# Patient Record
Sex: MALE | Race: BLACK OR AFRICAN AMERICAN | Employment: OTHER | URBAN - METROPOLITAN AREA
[De-identification: names, ages, dates, MRNs, and addresses within clinical notes are randomized per-mention and may not be internally consistent; named-entity substitution may affect disease eponyms.]

---

## 2018-02-24 ENCOUNTER — APPOINTMENT (OUTPATIENT)
Dept: GENERAL RADIOLOGY | Age: 68
End: 2018-02-24
Payer: MEDICARE

## 2018-02-24 ENCOUNTER — HOSPITAL ENCOUNTER (EMERGENCY)
Age: 68
Discharge: HOME | End: 2018-02-24
Attending: STUDENT IN AN ORGANIZED HEALTH CARE EDUCATION/TRAINING PROGRAM
Payer: MEDICARE

## 2018-02-24 ENCOUNTER — APPOINTMENT (OUTPATIENT)
Dept: CT IMAGING | Age: 68
End: 2018-02-24
Payer: MEDICARE

## 2018-02-24 VITALS
WEIGHT: 172 LBS | HEIGHT: 73 IN | BODY MASS INDEX: 22.8 KG/M2 | OXYGEN SATURATION: 96 % | DIASTOLIC BLOOD PRESSURE: 67 MMHG | HEART RATE: 60 BPM | RESPIRATION RATE: 29 BRPM | SYSTOLIC BLOOD PRESSURE: 154 MMHG | TEMPERATURE: 98.2 F

## 2018-02-24 DIAGNOSIS — N32.89 BLADDER MASS: ICD-10-CM

## 2018-02-24 DIAGNOSIS — M16.11 OSTEOARTHRITIS OF RIGHT HIP, UNSPECIFIED OSTEOARTHRITIS TYPE: Primary | ICD-10-CM

## 2018-02-24 PROCEDURE — 99283 EMERGENCY DEPT VISIT LOW MDM: CPT

## 2018-02-24 PROCEDURE — 73502 X-RAY EXAM HIP UNI 2-3 VIEWS: CPT

## 2018-02-24 PROCEDURE — 73700 CT LOWER EXTREMITY W/O DYE: CPT

## 2018-02-24 PROCEDURE — 6370000000 HC RX 637 (ALT 250 FOR IP): Performed by: STUDENT IN AN ORGANIZED HEALTH CARE EDUCATION/TRAINING PROGRAM

## 2018-02-24 RX ORDER — TRAMADOL HYDROCHLORIDE 50 MG/1
50 TABLET ORAL EVERY 8 HOURS PRN
Qty: 9 TABLET | Refills: 0 | Status: SHIPPED | OUTPATIENT
Start: 2018-02-24 | End: 2018-03-06

## 2018-02-24 RX ORDER — TRAMADOL HYDROCHLORIDE 50 MG/1
100 TABLET ORAL ONCE
Status: COMPLETED | OUTPATIENT
Start: 2018-02-24 | End: 2018-02-24

## 2018-02-24 RX ORDER — ACETAMINOPHEN 500 MG
1000 TABLET ORAL EVERY 6 HOURS PRN
Qty: 30 TABLET | Refills: 0 | Status: ON HOLD | OUTPATIENT
Start: 2018-02-24 | End: 2020-09-26 | Stop reason: ALTCHOICE

## 2018-02-24 RX ADMIN — TRAMADOL HYDROCHLORIDE 100 MG: 50 TABLET, FILM COATED ORAL at 13:22

## 2018-02-24 ASSESSMENT — PAIN SCALES - GENERAL
PAINLEVEL_OUTOF10: 8
PAINLEVEL_OUTOF10: 8

## 2018-02-24 ASSESSMENT — ENCOUNTER SYMPTOMS
COUGH: 0
VOMITING: 0
ABDOMINAL PAIN: 0
DIARRHEA: 0
NAUSEA: 0
SHORTNESS OF BREATH: 0

## 2018-02-24 ASSESSMENT — PAIN DESCRIPTION - LOCATION: LOCATION: HIP

## 2018-02-24 ASSESSMENT — PAIN DESCRIPTION - ORIENTATION: ORIENTATION: RIGHT

## 2018-02-24 NOTE — ED PROVIDER NOTES
 Smokeless tobacco: Never Used    Alcohol use No    Drug use: No    Sexual activity: Not Asked     Other Topics Concern    None     Social History Narrative    None       SCREENINGS             PHYSICAL EXAM    (up to 7 for level 4, 8 or more for level 5)     ED Triage Vitals [02/24/18 1218]   BP Temp Temp Source Pulse Resp SpO2 Height Weight   (!) 154/67 98.2 °F (36.8 °C) Oral 60 29 96 % 6' 1\" (1.854 m) 172 lb (78 kg)       Physical Exam   Constitutional: He is oriented to person, place, and time. He appears well-developed and well-nourished. He is active. No distress. HENT:   Head: Normocephalic and atraumatic. Mouth/Throat: Mucous membranes are normal.   Neck: Normal range of motion. Neck supple. Cardiovascular: Normal rate, regular rhythm, normal heart sounds, intact distal pulses and normal pulses. Pulmonary/Chest: Effort normal and breath sounds normal.   Abdominal: Soft. Normal appearance and bowel sounds are normal. There is no tenderness. Musculoskeletal:        Right hip: He exhibits decreased range of motion and tenderness. Minimal tenderness to palpation of hip. Pain elicited with range of motion. Minimal pain elicited with logroll test.  Sensation intact to light touch. Bilateral distal 2+ distal pulses. Neurological: He is alert and oriented to person, place, and time. He has normal strength. Skin: Skin is warm, dry and intact. No rash noted. He is not diaphoretic. Nursing note and vitals reviewed. All other labs were within normal range or not returned as of this dictation. EMERGENCY DEPARTMENT COURSE and DIFFERENTIAL DIAGNOSIS/MDM:   Vitals:    Vitals:    02/24/18 1218   BP: (!) 154/67   Pulse: 60   Resp: 29   Temp: 98.2 °F (36.8 °C)   TempSrc: Oral   SpO2: 96%   Weight: 172 lb (78 kg)   Height: 6' 1\" (1.854 m)       ED Course        This patient was seen with attending physician Fredo Martinez    Patient's x-ray does reveal severe degenerative joint disease.   CT

## 2019-11-21 ENCOUNTER — HOSPITAL ENCOUNTER (OUTPATIENT)
Dept: INFUSION THERAPY | Age: 69
Setting detail: INFUSION SERIES
Discharge: HOME | End: 2019-11-21
Payer: MEDICARE

## 2019-11-21 VITALS
TEMPERATURE: 97.9 F | OXYGEN SATURATION: 98 % | HEART RATE: 68 BPM | RESPIRATION RATE: 18 BRPM | DIASTOLIC BLOOD PRESSURE: 70 MMHG | SYSTOLIC BLOOD PRESSURE: 130 MMHG

## 2019-11-21 PROCEDURE — 2580000003 HC RX 258

## 2019-11-21 PROCEDURE — 96413 CHEMO IV INFUSION 1 HR: CPT

## 2019-11-21 PROCEDURE — 6360000002 HC RX W HCPCS: Performed by: INTERNAL MEDICINE

## 2019-11-21 PROCEDURE — 96401 CHEMO ANTI-NEOPL SQ/IM: CPT

## 2019-11-21 PROCEDURE — 96375 TX/PRO/DX INJ NEW DRUG ADDON: CPT

## 2019-11-21 PROCEDURE — 2580000003 HC RX 258: Performed by: INTERNAL MEDICINE

## 2019-11-21 RX ORDER — DIPHENHYDRAMINE HYDROCHLORIDE 50 MG/ML
25 INJECTION INTRAMUSCULAR; INTRAVENOUS ONCE
Status: COMPLETED | OUTPATIENT
Start: 2019-11-21 | End: 2019-11-21

## 2019-11-21 RX ORDER — SODIUM CHLORIDE 9 MG/ML
INJECTION, SOLUTION INTRAVENOUS
Status: COMPLETED
Start: 2019-11-21 | End: 2019-11-21

## 2019-11-21 RX ADMIN — DIPHENHYDRAMINE HYDROCHLORIDE 25 MG: 50 INJECTION INTRAMUSCULAR; INTRAVENOUS at 15:09

## 2019-11-21 RX ADMIN — WATER 10 ML: 1 INJECTION INTRAMUSCULAR; INTRAVENOUS; SUBCUTANEOUS at 15:07

## 2019-11-21 RX ADMIN — DENOSUMAB 120 MG: 120 INJECTION SUBCUTANEOUS at 13:40

## 2019-11-21 RX ADMIN — DEXAMETHASONE SODIUM PHOSPHATE: 4 INJECTION, SOLUTION INTRAMUSCULAR; INTRAVENOUS at 14:16

## 2019-11-21 RX ADMIN — HYDROCORTISONE SODIUM SUCCINATE 100 MG: 100 INJECTION, POWDER, FOR SOLUTION INTRAMUSCULAR; INTRAVENOUS at 15:08

## 2019-11-21 RX ADMIN — ONDANSETRON: 2 INJECTION INTRAMUSCULAR; INTRAVENOUS at 13:55

## 2019-11-21 RX ADMIN — SODIUM CHLORIDE 116 MG: 9 INJECTION, SOLUTION INTRAVENOUS at 14:33

## 2019-11-21 RX ADMIN — SODIUM CHLORIDE 250 ML: 9 INJECTION, SOLUTION INTRAVENOUS at 13:09

## 2019-11-21 NOTE — FLOWSHEET NOTE
solucortef and benadryl given per order. Vital signs stable. Legs remain elevated. Will reassess in 15 minutes. Family remains at side.

## 2019-11-21 NOTE — FLOWSHEET NOTE
Vital signs taken and stable. Denies any shortness of breath, chest pain, or dizziness. Family at bedside. Chemo treatment restarted. Nurse at side for observation.

## 2019-11-21 NOTE — FLOWSHEET NOTE
Patient left the unit via wheelchair. All equipment used in the care for this patient has been cleaned.

## 2019-11-21 NOTE — FLOWSHEET NOTE
Legs remain elevated. States that he feels better. Sweating is gone. Slight dizziness remains. Blood pressure is 130/77 with a heart rate of 66.

## 2019-11-21 NOTE — FLOWSHEET NOTE
Called to the room. Patient diaphoretic. Chemo stopped. States that he is having abdominal pain. Very pale. Blood pressure 192/88 with a heart rate of 44. Denies any chest pain, shortness of breath. Complains of dizziness. Elevated legs.  Call to Dr. Ailyn Paz.

## 2019-11-21 NOTE — FLOWSHEET NOTE
Spoke to Dr. Gunjan Cruz regarding patient having a reaction. New orders given. If after 15 -20 minutes he is feeling better, we may resume the chemo.

## 2019-12-12 ENCOUNTER — HOSPITAL ENCOUNTER (OUTPATIENT)
Dept: INFUSION THERAPY | Age: 69
Setting detail: INFUSION SERIES
End: 2019-12-12
Payer: MEDICARE

## 2019-12-12 ENCOUNTER — HOSPITAL ENCOUNTER (OUTPATIENT)
Dept: INFUSION THERAPY | Age: 69
Setting detail: INFUSION SERIES
Discharge: HOME | End: 2019-12-12
Payer: MEDICARE

## 2019-12-12 VITALS
SYSTOLIC BLOOD PRESSURE: 157 MMHG | DIASTOLIC BLOOD PRESSURE: 80 MMHG | RESPIRATION RATE: 18 BRPM | HEIGHT: 72 IN | HEART RATE: 70 BPM | WEIGHT: 165 LBS | BODY MASS INDEX: 22.35 KG/M2 | OXYGEN SATURATION: 99 % | TEMPERATURE: 97.8 F

## 2019-12-12 LAB
GLUCOSE BLD-MCNC: 88 MG/DL (ref 60–115)
PERFORMED ON: NORMAL

## 2019-12-12 PROCEDURE — 96375 TX/PRO/DX INJ NEW DRUG ADDON: CPT

## 2019-12-12 PROCEDURE — 6360000002 HC RX W HCPCS

## 2019-12-12 PROCEDURE — 6360000002 HC RX W HCPCS: Performed by: INTERNAL MEDICINE

## 2019-12-12 PROCEDURE — 96413 CHEMO IV INFUSION 1 HR: CPT

## 2019-12-12 PROCEDURE — 2580000003 HC RX 258

## 2019-12-12 PROCEDURE — 2580000003 HC RX 258: Performed by: INTERNAL MEDICINE

## 2019-12-12 RX ORDER — DEXAMETHASONE 4 MG/1
4 TABLET ORAL 2 TIMES DAILY WITH MEALS
Status: ON HOLD | COMMUNITY
End: 2020-11-18

## 2019-12-12 RX ORDER — BICALUTAMIDE 50 MG/1
50 TABLET, FILM COATED ORAL DAILY
Status: ON HOLD | COMMUNITY
End: 2020-11-18

## 2019-12-12 RX ORDER — PROCHLORPERAZINE MALEATE 10 MG
10 TABLET ORAL EVERY 6 HOURS PRN
Status: ON HOLD | COMMUNITY
End: 2020-12-18 | Stop reason: HOSPADM

## 2019-12-12 RX ORDER — SODIUM CHLORIDE 9 MG/ML
INJECTION, SOLUTION INTRAVENOUS
Status: COMPLETED
Start: 2019-12-12 | End: 2019-12-12

## 2019-12-12 RX ORDER — OXYCODONE HYDROCHLORIDE 5 MG/1
5 CAPSULE ORAL EVERY 6 HOURS PRN
COMMUNITY
End: 2020-03-25

## 2019-12-12 RX ADMIN — DIPHENHYDRAMINE HYDROCHLORIDE 25 MG: 50 INJECTION, SOLUTION INTRAMUSCULAR; INTRAVENOUS at 13:39

## 2019-12-12 RX ADMIN — DEXAMETHASONE SODIUM PHOSPHATE: 10 INJECTION INTRAMUSCULAR; INTRAVENOUS at 13:22

## 2019-12-12 RX ADMIN — HYDROCORTISONE SODIUM SUCCINATE 200 MG: 100 INJECTION, POWDER, FOR SOLUTION INTRAMUSCULAR; INTRAVENOUS at 14:40

## 2019-12-12 RX ADMIN — ONDANSETRON: 2 INJECTION INTRAMUSCULAR; INTRAVENOUS at 13:04

## 2019-12-12 RX ADMIN — SODIUM CHLORIDE 116 MG: 9 INJECTION, SOLUTION INTRAVENOUS at 14:09

## 2019-12-12 RX ADMIN — WATER 10 ML: 1 INJECTION INTRAMUSCULAR; INTRAVENOUS; SUBCUTANEOUS at 14:41

## 2019-12-12 RX ADMIN — SODIUM CHLORIDE 250 ML: 9 INJECTION, SOLUTION INTRAVENOUS at 12:29

## 2019-12-12 NOTE — FLOWSHEET NOTE
Patient yelling that his stomach on the right side is aching. Becoming diaphoretic and complaining of dizziness. Chemo stopped. Vs bp 185/97 HR 42 O2 sat is 93%. Blood sugar is 88.  Call to [facility]

## 2019-12-12 NOTE — FLOWSHEET NOTE
Patient is feeling much better. Dizziness is gone. Denies any nausea, skin is dry, denies any abdominal pain. solucortef 200 mg iv given. Call light within reach. Family at side.

## 2019-12-12 NOTE — FLOWSHEET NOTE
Patient to the floor ambulatory for his cycle 2 chemo treatment. Vital signs taken. Denies any discomfort. Call light within reach. Spoke to [facility] regarding labs and progress note from 12/9.   They will fax along with home medication list.

## 2019-12-12 NOTE — FLOWSHEET NOTE
Patient states \"I feel good\"  Vital signs are stable. Will resume chemo treatment at a lower rate.

## 2019-12-12 NOTE — FLOWSHEET NOTE
Spoke to Dr. Leslie Deleon and made him aware of all of his symptoms and new orders received. We are to give solucortef 200 mg now and wait 20 minutes. If he is doing better, them we may resume his chemo treatment.

## 2019-12-12 NOTE — FLOWSHEET NOTE
Declined an AVS. Left the unit via wheelchair. All equipment used in the care for this patient has been cleaned.

## 2019-12-12 NOTE — FLOWSHEET NOTE
Spoke to Dr. Kenton Varela and made him aware. He stated that he will add solucortef pre chemo treatment next infusion.

## 2020-01-10 ENCOUNTER — HOSPITAL ENCOUNTER (OUTPATIENT)
Dept: INFUSION THERAPY | Age: 70
Setting detail: INFUSION SERIES
Discharge: HOME | End: 2020-01-10
Payer: MEDICARE

## 2020-01-10 VITALS
HEART RATE: 73 BPM | RESPIRATION RATE: 18 BRPM | TEMPERATURE: 98 F | SYSTOLIC BLOOD PRESSURE: 140 MMHG | DIASTOLIC BLOOD PRESSURE: 86 MMHG

## 2020-01-10 PROCEDURE — 96367 TX/PROPH/DG ADDL SEQ IV INF: CPT

## 2020-01-10 PROCEDURE — 96375 TX/PRO/DX INJ NEW DRUG ADDON: CPT

## 2020-01-10 PROCEDURE — 2580000003 HC RX 258: Performed by: INTERNAL MEDICINE

## 2020-01-10 PROCEDURE — 2580000003 HC RX 258

## 2020-01-10 PROCEDURE — 96413 CHEMO IV INFUSION 1 HR: CPT

## 2020-01-10 PROCEDURE — 6360000002 HC RX W HCPCS: Performed by: INTERNAL MEDICINE

## 2020-01-10 RX ORDER — SODIUM CHLORIDE 9 MG/ML
INJECTION, SOLUTION INTRAVENOUS
Status: COMPLETED
Start: 2020-01-10 | End: 2020-01-10

## 2020-01-10 RX ADMIN — DOCETAXEL 116 MG: 20 INJECTION INTRAVENOUS at 13:05

## 2020-01-10 RX ADMIN — HYDROCORTISONE SODIUM SUCCINATE 100 MG: 100 INJECTION, POWDER, FOR SOLUTION INTRAMUSCULAR; INTRAVENOUS at 12:55

## 2020-01-10 RX ADMIN — DIPHENHYDRAMINE HYDROCHLORIDE 25 MG: 50 INJECTION, SOLUTION INTRAMUSCULAR; INTRAVENOUS at 12:31

## 2020-01-10 RX ADMIN — SODIUM CHLORIDE 250 ML: 9 INJECTION, SOLUTION INTRAVENOUS at 12:05

## 2020-01-10 RX ADMIN — DEXAMETHASONE SODIUM PHOSPHATE: 10 INJECTION, SOLUTION INTRAMUSCULAR; INTRAVENOUS at 11:36

## 2020-01-10 RX ADMIN — ONDANSETRON: 2 INJECTION INTRAMUSCULAR; INTRAVENOUS at 12:04

## 2020-01-10 NOTE — PROGRESS NOTES
Pt arrived to [facility] at 1030, d/t registration. Accompanied by sister and friend. States has no problems in between treatments. Pt had reactions with last two treatments and was medicated with rescue meds. Pt has requested to receive the rescue med before chemo, to avoid reaction. Stated \" I don't want to feel like that.   i'd rather  Have the medicine first.\"

## 2020-01-10 NOTE — PROGRESS NOTES
Had initiated taxotere. Pt c;o diaphoresis, abd pain. Stopped med. Will recheck in 30 minutes since premed was given and did not wait the 30 minutes.

## 2020-01-31 ENCOUNTER — HOSPITAL ENCOUNTER (OUTPATIENT)
Dept: INFUSION THERAPY | Age: 70
Setting detail: INFUSION SERIES
Discharge: HOME | End: 2020-01-31
Payer: MEDICARE

## 2020-01-31 VITALS
HEART RATE: 63 BPM | OXYGEN SATURATION: 99 % | DIASTOLIC BLOOD PRESSURE: 66 MMHG | SYSTOLIC BLOOD PRESSURE: 127 MMHG | RESPIRATION RATE: 18 BRPM | TEMPERATURE: 97.1 F

## 2020-01-31 PROCEDURE — 6360000002 HC RX W HCPCS: Performed by: INTERNAL MEDICINE

## 2020-01-31 PROCEDURE — 96413 CHEMO IV INFUSION 1 HR: CPT

## 2020-01-31 PROCEDURE — 2580000003 HC RX 258: Performed by: INTERNAL MEDICINE

## 2020-01-31 PROCEDURE — 2580000003 HC RX 258

## 2020-01-31 PROCEDURE — 6360000002 HC RX W HCPCS

## 2020-01-31 PROCEDURE — 96375 TX/PRO/DX INJ NEW DRUG ADDON: CPT

## 2020-01-31 RX ORDER — SODIUM CHLORIDE 9 MG/ML
INJECTION, SOLUTION INTRAVENOUS
Status: COMPLETED
Start: 2020-01-31 | End: 2020-01-31

## 2020-01-31 RX ORDER — DIPHENHYDRAMINE HYDROCHLORIDE 50 MG/ML
25 INJECTION INTRAMUSCULAR; INTRAVENOUS ONCE
Status: COMPLETED | OUTPATIENT
Start: 2020-01-31 | End: 2020-01-31

## 2020-01-31 RX ORDER — SODIUM CHLORIDE 9 MG/ML
INJECTION, SOLUTION INTRAVENOUS
Status: DISCONTINUED
Start: 2020-01-31 | End: 2020-02-01 | Stop reason: HOSPADM

## 2020-01-31 RX ADMIN — HYDROCORTISONE SODIUM SUCCINATE 200 MG: 100 INJECTION, POWDER, FOR SOLUTION INTRAMUSCULAR; INTRAVENOUS at 13:28

## 2020-01-31 RX ADMIN — DIPHENHYDRAMINE HYDROCHLORIDE 25 MG: 50 INJECTION INTRAMUSCULAR; INTRAVENOUS at 13:37

## 2020-01-31 RX ADMIN — DIPHENHYDRAMINE HYDROCHLORIDE 25 MG: 50 INJECTION INTRAMUSCULAR; INTRAVENOUS at 12:28

## 2020-01-31 RX ADMIN — HYDROCORTISONE SODIUM SUCCINATE 100 MG: 100 INJECTION, POWDER, FOR SOLUTION INTRAMUSCULAR; INTRAVENOUS at 11:56

## 2020-01-31 RX ADMIN — DEXAMETHASONE SODIUM PHOSPHATE: 10 INJECTION, SOLUTION INTRAMUSCULAR; INTRAVENOUS at 11:37

## 2020-01-31 RX ADMIN — SODIUM CHLORIDE 250 ML: 9 INJECTION, SOLUTION INTRAVENOUS at 11:36

## 2020-01-31 RX ADMIN — SODIUM CHLORIDE 116 MG: 9 INJECTION, SOLUTION INTRAVENOUS at 12:51

## 2020-01-31 RX ADMIN — ONDANSETRON: 2 INJECTION INTRAMUSCULAR; INTRAVENOUS at 12:03

## 2020-01-31 NOTE — PROGRESS NOTES
Called and left message on doctor voicemail line that pt did well after meds. Also suggested  Doctor if pt would benefit from pepcid as a premed. Roe Hernandez

## 2020-01-31 NOTE — PROGRESS NOTES
Spoke with dr. Bridgette Bassett again. Pt is doing fine. Dr. Sergo Boogie can do treatment, but if starts to react, then stop and do not complete it.

## 2020-01-31 NOTE — PROGRESS NOTES
Pt to [facility] via w/c with friend. Stated no c/o. VS were taken. Heart rate was elevated. Called Dr. Jihan Mix.   After another 5 minutes after spoke with Dr. Jihan Mix, heart rate to 84. Apical was checked during increased heart rate. Was regular rhythm.

## 2020-01-31 NOTE — PROGRESS NOTES
Treatment completed without reaction after extra rescue meds were given. Pt tolerated treatment well. Instructed pt to keep next appt. My at [facility], said if Dr. Olivier Haskins wants to see pt earlier, she will call patient.

## 2020-01-31 NOTE — PROGRESS NOTES
Pt started getting pain in abd, \"all over\". Stopped med. Heart rate 48, P.Ox 95%. Pt states this is the worse one yet. All premeds were given.

## 2020-01-31 NOTE — PROGRESS NOTES
Orders were received from Dr. Jen Dudley.  We are to hold treatment. Pt received rescue meds. Is relaxing in recliner. States is doing okay. Addendum to earlier note, that when pt started to react was guarding abd, diaphoretic, flushed.

## 2020-01-31 NOTE — PROGRESS NOTES
Pt to [facility] ambulatory, acompanied by friend. States no c/o. VS taken. Heart rate up. Called Dr. Jessica Martinez.  Updated doctor. He said if heart rate settles down under 100lbpm, then okay to give chemo. Pt states has been on mind of reactions from previous treatments. Reassuring pt that premeds will be given and will wait the 30 minutes before treatment initiated.

## 2020-02-05 ENCOUNTER — HOSPITAL ENCOUNTER (OUTPATIENT)
Dept: INFUSION THERAPY | Age: 70
Setting detail: INFUSION SERIES
Discharge: HOME | End: 2020-02-05
Payer: MEDICARE

## 2020-02-05 VITALS
HEART RATE: 74 BPM | DIASTOLIC BLOOD PRESSURE: 78 MMHG | SYSTOLIC BLOOD PRESSURE: 134 MMHG | TEMPERATURE: 98.9 F | RESPIRATION RATE: 18 BRPM

## 2020-02-05 PROCEDURE — 6360000002 HC RX W HCPCS: Performed by: INTERNAL MEDICINE

## 2020-02-05 PROCEDURE — 96401 CHEMO ANTI-NEOPL SQ/IM: CPT

## 2020-02-05 RX ADMIN — DENOSUMAB 120 MG: 120 INJECTION SUBCUTANEOUS at 14:02

## 2020-02-05 NOTE — PROGRESS NOTES
Pt to [facility] ambulatory, with sister, for xgeva injection. Is not sure if had before. Stated had an injection at the doctor office a few months ago. lexicomp info given on med.   Pt states will take AVS.

## 2020-02-24 ENCOUNTER — HOSPITAL ENCOUNTER (OUTPATIENT)
Dept: INFUSION THERAPY | Age: 70
Setting detail: INFUSION SERIES
Discharge: HOME | End: 2020-02-24
Payer: MEDICARE

## 2020-02-24 VITALS
RESPIRATION RATE: 16 BRPM | OXYGEN SATURATION: 100 % | HEART RATE: 73 BPM | TEMPERATURE: 97.5 F | SYSTOLIC BLOOD PRESSURE: 145 MMHG | DIASTOLIC BLOOD PRESSURE: 70 MMHG

## 2020-02-24 LAB
HCT VFR BLD CALC: 39.8 % (ref 42–52)
HEMOGLOBIN: 13.4 G/DL (ref 14–18)
MCH RBC QN AUTO: 30.6 PG (ref 27–31.3)
MCHC RBC AUTO-ENTMCNC: 33.6 % (ref 33–37)
MCV RBC AUTO: 91.1 FL (ref 80–100)
PDW BLD-RTO: 15.1 % (ref 11.5–14.5)
PLATELET # BLD: 284 K/UL (ref 130–400)
RBC # BLD: 4.37 M/UL (ref 4.7–6.1)
WBC # BLD: 7.8 K/UL (ref 4.8–10.8)

## 2020-02-24 PROCEDURE — 96413 CHEMO IV INFUSION 1 HR: CPT

## 2020-02-24 PROCEDURE — 6360000002 HC RX W HCPCS: Performed by: INTERNAL MEDICINE

## 2020-02-24 PROCEDURE — 36415 COLL VENOUS BLD VENIPUNCTURE: CPT

## 2020-02-24 PROCEDURE — 85027 COMPLETE CBC AUTOMATED: CPT

## 2020-02-24 PROCEDURE — 96376 TX/PRO/DX INJ SAME DRUG ADON: CPT

## 2020-02-24 PROCEDURE — 96375 TX/PRO/DX INJ NEW DRUG ADDON: CPT

## 2020-02-24 PROCEDURE — 2580000003 HC RX 258

## 2020-02-24 PROCEDURE — 2500000003 HC RX 250 WO HCPCS: Performed by: INTERNAL MEDICINE

## 2020-02-24 PROCEDURE — 96374 THER/PROPH/DIAG INJ IV PUSH: CPT

## 2020-02-24 PROCEDURE — 2580000003 HC RX 258: Performed by: INTERNAL MEDICINE

## 2020-02-24 RX ORDER — SODIUM CHLORIDE 9 MG/ML
INJECTION, SOLUTION INTRAVENOUS
Status: COMPLETED
Start: 2020-02-24 | End: 2020-02-24

## 2020-02-24 RX ORDER — SODIUM CHLORIDE 9 MG/ML
INJECTION, SOLUTION INTRAVENOUS
Status: DISCONTINUED
Start: 2020-02-24 | End: 2020-02-25 | Stop reason: HOSPADM

## 2020-02-24 RX ADMIN — ONDANSETRON: 2 INJECTION INTRAMUSCULAR; INTRAVENOUS at 10:55

## 2020-02-24 RX ADMIN — HYDROCORTISONE SODIUM SUCCINATE 100 MG: 100 INJECTION, POWDER, FOR SOLUTION INTRAMUSCULAR; INTRAVENOUS at 12:23

## 2020-02-24 RX ADMIN — SODIUM CHLORIDE 116 MG: 9 INJECTION, SOLUTION INTRAVENOUS at 13:25

## 2020-02-24 RX ADMIN — DIPHENHYDRAMINE HYDROCHLORIDE 25 MG: 50 INJECTION, SOLUTION INTRAMUSCULAR; INTRAVENOUS at 11:14

## 2020-02-24 RX ADMIN — WATER 10 ML: 1 INJECTION INTRAMUSCULAR; INTRAVENOUS; SUBCUTANEOUS at 12:23

## 2020-02-24 RX ADMIN — FAMOTIDINE: 10 INJECTION, SOLUTION INTRAVENOUS at 11:57

## 2020-02-24 RX ADMIN — SODIUM CHLORIDE 250 ML: 9 INJECTION, SOLUTION INTRAVENOUS at 10:55

## 2020-02-24 RX ADMIN — HYDROCORTISONE SODIUM SUCCINATE 200 MG: 100 INJECTION, POWDER, FOR SOLUTION INTRAMUSCULAR; INTRAVENOUS at 13:40

## 2020-02-24 RX ADMIN — DEXAMETHASONE SODIUM PHOSPHATE: 4 INJECTION, SOLUTION INTRAMUSCULAR; INTRAVENOUS at 11:41

## 2020-02-24 NOTE — FLOWSHEET NOTE
Patient resting in chair, vitals within normal limits. Patient denies any further sypmtoms Infusion still on hold, awaiting return physician call.

## 2020-02-24 NOTE — FLOWSHEET NOTE
Spoke to Ivis Edmondson at [facility]. Call place to Dr. Kelton Encarnacion regarding current orders.

## 2020-02-24 NOTE — FLOWSHEET NOTE
Infusion complete with no further complications. AVS and reminder card provided for next appointment. Patient to have Xgeva injection on 3/4/20. Patient aware. Patient ambulated off unit.

## 2020-02-24 NOTE — FLOWSHEET NOTE
CBC Lab results called to Dr Blount Duty and faxed to office, patient isidoro'd to proceed with chemotherapy

## 2020-02-24 NOTE — FLOWSHEET NOTE
Infusion stopped due to patient complaints of dizziness, chest pressure and severe abdominal pain. Noted low heart rate  and pulse ox 87%. On 2 liters oxygen. Solumedrol given as ordered. Patient reported relief of all symptoms after medication stopped and solumedrol provided. .Pulse ox improved to 100% on 2liters nasal cannula and heart rate improved to 67. Dr Candice Davidson office notified, awaiting return call. RN to stay with patient and monitor closely.

## 2020-02-26 ENCOUNTER — HOSPITAL ENCOUNTER (EMERGENCY)
Age: 70
Discharge: HOME | End: 2020-02-26
Attending: EMERGENCY MEDICINE
Payer: MEDICARE

## 2020-02-26 ENCOUNTER — APPOINTMENT (OUTPATIENT)
Dept: CT IMAGING | Age: 70
End: 2020-02-26
Payer: MEDICARE

## 2020-02-26 ENCOUNTER — APPOINTMENT (OUTPATIENT)
Dept: GENERAL RADIOLOGY | Age: 70
End: 2020-02-26
Payer: MEDICARE

## 2020-02-26 ENCOUNTER — OFFICE VISIT (OUTPATIENT)
Dept: PALLATIVE CARE | Age: 70
End: 2020-02-26
Payer: MEDICARE

## 2020-02-26 VITALS
WEIGHT: 177 LBS | DIASTOLIC BLOOD PRESSURE: 89 MMHG | RESPIRATION RATE: 15 BRPM | HEART RATE: 91 BPM | SYSTOLIC BLOOD PRESSURE: 129 MMHG | OXYGEN SATURATION: 100 % | BODY MASS INDEX: 23.35 KG/M2 | TEMPERATURE: 97.3 F

## 2020-02-26 VITALS
DIASTOLIC BLOOD PRESSURE: 80 MMHG | RESPIRATION RATE: 18 BRPM | HEART RATE: 67 BPM | HEIGHT: 73 IN | BODY MASS INDEX: 23.46 KG/M2 | SYSTOLIC BLOOD PRESSURE: 115 MMHG | OXYGEN SATURATION: 99 % | WEIGHT: 177 LBS | TEMPERATURE: 97.6 F

## 2020-02-26 PROBLEM — M16.9 OSTEOARTHRITIS OF HIP: Status: ACTIVE | Noted: 2020-02-26

## 2020-02-26 PROBLEM — R16.0 LIVER MASS: Status: ACTIVE | Noted: 2020-02-26

## 2020-02-26 LAB
ALBUMIN SERPL-MCNC: 4.1 G/DL (ref 3.5–4.6)
ALP BLD-CCNC: 102 U/L (ref 35–104)
ALT SERPL-CCNC: 17 U/L (ref 0–41)
ANION GAP SERPL CALCULATED.3IONS-SCNC: 15 MEQ/L (ref 9–15)
AST SERPL-CCNC: 20 U/L (ref 0–40)
BASOPHILS ABSOLUTE: 0 K/UL (ref 0–0.2)
BASOPHILS RELATIVE PERCENT: 0.8 %
BILIRUB SERPL-MCNC: 0.3 MG/DL (ref 0.2–0.7)
BUN BLDV-MCNC: 12 MG/DL (ref 8–23)
CALCIUM SERPL-MCNC: 8.6 MG/DL (ref 8.5–9.9)
CHLORIDE BLD-SCNC: 105 MEQ/L (ref 95–107)
CO2: 21 MEQ/L (ref 20–31)
CREAT SERPL-MCNC: 0.74 MG/DL (ref 0.7–1.2)
D DIMER: 1.81 MG/L FEU (ref 0–0.5)
EKG ATRIAL RATE: 271 BPM
EKG Q-T INTERVAL: 326 MS
EKG QRS DURATION: 72 MS
EKG QTC CALCULATION (BAZETT): 444 MS
EKG R AXIS: 21 DEGREES
EKG T AXIS: -57 DEGREES
EKG VENTRICULAR RATE: 112 BPM
EOSINOPHILS ABSOLUTE: 0 K/UL (ref 0–0.7)
EOSINOPHILS RELATIVE PERCENT: 0.4 %
GFR AFRICAN AMERICAN: >60
GFR NON-AFRICAN AMERICAN: >60
GLOBULIN: 3.5 G/DL (ref 2.3–3.5)
GLUCOSE BLD-MCNC: 111 MG/DL (ref 70–99)
HCT VFR BLD CALC: 42.7 % (ref 42–52)
HEMOGLOBIN: 14.5 G/DL (ref 14–18)
INR BLD: 0.9
LYMPHOCYTES ABSOLUTE: 1.6 K/UL (ref 1–4.8)
LYMPHOCYTES RELATIVE PERCENT: 30.5 %
MAGNESIUM: 2.2 MG/DL (ref 1.7–2.4)
MCH RBC QN AUTO: 30.8 PG (ref 27–31.3)
MCHC RBC AUTO-ENTMCNC: 34.1 % (ref 33–37)
MCV RBC AUTO: 90.4 FL (ref 80–100)
MONOCYTES ABSOLUTE: 0.3 K/UL (ref 0.2–0.8)
MONOCYTES RELATIVE PERCENT: 4.9 %
NEUTROPHILS ABSOLUTE: 3.2 K/UL (ref 1.4–6.5)
NEUTROPHILS RELATIVE PERCENT: 63.4 %
PDW BLD-RTO: 15.1 % (ref 11.5–14.5)
PLATELET # BLD: 228 K/UL (ref 130–400)
POTASSIUM SERPL-SCNC: 3.7 MEQ/L (ref 3.4–4.9)
PROTHROMBIN TIME: 12.3 SEC (ref 12.3–14.9)
RBC # BLD: 4.72 M/UL (ref 4.7–6.1)
SODIUM BLD-SCNC: 141 MEQ/L (ref 135–144)
TOTAL PROTEIN: 7.6 G/DL (ref 6.3–8)
TROPONIN: <0.01 NG/ML (ref 0–0.01)
TSH SERPL DL<=0.05 MIU/L-ACNC: 4.45 UIU/ML (ref 0.44–3.86)
WBC # BLD: 5.1 K/UL (ref 4.8–10.8)

## 2020-02-26 PROCEDURE — 71045 X-RAY EXAM CHEST 1 VIEW: CPT

## 2020-02-26 PROCEDURE — 99284 EMERGENCY DEPT VISIT MOD MDM: CPT

## 2020-02-26 PROCEDURE — 85379 FIBRIN DEGRADATION QUANT: CPT

## 2020-02-26 PROCEDURE — 6360000004 HC RX CONTRAST MEDICATION: Performed by: EMERGENCY MEDICINE

## 2020-02-26 PROCEDURE — 84484 ASSAY OF TROPONIN QUANT: CPT

## 2020-02-26 PROCEDURE — 93005 ELECTROCARDIOGRAM TRACING: CPT | Performed by: EMERGENCY MEDICINE

## 2020-02-26 PROCEDURE — 99205 OFFICE O/P NEW HI 60 MIN: CPT | Performed by: FAMILY MEDICINE

## 2020-02-26 PROCEDURE — 71275 CT ANGIOGRAPHY CHEST: CPT

## 2020-02-26 PROCEDURE — 85610 PROTHROMBIN TIME: CPT

## 2020-02-26 PROCEDURE — 80053 COMPREHEN METABOLIC PANEL: CPT

## 2020-02-26 PROCEDURE — 96374 THER/PROPH/DIAG INJ IV PUSH: CPT

## 2020-02-26 PROCEDURE — 85025 COMPLETE CBC W/AUTO DIFF WBC: CPT

## 2020-02-26 PROCEDURE — 99497 ADVNCD CARE PLAN 30 MIN: CPT | Performed by: FAMILY MEDICINE

## 2020-02-26 PROCEDURE — 83735 ASSAY OF MAGNESIUM: CPT

## 2020-02-26 PROCEDURE — 6370000000 HC RX 637 (ALT 250 FOR IP): Performed by: EMERGENCY MEDICINE

## 2020-02-26 PROCEDURE — 84443 ASSAY THYROID STIM HORMONE: CPT

## 2020-02-26 PROCEDURE — 36415 COLL VENOUS BLD VENIPUNCTURE: CPT

## 2020-02-26 PROCEDURE — 2500000003 HC RX 250 WO HCPCS: Performed by: EMERGENCY MEDICINE

## 2020-02-26 RX ORDER — ONDANSETRON 4 MG/1
4 TABLET, FILM COATED ORAL DAILY PRN
Qty: 30 TABLET | Refills: 0 | Status: ON HOLD | OUTPATIENT
Start: 2020-02-26 | End: 2020-12-18 | Stop reason: HOSPADM

## 2020-02-26 RX ORDER — OXYCODONE HYDROCHLORIDE 10 MG/1
10 TABLET ORAL EVERY 6 HOURS PRN
Qty: 120 TABLET | Refills: 0 | Status: SHIPPED | OUTPATIENT
Start: 2020-02-26 | End: 2020-03-25 | Stop reason: SDUPTHER

## 2020-02-26 RX ORDER — DILTIAZEM HYDROCHLORIDE 60 MG/1
60 TABLET, FILM COATED ORAL ONCE
Status: COMPLETED | OUTPATIENT
Start: 2020-02-26 | End: 2020-02-26

## 2020-02-26 RX ORDER — DILTIAZEM HYDROCHLORIDE 120 MG/1
120 CAPSULE, COATED, EXTENDED RELEASE ORAL DAILY
Qty: 90 CAPSULE | Refills: 3 | Status: ON HOLD | OUTPATIENT
Start: 2020-02-26 | End: 2020-11-18

## 2020-02-26 RX ORDER — DOCUSATE SODIUM 100 MG/1
100 CAPSULE, LIQUID FILLED ORAL 2 TIMES DAILY
Qty: 60 CAPSULE | Refills: 0 | Status: SHIPPED | OUTPATIENT
Start: 2020-02-26 | End: 2020-03-27

## 2020-02-26 RX ORDER — DILTIAZEM HYDROCHLORIDE 5 MG/ML
10 INJECTION INTRAVENOUS ONCE
Status: COMPLETED | OUTPATIENT
Start: 2020-02-26 | End: 2020-02-26

## 2020-02-26 RX ADMIN — DILTIAZEM HYDROCHLORIDE 60 MG: 60 TABLET, FILM COATED ORAL at 17:20

## 2020-02-26 RX ADMIN — RIVAROXABAN 20 MG: 20 TABLET, FILM COATED ORAL at 17:19

## 2020-02-26 RX ADMIN — DILTIAZEM HYDROCHLORIDE 10 MG: 5 INJECTION INTRAVENOUS at 14:47

## 2020-02-26 RX ADMIN — IOPAMIDOL 100 ML: 755 INJECTION, SOLUTION INTRAVENOUS at 16:03

## 2020-02-26 SDOH — HEALTH STABILITY: MENTAL HEALTH: HOW OFTEN DO YOU HAVE A DRINK CONTAINING ALCOHOL?: 2-4 TIMES A MONTH

## 2020-02-26 ASSESSMENT — ENCOUNTER SYMPTOMS
DIARRHEA: 0
SHORTNESS OF BREATH: 0
VOMITING: 0
RESPIRATORY NEGATIVE: 1
BACK PAIN: 1
EYES NEGATIVE: 1
CONSTIPATION: 0
WHEEZING: 0
COUGH: 0
NAUSEA: 1
ABDOMINAL DISTENTION: 0

## 2020-02-26 NOTE — ED NOTES
Patient presents to the ER from Palliative Care with complains of irregular heart rhythm  Patient was being seen by his palliative care NP when they auscultated his heart and sent him to the ER  Patient denies chest pain, denies SOB  Complains of being tired more than usual  Patient states sometimes he can feel his heart race but right now he does not feel that it is racing  [id number] RN  02/26/20 2592

## 2020-02-26 NOTE — ED NOTES
Pt given orange juice after ok'd with Dr. Reji Covarrubias Pt denies any pain at this time.       Kita Vazquez RN  02/26/20 3933

## 2020-02-26 NOTE — PROGRESS NOTES
Right 11/2018     Social History     Socioeconomic History    Marital status: Single     Spouse name: Not on file    Number of children: Not on file    Years of education: Not on file    Highest education level: Not on file   Occupational History    Not on file   Social Needs    Financial resource strain: Not on file    Food insecurity:     Worry: Not on file     Inability: Not on file    Transportation needs:     Medical: Not on file     Non-medical: Not on file   Tobacco Use    Smoking status: Never Smoker    Smokeless tobacco: Never Used   Substance and Sexual Activity    Alcohol use: Yes     Frequency: 2-4 times a month    Drug use: No    Sexual activity: Not on file   Lifestyle    Physical activity:     Days per week: Not on file     Minutes per session: Not on file    Stress: Not on file   Relationships    Social connections:     Talks on phone: Not on file     Gets together: Not on file     Attends Sikh service: Not on file     Active member of club or organization: Not on file     Attends meetings of clubs or organizations: Not on file     Relationship status: Not on file    Intimate partner violence:     Fear of current or ex partner: Not on file     Emotionally abused: Not on file     Physically abused: Not on file     Forced sexual activity: Not on file   Other Topics Concern    Not on file   Social History Narrative    Not on file     No family history on file. No Known Allergies  Current Outpatient Medications on File Prior to Visit   Medication Sig Dispense Refill    Fe Bisgly-Succ-C-Thre-B12-FA (IRON-150 PO) Take 325 mg by mouth daily      Denosumab (XGEVA SC) Inject into the skin      oxyCODONE 5 MG capsule Take 5 mg by mouth every 6 hours as needed for Pain.       bicalutamide (CASODEX) 50 MG chemo tablet Take 50 mg by mouth daily      acetaminophen (APAP EXTRA STRENGTH) 500 MG tablet Take 2 tablets by mouth every 6 hours as needed for Pain 30 tablet 0    dexamethasone is soft. Skin:     General: Skin is warm and dry. Neurological:      Mental Status: He is alert and oriented to person, place, and time. Psychiatric:         Behavior: Behavior normal.         Assessment and Plan:      1. Cancer associated pain  - Due to pain exacerbation will increase oxy to 10mg and assess at next visit    - oxyCODONE (OXY-IR) 10 MG immediate release tablet; Take 1 tablet by mouth every 6 hours as needed for Pain (moderate to severe pain) for up to 120 days. Dispense: 120 tablet; Refill: 0    Pt is tolerating current pain meds without adverse effects or over sedation. Lowest effective dose used. Pt advised to call and notify palliative care for any adverse effects or sedation  Pt is able to maintain adequate functional level and participate in ADLs  OARRS reviewed. There is no indication of aberrant behavior  Pt advised to call for increasing or uncontrolled pain. Risk vs benefit assessed. Pt educated on risk of addiction. Pt advised to take only as prescribed and not to change frequency of pain meds without consulting palliative care first.    2. Chemotherapy-induced nausea    - ondansetron (ZOFRAN) 4 MG tablet; Take 1 tablet by mouth daily as needed for Nausea or Vomiting  Dispense: 30 tablet; Refill: 0    3. Drug-induced constipation    - docusate sodium (COLACE) 100 MG capsule; Take 1 capsule by mouth 2 times daily  Dispense: 60 capsule; Refill: 0    4. Prostate cancer ([address]  5. Metastatic bone cancer ([address]  6. Liver mass  - F/U with heme/onc as scheduled    7. Palpitations  - Due to irregular rhythym on auscultation, intermittent tachycardia, and dizziness intermittently recommended ER eval.     8. Osteoarthritis of both hips, unspecified osteoarthritis type  - See #1    9. Encounter for palliative care  - Call for any questions, concerns or change in condition. Much support, guidance and active listening provided. There are no discontinued medications.      - Advance Care Planning   We discussed Living Will (LW), and [facility]) as well as their activation. Patient choice discussed. LW/HCPOA in place No;Tasked  for assistance with completing paperwork Yes; Code status discussed Yes; signed No. Code Full code. All related questions were answered completely. - Goals of Care Discussion:  Disease process and goals of treatment were discussed in basic terms. Herman's goal is to optimize available comfort care measures to optimize pain control and maximize functional ability. We discussed the palliative care philosophy in light of those goals. We discussed all care options contingent on treatment response and QOL. Much active listening, presence, and emotional support were given. Discussed with patient/surrogate: POC, Treatment risks/benefits, and alternatives. All questions were answered. Additionally, a printed copy of the CDC medications/opioid safety informational sheet was reviewed and given to patient for reference. Opioid contract signed:Yes    Due to acuity, symptomatology and high-risk medication management, I advised patient to Return in about 4 weeks (around 3/25/2020), or if symptoms worsen or fail to improve. Thanks for the opportunity you have allowed us to provide palliative care to Melchor Cates. We will be in touch as care progresses. Please feel free to reach out to us should you have any questions or requests.     Total Time 60 mins (Adv. Care planning 17 mins)   > 50% Time Spent Counseling/Care coordination yes       JOYCE Linares - CNP    Collaborating physician: Dr. Martha Zuniga

## 2020-02-26 NOTE — ED TRIAGE NOTES
Pt a/o x3 skin pink w/d resp non labored. Pt sent by Flaquito Watters NP for irregular hr. Pt denies any heart rhythm issues. pt denies pain.  Denies sob

## 2020-02-26 NOTE — ED PROVIDER NOTES
violence:     Fear of current or ex partner: None     Emotionally abused: None     Physically abused: None     Forced sexual activity: None   Other Topics Concern    None   Social History Narrative    None       SCREENINGS              PHYSICAL EXAM    (up to 7 for level 4, 8 or more for level 5)     ED Triage Vitals [02/26/20 1420]   BP Temp Temp Source Pulse Resp SpO2 Height Weight   118/69 97.3 °F (36.3 °C) Oral 89 18 98 % -- 177 lb (80.3 kg)       Physical Exam  Vitals signs and nursing note reviewed. Constitutional:       General: He is not in acute distress. Appearance: He is well-developed. He is not diaphoretic. HENT:      Head: Normocephalic and atraumatic. Right Ear: External ear normal.      Left Ear: External ear normal.      Mouth/Throat:      Pharynx: No oropharyngeal exudate. Eyes:      Conjunctiva/sclera: Conjunctivae normal.      Pupils: Pupils are equal, round, and reactive to light. Neck:      Musculoskeletal: Normal range of motion and neck supple. Thyroid: No thyromegaly. Vascular: No JVD. Trachea: No tracheal deviation. Cardiovascular:      Rate and Rhythm: Tachycardia present. Rhythm irregular. Heart sounds: Normal heart sounds. No murmur. Pulmonary:      Effort: Pulmonary effort is normal. No respiratory distress. Breath sounds: Normal breath sounds. No wheezing. Abdominal:      General: Bowel sounds are normal.      Palpations: Abdomen is soft. Tenderness: There is no abdominal tenderness. There is no guarding. Musculoskeletal: Normal range of motion. Skin:     General: Skin is warm and dry. Findings: No rash. Neurological:      Mental Status: He is alert and oriented to person, place, and time. Cranial Nerves: No cranial nerve deficit.    Psychiatric:         Behavior: Behavior normal.         DIAGNOSTIC RESULTS     EKG: All EKG's are interpreted by the Emergency Department Physician who either signs or Co-signsthis chart response [facility])          DISPOSITION/PLAN   DISPOSITION        PATIENT REFERREDTO:  Aroldo Andre MD  [address]  [phone]    Schedule an appointment as soon as possible for a visit   for your heart rhythm issue. DISCHARGEMEDICATIONS:  New Prescriptions    DILTIAZEM (CARDIZEM CD) 120 MG EXTENDED RELEASE CAPSULE    Take 1 capsule by mouth daily    RIVAROXABAN (XARELTO) 20 MG TABS TABLET    Take 1 tablet by mouth daily (with breakfast)     Controlled Substances Monitoring:     No flowsheet data found.     (Please note that portions of this note were completed with a voice recognition program.  Efforts were made to edit the dictations but occasionally words are mis-transcribed.)    Amado Mccoy DO (electronically signed)  Attending Emergency Physician          Amado Mccoy,   02/26/20 7357 [address] [place] [address],   02/26/20 4705

## 2020-02-27 PROCEDURE — 93010 ELECTROCARDIOGRAM REPORT: CPT | Performed by: INTERNAL MEDICINE

## 2020-03-04 ENCOUNTER — HOSPITAL ENCOUNTER (OUTPATIENT)
Dept: INFUSION THERAPY | Age: 70
Setting detail: INFUSION SERIES
Discharge: HOME | End: 2020-03-04
Payer: MEDICARE

## 2020-03-04 VITALS
TEMPERATURE: 97.6 F | HEART RATE: 74 BPM | DIASTOLIC BLOOD PRESSURE: 76 MMHG | RESPIRATION RATE: 18 BRPM | SYSTOLIC BLOOD PRESSURE: 155 MMHG

## 2020-03-04 PROCEDURE — 6360000002 HC RX W HCPCS: Performed by: INTERNAL MEDICINE

## 2020-03-04 PROCEDURE — 96401 CHEMO ANTI-NEOPL SQ/IM: CPT

## 2020-03-04 RX ADMIN — DENOSUMAB 120 MG: 120 INJECTION SUBCUTANEOUS at 12:36

## 2020-03-04 NOTE — FLOWSHEET NOTE
Injection given in left posterior arm. Tolerated well. Declined an AVS. Left the unit ambulatory. All equipment used in the care for this patient has been cleaned.

## 2020-03-04 NOTE — FLOWSHEET NOTE
Patient to the floor ambulatory for his  Xgeva. Vital signs taken. Denies any discomfort. Call light within reach.

## 2020-03-24 ENCOUNTER — TELEPHONE (OUTPATIENT)
Dept: PALLATIVE CARE | Age: 70
End: 2020-03-24

## 2020-03-25 ENCOUNTER — OFFICE VISIT (OUTPATIENT)
Dept: PALLATIVE CARE | Age: 70
End: 2020-03-25
Payer: MEDICARE

## 2020-03-25 ENCOUNTER — HOSPITAL ENCOUNTER (OUTPATIENT)
Dept: INFUSION THERAPY | Age: 70
Setting detail: INFUSION SERIES
Discharge: HOME | End: 2020-03-25
Payer: MEDICARE

## 2020-03-25 VITALS
RESPIRATION RATE: 18 BRPM | SYSTOLIC BLOOD PRESSURE: 124 MMHG | HEART RATE: 69 BPM | DIASTOLIC BLOOD PRESSURE: 71 MMHG | TEMPERATURE: 97.5 F

## 2020-03-25 VITALS
WEIGHT: 180.6 LBS | BODY MASS INDEX: 23.83 KG/M2 | TEMPERATURE: 97.3 F | HEART RATE: 62 BPM | DIASTOLIC BLOOD PRESSURE: 66 MMHG | RESPIRATION RATE: 16 BRPM | SYSTOLIC BLOOD PRESSURE: 151 MMHG | OXYGEN SATURATION: 99 %

## 2020-03-25 DIAGNOSIS — M85.80 OTHER SPECIFIED DISORDERS OF BONE DENSITY AND STRUCTURE, UNSPECIFIED SITE: Primary | ICD-10-CM

## 2020-03-25 DIAGNOSIS — M16.0 OSTEOARTHRITIS OF BOTH HIPS, UNSPECIFIED OSTEOARTHRITIS TYPE: ICD-10-CM

## 2020-03-25 PROCEDURE — 99214 OFFICE O/P EST MOD 30 MIN: CPT | Performed by: FAMILY MEDICINE

## 2020-03-25 PROCEDURE — 96401 CHEMO ANTI-NEOPL SQ/IM: CPT

## 2020-03-25 PROCEDURE — 6360000002 HC RX W HCPCS: Performed by: INTERNAL MEDICINE

## 2020-03-25 RX ORDER — POLYETHYLENE GLYCOL 3350
POWDER (GRAM) MISCELLANEOUS
Status: ON HOLD | COMMUNITY
End: 2020-12-18 | Stop reason: HOSPADM

## 2020-03-25 RX ORDER — OXYCODONE HYDROCHLORIDE 10 MG/1
10 TABLET ORAL EVERY 6 HOURS PRN
Qty: 120 TABLET | Refills: 0 | Status: SHIPPED | OUTPATIENT
Start: 2020-03-25 | End: 2020-04-23 | Stop reason: SDUPTHER

## 2020-03-25 RX ADMIN — DENOSUMAB 120 MG: 120 INJECTION SUBCUTANEOUS at 09:31

## 2020-03-25 ASSESSMENT — ENCOUNTER SYMPTOMS
COUGH: 0
SHORTNESS OF BREATH: 0
ABDOMINAL DISTENTION: 0
WHEEZING: 0
CONSTIPATION: 0
NAUSEA: 1
ABDOMINAL PAIN: 1
DIARRHEA: 0
BACK PAIN: 1
EYES NEGATIVE: 1
VOMITING: 0
RESPIRATORY NEGATIVE: 1

## 2020-03-25 NOTE — FLOWSHEET NOTE
Injection given in left arm. Tolerated well. Declined an AVS. Left the unit via wheelchair. All equipment used in the care for this patient has been cleaned. PEDIATRIC TRANSPLANT CARDIOLOGY NOTE    re:Rosa Phillips  :2011    Raina Castillo MD  [address] [facility] [place] [address]    Dear Dr. Castillo:    3rd grade this year at [employer].      09/04/2019    MCV 79 09/04/2019     (H) 09/04/[facility] [address] [place]  [address]  [phone]

## 2020-03-25 NOTE — FLOWSHEET NOTE
Patient to the floor via wheelchair for his xgeva injection. Vital signs taken. Denies any discomfort. Call light within reach.

## 2020-04-23 ENCOUNTER — VIRTUAL VISIT (OUTPATIENT)
Dept: PALLATIVE CARE | Age: 70
End: 2020-04-23
Payer: MEDICARE

## 2020-04-23 PROCEDURE — 99442 PR PHYS/QHP TELEPHONE EVALUATION 11-20 MIN: CPT | Performed by: FAMILY MEDICINE

## 2020-04-23 RX ORDER — OXYCODONE HYDROCHLORIDE 10 MG/1
10 TABLET ORAL EVERY 6 HOURS PRN
Qty: 120 TABLET | Refills: 0 | Status: SHIPPED | OUTPATIENT
Start: 2020-04-23 | End: 2020-05-18 | Stop reason: SDUPTHER

## 2020-04-23 ASSESSMENT — ENCOUNTER SYMPTOMS
VOMITING: 0
ABDOMINAL DISTENTION: 0
DIARRHEA: 0
CONSTIPATION: 0
WHEEZING: 0
ABDOMINAL PAIN: 1
SHORTNESS OF BREATH: 0
NAUSEA: 1
BACK PAIN: 1
RESPIRATORY NEGATIVE: 1
COUGH: 0
EYES NEGATIVE: 1

## 2020-04-23 NOTE — PROGRESS NOTES
Lifestyle    Physical activity     Days per week: Not on file     Minutes per session: Not on file    Stress: Not on file   Relationships    Social connections     Talks on phone: Not on file     Gets together: Not on file     Attends Tenriism service: Not on file     Active member of club or organization: Not on file     Attends meetings of clubs or organizations: Not on file     Relationship status: Not on file    Intimate partner violence     Fear of current or ex partner: Not on file     Emotionally abused: Not on file     Physically abused: Not on file     Forced sexual activity: Not on file   Other Topics Concern    Not on file   Social History Narrative    Not on file     No family history on file. No Known Allergies  Current Outpatient Medications on File Prior to Visit   Medication Sig Dispense Refill    Polyethylene Glycol 3350 POWD Take by mouth      Fe Bisgly-Succ-C-Thre-B12-FA (IRON-150 PO) Take 325 mg by mouth daily      Denosumab (XGEVA SC) Inject into the skin      ondansetron (ZOFRAN) 4 MG tablet Take 1 tablet by mouth daily as needed for Nausea or Vomiting 30 tablet 0    dilTIAZem (CARDIZEM CD) 120 MG extended release capsule Take 1 capsule by mouth daily 90 capsule 3    rivaroxaban (XARELTO) 20 MG TABS tablet Take 1 tablet by mouth daily (with breakfast) 60 tablet 2    dexamethasone (DECADRON) 4 MG tablet Take 4 mg by mouth 2 times daily (with meals) Take 1 tab Bid 1 day before chemo tx and 2 days after chemo treatment.  bicalutamide (CASODEX) 50 MG chemo tablet Take 50 mg by mouth daily      prochlorperazine (COMPAZINE) 10 MG tablet Take 10 mg by mouth every 6 hours as needed      acetaminophen (APAP EXTRA STRENGTH) 500 MG tablet Take 2 tablets by mouth every 6 hours as needed for Pain 30 tablet 0     No current facility-administered medications on file prior to visit.         Review of Systems   Constitutional: Negative for appetite change, fatigue, fever and unexpected

## 2020-05-07 ENCOUNTER — HOSPITAL ENCOUNTER (OUTPATIENT)
Dept: CT IMAGING | Age: 70
Discharge: HOME | End: 2020-05-09
Payer: MEDICARE

## 2020-05-07 PROCEDURE — 74177 CT ABD & PELVIS W/CONTRAST: CPT

## 2020-05-07 PROCEDURE — 71260 CT THORAX DX C+: CPT

## 2020-05-07 PROCEDURE — 6360000004 HC RX CONTRAST MEDICATION: Performed by: INTERNAL MEDICINE

## 2020-05-07 RX ORDER — SODIUM CHLORIDE 0.9 % (FLUSH) 0.9 %
10 SYRINGE (ML) INJECTION
Status: DISPENSED | OUTPATIENT
Start: 2020-05-07 | End: 2020-05-07

## 2020-05-07 RX ADMIN — IOPAMIDOL 100 ML: 612 INJECTION, SOLUTION INTRAVENOUS at 09:53

## 2020-05-18 RX ORDER — OXYCODONE HYDROCHLORIDE 10 MG/1
10 TABLET ORAL EVERY 6 HOURS PRN
Qty: 120 TABLET | Refills: 0 | Status: SHIPPED | OUTPATIENT
Start: 2020-05-18 | End: 2020-05-22 | Stop reason: SDUPTHER

## 2020-05-22 RX ORDER — OXYCODONE HYDROCHLORIDE 10 MG/1
10 TABLET ORAL EVERY 6 HOURS PRN
Qty: 120 TABLET | Refills: 0 | Status: SHIPPED | OUTPATIENT
Start: 2020-05-22 | End: 2020-06-24 | Stop reason: SDUPTHER

## 2020-05-27 ENCOUNTER — VIRTUAL VISIT (OUTPATIENT)
Dept: PALLATIVE CARE | Age: 70
End: 2020-05-27
Payer: MEDICARE

## 2020-05-27 PROCEDURE — 99213 OFFICE O/P EST LOW 20 MIN: CPT | Performed by: FAMILY MEDICINE

## 2020-05-27 ASSESSMENT — ENCOUNTER SYMPTOMS
DIARRHEA: 0
COUGH: 0
ABDOMINAL DISTENTION: 0
CONSTIPATION: 0
ABDOMINAL PAIN: 1
RESPIRATORY NEGATIVE: 1
EYES NEGATIVE: 1
BACK PAIN: 1
WHEEZING: 0
VOMITING: 0
NAUSEA: 1
SHORTNESS OF BREATH: 0

## 2020-05-27 NOTE — PROGRESS NOTES
Prostate cancer ([facility] [address]  7. Liver mass  - F/U with heme/onc as scheduled    8. Encounter for palliative care  - Call for any questions, concerns or change in condition. Much support, guidance and active listening provided. There are no discontinued medications. Discussed with patient/surrogate: POC, Treatment risks/benefits, and alternatives including as noted above. All questions were answered. Gave much active listening, presence, and emotional support. Due to acuity, symptomatology and high-risk medication management,   I advised patient to No follow-ups on file. Total Time 15 mins   > 50% Time Spent Counseling/Care coordination?  yes     JOYCE Medellin - CNP    Collaborating physician: Dr. July Rouse

## 2020-06-01 ENCOUNTER — HOSPITAL ENCOUNTER (OUTPATIENT)
Dept: INFUSION THERAPY | Age: 70
Setting detail: INFUSION SERIES
End: 2020-06-01
Payer: MEDICARE

## 2020-06-24 ENCOUNTER — OFFICE VISIT (OUTPATIENT)
Dept: PALLATIVE CARE | Age: 70
End: 2020-06-24
Payer: MEDICARE

## 2020-06-24 VITALS
HEART RATE: 70 BPM | OXYGEN SATURATION: 97 % | DIASTOLIC BLOOD PRESSURE: 90 MMHG | BODY MASS INDEX: 24.04 KG/M2 | RESPIRATION RATE: 20 BRPM | SYSTOLIC BLOOD PRESSURE: 160 MMHG | WEIGHT: 182.2 LBS

## 2020-06-24 PROCEDURE — 99215 OFFICE O/P EST HI 40 MIN: CPT | Performed by: FAMILY MEDICINE

## 2020-06-24 RX ORDER — OXYCODONE HYDROCHLORIDE 10 MG/1
10 TABLET ORAL EVERY 6 HOURS PRN
Qty: 120 TABLET | Refills: 0 | Status: SHIPPED | OUTPATIENT
Start: 2020-06-24 | End: 2020-07-22 | Stop reason: SDUPTHER

## 2020-06-24 RX ORDER — SENNA AND DOCUSATE SODIUM 50; 8.6 MG/1; MG/1
2 TABLET, FILM COATED ORAL DAILY PRN
Qty: 120 TABLET | Refills: 3 | Status: ON HOLD | OUTPATIENT
Start: 2020-06-24 | End: 2020-12-18 | Stop reason: HOSPADM

## 2020-06-24 RX ORDER — MORPHINE SULFATE 15 MG/1
15 TABLET, FILM COATED, EXTENDED RELEASE ORAL 2 TIMES DAILY
Qty: 60 TABLET | Refills: 0 | Status: SHIPPED | OUTPATIENT
Start: 2020-06-24 | End: 2020-07-22 | Stop reason: SDUPTHER

## 2020-06-24 ASSESSMENT — ENCOUNTER SYMPTOMS
CONSTIPATION: 0
RESPIRATORY NEGATIVE: 1
EYES NEGATIVE: 1
BACK PAIN: 1
ABDOMINAL DISTENTION: 0
WHEEZING: 0
SHORTNESS OF BREATH: 0
ABDOMINAL PAIN: 1
VOMITING: 0
COUGH: 0
DIARRHEA: 0
NAUSEA: 1

## 2020-07-15 ENCOUNTER — HOSPITAL ENCOUNTER (OUTPATIENT)
Dept: INFUSION THERAPY | Age: 70
Setting detail: INFUSION SERIES
Discharge: HOME | End: 2020-07-15
Payer: MEDICARE

## 2020-07-15 VITALS
SYSTOLIC BLOOD PRESSURE: 144 MMHG | RESPIRATION RATE: 18 BRPM | HEART RATE: 75 BPM | DIASTOLIC BLOOD PRESSURE: 70 MMHG | TEMPERATURE: 97.9 F

## 2020-07-15 DIAGNOSIS — M16.0 OSTEOARTHRITIS OF BOTH HIPS, UNSPECIFIED OSTEOARTHRITIS TYPE: ICD-10-CM

## 2020-07-15 DIAGNOSIS — M85.80 OTHER SPECIFIED DISORDERS OF BONE DENSITY AND STRUCTURE, UNSPECIFIED SITE: Primary | ICD-10-CM

## 2020-07-15 PROCEDURE — 6360000002 HC RX W HCPCS: Performed by: INTERNAL MEDICINE

## 2020-07-15 PROCEDURE — 96401 CHEMO ANTI-NEOPL SQ/IM: CPT

## 2020-07-15 RX ADMIN — DENOSUMAB 120 MG: 120 INJECTION SUBCUTANEOUS at 13:21

## 2020-07-15 NOTE — FLOWSHEET NOTE
Injection provided, patient tolerated well. Provided AVS and medication printout. discussed with patient that medication will be due every month if his doctor continues to prescribe, explained that we will need a new order for next month. Patient states he has appointment with his doctor in 2 weeks . Patient ambulated off unit, all equipment cleaned after discharge.

## 2020-07-22 ENCOUNTER — OFFICE VISIT (OUTPATIENT)
Dept: PALLATIVE CARE | Age: 70
End: 2020-07-22
Payer: MEDICARE

## 2020-07-22 VITALS
OXYGEN SATURATION: 98 % | WEIGHT: 179.8 LBS | BODY MASS INDEX: 23.72 KG/M2 | DIASTOLIC BLOOD PRESSURE: 72 MMHG | RESPIRATION RATE: 20 BRPM | HEART RATE: 82 BPM | SYSTOLIC BLOOD PRESSURE: 128 MMHG

## 2020-07-22 PROCEDURE — 99214 OFFICE O/P EST MOD 30 MIN: CPT | Performed by: FAMILY MEDICINE

## 2020-07-22 RX ORDER — OXYCODONE HYDROCHLORIDE 10 MG/1
10 TABLET ORAL EVERY 6 HOURS PRN
Qty: 120 TABLET | Refills: 0 | Status: SHIPPED | OUTPATIENT
Start: 2020-07-22 | End: 2020-08-26 | Stop reason: SDUPTHER

## 2020-07-22 RX ORDER — MORPHINE SULFATE 15 MG/1
15 TABLET, FILM COATED, EXTENDED RELEASE ORAL 2 TIMES DAILY
Qty: 60 TABLET | Refills: 0 | Status: SHIPPED | OUTPATIENT
Start: 2020-07-22 | End: 2020-08-26 | Stop reason: SDUPTHER

## 2020-07-22 ASSESSMENT — ENCOUNTER SYMPTOMS
DIARRHEA: 0
COUGH: 0
CONSTIPATION: 0
WHEEZING: 0
ABDOMINAL PAIN: 1
RESPIRATORY NEGATIVE: 1
ABDOMINAL DISTENTION: 0
NAUSEA: 1
EYES NEGATIVE: 1
BACK PAIN: 1
VOMITING: 0
SHORTNESS OF BREATH: 0

## 2020-07-22 NOTE — PROGRESS NOTES
Subjective:      Patient Id: Seen at [facility] for symptom management. He was accompanied to the appointment by: self. Chief Complaint   Patient presents with    Pain    Shortness of Breath        HPI       Nerissa Veliz is a 79 y.o. male seen and examined for symptomatic mangement related to prostate cancer with mets to the bone, liver mass.      States pain is well controlled on current regime. Rates pain at a 4/10 and states it occurs in all his joints though predominantly the ones in the abdomen and pelvic area. Describes pain as a constant ache that intermittently worsens. Currently taking oxy IR at 10mg PRN and morphine 15mg po ER BID. Denies Sedation, constipation, or other adverse effects on current regime.      Weight remains stable and Appetite is good. Constipation increased on med regime though was started on metamucil without relief.        On Casodex PO and Xgeva subq monthly    Past Medical History:   Diagnosis Date    Prostate cancer ([facility] [address] 11/2019     Past Surgical History:   Procedure Laterality Date    JOINT REPLACEMENT Right 11/2018     Social History     Socioeconomic History    Marital status: Single     Spouse name: Not on file    Number of children: Not on file    Years of education: Not on file    Highest education level: Not on file   Occupational History    Not on file   Social Needs    Financial resource strain: Not on file    Food insecurity     Worry: Not on file     Inability: Not on file    Transportation needs     Medical: Not on file     Non-medical: Not on file   Tobacco Use    Smoking status: Never Smoker    Smokeless tobacco: Never Used   Substance and Sexual Activity    Alcohol use: Yes     Frequency: 2-4 times a month    Drug use: No    Sexual activity: Not on file   Lifestyle    Physical activity     Days per week: Not on file     Minutes per session: Not on file    Stress: Not on file   Relationships    Social connections     Talks on phone: Not on file Gets together: Not on file     Attends Amish service: Not on file     Active member of club or organization: Not on file     Attends meetings of clubs or organizations: Not on file     Relationship status: Not on file    Intimate partner violence     Fear of current or ex partner: Not on file     Emotionally abused: Not on file     Physically abused: Not on file     Forced sexual activity: Not on file   Other Topics Concern    Not on file   Social History Narrative    Not on file     History reviewed. No pertinent family history. No Known Allergies  Current Outpatient Medications on File Prior to Visit   Medication Sig Dispense Refill    morphine (MS CONTIN) 15 MG extended release tablet Take 1 tablet by mouth 2 times daily for 30 days. 60 tablet 0    oxyCODONE HCl (OXY-IR) 10 MG immediate release tablet Take 1 tablet by mouth every 6 hours as needed for Pain (moderate to severe pain) for up to 120 days. 120 tablet 0    sennosides-docusate sodium (SENOKOT-S) 8.6-50 MG tablet Take 2 tablets by mouth daily as needed for Constipation 120 tablet 3    Polyethylene Glycol 3350 POWD Take by mouth      Fe Bisgly-Succ-C-Thre-B12-FA (IRON-150 PO) Take 325 mg by mouth daily      Denosumab (XGEVA SC) Inject into the skin      ondansetron (ZOFRAN) 4 MG tablet Take 1 tablet by mouth daily as needed for Nausea or Vomiting 30 tablet 0    dilTIAZem (CARDIZEM CD) 120 MG extended release capsule Take 1 capsule by mouth daily 90 capsule 3    rivaroxaban (XARELTO) 20 MG TABS tablet Take 1 tablet by mouth daily (with breakfast) 60 tablet 2    dexamethasone (DECADRON) 4 MG tablet Take 4 mg by mouth 2 times daily (with meals) Take 1 tab Bid 1 day before chemo tx and 2 days after chemo treatment.       bicalutamide (CASODEX) 50 MG chemo tablet Take 50 mg by mouth daily      prochlorperazine (COMPAZINE) 10 MG tablet Take 10 mg by mouth every 6 hours as needed      acetaminophen (APAP EXTRA STRENGTH) 500 MG tablet Take 2 tablets by mouth every 6 hours as needed for Pain 30 tablet 0     No current facility-administered medications on file prior to visit. Review of Systems   Constitutional: Negative for appetite change, fatigue, fever and unexpected weight change. HENT: Negative. Eyes: Negative. Respiratory: Negative. Negative for cough, shortness of breath and wheezing. Cardiovascular: Negative. Gastrointestinal: Positive for abdominal pain and nausea. Negative for abdominal distention, constipation, diarrhea and vomiting. Endocrine: Negative. Genitourinary: Negative. Musculoskeletal: Positive for arthralgias, back pain and myalgias. Skin: Negative. Neurological: Positive for weakness. Negative for dizziness and syncope. Psychiatric/Behavioral: Negative for confusion, sleep disturbance and suicidal ideas. The patient is not nervous/anxious. Objective:   /72   Pulse 82   Resp 20   Wt 179 lb 12.8 oz (81.6 kg)   SpO2 98%   BMI 23.72 kg/m²    Wt Readings from Last 3 Encounters:   07/22/20 179 lb 12.8 oz (81.6 kg)   06/24/20 182 lb 3.2 oz (82.6 kg)   03/25/20 180 lb 9.6 oz (81.9 kg)     Physical Exam  Vitals signs reviewed. Constitutional:       Appearance: He is well-developed. He is ill-appearing. HENT:      Head: Normocephalic and atraumatic. Eyes:      Pupils: Pupils are equal, round, and reactive to light. Neck:      Musculoskeletal: Normal range of motion. Cardiovascular:      Rate and Rhythm: Normal rate and regular rhythm. Heart sounds: No murmur. Pulmonary:      Effort: Pulmonary effort is normal.      Breath sounds: Normal breath sounds. Abdominal:      General: Bowel sounds are normal.      Palpations: Abdomen is soft. Skin:     General: Skin is warm and dry. Neurological:      Mental Status: He is alert and oriented to person, place, and time. Psychiatric:         Behavior: Behavior normal.         Assessment and Plan:      1.  Cancer associated pain  2. Osteoarthritis of both hips, unspecified osteoarthritis type  - controlled on current regime    - morphine (MS CONTIN) 15 MG extended release tablet; Take 1 tablet by mouth 2 times daily for 30 days. Dispense: 60 tablet; Refill: 0  - oxyCODONE HCl (OXY-IR) 10 MG immediate release tablet; Take 1 tablet by mouth every 6 hours as needed for Pain (moderate to severe pain) for up to 120 days. Dispense: 120 tablet; Refill: 0    Pt is tolerating current pain meds without adverse effects or over sedation. Lowest effective dose used. Pt advised to call and notify palliative care for any adverse effects or sedation  Pt is able to maintain adequate functional level and participate in ADLs  OARRS reviewed. There is no indication of aberrant behavior  Pt advised to call for increasing or uncontrolled pain. Risk vs benefit assessed. Pt educated on risk of addiction. Pt advised to take only as prescribed and not to change frequency of pain meds without consulting palliative care first.    3. Constipation, unspecified constipation type  4. Drug-induced constipation  Controlled on current regime of senna s    5. Chemotherapy-induced nausea  Controlled on current regime    6. Metastatic bone cancer ([facility] [address]  7. Prostate cancer ([facility] [address]  8. Liver mass  - F/U with heme/onc as scheduled    9. Encounter for palliative care  - Call for any questions, concerns or change in condition. Much support, guidance and active listening provided. There are no discontinued medications. Discussed with patient/surrogate: POC, Treatment risks/benefits, and alternatives including as noted above. All questions were answered. Gave much active listening, presence, and emotional support. Due to acuity, symptomatology and high-risk medication management,   I advised patient to Return in about 4 weeks (around 8/19/2020), or if symptoms worsen or fail to improve. Total Time 25 mins   > 50% Time Spent Counseling/Care coordination?  yes JOYCE Alicea - CNP    Collaborating physician: Dr. Pavel Davis

## 2020-08-19 ENCOUNTER — HOSPITAL ENCOUNTER (OUTPATIENT)
Dept: INFUSION THERAPY | Age: 70
Setting detail: INFUSION SERIES
Discharge: HOME | End: 2020-08-19
Payer: MEDICARE

## 2020-08-19 ENCOUNTER — APPOINTMENT (OUTPATIENT)
Dept: INFUSION THERAPY | Age: 70
End: 2020-08-19
Payer: MEDICARE

## 2020-08-19 VITALS
HEART RATE: 72 BPM | RESPIRATION RATE: 18 BRPM | DIASTOLIC BLOOD PRESSURE: 68 MMHG | TEMPERATURE: 98.1 F | SYSTOLIC BLOOD PRESSURE: 113 MMHG

## 2020-08-19 DIAGNOSIS — M85.80 OTHER SPECIFIED DISORDERS OF BONE DENSITY AND STRUCTURE, UNSPECIFIED SITE: Primary | ICD-10-CM

## 2020-08-19 DIAGNOSIS — M16.0 OSTEOARTHRITIS OF BOTH HIPS, UNSPECIFIED OSTEOARTHRITIS TYPE: ICD-10-CM

## 2020-08-19 PROCEDURE — 6360000002 HC RX W HCPCS: Performed by: INTERNAL MEDICINE

## 2020-08-19 PROCEDURE — 96401 CHEMO ANTI-NEOPL SQ/IM: CPT

## 2020-08-19 RX ADMIN — DENOSUMAB 120 MG: 120 INJECTION SUBCUTANEOUS at 12:49

## 2020-08-19 NOTE — FLOWSHEET NOTE
Patient left unit ambulatory. Injection given in right arm. Patient tolerated. Patient given information regarding medication. Equipment used by patient was cleaned.

## 2020-08-26 ENCOUNTER — OFFICE VISIT (OUTPATIENT)
Dept: PALLATIVE CARE | Age: 70
End: 2020-08-26
Payer: MEDICARE

## 2020-08-26 VITALS
BODY MASS INDEX: 22.48 KG/M2 | HEART RATE: 93 BPM | RESPIRATION RATE: 18 BRPM | OXYGEN SATURATION: 97 % | WEIGHT: 170.4 LBS | DIASTOLIC BLOOD PRESSURE: 68 MMHG | SYSTOLIC BLOOD PRESSURE: 124 MMHG

## 2020-08-26 PROCEDURE — 99214 OFFICE O/P EST MOD 30 MIN: CPT | Performed by: FAMILY MEDICINE

## 2020-08-26 RX ORDER — ROPINIROLE 0.25 MG/1
0.25 TABLET, FILM COATED ORAL 3 TIMES DAILY
Qty: 90 TABLET | Refills: 0 | Status: ON HOLD | OUTPATIENT
Start: 2020-08-26 | End: 2020-09-26

## 2020-08-26 RX ORDER — OXYCODONE HYDROCHLORIDE 10 MG/1
10 TABLET ORAL EVERY 6 HOURS PRN
Qty: 120 TABLET | Refills: 0 | Status: SHIPPED | OUTPATIENT
Start: 2020-08-26 | End: 2020-09-18 | Stop reason: SDUPTHER

## 2020-08-26 RX ORDER — MORPHINE SULFATE 15 MG/1
15 TABLET, FILM COATED, EXTENDED RELEASE ORAL 2 TIMES DAILY
Qty: 60 TABLET | Refills: 0 | Status: SHIPPED | OUTPATIENT
Start: 2020-08-26 | End: 2020-09-18 | Stop reason: SDUPTHER

## 2020-08-26 ASSESSMENT — ENCOUNTER SYMPTOMS
VOMITING: 0
ABDOMINAL PAIN: 1
WHEEZING: 0
RESPIRATORY NEGATIVE: 1
SHORTNESS OF BREATH: 0
COUGH: 0
NAUSEA: 1
DIARRHEA: 0
EYES NEGATIVE: 1
ABDOMINAL DISTENTION: 0
BACK PAIN: 1

## 2020-08-26 NOTE — PROGRESS NOTES
Subjective:      Patient Id: Seen at [facility] for symptom management. He was accompanied to the appointment by: self. Chief Complaint   Patient presents with    Pain    Shortness of Breath        HPI       Haley Alejo is a 79 y.o. male seen and examined for symptomatic mangement related to prostate cancer with mets to the bone, liver mass. Pt is calm, cooperative and in NAD. States pain is well controlled on current regime. Rates pain at a 7/10 and states it occurs in all his joints. States he has intermittent chills and sweats. Describes pain as a constant ache that intermittently worsens. Currently taking oxy IR at 10mg PRN and morphine 15mg po ER BID. Denies Sedation, constipation, or other adverse effects on current regime. States he has been having tighting and numbness in bilateral legs off and on for many months. Primarily in hip area. Denies increased. Back pain with tightening legs.        On Casodex PO and Xgeva subq monthly    Past Medical History:   Diagnosis Date    Prostate cancer ([facility] [address] 11/2019     Past Surgical History:   Procedure Laterality Date    JOINT REPLACEMENT Right 11/2018     Social History     Socioeconomic History    Marital status: Single     Spouse name: Not on file    Number of children: Not on file    Years of education: Not on file    Highest education level: Not on file   Occupational History    Not on file   Social Needs    Financial resource strain: Not on file    Food insecurity     Worry: Not on file     Inability: Not on file    Transportation needs     Medical: Not on file     Non-medical: Not on file   Tobacco Use    Smoking status: Never Smoker    Smokeless tobacco: Never Used   Substance and Sexual Activity    Alcohol use: Yes     Frequency: 2-4 times a month    Drug use: No    Sexual activity: Not on file   Lifestyle    Physical activity     Days per week: Not on file     Minutes per session: Not on file    Stress: Not on file   Relationships  Social connections     Talks on phone: Not on file     Gets together: Not on file     Attends Religion service: Not on file     Active member of club or organization: Not on file     Attends meetings of clubs or organizations: Not on file     Relationship status: Not on file    Intimate partner violence     Fear of current or ex partner: Not on file     Emotionally abused: Not on file     Physically abused: Not on file     Forced sexual activity: Not on file   Other Topics Concern    Not on file   Social History Narrative    Not on file     History reviewed. No pertinent family history. No Known Allergies  Current Outpatient Medications on File Prior to Visit   Medication Sig Dispense Refill    sennosides-docusate sodium (SENOKOT-S) 8.6-50 MG tablet Take 2 tablets by mouth daily as needed for Constipation 120 tablet 3    Polyethylene Glycol 3350 POWD Take by mouth      Fe Bisgly-Succ-C-Thre-B12-FA (IRON-150 PO) Take 325 mg by mouth daily      Denosumab (XGEVA SC) Inject into the skin      ondansetron (ZOFRAN) 4 MG tablet Take 1 tablet by mouth daily as needed for Nausea or Vomiting 30 tablet 0    dilTIAZem (CARDIZEM CD) 120 MG extended release capsule Take 1 capsule by mouth daily 90 capsule 3    rivaroxaban (XARELTO) 20 MG TABS tablet Take 1 tablet by mouth daily (with breakfast) 60 tablet 2    dexamethasone (DECADRON) 4 MG tablet Take 4 mg by mouth 2 times daily (with meals) Take 1 tab Bid 1 day before chemo tx and 2 days after chemo treatment.  bicalutamide (CASODEX) 50 MG chemo tablet Take 50 mg by mouth daily      prochlorperazine (COMPAZINE) 10 MG tablet Take 10 mg by mouth every 6 hours as needed      acetaminophen (APAP EXTRA STRENGTH) 500 MG tablet Take 2 tablets by mouth every 6 hours as needed for Pain 30 tablet 0     No current facility-administered medications on file prior to visit.         Review of Systems   Constitutional: Negative for fatigue, fever and unexpected weight Dispense: 120 tablet; Refill: 0  - morphine (MS CONTIN) 15 MG extended release tablet; Take 1 tablet by mouth 2 times daily for 30 days. Dispense: 60 tablet; Refill: 0    Pt is tolerating current pain meds without adverse effects or over sedation. Lowest effective dose used. Pt advised to call and notify palliative care for any adverse effects or sedation  Pt is able to maintain adequate functional level and participate in ADLs  OARRS reviewed. There is no indication of aberrant behavior  Pt advised to call for increasing or uncontrolled pain. Risk vs benefit assessed. Pt educated on risk of addiction. Pt advised to take only as prescribed and not to change frequency of pain meds without consulting palliative care first.    3. Chemotherapy-induced nausea  Controlled on current regime    4. Metastatic bone cancer ([facility] [address]  5. Prostate cancer ([facility] [address]  6. Liver mass  - F/U with Heme/Onc as scheduled    7. RLS (restless legs syndrome)    - rOPINIRole (REQUIP) 0.25 MG tablet; Take 1 tablet by mouth 3 times daily  Dispense: 90 tablet; Refill: 0    8. Encounter for palliative care  - Call for any questions, concerns or change in condition. Much support, guidance and active listening provided. Medications Discontinued During This Encounter   Medication Reason    oxyCODONE HCl (OXY-IR) 10 MG immediate release tablet REORDER    morphine (MS CONTIN) 15 MG extended release tablet REORDER       Discussed with patient/surrogate: POC, Treatment risks/benefits, and alternatives including as noted above. All questions were answered. Gave much active listening, presence, and emotional support. Due to acuity, symptomatology and high-risk medication management,   I advised patient to Return in about 4 weeks (around 9/23/2020), or if symptoms worsen or fail to improve. Total Time 40 mins   > 50% Time Spent Counseling/Care coordination?  yes     Nathalie Hardin, APRN - CNP    Collaborating physician: Dr. Farzad Kahn

## 2020-09-16 ENCOUNTER — HOSPITAL ENCOUNTER (OUTPATIENT)
Dept: INFUSION THERAPY | Age: 70
Setting detail: INFUSION SERIES
Discharge: HOME | End: 2020-09-16
Payer: MEDICARE

## 2020-09-16 VITALS
DIASTOLIC BLOOD PRESSURE: 62 MMHG | TEMPERATURE: 98.9 F | RESPIRATION RATE: 18 BRPM | SYSTOLIC BLOOD PRESSURE: 117 MMHG | HEART RATE: 85 BPM

## 2020-09-16 DIAGNOSIS — M16.0 OSTEOARTHRITIS OF BOTH HIPS, UNSPECIFIED OSTEOARTHRITIS TYPE: ICD-10-CM

## 2020-09-16 DIAGNOSIS — M85.80 OTHER SPECIFIED DISORDERS OF BONE DENSITY AND STRUCTURE, UNSPECIFIED SITE: Primary | ICD-10-CM

## 2020-09-16 DIAGNOSIS — R16.0 LIVER MASS: ICD-10-CM

## 2020-09-16 PROCEDURE — 6360000002 HC RX W HCPCS: Performed by: INTERNAL MEDICINE

## 2020-09-16 PROCEDURE — 96401 CHEMO ANTI-NEOPL SQ/IM: CPT

## 2020-09-16 RX ADMIN — DENOSUMAB 120 MG: 120 INJECTION SUBCUTANEOUS at 15:42

## 2020-09-16 NOTE — FLOWSHEET NOTE
Spoke to Aneesh Sorenson at [facility]. Lupron is 22.5  Is on back order. She will get us a new order for 7.5 mg monthly to start today.

## 2020-09-16 NOTE — PROGRESS NOTES
Pt to [facility] via w/c with a friend and 2 children. States c/o pain, numbness of right side. States goes to pain management.

## 2020-09-16 NOTE — FLOWSHEET NOTE
Injection given in left arm. Tolerated well. New order for lupron received from [facility]. Will place a new therapy plan and schedule him next week to come back. Left the unit via wheelchair. All equipment used in the care for this patient has been cleaned.

## 2020-09-18 RX ORDER — OXYCODONE HYDROCHLORIDE 10 MG/1
10 TABLET ORAL EVERY 6 HOURS PRN
Qty: 120 TABLET | Refills: 0 | Status: SHIPPED | OUTPATIENT
Start: 2020-09-18 | End: 2020-09-23 | Stop reason: SDUPTHER

## 2020-09-18 RX ORDER — MORPHINE SULFATE 15 MG/1
15 TABLET, FILM COATED, EXTENDED RELEASE ORAL 2 TIMES DAILY
Qty: 60 TABLET | Refills: 0 | Status: SHIPPED | OUTPATIENT
Start: 2020-09-18 | End: 2020-09-23 | Stop reason: SDUPTHER

## 2020-09-23 ENCOUNTER — OFFICE VISIT (OUTPATIENT)
Dept: PALLATIVE CARE | Age: 70
End: 2020-09-23
Payer: MEDICARE

## 2020-09-23 VITALS
OXYGEN SATURATION: 98 % | HEART RATE: 76 BPM | SYSTOLIC BLOOD PRESSURE: 154 MMHG | RESPIRATION RATE: 18 BRPM | BODY MASS INDEX: 21.93 KG/M2 | WEIGHT: 166.2 LBS | DIASTOLIC BLOOD PRESSURE: 74 MMHG

## 2020-09-23 PROCEDURE — 99215 OFFICE O/P EST HI 40 MIN: CPT | Performed by: FAMILY MEDICINE

## 2020-09-23 RX ORDER — OXYCODONE HYDROCHLORIDE 15 MG/1
15 TABLET ORAL EVERY 6 HOURS PRN
Qty: 120 TABLET | Refills: 0 | Status: SHIPPED | OUTPATIENT
Start: 2020-09-23 | End: 2020-10-13 | Stop reason: SDUPTHER

## 2020-09-23 RX ORDER — MIRTAZAPINE 15 MG/1
7.5 TABLET, FILM COATED ORAL NIGHTLY
Qty: 90 TABLET | Refills: 3 | Status: ON HOLD | OUTPATIENT
Start: 2020-09-23 | End: 2020-09-26

## 2020-09-23 RX ORDER — MORPHINE SULFATE 15 MG/1
15 TABLET, FILM COATED, EXTENDED RELEASE ORAL 2 TIMES DAILY
Qty: 60 TABLET | Refills: 0 | Status: SHIPPED | OUTPATIENT
Start: 2020-09-23 | End: 2020-10-13 | Stop reason: SDUPTHER

## 2020-09-23 ASSESSMENT — ENCOUNTER SYMPTOMS
NAUSEA: 1
DIARRHEA: 0
WHEEZING: 0
ABDOMINAL DISTENTION: 0
VOMITING: 0
BACK PAIN: 1
EYES NEGATIVE: 1
SHORTNESS OF BREATH: 1
ABDOMINAL PAIN: 1
COUGH: 0

## 2020-09-23 NOTE — PROGRESS NOTES
Subjective:      Patient Id: Seen at [facility] for symptom management. He was accompanied to the appointment by: self. Chief Complaint   Patient presents with    Pain    Shortness of Breath          Sherren Evans is a 79 y.o. male seen and examined for symptomatic mangement related to prostate cancer with mets to the bone, liver mass. Pt is calm, cooperative and in NAD. States pain is not well controlled on current regime. Exacerbated in colder weather with patient stating he is out of medications due to self increasing dose. Rates pain at a 7/10 and states it occurs in all his joints. States he has intermittent chills and sweats. Describes pain as a constant ache that intermittently worsens. Currently taking oxy IR at 10mg PRN and morphine 15mg po ER BID. Denies Sedation, constipation, or other adverse effects on current regime. States he has been having lack of appetitie. Noted weight loss at visit today. States he has less urge to eat at today's visit.   On Casodex PO and Xgeva subq monthly    Past Medical History:   Diagnosis Date    Prostate cancer ([facility] [address] 11/2019     Past Surgical History:   Procedure Laterality Date    JOINT REPLACEMENT Right 11/2018     Social History     Socioeconomic History    Marital status: Single     Spouse name: Not on file    Number of children: Not on file    Years of education: Not on file    Highest education level: Not on file   Occupational History    Not on file   Social Needs    Financial resource strain: Not on file    Food insecurity     Worry: Not on file     Inability: Not on file    Transportation needs     Medical: Not on file     Non-medical: Not on file   Tobacco Use    Smoking status: Never Smoker    Smokeless tobacco: Never Used   Substance and Sexual Activity    Alcohol use: Yes     Frequency: 2-4 times a month    Drug use: No    Sexual activity: Not on file   Lifestyle    Physical activity     Days per week: Not on file     Minutes per session: Not on file    Stress: Not on file   Relationships    Social connections     Talks on phone: Not on file     Gets together: Not on file     Attends Orthodox service: Not on file     Active member of club or organization: Not on file     Attends meetings of clubs or organizations: Not on file     Relationship status: Not on file    Intimate partner violence     Fear of current or ex partner: Not on file     Emotionally abused: Not on file     Physically abused: Not on file     Forced sexual activity: Not on file   Other Topics Concern    Not on file   Social History Narrative    Not on file     No family history on file. No Known Allergies  Current Outpatient Medications on File Prior to Visit   Medication Sig Dispense Refill    rOPINIRole (REQUIP) 0.25 MG tablet Take 1 tablet by mouth 3 times daily 90 tablet 0    sennosides-docusate sodium (SENOKOT-S) 8.6-50 MG tablet Take 2 tablets by mouth daily as needed for Constipation 120 tablet 3    Polyethylene Glycol 3350 POWD Take by mouth      Fe Bisgly-Succ-C-Thre-B12-FA (IRON-150 PO) Take 325 mg by mouth daily      Denosumab (XGEVA SC) Inject into the skin      ondansetron (ZOFRAN) 4 MG tablet Take 1 tablet by mouth daily as needed for Nausea or Vomiting 30 tablet 0    dilTIAZem (CARDIZEM CD) 120 MG extended release capsule Take 1 capsule by mouth daily 90 capsule 3    rivaroxaban (XARELTO) 20 MG TABS tablet Take 1 tablet by mouth daily (with breakfast) 60 tablet 2    dexamethasone (DECADRON) 4 MG tablet Take 4 mg by mouth 2 times daily (with meals) Take 1 tab Bid 1 day before chemo tx and 2 days after chemo treatment.       bicalutamide (CASODEX) 50 MG chemo tablet Take 50 mg by mouth daily      prochlorperazine (COMPAZINE) 10 MG tablet Take 10 mg by mouth every 6 hours as needed      acetaminophen (APAP EXTRA STRENGTH) 500 MG tablet Take 2 tablets by mouth every 6 hours as needed for Pain 30 tablet 0     No current facility-administered medications on file prior to visit. Review of Systems   Constitutional: Negative for fatigue, fever and unexpected weight change. HENT: Negative. Eyes: Negative. Respiratory: Positive for shortness of breath. Negative for cough and wheezing. Cardiovascular: Negative. Gastrointestinal: Positive for abdominal pain and nausea. Negative for abdominal distention, diarrhea and vomiting. Endocrine: Negative. Genitourinary: Negative. Musculoskeletal: Positive for arthralgias, back pain and myalgias. Skin: Negative. Neurological: Positive for weakness. Negative for dizziness and syncope. Psychiatric/Behavioral: Negative for confusion, sleep disturbance and suicidal ideas. The patient is not nervous/anxious. Objective:   BP (!) 154/74   Pulse 76   Resp 18   Wt 166 lb 3.2 oz (75.4 kg)   SpO2 98%   BMI 21.93 kg/m²    Wt Readings from Last 3 Encounters:   09/23/20 166 lb 3.2 oz (75.4 kg)   08/26/20 170 lb 6.4 oz (77.3 kg)   07/22/20 179 lb 12.8 oz (81.6 kg)     Physical Exam  Vitals signs reviewed. Constitutional:       Appearance: He is well-developed. He is ill-appearing. HENT:      Head: Normocephalic and atraumatic. Eyes:      Pupils: Pupils are equal, round, and reactive to light. Neck:      Musculoskeletal: Normal range of motion. Cardiovascular:      Rate and Rhythm: Normal rate and regular rhythm. Heart sounds: No murmur. Pulmonary:      Effort: Pulmonary effort is normal.      Breath sounds: Normal breath sounds. Abdominal:      General: Bowel sounds are normal.      Palpations: Abdomen is soft. Skin:     General: Skin is warm and dry. Neurological:      Mental Status: He is alert and oriented to person, place, and time. Psychiatric:         Behavior: Behavior normal.         Assessment and Plan:      1. Cancer associated pain  2.  Osteoarthritis of both hips, unspecified osteoarthritis type  Exacerbated on current regime  Pt advised not to increase if symptoms worsen or fail to improve. Total Time 40 mins   > 50% Time Spent Counseling/Care coordination?  yes     JOYCE Burt - CNP    Collaborating physician: Dr. Estefanía Fox

## 2020-09-24 ENCOUNTER — TELEPHONE (OUTPATIENT)
Dept: PALLATIVE CARE | Age: 70
End: 2020-09-24

## 2020-09-24 NOTE — TELEPHONE ENCOUNTER
Pharmacy called and stated that their were two different strengths for oxycodone sent over and are unsure if pt should be getting both one for 15mg and one for 10 mg  Please advise

## 2020-09-25 ENCOUNTER — HOSPITAL ENCOUNTER (OUTPATIENT)
Dept: INFUSION THERAPY | Age: 70
Setting detail: INFUSION SERIES
Discharge: HOME | End: 2020-09-25
Payer: MEDICARE

## 2020-09-25 DIAGNOSIS — M85.80 OTHER SPECIFIED DISORDERS OF BONE DENSITY AND STRUCTURE, UNSPECIFIED SITE: Primary | ICD-10-CM

## 2020-09-25 DIAGNOSIS — R16.0 LIVER MASS: ICD-10-CM

## 2020-09-25 NOTE — FLOWSHEET NOTE
Spoke to his sister, he is complaining of being very dizzy. Encouraged her to call his DrErik To make him aware and if it does not improve he should go to the ER to be evaluated.

## 2020-09-26 ENCOUNTER — APPOINTMENT (OUTPATIENT)
Dept: CT IMAGING | Age: 70
DRG: 435 | End: 2020-09-26
Payer: MEDICARE

## 2020-09-26 ENCOUNTER — APPOINTMENT (OUTPATIENT)
Dept: GENERAL RADIOLOGY | Age: 70
DRG: 435 | End: 2020-09-26
Payer: MEDICARE

## 2020-09-26 ENCOUNTER — HOSPITAL ENCOUNTER (INPATIENT)
Age: 70
LOS: 4 days | Discharge: HOME HEALTH | DRG: 435 | End: 2020-09-30
Attending: EMERGENCY MEDICINE | Admitting: INTERNAL MEDICINE
Payer: MEDICARE

## 2020-09-26 PROBLEM — D64.9 ANEMIA: Status: ACTIVE | Noted: 2020-09-26

## 2020-09-26 LAB
ABO/RH: NORMAL
ALBUMIN SERPL-MCNC: 3.5 G/DL (ref 3.5–4.6)
ALP BLD-CCNC: 171 U/L (ref 35–104)
ALT SERPL-CCNC: 23 U/L (ref 0–41)
ANION GAP SERPL CALCULATED.3IONS-SCNC: 11 MEQ/L (ref 9–15)
ANISOCYTOSIS: ABNORMAL
ANTIBODY SCREEN: NORMAL
AST SERPL-CCNC: 30 U/L (ref 0–40)
BACTERIA: ABNORMAL /HPF
BASOPHILS ABSOLUTE: 0 K/UL (ref 0–0.2)
BASOPHILS ABSOLUTE: 0.1 K/UL (ref 0–0.2)
BASOPHILS RELATIVE PERCENT: 0.4 %
BASOPHILS RELATIVE PERCENT: 0.9 %
BILIRUB SERPL-MCNC: 0.5 MG/DL (ref 0.2–0.7)
BILIRUBIN URINE: NEGATIVE
BLOOD BANK DISPENSE STATUS: NORMAL
BLOOD BANK PRODUCT CODE: NORMAL
BLOOD, URINE: ABNORMAL
BPU ID: NORMAL
BUN BLDV-MCNC: 19 MG/DL (ref 8–23)
CALCIUM SERPL-MCNC: 8.5 MG/DL (ref 8.5–9.9)
CHLORIDE BLD-SCNC: 104 MEQ/L (ref 95–107)
CLARITY: ABNORMAL
CO2: 22 MEQ/L (ref 20–31)
COLOR: YELLOW
CREAT SERPL-MCNC: 0.93 MG/DL (ref 0.7–1.2)
DESCRIPTION BLOOD BANK: NORMAL
EOSINOPHILS ABSOLUTE: 0.1 K/UL (ref 0–0.7)
EOSINOPHILS ABSOLUTE: 0.1 K/UL (ref 0–0.7)
EOSINOPHILS RELATIVE PERCENT: 0.4 %
EOSINOPHILS RELATIVE PERCENT: 1 %
EPITHELIAL CELLS, UA: ABNORMAL /HPF (ref 0–5)
FERRITIN: 1685 NG/ML (ref 30–400)
GFR AFRICAN AMERICAN: >60
GFR NON-AFRICAN AMERICAN: >60
GLOBULIN: 3.4 G/DL (ref 2.3–3.5)
GLUCOSE BLD-MCNC: 144 MG/DL (ref 70–99)
GLUCOSE URINE: NEGATIVE MG/DL
HCT VFR BLD CALC: 20 % (ref 42–52)
HCT VFR BLD CALC: 21.9 % (ref 42–52)
HCT VFR BLD CALC: 23 % (ref 42–52)
HEMOGLOBIN: 6.6 G/DL (ref 14–18)
HEMOGLOBIN: 7.2 G/DL (ref 14–18)
HEMOGLOBIN: 7.4 G/DL (ref 14–18)
HYALINE CASTS: ABNORMAL /HPF (ref 0–5)
IRON SATURATION: 5 % (ref 11–46)
IRON: 9 UG/DL (ref 59–158)
KETONES, URINE: NEGATIVE MG/DL
LACTIC ACID: 2.3 MMOL/L (ref 0.5–2.2)
LEUKOCYTE ESTERASE, URINE: ABNORMAL
LYMPHOCYTES ABSOLUTE: 1.2 K/UL (ref 1–4.8)
LYMPHOCYTES ABSOLUTE: 1.7 K/UL (ref 1–4.8)
LYMPHOCYTES RELATIVE PERCENT: 13 %
LYMPHOCYTES RELATIVE PERCENT: 9.2 %
MAGNESIUM: 2.1 MG/DL (ref 1.7–2.4)
MCH RBC QN AUTO: 26.9 PG (ref 27–31.3)
MCH RBC QN AUTO: 27.5 PG (ref 27–31.3)
MCHC RBC AUTO-ENTMCNC: 32.7 % (ref 33–37)
MCHC RBC AUTO-ENTMCNC: 32.9 % (ref 33–37)
MCV RBC AUTO: 82.5 FL (ref 80–100)
MCV RBC AUTO: 83.5 FL (ref 80–100)
METAMYELOCYTES RELATIVE PERCENT: 1 %
MICROCYTES: ABNORMAL
MONOCYTES ABSOLUTE: 0.4 K/UL (ref 0.2–0.8)
MONOCYTES ABSOLUTE: 0.8 K/UL (ref 0.2–0.8)
MONOCYTES RELATIVE PERCENT: 2.8 %
MONOCYTES RELATIVE PERCENT: 6 %
MYELOCYTE PERCENT: 1 %
NEUTROPHILS ABSOLUTE: 10.9 K/UL (ref 1.4–6.5)
NEUTROPHILS ABSOLUTE: 10.9 K/UL (ref 1.4–6.5)
NEUTROPHILS RELATIVE PERCENT: 81 %
NEUTROPHILS RELATIVE PERCENT: 84 %
NITRITE, URINE: NEGATIVE
PDW BLD-RTO: 16.2 % (ref 11.5–14.5)
PDW BLD-RTO: 16.4 % (ref 11.5–14.5)
PH UA: 6 (ref 5–9)
PLATELET # BLD: 312 K/UL (ref 130–400)
PLATELET # BLD: 361 K/UL (ref 130–400)
PLATELET SLIDE REVIEW: NORMAL
POLYCHROMASIA: ABNORMAL
POTASSIUM SERPL-SCNC: 4.3 MEQ/L (ref 3.4–4.9)
PROTEIN UA: NEGATIVE MG/DL
RBC # BLD: 2.39 M/UL (ref 4.7–6.1)
RBC # BLD: 2.66 M/UL (ref 4.7–6.1)
RBC UA: ABNORMAL /HPF (ref 0–5)
SLIDE REVIEW: ABNORMAL
SODIUM BLD-SCNC: 137 MEQ/L (ref 135–144)
SPECIFIC GRAVITY UA: 1.02 (ref 1–1.03)
TOTAL IRON BINDING CAPACITY: 171 UG/DL (ref 178–450)
TOTAL PROTEIN: 6.9 G/DL (ref 6.3–8)
TROPONIN: <0.01 NG/ML (ref 0–0.01)
URINE REFLEX TO CULTURE: YES
UROBILINOGEN, URINE: 1 E.U./DL
WBC # BLD: 13 K/UL (ref 4.8–10.8)
WBC # BLD: 13.1 K/UL (ref 4.8–10.8)
WBC UA: ABNORMAL /HPF (ref 0–5)

## 2020-09-26 PROCEDURE — 86923 COMPATIBILITY TEST ELECTRIC: CPT

## 2020-09-26 PROCEDURE — 6360000002 HC RX W HCPCS: Performed by: INTERNAL MEDICINE

## 2020-09-26 PROCEDURE — 82728 ASSAY OF FERRITIN: CPT

## 2020-09-26 PROCEDURE — 83605 ASSAY OF LACTIC ACID: CPT

## 2020-09-26 PROCEDURE — 87077 CULTURE AEROBIC IDENTIFY: CPT

## 2020-09-26 PROCEDURE — 99283 EMERGENCY DEPT VISIT LOW MDM: CPT

## 2020-09-26 PROCEDURE — 2580000003 HC RX 258: Performed by: EMERGENCY MEDICINE

## 2020-09-26 PROCEDURE — 80053 COMPREHEN METABOLIC PANEL: CPT

## 2020-09-26 PROCEDURE — 86850 RBC ANTIBODY SCREEN: CPT

## 2020-09-26 PROCEDURE — 1210000000 HC MED SURG R&B

## 2020-09-26 PROCEDURE — 74177 CT ABD & PELVIS W/CONTRAST: CPT

## 2020-09-26 PROCEDURE — 6370000000 HC RX 637 (ALT 250 FOR IP): Performed by: INTERNAL MEDICINE

## 2020-09-26 PROCEDURE — 71045 X-RAY EXAM CHEST 1 VIEW: CPT

## 2020-09-26 PROCEDURE — 85014 HEMATOCRIT: CPT

## 2020-09-26 PROCEDURE — P9612 CATHETERIZE FOR URINE SPEC: HCPCS

## 2020-09-26 PROCEDURE — 6360000002 HC RX W HCPCS: Performed by: EMERGENCY MEDICINE

## 2020-09-26 PROCEDURE — 83540 ASSAY OF IRON: CPT

## 2020-09-26 PROCEDURE — 85018 HEMOGLOBIN: CPT

## 2020-09-26 PROCEDURE — 83735 ASSAY OF MAGNESIUM: CPT

## 2020-09-26 PROCEDURE — 87086 URINE CULTURE/COLONY COUNT: CPT

## 2020-09-26 PROCEDURE — 87040 BLOOD CULTURE FOR BACTERIA: CPT

## 2020-09-26 PROCEDURE — 83550 IRON BINDING TEST: CPT

## 2020-09-26 PROCEDURE — 96365 THER/PROPH/DIAG IV INF INIT: CPT

## 2020-09-26 PROCEDURE — 2580000003 HC RX 258: Performed by: INTERNAL MEDICINE

## 2020-09-26 PROCEDURE — 86901 BLOOD TYPING SEROLOGIC RH(D): CPT

## 2020-09-26 PROCEDURE — 36430 TRANSFUSION BLD/BLD COMPNT: CPT

## 2020-09-26 PROCEDURE — 85025 COMPLETE CBC W/AUTO DIFF WBC: CPT

## 2020-09-26 PROCEDURE — 86900 BLOOD TYPING SEROLOGIC ABO: CPT

## 2020-09-26 PROCEDURE — 93005 ELECTROCARDIOGRAM TRACING: CPT | Performed by: EMERGENCY MEDICINE

## 2020-09-26 PROCEDURE — 36415 COLL VENOUS BLD VENIPUNCTURE: CPT

## 2020-09-26 PROCEDURE — 81001 URINALYSIS AUTO W/SCOPE: CPT

## 2020-09-26 PROCEDURE — 99284 EMERGENCY DEPT VISIT MOD MDM: CPT

## 2020-09-26 PROCEDURE — 6360000004 HC RX CONTRAST MEDICATION: Performed by: EMERGENCY MEDICINE

## 2020-09-26 PROCEDURE — 99222 1ST HOSP IP/OBS MODERATE 55: CPT | Performed by: SURGERY

## 2020-09-26 PROCEDURE — P9016 RBC LEUKOCYTES REDUCED: HCPCS

## 2020-09-26 PROCEDURE — 87186 SC STD MICRODIL/AGAR DIL: CPT

## 2020-09-26 PROCEDURE — 84484 ASSAY OF TROPONIN QUANT: CPT

## 2020-09-26 RX ORDER — BICALUTAMIDE 50 MG/1
50 TABLET, FILM COATED ORAL DAILY
Status: DISCONTINUED | OUTPATIENT
Start: 2020-09-26 | End: 2020-09-30 | Stop reason: HOSPADM

## 2020-09-26 RX ORDER — ACETAMINOPHEN 325 MG/1
650 TABLET ORAL EVERY 6 HOURS PRN
Status: DISCONTINUED | OUTPATIENT
Start: 2020-09-26 | End: 2020-09-30 | Stop reason: HOSPADM

## 2020-09-26 RX ORDER — POLYETHYLENE GLYCOL 3350 17 G/17G
17 POWDER, FOR SOLUTION ORAL DAILY PRN
Status: DISCONTINUED | OUTPATIENT
Start: 2020-09-26 | End: 2020-09-30 | Stop reason: HOSPADM

## 2020-09-26 RX ORDER — MORPHINE SULFATE 15 MG/1
15 TABLET, FILM COATED, EXTENDED RELEASE ORAL 2 TIMES DAILY
Status: DISCONTINUED | OUTPATIENT
Start: 2020-09-26 | End: 2020-09-30 | Stop reason: HOSPADM

## 2020-09-26 RX ORDER — LACTULOSE 10 G/15ML
20 SOLUTION ORAL 3 TIMES DAILY
Status: COMPLETED | OUTPATIENT
Start: 2020-09-26 | End: 2020-09-27

## 2020-09-26 RX ORDER — 0.9 % SODIUM CHLORIDE 0.9 %
20 INTRAVENOUS SOLUTION INTRAVENOUS ONCE
Status: COMPLETED | OUTPATIENT
Start: 2020-09-26 | End: 2020-09-26

## 2020-09-26 RX ORDER — PROMETHAZINE HYDROCHLORIDE 12.5 MG/1
12.5 TABLET ORAL EVERY 6 HOURS PRN
Status: DISCONTINUED | OUTPATIENT
Start: 2020-09-26 | End: 2020-09-30 | Stop reason: HOSPADM

## 2020-09-26 RX ORDER — ONDANSETRON 2 MG/ML
4 INJECTION INTRAMUSCULAR; INTRAVENOUS EVERY 6 HOURS PRN
Status: DISCONTINUED | OUTPATIENT
Start: 2020-09-26 | End: 2020-09-30 | Stop reason: HOSPADM

## 2020-09-26 RX ORDER — DILTIAZEM HYDROCHLORIDE 120 MG/1
120 CAPSULE, COATED, EXTENDED RELEASE ORAL DAILY
Status: DISCONTINUED | OUTPATIENT
Start: 2020-09-26 | End: 2020-09-30 | Stop reason: HOSPADM

## 2020-09-26 RX ORDER — ACETAMINOPHEN 650 MG/1
650 SUPPOSITORY RECTAL EVERY 6 HOURS PRN
Status: DISCONTINUED | OUTPATIENT
Start: 2020-09-26 | End: 2020-09-30 | Stop reason: HOSPADM

## 2020-09-26 RX ORDER — CEFTRIAXONE 1 G/1
INJECTION, POWDER, FOR SOLUTION INTRAMUSCULAR; INTRAVENOUS
Status: COMPLETED
Start: 2020-09-26 | End: 2020-09-26

## 2020-09-26 RX ORDER — SODIUM CHLORIDE 0.9 % (FLUSH) 0.9 %
10 SYRINGE (ML) INJECTION PRN
Status: DISCONTINUED | OUTPATIENT
Start: 2020-09-26 | End: 2020-09-30 | Stop reason: HOSPADM

## 2020-09-26 RX ORDER — POLYETHYLENE GLYCOL 3350 17 G/17G
17 POWDER, FOR SOLUTION ORAL DAILY
Status: DISCONTINUED | OUTPATIENT
Start: 2020-09-26 | End: 2020-09-30 | Stop reason: HOSPADM

## 2020-09-26 RX ORDER — 0.9 % SODIUM CHLORIDE 0.9 %
1000 INTRAVENOUS SOLUTION INTRAVENOUS ONCE
Status: COMPLETED | OUTPATIENT
Start: 2020-09-26 | End: 2020-09-26

## 2020-09-26 RX ORDER — PANTOPRAZOLE SODIUM 40 MG/1
40 TABLET, DELAYED RELEASE ORAL 2 TIMES DAILY
Status: DISCONTINUED | OUTPATIENT
Start: 2020-09-26 | End: 2020-09-30 | Stop reason: HOSPADM

## 2020-09-26 RX ORDER — AMOXICILLIN 250 MG
2 CAPSULE ORAL DAILY PRN
Status: DISCONTINUED | OUTPATIENT
Start: 2020-09-26 | End: 2020-09-30 | Stop reason: HOSPADM

## 2020-09-26 RX ORDER — OXYCODONE HYDROCHLORIDE 5 MG/1
15 TABLET ORAL EVERY 6 HOURS PRN
Status: DISCONTINUED | OUTPATIENT
Start: 2020-09-26 | End: 2020-09-30 | Stop reason: HOSPADM

## 2020-09-26 RX ORDER — SODIUM CHLORIDE 0.9 % (FLUSH) 0.9 %
10 SYRINGE (ML) INJECTION EVERY 12 HOURS SCHEDULED
Status: DISCONTINUED | OUTPATIENT
Start: 2020-09-26 | End: 2020-09-30 | Stop reason: HOSPADM

## 2020-09-26 RX ADMIN — POLYETHYLENE GLYCOL 3350 17 G: 17 POWDER, FOR SOLUTION ORAL at 20:56

## 2020-09-26 RX ADMIN — DEXTROSE MONOHYDRATE: 5 INJECTION INTRAVENOUS at 09:30

## 2020-09-26 RX ADMIN — SODIUM CHLORIDE 20 ML: 9 INJECTION, SOLUTION INTRAVENOUS at 11:20

## 2020-09-26 RX ADMIN — OXYCODONE 15 MG: 5 TABLET ORAL at 15:45

## 2020-09-26 RX ADMIN — BICALUTAMIDE 50 MG: 50 TABLET ORAL at 20:56

## 2020-09-26 RX ADMIN — PANTOPRAZOLE SODIUM 40 MG: 40 TABLET, DELAYED RELEASE ORAL at 20:56

## 2020-09-26 RX ADMIN — MORPHINE SULFATE 15 MG: 15 TABLET, FILM COATED, EXTENDED RELEASE ORAL at 15:45

## 2020-09-26 RX ADMIN — DILTIAZEM HYDROCHLORIDE 120 MG: 120 CAPSULE, COATED, EXTENDED RELEASE ORAL at 15:45

## 2020-09-26 RX ADMIN — CEFTRIAXONE SODIUM: 1 INJECTION, POWDER, FOR SOLUTION INTRAMUSCULAR; INTRAVENOUS at 09:30

## 2020-09-26 RX ADMIN — LACTULOSE 20 G: 20 SOLUTION ORAL at 20:56

## 2020-09-26 RX ADMIN — OXYCODONE 15 MG: 5 TABLET ORAL at 22:05

## 2020-09-26 RX ADMIN — CEFTRIAXONE SODIUM 1 G: 1 INJECTION, POWDER, FOR SOLUTION INTRAMUSCULAR; INTRAVENOUS at 09:49

## 2020-09-26 RX ADMIN — IOPAMIDOL 100 ML: 612 INJECTION, SOLUTION INTRAVENOUS at 11:06

## 2020-09-26 RX ADMIN — Medication 10 ML: at 21:00

## 2020-09-26 RX ADMIN — SODIUM CHLORIDE 1000 ML: 9 INJECTION, SOLUTION INTRAVENOUS at 09:06

## 2020-09-26 ASSESSMENT — ENCOUNTER SYMPTOMS
VOMITING: 0
SHORTNESS OF BREATH: 0
NAUSEA: 0
SORE THROAT: 0
CONSTIPATION: 1
EYE PAIN: 0
ABDOMINAL PAIN: 0
CHEST TIGHTNESS: 0

## 2020-09-26 ASSESSMENT — PAIN SCALES - GENERAL
PAINLEVEL_OUTOF10: 5
PAINLEVEL_OUTOF10: 0
PAINLEVEL_OUTOF10: 8

## 2020-09-26 NOTE — PROGRESS NOTES
On admission patient states that he has $790 cash and is refusing to lock it up with security, or have a family member take it home. Pt stated he wants to keep it with him in his black bag on his bed. Patient has a watch, hat, footwear, outer wear, pants and a few t-shirts.        Electronically signed by Riva Closs, RN on 9/26/2020 at 5:28 PM

## 2020-09-26 NOTE — CARE COORDINATION
[facility] [place] Case Management Initial Discharge Assessment    Met with Patient to discuss discharge plan. PCP: No primary care provider on file. Date of Last Visit: \"years\"    If no PCP, list provided? Yes    Discharge Planning    Living Arrangements: independently at home    Who do you live with? self    Who helps you with your care:  self    If lives at home:     Do you have any barriers navigating in your home? no    Patient can perform ADL? Yes    Current Services (outpatient and in home) :  Palliative care: Niranjan COOK    Dialysis: No    Is transportation available to get to your appointments? Yes    DME Equipment:  no    Respiratory equipment: None    Respiratory provider:  no     [facility]    Consult with Medication Assistance Program?  No        Does Patient Have a High-Risk for Readmission Diagnosis (CHF, PN, MI, COPD)? No    Initial Discharge Plan? (Note: please see concurrent daily documentation for any updates after initial note). Pt is current with palliative care through [facility] with Niranjan López NP. Pain control had been an issue. CM to assess for further d/c needs and referrals.      Electronically signed by Diane Fernandez on 9/26/2020 at 1:01 PM

## 2020-09-26 NOTE — ED PROVIDER NOTES
[address] [facility] ED  EMERGENCY DEPARTMENT ENCOUNTER      Pt Name: Jess Mcfarlane  MRN: [id number]  [place] 1950  Date of evaluation: 9/26/2020  Provider: Ky Alvarado DO    CHIEF COMPLAINT       Chief Complaint   Patient presents with    Fatigue     increasing over the past couple of days. hx of prostate cancer. HISTORY OF PRESENT ILLNESS   (Location/Symptom, Timing/Onset, Context/Setting, Quality, Duration, Modifying Factors, Severity)  Note limiting factors. Jess Mcfarlane is a 79 y.o. male who presents to the emergency department . Patient comes in with his sister because he is had increasing fatigue over the last couple of days. He states that he is able to eat and drink but does not have a good appetite. History of prostate cancer. Last treatment 3 months ago. Patient does self cath and has not self cath today. He thinks that perhaps he has a urinary tract infection. No fevers. No pain. HPI    Nursing Notes were reviewed. REVIEW OF SYSTEMS    (2-9 systems for level 4, 10 or more for level 5)     Review of Systems   Constitutional: Positive for activity change, appetite change and fatigue. Negative for fever. HENT: Negative for congestion and sore throat. Eyes: Negative for pain and visual disturbance. Respiratory: Negative for chest tightness and shortness of breath. Cardiovascular: Negative for chest pain. Gastrointestinal: Positive for constipation. Negative for abdominal pain, nausea and vomiting. Endocrine: Negative for polydipsia. Genitourinary: Negative for flank pain and urgency. Musculoskeletal: Negative for gait problem and neck stiffness. Skin: Negative for rash. Neurological: Positive for weakness and light-headedness. Negative for headaches. Psychiatric/Behavioral: Negative for confusion and sleep disturbance. Except as noted above the remainder of the review of systems was reviewed and negative.        PAST MEDICAL HISTORY Past Medical History:   Diagnosis Date    Prostate cancer ([facility] [address] 11/2019         SURGICAL HISTORY       Past Surgical History:   Procedure Laterality Date    JOINT REPLACEMENT Right 11/2018         CURRENT MEDICATIONS       Previous Medications    ACETAMINOPHEN (APAP EXTRA STRENGTH) 500 MG TABLET    Take 2 tablets by mouth every 6 hours as needed for Pain    BICALUTAMIDE (CASODEX) 50 MG CHEMO TABLET    Take 50 mg by mouth daily    DENOSUMAB (XGEVA SC)    Inject into the skin    DEXAMETHASONE (DECADRON) 4 MG TABLET    Take 4 mg by mouth 2 times daily (with meals) Take 1 tab Bid 1 day before chemo tx and 2 days after chemo treatment. DILTIAZEM (CARDIZEM CD) 120 MG EXTENDED RELEASE CAPSULE    Take 1 capsule by mouth daily    FE BISGLY-SUCC-C-THRE-B12-FA (IRON-150 PO)    Take 325 mg by mouth daily    MIRTAZAPINE (REMERON) 15 MG TABLET    Take 0.5 tablets by mouth nightly    MORPHINE (MS CONTIN) 15 MG EXTENDED RELEASE TABLET    Take 1 tablet by mouth 2 times daily for 30 days. ONDANSETRON (ZOFRAN) 4 MG TABLET    Take 1 tablet by mouth daily as needed for Nausea or Vomiting    OXYCODONE HCL (OXY-IR) 15 MG IMMEDIATE RELEASE TABLET    Take 1 tablet by mouth every 6 hours as needed for Pain (moderate to severe pain) for up to 120 days. POLYETHYLENE GLYCOL 3350 POWD    Take by mouth    PROCHLORPERAZINE (COMPAZINE) 10 MG TABLET    Take 10 mg by mouth every 6 hours as needed    RIVAROXABAN (XARELTO) 20 MG TABS TABLET    Take 1 tablet by mouth daily (with breakfast)    ROPINIROLE (REQUIP) 0.25 MG TABLET    Take 1 tablet by mouth 3 times daily    SENNOSIDES-DOCUSATE SODIUM (SENOKOT-S) 8.6-50 MG TABLET    Take 2 tablets by mouth daily as needed for Constipation       ALLERGIES     Patient has no known allergies. FAMILY HISTORY     History reviewed. No pertinent family history.        SOCIAL HISTORY       Social History     Socioeconomic History    Marital status: Single     Spouse name: None    Number of children: None    Years of education: None    Highest education level: None   Occupational History    None   Social Needs    Financial resource strain: None    Food insecurity     Worry: None     Inability: None    Transportation needs     Medical: None     Non-medical: None   Tobacco Use    Smoking status: Never Smoker    Smokeless tobacco: Never Used   Substance and Sexual Activity    Alcohol use: Yes     Frequency: 2-4 times a month    Drug use: No    Sexual activity: None   Lifestyle    Physical activity     Days per week: None     Minutes per session: None    Stress: None   Relationships    Social connections     Talks on phone: None     Gets together: None     Attends Denominational service: None     Active member of club or organization: None     Attends meetings of clubs or organizations: None     Relationship status: None    Intimate partner violence     Fear of current or ex partner: None     Emotionally abused: None     Physically abused: None     Forced sexual activity: None   Other Topics Concern    None   Social History Narrative    None       SCREENINGS                        PHYSICAL EXAM    (up to 7 for level 4, 8 or more for level 5)     ED Triage Vitals [09/26/20 0750]   BP Temp Temp Source Pulse Resp SpO2 Height Weight   92/61 97.4 °F (36.3 °C) Oral 100 20 100 % 6' 1\" (1.854 m) 166 lb (75.3 kg)       Physical Exam  Vitals signs and nursing note reviewed. Constitutional:       General: He is not in acute distress. Appearance: He is well-developed. He is ill-appearing. He is not diaphoretic. HENT:      Head: Normocephalic and atraumatic. Right Ear: External ear normal.      Left Ear: External ear normal.      Mouth/Throat:      Pharynx: No oropharyngeal exudate. Eyes:      Conjunctiva/sclera: Conjunctivae normal.      Pupils: Pupils are equal, round, and reactive to light. Neck:      Musculoskeletal: Normal range of motion and neck supple. Thyroid: No thyromegaly. Vascular: No JVD. Trachea: No tracheal deviation. Cardiovascular:      Rate and Rhythm: Normal rate. Heart sounds: Normal heart sounds. No murmur. Pulmonary:      Effort: Pulmonary effort is normal. No respiratory distress. Breath sounds: Normal breath sounds. No wheezing. Abdominal:      General: Bowel sounds are normal. There is no distension. Palpations: Abdomen is soft. Tenderness: There is no abdominal tenderness. There is no guarding. Genitourinary:     Rectum: Guaiac result positive. Comments: On rectal exam a left-sided rectal mass was palpated. Stool very dark and heme positive  Musculoskeletal: Normal range of motion. Skin:     General: Skin is warm and dry. Findings: No rash. Neurological:      Mental Status: He is alert and oriented to person, place, and time. Cranial Nerves: No cranial nerve deficit. Psychiatric:         Behavior: Behavior normal.         DIAGNOSTIC RESULTS     EKG: All EKG's are interpreted by the Emergency Department Physician who either signs or Co-signs this chart in the absence of a cardiologist.    Normal sinus rhythm 90 bpm nonspecific ST change    RADIOLOGY:   Non-plain film images such as CT, Ultrasound and MRI are read by the radiologist. Plain radiographic images are visualized and preliminarily interpreted by the emergency physician with the below findings:    Chest x-ray shows no acute pulmonary disease  CT abdomen pelvis shows mets to bone and liver    Interpretation per the Radiologist below, if available at the time of this note:    No orders to display         ED BEDSIDE ULTRASOUND:   Performed by ED Physician - none    LABS:  Labs Reviewed   LACTIC ACID, PLASMA   CBC WITH AUTO DIFFERENTIAL   COMPREHENSIVE METABOLIC PANEL   URINE RT REFLEX TO CULTURE   MAGNESIUM       All other labs were within normal range or not returned as of this dictation.     EMERGENCY DEPARTMENT COURSE and DIFFERENTIAL DIAGNOSIS/MDM:

## 2020-09-26 NOTE — H&P
Hospital Medicine  History and Physical    Patient:  Verónica Nieto  MRN: [id number]    CHIEF COMPLAINT:    Chief Complaint   Patient presents with    Fatigue     increasing over the past couple of days. hx of prostate cancer. History Obtained From:  Patient, EMR  Primary Care Physician: No primary care provider on file. HISTORY OF PRESENT ILLNESS:   68-year-old gentleman with history of metastatic prostate cancer presents with 4-day history of worsening weakness and dizziness. In the emergency department, he was found to have hemoglobin of 6.6. He denies any obvious bleeding. He otherwise denies fever, chills, chest pain, dyspnea, abdominal pain. He does admit to significant problems with constipation-he states he has not had a good bowel movement in months. He does not smoke, drink alcohol, or use illicit drugs. Past Medical History:      Diagnosis Date    Prostate cancer ([facility] [address] 11/2019       Past Surgical History:      Procedure Laterality Date    JOINT REPLACEMENT Right 11/2018       Medications Prior to Admission:    Prior to Admission medications    Medication Sig Start Date End Date Taking? Authorizing Provider   oxyCODONE HCl (OXY-IR) 15 MG immediate release tablet Take 1 tablet by mouth every 6 hours as needed for Pain (moderate to severe pain) for up to 120 days. 9/23/20 1/21/21 Yes JOYCE Figueroa CNP   morphine (MS CONTIN) 15 MG extended release tablet Take 1 tablet by mouth 2 times daily for 30 days.  9/23/20 10/23/20 Yes JOYCE Figueroa CNP   sennosides-docusate sodium (SENOKOT-S) 8.6-50 MG tablet Take 2 tablets by mouth daily as needed for Constipation 6/24/20  Yes JOYCE Figueroa CNP   dilTIAZem (CARDIZEM CD) 120 MG extended release capsule Take 1 capsule by mouth daily 2/26/20  Yes Elida Blanco,    rivaroxaban (XARELTO) 20 MG TABS tablet Take 1 tablet by mouth daily (with breakfast) 2/26/20  Yes Elida Paris,    dexamethasone (DECADRON) 4 MG tablet Take 4 mg by mouth 2 times daily (with meals) Take 1 tab Bid 1 day before chemo tx and 2 days after chemo treatment. Yes Historical Provider, MD   bicalutamide (CASODEX) 50 MG chemo tablet Take 50 mg by mouth daily   Yes Historical Provider, MD   Polyethylene Glycol 3350 POWD Take by mouth    Historical Provider, MD Soliman Bisgly-Succ-C-Thre-B12-FA (IRON-150 PO) Take 325 mg by mouth daily 11/28/18   Historical Provider, MD   Denosumab ([facility]) Inject into the skin    Historical Provider, MD   ondansetron (ZOFRAN) 4 MG tablet Take 1 tablet by mouth daily as needed for Nausea or Vomiting 2/26/20   Elana Martinez APRN - CNP   prochlorperazine (COMPAZINE) 10 MG tablet Take 10 mg by mouth every 6 hours as needed    Historical Provider, MD       Allergies:  Patient has no known allergies. Social History:   TOBACCO:   reports that he has never smoked. He has never used smokeless tobacco.  ETOH:   reports current alcohol use. Family History:   History reviewed. No pertinent family history. REVIEW OF SYSTEMS:  Ten systems reviewed and negative except for stated in HPI    Physical Exam:    Vitals: /61   Pulse 94   Temp 99.1 °F (37.3 °C) (Oral)   Resp 18   Ht 6' 1\" (1.854 m)   Wt 166 lb (75.3 kg)   SpO2 100%   BMI 21.90 kg/m²   General appearance: alert, appears stated age and cooperative. NAD. Thin  Skin: Skin color, texture, turgor normal. No rashes or lesions  HEENT: eomi, perrla. MMM  Neck: No JVD or lymphadenopathy  Lungs: CTA bilaterally. No wheeze   Heart: RRR, no murmur or gallp  Abdomen: soft, nontender. Bsx4. No masses or organomegaly  Extremities: no edema, redness, or tenderness in calves.   Neurologic: No focal deficits    Recent Labs     09/26/20  0815 09/26/20  0945 09/26/20  1412   WBC 13.0* 13.1*  --    HGB 7.2* 6.6* 7.4*    312  --      Recent Labs     09/26/20  0815      K 4.3      CO2 22   BUN 19   CREATININE 0.93   GLUCOSE 144*   AST 30   ALT 23 lymphadenopathy. No small bowel obstruction. No overt colonic mass or pericolonic inflammation. The appendix is within normal limits. . No free fluid or free air. Numerous sclerotic lesions are again identified of the visualized spine and pelvis, most significantly involving the inferior pubic ramus. Postsurgical changes of right total hip arthroplasty. Degenerative changes of the spine and left hip. No acute fracture identified. Interval increase in number and size of numerous hypodense lesions within the liver most compatible with metastatic disease. Numerous metastatic bone lesions are again identified. A tiny focus of air is present within the urinary bladder and is likely due to catheterization as there is no urinary bladder wall thickening or perivesicular inflammation. All CT scans at this facility use dose modulation, iterative reconstruction, and/or weight based dosing when appropriate to reduce radiation dose to as low as reasonably achievable. Xr Chest Portable    Result Date: 9/26/2020  Exam: XR CHEST PORTABLE History: Chest pain Technique: AP portable view of the chest obtained. Comparison:  CT chest from May 7, 2020  Findings: The cardiomediastinal silhouette is within normal limits. No pneumothorax, pleural effusion, or consolidation. Bones of the thorax appear intact. No radiographic evidence of acute intrathoracic process. Assessment and Plan     1. 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 Possible contribution from blood loss, chemotherapy, bone marrow involvement  -GI evaluation ongoing. Hold blood thinner  -Reticulocyte count in a.m.  -Transfuse to hemoglobin goal greater than 7    2. Chronic constipation: Aggressive bowel regimen    3. Paroxysmal atrial fibrillation on Xarelto: Hold anticoagulant    4. Pyuria: Follow-up urine culture    Prostate cancer with bone and liver metastases: Progression of disease noted on most recent imaging.   Patient to follow-up in office next week to discuss further treatment options.   May involve palliative care-we will discuss with patient    Teo Bhakta DO  Admitting Hospitalist

## 2020-09-26 NOTE — CONSULTS
Pt Name: Hima Shaver  MRN: [id number]  YOB: 1950  Date of evaluation: 9/26/2020  Primary Care Physician: No primary care provider on file. Patient evaluated at the request of  Dr. Annette Bennett  Reason for evaluation: Rectal bleeding and mass    IMPRESSIONS:  1. Lower GI bleed  2. History of prostate CA  3. Evidence of metastatic disease to the bone and liver  4. Acute blood loss anemia    PLANS  1. GI consult for bleeding  2. Hold Xarelto    SUBJECTIVE:  History of Chief Complaint:    Karie Humphreys is a 79 y.o.male who presents to the emergency room today with a chief complaint of increased fatigue and dark stools. He states he does have a history of constipation. He has a history of prostate CA and just recently finished radiation treatment for it. He complains of some lightheadedness and weakness as well as fatigue and decreased activity. He states his appetite has been poor lately. He denies any abdominal pain or rectal pain. Denies any nausea or vomiting. Hemoglobin was 6.6. Hemoglobin  6 months ago was normal.  Patient is on Xarelto. Past Medical History   has a past medical history of Prostate cancer ([facility] [address] Past Surgical History   has a past surgical history that includes joint replacement (Right, 11/2018). Medications  Prior to Admission medications    Medication Sig Start Date End Date Taking? Authorizing Provider   oxyCODONE HCl (OXY-IR) 15 MG immediate release tablet Take 1 tablet by mouth every 6 hours as needed for Pain (moderate to severe pain) for up to 120 days. 9/23/20 1/21/21  Melene Lama, APRN - CNP   morphine (MS CONTIN) 15 MG extended release tablet Take 1 tablet by mouth 2 times daily for 30 days.  9/23/20 10/23/20  Melene Lama, APRN - CNP   mirtazapine (REMERON) 15 MG tablet Take 0.5 tablets by mouth nightly 9/23/20   Melene Lama, APRN - CNP   rOPINIRole (REQUIP) 0.25 MG tablet Take 1 tablet by mouth 3 times daily 8/26/20   Melene Lama, APRN - CNP sennosides-docusate sodium (SENOKOT-S) 8.6-50 MG tablet Take 2 tablets by mouth daily as needed for Constipation 6/24/20   PhylJOYCE Akhtar CNP   Polyethylene Glycol 3350 POWD Take by mouth    Historical Provider, MD Janny Qureshi-Succ-C-Thre-B12-FA (IRON-150 PO) Take 325 mg by mouth daily 11/28/18   Historical Provider, MD   Denosumab ([facility]) Inject into the skin    Historical Provider, MD   ondansetron (ZOFRAN) 4 MG tablet Take 1 tablet by mouth daily as needed for Nausea or Vomiting 2/26/20   Phyllistine JOYCE Modi CNP   dilTIAZem (CARDIZEM CD) 120 MG extended release capsule Take 1 capsule by mouth daily 2/26/20   Belia Shepard DO   rivaroxaban (XARELTO) 20 MG TABS tablet Take 1 tablet by mouth daily (with breakfast) 2/26/20   Elida Paris DO   dexamethasone (DECADRON) 4 MG tablet Take 4 mg by mouth 2 times daily (with meals) Take 1 tab Bid 1 day before chemo tx and 2 days after chemo treatment. Historical Provider, MD   bicalutamide (CASODEX) 50 MG chemo tablet Take 50 mg by mouth daily    Historical Provider, MD   prochlorperazine (COMPAZINE) 10 MG tablet Take 10 mg by mouth every 6 hours as needed    Historical Provider, MD   acetaminophen (APAP EXTRA STRENGTH) 500 MG tablet Take 2 tablets by mouth every 6 hours as needed for Pain 2/24/18   Candice Harris Child, PANehalC    Scheduled Meds:   cefTRIAXone        dextrose        sodium chloride  20 mL Intravenous Once     Continuous Infusions:  PRN Meds:. Allergies  has No Known Allergies. Family History  family history is not on file. Social History   reports that he has never smoked. He has never used smokeless tobacco. He reports current alcohol use. He reports that he does not use drugs. Review of Systems:  14 systems were reviewed and negative other than [place]    OBJECTIVE  CURRENT VITALS:  height is 6' 1\" (1.854 m) and weight is 166 lb (75.3 kg). His temperature is 98 °F (36.7 °C). His blood pressure is 106/62 and his pulse is 90.  His K 4.3  --      --    CO2 22  --    BUN 19  --    CREATININE 0.93  --    MG 2.1  --    CALCIUM 8.5  --    AST 30  --    ALT 23  --    BILITOT 0.5  --    LACTA 2.3*  --    NITRU Negative  --    COLORU Yellow  --    BACTERIA MANY*  --        RADIOLOGY:  I have personally reviewed the following films:  CT scan abd/pelvis: Multiple liver and bone lesions consistent with metastatic disease. No rectal mass seen  Thank you for the interesting evaluation. Further recommendations to follow.     Electronically signed by Henry Espino MD on 9/26/20 at 11:56 AM EDT

## 2020-09-26 NOTE — PROGRESS NOTES
Shift assessment performed. Vitals stable. Patient states pain 8 out of 10 chronic and acute in his R leg. Patient finished receiving the 1 unit of blood. Repeat H/H ysabel and it went from 6.6 to 7.4 hemoglobin. Patient states that he straight caths himself at home due to enlarged prostate and has for over 1 year now. Supplies was given-patient voided 400 mL's of vadim urine. Reassessed patients pain and patient is satisfied after pain medication. Patient states he is unable to ambulate which is new to him and that scares him. Denies any needs at this time. Will continue to monitor.      Electronically signed by Antonette Pitt RN on 9/26/2020 at 5:23 PM

## 2020-09-26 NOTE — CONSULTS
Hematology/Oncology Consult  Encounter Date: 2020 3:43 PM    Mr. Suad Leroy is a 79 y.o. male  : 1950  MRN: [id number]  [place] Number: [[id number]]  Requesting Provider: DR Sanjeev Andres    Reason for request: Prostate cancer      CONSULTANT: Lazarus Craig    HPI: Rubbie Klinefelter was admitted for progressive weakness. CBC showd hemoglobin 7.2, WBC 98691 and platelets at [id number]. Today the hemoglobin dropped to 6.5 and received blood transfusion. He has a history of prostate cancer diagnosed in 2019. On hormonal therapy with lupron and casodex. Had chemotherapy with taxotere with good response. Last treatment in 3/2020. Ct scan of the abdomen and pelvis now showed bigger liver lesions. He will need to receive additional treatment with xtandi as outpatient. Patient Active Problem List   Diagnosis    Liver mass    Osteoarthritis of hip    Palpitations    Other specified disorders of bone density and structure, unspecified site     Anemia     Past Medical History:   Diagnosis Date    [facility] 2019     @PS@  History reviewed. No pertinent family history.   Social History     Socioeconomic History    Marital status: Single     Spouse name: Not on file    Number of children: Not on file    Years of education: Not on file    Highest education level: Not on file   Occupational History    Not on file   Social Needs    Financial resource strain: Not on file    Food insecurity     Worry: Not on file     Inability: Not on file    Transportation needs     Medical: Not on file     Non-medical: Not on file   Tobacco Use    Smoking status: Never Smoker    Smokeless tobacco: Never Used   Substance and Sexual Activity    Alcohol use: Yes     Frequency: 2-4 times a month    Drug use: No    Sexual activity: Not on file   Lifestyle    Physical activity     Days per week: Not on file     Minutes per session: Not on file    Stress: Not on file   Relationships    Social connections     Talks on phone: Not on file     Gets together: Not on file     Attends Mandaeism service: Not on file     Active member of club or organization: Not on file     Attends meetings of clubs or organizations: Not on file     Relationship status: Not on file    Intimate partner violence     Fear of current or ex partner: Not on file     Emotionally abused: Not on file     Physically abused: Not on file     Forced sexual activity: Not on file   Other Topics Concern    Not on file   Social History Narrative    Not on file            Current Facility-Administered Medications   Medication Dose Route Frequency Provider Last Rate Last Dose    sodium chloride flush 0.9 % injection 10 mL  10 mL Intravenous 2 times per day Bertrum Mediate, DO        sodium chloride flush 0.9 % injection 10 mL  10 mL Intravenous PRN Bertrum Mediate, DO        acetaminophen (TYLENOL) tablet 650 mg  650 mg Oral Q6H PRN Bertrum Mediate, DO        Or    acetaminophen (TYLENOL) suppository 650 mg  650 mg Rectal Q6H PRN Bertrum Mediate, DO        polyethylene glycol (GLYCOLAX) packet 17 g  17 g Oral Daily PRN Bertrum Mediate, DO        promethazine (PHENERGAN) tablet 12.5 mg  12.5 mg Oral Q6H PRN Bertrum Mediate, DO        Or    ondansetron (ZOFRAN) injection 4 mg  4 mg Intravenous Q6H PRN Bertrum Mediate, DO        bicalutamide (CASODEX) chemo tablet 50 mg  50 mg Oral Daily Bertrum Mediate, DO        dilTIAZem (CARDIZEM CD) extended release capsule 120 mg  120 mg Oral Daily Bertrum Mediate, DO        morphine (MS CONTIN) extended release tablet 15 mg  15 mg Oral BID Bertrum Mediate, DO        oxyCODONE (ROXICODONE) immediate release tablet 15 mg  15 mg Oral Q6H PRN Bertrum Mediate, DO        senna-docusate (PERICOLACE) 8.6-50 MG per tablet 2 tablet  2 tablet Oral Daily PRN Bertrum Mediate, DO         Facility-Administered Medications Ordered in Other Encounters   Medication Dose Route Frequency Provider Last Rate Last Dose    leuprolide (LUPRON) injection 7.5 mg  7.5 mg Intramuscular Once Linda Kirk MD         [unfilled]  No Known Allergies     Review of Systems  All other systems are normal other than ones mentioned in HPI. PHYSICAL EXAMINATION:   VITAL SIGNS: /61   Pulse 98   Temp 99.8 °F (37.7 °C)   Resp 18   Ht 6' 1\" (1.854 m)   Wt 166 lb (75.3 kg)   SpO2 100%   BMI 21.90 kg/m²         GENERAL: In no acute distress, well- nourished, well- developed,alert and oriented to person place and time. SKIN: Warm and dry, withoutjaundice, ecchymoses, or petechiae. HEENT: Normocephalic, sclera anicteric, oral mucosa moist without lesion or exudate in the visible oral cavity or oropharynx, tongue mid-line with good mobility and no deviation with extension. NODES: No palpable adenopathy in the neck Levels I-V, bilateral   Supraclavicular fossae, axillary chains, or inguinal regions. LUNGS: Good inspiratory effort, no accessory muscle use, clear bilaterally, no focal wheeze, rales or rhonchi. CARDIAC: Regular rate and rhythm, without murmurs, rubs or gallops. ABDOMINAL: Normal bowel soundspresent, soft, non-tender, no mass or  organomegaly.   MUSKL:   LAB RESULTS:  Recent Results (from the past 24 hour(s))   Lactic Acid, Plasma    Collection Time: 09/26/20  8:15 AM   Result Value Ref Range    Lactic Acid 2.3 (H) 0.5 - 2.2 mmol/L   CBC Auto Differential    Collection Time: 09/26/20  8:15 AM   Result Value Ref Range    WBC 13.0 (H) 4.8 - 10.8 K/uL    RBC 2.66 (L) 4.70 - 6.10 M/uL    Hemoglobin 7.2 (L) 14.0 - 18.0 g/dL    Hematocrit 21.9 (L) 42.0 - 52.0 %    MCV 82.5 80.0 - 100.0 fL    MCH 26.9 (L) 27.0 - 31.3 pg    MCHC 32.7 (L) 33.0 - 37.0 %    RDW 16.2 (H) 11.5 - 14.5 %    Platelets 394 500 - 319 K/uL    Neutrophils % 84.0 %    Lymphocytes % 9.2 %    Monocytes % 6.0 %    Eosinophils % 0.4 %    Basophils % 0.4 %    Neutrophils Absolute 10.9 (H) 1.4 - 6.5 K/uL    Lymphocytes Absolute 1.2 1.0 - 4.8 K/uL    Monocytes Absolute 0.8 0.2 - 0.8 K/uL    Eosinophils Absolute 0.1 0.0 - 0.7 K/uL    Basophils Absolute 0.1 0.0 - 0.2 K/uL   Comprehensive Metabolic Panel    Collection Time: 09/26/20  8:15 AM   Result Value Ref Range    Sodium 137 135 - 144 mEq/L    Potassium 4.3 3.4 - 4.9 mEq/L    Chloride 104 95 - 107 mEq/L    CO2 22 20 - 31 mEq/L    Anion Gap 11 9 - 15 mEq/L    Glucose 144 (H) 70 - 99 mg/dL    BUN 19 8 - 23 mg/dL    CREATININE 0.93 0.70 - 1.20 mg/dL    GFR Non-African American >60.0 >60    GFR  >60.0 >60    Calcium 8.5 8.5 - 9.9 mg/dL    Total Protein 6.9 6.3 - 8.0 g/dL    Alb 3.5 3.5 - 4.6 g/dL    Total Bilirubin 0.5 0.2 - 0.7 mg/dL    Alkaline Phosphatase 171 (H) 35 - 104 U/L    ALT 23 0 - 41 U/L    AST 30 0 - 40 U/L    Globulin 3.4 2.3 - 3.5 g/dL   Urine Reflex to Culture    Collection Time: 09/26/20  8:15 AM    Specimen: Urine, clean catch   Result Value Ref Range    Color, UA Yellow Straw/Yellow    Clarity, UA CLOUDY (A) Clear    Glucose, Ur Negative Negative mg/dL    Bilirubin Urine Negative Negative    Ketones, Urine Negative Negative mg/dL    Specific Gravity, UA 1.019 1.005 - 1.030    Blood, Urine TRACE (A) Negative    pH, UA 6.0 5.0 - 9.0    Protein, UA Negative Negative mg/dL    Urobilinogen, Urine 1.0 <2.0 E.U./dL    Nitrite, Urine Negative Negative    Leukocyte Esterase, Urine SMALL (A) Negative    Urine Reflex to Culture Yes    Magnesium    Collection Time: 09/26/20  8:15 AM   Result Value Ref Range    Magnesium 2.1 1.7 - 2.4 mg/dL   Microscopic Urinalysis    Collection Time: 09/26/20  8:15 AM   Result Value Ref Range    Bacteria, UA MANY (A) Negative /HPF    Hyaline Casts, UA 0-1 0 - 5 /HPF    WBC, UA 20-50 (A) 0 - 5 /HPF    RBC, UA 3-5 (A) 0 - 5 /HPF    Epithelial Cells, UA 0-2 0 - 5 /HPF   EKG 12 Lead - Chest Pain    Collection Time: 09/26/20  8:35 AM   Result Value Ref Range    Ventricular Rate 90 BPM    Atrial Rate 90 BPM    P-R Interval 186 ms    QRS Duration 82 ms    Q-T Interval 386 ms    QTc Calculation (Alexia) 472 ms    P Axis 28 degrees    R Axis 1 degrees    T Axis 16 degrees   Troponin    Collection Time: 09/26/20  9:45 AM   Result Value Ref Range    Troponin <0.010 0.000 - 0.010 ng/mL   CBC Auto Differential    Collection Time: 09/26/20  9:45 AM   Result Value Ref Range    WBC 13.1 (H) 4.8 - 10.8 K/uL    RBC 2.39 (L) 4.70 - 6.10 M/uL    Hemoglobin 6.6 (LL) 14.0 - 18.0 g/dL    Hematocrit 20.0 (LL) 42.0 - 52.0 %    MCV 83.5 80.0 - 100.0 fL    MCH 27.5 27.0 - 31.3 pg    MCHC 32.9 (L) 33.0 - 37.0 %    RDW 16.4 (H) 11.5 - 14.5 %    Platelets 912 603 - 984 K/uL    PLATELET SLIDE REVIEW Normal     SLIDE REVIEW see below     Neutrophils % 81.0 %    Lymphocytes % 13.0 %    Monocytes % 2.8 %    Eosinophils % 1.0 %    Basophils % 0.9 %    Neutrophils Absolute 10.9 (H) 1.4 - 6.5 K/uL    Lymphocytes Absolute 1.7 1.0 - 4.8 K/uL    Monocytes Absolute 0.4 0.2 - 0.8 K/uL    Eosinophils Absolute 0.1 0.0 - 0.7 K/uL    Basophils Absolute 0.0 0.0 - 0.2 K/uL    Metamyelocytes Relative 1 %    Myelocyte Percent 1 %    Anisocytosis 1+     Microcytes 1+     Polychromasia 1+    TYPE AND SCREEN    Collection Time: 09/26/20  9:45 AM   Result Value Ref Range    ABO/Rh O POS     Antibody Screen NEG    PREPARE RBC (CROSSMATCH), 1 Units    Collection Time: 09/26/20  9:45 AM   Result Value Ref Range    Product Code Blood Bank [id number]     Description Blood Bank Red Blood Cells, Apheresis, Leuko-reduced     Unit Number [id number]     Dispense Status Blood Bank issued    Iron and TIBC    Collection Time: 09/26/20  9:45 AM   Result Value Ref Range    Iron 9 (L) 59 - 158 ug/dL    TIBC 171 (L) 178 - 450 ug/dL    Iron Saturation 5 (L) 11 - 46 %   Ferritin    Collection Time: 09/26/20  9:45 AM   Result Value Ref Range    Ferritin 1,685.0 (H) 30.0 - 400.0 ng/mL   Hemoglobin and Hematocrit, Blood    Collection Time: 09/26/20  2:12 PM   Result Value Ref Range    Hemoglobin 7.4 (L) 14.0 - 18.0 g/dL    Hematocrit 23.0 (L) 42.0 - 52.0 %     Recent Labs 09/26/20  0815   COLORU Yellow   PHUR 6.0   WBCUA 20-50*   RBCUA 3-5*   BACTERIA MANY*   CLARITYU CLOUDY*   SPECGRAV 1.019   LEUKOCYTESUR SMALL*   UROBILINOGEN 1.0   BILIRUBINUR Negative   BLOODU TRACE*   GLUCOSE 144*        Pathology:     RADIOLOGY RESULTS:  Ct Abdomen Pelvis W Iv Contrast Additional Contrast? None    Result Date: 9/26/2020  EXAM:  CT ABDOMEN PELVIS W IV CONTRAST History: Rectal mass. Increasing fatigue. Technique: Multiple contiguous axial images were obtained of the abdomen and pelvis from an level of the lung bases through the ischial tuberosities with contrast. Multiplanar reformats were obtained. Delayed images were obtained Comparison: CT chest abdomen pelvis from May 7, 2020 Findings: A few tiny right lung base nodules are stable. Interval increase in size and number of hypodense lesions throughout the liver, the largest of which measures approximately 6 cm and is located within the superior right lobe. The gallbladder contains at least one gallstone but is otherwise unremarkable. The spleen, stomach, pancreas, and adrenal glands are within normal limits. The kidneys enhance uniformly. 1 cm left renal cyst. 2.5 cm right renal cyst. No urinary tract calculi or hydronephrosis. Urinary bladder and prostate are secured by streak artifact from right total hip hip arthroplasty hardware. Given this, no overt abnormality of the prostate. A tiny focus of air is present within the urinary bladder, likely due to catheterization as there is no urinary bladder wall thickening or perivesicular inflammation. Abdominal aorta is nonaneurysmal  and demonstrates atherosclerotic disease . No retroperitoneal or abdominal/pelvic lymphadenopathy. No small bowel obstruction. No overt colonic mass or pericolonic inflammation. The appendix is within normal limits. . No free fluid or free air.  Numerous sclerotic lesions are again identified of the visualized spine and pelvis, most significantly involving the inferior pubic ramus. Postsurgical changes of right total hip arthroplasty. Degenerative changes of the spine and left hip. No acute fracture identified. Interval increase in number and size of numerous hypodense lesions within the liver most compatible with metastatic disease. Numerous metastatic bone lesions are again identified. A tiny focus of air is present within the urinary bladder and is likely due to catheterization as there is no urinary bladder wall thickening or perivesicular inflammation. All CT scans at this facility use dose modulation, iterative reconstruction, and/or weight based dosing when appropriate to reduce radiation dose to as low as reasonably achievable. Xr Chest Portable    Result Date: 9/26/2020  Exam: XR CHEST PORTABLE History: Chest pain Technique: AP portable view of the chest obtained. Comparison:  CT chest from May 7, 2020  Findings: The cardiomediastinal silhouette is within normal limits. No pneumothorax, pleural effusion, or consolidation. Bones of the thorax appear intact. No radiographic evidence of acute intrathoracic process. .     ASSESSMENT AND PLAN  1. Prostate cancer with bone metastasis and liver metastasis. Now with progression. Anemia is due to cancer progression and likely bone marrow involvement. He will follow up in our office next week when discharged.      Electronically signed by Dario Mcdonald MD on 9/26/2020 at 3:43 PM

## 2020-09-27 LAB
ALBUMIN SERPL-MCNC: 2.9 G/DL (ref 3.5–4.6)
ALP BLD-CCNC: 142 U/L (ref 35–104)
ALT SERPL-CCNC: 20 U/L (ref 0–41)
ANION GAP SERPL CALCULATED.3IONS-SCNC: 11 MEQ/L (ref 9–15)
AST SERPL-CCNC: 21 U/L (ref 0–40)
BASOPHILS ABSOLUTE: 0 K/UL (ref 0–0.2)
BASOPHILS RELATIVE PERCENT: 0.3 %
BILIRUB SERPL-MCNC: 0.5 MG/DL (ref 0.2–0.7)
BILIRUBIN DIRECT: 0.3 MG/DL (ref 0–0.4)
BILIRUBIN, INDIRECT: 0.2 MG/DL (ref 0–0.6)
BLOOD BANK DISPENSE STATUS: NORMAL
BLOOD BANK PRODUCT CODE: NORMAL
BPU ID: NORMAL
BUN BLDV-MCNC: 13 MG/DL (ref 8–23)
CALCIUM SERPL-MCNC: 7.8 MG/DL (ref 8.5–9.9)
CHLORIDE BLD-SCNC: 103 MEQ/L (ref 95–107)
CO2: 21 MEQ/L (ref 20–31)
CREAT SERPL-MCNC: 0.7 MG/DL (ref 0.7–1.2)
DESCRIPTION BLOOD BANK: NORMAL
EOSINOPHILS ABSOLUTE: 0.1 K/UL (ref 0–0.7)
EOSINOPHILS RELATIVE PERCENT: 0.6 %
GFR AFRICAN AMERICAN: >60
GFR NON-AFRICAN AMERICAN: >60
GLUCOSE BLD-MCNC: 124 MG/DL (ref 70–99)
HCT VFR BLD CALC: 21.3 % (ref 42–52)
HCT VFR BLD CALC: 21.3 % (ref 42–52)
HEMOGLOBIN: 6.9 G/DL (ref 14–18)
LYMPHOCYTES ABSOLUTE: 2.1 K/UL (ref 1–4.8)
LYMPHOCYTES RELATIVE PERCENT: 13.8 %
MCH RBC QN AUTO: 27.2 PG (ref 27–31.3)
MCHC RBC AUTO-ENTMCNC: 32.4 % (ref 33–37)
MCV RBC AUTO: 84.1 FL (ref 80–100)
MONOCYTES ABSOLUTE: 1.4 K/UL (ref 0.2–0.8)
MONOCYTES RELATIVE PERCENT: 9.1 %
NEUTROPHILS ABSOLUTE: 11.3 K/UL (ref 1.4–6.5)
NEUTROPHILS RELATIVE PERCENT: 76.2 %
PDW BLD-RTO: 16.2 % (ref 11.5–14.5)
PLATELET # BLD: 296 K/UL (ref 130–400)
POTASSIUM REFLEX MAGNESIUM: 4 MEQ/L (ref 3.4–4.9)
RBC # BLD: 2.54 M/UL (ref 4.7–6.1)
RETICULOCYTE ABSOLUTE COUNT: 0.09 M/CUMM (ref 0.02–0.11)
RETICULOCYTE COUNT PCT: 3.6 % (ref 0.6–2.2)
SODIUM BLD-SCNC: 135 MEQ/L (ref 135–144)
TOTAL PROTEIN: 6.2 G/DL (ref 6.3–8)
WBC # BLD: 14.9 K/UL (ref 4.8–10.8)

## 2020-09-27 PROCEDURE — 6360000002 HC RX W HCPCS: Performed by: INTERNAL MEDICINE

## 2020-09-27 PROCEDURE — 1210000000 HC MED SURG R&B

## 2020-09-27 PROCEDURE — 36415 COLL VENOUS BLD VENIPUNCTURE: CPT

## 2020-09-27 PROCEDURE — 85046 RETICYTE/HGB CONCENTRATE: CPT

## 2020-09-27 PROCEDURE — 99222 1ST HOSP IP/OBS MODERATE 55: CPT | Performed by: INTERNAL MEDICINE

## 2020-09-27 PROCEDURE — 85025 COMPLETE CBC W/AUTO DIFF WBC: CPT

## 2020-09-27 PROCEDURE — 6370000000 HC RX 637 (ALT 250 FOR IP): Performed by: INTERNAL MEDICINE

## 2020-09-27 PROCEDURE — 80048 BASIC METABOLIC PNL TOTAL CA: CPT

## 2020-09-27 PROCEDURE — 36430 TRANSFUSION BLD/BLD COMPNT: CPT

## 2020-09-27 PROCEDURE — P9016 RBC LEUKOCYTES REDUCED: HCPCS

## 2020-09-27 PROCEDURE — 80076 HEPATIC FUNCTION PANEL: CPT

## 2020-09-27 PROCEDURE — 2580000003 HC RX 258: Performed by: INTERNAL MEDICINE

## 2020-09-27 PROCEDURE — 99231 SBSQ HOSP IP/OBS SF/LOW 25: CPT | Performed by: SURGERY

## 2020-09-27 RX ORDER — 0.9 % SODIUM CHLORIDE 0.9 %
250 INTRAVENOUS SOLUTION INTRAVENOUS ONCE
Status: COMPLETED | OUTPATIENT
Start: 2020-09-27 | End: 2020-09-27

## 2020-09-27 RX ORDER — PEG-3350, SODIUM SULFATE, SODIUM CHLORIDE, POTASSIUM CHLORIDE, SODIUM ASCORBATE AND ASCORBIC ACID 7.5-2.691G
100 KIT ORAL ONCE
Status: COMPLETED | OUTPATIENT
Start: 2020-09-27 | End: 2020-09-27

## 2020-09-27 RX ORDER — POLYETHYLENE GLYCOL 3350 17 G/17G
238 POWDER, FOR SOLUTION ORAL ONCE
Status: COMPLETED | OUTPATIENT
Start: 2020-09-27 | End: 2020-09-27

## 2020-09-27 RX ORDER — LACTULOSE 10 G/15ML
20 SOLUTION ORAL 3 TIMES DAILY
Status: ACTIVE | OUTPATIENT
Start: 2020-09-27 | End: 2020-09-28

## 2020-09-27 RX ADMIN — PANTOPRAZOLE SODIUM 40 MG: 40 TABLET, DELAYED RELEASE ORAL at 20:37

## 2020-09-27 RX ADMIN — OXYCODONE 15 MG: 5 TABLET ORAL at 15:32

## 2020-09-27 RX ADMIN — PANTOPRAZOLE SODIUM 40 MG: 40 TABLET, DELAYED RELEASE ORAL at 08:03

## 2020-09-27 RX ADMIN — POLYETHYLENE GLYCOL 3350 238 G: 17 POWDER, FOR SOLUTION ORAL at 11:40

## 2020-09-27 RX ADMIN — CEFTRIAXONE SODIUM 1 G: 1 INJECTION, POWDER, FOR SOLUTION INTRAMUSCULAR; INTRAVENOUS at 09:51

## 2020-09-27 RX ADMIN — POLYETHYLENE GLYCOL 3350, SODIUM SULFATE, SODIUM CHLORIDE, POTASSIUM CHLORIDE, ASCORBIC ACID, SODIUM ASCORBATE 100 G: KIT at 17:45

## 2020-09-27 RX ADMIN — BICALUTAMIDE 50 MG: 50 TABLET ORAL at 08:44

## 2020-09-27 RX ADMIN — MORPHINE SULFATE 15 MG: 15 TABLET, FILM COATED, EXTENDED RELEASE ORAL at 20:37

## 2020-09-27 RX ADMIN — LACTULOSE 20 G: 20 SOLUTION ORAL at 08:03

## 2020-09-27 RX ADMIN — Medication 10 ML: at 08:04

## 2020-09-27 RX ADMIN — Medication 10 ML: at 20:38

## 2020-09-27 RX ADMIN — MORPHINE SULFATE 15 MG: 15 TABLET, FILM COATED, EXTENDED RELEASE ORAL at 08:03

## 2020-09-27 RX ADMIN — SODIUM CHLORIDE 250 ML: 9 INJECTION, SOLUTION INTRAVENOUS at 11:41

## 2020-09-27 RX ADMIN — POLYETHYLENE GLYCOL 3350 17 G: 17 POWDER, FOR SOLUTION ORAL at 08:03

## 2020-09-27 RX ADMIN — DILTIAZEM HYDROCHLORIDE 120 MG: 120 CAPSULE, COATED, EXTENDED RELEASE ORAL at 08:03

## 2020-09-27 ASSESSMENT — PAIN SCALES - GENERAL
PAINLEVEL_OUTOF10: 6
PAINLEVEL_OUTOF10: 3
PAINLEVEL_OUTOF10: 0
PAINLEVEL_OUTOF10: 7
PAINLEVEL_OUTOF10: 7

## 2020-09-27 NOTE — PROGRESS NOTES
senna-docusate (PERICOLACE) 8.6-50 MG per tablet 2 tablet  2 tablet Oral Daily PRN Jamari Tracy DO        pantoprazole (PROTONIX) tablet 40 mg  40 mg Oral BID Damian Valentine MD   40 mg at 09/27/20 0803    polyethylene glycol (GLYCOLAX) packet 17 g  17 g Oral Daily Jamari Tracy    17 g at 09/27/20 0803     Facility-Administered Medications Ordered in Other Encounters   Medication Dose Route Frequency Provider Last Rate Last Dose    leuprolide (LUPRON) injection 7.5 mg  7.5 mg Intramuscular Once Nitin Lee MD           PHYSICAL EXAM:    EYES:  Lids and lashes normal, pupils equal, round and reactive to light, extra ocular muscles intact, sclera clear, conjunctiva normal    ENT:  Normocephalic, without obvious abnormality, atraumatic, sinuses nontender on palpation, external ears without lesions, oral pharynx with moist mucus membranes, tonsils without erythema or exudates, gums normal and good dentition. NECK:  Supple, symmetrical, trachea midline, no adenopathy, thyroid symmetric, not enlarged and no tenderness, skin normal    CHEST:    LUNGS:  No increased work of breathing, good air exchange, clear to auscultation bilaterally, no crackles or wheezing    CARDIOVASCULAR:  Normal apical impulse, regular rate and rhythm, normal S1 and S2, no S3 or S4, and no murmur noted    ABDOMEN:  No scars, normal bowel sounds, soft, non-distended, non-tender, no masses palpated, no hepatosplenomegally    MUSCULOSKELETAL:  There is no redness, warmth, or swelling of the joints. Full range of motion noted. Motor strength is 5 out of 5 all extremities bilaterally.   Tone is normal.  EXTREMITIES:    NEURO:    DATA:      PT/INR:  No results found for: PTINR  PTT:  No results found for: APTT  CMP:    Lab Results   Component Value Date     09/27/2020    K 4.0 09/27/2020     09/27/2020    CO2 21 09/27/2020    BUN 13 09/27/2020    PROT 6.2 09/27/2020     Magnesium:    Lab Results   Component Value Date    MG 2.1 09/26/2020     Phosphorus:  No components found for: PO4  Calcium:  No components found for: CA  CBC:    Lab Results   Component Value Date    WBC 14.9 09/27/2020    RBC 2.54 09/27/2020    HGB 6.9 09/27/2020    HCT 21.3 09/27/2020    MCV 84.1 09/27/2020    RDW 16.2 09/27/2020     09/27/2020     DIFF:    Lab Results   Component Value Date    MCV 84.1 09/27/2020    RDW 16.2 09/27/2020      LDH:  No results found for: LDH  Uric Acid:  No components found for: URIC    EKG Reviewed  Appropriate Radiology Reviewed      Pathology: Reviewed where indicated      ASSESSMENT:  Active Problems:    Anemia  Resolved Problems:    * No resolved hospital problems. *    Patient Active Problem List   Diagnosis    Liver mass    Osteoarthritis of hip    Palpitations    Other specified disorders of bone density and structure, unspecified site     Anemia       PLAN:  When discharged to follow up in our office.           Electronically signed by Tran Valdez MD on 9/27/20 at 1:21 PM EDT

## 2020-09-27 NOTE — PROGRESS NOTES
Pt Name: Jess Mcfarlane  Medical Record Number: [id number]  Date of Birth 1950   Admit date 2020  7:55 AM  Today's Date: 2020     ASSESSMENT  1. Hospital day # 1  2. Lower GI bleed  3. Acute blood loss anemia    PLAN  1. Dr. Martita Garza to evaluate    SUBJECTIVE  Chief complaint: None  Afebrile, vital signs are stable. He denies any nausea or vomiting, has passed flatus and had a bowel movement. He is tolerating a DIET CLEAR LIQUID;. His pain is well controlled on current medications. He has been ambulating in the halls. has a past medical history of Prostate cancer ([facility] [address] CURRENT MEDS  Scheduled Meds:   cefTRIAXone (ROCEPHIN) IV  1 g Intravenous Q24H    PEG-KCl-NaCl-NaSulf-Na Asc-C  100 g Oral Once    lactulose  20 g Oral TID    sodium chloride flush  10 mL Intravenous 2 times per day    bicalutamide  50 mg Oral Daily    dilTIAZem  120 mg Oral Daily    morphine  15 mg Oral BID    pantoprazole  40 mg Oral BID    polyethylene glycol  17 g Oral Daily     Continuous Infusions:  PRN Meds:.sodium chloride flush, acetaminophen **OR** acetaminophen, polyethylene glycol, promethazine **OR** ondansetron, oxyCODONE, senna-docusate    OBJECTIVE  CURRENT VITALS:  height is 6' 1\" (1.854 m) and weight is 166 lb (75.3 kg). His temperature is 100.2 °F (37.9 °C). His blood pressure is 121/63 and his pulse is 81. His respiration is 16 and oxygen saturation is 100%.    Temperature Range (24h):Temp: 100.2 °F (37.9 °C) Temp  Av.8 °F (37.1 °C)  Min: 98.2 °F (36.8 °C)  Max: 100.2 °F (37.9 °C)  BP Range (27N): Systolic (53FFF), ICZ:678 , Min:91 , QTR:188     Diastolic (73WYT), NCD:73, Min:61, Max:74    Pulse Range (24h): Pulse  Av.2  Min: 78  Max: 96  Respiration Range (24h): Resp  Av  Min: 16  Max: 18    GENERAL: alert, no distress  LUNGS: clear to ausculation, without wheezes, rales or rhonci  HEART: normal rate and regular rhythm  ABDOMEN: soft, non-tender, non-distended, bowel sounds present in all 4 quadrants and no guarding or peritoneal signs  EXTERMITY: no cyanosis, clubbing or edema  In: 1220 [P.O.:360; I.V.:860]  Out: 550 [Urine:550]  Date 09/27/20 0000 - 09/27/20 2359   Shift 5086-0578 0942-0279 0321-7361 24 Hour Total   INTAKE   P.O.(mL/kg/hr) 120(0.2) 240  360   I. V.(mL/kg) 10(0.1) 850(11.3)  860(11.4)   Shift Total(mL/kg) 130(1.7) 7431(33.2)  1851(92.8)   OUTPUT   Urine(mL/kg/hr) 550(0.9)   550   Shift Total(mL/kg) 550(7.3)   550(7.3)   Weight (kg) 75.3 75.3 75.3 75.3       LABS  Recent Labs     09/26/20  0815 09/26/20  0945 09/26/20  1412 09/27/20  0632 09/27/20  1049   WBC 13.0* 13.1*  --  14.9*  --    HGB 7.2* 6.6* 7.4* 6.9*  --    HCT 21.9* 20.0* 23.0* 21.3* 21.3*    312  --  296  --      --   --  135  --    K 4.3  --   --  4.0  --      --   --  103  --    CO2 22  --   --  21  --    BUN 19  --   --  13  --    CREATININE 0.93  --   --  0.70  --    MG 2.1  --   --   --   --    CALCIUM 8.5  --   --  7.8*  --       No results for input(s): PTT, INR in the last 72 hours. Invalid input(s): PT  Recent Labs     09/26/20 0815 09/27/20  0632   AST 30 21   ALT 23 20   BILITOT 0.5 0.5   BILIDIR  --  0.3   LACTA 2.3*  --        RADIOLOGY  Ct Abdomen Pelvis W Iv Contrast Additional Contrast? None    Result Date: 9/26/2020  EXAM:  CT ABDOMEN PELVIS W IV CONTRAST History: Rectal mass. Increasing fatigue. Technique: Multiple contiguous axial images were obtained of the abdomen and pelvis from an level of the lung bases through the ischial tuberosities with contrast. Multiplanar reformats were obtained. Delayed images were obtained Comparison: CT chest abdomen pelvis from May 7, 2020 Findings: A few tiny right lung base nodules are stable. Interval increase in size and number of hypodense lesions throughout the liver, the largest of which measures approximately 6 cm and is located within the superior right lobe.  The gallbladder contains at least one gallstone but is otherwise unremarkable. The spleen, stomach, pancreas, and adrenal glands are within normal limits. The kidneys enhance uniformly. 1 cm left renal cyst. 2.5 cm right renal cyst. No urinary tract calculi or hydronephrosis. Urinary bladder and prostate are secured by streak artifact from right total hip hip arthroplasty hardware. Given this, no overt abnormality of the prostate. A tiny focus of air is present within the urinary bladder, likely due to catheterization as there is no urinary bladder wall thickening or perivesicular inflammation. Abdominal aorta is nonaneurysmal  and demonstrates atherosclerotic disease . No retroperitoneal or abdominal/pelvic lymphadenopathy. No small bowel obstruction. No overt colonic mass or pericolonic inflammation. The appendix is within normal limits. . No free fluid or free air. Numerous sclerotic lesions are again identified of the visualized spine and pelvis, most significantly involving the inferior pubic ramus. Postsurgical changes of right total hip arthroplasty. Degenerative changes of the spine and left hip. No acute fracture identified. Interval increase in number and size of numerous hypodense lesions within the liver most compatible with metastatic disease. Numerous metastatic bone lesions are again identified. A tiny focus of air is present within the urinary bladder and is likely due to catheterization as there is no urinary bladder wall thickening or perivesicular inflammation. All CT scans at this facility use dose modulation, iterative reconstruction, and/or weight based dosing when appropriate to reduce radiation dose to as low as reasonably achievable. Xr Chest Portable    Result Date: 9/26/2020  Exam: XR CHEST PORTABLE History: Chest pain Technique: AP portable view of the chest obtained. Comparison:  CT chest from May 7, 2020  Findings: The cardiomediastinal silhouette is within normal limits. No pneumothorax, pleural effusion, or consolidation.   Bones of the thorax appear intact. No radiographic evidence of acute intrathoracic process.      Electronically signed by Felipe Barboza MD on 9/27/2020 at 3:40 PM

## 2020-09-27 NOTE — CONSULTS
Inpatient consult to GI  Consult performed by: Soo Schwartz MD  Consult ordered by: Itzel Barnard MD          Patient Name: Cricket Gillespie Date: 2020  7:55 AM  MR #: [id number]  : 1950    Attending Physician: Dieudonne Dean DO  Reason for consult: Acute blood loss anemia, rectal bleeding   History of Presenting Illness:      Petra Chaudhary is a 79 y.o. male on hospital day 1 with a history of  Metastatic prostate cancer, osteoarthritis of hip, anemia, constipation   . History Obtained From:  Patient  Patient reports increased weakness/fatigue over the past week. He reports taking ibuprofen along with oxycodone for worsening right hip pain. He deals with constipation on a regular basis, due to his pain medication. He does not take any over the counter laxative to help. He just \"waits\". Patient denies hematochezia or hematemesis, but reports hard \"black\" stool on and off for months. Experiencing lower abd pressure, bloating, and excessive flatus. +occult blood on rectal exam in ED. Prostate Ca diagnosed on . Patient is on hormonal therapy with lupron and casodex. Had chemotherapy with taxotere, last treatment 3/2020. Patient denies CP, SOB, or palpitations. No F/C. Colonoscopy 2010- moderate internal hemorrhoids. One 3 mm polyp removed from rectum. Sigmoid diverticulosis. History:      Past Medical History:   Diagnosis Date    Prostate cancer ([facility] [address] 2019     Past Surgical History:   Procedure Laterality Date    JOINT REPLACEMENT Right 2018     Family History  History reviewed. No pertinent family history.   Social History     Socioeconomic History    Marital status: Single     Spouse name: Not on file    Number of children: Not on file    Years of education: Not on file    Highest education level: Not on file   Occupational History    Not on file   Social Needs    Financial resource strain: Not on file    Food insecurity     Worry: Not on file     Inability: Not on file    Transportation needs     Medical: Not on file     Non-medical: Not on file   Tobacco Use    Smoking status: Never Smoker    Smokeless tobacco: Never Used   Substance and Sexual Activity    Alcohol use: Yes     Frequency: 2-4 times a month    Drug use: No    Sexual activity: Not on file   Lifestyle    Physical activity     Days per week: Not on file     Minutes per session: Not on file    Stress: Not on file   Relationships    Social connections     Talks on phone: Not on file     Gets together: Not on file     Attends [employer] service: Not on file     Active member of club or organization: Not on file     Attends meetings of clubs or organizations: Not on file     Relationship status: Not on file    Intimate partner violence     Fear of current or ex partner: Not on file     Emotionally abused: Not on file     Physically abused: Not on file     Forced sexual activity: Not on file   Other Topics Concern    Not on file   Social History Narrative    Not on file      Home Medications:      Medications Prior to Admission: oxyCODONE HCl (OXY-IR) 15 MG immediate release tablet, Take 1 tablet by mouth every 6 hours as needed for Pain (moderate to severe pain) for up to 120 days. morphine (MS CONTIN) 15 MG extended release tablet, Take 1 tablet by mouth 2 times daily for 30 days. sennosides-docusate sodium (SENOKOT-S) 8.6-50 MG tablet, Take 2 tablets by mouth daily as needed for Constipation  dilTIAZem (CARDIZEM CD) 120 MG extended release capsule, Take 1 capsule by mouth daily  rivaroxaban (XARELTO) 20 MG TABS tablet, Take 1 tablet by mouth daily (with breakfast)  dexamethasone (DECADRON) 4 MG tablet, Take 4 mg by mouth 2 times daily (with meals) Take 1 tab Bid 1 day before chemo tx and 2 days after chemo treatment.   bicalutamide (CASODEX) 50 MG chemo tablet, Take 50 mg by mouth daily  [DISCONTINUED] mirtazapine (REMERON) 15 MG tablet, Take 0.5 tablets by mouth nightly  [DISCONTINUED] rOPINIRole (REQUIP) 0.25 MG tablet, Take 1 tablet by mouth 3 times daily  Polyethylene Glycol 3350 POWD, Take by mouth  Fe Bisgly-Succ-C-Thre-B12-FA (IRON-150 PO), Take 325 mg by mouth daily  Denosumab (XGEVA SC), Inject into the skin  ondansetron (ZOFRAN) 4 MG tablet, Take 1 tablet by mouth daily as needed for Nausea or Vomiting  prochlorperazine (COMPAZINE) 10 MG tablet, Take 10 mg by mouth every 6 hours as needed    Current Hospital Medications:   Scheduled Meds:   0.9 % sodium chloride  250 mL Intravenous Once    cefTRIAXone (ROCEPHIN) IV  1 g Intravenous Q24H    sodium chloride flush  10 mL Intravenous 2 times per day    bicalutamide  50 mg Oral Daily    dilTIAZem  120 mg Oral Daily    morphine  15 mg Oral BID    pantoprazole  40 mg Oral BID    polyethylene glycol  17 g Oral Daily     Continuous Infusions:  PRN Meds:.sodium chloride flush, acetaminophen **OR** acetaminophen, polyethylene glycol, promethazine **OR** ondansetron, oxyCODONE, senna-docusate     Allergies:   No Known Allergies   Review of Systems:       [x] CV, Resp, Neuro, , and all other systems reviewed and negative other than listed in HPI.     Objective Findings:     Vitals:   Vitals:    09/26/20 1610 09/27/20 0015 09/27/20 0421 09/27/20 0752   BP: 131/61  108/64 115/67   Pulse: 94 90 82 82   Resp: 18  16    Temp: 99.1 °F (37.3 °C)  98.2 °F (36.8 °C) 98.4 °F (36.9 °C)   TempSrc: Oral      SpO2: 100%  100% 100%   Weight:       Height:          Physical Examination:  General: No apparent distress, A/O x3  HEENT: Normocephalic, no scleral icterus. Neck: No JVD. Heart: Regular, no murmur, no rub/gallop. No RV heave. Lungs: Clear to ascultation, no rales/wheezing/rhonchi. Good chest wall excursion. Abdomen: Appearance:, Distension+, Soft , tenderness -none, Bowel sounds normal  Extremities: No clubbing/cyanosis, no edema. Skin: Warm, dry, normal turgor, no rash, no bruise, no petichiae. Neuro: No myoclonus or tremor.    Psych: Normal affect    Results/ Medications reviewed 9/27/2020, 9:39 AM     Laboratory, Microbiology, Pathology, Radiology, Cardiology, Medications and Transcriptions reviewed  Scheduled Meds:   0.9 % sodium chloride  250 mL Intravenous Once    cefTRIAXone (ROCEPHIN) IV  1 g Intravenous Q24H    sodium chloride flush  10 mL Intravenous 2 times per day    bicalutamide  50 mg Oral Daily    dilTIAZem  120 mg Oral Daily    morphine  15 mg Oral BID    pantoprazole  40 mg Oral BID    polyethylene glycol  17 g Oral Daily     Continuous Infusions:    Recent Labs     09/26/20  0815 09/26/20  0945 09/26/20  1412 09/27/20  0632   WBC 13.0* 13.1*  --  14.9*   HGB 7.2* 6.6* 7.4* 6.9*   HCT 21.9* 20.0* 23.0* 21.3*   MCV 82.5 83.5  --  84.1    312  --  296     Recent Labs     09/26/20  0815 09/27/20  0632    135   K 4.3 4.0    103   CO2 22 21   BUN 19 13   CREATININE 0.93 0.70     Recent Labs     09/26/20  0815 09/27/20  0632   AST 30 21   ALT 23 20   BILIDIR  --  0.3   BILITOT 0.5 0.5   ALKPHOS 171* 142*     No results for input(s): LIPASE, AMYLASE in the last 72 hours. Recent Labs     09/26/20  0815 09/27/20  0632   PROT 6.9 6.2*     Ct Abdomen Pelvis W Iv Contrast Additional Contrast? None  Result Date: 9/26/2020  Interval increase in number and size of numerous hypodense lesions within the liver most compatible with metastatic disease. Numerous metastatic bone lesions are again identified. A tiny focus of air is present within the urinary bladder and is likely due to catheterization as there is no urinary bladder wall thickening or perivesicular inflammation. Xr Chest Portable  Result Date: 9/26/2020  No radiographic evidence of acute intrathoracic process. Impression:   79year old male patient with metastatic prostate cancer. GI consulted for symptomatic anemia and tarry stools. Risk factors include NSAID use and oral anticoagulation. Hemoglobin on admission 6.6.   Iron studies show low iron saturation, TIBC not elevated. Suggests bone marrow suppression/anemia of chronic disease in addition to suspected recent GI blood loss: Acute on chronic anemia  Endoscopic evaluation to exclude NSAID induced peptic ulcer disease, colonic source for melena, small bowel AVMs in differential  Plan:   - Continue supportive care  - Monitor H/H  - Continue PPI twice daily   - Start MiraLAX/Gatorade prep now  - Movi prep split prep to start tonight  - Continue clear liquid diet  - NPO 9/28/20 at 0800  Comments: Thank you for allowing us to participate in the care of this patient. Will continue to follow. Please call if questions or concerns arise. Electronically signed by Sol Hutson MD on 9/27/2020 at 9:39 AM    Please note this report has been partially produced using speech recognition software and may cause contain errors related to that system including grammar, punctuation and spelling as well as words and phrases that may seem inappropriate. If there are questions or concerns please feel free to contact me to clarify.

## 2020-09-27 NOTE — PROGRESS NOTES
Physician Progress Note    9/27/2020   12:33 PM    Name:  Dru Evans  MRN:    [id number]      Day: 1     Admit Date: 9/26/2020  7:55 AM  PCP: No primary care provider on file. Code Status:  Full Code    Subjective:     He has not tried moving around but denies any dizziness. No obvious bleeding. Some abdominal discomfort in his right upper quadrant. Still no bowel movement.     Current Facility-Administered Medications   Medication Dose Route Frequency Provider Last Rate Last Dose    cefTRIAXone (ROCEPHIN) 1 g IVPB in 50 mL D5W minibag  1 g Intravenous Q24H Doreene Yony, DO   Stopped at 09/27/20 1021    PEG-KCl-NaCl-NaSulf-Na Asc-C (MOVIPREP) solution 100 g  100 g Oral Once Christin Roa MD        lactulose [facility]) 10 GM/15ML solution 20 g  20 g Oral TID Doreene Yony, DO        sodium chloride flush 0.9 % injection 10 mL  10 mL Intravenous 2 times per day Doreene Yony, DO   10 mL at 09/27/20 0804    sodium chloride flush 0.9 % injection 10 mL  10 mL Intravenous PRN Doreene Yony, DO        acetaminophen (TYLENOL) tablet 650 mg  650 mg Oral Q6H PRN Doreene Yony, DO        Or    acetaminophen (TYLENOL) suppository 650 mg  650 mg Rectal Q6H PRN Doreene Yony, DO        polyethylene glycol (GLYCOLAX) packet 17 g  17 g Oral Daily PRN Doreene Yony, DO        promethazine (PHENERGAN) tablet 12.5 mg  12.5 mg Oral Q6H PRN Doreene Yony, DO        Or    [facility]ron [facility]) injection 4 mg  4 mg Intravenous Q6H PRN Doreene Yony, DO        bicalutamide (CASODEX) chemo tablet 50 mg  50 mg Oral Daily Doreene Yony, DO   50 mg at 09/27/20 0844    dilTIAZem (CARDIZEM CD) extended release capsule 120 mg  120 mg Oral Daily Doreene Yony, DO   120 mg at 09/27/20 0803    morphine (MS CONTIN) extended release tablet 15 mg  15 mg Oral BID Doreene Yony, DO   15 mg at 09/27/20 0803    oxyCODONE (ROXICODONE) immediate release tablet 15 mg  15 mg Oral Q6H PRN Teo Bhakta, DO   15 mg at 20    senna-docusate (PERICOLACE) 8.6-50 MG per tablet 2 tablet  2 tablet Oral Daily PRN Petra Pugh DO        pantoprazole (PROTONIX) tablet 40 mg  40 mg Oral BID Suzy Monteiro MD   40 mg at 20 08    polyethylene glycol (GLYCOLAX) packet 17 g  17 g Oral Daily Petra Pugh DO   17 g at 20 0803     Facility-Administered Medications Ordered in Other Encounters   Medication Dose Route Frequency Provider Last Rate Last Dose    leuprolide (LUPRON) injection 7.5 mg  7.5 mg Intramuscular Once Ray Schwarz MD           Physical Examination:      Vitals:  /69   Pulse 80   Temp 98.2 °F (36.8 °C)   Resp 16   Ht 6' 1\" (1.854 m)   Wt 166 lb (75.3 kg)   SpO2 100%   BMI 21.90 kg/m²   Temp (24hrs), Av.9 °F (37.2 °C), Min:98.2 °F (36.8 °C), Max:100.2 °F (37.9 °C)      General appearance: alert, cooperative and no distress. thin  Mental Status: oriented to person, place and time and normal affect  Lungs: clear to auscultation bilaterally, normal effort  Heart: regular rate and rhythm, no murmur  Abdomen: mild TTP in RUQ. Otherwise soft, nontender, nondistended, bowel sounds present, no masses  Extremities: no edema, redness, tenderness in the calves  Skin: no gross lesions, rashes    Data:     Labs:  Recent Labs     20  0945 20  1412 20  0632   WBC 13.1*  --  14.9*   HGB 6.6* 7.4* 6.9*     --  296     Recent Labs     20  0815 20  0632    135   K 4.3 4.0    103   CO2 22 21   BUN 19 13   CREATININE 0.93 0.70   GLUCOSE 144* 124*     Recent Labs     20  0815 20  0632   AST 30 21   ALT 23 20   BILITOT 0.5 0.5   ALKPHOS 171* 142*       Assessment and Plan:        1. 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 Possible contribution from blood loss, chemotherapy, bone marrow involvement  -GI evaluation ongoing. Hold blood thinner  -Transfuse to hemoglobin goal greater than 7     2.   Chronic constipation: Aggressive bowel

## 2020-09-28 LAB
ANION GAP SERPL CALCULATED.3IONS-SCNC: 15 MEQ/L (ref 9–15)
BASOPHILS ABSOLUTE: 0 K/UL (ref 0–0.2)
BASOPHILS RELATIVE PERCENT: 0.2 %
BUN BLDV-MCNC: 12 MG/DL (ref 8–23)
CALCIUM SERPL-MCNC: 8.1 MG/DL (ref 8.5–9.9)
CHLORIDE BLD-SCNC: 97 MEQ/L (ref 95–107)
CO2: 21 MEQ/L (ref 20–31)
CREAT SERPL-MCNC: 0.73 MG/DL (ref 0.7–1.2)
EKG ATRIAL RATE: 90 BPM
EKG ATRIAL RATE: 91 BPM
EKG P AXIS: 28 DEGREES
EKG P AXIS: 31 DEGREES
EKG P-R INTERVAL: 186 MS
EKG P-R INTERVAL: 194 MS
EKG Q-T INTERVAL: 386 MS
EKG Q-T INTERVAL: 392 MS
EKG QRS DURATION: 82 MS
EKG QRS DURATION: 86 MS
EKG QTC CALCULATION (BAZETT): 472 MS
EKG QTC CALCULATION (BAZETT): 482 MS
EKG R AXIS: -6 DEGREES
EKG R AXIS: 1 DEGREES
EKG T AXIS: 16 DEGREES
EKG T AXIS: 7 DEGREES
EKG VENTRICULAR RATE: 90 BPM
EKG VENTRICULAR RATE: 91 BPM
EOSINOPHILS ABSOLUTE: 0.1 K/UL (ref 0–0.7)
EOSINOPHILS RELATIVE PERCENT: 0.6 %
GFR AFRICAN AMERICAN: >60
GFR NON-AFRICAN AMERICAN: >60
GLUCOSE BLD-MCNC: 117 MG/DL (ref 70–99)
HCT VFR BLD CALC: 27 % (ref 42–52)
HEMOGLOBIN: 8.8 G/DL (ref 14–18)
LYMPHOCYTES ABSOLUTE: 1.7 K/UL (ref 1–4.8)
LYMPHOCYTES RELATIVE PERCENT: 10.6 %
MCH RBC QN AUTO: 27.6 PG (ref 27–31.3)
MCHC RBC AUTO-ENTMCNC: 32.6 % (ref 33–37)
MCV RBC AUTO: 84.7 FL (ref 80–100)
MONOCYTES ABSOLUTE: 1.5 K/UL (ref 0.2–0.8)
MONOCYTES RELATIVE PERCENT: 9.2 %
NEUTROPHILS ABSOLUTE: 12.9 K/UL (ref 1.4–6.5)
NEUTROPHILS RELATIVE PERCENT: 79.4 %
ORGANISM: ABNORMAL
PDW BLD-RTO: 15.8 % (ref 11.5–14.5)
PLATELET # BLD: 367 K/UL (ref 130–400)
POTASSIUM REFLEX MAGNESIUM: 3.8 MEQ/L (ref 3.4–4.9)
RBC # BLD: 3.18 M/UL (ref 4.7–6.1)
SODIUM BLD-SCNC: 133 MEQ/L (ref 135–144)
URINE CULTURE, ROUTINE: ABNORMAL
WBC # BLD: 16.2 K/UL (ref 4.8–10.8)

## 2020-09-28 PROCEDURE — 80048 BASIC METABOLIC PNL TOTAL CA: CPT

## 2020-09-28 PROCEDURE — 6370000000 HC RX 637 (ALT 250 FOR IP): Performed by: INTERNAL MEDICINE

## 2020-09-28 PROCEDURE — 6360000002 HC RX W HCPCS: Performed by: INTERNAL MEDICINE

## 2020-09-28 PROCEDURE — 1210000000 HC MED SURG R&B

## 2020-09-28 PROCEDURE — 99231 SBSQ HOSP IP/OBS SF/LOW 25: CPT | Performed by: INTERNAL MEDICINE

## 2020-09-28 PROCEDURE — 99231 SBSQ HOSP IP/OBS SF/LOW 25: CPT | Performed by: NURSE PRACTITIONER

## 2020-09-28 PROCEDURE — 85025 COMPLETE CBC W/AUTO DIFF WBC: CPT

## 2020-09-28 PROCEDURE — 93010 ELECTROCARDIOGRAM REPORT: CPT | Performed by: INTERNAL MEDICINE

## 2020-09-28 PROCEDURE — 93005 ELECTROCARDIOGRAM TRACING: CPT | Performed by: INTERNAL MEDICINE

## 2020-09-28 PROCEDURE — 2580000003 HC RX 258: Performed by: INTERNAL MEDICINE

## 2020-09-28 PROCEDURE — 99231 SBSQ HOSP IP/OBS SF/LOW 25: CPT | Performed by: SURGERY

## 2020-09-28 PROCEDURE — 36415 COLL VENOUS BLD VENIPUNCTURE: CPT

## 2020-09-28 RX ORDER — PEG-3350, SODIUM SULFATE, SODIUM CHLORIDE, POTASSIUM CHLORIDE, SODIUM ASCORBATE AND ASCORBIC ACID 7.5-2.691G
100 KIT ORAL ONCE
Status: COMPLETED | OUTPATIENT
Start: 2020-09-28 | End: 2020-09-28

## 2020-09-28 RX ADMIN — CEFTRIAXONE SODIUM 1 G: 1 INJECTION, POWDER, FOR SOLUTION INTRAMUSCULAR; INTRAVENOUS at 09:13

## 2020-09-28 RX ADMIN — BICALUTAMIDE 50 MG: 50 TABLET ORAL at 09:12

## 2020-09-28 RX ADMIN — PANTOPRAZOLE SODIUM 40 MG: 40 TABLET, DELAYED RELEASE ORAL at 20:02

## 2020-09-28 RX ADMIN — POLYETHYLENE GLYCOL 3350, SODIUM SULFATE, SODIUM CHLORIDE, POTASSIUM CHLORIDE, ASCORBIC ACID, SODIUM ASCORBATE 100 G: KIT at 17:46

## 2020-09-28 RX ADMIN — DILTIAZEM HYDROCHLORIDE 120 MG: 120 CAPSULE, COATED, EXTENDED RELEASE ORAL at 09:12

## 2020-09-28 RX ADMIN — Medication 10 ML: at 09:16

## 2020-09-28 RX ADMIN — MORPHINE SULFATE 15 MG: 15 TABLET, FILM COATED, EXTENDED RELEASE ORAL at 09:15

## 2020-09-28 RX ADMIN — ONDANSETRON 4 MG: 2 INJECTION INTRAMUSCULAR; INTRAVENOUS at 06:20

## 2020-09-28 RX ADMIN — Medication 10 ML: at 20:03

## 2020-09-28 RX ADMIN — PANTOPRAZOLE SODIUM 40 MG: 40 TABLET, DELAYED RELEASE ORAL at 09:12

## 2020-09-28 RX ADMIN — OXYCODONE 15 MG: 5 TABLET ORAL at 15:46

## 2020-09-28 RX ADMIN — MORPHINE SULFATE 15 MG: 15 TABLET, FILM COATED, EXTENDED RELEASE ORAL at 20:02

## 2020-09-28 ASSESSMENT — PAIN SCALES - GENERAL
PAINLEVEL_OUTOF10: 3
PAINLEVEL_OUTOF10: 0
PAINLEVEL_OUTOF10: 6
PAINLEVEL_OUTOF10: 6
PAINLEVEL_OUTOF10: 7

## 2020-09-28 ASSESSMENT — ENCOUNTER SYMPTOMS
EYES NEGATIVE: 1
ABDOMINAL PAIN: 0
SHORTNESS OF BREATH: 0
DIARRHEA: 0
ALLERGIC/IMMUNOLOGIC NEGATIVE: 1
VOMITING: 0
CHEST TIGHTNESS: 0
ABDOMINAL DISTENTION: 0
NAUSEA: 0
BACK PAIN: 1
CONSTIPATION: 1
WHEEZING: 0

## 2020-09-28 NOTE — PLAN OF CARE
Problem: Pain:  Description: Pain management should include both nonpharmacologic and pharmacologic interventions.   Goal: Pain level will decrease  Outcome: Ongoing  Goal: Control of acute pain  Outcome: Ongoing  Goal: Control of chronic pain  Outcome: Ongoing     Problem: Skin Integrity:  Goal: Will show no infection signs and symptoms  Outcome: Ongoing  Goal: Absence of new skin breakdown  Outcome: Ongoing     Problem: Falls - Risk of:  Goal: Will remain free from falls  Outcome: Ongoing  Goal: Absence of physical injury  Outcome: Ongoing

## 2020-09-28 NOTE — PROGRESS NOTES
[facility] Occupational Therapy      Date: 2020  Patient Name: Marcy Herrmann        MRN: [id number]  Account: [de-identified]   : 1950  (79 y.o.)  Room: [place] [id number]    Chart reviewed, attempted OT at [facility] [id number] for eval. Patient not seen 2° to:    Hold per nursing request due to: Discussed with Linda Bal RN Pt's most recent HgB of 6.9. Decision made to hold pending results of next CBC. Spoke to NEIDA Rolle aware. Will attempt again when able.     Electronically signed by GIFTY Ortega on 2020 at 10:45 AM

## 2020-09-28 NOTE — PROGRESS NOTES
Physician Progress Note    9/28/2020   3:22 PM    Name:  Stefano Adams  MRN:    [id number]      Day: 2     Admit Date: 9/26/2020  7:55 AM  PCP: No primary care provider on file. Code Status:  Full Code    Subjective:     Still not clear. Endoscopy tomorrow.      Current Facility-Administered Medications   Medication Dose Route Frequency Provider Last Rate Last Dose    PEG-KCl-NaCl-NaSulf-Na Asc-C (MOVIPREP) solution 100 g  100 g Oral Once Tonja Cates MD        cefTRIAXone (ROCEPHIN) 1 g IVPB in 50 mL D5W minibag  1 g Intravenous Q24H Erica Chuyita,  mL/hr at 09/28/20 0913 1 g at 09/28/20 0913    sodium chloride flush 0.9 % injection 10 mL  10 mL Intravenous 2 times per day Erica Chuyita, DO   10 mL at 09/28/20 0916    sodium chloride flush 0.9 % injection 10 mL  10 mL Intravenous PRN Erica Chuyita, DO        acetaminophen (TYLENOL) tablet 650 mg  650 mg Oral Q6H PRN Erica Chuyita, DO        Or    acetaminophen (TYLENOL) suppository 650 mg  650 mg Rectal Q6H PRN Erica Chuyita, DO        polyethylene glycol (GLYCOLAX) packet 17 g  17 g Oral Daily PRN Erica Chuyita, DO        promethazine (PHENERGAN) tablet 12.5 mg  12.5 mg Oral Q6H PRN Erica Chuyita, DO        Or    [facility]ron [facility]) injection 4 mg  4 mg Intravenous Q6H PRN Erica Chuyita, DO   4 mg at 09/28/20 0620    bicalutamide (CASODEX) chemo tablet 50 mg  50 mg Oral Daily Erica Chuyita, DO   50 mg at 09/28/20 0912    dilTIAZem (CARDIZEM CD) extended release capsule 120 mg  120 mg Oral Daily Erica Chuyita, DO   120 mg at 09/28/20 0912    morphine (MS CONTIN) extended release tablet 15 mg  15 mg Oral BID Erica Chuyita, DO   15 mg at 09/28/20 0915    oxyCODONE (ROXICODONE) immediate release tablet 15 mg  15 mg Oral Q6H PRN Erica Chuyita, DO   15 mg at 09/27/20 1532    senna-docusate (PERICOLACE) 8.6-50 MG per tablet 2 tablet  2 tablet Oral Daily PRN Erica Boogie DO        pantoprazole (PROTONIX) tablet 40 mg 40 mg Oral BID Melita Caballero MD   40 mg at 20 0912    polyethylene glycol (GLYCOLAX) packet 17 g  17 g Oral Daily Herminio Murdock DO   17 g at 20 0803       Physical Examination:      Vitals:  /74   Pulse 87   Temp 98.1 °F (36.7 °C)   Resp 16   Ht 6' 1\" (1.854 m)   Wt 166 lb (75.3 kg)   SpO2 100%   BMI 21.90 kg/m²   Temp (24hrs), Av.6 °F (37 °C), Min:97.5 °F (36.4 °C), Max:100.2 °F (37.9 °C)      General appearance: alert, cooperative and no distress. thin  Mental Status: oriented to person, place and time and normal affect  Lungs: clear to auscultation bilaterally, normal effort  Heart: regular rate and rhythm, no murmur  Abdomen: mild TTP in RUQ. Otherwise soft, nontender, nondistended, bowel sounds present, no masses  Extremities: no edema, redness, tenderness in the calves  Skin: no gross lesions, rashes    Data:     Labs:  Recent Labs     20  0632 20  0835   WBC 14.9* 16.2*   HGB 6.9* 8.8*    367     Recent Labs     20  0632 20  0835    133*   K 4.0 3.8    97   CO2 21 21   BUN 13 12   CREATININE 0.70 0.73   GLUCOSE 124* 117*     Recent Labs     20  0815 20  0632   AST 30 21   ALT 23 20   BILITOT 0.5 0.5   ALKPHOS 171* 142*     Escherichia coli (1)     Antibiotic  Interpretation  MADELYN  Status     ampicillin  Sensitive  8  mcg/mL      ceFAZolin  Sensitive  <=4  mcg/mL      ciprofloxacin  Resistant  >=4  mcg/mL      gentamicin  Sensitive  <=1  mcg/mL      levofloxacin  Resistant  >=8  mcg/mL      nitrofurantoin  Sensitive  <=16  mcg/mL      trimethoprim-sulfamethoxazole  Resistant  >=320  mcg/mL            Assessment and Plan:        1. 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 Possible contribution from blood loss, chemotherapy, bone marrow involvement  -GI evaluation ongoing- endoscopy tomorrow. Hold blood thinner  -Transfuse to hemoglobin goal greater than 7     2. Chronic constipation: on bowel prep     3.   Paroxysmal atrial fibrillation on Xarelto: Hold anticoagulant     4.  E coli UTI: CTX while in house. Transition to macrobid at d/c for 7d total     5. Prostate cancer with bone and liver metastases: Progression of disease noted on most recent imaging. Patient to follow-up in office next week to discuss further treatment options. Will ask [facility] to follow as outpatient. Inpatient until blood counts have stabilized. PT/OT ordered.        Electronically signed by Julia Jara DO on 9/28/2020 at 3:22 PM

## 2020-09-28 NOTE — CONSULTS
abdominal pain, diarrhea, nausea and vomiting. Endocrine: Negative. Genitourinary: Negative for dysuria. Musculoskeletal: Positive for arthralgias and back pain. Skin: Negative for pallor. Allergic/Immunologic: Negative. Neurological: Positive for weakness. Negative for dizziness and tremors. Psychiatric/Behavioral: Negative for agitation and sleep disturbance. Physical Examination:       /74   Pulse 87   Temp 98.1 °F (36.7 °C)   Resp 16   Ht 6' 1\" (1.854 m)   Wt 166 lb (75.3 kg)   SpO2 100%   BMI 21.90 kg/m²    Physical Exam  Vitals signs reviewed. Constitutional:       General: He is not in acute distress. Appearance: He is well-developed. He is not diaphoretic. HENT:      Head: Normocephalic and atraumatic. Right Ear: External ear normal.      Left Ear: External ear normal.      Nose: Nose normal.      Mouth/Throat:      Pharynx: No oropharyngeal exudate. Eyes:      General: No scleral icterus. Right eye: No discharge. Left eye: No discharge. Conjunctiva/sclera: Conjunctivae normal.      Pupils: Pupils are equal, round, and reactive to light. Neck:      Musculoskeletal: Normal range of motion and neck supple. Thyroid: No thyromegaly. Vascular: No JVD. Trachea: No tracheal deviation. Cardiovascular:      Rate and Rhythm: Normal rate and regular rhythm. Heart sounds: Normal heart sounds. No murmur. No friction rub. No gallop. Pulmonary:      Effort: Pulmonary effort is normal. No respiratory distress. Breath sounds: Normal breath sounds. No stridor. No wheezing or rales. Chest:      Chest wall: No tenderness. Abdominal:      General: Bowel sounds are normal. There is no distension. Palpations: Abdomen is soft. There is no mass. Tenderness: There is no abdominal tenderness. There is no guarding or rebound. Hernia: No hernia is present. Genitourinary:     Rectum: Guaiac result negative. Oral Daily Louellen Honey, DO   50 mg at 09/28/20 0912    dilTIAZem (CARDIZEM CD) extended release capsule 120 mg  120 mg Oral Daily Louellen Honey, DO   120 mg at 09/28/20 0912    morphine (MS CONTIN) extended release tablet 15 mg  15 mg Oral BID Louellen Honey, DO   15 mg at 09/28/20 0915    oxyCODONE (ROXICODONE) immediate release tablet 15 mg  15 mg Oral Q6H PRN Louellen Honey, DO   15 mg at 09/27/20 1532    senna-docusate (PERICOLACE) 8.6-50 MG per tablet 2 tablet  2 tablet Oral Daily PRN Louellen Honey, DO        pantoprazole (PROTONIX) tablet 40 mg  40 mg Oral BID Jose Elias Kong MD   40 mg at 09/28/20 0912    polyethylene glycol (GLYCOLAX) packet 17 g  17 g Oral Daily Louellen Honey, DO   17 g at 09/27/20 9294       History:       PMHx:  Past Medical History:   Diagnosis Date    [facility]     ON XARELTO    Prostate cancer ([facility] 11/2019       PSHx:  Past Surgical History:   Procedure Laterality Date    JOINT REPLACEMENT Right 11/2018       Social Hx:  Social History     Socioeconomic History    Marital status: Single     Spouse name: None    Number of children: None    Years of education: None    Highest education level: None   Occupational History    None   Social Needs    Financial resource strain: None    Food insecurity     Worry: None     Inability: None    Transportation needs     Medical: None     Non-medical: None   Tobacco Use    Smoking status: Never Smoker    Smokeless tobacco: Never Used   Substance and Sexual Activity    Alcohol use: Yes     Frequency: 2-4 times a month    Drug use: No    Sexual activity: None   Lifestyle    Physical activity     Days per week: None     Minutes per session: None    Stress: None   Relationships    Social connections     Talks on phone: None     Gets together: None     Attends [employer] service: None     Active member of club or organization: None     Attends meetings of clubs or organizations: None Relationship status: None    Intimate partner violence     Fear of current or ex partner: None     Emotionally abused: None     Physically abused: None     Forced sexual activity: None   Other Topics Concern    None   Social History Narrative    None       Family Hx:  History reviewed. No pertinent family history.     LABS: Reviewed     CBC:  Lab Results   Component Value Date    WBC 16.2 09/28/2020    RBC 3.18 09/28/2020    HGB 8.8 09/28/2020    HCT 27.0 09/28/2020    MCV 84.7 09/28/2020    MCH 27.6 09/28/2020    MCHC 32.6 09/28/2020    RDW 15.8 09/28/2020     09/28/2020    MPV 8.7 09/09/2015     CBC with Differential:   Lab Results   Component Value Date    WBC 16.2 09/28/2020    RBC 3.18 09/28/2020    HGB 8.8 09/28/2020    HCT 27.0 09/28/2020     09/28/2020    MCV 84.7 09/28/2020    MCH 27.6 09/28/2020    MCHC 32.6 09/28/2020    RDW 15.8 09/28/2020    METASPCT 1 09/26/2020    LYMPHOPCT 10.6 09/28/2020    MONOPCT 9.2 09/28/2020    MYELOPCT 1 09/26/2020    BASOPCT 0.2 09/28/2020    MONOSABS 1.5 09/28/2020    LYMPHSABS 1.7 09/28/2020    EOSABS 0.1 09/28/2020    BASOSABS 0.0 09/28/2020     CMP:    Lab Results   Component Value Date     09/28/2020    K 3.8 09/28/2020    CL 97 09/28/2020    CO2 21 09/28/2020    BUN 12 09/28/2020    CREATININE 0.73 09/28/2020    GFRAA >60.0 09/28/2020    LABGLOM >60.0 09/28/2020    GLUCOSE 117 09/28/2020    PROT 6.2 09/27/2020    LABALBU 2.9 09/27/2020    CALCIUM 8.1 09/28/2020    BILITOT 0.5 09/27/2020    ALKPHOS 142 09/27/2020    AST 21 09/27/2020    ALT 20 09/27/2020     BMP:    Lab Results   Component Value Date     09/28/2020    K 3.8 09/28/2020    CL 97 09/28/2020    CO2 21 09/28/2020    BUN 12 09/28/2020    LABALBU 2.9 09/27/2020    CREATININE 0.73 09/28/2020    CALCIUM 8.1 09/28/2020    GFRAA >60.0 09/28/2020    LABGLOM >60.0 09/28/2020    GLUCOSE 117 09/28/2020     TSH:   Lab Results   Component Value Date    TSH 4.450 02/26/2020     Vitamin B12 and Folate: No components found for: FOLIC,  R02  Urinalysis:   Lab Results   Component Value Date    NITRU Negative 09/26/2020    WBCUA 20-50 09/26/2020    BACTERIA MANY 09/26/2020    RBCUA 3-5 09/26/2020    BLOODU TRACE 09/26/2020    SPECGRAV 1.019 09/26/2020    GLUCOSEU Negative 09/26/2020           FUNCTIONAL ADL´S:     Independent: [ X ] Eating, [  X ] Dressing, [ X  ] Transferring, [ X  ] Toileting, [ X  ] Bathing, [  X ] Continence  Dependent   : [  ] Eating, [   ] Dressing, [   ] Transferring, [   ] Cavazos Apley, [   ] Bathing, [   ] Continence  W. assistant : [  ] Eating, [   ] Dressing, [   ] Transferring, [   ] Cavazos Apley, [   ] Christiano Andersen, [   ] Continence    Radiology: Reviewed      Ct Abdomen Pelvis W Iv Contrast Additional Contrast? None     Result Date: 9/26/2020  EXAM:  CT ABDOMEN PELVIS W IV CONTRAST History: Rectal mass. Increasing fatigue. Technique: Multiple contiguous axial images were obtained of the abdomen and pelvis from an level of the lung bases through the ischial tuberosities with contrast. Multiplanar reformats were obtained. Delayed images were obtained Comparison: CT chest abdomen pelvis from May 7, 2020 Findings: A few tiny right lung base nodules are stable. Interval increase in size and number of hypodense lesions throughout the liver, the largest of which measures approximately 6 cm and is located within the superior right lobe. The gallbladder contains at least one gallstone but is otherwise unremarkable. The spleen, stomach, pancreas, and adrenal glands are within normal limits. The kidneys enhance uniformly. 1 cm left renal cyst. 2.5 cm right renal cyst. No urinary tract calculi or hydronephrosis. Urinary bladder and prostate are secured by streak artifact from right total hip hip arthroplasty hardware. Given this, no overt abnormality of the prostate.  A tiny focus of air is present within the urinary bladder, likely due to catheterization as there is no urinary bladder wall thickening or perivesicular inflammation. Abdominal aorta is nonaneurysmal  and demonstrates atherosclerotic disease . No retroperitoneal or abdominal/pelvic lymphadenopathy. No small bowel obstruction. No overt colonic mass or pericolonic inflammation. The appendix is within normal limits. . No free fluid or free air. Numerous sclerotic lesions are again identified of the visualized spine and pelvis, most significantly involving the inferior pubic ramus. Postsurgical changes of right total hip arthroplasty. Degenerative changes of the spine and left hip. No acute fracture identified.      Interval increase in number and size of numerous hypodense lesions within the liver most compatible with metastatic disease. Numerous metastatic bone lesions are again identified. A tiny focus of air is present within the urinary bladder and is likely due to catheterization as there is no urinary bladder wall thickening or perivesicular inflammation. All CT scans at this facility use dose modulation, iterative reconstruction, and/or weight based dosing when appropriate to reduce radiation dose to as low as reasonably achievable.     Xr Chest Portable     Result Date: 9/26/2020  Exam: XR CHEST PORTABLE History: Chest pain Technique: AP portable view of the chest obtained. Comparison:  CT chest from May 7, 2020  Findings: The cardiomediastinal silhouette is within normal limits. No pneumothorax, pleural effusion, or consolidation. Bones of the thorax appear intact.      No radiographic evidence of acute intrathoracic process. Assessment and plan:    1. Generalized weakness secondary to anemia in the setting of malignancy. -prostate cancer with bone and liver metastases. Currently on Casodex  -H&H 7.5 and 21.9    2. Chronic constipation  -Currently on Miralax, and Lactulose    3. Generalized weakness  -Will benefit from PT/OT    4.  Neoplasm related pain  -Currently controlled with MS Contin and Roxicodone    -Palliative Care Encounter  -Patient is currently active with [address] in the community.   -Will continue to follow.  -Patient will continue to follow with Nuris Noel CNP upon discharge. -Advance Care Planning  Discussed goals of care with patient. Explained in extensive detail nuances between full code, DNR CCA and DNR CC. Patient has made the decision to be Full code      -Goals of Care Discussion:  Disease process and goals of treatment were discussed in basic terms. Herman's goal is to optimize available comfort care measures to decrease hospitalization and prevent ER visits, manage symptomology with future chemo and immunotherapy  and Palliative Care Services. We discussed the palliative care philosophy in light of those goals. We discussed all care options contingent on treatment response and QOL. Much active listening, presence, and emotional support were given.          Electronically signed by JOYCE Turner CNP on 9/28/2020 at 12:25 PM

## 2020-09-28 NOTE — PROGRESS NOTES
Pt finished the 2nd part of the movi bowel prep. Was nauseated and had emesis. medicated with zofran PRN. Pt has not had any bowel movements yet.

## 2020-09-28 NOTE — PROGRESS NOTES
2200: RN bedside. Pt completed bowel prep for procedure in morning. Pt on bedside commode. Pt complaint of loose stool. 2230: Pt back to bed, Assessment complete. Pt denies any pain. Pt resting comfortably. 1051: Moviprep bowel prep given to patient.  Pt instructed to drink before 7am

## 2020-09-28 NOTE — PROGRESS NOTES
rales or rhonci  HEART: normal rate and regular rhythm  ABDOMEN: soft, non-tender, non-distended, bowel sounds present in all 4 quadrants and no guarding or peritoneal signs  EXTERMITY: no cyanosis, clubbing or edema  In: 2050 [P.O.:1200; I.V.:850]  Out: 300 [Urine:300]      LABS  Recent Labs     09/26/20  0815 09/26/20  0945 09/26/20  1412 09/27/20  0632 09/27/20  1049   WBC 13.0* 13.1*  --  14.9*  --    HGB 7.2* 6.6* 7.4* 6.9*  --    HCT 21.9* 20.0* 23.0* 21.3* 21.3*    312  --  296  --      --   --  135  --    K 4.3  --   --  4.0  --      --   --  103  --    CO2 22  --   --  21  --    BUN 19  --   --  13  --    CREATININE 0.93  --   --  0.70  --    MG 2.1  --   --   --   --    CALCIUM 8.5  --   --  7.8*  --       No results for input(s): PTT, INR in the last 72 hours. Invalid input(s): PT  Recent Labs     09/26/20  0815 09/27/20  0632   AST 30 21   ALT 23 20   BILITOT 0.5 0.5   BILIDIR  --  0.3   LACTA 2.3*  --        RADIOLOGY  Ct Abdomen Pelvis W Iv Contrast Additional Contrast? None    Result Date: 9/26/2020  EXAM:  CT ABDOMEN PELVIS W IV CONTRAST History: Rectal mass. Increasing fatigue. Technique: Multiple contiguous axial images were obtained of the abdomen and pelvis from an level of the lung bases through the ischial tuberosities with contrast. Multiplanar reformats were obtained. Delayed images were obtained Comparison: CT chest abdomen pelvis from May 7, 2020 Findings: A few tiny right lung base nodules are stable. Interval increase in size and number of hypodense lesions throughout the liver, the largest of which measures approximately 6 cm and is located within the superior right lobe. The gallbladder contains at least one gallstone but is otherwise unremarkable. The spleen, stomach, pancreas, and adrenal glands are within normal limits. The kidneys enhance uniformly. 1 cm left renal cyst. 2.5 cm right renal cyst. No urinary tract calculi or hydronephrosis.  Urinary bladder and prostate are secured by streak artifact from right total hip hip arthroplasty hardware. Given this, no overt abnormality of the prostate. A tiny focus of air is present within the urinary bladder, likely due to catheterization as there is no urinary bladder wall thickening or perivesicular inflammation. Abdominal aorta is nonaneurysmal  and demonstrates atherosclerotic disease . No retroperitoneal or abdominal/pelvic lymphadenopathy. No small bowel obstruction. No overt colonic mass or pericolonic inflammation. The appendix is within normal limits. . No free fluid or free air. Numerous sclerotic lesions are again identified of the visualized spine and pelvis, most significantly involving the inferior pubic ramus. Postsurgical changes of right total hip arthroplasty. Degenerative changes of the spine and left hip. No acute fracture identified. Interval increase in number and size of numerous hypodense lesions within the liver most compatible with metastatic disease. Numerous metastatic bone lesions are again identified. A tiny focus of air is present within the urinary bladder and is likely due to catheterization as there is no urinary bladder wall thickening or perivesicular inflammation. All CT scans at this facility use dose modulation, iterative reconstruction, and/or weight based dosing when appropriate to reduce radiation dose to as low as reasonably achievable. Xr Chest Portable    Result Date: 9/26/2020  Exam: XR CHEST PORTABLE History: Chest pain Technique: AP portable view of the chest obtained. Comparison:  CT chest from May 7, 2020  Findings: The cardiomediastinal silhouette is within normal limits. No pneumothorax, pleural effusion, or consolidation. Bones of the thorax appear intact. No radiographic evidence of acute intrathoracic process.      Electronically signed by Mario Willis MD on 9/28/2020 at 9:01 AM

## 2020-09-28 NOTE — PROGRESS NOTES
Physician Progress Note      PATIENT:               Shanna Saint  CSN #:                  [id number]  :                       1950  ADMIT DATE:       2020 7:55 AM  DISCH DATE:  RESPONDING  PROVIDER #:        Leon Hyatt DO          QUERY TEXT:    Attending Physician:  Patient admitted with E. coli UTI. Noted: self catheterization. Please   document in the progress notes and discharge summary if you are evaluating   and/or treating any of the following: The medical record reflects the following:  Risk Factors: prostate cancer with bone and liver metastases  anemia  Clinical Indicators:   Dr. Ruiz Camacho: E coli UTI: CTX  ED: does self cath and has not self cath today. He thinks that perhaps he has   a urinary tract infection. Urine Culture: Escherichia coli >100,000 CFU/ml  Treatment: IV Rocephin  UA/UC  Options provided:  -- UTI due to self catheterization  -- UTI not due to self catheterization  -- Other - I will add my own diagnosis  -- Disagree - Not applicable / Not valid  -- Disagree - Clinically unable to determine / Unknown  -- Refer to Clinical Documentation Reviewer    PROVIDER RESPONSE TEXT:    This patient?s UTI is due to self catheterization. Query created by: Marimar Naranjo on 2020 11:14 AM      QUERY TEXT:    Patient admitted with E. coli UTI, bone/liver metastases, and acute blood loss   anemia. Noted: WBC 13.0  13.1  14.9  Pulse 112-78. Please document in the   progress notes and discharge summary if you are evaluating and /or treating   any of the following: The medical record reflects the following:  Risk Factors: prostate cancer with bone/liver metastases  acute blood loss   anemia  Clinical Indicators:   Dr. Ruiz Camacho: E coli UTI: CTX  WBC 13.0  13.1  14.9  Pulse 112-78  T 100.2.   Treatment: IV Rocephin  UA/UC  blood culture  oncology  GI  general surgery  Options provided:  -- Sepsis, present on admission  -- No Sepsis, localized infection only E. coli UTI  -- Other - I will add my own diagnosis  -- Disagree - Not applicable / Not valid  -- Disagree - Clinically unable to determine / Unknown  -- Refer to Clinical Documentation Reviewer    PROVIDER RESPONSE TEXT:    This patient has E. coli UTI localized infection only, patient is not septic.     Query created by: Maddy Rosenberg on 9/28/2020 11:50 AM      Electronically signed by:  Katrin Benitez DO 9/28/2020 3:22 PM

## 2020-09-28 NOTE — CARE COORDINATION
MET WITH PATIENT, FREEDOM OF CHOICE OFFERED . SNF LIST GIVEN TO PATIENT. STATES HE WILL DISCUSS WITH HIS SISTER AND LET US KNOW TOMORROW.

## 2020-09-28 NOTE — PROGRESS NOTES
Physical Therapy   Facility/Department: [facility] MED SURG K967/[id number]    NAME: Jenny Malhotra    : 1950 (31 y.o.)  MRN: [id number]    Account: [de-identified]  Gender: male    PT evaluation and treatment orders received. Chart reviewed. PT eval attempted. Hold PT eval: Last Hgb reading 6.9. Spoke with RN and decision to hold PT until improved lab values. Will attempt PT evaluation again at earliest convenience.       Electronically signed by Taco Oliva PT on 20 at 10:44 AM EDT

## 2020-09-28 NOTE — PROGRESS NOTES
Gastroenterology Progress Note      Sandra Peres   Hospitalization Day:2    Chief C/o: Anemia     SUBJECTIVE: Patient finished MiraLAx/Gatorade prep along with Movi prep. Still having dark brown stool. RN reports patient now in AF, pending 12 lead ECG. Patient without CP, SOB, or palpitations. No F/C. Ongoing lower abd pressure. Did experience nausea without emesis early this morning. Zofran helped. No melena, hematochezia, or hematemesis. ROS GI: as mentioned above. Physical    VITALS:  /65   Pulse 87   Temp 97.5 °F (36.4 °C) (Oral)   Resp 16   Ht 6' 1\" (1.854 m)   Wt 166 lb (75.3 kg)   SpO2 99%   BMI 21.90 kg/m²   TEMPERATURE:  Current - Temp: 97.5 °F (36.4 °C); Max - Temp  Av.5 °F (36.9 °C)  Min: 97.5 °F (36.4 °C)  Max: 100.2 °F (37.9 °C)    General- No Apparent distress, A/O x3  Eyes- anicteric sclera, no pallor  Cardiovascular- RRR withtout murmur  Lungs- clear to auscultation bilaterally  Abdomen soft, distended, nontender, no organomegaly, Bowel sounds normal   Extremities- without edema  Skin- without jaundice    Data      Recent Labs     20  0815 20  0945 20  1412 20  0632 20  1049   WBC 13.0* 13.1*  --  14.9*  --    HGB 7.2* 6.6* 7.4* 6.9*  --    HCT 21.9* 20.0* 23.0* 21.3* 21.3*   MCV 82.5 83.5  --  84.1  --     312  --  296  --      Recent Labs     20  0815 20  0632    135   K 4.3 4.0    103   CO2 22 21   BUN 19 13   CREATININE 0.93 0.70     Recent Labs     20  0815 20  0632   AST 30 21   ALT 23 20   BILIDIR  --  0.3   BILITOT 0.5 0.5   ALKPHOS 171* 142*     ASSESSMENT :  79year old male patient with symptomatic iron deficiency anemia. History  of prostate cancer with metastasis. No overt signs of active GI bleeding. Brief run of AF, MP now NSR. VSS. Completed prep, but results are still dark brown. Will postpone endoscopies, until clear and cardiac clearance obtained.    PLAN:  - Continue course of care  - Monitor H/H, H/H results pending for today  - Clear liquid diet  - Movi prep split prep     Thank you for allowing me to participate in the care of your patient.   Feel free to contact me with any questions or concerns    Jj Medley MD

## 2020-09-28 NOTE — FLOWSHEET NOTE
Patient has been awake and assisted up to [facility] for watery dark black color stools after having movi prep very early this morning but still not clear yet. EGD/Colonoscopy was changed for tomorrow. Greg Gauthier CNP/Dr Hardy's  nurse spoke to patient this morning. Had breakfast, remains on clear liquids. Lungs clear. Tele. NSR-88 but had converted to A. Fib at 08:01 this morning then convert back to NSR. 12 lead EKG was done during that time. Dr Esteban Loud notified of rhythm change. Hgb. 8.8/27.0 for today. Alert and oriented x4.

## 2020-09-29 ENCOUNTER — ANESTHESIA (OUTPATIENT)
Dept: ENDOSCOPY | Age: 70
DRG: 435 | End: 2020-09-29
Payer: MEDICARE

## 2020-09-29 ENCOUNTER — ANESTHESIA EVENT (OUTPATIENT)
Dept: ENDOSCOPY | Age: 70
DRG: 435 | End: 2020-09-29
Payer: MEDICARE

## 2020-09-29 ENCOUNTER — ANCILLARY PROCEDURE (OUTPATIENT)
Dept: ENDOSCOPY | Age: 70
DRG: 435 | End: 2020-09-29
Payer: MEDICARE

## 2020-09-29 VITALS
DIASTOLIC BLOOD PRESSURE: 58 MMHG | RESPIRATION RATE: 11 BRPM | SYSTOLIC BLOOD PRESSURE: 92 MMHG | OXYGEN SATURATION: 100 %

## 2020-09-29 PROBLEM — K25.9 GASTRIC ULCER: Status: ACTIVE | Noted: 2020-09-29

## 2020-09-29 LAB
ANION GAP SERPL CALCULATED.3IONS-SCNC: 12 MEQ/L (ref 9–15)
BASOPHILS ABSOLUTE: 0 K/UL (ref 0–0.2)
BASOPHILS RELATIVE PERCENT: 0.3 %
BUN BLDV-MCNC: 8 MG/DL (ref 8–23)
CALCIUM SERPL-MCNC: 7.8 MG/DL (ref 8.5–9.9)
CHLORIDE BLD-SCNC: 99 MEQ/L (ref 95–107)
CO2: 23 MEQ/L (ref 20–31)
CREAT SERPL-MCNC: 0.7 MG/DL (ref 0.7–1.2)
EOSINOPHILS ABSOLUTE: 0.2 K/UL (ref 0–0.7)
EOSINOPHILS RELATIVE PERCENT: 1.7 %
GFR AFRICAN AMERICAN: >60
GFR NON-AFRICAN AMERICAN: >60
GLUCOSE BLD-MCNC: 105 MG/DL (ref 70–99)
HCT VFR BLD CALC: 25.5 % (ref 42–52)
HEMOGLOBIN: 8.3 G/DL (ref 14–18)
LYMPHOCYTES ABSOLUTE: 2.1 K/UL (ref 1–4.8)
LYMPHOCYTES RELATIVE PERCENT: 15.9 %
MCH RBC QN AUTO: 27.6 PG (ref 27–31.3)
MCHC RBC AUTO-ENTMCNC: 32.5 % (ref 33–37)
MCV RBC AUTO: 85 FL (ref 80–100)
MONOCYTES ABSOLUTE: 1.2 K/UL (ref 0.2–0.8)
MONOCYTES RELATIVE PERCENT: 9 %
NEUTROPHILS ABSOLUTE: 9.6 K/UL (ref 1.4–6.5)
NEUTROPHILS RELATIVE PERCENT: 73.1 %
PDW BLD-RTO: 16.1 % (ref 11.5–14.5)
PLATELET # BLD: 395 K/UL (ref 130–400)
POTASSIUM REFLEX MAGNESIUM: 3.7 MEQ/L (ref 3.4–4.9)
RBC # BLD: 3 M/UL (ref 4.7–6.1)
SODIUM BLD-SCNC: 134 MEQ/L (ref 135–144)
WBC # BLD: 13.1 K/UL (ref 4.8–10.8)

## 2020-09-29 PROCEDURE — 2580000003 HC RX 258

## 2020-09-29 PROCEDURE — 2580000003 HC RX 258: Performed by: NURSE ANESTHETIST, CERTIFIED REGISTERED

## 2020-09-29 PROCEDURE — 1210000000 HC MED SURG R&B

## 2020-09-29 PROCEDURE — 85025 COMPLETE CBC W/AUTO DIFF WBC: CPT

## 2020-09-29 PROCEDURE — 6360000002 HC RX W HCPCS: Performed by: NURSE ANESTHETIST, CERTIFIED REGISTERED

## 2020-09-29 PROCEDURE — 3700000000 HC ANESTHESIA ATTENDED CARE: Performed by: INTERNAL MEDICINE

## 2020-09-29 PROCEDURE — 6370000000 HC RX 637 (ALT 250 FOR IP): Performed by: INTERNAL MEDICINE

## 2020-09-29 PROCEDURE — 36415 COLL VENOUS BLD VENIPUNCTURE: CPT

## 2020-09-29 PROCEDURE — 6360000002 HC RX W HCPCS: Performed by: INTERNAL MEDICINE

## 2020-09-29 PROCEDURE — 2580000003 HC RX 258: Performed by: INTERNAL MEDICINE

## 2020-09-29 PROCEDURE — 0DJ08ZZ INSPECTION OF UPPER INTESTINAL TRACT, VIA NATURAL OR ARTIFICIAL OPENING ENDOSCOPIC: ICD-10-PCS | Performed by: INTERNAL MEDICINE

## 2020-09-29 PROCEDURE — 2500000003 HC RX 250 WO HCPCS: Performed by: NURSE ANESTHETIST, CERTIFIED REGISTERED

## 2020-09-29 PROCEDURE — 97162 PT EVAL MOD COMPLEX 30 MIN: CPT

## 2020-09-29 PROCEDURE — 80048 BASIC METABOLIC PNL TOTAL CA: CPT

## 2020-09-29 PROCEDURE — 2709999900 HC NON-CHARGEABLE SUPPLY: Performed by: INTERNAL MEDICINE

## 2020-09-29 PROCEDURE — 3609017100 HC EGD: Performed by: INTERNAL MEDICINE

## 2020-09-29 PROCEDURE — 99231 SBSQ HOSP IP/OBS SF/LOW 25: CPT | Performed by: SURGERY

## 2020-09-29 PROCEDURE — 99231 SBSQ HOSP IP/OBS SF/LOW 25: CPT | Performed by: INTERNAL MEDICINE

## 2020-09-29 PROCEDURE — 0DJD8ZZ INSPECTION OF LOWER INTESTINAL TRACT, VIA NATURAL OR ARTIFICIAL OPENING ENDOSCOPIC: ICD-10-PCS | Performed by: INTERNAL MEDICINE

## 2020-09-29 PROCEDURE — 7100000010 HC PHASE II RECOVERY - FIRST 15 MIN: Performed by: INTERNAL MEDICINE

## 2020-09-29 PROCEDURE — 3700000001 HC ADD 15 MINUTES (ANESTHESIA): Performed by: INTERNAL MEDICINE

## 2020-09-29 PROCEDURE — 7100000011 HC PHASE II RECOVERY - ADDTL 15 MIN: Performed by: INTERNAL MEDICINE

## 2020-09-29 PROCEDURE — 3609027000 HC COLONOSCOPY: Performed by: INTERNAL MEDICINE

## 2020-09-29 PROCEDURE — 97166 OT EVAL MOD COMPLEX 45 MIN: CPT

## 2020-09-29 RX ORDER — LIDOCAINE HYDROCHLORIDE 20 MG/ML
INJECTION, SOLUTION INFILTRATION; PERINEURAL PRN
Status: DISCONTINUED | OUTPATIENT
Start: 2020-09-29 | End: 2020-09-29 | Stop reason: SDUPTHER

## 2020-09-29 RX ORDER — SODIUM CHLORIDE 9 MG/ML
INJECTION, SOLUTION INTRAVENOUS CONTINUOUS PRN
Status: DISCONTINUED | OUTPATIENT
Start: 2020-09-29 | End: 2020-09-29 | Stop reason: SDUPTHER

## 2020-09-29 RX ORDER — NITROFURANTOIN 25; 75 MG/1; MG/1
100 CAPSULE ORAL 2 TIMES DAILY
Qty: 6 CAPSULE | Refills: 0 | Status: SHIPPED | OUTPATIENT
Start: 2020-09-29 | End: 2020-10-02

## 2020-09-29 RX ORDER — MAGNESIUM HYDROXIDE 1200 MG/15ML
LIQUID ORAL PRN
Status: DISCONTINUED | OUTPATIENT
Start: 2020-09-29 | End: 2020-09-29 | Stop reason: ALTCHOICE

## 2020-09-29 RX ORDER — 0.9 % SODIUM CHLORIDE 0.9 %
500 INTRAVENOUS SOLUTION INTRAVENOUS ONCE
Status: COMPLETED | OUTPATIENT
Start: 2020-09-29 | End: 2020-09-29

## 2020-09-29 RX ORDER — SODIUM CHLORIDE 9 MG/ML
INJECTION, SOLUTION INTRAVENOUS
Status: COMPLETED
Start: 2020-09-29 | End: 2020-09-29

## 2020-09-29 RX ORDER — PANTOPRAZOLE SODIUM 40 MG/1
40 TABLET, DELAYED RELEASE ORAL
Qty: 30 TABLET | Refills: 1 | Status: ON HOLD | OUTPATIENT
Start: 2020-09-29 | End: 2020-11-18

## 2020-09-29 RX ORDER — PROPOFOL 10 MG/ML
INJECTION, EMULSION INTRAVENOUS PRN
Status: DISCONTINUED | OUTPATIENT
Start: 2020-09-29 | End: 2020-09-29 | Stop reason: SDUPTHER

## 2020-09-29 RX ADMIN — Medication 500 ML: at 13:18

## 2020-09-29 RX ADMIN — CEFTRIAXONE SODIUM 1 G: 1 INJECTION, POWDER, FOR SOLUTION INTRAMUSCULAR; INTRAVENOUS at 10:02

## 2020-09-29 RX ADMIN — PANTOPRAZOLE SODIUM 40 MG: 40 TABLET, DELAYED RELEASE ORAL at 10:01

## 2020-09-29 RX ADMIN — Medication 10 ML: at 21:44

## 2020-09-29 RX ADMIN — BICALUTAMIDE 50 MG: 50 TABLET ORAL at 10:01

## 2020-09-29 RX ADMIN — MORPHINE SULFATE 15 MG: 15 TABLET, FILM COATED, EXTENDED RELEASE ORAL at 21:43

## 2020-09-29 RX ADMIN — Medication 10 ML: at 10:02

## 2020-09-29 RX ADMIN — DILTIAZEM HYDROCHLORIDE 120 MG: 120 CAPSULE, COATED, EXTENDED RELEASE ORAL at 10:01

## 2020-09-29 RX ADMIN — PROPOFOL 280 MG: 10 INJECTION, EMULSION INTRAVENOUS at 13:38

## 2020-09-29 RX ADMIN — OXYCODONE 15 MG: 5 TABLET ORAL at 18:50

## 2020-09-29 RX ADMIN — SODIUM CHLORIDE: 9 INJECTION, SOLUTION INTRAVENOUS at 13:13

## 2020-09-29 RX ADMIN — MORPHINE SULFATE 15 MG: 15 TABLET, FILM COATED, EXTENDED RELEASE ORAL at 10:01

## 2020-09-29 RX ADMIN — LIDOCAINE HYDROCHLORIDE 40 MG: 20 INJECTION, SOLUTION INFILTRATION; PERINEURAL at 13:38

## 2020-09-29 RX ADMIN — SODIUM CHLORIDE 500 ML: 9 INJECTION, SOLUTION INTRAVENOUS at 13:18

## 2020-09-29 RX ADMIN — PANTOPRAZOLE SODIUM 40 MG: 40 TABLET, DELAYED RELEASE ORAL at 21:42

## 2020-09-29 ASSESSMENT — PULMONARY FUNCTION TESTS
PIF_VALUE: 0
PIF_VALUE: 1
PIF_VALUE: 0
PIF_VALUE: 1
PIF_VALUE: 0

## 2020-09-29 ASSESSMENT — PAIN DESCRIPTION - ORIENTATION
ORIENTATION: RIGHT
ORIENTATION: RIGHT

## 2020-09-29 ASSESSMENT — PAIN DESCRIPTION - PAIN TYPE
TYPE: ACUTE PAIN
TYPE: CHRONIC PAIN

## 2020-09-29 ASSESSMENT — PAIN - FUNCTIONAL ASSESSMENT: PAIN_FUNCTIONAL_ASSESSMENT: 0-10

## 2020-09-29 ASSESSMENT — PAIN SCALES - GENERAL
PAINLEVEL_OUTOF10: 9
PAINLEVEL_OUTOF10: 0
PAINLEVEL_OUTOF10: 8
PAINLEVEL_OUTOF10: 9
PAINLEVEL_OUTOF10: 9
PAINLEVEL_OUTOF10: 8

## 2020-09-29 ASSESSMENT — PAIN DESCRIPTION - LOCATION
LOCATION: HIP
LOCATION: HIP
LOCATION: GENERALIZED
LOCATION: ABDOMEN

## 2020-09-29 ASSESSMENT — PAIN DESCRIPTION - FREQUENCY: FREQUENCY: INTERMITTENT

## 2020-09-29 NOTE — ANESTHESIA POSTPROCEDURE EVALUATION
Department of Anesthesiology  Postprocedure Note    Patient: Reny Forrester  MRN: [id number]  YOB: 1950  Date of evaluation: 9/29/2020  Time:  2:05 PM     Procedure Summary     Date:  09/29/20 Room / Location:  [address] [place] [address]    Anesthesia Start:  1333 Anesthesia Stop:  7002    Procedures:       EGD DIAGNOSTIC ONLY (N/A )      COLONOSCOPY DIAGNOSTIC (N/A ) Diagnosis:  (Inpatient EGD/Colon)    Surgeon:  Lady Cyr MD Responsible Provider:  JOYCE Disla CRNA    Anesthesia Type:  MAC ASA Status:  3          Anesthesia Type: MAC    Mathieu Phase I: Mathieu Score: 10    Mathieu Phase II:      Last vitals: Reviewed and per EMR flowsheets.        Anesthesia Post Evaluation    Patient location during evaluation: PACU  Patient participation: complete - patient participated  Level of consciousness: awake and alert  Pain score: 1  Airway patency: patent  Nausea & Vomiting: no nausea and no vomiting  Complications: no  Cardiovascular status: hemodynamically stable  Respiratory status: acceptable  Hydration status: euvolemic  Comments: Report to RN, normal sinus rhythm

## 2020-09-29 NOTE — FLOWSHEET NOTE
Patient resting in bed, a/o x4. No distress. Patient tolerating clear liquid diet and MoviPrep started for procedures tomorrow morning. Patient has saline locked iv in right AC, patent, flushed. NSR on tele. Pt is up with 1 assist, steady in room. Will continue with hourly rounding. MoviPrep completed by midnight. Will give the rest in the morning.     0600 - Pt drinking second pack of MoviPrep.    0650 - Bowel prep finished.

## 2020-09-29 NOTE — PROGRESS NOTES
Patient had EGD/colonoscopy. Per Dr. Aileen Marquez, patient may have advance diet as tolerated. Pt eating general diet for dinner and tolerating. VSS. Denies pain. No active bleed found. Plan for possible dc tomorrow AM per Dr. Maureen Pretty.  Electronically signed by Curtis Vivas RN on 9/29/2020 at 6:34 PM

## 2020-09-29 NOTE — ANESTHESIA PRE PROCEDURE
Department of Anesthesiology  Preprocedure Note       Name:  Adriana Valles   Age:  79 y.o.  :  1950                                          MRN:  [id number]         Date:  2020      Surgeon: Savanna Henry):  Cristina Dolan MD    Procedure: Procedure(s):  EGD DIAGNOSTIC ONLY  COLONOSCOPY DIAGNOSTIC    Medications prior to admission:   Prior to Admission medications    Medication Sig Start Date End Date Taking? Authorizing Provider   oxyCODONE HCl (OXY-IR) 15 MG immediate release tablet Take 1 tablet by mouth every 6 hours as needed for Pain (moderate to severe pain) for up to 120 days. 20 Yes JOYCE Zuniga CNP   morphine (MS CONTIN) 15 MG extended release tablet Take 1 tablet by mouth 2 times daily for 30 days. 9/23/20 10/23/20 Yes JOYCE Zuniga CNP   sennosides-docusate sodium (SENOKOT-S) 8.6-50 MG tablet Take 2 tablets by mouth daily as needed for Constipation 20  Yes JOYCE Zuniga CNP   dilTIAZem (CARDIZEM CD) 120 MG extended release capsule Take 1 capsule by mouth daily 20  Yes Elida Zamora DO   rivaroxaban (XARELTO) 20 MG TABS tablet Take 1 tablet by mouth daily (with breakfast) 20  Yes Elida Paris DO   dexamethasone (DECADRON) 4 MG tablet Take 4 mg by mouth 2 times daily (with meals) Take 1 tab Bid 1 day before chemo tx and 2 days after chemo treatment.    Yes Historical Provider, MD   bicalutamide (CASODEX) 50 MG chemo tablet Take 50 mg by mouth daily   Yes Historical Provider, MD   Polyethylene Glycol 3350 POWD Take by mouth    Historical Provider, MD   Fe Bisgly-Succ-C-Thre-B12-FA (IRON-150 PO) Take 325 mg by mouth daily 18   Historical Provider, MD   Denosumab ([place]) Inject into the skin    Historical Provider, MD   ondansetron (ZOFRAN) 4 MG tablet Take 1 tablet by mouth daily as needed for Nausea or Vomiting 20   JOYCE Zuniga CNP   prochlorperazine (COMPAZINE) 10 MG tablet Take 10 mg by mouth every 6 hours as needed    Historical Provider, MD       Current medications:    Current Facility-Administered Medications   Medication Dose Route Frequency Provider Last Rate Last Dose    0.9 % sodium chloride bolus  500 mL Intravenous Once Guillermo Peterson  mL/hr at 09/29/20 1318 500 mL at 09/29/20 1318    cefTRIAXone (ROCEPHIN) 1 g IVPB in 50 mL D5W minibag  1 g Intravenous Q24H Amanda Basket, DO   Stopped at 09/29/20 1032    sodium chloride flush 0.9 % injection 10 mL  10 mL Intravenous 2 times per day Irwin Basket, DO   10 mL at 09/29/20 1002    sodium chloride flush 0.9 % injection 10 mL  10 mL Intravenous PRN Irwin Basket, DO        acetaminophen (TYLENOL) tablet 650 mg  650 mg Oral Q6H PRN Amanda Basket, DO        Or    acetaminophen (TYLENOL) suppository 650 mg  650 mg Rectal Q6H PRN Amanda Basket, DO        polyethylene glycol (GLYCOLAX) packet 17 g  17 g Oral Daily PRN Amanda Basket, DO        promethazine (PHENERGAN) tablet 12.5 mg  12.5 mg Oral Q6H PRN Irwin Basket, DO        Or    ondansetron [facility]) injection 4 mg  4 mg Intravenous Q6H PRN Irwin Basket, DO   4 mg at 09/28/20 0620    bicalutamide (CASODEX) chemo tablet 50 mg  50 mg Oral Daily Amanda Basket, DO   50 mg at 09/29/20 1001    dilTIAZem (CARDIZEM CD) extended release capsule 120 mg  120 mg Oral Daily Irwin Basket, DO   120 mg at 09/29/20 1001    morphine (MS CONTIN) extended release tablet 15 mg  15 mg Oral BID Amanda Basket, DO   15 mg at 09/29/20 1001    oxyCODONE (ROXICODONE) immediate release tablet 15 mg  15 mg Oral Q6H PRN Irwin Basket, DO   15 mg at 09/28/20 1546    senna-docusate (PERICOLACE) 8.6-50 MG per tablet 2 tablet  2 tablet Oral Daily PRN Amanda Basket, DO        pantoprazole (PROTONIX) tablet 40 mg  40 mg Oral BID Guillermo Peterson MD   40 mg at 09/29/20 1001    polyethylene glycol (GLYCOLAX) packet 17 g  17 g Oral Daily Amanda Basket, DO   17 g at 09/27/20 0803       Allergies:  No Known Allergies    Problem List:    Patient Active Problem List   Diagnosis Code    Liver mass R16.0    Osteoarthritis of hip M16.9    Palpitations R00.2    Other specified disorders of bone density and structure, unspecified site  M85.80    Anemia D64.9    General weakness R53.1       Past Medical History:        Diagnosis Date    [facility]ox[facility]) [facility] [address]     ON XARELTO    [facility]       Past Surgical History:        Procedure Laterality Date    JOINT REPLACEMENT Right 11/2018       Social History:    Social History     Tobacco Use    Smoking status: Never Smoker    Smokeless tobacco: Never Used   Substance Use Topics    Alcohol use: Yes     Frequency: 2-4 times a month                                Counseling given: Not Answered      Vital Signs (Current):   Vitals:    09/28/20 0923 09/28/20 2000 09/29/20 0734 09/29/20 1306   BP:  122/70 (!) 147/119 128/63   Pulse: 87 82 82 80   Resp:  18 16 16   Temp:  36.7 °C (98.1 °F) 36.5 °C (97.7 °F) 37.3 °C (99.1 °F)   TempSrc:  Oral Oral Temporal   SpO2:  100%  98%   Weight:    166 lb (75.3 kg)   Height:    6' 1\" (1.854 m)                                              BP Readings from Last 3 Encounters:   09/29/20 128/63   09/23/20 (!) 154/74   09/16/20 117/62       NPO Status: Time of last liquid consumption: 1000(sip of water with meds)                        Time of last solid consumption: 0800(some bites of scrambled eggs and hashbrowns)                        Date of last liquid consumption: 09/29/20                        Date of last solid food consumption: 09/28/20    BMI:   Wt Readings from Last 3 Encounters:   09/29/20 166 lb (75.3 kg)   09/23/20 166 lb 3.2 oz (75.4 kg)   08/26/20 170 lb 6.4 oz (77.3 kg)     Body mass index is 21.9 kg/m².     CBC:   Lab Results   Component Value Date    WBC 13.1 09/29/2020    RBC 3.00 09/29/2020    HGB 8.3 09/29/2020    HCT 25.5 09/29/2020    MCV 85.0 09/29/2020    RDW 16.1

## 2020-09-29 NOTE — PROGRESS NOTES
Physical Therapy Med Surg Initial Assessment  Facility/Department: [address] MED SURG UNIT  Room: [facility] OR/NONE       NAME: You Santillan  : 1950 (79 y.o.)  MRN: [id number]  CODE STATUS: Full Code    Date of Service: 2020    Patient Diagnosis(es): Anemia [D64.9]   Chief Complaint   Patient presents with    Fatigue     increasing over the past couple of days. hx of prostate cancer.       Patient Active Problem List    Diagnosis Date Noted    General weakness     Anemia 2020    Other specified disorders of bone density and structure, unspecified site      Liver mass 2020    Osteoarthritis of hip 2020    Palpitations 10/19/2010        Past Medical History:   Diagnosis Date    PAF (paroxysmal atrial fibrillation) ([facility] [address]     ON XARELTO    Prostate cancer ([facility] [address] 2019     Past Surgical History:   Procedure Laterality Date    JOINT REPLACEMENT Right 2018       Chart Reviewed: Yes  Family / Caregiver Present: No    Restrictions:  Restrictions/Precautions: Fall Risk     SUBJECTIVE:      Pain  Pre Treatment Pain Screening  Pain at present: 9  Intervention List: Patient able to continue with treatment  Comments / Details: right hip pain - increasing over the last 2 weeks - medication taken this date    Post Treatment Pain Screening:   Pain Screening  Patient Currently in Pain: Yes  Pain Assessment  Pain Level: 9  Pain Location: Hip  Pain Orientation: Right    Prior Level of Function:  Social/Functional History  Lives With: Alone  Type of Home: Apartment  Home Layout: One level  Home Access: Level entry  Bathroom Shower/Tub: Tub/Shower unit  Bathroom Toilet: Handicap height  Bathroom Equipment: Grab bars in shower  Home Equipment: Grab bars  ADL Assistance: Independent  Homemaking Assistance: Independent  Ambulation Assistance: Independent  Transfer Assistance: Independent  Active : Yes  Occupation: Retired    OBJECTIVE:   Vision: Within Functional Limits  Hearing: Within functional limits    Cognition:  Overall Orientation Status: Within Functional Limits  Follows Commands: Within Functional Limits         ROM:  RLE PROM: WFL  RLE General PROM: good alignment of hip joint noted  LLE PROM: WFL    Strength:  Strength RLE  Comment: 1/5 hip, 4-/5 knee and ankle  Strength LLE  Strength LLE: WFL    Neuro:  Balance  Sitting - Static: Good  Sitting - Dynamic: Good  Standing - Static: Fair;-  Standing - Dynamic: Fair;-  Comments: Pt required intermittent assistance for balance maintenance in gait             Bed mobility  Rolling to Right: Modified independent  Supine to Sit: Modified independent  Sit to Supine: Modified independent  Comment: pt utilizes UE's to assist with right LE movement    Transfers  Sit to Stand: Supervision  Stand to sit: Supervision    Ambulation  Ambulation?: Yes  Ambulation 1  Device: No Device  Assistance: Contact guard assistance;Minimal assistance  Quality of Gait: significant decrease RLE stance time, LOB to right with intermittent balance assistance required, short step length bilat  Distance: 4-5 feet  Comments: pt declines gait with device this date              Activity Tolerance  Activity Tolerance: Patient limited by pain  Activity Tolerance: right hip pain limits gait distance and tolerance          PT Education  PT Education: PT Role;Goals;Plan of Care    ASSESSMENT:   Body structures, Functions, Activity limitations: Decreased functional mobility ; Decreased strength;Decreased balance  Decision Making: Medium Complexity  History: med  Exam: med  Clinical Presentation: med    Barriers to Learning: none    DISCHARGE RECOMMENDATIONS:  Discharge Recommendations: Continue to assess pending progress    Assessment: Pt demonstrate deficits as listed. Right hip replacement performed approx 1 1/2 years ago. Joint mobility in maintained however weakness is noted. Pt is requiring assistance with mobility as result.  Pt would benefit from PT at this level of care

## 2020-09-29 NOTE — PROGRESS NOTES
Physician Progress Note    9/29/2020   4:09 PM    Name:  Deepak Sykes  MRN:    [id number]      Day: 3     Admit Date: 9/26/2020  7:55 AM  PCP: No primary care provider on file.     Code Status:  Full Code    Subjective:     Endoscopy today    Current Facility-Administered Medications   Medication Dose Route Frequency Provider Last Rate Last Dose    cefTRIAXone (ROCEPHIN) 1 g IVPB in 50 mL D5W minibag  1 g Intravenous Q24H Petra Pugh DO   Stopped at 09/29/20 1032    sodium chloride flush 0.9 % injection 10 mL  10 mL Intravenous 2 times per day Petra Pugh DO   10 mL at 09/29/20 1002    sodium chloride flush 0.9 % injection 10 mL  10 mL Intravenous PRN Petra Pugh DO        acetaminophen (TYLENOL) tablet 650 mg  650 mg Oral Q6H PRN Petra Pugh DO        Or    acetaminophen (TYLENOL) suppository 650 mg  650 mg Rectal Q6H PRN Petra Pugh DO        polyethylene glycol (GLYCOLAX) packet 17 g  17 g Oral Daily PRN Petra Pugh DO        promethazine (PHENERGAN) tablet 12.5 mg  12.5 mg Oral Q6H PRN Petra Pugh DO        Or    ondansetron [facility]) injection 4 mg  4 mg Intravenous Q6H PRN Petra Pugh DO   4 mg at 09/28/20 0620    bicalutamide (CASODEX) chemo tablet 50 mg  50 mg Oral Daily Petra Pugh, DO   50 mg at 09/29/20 1001    dilTIAZem (CARDIZEM CD) extended release capsule 120 mg  120 mg Oral Daily Petra Pugh, DO   120 mg at 09/29/20 1001    morphine (MS CONTIN) extended release tablet 15 mg  15 mg Oral BID Petra Pugh DO   15 mg at 09/29/20 1001    oxyCODONE (ROXICODONE) immediate release tablet 15 mg  15 mg Oral Q6H PRN Petra Pugh DO   15 mg at 09/28/20 1546    senna-docusate (PERICOLACE) 8.6-50 MG per tablet 2 tablet  2 tablet Oral Daily PRN Petra Pugh DO        pantoprazole (PROTONIX) tablet 40 mg  40 mg Oral BID Suzy Monteiro MD   40 mg at 09/29/20 1001    polyethylene glycol (GLYCOLAX) packet 17 g  17 g Oral Daily Petra Pugh, DO   17 g at 20 0803       Physical Examination:      Vitals:  /67   Pulse 77   Temp 99.1 °F (37.3 °C) (Temporal)   Resp 16   Ht 6' 1\" (1.854 m)   Wt 166 lb (75.3 kg)   SpO2 99%   BMI 21.90 kg/m²   Temp (24hrs), Av.3 °F (36.8 °C), Min:97.7 °F (36.5 °C), Max:99.1 °F (37.3 °C)      General appearance: alert, cooperative and no distress. thin  Mental Status: oriented to person, place and time and normal affect  Lungs: clear to auscultation bilaterally, normal effort  Heart: regular rate and rhythm, no murmur  Abdomen: mild TTP in RUQ. Otherwise soft, nontender, nondistended, bowel sounds present, no masses  Extremities: no edema, redness, tenderness in the calves  Skin: no gross lesions, rashes    Data:     Labs:  Recent Labs     20  0835 20  0638   WBC 16.2* 13.1*   HGB 8.8* 8.3*    395     Recent Labs     20  0835 20  0638   * 134*   K 3.8 3.7   CL 97 99   CO2 21 23   BUN 12 8   CREATININE 0.73 0.70   GLUCOSE 117* 105*     Recent Labs     20  0632   AST 21   ALT 20   BILITOT 0.5   ALKPHOS 142*     Escherichia coli (1)     Antibiotic  Interpretation  MADELYN  Status     ampicillin  Sensitive  8  mcg/mL      ceFAZolin  Sensitive  <=4  mcg/mL      ciprofloxacin  Resistant  >=4  mcg/mL      gentamicin  Sensitive  <=1  mcg/mL      levofloxacin  Resistant  >=8  mcg/mL      nitrofurantoin  Sensitive  <=16  mcg/mL      trimethoprim-sulfamethoxazole  Resistant  >=320  mcg/mL            Assessment and Plan:        1. 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 Possible contribution from blood loss, chemotherapy, bone marrow involvement  -endoscopy today. Hold blood thinner  -Transfuse to hemoglobin goal greater than 7     2. Chronic constipation: resolved with bowel prep     3. Paroxysmal atrial fibrillation on Xarelto: Hold anticoagulant     4.  E coli UTI: CTX while in house. Transition to macrobid at d/c for 7d total     5.  Prostate cancer with bone and liver

## 2020-09-29 NOTE — CARE COORDINATION
JAKEW met with pt and sister Francesca Prakash at bedside. Pt is declining DC to SNF at this time and would like DC to home with [facility]. Sister states she lives directly across from pt and can assist if needed. Pt is requesting a script for a rollator.

## 2020-09-29 NOTE — PROGRESS NOTES
[facility] OCCUPATIONAL THERAPY EVALUATION - ACUTE     NAME: Primo Sharpe  : 1950 (79 y.o.)  MRN: [id number]  CODE STATUS: Full Code  Room: [place] [address]    Date of Service: 2020    Patient Diagnosis(es): Anemia [D64.9]   Chief Complaint   Patient presents with    Fatigue     increasing over the past couple of days. hx of prostate cancer. Patient Active Problem List    Diagnosis Date Noted    General weakness     Anemia 2020    Other specified disorders of bone density and structure, unspecified site      Liver mass 2020    Osteoarthritis of hip 2020    Palpitations 10/19/2010        Past Medical History:   Diagnosis Date    PAF (paroxysmal atrial fibrillation) ([facility] [address]     ON XARELTO    [facility]     Past Surgical History:   Procedure Laterality Date    JOINT REPLACEMENT Right 2018        Restrictions  Restrictions/Precautions: Fall Risk     Safety Devices: Safety Devices  Safety Devices in place: Yes  Type of devices:  All fall risk precautions in place        Subjective       Pain Reassessment:   Pain Assessment  Pain Assessment: 0-10  Pain Level: 9  Pain Location: Hip  Pain Orientation: Right       Prior Level of Function:  Social/Functional History  Lives With: Alone  Type of Home: Apartment  Home Layout: One level  Home Access: Level entry  Bathroom Shower/Tub: Tub/Shower unit  Bathroom Toilet: Handicap height  Bathroom Equipment: Grab bars in shower  Home Equipment: Grab bars  ADL Assistance: Independent  Homemaking Assistance: Independent  Ambulation Assistance: Independent  Transfer Assistance: Independent  Active : Yes  Occupation: Retired    OBJECTIVE:     Orientation Status:  Orientation  Overall Orientation Status: Within Functional Limits    Observation:       Cognition Status:  Cognition  Overall Cognitive Status: WFL    Perception Status:       Sensation Status:  Sensation  Overall Sensation Status: Impaired(tingling in feet and hands)    Vision and Hearing Status:  Vision  Vision: Within Functional Limits  Hearing  Hearing: Within functional limits     ROM:   LUE AROM (degrees)  LUE AROM : WFL  Left Hand AROM (degrees)  Left Hand AROM: WFL  RUE AROM (degrees)  RUE AROM : WFL  Right Hand AROM (degrees)  Right Hand AROM: WFL    Strength:  LUE Strength  Gross LUE Strength: WFL  L Hand General: 4/5  LUE Strength Comment: gross assessment  RUE Strength  Gross RUE Strength: WFL  R Hand General: 4/5  RUE Strength Comment: gross assessment    Coordination, Tone, Quality of Movement: Tone RUE  RUE Tone: Normotonic  Tone LUE  LUE Tone: Normotonic  Coordination  Movements Are Fluid And Coordinated: Yes    Hand Dominance:  Hand Dominance  Hand Dominance: Right    ADL Status:  ADL  Feeding: Setup  Grooming: Stand by assistance  UE Bathing: Stand by assistance  LE Bathing: Minimal assistance  UE Dressing: Stand by assistance  LE Dressing: Moderate assistance  Toileting: Moderate assistance  Additional Comments: simulated ADL seated EOB. levels as anticipated.   Toilet Transfers  Toilet Transfer: Supervision  Toilet Transfers Comments: anticipated level based on functional t/f completed in room       Therapy key for assistance levels -   Independent = Pt. is able to perform task with no assistance but may require a device   Stand by assistance = Pt. does not perform task at an independent level but does not need physical assistance, requires verbal cues  Minimal, Moderate, Maximal Assistance = Pt. requires physical assistance (25%, 50%, 75% assist from helper) for task but is able to actively participate in task   Dependent = Pt. requires total assistance with task and is not able to actively participate with task completion     Functional Mobility:  Functional Mobility  Functional - Mobility Device: No device  Activity: Other(aprox 5 ft)  Assist Level: Minimal assistance  Transfers  Sit to stand: Supervision  Stand to sit: Supervision    Bed Mobility  Bed mobility  Rolling to Right: Modified independent  Supine to Sit: Modified independent  Sit to Supine: Modified independent    Seated and Standing Balance:  Balance  Sitting Balance: Supervision  Standing Balance: Contact guard assistance    Functional Endurance:  Activity Tolerance  Activity Tolerance: Patient limited by pain    D/C Recommendations:  OT D/C RECOMMENDATIONS  REQUIRES OT FOLLOW UP: Yes    Equipment Recommendations:  OT Equipment Recommendations  Other: continue to assess    OT Education:        OT Follow Up:  OT D/C RECOMMENDATIONS  REQUIRES OT FOLLOW UP: Yes       Assessment/Discharge Disposition:  Assessment: Pt is a 79 yr old male presenting to [facility] with the above functional deficits. Pt would benefit from occupational therapy services to maximize safety and independence with ADL tasks, improve overall strength/endurance and balance for functional tasks.   Performance deficits / Impairments: Decreased functional mobility , Decreased strength, Decreased endurance, Decreased ADL status, Decreased high-level IADLs, Decreased balance  Prognosis: Good  Discharge Recommendations: Continue to assess pending progress  Decision Making: Medium Complexity  History: minimal  Exam: 6 perf deficits  Assistance / Modification: modA    Six Click Score   How much help for putting on and taking off regular lower body clothing?: A Lot  How much help for Bathing?: A Lot  How much help for Toileting?: A Lot  How much help for putting on and taking off regular upper body clothing?: A Little  How much help for taking care of personal grooming?: A Little  How much help for eating meals?: A Little  AM-[facility] Inpatient Daily Activity Raw Score: 15  AM-PAC Inpatient ADL T-Scale Score : 34.69  ADL Inpatient CMS 0-100% Score: 56.46    Plan:  Plan  Times per week: 1-3x/wk  Plan weeks: length of acute stay  Current Treatment Recommendations: Strengthening, Functional Mobility Training, Patient/Caregiver Education & Training, Home Management Training, Endurance Training, Equipment Evaluation, Education, & procurement, Balance Training, Safety Education & Training, Self-Care / ADL    Goals:   Patient will:    - Improve functional endurance to tolerate/complete 30 mins of ADL's  - Be MILADIS in UB ADLs   - Be MILADIS in LB ADLs  - Be MILADIS in ADL transfers without LOB  - Be MILADIS in toileting tasks  - Improve Bilateral UE strength and endurance to 5/5 in order to participate in self-care activities as projected. - Access appropriate D/C site with as few architectural barriers as possible.     Patient Goal: Patient goals : to get better     Discussed and agreed upon: Yes Comments:     Therapy Time:   OT Individual Minutes  Time In: 1055  Time Out: 1108  Minutes: 13    Eval: 13 minutes     Electronically signed by:    GIFTY Hussein  9/29/2020, 1:16 PM

## 2020-09-30 VITALS
BODY MASS INDEX: 22 KG/M2 | HEIGHT: 73 IN | DIASTOLIC BLOOD PRESSURE: 76 MMHG | TEMPERATURE: 98.6 F | RESPIRATION RATE: 17 BRPM | WEIGHT: 166 LBS | OXYGEN SATURATION: 99 % | SYSTOLIC BLOOD PRESSURE: 134 MMHG | HEART RATE: 80 BPM

## 2020-09-30 LAB
ANION GAP SERPL CALCULATED.3IONS-SCNC: 11 MEQ/L (ref 9–15)
BASOPHILS ABSOLUTE: 0 K/UL (ref 0–0.2)
BASOPHILS RELATIVE PERCENT: 0.5 %
BUN BLDV-MCNC: 8 MG/DL (ref 8–23)
CALCIUM SERPL-MCNC: 8.2 MG/DL (ref 8.5–9.9)
CHLORIDE BLD-SCNC: 103 MEQ/L (ref 95–107)
CO2: 21 MEQ/L (ref 20–31)
CREAT SERPL-MCNC: 0.68 MG/DL (ref 0.7–1.2)
EOSINOPHILS ABSOLUTE: 0.3 K/UL (ref 0–0.7)
EOSINOPHILS RELATIVE PERCENT: 2.7 %
GFR AFRICAN AMERICAN: >60
GFR NON-AFRICAN AMERICAN: >60
GLUCOSE BLD-MCNC: 92 MG/DL (ref 70–99)
HCT VFR BLD CALC: 23.5 % (ref 42–52)
HEMOGLOBIN: 8 G/DL (ref 14–18)
LYMPHOCYTES ABSOLUTE: 2.1 K/UL (ref 1–4.8)
LYMPHOCYTES RELATIVE PERCENT: 20.9 %
MCH RBC QN AUTO: 28.4 PG (ref 27–31.3)
MCHC RBC AUTO-ENTMCNC: 34.1 % (ref 33–37)
MCV RBC AUTO: 83.4 FL (ref 80–100)
MONOCYTES ABSOLUTE: 1 K/UL (ref 0.2–0.8)
MONOCYTES RELATIVE PERCENT: 9.4 %
NEUTROPHILS ABSOLUTE: 6.7 K/UL (ref 1.4–6.5)
NEUTROPHILS RELATIVE PERCENT: 66.5 %
PDW BLD-RTO: 16.2 % (ref 11.5–14.5)
PLATELET # BLD: 436 K/UL (ref 130–400)
POTASSIUM REFLEX MAGNESIUM: 3.7 MEQ/L (ref 3.4–4.9)
RBC # BLD: 2.81 M/UL (ref 4.7–6.1)
SODIUM BLD-SCNC: 135 MEQ/L (ref 135–144)
WBC # BLD: 10.1 K/UL (ref 4.8–10.8)

## 2020-09-30 PROCEDURE — 6360000002 HC RX W HCPCS: Performed by: INTERNAL MEDICINE

## 2020-09-30 PROCEDURE — 99232 SBSQ HOSP IP/OBS MODERATE 35: CPT | Performed by: NURSE PRACTITIONER

## 2020-09-30 PROCEDURE — 6370000000 HC RX 637 (ALT 250 FOR IP): Performed by: INTERNAL MEDICINE

## 2020-09-30 PROCEDURE — 36415 COLL VENOUS BLD VENIPUNCTURE: CPT

## 2020-09-30 PROCEDURE — 80048 BASIC METABOLIC PNL TOTAL CA: CPT

## 2020-09-30 PROCEDURE — 2580000003 HC RX 258: Performed by: INTERNAL MEDICINE

## 2020-09-30 PROCEDURE — 85025 COMPLETE CBC W/AUTO DIFF WBC: CPT

## 2020-09-30 RX ADMIN — PANTOPRAZOLE SODIUM 40 MG: 40 TABLET, DELAYED RELEASE ORAL at 08:57

## 2020-09-30 RX ADMIN — Medication 10 ML: at 08:50

## 2020-09-30 RX ADMIN — BICALUTAMIDE 50 MG: 50 TABLET ORAL at 08:51

## 2020-09-30 RX ADMIN — CEFTRIAXONE SODIUM 1 G: 1 INJECTION, POWDER, FOR SOLUTION INTRAMUSCULAR; INTRAVENOUS at 08:47

## 2020-09-30 RX ADMIN — DILTIAZEM HYDROCHLORIDE 120 MG: 120 CAPSULE, COATED, EXTENDED RELEASE ORAL at 08:56

## 2020-09-30 RX ADMIN — MORPHINE SULFATE 15 MG: 15 TABLET, FILM COATED, EXTENDED RELEASE ORAL at 08:58

## 2020-09-30 ASSESSMENT — ENCOUNTER SYMPTOMS
CHEST TIGHTNESS: 0
WHEEZING: 0
BACK PAIN: 1
SHORTNESS OF BREATH: 0
ABDOMINAL PAIN: 0
ABDOMINAL DISTENTION: 0
EYES NEGATIVE: 1
CONSTIPATION: 1
VOMITING: 0
ALLERGIC/IMMUNOLOGIC NEGATIVE: 1
NAUSEA: 0
DIARRHEA: 0

## 2020-09-30 ASSESSMENT — PAIN SCALES - GENERAL
PAINLEVEL_OUTOF10: 0

## 2020-09-30 NOTE — DISCHARGE INSTR - COC
Continuity of Care Form    Patient Name: Collin Thorne   :  1950  MRN:  [id number]    Admit date:  2020  Discharge date:  2020    Code Status Order: Full Code   Advance Directives:   [facility] Documentation     Date/Time Healthcare Directive Type of Healthcare Directive Copy in [address] Agent's Name Healthcare Agent's Phone Number    [phone]  No, patient does not have an advance directive for healthcare treatment -- -- -- -- --          Admitting Physician:  Nevin Spaulding DO  PCP: No primary care provider on file. Discharging Nurse: [facility] Unit/Room#: X302/O752-63  Discharging Unit Phone Number: [phone]    Emergency Contact:   Extended Emergency Contact Information  Primary Emergency Contact: Lizette Sanders  of [address] [place] [address] Phone: [phone]  Mobile Phone: [phone]  Relation: Other  Secondary Emergency Contact: Jimmy Georgedwaine of [address] [place] [address] Phone: [phone]  Mobile Phone: [phone]  Relation: Brother/Sister    Past Surgical History:  Past Surgical History:   Procedure Laterality Date    COLONOSCOPY N/A 2020    COLONOSCOPY DIAGNOSTIC performed by Tatyana Peterson MD at [address] [address] Right 2018    UPPER GASTROINTESTINAL ENDOSCOPY N/A 2020    EGD DIAGNOSTIC ONLY performed by [facility] MD at [facility]       Immunization History: There is no immunization history on file for this patient.     Active Problems:  Patient Active Problem List   Diagnosis Code    Liver mass R16.0    Osteoarthritis of hip M16.9    Palpitations R00.2    Other specified disorders of bone density and structure, unspecified site  M85.80    Anemia D64.9    General weakness R53.1    Gastric ulcer K25.9       Isolation/Infection:   Isolation          No Isolation        Patient Infection Status     None to display          Nurse Assessment:  Last Vital Signs: /76   Pulse 80   Temp 98.6 °F (37 °C) (Oral)   Resp 17   Ht 6' 1\" (1.854 m)   Wt 166 lb (75.3 kg)   SpO2 99%   BMI 21.90 kg/m²     Last documented pain score (0-10 scale): Pain Level: 0  Last Weight:   Wt Readings from Last 1 Encounters:   09/29/20 166 lb (75.3 kg)     Mental Status:  oriented, alert, coherent, logical, thought processes intact and able to concentrate and follow conversation    IV Access:  - None    Nursing Mobility/ADLs:  Walking   Independent  Transfer  Independent  Bathing  Independent  Dressing  Independent  Toileting  Independent  Feeding  Independent  Med [address]  Med Delivery   whole    Wound Care Documentation and Therapy:        Elimination:  Continence:   · Bowel: No  · Bladder: No  Urinary Catheter: None   Colostomy/Ileostomy/Ileal Conduit: No       Date of Last BM: 09/29/2020    Intake/Output Summary (Last 24 hours) at 9/30/2020 1109  Last data filed at 9/30/2020 0540  Gross per 24 hour   Intake 590 ml   Output 400 ml   Net 190 ml     I/O last 3 completed shifts: In: 5192 [P.O.:720; I.V.:350]  Out: 400 [Urine:400]    Safety Concerns: At Risk for Falls    Impairments/Disabilities:      None    Nutrition Therapy:  Current Nutrition Therapy:   - Oral Diet:  General    Routes of Feeding: Oral  Liquids: Thin Liquids  Daily Fluid Restriction: no  Last Modified Barium Swallow with Video (Video Swallowing Test): not done    Treatments at the Time of Hospital Discharge:   Respiratory Treatments:    Oxygen Therapy:  is not on home oxygen therapy. Ventilator:    - No ventilator support    Rehab Therapies: Physical Therapy and Occupational Therapy  Weight Bearing Status/Restrictions: No weight bearing restirctions  Other Medical Equipment (for information only, NOT a DME order):      Other Treatments: ***    Patient's personal belongings (please select all that are sent with patient):  {[facility] DME Belongings:545203269}    RN SIGNATURE:  Electronically signed by Olga Armendariz RN on 9/30/20 at 2:26 PM EDT    CASE MANAGEMENT/SOCIAL WORK SECTION    Inpatient Status Date: ***    Readmission Risk Assessment Score:  Readmission Risk              Risk of Unplanned Readmission:        18           Discharging to Facility/ Agency   · Name: [facility]  · Address:  · [phone]  · Fax:    Dialysis Facility (if applicable)   · Name:  · Address:  · Dialysis Schedule:  · Phone:  · Fax:    / signature: Electronically signed by RUBÉN Lopes on 9/30/20 at 11:10 AM EDT    PHYSICIAN SECTION    Prognosis: {Prognosis:8967736771}    Condition at Discharge: [facility] Patient Condition:560824451}    Rehab Potential (if transferring to Rehab): {Prognosis:6547855446}    Recommended Labs or Other Treatments After Discharge: ***    Physician Certification: I certify the above information and transfer of [facility]  is necessary for the continuing treatment of the diagnosis listed and that he requires {Admit to Appropriate Level of Care:18603} for {GREATER/LESS:777384825} 30 days.      Update Admission H&P: [facility]225[facility]}    PHYSICIAN SIGNATURE:  {Esignature:933552517}

## 2020-09-30 NOTE — CARE COORDINATION
Met with pt who confirmed he would like to DC to home with [facility]. Script for rollator written. He requested DME supply company for this be discussed with his sister Rian Henderson who will also transport to home. LSW called Rian Henderson who would like script for rollator faxed to [facility]. She has a walker he can borrow until this is received. Phone call to [facility] and spoke to Quentin. Faxed script and accompanying information so that she may start process to obtain rollator. Phone call to [facility]. They are not in network for pt. He is agreeable to looking at other options. Phone call to [facility]  Who may be in network. Faxed [employer] for them to verify. Spoke with Kerin Mortimer with [facility] who stated they are in network and can provide services for pt. They are aware pt will DC today. Pt is in need of a PCP. Given list and he would like an appt made with Dr. Keily Navarro. Phone call back from Quentin at [facility]. She confirmed they are in network and they will process the order asap.

## 2020-09-30 NOTE — PROGRESS NOTES
Pt Name: Jenny Malhotra  Medical Record Number: [id number]  Date of Birth 1950   Admit date 2020  7:55 AM  Today's Date: 2020     ASSESSMENT  1. Hospital day # 3  2. Small pyloric channel ulcer and small colonic polyp noted on EGD and colonoscopy today  3. No evidence of active GI bleeding    PLAN  1. There are no general surgical issues at this time and he can be discharged at your discretion    SUBJECTIVE  Chief complaint: None  Afebrile, vital signs are stable. He denies any nausea or vomiting, has passed flatus and had a bowel movement. He is tolerating a DIET GENERAL;. Hemoglobin is 8.3 today. has a past medical history of PAF (paroxysmal atrial fibrillation) ([facility] [address] and Prostate cancer ([facility] [address] CURRENT MEDS  Scheduled Meds:   cefTRIAXone (ROCEPHIN) IV  1 g Intravenous Q24H    sodium chloride flush  10 mL Intravenous 2 times per day    bicalutamide  50 mg Oral Daily    dilTIAZem  120 mg Oral Daily    morphine  15 mg Oral BID    pantoprazole  40 mg Oral BID    polyethylene glycol  17 g Oral Daily     Continuous Infusions:  PRN Meds:.sodium chloride flush, acetaminophen **OR** acetaminophen, polyethylene glycol, promethazine **OR** ondansetron, oxyCODONE, senna-docusate    OBJECTIVE  CURRENT VITALS:  height is 6' 1\" (1.854 m) and weight is 166 lb (75.3 kg). His temporal temperature is 99.1 °F (37.3 °C). His blood pressure is 128/67 and his pulse is 77. His respiration is 16 and oxygen saturation is 99%.    Temperature Range (24h):Temp: 99.1 °F (37.3 °C) Temp  Av.4 °F (36.9 °C)  Min: 97.7 °F (36.5 °C)  Max: 99.1 °F (37.3 °C)  BP Range (74Z): Systolic (36KDR), LKC:866 , Min:90 , CGK:709     Diastolic (80FOU), YRR:46, Min:52, Max:119    Pulse Range (24h): Pulse  Av  Min: 77  Max: 82  Respiration Range (24h): Resp  Av.6  Min: 0  Max: 22    GENERAL: alert, no distress  LUNGS: clear to ausculation, without wheezes, rales or rhonci  HEART: normal rate and regular and adrenal glands are within normal limits. The kidneys enhance uniformly. 1 cm left renal cyst. 2.5 cm right renal cyst. No urinary tract calculi or hydronephrosis. Urinary bladder and prostate are secured by streak artifact from right total hip hip arthroplasty hardware. Given this, no overt abnormality of the prostate. A tiny focus of air is present within the urinary bladder, likely due to catheterization as there is no urinary bladder wall thickening or perivesicular inflammation. Abdominal aorta is nonaneurysmal  and demonstrates atherosclerotic disease . No retroperitoneal or abdominal/pelvic lymphadenopathy. No small bowel obstruction. No overt colonic mass or pericolonic inflammation. The appendix is within normal limits. . No free fluid or free air. Numerous sclerotic lesions are again identified of the visualized spine and pelvis, most significantly involving the inferior pubic ramus. Postsurgical changes of right total hip arthroplasty. Degenerative changes of the spine and left hip. No acute fracture identified. Interval increase in number and size of numerous hypodense lesions within the liver most compatible with metastatic disease. Numerous metastatic bone lesions are again identified. A tiny focus of air is present within the urinary bladder and is likely due to catheterization as there is no urinary bladder wall thickening or perivesicular inflammation. All CT scans at this facility use dose modulation, iterative reconstruction, and/or weight based dosing when appropriate to reduce radiation dose to as low as reasonably achievable. Xr Chest Portable    Result Date: 9/26/2020  Exam: XR CHEST PORTABLE History: Chest pain Technique: AP portable view of the chest obtained. Comparison:  CT chest from May 7, 2020  Findings: The cardiomediastinal silhouette is within normal limits. No pneumothorax, pleural effusion, or consolidation. Bones of the thorax appear intact.      No radiographic evidence of acute intrathoracic process.      Electronically signed by Fercho Gómez MD on 9/29/2020 at 8:07 PM

## 2020-09-30 NOTE — FLOWSHEET NOTE
Patient resting in bed, a/o x4, watching tv. Pt is c/o aching, repositioned and warm blanket provided. Pt also medicated per orders. Pt NSR on tele. Vitals stable. Will continue with hourly rounding.

## 2020-09-30 NOTE — PLAN OF CARE
Problem: Pain:  Description: Pain management should include both nonpharmacologic and pharmacologic interventions.   Goal: Pain level will decrease  9/30/2020 1045 by Jacob Trejo RN  Outcome: Completed  9/30/2020 1045 by Jacob Trejo RN  Outcome: Ongoing  Goal: Control of acute pain  9/30/2020 1045 by Jacob Trejo RN  Outcome: Completed  9/30/2020 1045 by Jacob Trejo RN  Outcome: Ongoing  Goal: Control of chronic pain  9/30/2020 1045 by Jacob Trejo RN  Outcome: Completed  9/30/2020 1045 by Jacob Trejo RN  Outcome: Ongoing     Problem: Skin Integrity:  Goal: Will show no infection signs and symptoms  9/30/2020 1045 by Jacob Trejo RN  Outcome: Completed  9/30/2020 1045 by Jacob Trejo RN  Outcome: Ongoing  Goal: Absence of new skin breakdown  9/30/2020 1045 by Jacob Trejo RN  Outcome: Completed  9/30/2020 1045 by Jacob Trejo RN  Outcome: Ongoing     Problem: Falls - Risk of:  Goal: Will remain free from falls  9/30/2020 1045 by Jacob Trejo RN  Outcome: Completed  9/30/2020 1045 by Jacob Trejo RN  Outcome: Ongoing  Goal: Absence of physical injury  9/30/2020 1045 by Jacob Trejo RN  Outcome: Completed  9/30/2020 1045 by Jacob Trejo RN  Outcome: Ongoing

## 2020-09-30 NOTE — DISCHARGE SUMMARY
[facility] Medicine Discharge Summary    Hima Shaver  :  1950  MRN:  [id number]    Admit date:  2020  Discharge date:  2020    Admitting Physician:  Park Apley, DO  Primary Care Physician:  No primary care provider on file. Discharge Diagnoses: Active Problems:    Anemia    General weakness    Gastric ulcer    Goals of care, counseling/discussion    Gastrointestinal hemorrhage    Rectal mass    Acute cystitis without hematuria    Metastatic cancer ([address]  Resolved Problems:    * No resolved hospital problems. *    Chief Complaint   Patient presents with    Fatigue     increasing over the past couple of days. hx of prostate cancer. Condition: improved   Activity: no strenuous activity   Diet: regular  Disposition: home with home care  Functional Status: ambulatory    Significant Findings:     Recent Labs     20  0622 20  0638 20  0835   WBC 10.1 13.1* 16.2*   HGB 8.0* 8.3* 8.8*   HCT 23.5* 25.5* 27.0*   MCV 83.4 85.0 84.7   * 395 367       Hospital Course:   80-year-old male with history of metastatic prostate cancer to liver and bone, urinary retention requiring straight catheterization presented on  with 4-day history of worsening weakness and dizziness. In the emergency department, he was found to have hemoglobin of 6.6. This was felt to be anemia of multifactorial etiology: Some contribution from GI bleed and other contribution from malignancy with possible bone marrow involvement. Upper and lower endoscopy was performed by GI which revealed nonbleeding gastric ulcer. He was maintained on PPI throughout the hospitalization and will be discharged on Protonix once daily. He has history of paroxysmal atrial fibrillation for which he is on Xarelto-with this recent bleed and his low CHADSVASC score of 1, decision was made to hold his Xarelto at discharge.   He may resume this after approval from either hematology or cardiology. This was discussed with the patient and he understands he is at higher risk of blood clot and CVA while without his blood thinner. Urinalysis was sent in the emergency department which did ultimately grow E. coli-this was treated with ceftriaxone and he was transitioned to [facility] at discharge. The significance of this bacteriuria is unclear-I feel his symptoms were most likely related to his anemia, but in case there was some contribution from urinary tract infection, I decided to treat empirically for total 7-day course. Exam on Discharge:   /76   Pulse 80   Temp 98.6 °F (37 °C) (Oral)   Resp 17   Ht 6' 1\" (1.854 m)   Wt 166 lb (75.3 kg)   SpO2 99%   BMI 21.90 kg/m²   General appearance: alert, cooperative and no distress  Mental Status: oriented to person, place and time and normal affect  Lungs: clear to auscultation bilaterally, normal effort  Heart: regular rate and rhythm, no murmur  Abdomen: soft, nontender, nondistended, bowel sounds present, no masses  Extremities: no edema, redness, tenderness in the calves  Skin: no gross lesions, rashes    Discharge Medication List:   Farzad Buckley   [place] Medication Instructions JOSE:[id number]    Printed on:09/30/20 1512   Medication Information                      bicalutamide (CASODEX) 50 MG chemo tablet  Take 50 mg by mouth daily             Denosumab (XGEVA SC)  Inject into the skin             dexamethasone (DECADRON) 4 MG tablet  Take 4 mg by mouth 2 times daily (with meals) Take 1 tab Bid 1 day before chemo tx and 2 days after chemo treatment. dilTIAZem (CARDIZEM CD) 120 MG extended release capsule  Take 1 capsule by mouth daily             Fe Bisgly-Succ-C-Thre-B12-FA (IRON-150 PO)  Take 325 mg by mouth daily             morphine (MS CONTIN) 15 MG extended release tablet  Take 1 tablet by mouth 2 times daily for 30 days.              nitrofurantoin, macrocrystal-monohydrate, (MACROBID) 100 MG capsule  Take 1 capsule by mouth 2 times daily for 3 days             ondansetron (ZOFRAN) 4 MG tablet  Take 1 tablet by mouth daily as needed for Nausea or Vomiting             oxyCODONE HCl (OXY-IR) 15 MG immediate release tablet  Take 1 tablet by mouth every 6 hours as needed for Pain (moderate to severe pain) for up to 120 days.              pantoprazole (PROTONIX) 40 MG tablet  Take 1 tablet by mouth every morning (before breakfast)             Polyethylene Glycol 3350 POWD  Take by mouth             prochlorperazine (COMPAZINE) 10 MG tablet  Take 10 mg by mouth every 6 hours as needed             sennosides-docusate sodium (SENOKOT-S) 8.6-50 MG tablet  Take 2 tablets by mouth daily as needed for Constipation                 DC time 37 minutes    Signed:  Stefano Mitchell  9/30/2020, 3:12 PM

## 2020-09-30 NOTE — PROGRESS NOTES
Palliative Care Progress Note  Patient: Allie Navarrete  Gender: male  YOB: 1950  Unit/Bed: R037/U703-40  CodeStatus: Full Code  Inpatient Treatment Team: Treatment Team: Attending Provider: Roxana Kenney DO; Consulting Physician: Gal Amor MD; Consulting Physician: Luanne Waite MD; Consulting Physician: Daniel Reeves MD; Surgeon: Danile Reeves MD; Utilization Reviewer: Lydia Fletcher RN; Registered Nurse: Jahaira Garcia RN; : Mckayla Arguello RN; : Lacy Lakhani [place]  Admit Date:  9/26/2020    Chief Complaint: weakness    History of Presenting Illness:      Allie Navarrete is a 79 y.o. male on hospital day 4 with a history of Anemia. PMH is significant for Prostate cancer with metastases to bone and liver, OA, and PAF. Patient seen and examined at bedside for goals of care, code status, family support, and symptom management. Patient observed laying in bed. He is alert and oriented times three. However, he is forgetful. He reports pain 3/10 which he states is acceptable for him. He tells me that he is still weak but his weakness is improving. Review of Systems:       Review of Systems   Constitutional: Positive for fatigue. Negative for activity change, appetite change and unexpected weight change. HENT: Negative for congestion. Eyes: Negative. Respiratory: Negative for chest tightness, shortness of breath and wheezing. Cardiovascular: Negative for chest pain, palpitations and leg swelling. Gastrointestinal: Positive for constipation. Negative for abdominal distention, abdominal pain, diarrhea, nausea and vomiting. Endocrine: Negative. Genitourinary: Negative for dysuria. Musculoskeletal: Positive for arthralgias and back pain. Skin: Negative for pallor. Allergic/Immunologic: Negative. Neurological: Positive for weakness. Negative for dizziness and tremors.    Psychiatric/Behavioral: Negative for agitation and sleep alert and oriented to person, place, and time. Cranial Nerves: No cranial nerve deficit. Sensory: No sensory deficit. Motor: No abnormal muscle tone. Coordination: Coordination normal.      Deep Tendon Reflexes: Reflexes normal.   Psychiatric:         Behavior: Behavior normal. Behavior is cooperative. Thought Content:  Thought content normal.         Judgment: Judgment normal.         Allergies:      No Known Allergies    Medications:      Current Facility-Administered Medications   Medication Dose Route Frequency Provider Last Rate Last Dose    cefTRIAXone (ROCEPHIN) 1 g IVPB in 50 mL D5W minibag  1 g Intravenous Q24H Stefano Stephen, DO   Stopped at 09/30/20 9221    sodium chloride flush 0.9 % injection 10 mL  10 mL Intravenous 2 times per day Stefano Randolph, DO   10 mL at 09/30/20 0850    sodium chloride flush 0.9 % injection 10 mL  10 mL Intravenous PRN Stefano Stephen, DO        acetaminophen (TYLENOL) tablet 650 mg  650 mg Oral Q6H PRN Stefano Randolph, DO        Or    acetaminophen (TYLENOL) suppository 650 mg  650 mg Rectal Q6H PRN Stefano Randolph, DO        polyethylene glycol (GLYCOLAX) packet 17 g  17 g Oral Daily PRN Stefano Stephen, DO        promethazine (PHENERGAN) tablet 12.5 mg  12.5 mg Oral Q6H PRN Stefano Randolph, DO        Or    [facility] injection 4 mg  4 mg Intravenous Q6H PRN Stefano Stephen, DO   4 mg at 09/28/20 0620    bicalutamide (CASODEX) chemo tablet 50 mg  50 mg Oral Daily Stefano Randolph, DO   50 mg at 09/30/20 0851    dilTIAZem (CARDIZEM CD) extended release capsule 120 mg  120 mg Oral Daily Stefano Stephen, DO   120 mg at 09/30/20 0856    morphine (MS CONTIN) extended release tablet 15 mg  15 mg Oral BID Stefano Stephen, DO   15 mg at 09/30/20 0858    oxyCODONE (ROXICODONE) immediate release tablet 15 mg  15 mg Oral Q6H PRN Stefano Stephen, DO   15 mg at 09/29/20 1850    senna-docusate (PERICOLACE) 8.6-50 MG per tablet 2 tablet  2 Family Hx:  History reviewed. No pertinent family history.     LABS: Reviewed     CBC:  Lab Results   Component Value Date    WBC 10.1 09/30/2020    RBC 2.81 09/30/2020    HGB 8.0 09/30/2020    HCT 23.5 09/30/2020    MCV 83.4 09/30/2020    MCH 28.4 09/30/2020    MCHC 34.1 09/30/2020    RDW 16.2 09/30/2020     09/30/2020    MPV 8.7 09/09/2015     CBC with Differential:   Lab Results   Component Value Date    WBC 10.1 09/30/2020    RBC 2.81 09/30/2020    HGB 8.0 09/30/2020    HCT 23.5 09/30/2020     09/30/2020    MCV 83.4 09/30/2020    MCH 28.4 09/30/2020    MCHC 34.1 09/30/2020    RDW 16.2 09/30/2020    METASPCT 1 09/26/2020    LYMPHOPCT 20.9 09/30/2020    MONOPCT 9.4 09/30/2020    MYELOPCT 1 09/26/2020    BASOPCT 0.5 09/30/2020    MONOSABS 1.0 09/30/2020    LYMPHSABS 2.1 09/30/2020    EOSABS 0.3 09/30/2020    BASOSABS 0.0 09/30/2020     CMP:    Lab Results   Component Value Date     09/30/2020    K 3.7 09/30/2020     09/30/2020    CO2 21 09/30/2020    BUN 8 09/30/2020    CREATININE 0.68 09/30/2020    GFRAA >60.0 09/30/2020    LABGLOM >60.0 09/30/2020    GLUCOSE 92 09/30/2020    PROT 6.2 09/27/2020    LABALBU 2.9 09/27/2020    CALCIUM 8.2 09/30/2020    BILITOT 0.5 09/27/2020    ALKPHOS 142 09/27/2020    AST 21 09/27/2020    ALT 20 09/27/2020     BMP:    Lab Results   Component Value Date     09/30/2020    K 3.7 09/30/2020     09/30/2020    CO2 21 09/30/2020    BUN 8 09/30/2020    LABALBU 2.9 09/27/2020    CREATININE 0.68 09/30/2020    CALCIUM 8.2 09/30/2020    GFRAA >60.0 09/30/2020    LABGLOM >60.0 09/30/2020    GLUCOSE 92 09/30/2020     TSH:   Lab Results   Component Value Date    TSH 4.450 02/26/2020     Vitamin B12 and Folate: No components found for: FOLIC,  P84  Urinalysis:   Lab Results   Component Value Date    NITRU Negative 09/26/2020    WBCUA 20-50 09/26/2020    BACTERIA MANY 09/26/2020    RBCUA 3-5 09/26/2020    BLOODU TRACE 09/26/2020    SPECGRAV 1.019 09/26/2020    GLUCOSEU Negative 09/26/2020           FUNCTIONAL ADL´S:     Independent: [ X ] Eating, [  X ] Dressing, [ X  ] Transferring, [ X  ] Toileting, [ X  ] Bathing, [  X ] Continence  Dependent   : [  ] Eating, [   ] Dressing, [   ] Transferring, [   ] Vianca Joseph, [   ] Joanne Cornel, [   ] Continence  W. assistant : [  ] Eating, [   ] Dressing, [   ] Transferring, [   ] Vianca Joseph, [   ] Bathing, [   ] Continence    Radiology: Reviewed      Ct Abdomen Pelvis W Iv Contrast Additional Contrast? None     Result Date: 9/26/2020  EXAM:  CT ABDOMEN PELVIS W IV CONTRAST History: Rectal mass. Increasing fatigue. Technique: Multiple contiguous axial images were obtained of the abdomen and pelvis from an level of the lung bases through the ischial tuberosities with contrast. Multiplanar reformats were obtained. Delayed images were obtained Comparison: CT chest abdomen pelvis from May 7, 2020 Findings: A few tiny right lung base nodules are stable. Interval increase in size and number of hypodense lesions throughout the liver, the largest of which measures approximately 6 cm and is located within the superior right lobe. The gallbladder contains at least one gallstone but is otherwise unremarkable. The spleen, stomach, pancreas, and adrenal glands are within normal limits. The kidneys enhance uniformly. 1 cm left renal cyst. 2.5 cm right renal cyst. No urinary tract calculi or hydronephrosis. Urinary bladder and prostate are secured by streak artifact from right total hip hip arthroplasty hardware. Given this, no overt abnormality of the prostate. A tiny focus of air is present within the urinary bladder, likely due to catheterization as there is no urinary bladder wall thickening or perivesicular inflammation. Abdominal aorta is nonaneurysmal  and demonstrates atherosclerotic disease . No retroperitoneal or abdominal/pelvic lymphadenopathy. No small bowel obstruction. No overt colonic mass or pericolonic inflammation.  The appendix is within normal limits. . No free fluid or free air. Numerous sclerotic lesions are again identified of the visualized spine and pelvis, most significantly involving the inferior pubic ramus. Postsurgical changes of right total hip arthroplasty. Degenerative changes of the spine and left hip. No acute fracture identified.      Interval increase in number and size of numerous hypodense lesions within the liver most compatible with metastatic disease. Numerous metastatic bone lesions are again identified. A tiny focus of air is present within the urinary bladder and is likely due to catheterization as there is no urinary bladder wall thickening or perivesicular inflammation. All CT scans at this facility use dose modulation, iterative reconstruction, and/or weight based dosing when appropriate to reduce radiation dose to as low as reasonably achievable.     Xr Chest Portable     Result Date: 9/26/2020  Exam: XR CHEST PORTABLE History: Chest pain Technique: AP portable view of the chest obtained. Comparison:  CT chest from May 7, 2020  Findings: The cardiomediastinal silhouette is within normal limits. No pneumothorax, pleural effusion, or consolidation. Bones of the thorax appear intact.      No radiographic evidence of acute intrathoracic process. Assessment and plan:    1. Generalized weakness secondary to anemia in the setting of malignancy. -prostate cancer with bone and liver metastases. Currently on Casodex  -Had colonoscopy yesterday. There are no evidence of bleeding.  -H&H has improved to 8.0 and 23.5    2. Chronic constipation  -Currently on Miralax, and Lactulose    3. Generalized weakness  -Will benefit from PT/OT    4.  Neoplasm related pain  -Currently controlled with MS Contin and Roxicodone    -Palliative Care Encounter  -Patient is currently active with [id number] [facility] in the community.   -Will continue to follow.  -Patient will continue to follow with Irving Dubin, CNP upon discharge. -Advance Care Planning  Discussed goals of care with patient. Explained in extensive detail nuances between full code, DNR CCA and DNR CC. Patient has made the decision to be Full code      -Goals of Care Discussion:  Disease process and goals of treatment were discussed in basic terms. Herman's goal is to optimize available comfort care measures to decrease hospitalization and prevent ER visits, manage symptomology with future chemo and immunotherapy  and Palliative Care Services. We discussed the palliative care philosophy in light of those goals. We discussed all care options contingent on treatment response and QOL. Much active listening, presence, and emotional support were given.          Electronically signed by JOYCE Myles CNP on 9/30/2020 at 10:59 AM

## 2020-09-30 NOTE — PROGRESS NOTES
Physician Progress Note      PATIENT:               Thalia Fernandez  CSN #:                  [id number]  :                       1950  ADMIT DATE:       2020 7:55 AM  DISCH DATE:  RESPONDING  PROVIDER #:        Archana Navarro DO          QUERY TEXT:    Attending Physician:  Patient admitted with metastatic prostate cancer with worsening weakness and   dizziness and hemoglobin of 6.6. Per general surgery: lower GI bleed and   acute blood loss anemia. Consult to GI for symptomatic anemia and tarry stools    Please document in progress notes and discharge summary the cause of the GI   bleeding: The medical record reflects the following:  Risk Factors: metastatic prostate cancer  NSAID use  oral anticoagulation  Clinical Indicators:   Dr. Elzbieta Herrera: Anemia secondary to malignancy. Possible contribution from   blood loss, chemotherapy, bone marrow involvement -endoscopy today. ?   Dr. Ministerio Mao: Small pyloric channel ulcer and small colonic polyp noted   on EGD and colonoscopy today  No evidence of active GI bleeding?  Dr. Saintclair Murdoch: reports hard \"black\" stool on and off for months. +occult blood   on rectal exam in ED  GI consulted for symptomatic anemia and tarry stools    Hemoglobin on admission 6.6.   Iron studies show low iron saturation  Hgb: 7.2 8  8.3  8.06.6  7.4  6.9  8.0  Treatment: hold AC  IV Protonix  H&H  blood transfusion  GI  general surgery    EGD/colonoscopy  Options provided:  -- GI bleeding due to colon polyp and/or small pyloric channel ulcer  -- Anemia secondary to malignancy, no GI bleeding  -- Other - I will add my own diagnosis  -- Disagree - Not applicable / Not valid  -- Disagree - Clinically unable to determine / Unknown  -- Refer to Clinical Documentation Reviewer    PROVIDER RESPONSE TEXT:    Anemia secondary to both malignancy and GI bleed    Query created by: Nery Castro on 2020 12:13 PM      Electronically signed by:  Archana Navarro DO 2020 1:45 PM

## 2020-10-01 ENCOUNTER — VIRTUAL VISIT (OUTPATIENT)
Dept: FAMILY MEDICINE CLINIC | Age: 70
End: 2020-10-01
Payer: MEDICARE

## 2020-10-01 ENCOUNTER — TELEPHONE (OUTPATIENT)
Dept: FAMILY MEDICINE CLINIC | Age: 70
End: 2020-10-01

## 2020-10-01 LAB
BLOOD CULTURE, ROUTINE: NORMAL
CULTURE, BLOOD 2: NORMAL

## 2020-10-01 PROCEDURE — 99204 OFFICE O/P NEW MOD 45 MIN: CPT | Performed by: INTERNAL MEDICINE

## 2020-10-01 RX ORDER — CALCIUM CARBONATE 160(400)MG
1 TABLET,CHEWABLE ORAL DAILY
Qty: 1 EACH | Refills: 0 | Status: ON HOLD | OUTPATIENT
Start: 2020-10-01 | End: 2020-12-18 | Stop reason: HOSPADM

## 2020-10-01 ASSESSMENT — PATIENT HEALTH QUESTIONNAIRE - PHQ9
2. FEELING DOWN, DEPRESSED OR HOPELESS: 0
SUM OF ALL RESPONSES TO PHQ QUESTIONS 1-9: 0
1. LITTLE INTEREST OR PLEASURE IN DOING THINGS: 0
SUM OF ALL RESPONSES TO PHQ9 QUESTIONS 1 & 2: 0
SUM OF ALL RESPONSES TO PHQ QUESTIONS 1-9: 0

## 2020-10-01 ASSESSMENT — ENCOUNTER SYMPTOMS
VOMITING: 0
SHORTNESS OF BREATH: 0
CONSTIPATION: 0
SINUS PRESSURE: 0
FACIAL SWELLING: 0
PHOTOPHOBIA: 0
VOICE CHANGE: 0
BLOOD IN STOOL: 0
TROUBLE SWALLOWING: 0
SINUS PAIN: 0
EYE DISCHARGE: 0
RHINORRHEA: 0
ABDOMINAL PAIN: 0
COUGH: 0
ABDOMINAL DISTENTION: 0
APNEA: 0
WHEEZING: 0
SORE THROAT: 0
NAUSEA: 0
BACK PAIN: 0
EYE ITCHING: 0
EYE REDNESS: 0
RECTAL PAIN: 0
CHEST TIGHTNESS: 0
EYE PAIN: 0
DIARRHEA: 0
COLOR CHANGE: 0

## 2020-10-01 NOTE — PROGRESS NOTES
10/1/2020    Suad Leroy (:  1950) is a 79 y.o. male, here for evaluation of the following medical concerns:    HPI     63-year-old male with a history of prostate cancer recently diagnosed with a nonbleeding gastric ulcer presents to establish continuity with me as his primary care doctor. Patient has a history of atrial fibrillation and is no longer receiving anticoagulation therapy as a result of his recent gastrointestinal bleed. The patient request a Rollator and referral for home health. Right hip pain: Managed by palliative care physician Dr. CASTORENA [facility]. At present he denies polyuria,  Polydipsia, constitutional, sinus, visual, cardiopulmonary, urologic, gastrointestinal, immunologic/hematologic, musculoskeletal, neurologic,dermatologic, or psychiatric complaints. Review of Systems   Constitutional: Negative for chills, diaphoresis, fatigue and fever. HENT: Negative for congestion, dental problem, drooling, ear discharge, ear pain, facial swelling, hearing loss, mouth sores, nosebleeds, postnasal drip, rhinorrhea, sinus pressure, sinus pain, sneezing, sore throat, tinnitus, trouble swallowing and voice change. Eyes: Negative for photophobia, pain, discharge, redness, itching and visual disturbance. Respiratory: Negative for apnea, cough, chest tightness, shortness of breath and wheezing. Cardiovascular: Negative for chest pain, palpitations and leg swelling. Gastrointestinal: Negative for abdominal distention, abdominal pain, blood in stool, constipation, diarrhea, nausea, rectal pain and vomiting. Endocrine: Negative for cold intolerance, heat intolerance, polydipsia, polyphagia and polyuria. Genitourinary: Negative for decreased urine volume, difficulty urinating, dysuria, flank pain, frequency, genital sores, hematuria and urgency. Musculoskeletal: Negative for arthralgias, back pain, gait problem, joint swelling, myalgias, neck pain and neck stiffness. Right hip pain   Skin: Negative for color change, rash and wound. Allergic/Immunologic: Negative for environmental allergies and food allergies. Neurological: Negative for dizziness, tremors, seizures, syncope, facial asymmetry, speech difficulty, weakness, light-headedness, numbness and headaches. Hematological: Negative for adenopathy. Does not bruise/bleed easily. Psychiatric/Behavioral: Negative for agitation, confusion, decreased concentration, hallucinations, self-injury, sleep disturbance and suicidal ideas. The patient is not nervous/anxious. Prior to Visit Medications    Medication Sig Taking? Authorizing Provider   Misc. Devices (ROLLATOR ULTRA-LIGHT) MISC 1 Device by Does not apply route daily Yes Jose G Ma MD   pantoprazole (PROTONIX) 40 MG tablet Take 1 tablet by mouth every morning (before breakfast)  Park Apley, DO   nitrofurantoin, macrocrystal-monohydrate, (MACROBID) 100 MG capsule Take 1 capsule by mouth 2 times daily for 3 days  Park Apley, DO   oxyCODONE HCl (OXY-IR) 15 MG immediate release tablet Take 1 tablet by mouth every 6 hours as needed for Pain (moderate to severe pain) for up to 120 days. Melene Lama, APRN - CNP   morphine (MS CONTIN) 15 MG extended release tablet Take 1 tablet by mouth 2 times daily for 30 days.   Melene Lama, APRN - CNP   sennosides-docusate sodium (SENOKOT-S) 8.6-50 MG tablet Take 2 tablets by mouth daily as needed for Constipation  Melene Lama, APRN - CNP   Polyethylene Glycol 3350 POWD Take by mouth  Historical Provider, MD   Fe Bisgly-Succ-C-Thre-B12-FA (IRON-150 PO) Take 325 mg by mouth daily  Historical Provider, MD   Denosumab (Mitchael Dills SC) Inject into the skin  Historical Provider, MD   ondansetron (ZOFRAN) 4 MG tablet Take 1 tablet by mouth daily as needed for Nausea or Vomiting  Melene Lama, APRN - CNP   dilTIAZem (CARDIZEM CD) 120 MG extended release capsule Take 1 capsule by mouth daily  Elida Purdy, DO   dexamethasone (DECADRON) 4 MG tablet Take 4 mg by mouth 2 times daily (with meals) Take 1 tab Bid 1 day before chemo tx and 2 days after chemo treatment.   Historical Provider, MD   bicalutamide (CASODEX) 50 MG chemo tablet Take 50 mg by mouth daily  Historical Provider, MD   prochlorperazine (COMPAZINE) 10 MG tablet Take 10 mg by mouth every 6 hours as needed  Historical Provider, MD        No Known Allergies    Past Medical History:   Diagnosis Date    PAF (paroxysmal atrial fibrillation) ([facility] [address]     ON XARELTO    Prostate cancer ([facility] [address] 11/2019       Past Surgical History:   Procedure Laterality Date    COLONOSCOPY N/A 9/29/2020    COLONOSCOPY DIAGNOSTIC performed by Carina Ureña MD at [address] Right 11/2018    UPPER GASTROINTESTINAL ENDOSCOPY N/A 9/29/2020    EGD DIAGNOSTIC ONLY performed by Carina Ureña MD at [address] [place] [address] Marital status: Single     Spouse name: Not on file    Number of children: Not on file    Years of education: Not on file    Highest education level: Not on file   Occupational History    Not on file   Social Needs    Financial resource strain: Not on file    Food insecurity     Worry: Not on file     Inability: Not on file    Transportation needs     Medical: Not on file     Non-medical: Not on file   Tobacco Use    Smoking status: Never Smoker    Smokeless tobacco: Never Used   Substance and Sexual Activity    Alcohol use: Yes     Frequency: 2-4 times a month    Drug use: No    Sexual activity: Not on file   Lifestyle    Physical activity     Days per week: Not on file     Minutes per session: Not on file    Stress: Not on file   Relationships    Social connections     Talks on phone: Not on file     Gets together: Not on file     Attends [employer] service: Not on file     Active member of club or organization: Not on file     Attends meetings of clubs or organizations: Not on file Relationship status: Not on file    Intimate partner violence     Fear of current or ex partner: Not on file     Emotionally abused: Not on file     Physically abused: Not on file     Forced sexual activity: Not on file   Other Topics Concern    Not on file   Social History Narrative    Not on file        No family history on file. There were no vitals filed for this visit. Estimated body mass index is 21.9 kg/m² as calculated from the following:    Height as of 9/29/20: 6' 1\" (1.854 m). Weight as of 9/29/20: 166 lb (75.3 kg). Physical Exam    ASSESSMENT/PLAN:       Diagnosis Orders   1. Prostate cancer [facility])  Amb External Referral To Home Health   2. Debility     3. Atrial fibrillation, unspecified type ([address]  Amb External Referral To Home Health   4. Anemia, unspecified type  Amb External Referral To Home Health   5. Encounter for general medical examination  Lipid Panel    Hepatitis C Antibody   6. Acute gastric ulcer without hemorrhage or perforation       -We will monitor the patient's hemoglobin hematocrit.  -Continue Protonix  -Follow-up with palliative care and gastroenterology  -Follow-up with hematology oncology    Return in about 2 weeks (around 10/15/2020). An  electronic signature was used to authenticate this note.     --Pedro Mendoza MD on 10/1/2020 at 2:38 PM

## 2020-10-02 NOTE — PROGRESS NOTES
Physical Therapy  Facility/Department: [facility] C203/V870-79  Physical Therapy Discharge      NAME: Charles Beaver    : 1950 (17 y.o.)  MRN: [id number]    Account: [de-identified]  Gender: male      Patient has been discharged from acute care hospital. DC patient from current PT program.      Electronically signed by Reilly Andrews, PT on 10/2/20 at 4:18 PM EDT

## 2020-10-09 ENCOUNTER — HOSPITAL ENCOUNTER (OUTPATIENT)
Dept: INFUSION THERAPY | Age: 70
Setting detail: INFUSION SERIES
Discharge: HOME | End: 2020-10-09
Payer: MEDICARE

## 2020-10-09 VITALS
HEART RATE: 78 BPM | RESPIRATION RATE: 18 BRPM | SYSTOLIC BLOOD PRESSURE: 94 MMHG | DIASTOLIC BLOOD PRESSURE: 59 MMHG | TEMPERATURE: 97.8 F

## 2020-10-09 DIAGNOSIS — M16.0 OSTEOARTHRITIS OF BOTH HIPS, UNSPECIFIED OSTEOARTHRITIS TYPE: ICD-10-CM

## 2020-10-09 DIAGNOSIS — M85.80 OTHER SPECIFIED DISORDERS OF BONE DENSITY AND STRUCTURE, UNSPECIFIED SITE: Primary | ICD-10-CM

## 2020-10-09 DIAGNOSIS — R16.0 LIVER MASS: ICD-10-CM

## 2020-10-09 PROCEDURE — 6360000002 HC RX W HCPCS: Performed by: INTERNAL MEDICINE

## 2020-10-09 PROCEDURE — 96372 THER/PROPH/DIAG INJ SC/IM: CPT

## 2020-10-09 PROCEDURE — 96401 CHEMO ANTI-NEOPL SQ/IM: CPT

## 2020-10-09 RX ADMIN — LEUPROLIDE ACETATE 7.5 MG: KIT at 13:54

## 2020-10-09 RX ADMIN — DENOSUMAB 120 MG: 120 INJECTION SUBCUTANEOUS at 13:51

## 2020-10-09 NOTE — FLOWSHEET NOTE
Patient to the floor via wheelchair for his injections. Vital signs taken. Denies any discomfort. Call light within reach.

## 2020-10-09 NOTE — FLOWSHEET NOTE
xgeva injection given in left arm and lupron injection given in left buttocks. Tolerated well. Declined an observation. Left the unit via wheelchair.  All equipment used in the care for this patient has been cleaned.'

## 2020-10-13 RX ORDER — MORPHINE SULFATE 15 MG/1
15 TABLET, FILM COATED, EXTENDED RELEASE ORAL 2 TIMES DAILY
Qty: 60 TABLET | Refills: 0 | Status: ON HOLD | OUTPATIENT
Start: 2020-10-13 | End: 2020-12-12

## 2020-10-13 RX ORDER — OXYCODONE HYDROCHLORIDE 15 MG/1
15 TABLET ORAL EVERY 6 HOURS PRN
Qty: 120 TABLET | Refills: 0 | Status: ON HOLD | OUTPATIENT
Start: 2020-10-13 | End: 2020-12-18 | Stop reason: HOSPADM

## 2020-10-13 NOTE — TELEPHONE ENCOUNTER
Filled early as I will be on vacation week due    OARRS reviewed and validated. Suspicious activity identified: no. Patient is getting appropriate analgesic effect of treatment; no sedation and other serious reactions noted.

## 2020-10-13 NOTE — TELEPHONE ENCOUNTER
Rx requested:  Requested Prescriptions     Pending Prescriptions Disp Refills    oxyCODONE (OXY-IR) 15 MG immediate release tablet 120 tablet 0     Sig: Take 1 tablet by mouth every 6 hours as needed for Pain (moderate to severe pain) for up to 120 days.  morphine (MS CONTIN) 15 MG extended release tablet 60 tablet 0     Sig: Take 1 tablet by mouth 2 times daily for 30 days.        Last Office Visit:   9/23/2020      Last filled:  9/23/2020    Next Visit Date:  Future Appointments   Date Time Provider Alberto Shearer   10/15/2020  2:00 PM Scott Clarke MD [facility] [place]   11/4/2020  1:30 PM Elda Santillan APRN - CNP [id number]

## 2020-10-15 ENCOUNTER — TELEPHONE (OUTPATIENT)
Dept: FAMILY MEDICINE CLINIC | Age: 70
End: 2020-10-15

## 2020-10-15 ENCOUNTER — VIRTUAL VISIT (OUTPATIENT)
Dept: FAMILY MEDICINE CLINIC | Age: 70
End: 2020-10-15
Payer: MEDICARE

## 2020-10-15 PROCEDURE — 99441 PR PHYS/QHP TELEPHONE EVALUATION 5-10 MIN: CPT | Performed by: INTERNAL MEDICINE

## 2020-10-15 ASSESSMENT — ENCOUNTER SYMPTOMS
BLOOD IN STOOL: 0
EYE REDNESS: 0
VOMITING: 0
PHOTOPHOBIA: 0
NAUSEA: 0
SINUS PAIN: 0
FACIAL SWELLING: 0
CHEST TIGHTNESS: 0
EYE ITCHING: 0
DIARRHEA: 0
CONSTIPATION: 0
RECTAL PAIN: 0
TROUBLE SWALLOWING: 0
WHEEZING: 0
VOICE CHANGE: 0
COUGH: 0
APNEA: 0
ABDOMINAL PAIN: 0
EYE PAIN: 0
SINUS PRESSURE: 0
RHINORRHEA: 0
EYE DISCHARGE: 0
SORE THROAT: 0
COLOR CHANGE: 0
ABDOMINAL DISTENTION: 0
SHORTNESS OF BREATH: 0
BACK PAIN: 0

## 2020-11-18 ENCOUNTER — APPOINTMENT (OUTPATIENT)
Dept: GENERAL RADIOLOGY | Age: 70
DRG: 515 | End: 2020-11-18
Payer: MEDICARE

## 2020-11-18 ENCOUNTER — HOSPITAL ENCOUNTER (INPATIENT)
Age: 70
LOS: 20 days | Discharge: HOME HEALTH | DRG: 515 | End: 2020-12-10
Attending: FAMILY MEDICINE
Payer: MEDICARE

## 2020-11-18 ENCOUNTER — APPOINTMENT (OUTPATIENT)
Dept: CT IMAGING | Age: 70
DRG: 515 | End: 2020-11-18
Payer: MEDICARE
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
## 2020-11-18 LAB
ALBUMIN SERPL-MCNC: 3.6 G/DL (ref 3.5–4.6)
ALP BLD-CCNC: 233 U/L (ref 35–104)
ALT SERPL-CCNC: 9 U/L (ref 0–41)
ANION GAP SERPL CALCULATED.3IONS-SCNC: 13 MEQ/L (ref 9–15)
AST SERPL-CCNC: 26 U/L (ref 0–40)
BASOPHILS ABSOLUTE: 0.1 K/UL (ref 0–0.2)
BASOPHILS RELATIVE PERCENT: 0.6 %
BILIRUB SERPL-MCNC: 0.7 MG/DL (ref 0.2–0.7)
BUN BLDV-MCNC: 12 MG/DL (ref 8–23)
CALCIUM SERPL-MCNC: 9.4 MG/DL (ref 8.5–9.9)
CHLORIDE BLD-SCNC: 98 MEQ/L (ref 95–107)
CO2: 21 MEQ/L (ref 20–31)
CREAT SERPL-MCNC: 0.68 MG/DL (ref 0.7–1.2)
EKG ATRIAL RATE: 88 BPM
EKG P AXIS: 62 DEGREES
EKG P-R INTERVAL: 174 MS
EKG Q-T INTERVAL: 400 MS
EKG QRS DURATION: 82 MS
EKG QTC CALCULATION (BAZETT): 484 MS
EKG R AXIS: 12 DEGREES
EKG T AXIS: 33 DEGREES
EKG VENTRICULAR RATE: 88 BPM
EOSINOPHILS ABSOLUTE: 0.1 K/UL (ref 0–0.7)
EOSINOPHILS RELATIVE PERCENT: 0.8 %
GFR AFRICAN AMERICAN: >60
GFR NON-AFRICAN AMERICAN: >60
GLOBULIN: 4.4 G/DL (ref 2.3–3.5)
GLUCOSE BLD-MCNC: 125 MG/DL (ref 70–99)
HCT VFR BLD CALC: 33.9 % (ref 42–52)
HEMOGLOBIN: 11.2 G/DL (ref 14–18)
LIPASE: 12 U/L (ref 12–95)
LYMPHOCYTES ABSOLUTE: 1.1 K/UL (ref 1–4.8)
LYMPHOCYTES RELATIVE PERCENT: 11.3 %
MAGNESIUM: 2.2 MG/DL (ref 1.7–2.4)
MCH RBC QN AUTO: 25.7 PG (ref 27–31.3)
MCHC RBC AUTO-ENTMCNC: 33 % (ref 33–37)
MCV RBC AUTO: 77.9 FL (ref 80–100)
MONOCYTES ABSOLUTE: 0.7 K/UL (ref 0.2–0.8)
MONOCYTES RELATIVE PERCENT: 7.1 %
NEUTROPHILS ABSOLUTE: 7.5 K/UL (ref 1.4–6.5)
NEUTROPHILS RELATIVE PERCENT: 80.2 %
PDW BLD-RTO: 17.9 % (ref 11.5–14.5)
PLATELET # BLD: 493 K/UL (ref 130–400)
POTASSIUM SERPL-SCNC: 4.7 MEQ/L (ref 3.4–4.9)
RBC # BLD: 4.36 M/UL (ref 4.7–6.1)
SARS-COV-2, NAAT: NOT DETECTED
SODIUM BLD-SCNC: 132 MEQ/L (ref 135–144)
TOTAL PROTEIN: 8 G/DL (ref 6.3–8)
TROPONIN: <0.01 NG/ML (ref 0–0.01)
WBC # BLD: 9.4 K/UL (ref 4.8–10.8)

## 2020-11-18 PROCEDURE — 71045 X-RAY EXAM CHEST 1 VIEW: CPT

## 2020-11-18 PROCEDURE — 83690 ASSAY OF LIPASE: CPT

## 2020-11-18 PROCEDURE — 80053 COMPREHEN METABOLIC PANEL: CPT

## 2020-11-18 PROCEDURE — 99285 EMERGENCY DEPT VISIT HI MDM: CPT

## 2020-11-18 PROCEDURE — 36415 COLL VENOUS BLD VENIPUNCTURE: CPT

## 2020-11-18 PROCEDURE — 70450 CT HEAD/BRAIN W/O DYE: CPT

## 2020-11-18 PROCEDURE — 83735 ASSAY OF MAGNESIUM: CPT

## 2020-11-18 PROCEDURE — 84484 ASSAY OF TROPONIN QUANT: CPT

## 2020-11-18 PROCEDURE — 96372 THER/PROPH/DIAG INJ SC/IM: CPT

## 2020-11-18 PROCEDURE — G0378 HOSPITAL OBSERVATION PER HR: HCPCS

## 2020-11-18 PROCEDURE — 96374 THER/PROPH/DIAG INJ IV PUSH: CPT

## 2020-11-18 PROCEDURE — 6360000002 HC RX W HCPCS: Performed by: FAMILY MEDICINE

## 2020-11-18 PROCEDURE — 85025 COMPLETE CBC W/AUTO DIFF WBC: CPT

## 2020-11-18 PROCEDURE — 93005 ELECTROCARDIOGRAM TRACING: CPT | Performed by: NURSE PRACTITIONER

## 2020-11-18 PROCEDURE — U0002 COVID-19 LAB TEST NON-CDC: HCPCS

## 2020-11-18 RX ORDER — ENZALUTAMIDE 40 MG/1
160 CAPSULE ORAL DAILY
Status: ON HOLD | COMMUNITY
End: 2020-12-18 | Stop reason: HOSPADM

## 2020-11-18 RX ORDER — SODIUM CHLORIDE 0.9 % (FLUSH) 0.9 %
10 SYRINGE (ML) INJECTION PRN
Status: DISCONTINUED | OUTPATIENT
Start: 2020-11-18 | End: 2020-12-01

## 2020-11-18 RX ORDER — ACETAMINOPHEN 325 MG/1
650 TABLET ORAL EVERY 6 HOURS PRN
Status: DISCONTINUED | OUTPATIENT
Start: 2020-11-18 | End: 2020-11-24

## 2020-11-18 RX ORDER — ONDANSETRON 2 MG/ML
4 INJECTION INTRAMUSCULAR; INTRAVENOUS EVERY 6 HOURS PRN
Status: DISCONTINUED | OUTPATIENT
Start: 2020-11-18 | End: 2020-12-10 | Stop reason: HOSPADM

## 2020-11-18 RX ORDER — PROMETHAZINE HYDROCHLORIDE 12.5 MG/1
12.5 TABLET ORAL EVERY 6 HOURS PRN
Status: DISCONTINUED | OUTPATIENT
Start: 2020-11-18 | End: 2020-12-10 | Stop reason: HOSPADM

## 2020-11-18 RX ORDER — ACETAMINOPHEN 650 MG/1
650 SUPPOSITORY RECTAL EVERY 6 HOURS PRN
Status: DISCONTINUED | OUTPATIENT
Start: 2020-11-18 | End: 2020-11-24

## 2020-11-18 RX ORDER — POLYETHYLENE GLYCOL 3350 17 G/17G
17 POWDER, FOR SOLUTION ORAL DAILY PRN
Status: DISCONTINUED | OUTPATIENT
Start: 2020-11-18 | End: 2020-11-19

## 2020-11-18 RX ORDER — SODIUM CHLORIDE 0.9 % (FLUSH) 0.9 %
10 SYRINGE (ML) INJECTION EVERY 12 HOURS SCHEDULED
Status: DISCONTINUED | OUTPATIENT
Start: 2020-11-18 | End: 2020-12-01

## 2020-11-18 RX ADMIN — ONDANSETRON 4 MG: 2 INJECTION INTRAMUSCULAR; INTRAVENOUS at 18:43

## 2020-11-18 RX ADMIN — ENOXAPARIN SODIUM 40 MG: 40 INJECTION SUBCUTANEOUS at 18:40

## 2020-11-18 ASSESSMENT — PAIN DESCRIPTION - ORIENTATION: ORIENTATION: RIGHT

## 2020-11-18 ASSESSMENT — PAIN DESCRIPTION - PAIN TYPE: TYPE: CHRONIC PAIN

## 2020-11-18 ASSESSMENT — PAIN DESCRIPTION - LOCATION: LOCATION: SHOULDER;HIP

## 2020-11-18 ASSESSMENT — PAIN SCALES - GENERAL: PAINLEVEL_OUTOF10: 8

## 2020-11-18 ASSESSMENT — PAIN DESCRIPTION - FREQUENCY: FREQUENCY: CONTINUOUS

## 2020-11-18 ASSESSMENT — PAIN DESCRIPTION - DESCRIPTORS: DESCRIPTORS: CONSTANT;ACHING

## 2020-11-18 ASSESSMENT — PAIN DESCRIPTION - PROGRESSION: CLINICAL_PROGRESSION: NOT CHANGED

## 2020-11-18 ASSESSMENT — PAIN DESCRIPTION - ONSET: ONSET: ON-GOING

## 2020-11-18 NOTE — ED NOTES
Pt back from CT scan, still waiting on urine. Urinal at bedside and pt aware of need for urine.        Sherrell Santillan RN  11/18/20 2019

## 2020-11-18 NOTE — ED NOTES
Patient to ct via cart. No distress and patient tolerated well.       Sravani Bartholomew  11/18/20 0386

## 2020-11-18 NOTE — ED PROVIDER NOTES
1980 [facility]  EMERGENCY DEPARTMENT ENCOUNTER      Pt Name: Dru Evans  MRN: [id number]  [place] 1950  Date of evaluation: 11/18/2020  Provider: JOYCE Reina CNP    CHIEF COMPLAINT       Chief Complaint   Patient presents with    Fatigue         HISTORY OF PRESENT ILLNESS   (Location/Symptom, Timing/Onset,Context/Setting, Quality, Duration, Modifying Factors, Severity)  Note limiting factors. Dru Evans is a 79 y.o. male who presents to the emergency department with chart past medical history of liver mass, metastatic pancreatic cancer, prostate cancer, A. fib, anemia, generalized weakness for chief complaint of increasing weakness. He reports that for the past week he has been struggling to walk because he feels more weak, he is reporting fatigue and feeling very tired. He denies any shortness of breath, chest pain, headaches, numbness or tingling. He denies loss of bladder bowel control. Nursing Notes were reviewed. REVIEW OF SYSTEMS    (2-9 systems for level 4, 10 or more for level 5)     Review of Systems   Constitutional: Positive for fatigue. Negative for activity change, appetite change and fever. HENT: Negative for congestion, ear pain, rhinorrhea, sore throat and trouble swallowing. Eyes: Negative for pain, discharge and redness. Respiratory: Negative for cough, shortness of breath and wheezing. Cardiovascular: Negative for chest pain and palpitations. Gastrointestinal: Negative for abdominal pain, blood in stool, constipation, diarrhea, nausea and vomiting. Endocrine: Negative for polydipsia and polyuria. Genitourinary: Negative for decreased urine volume, dysuria, flank pain and hematuria. Musculoskeletal: Negative for arthralgias, back pain and myalgias. Skin: Negative for color change, rash and wound. Neurological: Positive for weakness. Negative for dizziness, syncope, light-headedness and headaches.    Psychiatric/Behavioral: Negative for behavioral problems. All other systems reviewed and are negative. Except as noted above the remainder of the review of systems was reviewed and negative.        PAST MEDICAL HISTORY     Past Medical History:   Diagnosis Date    PAF (paroxysmal atrial fibrillation) ([facility] [address]     ON XARELTO    Prostate cancer ([facility] [address] 11/2019     Past Surgical History:   Procedure Laterality Date    COLONOSCOPY N/A 9/29/2020    COLONOSCOPY DIAGNOSTIC performed by Fannie Hernandez MD at [address] 11/2018    UPPER GASTROINTESTINAL ENDOSCOPY N/A 9/29/2020    EGD DIAGNOSTIC ONLY performed by Fannie Hernandez MD at [address] History     Socioeconomic History    Marital status: Single     Spouse name: None    Number of children: None    Years of education: None    Highest education level: None   Occupational History    None   Social Needs    Financial resource strain: None    Food insecurity     Worry: None     Inability: None    Transportation needs     Medical: None     Non-medical: None   Tobacco Use    Smoking status: Never Smoker    Smokeless tobacco: Never Used    Tobacco comment: denies   Substance and Sexual Activity    Alcohol use: Not Currently     Frequency: 2-4 times a month     Comment: denies    Drug use: No     Comment: denies    Sexual activity: None   Lifestyle    Physical activity     Days per week: None     Minutes per session: None    Stress: None   Relationships    Social connections     Talks on phone: None     Gets together: None     Attends Yazidism service: None     Active member of club or organization: None     Attends meetings of clubs or organizations: None     Relationship status: None    Intimate partner violence     Fear of current or ex partner: None     Emotionally abused: None     Physically abused: None     Forced sexual activity: None   Other Topics Concern    None   Social History Narrative    None       SCREENINGS findings:        Interpretation per the Radiologist below, if available at the time ofthis note:    RADIOLOGY REPORT   Final Result      MRI CERVICAL THORACIC LUMBAR SPINE WO CONTRAST LIMITED   Final Result      Advanced cervical spondylosis from C3-4 through C6-7 resulting in severe spinal canal stenosis at these levels with minimal spinal canal diameter approaching 4 to 5 mm. Multiple metastatic bone disease throughout the thoracolumbar spine with no pathologic fracture. CT Head WO Contrast   Final Result   Impression:      No acute findings. Mild cerebral atrophy. Chronic ischemic white matter disease. All CT scans at this facility use dose modulation, iterative reconstruction, and/or weight based dosing when appropriate to reduce radiation dose to as low as reasonably achievable. XR CHEST PORTABLE   Final Result   NO ACUTE CARDIOPULMONARY DISEASE. ED BEDSIDE ULTRASOUND:   Performed by ED Physician - none    LABS:  Labs Reviewed   COMPREHENSIVE METABOLIC PANEL - Abnormal; Notable for the following components:       Result Value    Sodium 132 (*)     Glucose 125 (*)     CREATININE 0.68 (*)     Alkaline Phosphatase 233 (*)     Globulin 4.4 (*)     All other components within normal limits   CBC WITH AUTO DIFFERENTIAL - Abnormal; Notable for the following components:    RBC 4.36 (*)     Hemoglobin 11.2 (*)     Hematocrit 33.9 (*)     MCV 77.9 (*)     MCH 25.7 (*)     RDW 17.9 (*)     Platelets 727 (*)     Neutrophils Absolute 7.5 (*)     All other components within normal limits   VITAMIN B12 & FOLATE - Abnormal; Notable for the following components:    Folate 3.1 (*)     All other components within normal limits   CK - Abnormal; Notable for the following components:     Total  (*)     All other components within normal limits   CBC WITH AUTO DIFFERENTIAL - Abnormal; Notable for the following components:    RBC 3.59 (*)     Hemoglobin 9.0 (*)     Hematocrit 27.9 (*)     MCV 77.8 (*)     MCH 25.1 (*)     MCHC 32.3 (*)     RDW 18.5 (*)     Neutrophils Absolute 7.7 (*)     Monocytes Absolute 1.3 (*)     All other components within normal limits   BASIC METABOLIC PANEL W/ REFLEX TO MG FOR LOW K - Abnormal; Notable for the following components:    Sodium 127 (*)     CO2 19 (*)     Glucose 106 (*)     CREATININE 0.55 (*)     All other components within normal limits   LIPASE   MAGNESIUM   TROPONIN   COVID-19   PSA, DIAGNOSTIC   TSH WITHOUT REFLEX   CKMB & RELATIVE PERCENT   COVID-19   URINE RT REFLEX TO CULTURE       All other labs were within normal range or not returned as of this dictation. EMERGENCY DEPARTMENT COURSE and DIFFERENTIAL DIAGNOSIS/MDM:   Vitals:    Vitals:    11/19/20 0950 11/19/20 0953 11/19/20 2015 11/20/20 0727   BP: 113/69 (!) 107/54 130/68 131/65   Pulse: 74 103 84 85   Resp: 18 18 18 18   Temp: 98.4 °F (36.9 °C) 99.1 °F (37.3 °C) 98.4 °F (36.9 °C) 99 °F (37.2 °C)   TempSrc: Oral Oral Oral Oral   SpO2:   100% 100%   Weight:       Height:                MDM   Patient is a 9year-old male presenting to the ER with chief complaint of weakness. Hemodynamically stable nontoxic-appearing. Lab work unremarkable. CT negative. Patient unable to walk with the assistance of the RN. Tried multiple times. Admitted patient to the service of the hospitalist for a PT OT evaluation. CRITICAL CARE TIME       CONSULTS:  IP CONSULT TO ONCOLOGY  IP CONSULT TO REHAB/TCU ADMISSION COORDINATOR  IP CONSULT TO NEUROLOGY    PROCEDURES:  Unless otherwise noted below, none     Procedures    FINAL IMPRESSION      1.  Generalized weakness          DISPOSITION/PLAN   DISPOSITION Admitted 11/18/2020 06:13:42 PM      PATIENT REFERRED TO:  Minda Cedillo MD  [address] [facility] [address] [facility]  [phone]    Schedule an appointment as soon as possible for a visit in 1 day      [address] [phone]    In 1 week  after discharge      DISCHARGE MEDICATIONS:  Current Discharge Medication List             (Please notethat portions of this note were completed with a voice recognition program.  Efforts were made to edit the dictations but occasionally words are mis-transcribed.)    JOYCE Pearson CNP (electronically signed)  Attending Emergency Physician          JOYCE Hopper CNP  11/18/20 JOYCE Olvera CNP  11/20/20 [id number]

## 2020-11-18 NOTE — ED TRIAGE NOTES
Pt here with complaints of weakness for about a week. Pt has pancreatic cancer and is undergoing chemotherapy. He states he cannot walk due to the weakness. Pt reports nausea, denies vomiting or diarrhea. He reports decreased appetite and decreased fluid intake. He denies pain. Denies any chest or shortness of breath.

## 2020-11-19 ENCOUNTER — APPOINTMENT (OUTPATIENT)
Dept: MRI IMAGING | Age: 70
DRG: 515 | End: 2020-11-19
Payer: MEDICARE

## 2020-11-19 PROBLEM — E43 SEVERE MALNUTRITION (HCC): Status: ACTIVE | Noted: 2020-11-19

## 2020-11-19 LAB
CK MB: 1.3 NG/ML (ref 0–6.7)
CREATINE KINASE-MB INDEX: 0.7 % (ref 0–3.5)
FOLATE: 3.1 NG/ML (ref 7.3–26.1)
PROSTATE SPECIFIC ANTIGEN: 3.46 NG/ML (ref 0–6.22)
SARS-COV-2, NAAT: NOT DETECTED
TOTAL CK: 191 U/L (ref 0–190)
TSH SERPL DL<=0.05 MIU/L-ACNC: 1.21 UIU/ML (ref 0.44–3.86)
VITAMIN B-12: 393 PG/ML (ref 232–1245)

## 2020-11-19 PROCEDURE — 6360000002 HC RX W HCPCS: Performed by: FAMILY MEDICINE

## 2020-11-19 PROCEDURE — 82550 ASSAY OF CK (CPK): CPT

## 2020-11-19 PROCEDURE — U0002 COVID-19 LAB TEST NON-CDC: HCPCS

## 2020-11-19 PROCEDURE — 6360000002 HC RX W HCPCS: Performed by: NURSE PRACTITIONER

## 2020-11-19 PROCEDURE — 97162 PT EVAL MOD COMPLEX 30 MIN: CPT

## 2020-11-19 PROCEDURE — 6370000000 HC RX 637 (ALT 250 FOR IP): Performed by: INTERNAL MEDICINE

## 2020-11-19 PROCEDURE — 82607 VITAMIN B-12: CPT

## 2020-11-19 PROCEDURE — 82553 CREATINE MB FRACTION: CPT

## 2020-11-19 PROCEDURE — 96376 TX/PRO/DX INJ SAME DRUG ADON: CPT

## 2020-11-19 PROCEDURE — 2580000003 HC RX 258: Performed by: INTERNAL MEDICINE

## 2020-11-19 PROCEDURE — 97166 OT EVAL MOD COMPLEX 45 MIN: CPT

## 2020-11-19 PROCEDURE — 82746 ASSAY OF FOLIC ACID SERUM: CPT

## 2020-11-19 PROCEDURE — 2580000003 HC RX 258: Performed by: FAMILY MEDICINE

## 2020-11-19 PROCEDURE — 6360000002 HC RX W HCPCS: Performed by: PSYCHIATRY & NEUROLOGY

## 2020-11-19 PROCEDURE — 99222 1ST HOSP IP/OBS MODERATE 55: CPT | Performed by: PSYCHIATRY & NEUROLOGY

## 2020-11-19 PROCEDURE — 6370000000 HC RX 637 (ALT 250 FOR IP): Performed by: PSYCHIATRY & NEUROLOGY

## 2020-11-19 PROCEDURE — G0378 HOSPITAL OBSERVATION PER HR: HCPCS

## 2020-11-19 PROCEDURE — 96375 TX/PRO/DX INJ NEW DRUG ADDON: CPT

## 2020-11-19 PROCEDURE — 96372 THER/PROPH/DIAG INJ SC/IM: CPT

## 2020-11-19 PROCEDURE — 84443 ASSAY THYROID STIM HORMONE: CPT

## 2020-11-19 PROCEDURE — 72146 MRI CHEST SPINE W/O DYE: CPT

## 2020-11-19 PROCEDURE — 36415 COLL VENOUS BLD VENIPUNCTURE: CPT

## 2020-11-19 PROCEDURE — 93010 ELECTROCARDIOGRAM REPORT: CPT | Performed by: INTERNAL MEDICINE

## 2020-11-19 PROCEDURE — 84153 ASSAY OF PSA TOTAL: CPT

## 2020-11-19 RX ORDER — MORPHINE SULFATE 15 MG/1
15 TABLET, FILM COATED, EXTENDED RELEASE ORAL EVERY 12 HOURS SCHEDULED
Status: DISCONTINUED | OUTPATIENT
Start: 2020-11-19 | End: 2020-11-24

## 2020-11-19 RX ORDER — SODIUM CHLORIDE 9 MG/ML
INJECTION, SOLUTION INTRAVENOUS CONTINUOUS
Status: DISPENSED | OUTPATIENT
Start: 2020-11-19 | End: 2020-11-19

## 2020-11-19 RX ORDER — CYANOCOBALAMIN 1000 UG/ML
1000 INJECTION INTRAMUSCULAR; SUBCUTANEOUS ONCE
Status: COMPLETED | OUTPATIENT
Start: 2020-11-19 | End: 2020-11-19

## 2020-11-19 RX ORDER — FOLIC ACID 1 MG/1
1 TABLET ORAL DAILY
Status: DISCONTINUED | OUTPATIENT
Start: 2020-11-19 | End: 2020-12-10 | Stop reason: HOSPADM

## 2020-11-19 RX ORDER — LACTULOSE 10 G/15ML
20 SOLUTION ORAL ONCE
Status: COMPLETED | OUTPATIENT
Start: 2020-11-19 | End: 2020-11-19

## 2020-11-19 RX ORDER — KETOROLAC TROMETHAMINE 15 MG/ML
15 INJECTION, SOLUTION INTRAMUSCULAR; INTRAVENOUS EVERY 6 HOURS PRN
Status: DISCONTINUED | OUTPATIENT
Start: 2020-11-19 | End: 2020-11-24

## 2020-11-19 RX ORDER — FOLIC ACID 1 MG/1
1 TABLET ORAL DAILY
Status: DISCONTINUED | OUTPATIENT
Start: 2020-11-19 | End: 2020-11-19

## 2020-11-19 RX ORDER — SENNA PLUS 8.6 MG/1
1 TABLET ORAL NIGHTLY
Status: DISCONTINUED | OUTPATIENT
Start: 2020-11-19 | End: 2020-12-10 | Stop reason: HOSPADM

## 2020-11-19 RX ORDER — LORAZEPAM 2 MG/ML
1 INJECTION INTRAMUSCULAR
Status: ACTIVE | OUTPATIENT
Start: 2020-11-19 | End: 2020-11-19

## 2020-11-19 RX ORDER — POLYETHYLENE GLYCOL 3350 17 G/17G
17 POWDER, FOR SOLUTION ORAL DAILY
Status: DISCONTINUED | OUTPATIENT
Start: 2020-11-19 | End: 2020-12-10 | Stop reason: HOSPADM

## 2020-11-19 RX ORDER — OXYCODONE HYDROCHLORIDE 5 MG/1
5 TABLET ORAL EVERY 4 HOURS PRN
Status: DISCONTINUED | OUTPATIENT
Start: 2020-11-19 | End: 2020-11-24

## 2020-11-19 RX ADMIN — FOLIC ACID 1 MG: 1 TABLET ORAL at 15:08

## 2020-11-19 RX ADMIN — Medication 10 ML: at 23:01

## 2020-11-19 RX ADMIN — POLYETHYLENE GLYCOL 3350 17 G: 17 POWDER, FOR SOLUTION ORAL at 15:09

## 2020-11-19 RX ADMIN — Medication 10 ML: at 01:26

## 2020-11-19 RX ADMIN — Medication 10 ML: at 15:16

## 2020-11-19 RX ADMIN — MORPHINE SULFATE 15 MG: 15 TABLET, FILM COATED, EXTENDED RELEASE ORAL at 20:01

## 2020-11-19 RX ADMIN — SODIUM CHLORIDE: 9 INJECTION, SOLUTION INTRAVENOUS at 15:14

## 2020-11-19 RX ADMIN — STANDARDIZED SENNA CONCENTRATE 8.6 MG: 8.6 TABLET ORAL at 20:00

## 2020-11-19 RX ADMIN — ENOXAPARIN SODIUM 40 MG: 40 INJECTION SUBCUTANEOUS at 09:30

## 2020-11-19 RX ADMIN — LACTULOSE 20 G: 20 SOLUTION ORAL at 15:10

## 2020-11-19 RX ADMIN — Medication 10 ML: at 09:39

## 2020-11-19 RX ADMIN — KETOROLAC TROMETHAMINE 15 MG: 15 INJECTION, SOLUTION INTRAMUSCULAR; INTRAVENOUS at 10:39

## 2020-11-19 RX ADMIN — KETOROLAC TROMETHAMINE 15 MG: 15 INJECTION, SOLUTION INTRAMUSCULAR; INTRAVENOUS at 01:26

## 2020-11-19 RX ADMIN — CYANOCOBALAMIN 1000 MCG: 1000 INJECTION, SOLUTION INTRAMUSCULAR; SUBCUTANEOUS at 15:11

## 2020-11-19 ASSESSMENT — ENCOUNTER SYMPTOMS
VOMITING: 0
TROUBLE SWALLOWING: 0
COLOR CHANGE: 0
BACK PAIN: 0
PHOTOPHOBIA: 0
NAUSEA: 0
CHOKING: 0
SHORTNESS OF BREATH: 0

## 2020-11-19 ASSESSMENT — PAIN DESCRIPTION - ORIENTATION: ORIENTATION: RIGHT

## 2020-11-19 ASSESSMENT — PAIN SCALES - GENERAL
PAINLEVEL_OUTOF10: 0
PAINLEVEL_OUTOF10: 8
PAINLEVEL_OUTOF10: 10
PAINLEVEL_OUTOF10: 6
PAINLEVEL_OUTOF10: 8
PAINLEVEL_OUTOF10: 4

## 2020-11-19 ASSESSMENT — PAIN DESCRIPTION - FREQUENCY: FREQUENCY: CONTINUOUS

## 2020-11-19 ASSESSMENT — PAIN DESCRIPTION - DESCRIPTORS: DESCRIPTORS: ACHING;CONSTANT

## 2020-11-19 ASSESSMENT — PAIN DESCRIPTION - LOCATION: LOCATION: OTHER (COMMENT)

## 2020-11-19 ASSESSMENT — PAIN DESCRIPTION - PAIN TYPE: TYPE: CHRONIC PAIN

## 2020-11-19 ASSESSMENT — PAIN DESCRIPTION - PROGRESSION: CLINICAL_PROGRESSION: NOT CHANGED

## 2020-11-19 ASSESSMENT — PAIN DESCRIPTION - ONSET: ONSET: ON-GOING

## 2020-11-19 NOTE — CARE COORDINATION
[facility] AT [place] Case Management Initial Discharge Assessment    Met with Patient to discuss discharge plan. PCP: Javon Espinoza MD                                Date of Last Visit: 1 week    If no PCP, list provided? N/A    Discharge Planning    Living Arrangements: independently at home    Who do you live with? self    Who helps you with your care:  self    If lives at home:     Do you have any barriers navigating in your home? no    Patient can perform ADL? Yes    Current Services (outpatient and in home) :  None    Dialysis: No    Is transportation available to get to your appointments? Yes    DME Equipment:  no    Respiratory equipment: None    Respiratory provider:  no     [facility]    Consult with Medication Assistance Program?  No      Does Patient Have a High-Risk for Readmission Diagnosis (CHF, PN, MI, COPD)? No    Initial Discharge Plan? (Note: please see concurrent daily documentation for any updates after initial note). Pt may benefit from rehab due to weakness. He has pt/ot consults ordered and I left a sticky note for a rehab consult. CM to assess for further d/c needs and referrals.         Electronically signed by Grace Montoya on 11/18/2020 at 9:19 PM

## 2020-11-19 NOTE — PROGRESS NOTES
Physical Therapy Med Surg Initial Assessment  Fac[facility]/Department: [facility]  Room: [facility]       NAME: Torrie Bamberger  : 1950 (30 y.o.)  MRN: [id number]  CODE STATUS: Full Code    Date of Service: 2020    Patient Diagnosis(es): Fall at home, initial encounter [W19. Erica Traylor, L83.337]   Chief Complaint   Patient presents with    Fatigue     Patient Active Problem List    Diagnosis Date Noted    Fall at home, initial encounter 2020    Goals of care, counseling/discussion     Gastrointestinal hemorrhage     Rectal mass     Acute cystitis without hematuria     Met[facility] cancer ([facility] [address]     Gastric ulcer 2020    General weakness     Anemia 2020    Other specified disorders of bone density and structure, unspecified site      Liver mass 2020    Osteoarthritis of hip 2020    Palpitations 10/19/2010        Past Medical History:   Diagnosis Date    PAF (paroxysmal atrial fibrillation) ([facility] [address]     ON XARELTO    Prostate cancer ([facility] [address] 2019     Past Surgical History:   Procedure Laterality Date    COLONOSCOPY N/A 2020    COLONOSCOPY DIAGNOSTIC performed by Thu José MD at [facility] [address] 2018    UPPER GASTROINTESTINAL ENDOSCOPY N/A 2020    EGD DIAGNOSTIC ONLY performed by Thu José MD at [facility]       Patient assessed for rehabilitation services?: Yes(Simultaneous filing. User may not have seen previous data.)  Family / Caregiver Present: No  General Comment  Comments: Pt resting in bed/agreeable to PT eval    Restrictions:  Restrictions/Precautions: Isolation, Contact Precautions, Fall Risk(droplet plus +)     SUBJECTIVE: Subjective: Pt reports overall fatigue and weakness.  Pt denies recent falls    Pain  Pre Treatment Pain Screening  Pain at present: 0  Scale Used: Numeric Score  Intervention List: Patient able to continue with treatment    Post Treatment Pain Screening:   Pain Screening  Patient Currently in Pain: No(Simultaneous filing. User may not have seen previous data.)  Pain Assessment  Pain Assessment: 0-10(Simultaneous filing. User may not have seen previous data.)    Prior Level of Function:  Social/Functional History  Lives With: Alone  Type of Home: Apartment  Home Layout: One level  Home Access: Level entry  Bathroom Shower/Tub: Tub/Shower unit  Bathroom Equipment: Grab bars in shower  ADL Assistance: Independent  Homemaking Assistance: Independent  Ambulation Assistance: Independent  Transfer Assistance: Independent  Active : No  Patient's  Info: Sister  Additional Comments: Pt. reports he has been having near falls but no falls in the last few weeks, but progressively more weakness hte last few weeks    OBJECTIVE:   Vision: Within Functional Limits  Hearing: Within functional limits    Cognition:  Overall Orientation Status: Within Functional Limits  Follows Commands: Within Functional Limits    Observation/Palpation  Posture: Good  Observation: cooperative, flat affect, no acute distress on RA    ROM:  RLE PROM: WFL  LLE PROM: WFL    Strength:  Strength RLE  Comment: functionally >3+/5  Strength LLE  Comment: functionally >3+/5    Neuro:  Balance  Posture: Fair  Sitting - Static: Good  Sitting - Dynamic: Good;-  Standing - Static: Fair;+(hand washing at sink with no UE support-supervision)  Standing - Dynamic: Fair;+     Motor Control  Gross Motor?: WFL  Sensation  Overall Sensation Status: WFL    Bed mobility  Supine to Sit: Stand by assistance  Sit to Supine: Stand by assistance    Transfers  Sit to Stand: Stand by assistance  Stand to sit: Stand by assistance   From EOB and toilet.     Ambulation  Ambulation?: Yes  Ambulation 1  Surface: level tile  Device: No Device  Assistance: Contact guard assistance  Gait Deviations: Slow Yanet;Decreased step length  Distance: 25ftx2  Comments: mild unsteadiness, slow pace, pt fatigues quickly    Activity

## 2020-11-19 NOTE — CARE COORDINATION
Attempted to reach pt by phone due to contact precautions, but no contact yet. Per therapy staff, pt stated he would want to go somewhere for therapy at [place]. Rehab eval order in and still being reviewed.

## 2020-11-19 NOTE — PROGRESS NOTES
[facility] OCCUPATIONAL THERAPY EVALUATION - ACUTE     NAME: Audi Donahue  : 1950 (87 y.o.)  MRN: [id number]  CODE STATUS: Full Code  [facility]    Date of Service: 2020    Patient Diagnosis(es): Fall at home, initial encounter [W19. Italo Martinez, E60.961]   Chief Complaint   Patient presents with    Fatigue     Patient Active Problem List    Diagnosis Date Noted    Fall at home, initial encounter 2020    Goals of care, counseling/discussion     Gastrointestinal hemorrhage     Rectal mass     Acute cystitis without hematuria     [facility] [address]     Gastric ulcer 2020    General weakness     Anemia 2020    Other specified disorders of bone density and structure, unspecified site      Liver mass 2020    Osteoarthritis of hip 2020    Palpitations 10/19/2010        Past Medical History:   Diagnosis Date    PAF (paroxysmal atrial fibrillation) ([facility] [address]     ON XARELTO    Prostate cancer ([facility] [address] 2019     Past Surgical History:   Procedure Laterality Date    COLONOSCOPY N/A 2020    COLONOSCOPY DIAGNOSTIC performed by Jose Elias Kong MD at [address] 2018    UPPER GASTROINTESTINAL ENDOSCOPY N/A 2020    EGD DIAGNOSTIC ONLY performed by Jose Elias Kong MD at [facility]        Restrictions  Restrictions/Precautions: Isolation, Contact Precautions, Fall Risk(Droplet plus)     Safety Devices: Safety Devices  Safety Devices in place: Yes  Type of devices:  All fall risk precautions in place        Subjective  Pre Treatment Pain Screening  Pain at present: 0  Scale Used: Numeric Score  Intervention List: Patient able to continue with treatment, Patient declined any intervention    Pain Reassessment:   Pain Assessment  Pain Level: 0       Prior Level of Function:  Social/Functional History  Lives With: Alone  Type of Home: Apartment  Home Layout: One level  Home Access: Level entry  Bathroom Shower/Tub: Tub/Shower unit  Bathroom Equipment: Grab bars in shower  ADL Assistance: Independent  Homemaking Assistance: Independent  Ambulation Assistance: Independent  Transfer Assistance: Independent  Active : No  Patient's  Info: Sister  Additional Comments: Pt. reports he has been having near falls but no falls in the last few weeks, but progressively more weakness hte last few weeks    OBJECTIVE:     Orientation Status:  Orientation  Overall Orientation Status: Within Functional Limits    Observation:  Observation/Palpation  Posture: Good  Observation: Pt. alert, attentive, answers questions in 1-2 word answers    Cognition Status:  Cognition  Overall Cognitive Status: WFL    Perception Status:  Perception  Overall Perceptual Status: WFL    Sensation Status:  Sensation  Overall Sensation Status: WFL    Vision and Hearing Status:  Vision  Vision: Within Functional Limits  Hearing  Hearing: Within functional limits     ROM:   LUE AROM (degrees)  LUE AROM : WFL  Left Hand AROM (degrees)  Left Hand AROM: WFL  RUE AROM (degrees)  RUE AROM : WFL  Right Hand AROM (degrees)  Right Hand AROM: WFL    Strength:  LUE Strength  Gross LUE Strength: Exceptions to [facility]  L Hand General: 3+/5  LUE Strength Comment: 3+/5 all planes  RUE Strength  Gross RUE Strength: Exceptions to [facility]  R Hand General: 3+/5  RUE Strength Comment: 3+/5 all planes    Coordination, Tone, Quality of Movement:    Tone RUE  RUE Tone: Normotonic  Tone LUE  LUE Tone: Normotonic  Coordination  Movements Are Fluid And Coordinated: No  Coordination and Movement description: Fine motor impairments, Decreased speed, Decreased accuracy, Right UE, Left UE    Hand Dominance:  Hand Dominance  Hand Dominance: Right    ADL Status:  ADL  Feeding: Independent  Grooming: Supervision  UE Bathing: Stand by assistance  LE Bathing: Contact guard assistance  UE Dressing: Stand by assistance  LE Dressing: Contact guard assistance  Toileting: Contact guard assistance  Additional Comments: Simulated ADLs as above. Decreased overall endurance, strength and coordination. Fatigues quickly  Toilet Transfers  Toilet - Technique: Ambulating  Equipment Used: Grab bars  Toilet Transfer: Stand by assistance  Toilet Transfers Comments: Increased time and effort for ambulation       Therapy key for assistance levels -   Independent = Pt. is able to perform task with no assistance but may require a device   Stand by assistance = Pt. does not perform task at an independent level but does not need physical assistance, requires verbal cues  Minimal, Moderate, Maximal Assistance = Pt. requires physical assistance (25%, 50%, 75% assist from helper) for task but is able to actively participate in task   Dependent = Pt. requires total assistance with task and is not able to actively participate with task completion     Functional Mobility:  Functional Mobility  Functional - Mobility Device: No device  Activity: To/from bathroom  Assist Level: Stand by assistance  Functional Mobility Comments: Verbal cues for safety, decreased overall balance  Transfers  Sit to stand: Stand by assistance  Stand to sit: Stand by assistance    Bed Mobility  Bed mobility  Supine to Sit: Stand by assistance  Sit to Supine: Stand by assistance    Seated and Standing Balance:  Balance  Sitting Balance: Stand by assistance  Standing Balance: Stand by assistance    Functional Endurance:  Activity Tolerance  Activity Tolerance: Patient Tolerated treatment well    D/C Recommendations:  OT D/C RECOMMENDATIONS  REQUIRES OT FOLLOW UP: Yes    Equipment Recommendations:  OT Equipment Recommendations  Other: Continue to assess    OT Education:   OT Education  OT Education: Plan of Care, OT Role  Patient Education: Educated pt.  on role of acute care OT  Barriers to Learning: None    OT Follow Up:  OT D/C RECOMMENDATIONS  REQUIRES OT FOLLOW UP: Yes       Assessment/Discharge Disposition:  Assessment: Pt. is a 79year old man from home alone who presents to [address] [facility] with the above deficits which impact his ability to perform ADLs and IADLs. Pt. would benefit from continued OT to maximize independence and safety with ADL tasks.   Performance deficits / Impairments: Decreased functional mobility , Decreased ADL status, Decreased strength, Decreased endurance, Decreased balance, Decreased high-level IADLs, Decreased fine motor control  Prognosis: Good  Discharge Recommendations: Continue to assess pending progress  Decision Making: Medium Complexity  History: Pt's medical history is moderately complex  Exam: Pt. has 7 performance deficits  Assistance / Modification: Pt. requires min A    Six Click Score   How much help for putting on and taking off regular lower body clothing?: A Little  How much help for Bathing?: A Little  How much help for Toileting?: A Little  How much help for putting on and taking off regular upper body clothing?: A Little  How much help for taking care of personal grooming?: A Little  How much help for eating meals?: None  AM-[facility] Inpatient Daily Activity Raw Score: 19  AM-PAC Inpatient ADL T-Scale Score : 40.22  ADL Inpatient CMS 0-100% Score: 42.8    Plan:  Plan  Times per week: 1-3x  Plan weeks: Length of acute stay  Current Treatment Recommendations: Strengthening, Balance Training, Functional Mobility Training, Endurance Training, Safety Education & Training, Patient/Caregiver Education & Training, Equipment Evaluation, Education, & procurement, Pain Management, Self-Care / ADL, Home Management Training, Cognitive/Perceptual Training    Goals:   Patient will:    - Improve functional endurance to tolerate/complete 30 mins of ADL's  - Be Mod I in UB ADLs   - Be Mod I in LB ADLs  - Be Mod I in ADL transfers without LOB  - Be Mod I in toileting tasks  - Improve B hand fine motor coordination to [facility] in order to manage clothing fasteners/self-care containers in a timely manner  - Improve B UE strength and endurance to 4/5 in order to participate in self-care activities as projected. - Access appropriate D/C site with as few architectural barriers as possible. - Sequence self-care tasks with no verbal cues for safety    Patient Goal: Patient goals : \"I need to get stronger\"     Discussed and agreed upon: Yes Comments:     Therapy Time:   OT Individual Minutes  Time In: 1055  Time Out: 1118  Minutes: 23    Eval: 23 minutes     Electronically signed by:     GIFTY Velasco  11/19/2020, 11:28 AM

## 2020-11-19 NOTE — ONCOLOGY
Hematology/Oncology Consult  Encounter Date: 2020 11:45 AM    Mr. You Santillan is a 79 y.o. male  : 1950  MRN: [id number]  Acct Number: [de-identified]  Requesting Provider: Suzette Mcduffie DO    Reason for request: prostate cancer      CONSULTANT: Zaire Saavedra    HPI: The patient has androgen independent prostate cancer. Known liver and bone mets. Started oral Sarah Cornel in 10/2020. Admitted  with frequent falls and weakness. Patient Active Problem List   Diagnosis    Liver mass    Osteoarthritis of hip    Palpitations    Other specified disorders of bone density and structure, unspecified site     Anemia    General weakness    Gastric ulcer    Goals of care, counseling/discussion    Gastrointestinal hemorrhage    Rectal mass    Acute cystitis without hematuria    [facility]    Fall at home, initial encounter     Past Medical History:   Diagnosis Date    PAF (paroxysmal atrial fibrillation) ([facility]     ON XARELTO    [facility]     @[facility]@  History reviewed. No pertinent family history.   Social History     Socioeconomic History    Marital status: Single     Spouse name: Not on file    Number of children: Not on file    Years of education: Not on file    Highest education level: Not on file   Occupational History    Not on file   Social Needs    Financial resource strain: Not on file    Food insecurity     Worry: Not on file     Inability: Not on file    Transportation needs     Medical: Not on file     Non-medical: Not on file   Tobacco Use    Smoking status: Never Smoker    Smokeless tobacco: Never Used    Tobacco comment: denies   Substance and Sexual Activity    Alcohol use: Not Currently     Frequency: 2-4 times a month     Comment: denies    Drug use: No     Comment: denies    Sexual activity: Not on file   Lifestyle    Physical activity     Days per week: Not on file     Minutes per session: Not on file    Stress: Not on file Relationships    Social connections     Talks on phone: Not on file     Gets together: Not on file     Attends Buddhist service: Not on file     Active member of club or organization: Not on file     Attends meetings of clubs or organizations: Not on file     Relationship status: Not on file    Intimate partner violence     Fear of current or ex partner: Not on file     Emotionally abused: Not on file     Physically abused: Not on file     Forced sexual activity: Not on file   Other Topics Concern    Not on file   Social History Narrative    Not on file         Current Facility-Administered Medications   Medication Dose Route Frequency Provider Last Rate Last Dose    enzalutamide (XTANDI) capsule 160 mg  160 mg Oral Daily Sha Santiago RN, NP        ketorolac (TORADOL) injection 15 mg  15 mg Intravenous Q6H PRN Sha Santiago RN, NP   15 mg at 11/19/20 1039    sodium chloride flush 0.9 % injection 10 mL  10 mL Intravenous 2 times per day Mira South MD   10 mL at 11/19/20 0939    sodium chloride flush 0.9 % injection 10 mL  10 mL Intravenous PRN Mira South MD        acetaminophen (TYLENOL) tablet 650 mg  650 mg Oral Q6H PRN Mira South MD        Or    acetaminophen (TYLENOL) suppository 650 mg  650 mg Rectal Q6H PRN Mira South MD        polyethylene glycol [facility]) packet 17 g  17 g Oral Daily PRN Mira South MD        promethazine (PHENERGAN) tablet 12.5 mg  12.5 mg Oral Q6H PRN Mira South MD        Or    ondansetron [facility]) injection 4 mg  4 mg Intravenous Q6H PRN Mira South MD   4 mg at 11/18/20 1843    enoxaparin (LOVENOX) injection 40 mg  40 mg Subcutaneous Daily Mira South MD   40 mg at 11/19/20 0930     [unfilled]  No Known Allergies     Review of Systems  Moderate weight loss, weakness, and frequent falls. Some right-sided pain. Moderate constipation. No fever, chills, or night sweats. No new SOB.   Otherwise ROS are negative. PHYSICAL EXAMINATION:   VITAL SIGNS: BP (!) 107/54   Pulse 103   Temp 99.1 °F (37.3 °C) (Oral)   Resp 18   Ht 6' 1\" (1.854 m)   Wt 147 lb 4.3 oz (66.8 kg)   SpO2 100%   BMI 19.43 kg/m²     GENERAL: In no acute distress, thin, well- developed,alert and oriented to person place and time. SKIN: Warm and dry, withoutjaundice, ecchymoses, or petechiae. HEENT: Normocephalic, sclera anicteric, oral mucosa moist.  NODES: No palpable adenopathy in the neck Levels I-V, bilateral   Supraclavicular fossae, or axillary chains. LUNGS: Good inspiratory effort, no accessory muscle use, clear bilaterally, no focal wheeze, rales or rhonchi. CARDIAC: Regular rate and rhythm, without murmurs, rubs or gallops. ABDOMINAL: Normal bowel soundspresent, soft, non-tender, no mass or  organomegaly. MUSKL: Full ROM  EXTREMITIES:No edema. NEUROLOGIC: No grossly apparent focal deficits.     LAB RESULTS:  Recent Results (from the past 24 hour(s))   Comprehensive Metabolic Panel    Collection Time: 11/18/20  3:00 PM   Result Value Ref Range    Sodium 132 (L) 135 - 144 mEq/L    Potassium 4.7 3.4 - 4.9 mEq/L    Chloride 98 95 - 107 mEq/L    CO2 21 20 - 31 mEq/L    Anion Gap 13 9 - 15 mEq/L    Glucose 125 (H) 70 - 99 mg/dL    BUN 12 8 - 23 mg/dL    CREATININE 0.68 (L) 0.70 - 1.20 mg/dL    GFR Non-African American >60.0 >60    GFR  >60.0 >60    Calcium 9.4 8.5 - 9.9 mg/dL    Total Protein 8.0 6.3 - 8.0 g/dL    Alb 3.6 3.5 - 4.6 g/dL    Total Bilirubin 0.7 0.2 - 0.7 mg/dL    Alkaline Phosphatase 233 (H) 35 - 104 U/L    ALT 9 0 - 41 U/L    AST 26 0 - 40 U/L    Globulin 4.4 (H) 2.3 - 3.5 g/dL   CBC Auto Differential    Collection Time: 11/18/20  3:00 PM   Result Value Ref Range    WBC 9.4 4.8 - 10.8 K/uL    RBC 4.36 (L) 4.70 - 6.10 M/uL    Hemoglobin 11.2 (L) 14.0 - 18.0 g/dL    Hematocrit 33.9 (L) 42.0 - 52.0 %    MCV 77.9 (L) 80.0 - 100.0 fL    MCH 25.7 (L) 27.0 - 31.3 pg    MCHC 33.0 33.0 - 37.0 %    RDW 17.9 (H) 11.5 - 14.5 %    Platelets 726 (H) 759 - 400 K/uL    Neutrophils % 80.2 %    Lymphocytes % 11.3 %    Monocytes % 7.1 %    Eosinophils % 0.8 %    Basophils % 0.6 %    Neutrophils Absolute 7.5 (H) 1.4 - 6.5 K/uL    Lymphocytes Absolute 1.1 1.0 - 4.8 K/uL    Monocytes Absolute 0.7 0.2 - 0.8 K/uL    Eosinophils Absolute 0.1 0.0 - 0.7 K/uL    Basophils Absolute 0.1 0.0 - 0.2 K/uL   Lipase    Collection Time: 11/18/20  3:00 PM   Result Value Ref Range    Lipase 12 12 - 95 U/L   Magnesium    Collection Time: 11/18/20  3:00 PM   Result Value Ref Range    Magnesium 2.2 1.7 - 2.4 mg/dL   Troponin    Collection Time: 11/18/20  3:00 PM   Result Value Ref Range    Troponin <0.010 0.000 - 0.010 ng/mL   EKG 12 Lead - Chest Pain    Collection Time: 11/18/20  3:06 PM   Result Value Ref Range    Ventricular Rate 88 BPM    Atrial Rate 88 BPM    P-R Interval 174 ms    QRS Duration 82 ms    Q-T Interval 400 ms    QTc Calculation (Bazett) 484 ms    P Axis 62 degrees    R Axis 12 degrees    T Axis 33 degrees   COVID-19    Collection Time: 11/18/20  3:38 PM   Result Value Ref Range    SARS-CoV-2, NAAT Not Detected Not Detected     Recent Labs     11/18/20  1500   GLUCOSE 125*     RADIOLOGY RESULTS:  Ct Head Wo Contrast    Result Date: 11/18/2020  CT Brain Contrast medium:  Not utilized. History:  Weakness, fatigue Comparison:  None Findings: Extra-axial spaces:  Normal. Intracranial hemorrhage:  None. Ventricular system: Ventricles mildly enlarged. Sulci mildly prominent. Basal Cisterns:  Normal. Cerebral Parenchyma: Bilateral symmetric periventricular areas decreased attenuation. Midline Shift:  None. Cerebellum:  Normal. Paranasal sinuses and mastoid air cells:  Normal. Visualized Orbits:  Normal.     Impression: No acute findings. Mild cerebral atrophy. Chronic ischemic white matter disease.  All CT scans at this facility use dose modulation, iterative reconstruction, and/or weight based dosing when appropriate to reduce radiation dose to as low as reasonably achievable. Xr Chest Portable    Result Date: 11/18/2020  EXAMINATION: XR CHEST PORTABLE CLINICAL HISTORY: SHORTNESS OF BREATH COMPARISONS: CHEST RADIOGRAPH, SEPTEMBER 26, 2020 FINDINGS: Osseous structures are intact. Cardiopericardial silhouette is normal. Pulmonary vasculature is normal. Lungs are clear and hyperexpanded. NO ACUTE CARDIOPULMONARY DISEASE. ASSESSMENT AND PLAN:  Patient with androgen independent metastatic prostate cancer to bones and liver. Started Erle Rase 10/2020. Since that time increased weakness and falls. I suspect his symptoms are from Erle Rase (grade 3-4 weakness in 9% of patients). However, cord compression should be ruled out. Rafa Balderas for now. I consulted neurology just to ensure no cord compression causing symptoms. Eventual rehab and slowly increase Xtandi dose in a few weeks. Prognosis is guarded, but he should still have some response to various treatments. Fu with Dr. Ruy Lees 1 week after discharge. Thanks.     Electronically signed by Henry Pitt DO on 11/19/2020 at 11:45 AM

## 2020-11-19 NOTE — PROGRESS NOTES
Comprehensive Nutrition Assessment    Type and Reason for Visit:  Initial, Positive Nutrition Screen    Nutrition Recommendations/Plan:   Continue with General diet  High Calorie ONS @ B & D  Frozen and Clear ONS @ L    Nutrition Assessment:  Severe malnutrition related to metastatic disease, attempted to reach pt by phone due to droplet precautions, unable to reach, May need to consider nutrition suppoort if po < 50%    Malnutrition Assessment:  Malnutrition Status:  Severe malnutrition    Context:  Chronic Illness     Findings of the 6 clinical characteristics of malnutrition:  Energy Intake:  7 - 75% or less estimated energy requirements for 1 month or longer  Weight Loss:  7 - Greater than 10% over 6 months     Body Fat Loss:  Unable to assess     Muscle Mass Loss:  Unable to assess    Fluid Accumulation:  No significant fluid accumulation     Strength:  Not Performed    Estimated Daily Nutrient Needs:  Energy (kcal):  1644-3402 kcals @ 28-30 kcal/kg; Weight Used for Energy Requirements:  Current(67 kg)     Protein (g):   g protein @ 1.3-1.5 g/kg; Weight Used for Protein Requirements:  Current(67 kg)        Fluid (ml/day):  ~1900 ( 28 ml/kg);  Method Used for Fluid Requirements:  ml/Kg      Nutrition Related Findings:  Pt admitted with weakness, falls , hx of chemo therapy wtih metastatic prostate Ca ( liver/bone), hx of poor po PTA, currently in droplet precautions to r/o COIVD, c/o constpation with last BM 11/15, per notes, bowel regimine ordered, IVF = .9 NS @ 100 ml/hr      Wounds:  None       Current Nutrition Therapies:    DIET GENERAL; Safety Tray; Safety Tray (Disposables)  Dietary Nutrition Supplements: Standard High Calorie Oral Supplement    Anthropometric Measures:  · Height: 6' 1\" (185.4 cm)  · Current Body Weight: 147 lb (66.7 kg)   · Admission Body Weight: 147 lb (66.7 kg)(stated)    · Usual Body Weight: 177 lb (80.3 kg)((2/20), 182# ( 6/20), 166# ( 9/20))     · Ideal Body Weight: 184 lbs; % Ideal Body Weight   80%  · BMI: 19.4  · BMI Categories: Underweight (BMI less than 22) age over 72       Nutrition Diagnosis:   · Severe malnutrition, In context of chronic illness related to catabolic illness, inadequate protein-energy intake as evidenced by poor intake prior to admission, BMI, weight loss greater than or equal to 10% in 6 months      Nutrition Interventions:   Food and/or Nutrient Delivery:  Continue Current Diet, Start Oral Nutrition Supplement  Nutrition Education/Counseling:  Education not indicated   Coordination of Nutrition Care:  No recommendation at this time, Continue to monitor while inpatient    Goals:  po > 50% meals . supplements, wt > 147# resolve constipation       Nutrition Monitoring and Evaluation:   Behavioral-Environmental Outcomes:      Food/Nutrient Intake Outcomes:  Diet Advancement/Tolerance, Food and Nutrient Intake, Supplement Intake  Physical Signs/Symptoms Outcomes:  Weight, Meal Time Behavior     Discharge Planning:     Too soon to determine     Electronically signed by Chris Todd RD, LD on 11/19/20 at 1:50 PM EST

## 2020-11-19 NOTE — H&P
Hospital Medicine  History and Physical    Patient:  Kirsten Aquino  MRN: [id number]    CHIEF COMPLAINT:    Chief Complaint   Patient presents with    Fatigue       History Obtained From:  patient  Primary Care Physician: Shea Weber MD    HISTORY OF PRESENT ILLNESS:   The patient is a 79 y.o. male who presents with a hx of prostate cancer undergoing current chemotherapy, with recurrent falls. The patient states he was seen as an outpatient in mid October for similar falls. He was started on home health pt/ot which was not successful. His fall frequency increased, and he presented today with the inability to ambulate. He is on current chemotherapy which he believes has weakened him, decreased his appetite and decreased his energy. He denies cp, sob. Past Medical History:      Diagnosis Date    PAF (paroxysmal atrial fibrillation) ([facility]     ON XARELTO    Prostate cancer ([facility] 11/2019       Past Surgical History:      Procedure Laterality Date    COLONOSCOPY N/A 9/29/2020    COLONOSCOPY DIAGNOSTIC performed by Ravin Del Castillo MD at [address] 11/2018    UPPER GASTROINTESTINAL ENDOSCOPY N/A 9/29/2020    EGD DIAGNOSTIC ONLY performed by R[facility] Del Castillo MD at [facility]       Medications Prior to Admission:    Prior to Admission medications    Medication Sig Start Date End Date Taking? Authorizing Provider   oxyCODONE (OXY-IR) 15 MG immediate release tablet Take 1 tablet by mouth every 6 hours as needed for Pain (moderate to severe pain) for up to 120 days. 10/13/20 2/10/21  Pablo Skiff, APRN - CNP   leuprolide (LUPRON) 7.5 MG injection Inject 7.5 mg into the muscle every 28 days    Historical Provider, MD [place]  Devices (ROLLATOR ULTRA-LIGHT) MISC 1 Device by Does not apply route daily 10/1/20   Shea Weber MD   pantoprazole (PROTONIX) 40 MG tablet Take 1 tablet by mouth every morning (before breakfast) 9/29/20   DO yamel Law sodium (SENOKOT-S) 8.6-50 MG tablet Take 2 tablets by mouth daily as needed for Constipation 6/24/20   JOYCE Crowe CNP   Polyethylene Glycol 3350 POWD Take by mouth    Historical Provider, MD [place] Bisgly-Succ-C-Thre-B12-FA (IRON-150 PO) Take 325 mg by mouth daily 11/28/18   Historical Provider, MD   Denosumab (Marcene Marking SC) Inject into the skin    Historical Provider, MD   ondansetron (ZOFRAN) 4 MG tablet Take 1 tablet by mouth daily as needed for Nausea or Vomiting 2/26/20   JOYCE Crowe CNP   dilTIAZem (CARDIZEM CD) 120 MG extended release capsule Take 1 capsule by mouth daily 2/26/20   Elida Paris DO   dexamethasone (DECADRON) 4 MG tablet Take 4 mg by mouth 2 times daily (with meals) Take 1 tab Bid 1 day before chemo tx and 2 days after chemo treatment. Historical Provider, MD   bicalutamide (CASODEX) 50 MG chemo tablet Take 50 mg by mouth daily    Historical Provider, MD   prochlorperazine (COMPAZINE) 10 MG tablet Take 10 mg by mouth every 6 hours as needed    Historical Provider, MD       Allergies:  Patient has no known allergies. Social History:   TOBACCO:   reports that he has never smoked. He has never used smokeless tobacco.  ETOH:   reports previous alcohol use. Family History:   History reviewed. No pertinent family history. REVIEW OF SYSTEMS:  Ten systems reviewed and negative except for as above. Physical Exam:    Vitals: /74   Pulse 86   Temp 97.7 °F (36.5 °C) (Oral)   Resp 16   Ht 6' 1\" (1.854 m)   Wt 147 lb (66.7 kg)   SpO2 100%   BMI 19.39 kg/m²   Constitutional: alert, appears stated age and cooperative, thin, cachetic  Skin: Skin color, texture, turgor normal. No rashes or lesions  Eyes:Eye: Normal external eye, conjunctiva, NEFTALY. ENT: Head: Normocephalic, no lesions, without obvious abnormality.   Neck: no adenopathy, no carotid bruit, no JVD, supple, symmetrical, trachea midline and thyroid not enlarged, symmetric, no tenderness/mass/nodules  Respiratory: clear to auscultation bilaterally  Cardiovascular: regular rate and rhythm, S1, S2 normal, no murmur, click, rub or gallop  Gastrointestinal: soft, non-tender; bowel sounds normal; no masses,  no organomegaly  Genitourinary: Deferred  Musculoskeletal:extremities normal, atraumatic, no cyanosis or edema  Neurologic: Mental status AAOx3 No facial asymmetry or droop. Normal muscle strength b/l. CN II-XII grossly intact. Psychiatric: Appropriate mood and affect. Good insight and judgement  Hematologic: No obvious bruising or bleeding    Recent Labs     11/18/20  1500   WBC 9.4   HGB 11.2*   *     Recent Labs     11/18/20  1500   *   K 4.7   CL 98   CO2 21   BUN 12   CREATININE 0.68*   GLUCOSE 125*   AST 26   ALT 9   BILITOT 0.7   ALKPHOS 233*     Troponin T:   Recent Labs     11/18/20  1500   TROPONINI <0.010     -----------------------------------------------------------------   Ct Head Wo Contrast    Result Date: 11/18/2020  CT Brain Contrast medium:  Not utilized. History:  Weakness, fatigue Comparison:  None Findings: Extra-axial spaces:  Normal. Intracranial hemorrhage:  None. Ventricular system: Ventricles mildly enlarged. Sulci mildly prominent. Basal Cisterns:  Normal. Cerebral Parenchyma: Bilateral symmetric periventricular areas decreased attenuation. Midline Shift:  None. Cerebellum:  Normal. Paranasal sinuses and mastoid air cells:  Normal. Visualized Orbits:  Normal.     Impression: No acute findings. Mild cerebral atrophy. Chronic ischemic white matter disease. All CT scans at this facility use dose modulation, iterative reconstruction, and/or weight based dosing when appropriate to reduce radiation dose to as low as reasonably achievable. Xr Chest Portable    Result Date: 11/18/2020  EXAMINATION: XR CHEST PORTABLE CLINICAL HISTORY: SHORTNESS OF BREATH COMPARISONS: CHEST RADIOGRAPH, SEPTEMBER 26, 2020 FINDINGS: Osseous structures are intact.

## 2020-11-19 NOTE — PROGRESS NOTES
Patient assessment an vitals complete and stable. Medicated for pain per [place] with Toradol, voiced relief. PT and OT in to see patient, falls protocol in place. COVID test due at 1530 today. Will test at that time.

## 2020-11-19 NOTE — ACP (ADVANCE CARE PLANNING)
Advance Care Planning     Advance Care Planning Activator (Inpatient)  Conversation Note      Date of ACP Conversation: 11/18/2020    Conversation Conducted with: Patient with Decision Making Capacity    ACP Activator: [address] makes decisions on behalf of the incapacitated patient: Decision Maker is asked to consider and make decisions based on patient values, known preferences, or best interests. Health Care Decision Maker:     Current Designated Health Care Decision Maker:   Primary Decision Maker: Alf Lockett - Brother/Sister - [phone]    Secondary Decision Maker: Reny Linton - Other - [phone]  (If there is a [address] named in the [address] [employer]\" box in the ACP activity, but it is not visible above, be sure to open that field and then select the health care decision maker relationship (ie \"primary\") in the blank space to the right of the name.) Validate  this information as still accurate & up-to-date; edit [place] field as needed.)    Note: Assess and validate information in current ACP documents, as indicated. If no Decision Maker listed above or available through scanned documents, then:    If no Authorized Decision Maker has previously been identified, then patient chooses [place]:  \"Who would you like to name as your primary health care decision-maker? \"               Name: Santhosh Coelho        Relationship: sister          Phone number: [phone]  Lynnette Leon this person be reached easily? \" Yes  \"Who would you like to name as your back-up decision maker? \"   Name: Bud Leola        Relationship: other          Phone number: [phone]  Lynnette Leon this person be reached easily? \" unknown    Note: If the relationship of these Decision-Makers to the patient does NOT follow your state's Next of Kin hierarchy, recommend that patient complete ACP document that meets state-specific requirements to previously recorded wishes  [] New Advance Directive completed  [] Portable Do Not Rescitate prepared for Provider review and signature  [] POLST/POST/MOLST/MOST prepared for Provider review and signature      Follow-up plan:    [] Schedule follow-up conversation to continue planning  [] Referred individual to Provider for additional questions/concerns   [] Advised patient/agent/surrogate to review completed ACP document and update if needed with changes in condition, patient preferences or care setting    [x] This note routed to one or more involved healthcare providers

## 2020-11-19 NOTE — ED NOTES
Transporter here. Pt states the nausea is gone for now. Ate the jello without difficulty. Pt asking if there is food he can have upstairs. Pt aware there is. No acute distress noted at this time.      Haider Bejarano RN  11/18/20 1919

## 2020-11-19 NOTE — CARE COORDINATION
Received fax from [facility] pt is current with them Electronically signed by Jaylon Aceves RN on 11/19/2020 at 3:09 PM

## 2020-11-19 NOTE — PROGRESS NOTES
Physician Progress Note    11/19/2020   1:13 PM    Name:  You Santillan  MRN:    [id number]      Day: 0     Admit Date: 11/18/2020  2:44 PM  PCP: Lenny Vargas MD    Code Status:  Full Code    Subjective:     He admits to feeling weak with decreased appetite but otherwise denies nausea, chest pain, dyspnea, abdominal pain. He does admit he has not had a bowel movement in 4 days. He straight catheterizes to urinate.     Current Facility-Administered Medications   Medication Dose Route Frequency Provider Last Rate Last Dose    enzalutamide (XTANDI) capsule 160 mg  160 mg Oral Daily Sha Santiago RN, NP   Stopped at 11/19/20 1242    ketorolac (TORADOL) injection 15 mg  15 mg Intravenous Q6H PRN Sha Santiago RN, NP   15 mg at 02/08/55 5196    folic acid (FOLVITE) tablet 1 mg  1 mg Oral Daily Suzette Proffer, DO        0.9 % sodium chloride infusion   Intravenous Continuous Suzette Griffithfer, DO        LORazepam (ATIVAN) injection 1 mg  1 mg Intravenous Once PRN Bria La MD        sodium chloride flush 0.9 % injection 10 mL  10 mL Intravenous 2 times per day Stefano Salazar MD   10 mL at 11/19/20 0939    sodium chloride flush 0.9 % injection 10 mL  10 mL Intravenous PRN Stefano Salazar MD        acetaminophen (TYLENOL) tablet 650 mg  650 mg Oral Q6H PRN Stefano Salazar MD        Or    acetaminophen (TYLENOL) suppository 650 mg  650 mg Rectal Q6H PRN Stefano Salazar MD        polyethylene glycol [facility]) packet 17 g  17 g Oral Daily PRN Stefano Salazar MD        [facility]) tablet 12.5 mg  12.5 mg Oral Q6H PRN Stefano Salazar MD        Or    ondansetron [facility]) injection 4 mg  4 mg Intravenous Q6H PRN Stefano Salazar MD   4 mg at 11/18/20 1843    enoxaparin (LOVENOX) injection 40 mg  40 mg Subcutaneous Daily Stefano Salazar MD   40 mg at 11/19/20 0930       Physical Examination:      Vitals:  BP (!) 107/54   Pulse 103   Temp 99.1 °F (37.3 °C) (Oral)   Resp 18   Ht 6' 1\" (1.854 m)   Wt 147 lb 4.3 oz (66.8 kg)   SpO2 100%   BMI 19.43 kg/m²   Temp (24hrs), Av.4 °F (36.9 °C), Min:97.7 °F (36.5 °C), Max:99.1 °F (37.3 °C)      General appearance: alert, cooperative and no distress  Mental Status: oriented to person, place and time and normal affect  Lungs: clear to auscultation bilaterally, normal effort  Heart: regular rate and rhythm, no murmur  Abdomen: soft, nontender, nondistended, bowel sounds present, no masses  Extremities: no edema, redness, tenderness in the calves  Skin: no gross lesions, rashes    Data:     Labs:  Recent Labs     20  1500   WBC 9.4   HGB 11.2*   *     Recent Labs     20  1500   *   K 4.7   CL 98   CO2 21   BUN 12   CREATININE 0.68*   GLUCOSE 125*     Recent Labs     20  1500   AST 26   ALT 9   BILITOT 0.7   ALKPHOS 233*       Assessment and Plan:        1. Recurrent falls multifactorial in etiology: Mild orthostatic hypotension suspect due to autonomic dysfunction versus mild dehydration, advanced prostate cancer on chemotherapy, and moderate protein calorie malnutrition  -Gentle IVF. Recheck orthostatic vitals in a.m.  -Oncology to consult with neurology to assess for any cord compression  -Boost protein supplement  -PT/OT  -Palliative care referral for outpatient    2. Constipation: Add bowel regimen    3. Folic acid deficiency: Supplement    4.   Cancer related pain on opioids    Lovenox prophylaxis    Continue observation    Electronically signed by Marian Sanchez DO on 2020 at 1:13 PM

## 2020-11-19 NOTE — PLAN OF CARE
Nutrition Problem #1: Severe malnutrition, In context of chronic illness  Intervention: Food and/or Nutrient Delivery: Continue Current Diet, Start Oral Nutrition Supplement  Nutritional Goals: po > 50% meals .  supplements, wt > 147# resolve constipation

## 2020-11-19 NOTE — PROGRESS NOTES
Pt has chemo medication from home   States he pays $2000/month for this medication. Medication sent to pharmacy to be stored there while patient is here.   Electronically signed by Zuleima Seay RN on 11/18/2020 at 11:29 PM

## 2020-11-19 NOTE — CONSULTS
Subjective:      Patient ID: Jess Mcfarlane is a 79 y.o. male who presents today for weakness    HPI 72-year-old right-handed gentleman who we are asked to see for weakness. Patient reports he has generalized weakness going on for months. He has lost about 40 pounds in weight in the last 2 months. He is able to walk but only certain distance and he feels weak. He denies any numbness in his lower extremity he denies any burning pain. He can self caths and this has been done for the last few months. Patient has prostate cancer and received chemotherapy he did not receive radiation. He denies any back pain. Denies any recent falls injuries trauma or bleeding bruising fevers chills rigors    Review of Systems   Constitutional: Negative for fever. HENT: Negative for ear pain, tinnitus and trouble swallowing. Eyes: Negative for photophobia and visual disturbance. Respiratory: Negative for choking and shortness of breath. Cardiovascular: Negative for chest pain and palpitations. Gastrointestinal: Negative for nausea and vomiting. Musculoskeletal: Positive for gait problem. Negative for back pain, joint swelling, myalgias, neck pain and neck stiffness. Skin: Negative for color change. Allergic/Immunologic: Negative for food allergies. Neurological: Positive for weakness. Negative for dizziness, tremors, seizures, syncope, facial asymmetry, speech difficulty, light-headedness, numbness and headaches. Psychiatric/Behavioral: Negative for behavioral problems, confusion, hallucinations and sleep disturbance.        Past Medical History:   Diagnosis Date    PAF (paroxysmal atrial fibrillation) ([facility] [address]     ON XARELTO    Prostate cancer ([facility] [address] 11/2019     Past Surgical History:   Procedure Laterality Date    COLONOSCOPY N/A 9/29/2020    COLONOSCOPY DIAGNOSTIC performed by Devan Hartley MD at [address] 11/2018    UPPER GASTROINTESTINAL ENDOSCOPY N/A 9/29/2020 EGD DIAGNOSTIC ONLY performed by Don[facility] Saba [facility] at [facility] Marital status: Single     Spouse name: Not on file    Number of children: Not on file    Years of education: Not on file    Highest education level: Not on file   Occupational History    Not on file   Social Needs    Financial resource strain: Not on file    Food insecurity     Worry: Not on file     Inability: Not on file    Transportation needs     Medical: Not on file     Non-medical: Not on file   Tobacco Use    Smoking status: Never Smoker    Smokeless tobacco: Never Used    Tobacco comment: denies   Substance and Sexual Activity    Alcohol use: Not Currently     Frequency: 2-4 times a month     Comment: denies    Drug use: No     Comment: denies    Sexual activity: Not on file   Lifestyle    Physical activity     Days per week: Not on file     Minutes per session: Not on file    Stress: Not on file   Relationships    Social connections     Talks on phone: Not on file     Gets together: Not on file     Attends [employer] witness service: Not on file     Active member of club or organization: Not on file     Attends meetings of clubs or organizations: Not on file     Relationship status: Not on file    Intimate partner violence     Fear of current or ex partner: Not on file     Emotionally abused: Not on file     Physically abused: Not on file     Forced sexual activity: Not on file   Other Topics Concern    Not on file   Social History Narrative    Not on file     History reviewed. No pertinent family history.   No Known Allergies  Current Facility-Administered Medications   Medication Dose Route Frequency Provider Last Rate Last Dose    enzalutamide (XTANDI) capsule 160 mg  160 mg Oral Daily Sha Santiago RN, NP   Stopped at 11/19/20 1242    ketorolac (TORADOL) injection 15 mg  15 mg Intravenous Q6H PRN Sha Santiago RN, NP   15 mg at 38/71/04 8192    folic sounds: Normal breath sounds. Abdominal:      General: Bowel sounds are normal.   Musculoskeletal: Normal range of motion. Skin:     General: Skin is warm. Neurological:      Mental Status: He is alert and oriented to person, place, and time. Cranial Nerves: No cranial nerve deficit. Sensory: No sensory deficit. Motor: No abnormal muscle tone. Coordination: Coordination normal.      Deep Tendon Reflexes: Babinski sign absent on the right side. Babinski sign absent on the left side. Reflex Scores:       Tricep reflexes are 0 on the right side and 0 on the left side. Bicep reflexes are 0 on the right side and 0 on the left side. Brachioradialis reflexes are 0 on the right side and 0 on the left side. Patellar reflexes are 3+ on the right side and 3+ on the left side. Achilles reflexes are 3+ on the right side and 3+ on the left side. Comments: In addition to the above examination patient has some minor weakness which is proximal bilaterally in the lower extremity. There is no sensory levels with pinprick. Patient is areflexic in the upper extremity but hyperreflexic in the lower extremity. Patient is able to stand but cannot walk a distance   Psychiatric:         Mood and Affect: Mood normal.         Ct Head Wo Contrast    Result Date: 11/18/2020  CT Brain Contrast medium:  Not utilized. History:  Weakness, fatigue Comparison:  None Findings: Extra-axial spaces:  Normal. Intracranial hemorrhage:  None. Ventricular system: Ventricles mildly enlarged. Sulci mildly prominent. Basal Cisterns:  Normal. Cerebral Parenchyma: Bilateral symmetric periventricular areas decreased attenuation. Midline Shift:  None. Cerebellum:  Normal. Paranasal sinuses and mastoid air cells:  Normal. Visualized Orbits:  Normal.     Impression: No acute findings. Mild cerebral atrophy. Chronic ischemic white matter disease.  All CT scans at this facility use dose modulation, iterative reconstruction, and/or weight based dosing when appropriate to reduce radiation dose to as low as reasonably achievable. Xr Chest Portable    Result Date: 11/18/2020  EXAMINATION: XR CHEST PORTABLE CLINICAL HISTORY: SHORTNESS OF BREATH COMPARISONS: CHEST RADIOGRAPH, SEPTEMBER 26, 2020 FINDINGS: Osseous structures are intact. Cardiopericardial silhouette is normal. Pulmonary vasculature is normal. Lungs are clear and hyperexpanded. NO ACUTE CARDIOPULMONARY DISEASE. Lab Results   Component Value Date    WBC 9.4 11/18/2020    RBC 4.36 11/18/2020    HGB 11.2 11/18/2020    HCT 33.9 11/18/2020    MCV 77.9 11/18/2020    MCH 25.7 11/18/2020    MCHC 33.0 11/18/2020    RDW 17.9 11/18/2020     11/18/2020    MPV 8.7 09/09/2015     Lab Results   Component Value Date     11/18/2020    K 4.7 11/18/2020    K 3.7 09/30/2020    CL 98 11/18/2020    CO2 21 11/18/2020    BUN 12 11/18/2020    CREATININE 0.68 11/18/2020    GFRAA >60.0 11/18/2020    LABGLOM >60.0 11/18/2020    GLUCOSE 125 11/18/2020    PROT 8.0 11/18/2020    LABALBU 3.6 11/18/2020    CALCIUM 9.4 11/18/2020    BILITOT 0.7 11/18/2020    ALKPHOS 233 11/18/2020    AST 26 11/18/2020    ALT 9 11/18/2020     Lab Results   Component Value Date    PROTIME 12.3 02/26/2020    INR 0.9 02/26/2020     Lab Results   Component Value Date    TSH 4.450 02/26/2020    BGHDBEDS73 393 11/19/2020    FOLATE 3.1 11/19/2020    FERRITIN 1,685.0 09/26/2020    IRON 9 09/26/2020    TIBC 171 09/26/2020     No results found for: TRIG, HDL, LDLCALC, LDLDIRECT, LABVLDL  No results found for: LABAMPH, BARBSCNU, LABBENZ, CANNAB, COCAINESCRN, LABMETH, OPIATESCREENURINE, PHENCYCLIDINESCREENURINE, PPXUR, ETOH  No results found for: LITHIUM, DILFRTOT, VALPROATE    Assessment:   Generalized weakness with leg weakness with significant weight loss. Patient's examination suggests hyperreflexia in the lower extremity with areflexia in the upper extremity.   Patient did receive chemotherapy

## 2020-11-20 ENCOUNTER — APPOINTMENT (OUTPATIENT)
Dept: MRI IMAGING | Age: 70
DRG: 515 | End: 2020-11-20
Payer: MEDICARE
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
## 2020-11-20 LAB
ANION GAP SERPL CALCULATED.3IONS-SCNC: 10 MEQ/L (ref 9–15)
BASOPHILS ABSOLUTE: 0 K/UL (ref 0–0.2)
BASOPHILS RELATIVE PERCENT: 0.4 %
BUN BLDV-MCNC: 13 MG/DL (ref 8–23)
CALCIUM SERPL-MCNC: 8.6 MG/DL (ref 8.5–9.9)
CHLORIDE BLD-SCNC: 98 MEQ/L (ref 95–107)
CO2: 19 MEQ/L (ref 20–31)
CREAT SERPL-MCNC: 0.55 MG/DL (ref 0.7–1.2)
EOSINOPHILS ABSOLUTE: 0.1 K/UL (ref 0–0.7)
EOSINOPHILS RELATIVE PERCENT: 0.8 %
GFR AFRICAN AMERICAN: >60
GFR NON-AFRICAN AMERICAN: >60
GLUCOSE BLD-MCNC: 106 MG/DL (ref 70–99)
HCT VFR BLD CALC: 27.9 % (ref 42–52)
HEMOGLOBIN: 9 G/DL (ref 14–18)
LYMPHOCYTES ABSOLUTE: 1.5 K/UL (ref 1–4.8)
LYMPHOCYTES RELATIVE PERCENT: 14 %
MCH RBC QN AUTO: 25.1 PG (ref 27–31.3)
MCHC RBC AUTO-ENTMCNC: 32.3 % (ref 33–37)
MCV RBC AUTO: 77.8 FL (ref 80–100)
MONOCYTES ABSOLUTE: 1.3 K/UL (ref 0.2–0.8)
MONOCYTES RELATIVE PERCENT: 11.9 %
NEUTROPHILS ABSOLUTE: 7.7 K/UL (ref 1.4–6.5)
NEUTROPHILS RELATIVE PERCENT: 72.9 %
PDW BLD-RTO: 18.5 % (ref 11.5–14.5)
PLATELET # BLD: 399 K/UL (ref 130–400)
POTASSIUM REFLEX MAGNESIUM: 3.9 MEQ/L (ref 3.4–4.9)
RBC # BLD: 3.59 M/UL (ref 4.7–6.1)
SODIUM BLD-SCNC: 127 MEQ/L (ref 135–144)
WBC # BLD: 10.5 K/UL (ref 4.8–10.8)

## 2020-11-20 PROCEDURE — 80048 BASIC METABOLIC PNL TOTAL CA: CPT

## 2020-11-20 PROCEDURE — 6360000002 HC RX W HCPCS: Performed by: NURSE PRACTITIONER

## 2020-11-20 PROCEDURE — 85025 COMPLETE CBC W/AUTO DIFF WBC: CPT

## 2020-11-20 PROCEDURE — 0PH304Z INSERTION OF INTERNAL FIXATION DEVICE INTO CERVICAL VERTEBRA, OPEN APPROACH: ICD-10-PCS | Performed by: NEUROLOGICAL SURGERY

## 2020-11-20 PROCEDURE — 96372 THER/PROPH/DIAG INJ SC/IM: CPT

## 2020-11-20 PROCEDURE — 6370000000 HC RX 637 (ALT 250 FOR IP): Performed by: PSYCHIATRY & NEUROLOGY

## 2020-11-20 PROCEDURE — 96376 TX/PRO/DX INJ SAME DRUG ADON: CPT

## 2020-11-20 PROCEDURE — 6360000002 HC RX W HCPCS: Performed by: INTERNAL MEDICINE

## 2020-11-20 PROCEDURE — 72156 MRI NECK SPINE W/O & W/DYE: CPT

## 2020-11-20 PROCEDURE — 99233 SBSQ HOSP IP/OBS HIGH 50: CPT | Performed by: PSYCHIATRY & NEUROLOGY

## 2020-11-20 PROCEDURE — 0PU30JZ SUPPLEMENT CERVICAL VERTEBRA WITH SYNTHETIC SUBSTITUTE, OPEN APPROACH: ICD-10-PCS | Performed by: NEUROLOGICAL SURGERY

## 2020-11-20 PROCEDURE — A9577 INJ MULTIHANCE: HCPCS | Performed by: NURSE PRACTITIONER

## 2020-11-20 PROCEDURE — 6360000004 HC RX CONTRAST MEDICATION: Performed by: NURSE PRACTITIONER

## 2020-11-20 PROCEDURE — 6360000002 HC RX W HCPCS: Performed by: FAMILY MEDICINE

## 2020-11-20 PROCEDURE — 1210000000 HC MED SURG R&B

## 2020-11-20 PROCEDURE — 36415 COLL VENOUS BLD VENIPUNCTURE: CPT

## 2020-11-20 PROCEDURE — 2580000003 HC RX 258: Performed by: FAMILY MEDICINE

## 2020-11-20 PROCEDURE — 6370000000 HC RX 637 (ALT 250 FOR IP): Performed by: INTERNAL MEDICINE

## 2020-11-20 PROCEDURE — 97116 GAIT TRAINING THERAPY: CPT

## 2020-11-20 PROCEDURE — APPSS30 APP SPLIT SHARED TIME 16-30 MINUTES: Performed by: NURSE PRACTITIONER

## 2020-11-20 RX ORDER — SODIUM CHLORIDE 0.9 % (FLUSH) 0.9 %
10 SYRINGE (ML) INJECTION 2 TIMES DAILY
Status: DISCONTINUED | OUTPATIENT
Start: 2020-11-20 | End: 2020-12-01

## 2020-11-20 RX ORDER — CEFAZOLIN SODIUM 2 G/50ML
2 SOLUTION INTRAVENOUS ONCE
Status: COMPLETED | OUTPATIENT
Start: 2020-11-23 | End: 2020-11-23

## 2020-11-20 RX ORDER — LORAZEPAM 2 MG/ML
1 INJECTION INTRAMUSCULAR ONCE
Status: COMPLETED | OUTPATIENT
Start: 2020-11-20 | End: 2020-11-20

## 2020-11-20 RX ADMIN — POLYETHYLENE GLYCOL 3350 17 G: 17 POWDER, FOR SOLUTION ORAL at 09:13

## 2020-11-20 RX ADMIN — FOLIC ACID 1 MG: 1 TABLET ORAL at 09:41

## 2020-11-20 RX ADMIN — STANDARDIZED SENNA CONCENTRATE 8.6 MG: 8.6 TABLET ORAL at 20:51

## 2020-11-20 RX ADMIN — MORPHINE SULFATE 15 MG: 15 TABLET, FILM COATED, EXTENDED RELEASE ORAL at 09:13

## 2020-11-20 RX ADMIN — LORAZEPAM 1 MG: 2 INJECTION INTRAMUSCULAR; INTRAVENOUS at 12:38

## 2020-11-20 RX ADMIN — GADOBENATE DIMEGLUMINE 13 ML: 529 INJECTION, SOLUTION INTRAVENOUS at 13:03

## 2020-11-20 RX ADMIN — Medication 10 ML: at 09:13

## 2020-11-20 RX ADMIN — KETOROLAC TROMETHAMINE 15 MG: 15 INJECTION, SOLUTION INTRAMUSCULAR; INTRAVENOUS at 10:34

## 2020-11-20 RX ADMIN — KETOROLAC TROMETHAMINE 15 MG: 15 INJECTION, SOLUTION INTRAMUSCULAR; INTRAVENOUS at 00:42

## 2020-11-20 RX ADMIN — Medication 10 ML: at 20:51

## 2020-11-20 RX ADMIN — ENOXAPARIN SODIUM 40 MG: 40 INJECTION SUBCUTANEOUS at 09:13

## 2020-11-20 RX ADMIN — MORPHINE SULFATE 15 MG: 15 TABLET, FILM COATED, EXTENDED RELEASE ORAL at 20:51

## 2020-11-20 ASSESSMENT — ENCOUNTER SYMPTOMS
ABDOMINAL PAIN: 0
RHINORRHEA: 0
SORE THROAT: 0
EYE REDNESS: 0
COLOR CHANGE: 0
CONSTIPATION: 0
TROUBLE SWALLOWING: 0
CHEST TIGHTNESS: 0
EYE PAIN: 0
NAUSEA: 0
BACK PAIN: 0
DIARRHEA: 0
BLOOD IN STOOL: 0
COUGH: 0
EYE DISCHARGE: 0
WHEEZING: 0
SHORTNESS OF BREATH: 0
VOMITING: 0

## 2020-11-20 ASSESSMENT — PAIN SCALES - GENERAL
PAINLEVEL_OUTOF10: 2
PAINLEVEL_OUTOF10: 9
PAINLEVEL_OUTOF10: 8
PAINLEVEL_OUTOF10: 0
PAINLEVEL_OUTOF10: 6
PAINLEVEL_OUTOF10: 10
PAINLEVEL_OUTOF10: 0

## 2020-11-20 NOTE — PROGRESS NOTES
Physical Therapy Missed Treatment   Facility/Department: [facility] MED SURG C510/[id number]    NAME: Verónica Nieto    : 1950 (79 y.o.)  MRN: [id number]    Account: [de-identified]  Gender: male        [x] Patient Declines PT Treatment: no specific reason \"I am not moving right now. \" Request to check back            Electronically signed by Macario Morrison PTA on 20 at 9:10 AM EST

## 2020-11-20 NOTE — PROGRESS NOTES
Physical Therapy Med Surg Daily Treatment Note  Facility/Department: [facility] MED SURG UNIT  [id number]       NAME: Stefano Adams  : 1950 (17 y.o.)  MRN: [id number]  CODE STATUS: Full Code    Date of Service: 2020    Patient Diagnosis(es): Fall at home, initial encounter [W19. XXXA, Q92.041]  Falls, initial encounter [V43. XXXA]   Chief Complaint   Patient presents with    Fatigue     Patient Active Problem List    Diagnosis Date Noted    Falls, initial encounter 2020    Severe malnutrition ([facility] [address] 2020    Fall at home, initial encounter 2020    Goals of care, counseling/discussion     Gastrointestinal hemorrhage     Rectal mass     Acute cystitis without hematuria     Metastatic cancer ([facility] [address]     Gastric ulcer 2020    General weakness     Anemia 2020    Other specified disorders of bone density and structure, unspecified site      Liver mass 2020    Osteoarthritis of hip 2020    Palpitations 10/19/2010        Past Medical History:   Diagnosis Date    PAF (paroxysmal atrial fibrillation) ([facility] [address]     ON XARELTO    Prostate cancer ([facility] [address] 2019     Past Surgical History:   Procedure Laterality Date    COLONOSCOPY N/A 2020    COLONOSCOPY DIAGNOSTIC performed by Tonja Cates MD at [facility] [address] 2018    UPPER GASTROINTESTINAL ENDOSCOPY N/A 2020    EGD DIAGNOSTIC ONLY performed by Tonja Cates [facility] at [facility]       Restrictions  Restrictions/Precautions: Fall Risk    SUBJECTIVE   General  Chart Reviewed: Yes  Family / Caregiver Present: No  Subjective  Subjective: Pt reports overall fatigue and weakness. Agreeable to get into chair.     Pre-Session Pain Report  Pre Treatment Pain Screening  Pain at present: 0  Scale Used: Numeric Score  Intervention List: Patient able to continue with treatment  Pain Screening  Patient Currently in Pain: No       Post-Session Pain Report  Pain Assessment  Pain Assessment: 0-10  Pain Level: 0         OBJECTIVE        Bed mobility  Supine to Sit: Stand by assistance    Transfers  Sit to Stand: Stand by assistance  Stand to sit: Stand by assistance  Comment: vc's for safety, pt with slight LOB but able to self correct. Ambulation  Ambulation?: Yes  Ambulation 1  Surface: level tile  Device: No Device  Assistance: Contact guard assistance  Quality of Gait: increased lateral sway. guarded. Gait Deviations: Slow Yanet;Decreased step length  Distance: 15'  Comments: pt declines further distance, only wants to get to chair. Exercises  Comments: pt educated on supine/seated HEP instructed to complete throughout the day, pt verbalizes understanding. Activity Tolerance  Activity Tolerance: Patient Tolerated treatment well          ASSESSMENT   Assessment: pt needing encouragement to participate in therapy, pt agreeable to get into bedside chair only. Discharge Recommendations:  Continue to assess pending progress    Goals  Long term goals  Long term goal 1: Bed mobility with indep  Long term goal 2: Functional transfers with indep  Long term goal 3: Amb >150ft with indep  Long term goal 4: TUG <15.0 seconds to demo decreased risk for falls    PLAN    Times per week: 3-6  Safety Devices  Type of devices: Left in chair, Call light within reach     [facility] (6 CLICK) BASIC MOBILITY  AM-PAC Inpatient Mobility Raw Score : 17      Therapy Time   Individual   Time In 1429   Time Out 1437   Minutes 8      Gait: [address], PTA, 11/20/20 at 2:51 PM         Definitions for assistance levels  Independent = pt does not require any physical supervision or assistance from another person for activity completion. Device may be needed.   Stand by assistance = pt requires verbal cues or instructions from another person, close to but not touching, to perform the activity  Minimal assistance= pt performs 75% or more of the activity; assistance is required to complete the activity  Moderate assistance= pt performs 50% of the activity; assistance is required to complete the activity  Maximal assistance = pt performs 25% of the activity; assistance is required to complete the activity  Dependent = pt requires total physical assistance to accomplish the task

## 2020-11-20 NOTE — CARE COORDINATION
CM met with patient to discuss inpatient status and go over IMM. Patient updated on plan to continue medical work-up with eventual goal of inpatient rehab for resumption of prior level of functioning. Patient verbalized understanding of IMM and ongoing plan of care and is in agreement. IMM signed and placed on patient's chart.

## 2020-11-20 NOTE — CARE COORDINATION
Inpatient Rehab referral received. Attempted to meet with patient to discuss x2 today but unavailable both times. Per chart review, MRI cervical/thoracic/lumbar completed this morning and Neurosurgery consulted regarding findings. Will continue to follow.  Electronically signed by Kristin Babin RN on 11/20/20 at 12:45 PM EST

## 2020-11-20 NOTE — PROGRESS NOTES
Physician Progress Note    11/20/2020   2:36 PM    Name:  Leia River  MRN:    [id number]      Day: 0     Admit Date: 11/18/2020  2:44 PM  PCP: Mary Clay MD    Code Status:  Full Code    Subjective:     Still feeling weak. He had bowel movement yesterday. Going for MRI today.     Current Facility-Administered Medications   Medication Dose Route Frequency Provider Last Rate Last Dose    sodium chloride flush 0.9 % injection 10 mL  10 mL Intravenous BID JOYCE Eller - CNP        enzalutamide [facility]) capsule 160 mg  160 mg Oral Daily Sha Santiago, RN, NP   Stopped at 11/19/20 1242    ketorolac (TORADOL) injection 15 mg  15 mg Intravenous Q6H PRN Sha Santiago RN, NP   15 mg at 11/20/20 1034    polyethylene glycol (GLYCOLAX) packet 17 g  17 g Oral Daily Leila Oliverio, DO   17 g at 11/20/20 0913    senna (SENOKOT) tablet 8.6 mg  1 tablet Oral Nightly Leila Oliverio, DO   8.6 mg at 11/19/20 2000    morphine (MS CONTIN) extended release tablet 15 mg  15 mg Oral 2 times per day Leila Oliverio, DO   15 mg at 11/20/20 0913    oxyCODONE (ROXICODONE) immediate release tablet 5 mg  5 mg Oral Q4H PRN Leila Oliverio, DO        folic acid (FOLVITE) tablet 1 mg  1 mg Oral Daily Woo Liu MD   1 mg at 11/20/20 0941    sodium chloride flush 0.9 % injection 10 mL  10 mL Intravenous 2 times per day Lashon Alfred MD   10 mL at 11/20/20 0913    sodium chloride flush 0.9 % injection 10 mL  10 mL Intravenous PRN Lashon Alfred MD   10 mL at 11/19/20 1516    acetaminophen (TYLENOL) tablet 650 mg  650 mg Oral Q6H PRN Lashon Alfred MD        Or    acetaminophen (TYLENOL) suppository 650 mg  650 mg Rectal Q6H PRN Lashon Alfred MD        [facility]) tablet 12.5 mg  12.5 mg Oral Q6H PRN Lashon Alfred MD        Or    [facility]) injection 4 mg  4 mg Intravenous Q6H PRN Lashon Alfred MD   4 mg at 11/18/20 0252    enoxaparin (LOVENOX) injection 40 mg  40 mg Subcutaneous Daily Lashon Alfred MD   40 mg at 20 0913       Physical Examination:      Vitals:  /65   Pulse 85   Temp 99 °F (37.2 °C) (Oral)   Resp 18   Ht 6' 1\" (1.854 m)   Wt 147 lb 4.3 oz (66.8 kg)   SpO2 100%   BMI 19.43 kg/m²   Temp (24hrs), Av.7 °F (37.1 °C), Min:98.4 °F (36.9 °C), Max:99 °F (37.2 °C)      General appearance: alert, cooperative and no distress  Mental Status: oriented to person, place and time and normal affect  Lungs: clear to auscultation bilaterally, normal effort  Heart: regular rate and rhythm, no murmur  Abdomen: soft, nontender, nondistended, bowel sounds present, no masses  Extremities: no edema, redness, tenderness in the calves  Skin: no gross lesions, rashes    Data:     Labs:  Recent Labs     20  1500 20  0708   WBC 9.4 10.5   HGB 11.2* 9.0*   * 399     Recent Labs     20  1500 20  0707   * 127*   K 4.7 3.9   CL 98 98   CO2 21 19*   BUN 12 13   CREATININE 0.68* 0.55*   GLUCOSE 125* 106*     Recent Labs     20  1500   AST 26   ALT 9   BILITOT 0.7   ALKPHOS 233*       Assessment and Plan:        1. Recurrent falls multifactorial in etiology: Mild orthostatic hypotension suspect due to autonomic dysfunction versus mild dehydration, advanced prostate cancer on chemotherapy, moderate protein calorie malnutrition, spinal stenosis  -s/p IVF. Recheck orthostatic vitals  -neurology to assess regarding spinal stenosis. Further imaging pending  -Boost protein supplement  -PT/OT; rehab evaluation  -Palliative care referral    2. Constipation: resolved    3. Folic acid deficiency: Supplement    4.   Cancer related pain on opioids    Lovenox prophylaxis    Electronically signed by Leila Duron DO on 2020 at 2:36 PM

## 2020-11-20 NOTE — PROGRESS NOTES
for gait problem. Negative for back pain. Skin: Negative for color change and rash. Neurological: Positive for weakness. Negative for dizziness, tremors, seizures, syncope, facial asymmetry, speech difficulty, light-headedness, numbness and headaches. Psychiatric/Behavioral: Negative for agitation, confusion and hallucinations. The patient is not nervous/anxious. Physical Exam  Vitals signs and nursing note reviewed. Constitutional:       General: He is not in acute distress. Appearance: He is underweight. He is not diaphoretic. HENT:      Head: Normocephalic and atraumatic. Eyes:      Extraocular Movements: Extraocular movements intact. Pupils: Pupils are equal, round, and reactive to light. Cardiovascular:      Rate and Rhythm: Normal rate and regular rhythm. Pulmonary:      Effort: Pulmonary effort is normal. No respiratory distress. Breath sounds: Normal breath sounds. Skin:     General: Skin is warm and dry. Neurological:      Mental Status: He is alert and oriented to person, place, and time. Cranial Nerves: No cranial nerve deficit. Motor: Weakness present. No tremor or seizure activity. Gait: Gait abnormal.      Deep Tendon Reflexes:      Reflex Scores:       Bicep reflexes are 0 on the right side and 0 on the left side. Brachioradialis reflexes are 0 on the right side and 0 on the left side. Patellar reflexes are 3+ on the right side and 3+ on the left side.               Medications:  Reviewed    Infusion Medications:   Scheduled Medications:    enzalutamide  160 mg Oral Daily    polyethylene glycol  17 g Oral Daily    senna  1 tablet Oral Nightly    morphine  15 mg Oral 2 times per day    folic acid  1 mg Oral Daily    sodium chloride flush  10 mL Intravenous 2 times per day    enoxaparin  40 mg Subcutaneous Daily     PRN Meds: ketorolac, oxyCODONE, sodium chloride flush, acetaminophen **OR** acetaminophen, promethazine **OR** ondansetron    Labs:   Recent Labs     11/18/20  1500 11/20/20  0708   WBC 9.4 10.5   HGB 11.2* 9.0*   HCT 33.9* 27.9*   * 399     Recent Labs     11/18/20  1500 11/20/20  0707   * 127*   K 4.7 3.9   CL 98 98   CO2 21 19*   BUN 12 13   CREATININE 0.68* 0.55*   CALCIUM 9.4 8.6     Recent Labs     11/18/20  1500   AST 26   ALT 9   BILITOT 0.7   ALKPHOS 233*     No results for input(s): INR in the last 72 hours. Recent Labs     11/18/20  1500 11/19/20  1335   CKTOTAL  --  191*   TROPONINI <0.010  --        Urinalysis:   Lab Results   Component Value Date    NITRU Negative 09/26/2020    WBCUA 20-50 09/26/2020    BACTERIA MANY 09/26/2020    RBCUA 3-5 09/26/2020    BLOODU TRACE 09/26/2020    SPECGRAV 1.019 09/26/2020    GLUCOSEU Negative 09/26/2020       Radiology:   Most recent    EEG No procedure found. MRI of Brain No results found for this or any previous visit. No results found for this or any previous visit. MRA of the Head and Neck: No results found for this or any previous visit. No results found for this or any previous visit. No results found for this or any previous visit. CT of the Head:   Results for orders placed during the hospital encounter of 11/18/20   CT Head WO Contrast    Narrative CT Brain    Contrast medium:  Not utilized. History:  Weakness, fatigue    Comparison:  None    Findings:    Extra-axial spaces:  Normal.     Intracranial hemorrhage:  None. Ventricular system: Ventricles mildly enlarged. Sulci mildly prominent. Basal Cisterns:  Normal.    Cerebral Parenchyma: Bilateral symmetric periventricular areas decreased attenuation. Midline Shift:  None. Cerebellum:  Normal.     Paranasal sinuses and mastoid air cells:  Normal.    Visualized Orbits:  Normal.        Impression Impression:    No acute findings. Mild cerebral atrophy. Chronic ischemic white matter disease.       All CT scans at this facility use dose modulation, iterative reconstruction, and/or weight based dosing when appropriate to reduce radiation dose to as low as reasonably achievable. No results found for this or any previous visit. No results found for this or any previous visit. Carotid duplex: No results found for this or any previous visit. No results found for this or any previous visit. No results found for this or any previous visit. Echo No results found for this or any previous visit. Assessment/Plan:    11/19/20:  Generalized weakness with leg weakness with significant weight loss. Patient's examination suggests hyperreflexia in the lower extremity with areflexia in the upper extremity. Patient did receive chemotherapy and therefore may have been areflexic in the upper extremity though the lower extremity reflexes are of concern for upper motor neuron type of pathology is no sensory levels. Cord compression is a consideration.     We will arrange for a stat limited sagittal views of the cervical thoracic and lumbar spine to make sure he does not have cord compression and if they see anything specific a dedicated MRIs will further requested. Patient CPK levels are pending see thyroid tests and B12 levels are pending.     Of note patient has lost about 40 pounds in weight in the last 2 months which could add to generalized weakness. We will follow patient with you    11/20/20:  Patient with history of prostate cancer with known liver and bone mets and was started on Xtandi in October 2020. Oncology is following and placed Mena Curio on hold to rule this out as contributing factor for lower extremity weakness and falls. Screening MRI of cervical and thoracic spine identified severe spinal canal stenosis at C3-4 and C6-7.     Will obtain dedicated MRI of cervical spine with and without contrast given these findings and patient's history of prostate cancer  Will place neurosurgery on consult  TSH within normal limits  B12 within normal limits  Folate low at 3.1, will replace    I independently performed an evaluation on this patient. I have reviewed the above documentation completed by the Nurse Practitioner. Please see my additional contributions to the HPI, physical exam, assessment/medical decision making. Oscar Combs MD, [id number], American Board of Psychiatry & Neurology  Board Certified in Vascular Neurology  Board Certified in Neuromuscular Medicine  Certified in Neurorehabilitation         Collaborating physicians: Dr Sheyla Combs    Electronically signed by JOYCE Galvez CNP on 11/20/2020 at 9:47 AM

## 2020-11-20 NOTE — CARE COORDINATION
Care coordination:    1621: Message sent to hospitalist regarding plan for patient's stay and whether patient should be inpatient. Physician responded, patient's status still to be determined. 1130: CM spoke with [facility] Admissions/CM Ronit Jj regarding whether patient had been evaluated by rehab staff for admission. Rehab CM states that she has not yet seen the patient and is unsure if Dr. Nilda Moralez has done her evaluation yet. Rehab CM is also concerned about patient's MRI results and neurology consult and prefers to wait to see what neurology determines in terms of care/treatment for this patient before an evaluation can be completed.

## 2020-11-20 NOTE — CONSULTS
79-year-old male with progressive quadriparesis weight loss inability to ambulate. His legs will not support him anymore. Every few weeks he is getting weaker and weaker. He is now unable to ambulate. He has been self cathing himself for the last few months. He does have a history of prostate cancer receiving chemotherapy has not received radiation. Review of Systems   Constitutional: Negative for fever. HENT: Negative for ear pain, tinnitus and trouble swallowing. Eyes: Negative for photophobia and visual disturbance. Respiratory: Negative for choking and shortness of breath. Cardiovascular: Negative for chest pain and palpitations. Gastrointestinal: Negative for nausea and vomiting. Musculoskeletal: Positive for gait problem. Negative for back pain, joint swelling, myalgias, neck pain and neck stiffness. Skin: Negative for color change. Allergic/Immunologic: Negative for food allergies. Neurological: Positive for weakness. Negative for dizziness, tremors, seizures, syncope, facial asymmetry, speech difficulty, light-headedness, numbness and headaches. Psychiatric/Behavioral: Negative for behavioral problems, confusion, hallucinations and sleep disturbance.      Physical Exam  Vitals signs reviewed. Eyes:      Pupils: Pupils are equal, round, and reactive to light. Neck:      Musculoskeletal: Normal range of motion. Cardiovascular:      Rate and Rhythm: Normal rate and regular rhythm. Heart sounds: No murmur. Pulmonary:      Effort: Pulmonary effort is normal.      Breath sounds: Normal breath sounds. Abdominal:      General: Bowel sounds are normal.   Musculoskeletal: Normal range of motion. Skin:     General: Skin is warm. Neurological:      Mental Status: He is alert and oriented to person, place, and time. Cranial Nerves: No cranial nerve deficit. Sensory: No sensory deficit. Motor: No abnormal muscle tone.       Coordination: Coordination normal. Deep Tendon Reflexes: Babinski sign absent on the right side. Babinski sign absent on the left side. Reflex Scores:       Tricep reflexes are 0 on the right side and 0 on the left side. Bicep reflexes are 0 on the right side and 0 on the left side. Brachioradialis reflexes are 0 on the right side and 0 on the left side. Patellar reflexes are 3+ on the right side and 3+ on the left side. Achilles reflexes are 3+ on the right side and 3+ on the left side. Comments: In addition to the above examination patient has some minor weakness which is proximal bilaterally in the lower extremity. There is no sensory levels with pinprick. Patient is areflexic in the upper extremity but hyperreflexic in the lower extremity. Patient is able to stand but cannot walk a distance   Psychiatric:         Mood and Affect: Mood normal.        MRI cervical spine shows severe canal stenosis C3-4-5 6 top of 7. He is a candidate for cervical laminoplasty with reconstruction. The procedure, indications and risks of the surgery have been discussed with the patient. The risks are small. There is a low chance of having any type of risk, but the risks still present including even something serious like death, paralysis, sensory loss, loss of bladder or bowel control, pain, bleeding, infection, spinal instability, chance of recurrence and so forth. Surgery is not a guarantee of normalcy. We cannot undo any permanent damage already done. We cannot change the course of any of the other medical diseases. I have discussed alternative procedures, risks and benefits. I have answered his questions. He understand and agree to proceed.      11/19/2020

## 2020-11-21 PROCEDURE — 99221 1ST HOSP IP/OBS SF/LOW 40: CPT | Performed by: PHYSICAL MEDICINE & REHABILITATION

## 2020-11-21 PROCEDURE — 2580000003 HC RX 258: Performed by: NURSE PRACTITIONER

## 2020-11-21 PROCEDURE — 6360000002 HC RX W HCPCS: Performed by: NURSE PRACTITIONER

## 2020-11-21 PROCEDURE — 6370000000 HC RX 637 (ALT 250 FOR IP): Performed by: PSYCHIATRY & NEUROLOGY

## 2020-11-21 PROCEDURE — 99233 SBSQ HOSP IP/OBS HIGH 50: CPT | Performed by: PSYCHIATRY & NEUROLOGY

## 2020-11-21 PROCEDURE — 2580000003 HC RX 258: Performed by: FAMILY MEDICINE

## 2020-11-21 PROCEDURE — 1210000000 HC MED SURG R&B

## 2020-11-21 PROCEDURE — 6370000000 HC RX 637 (ALT 250 FOR IP): Performed by: INTERNAL MEDICINE

## 2020-11-21 PROCEDURE — 6360000002 HC RX W HCPCS: Performed by: FAMILY MEDICINE

## 2020-11-21 RX ADMIN — Medication 10 ML: at 08:12

## 2020-11-21 RX ADMIN — Medication 10 ML: at 19:55

## 2020-11-21 RX ADMIN — MORPHINE SULFATE 15 MG: 15 TABLET, FILM COATED, EXTENDED RELEASE ORAL at 19:55

## 2020-11-21 RX ADMIN — ENOXAPARIN SODIUM 40 MG: 40 INJECTION SUBCUTANEOUS at 08:12

## 2020-11-21 RX ADMIN — Medication 10 ML: at 19:54

## 2020-11-21 RX ADMIN — STANDARDIZED SENNA CONCENTRATE 8.6 MG: 8.6 TABLET ORAL at 19:55

## 2020-11-21 RX ADMIN — FOLIC ACID 1 MG: 1 TABLET ORAL at 08:12

## 2020-11-21 RX ADMIN — KETOROLAC TROMETHAMINE 15 MG: 15 INJECTION, SOLUTION INTRAMUSCULAR; INTRAVENOUS at 06:34

## 2020-11-21 RX ADMIN — MORPHINE SULFATE 15 MG: 15 TABLET, FILM COATED, EXTENDED RELEASE ORAL at 08:12

## 2020-11-21 ASSESSMENT — ENCOUNTER SYMPTOMS
TROUBLE SWALLOWING: 0
VOMITING: 0
CONSTIPATION: 1
STRIDOR: 0
ABDOMINAL PAIN: 0
BLOOD IN STOOL: 0
DIARRHEA: 0
SHORTNESS OF BREATH: 1
COLOR CHANGE: 0
NAUSEA: 0
BACK PAIN: 1
PHOTOPHOBIA: 0
SHORTNESS OF BREATH: 0
BACK PAIN: 0
COUGH: 0
WHEEZING: 0
CHEST TIGHTNESS: 0
EYE REDNESS: 0
SORE THROAT: 0
EYE PAIN: 0

## 2020-11-21 ASSESSMENT — PAIN SCALES - GENERAL
PAINLEVEL_OUTOF10: 7
PAINLEVEL_OUTOF10: 6
PAINLEVEL_OUTOF10: 7

## 2020-11-21 NOTE — PROGRESS NOTES
We discussed procedure indications and risks of surgery on Monday C3-C6 laminoplasty with reconstruction. All questions answered.

## 2020-11-21 NOTE — CONSULTS
Physical Medicine & Rehabilitation  Consult Note      Admitting Physician: Jonathon Bender DO    Primary Care Provider: Stella Burnette MD     Reason for Consult:  Asses rehab needs, promote physical and mental function, and decrease likelihood of re-admit to the hospital after discharge. History of Present Illness:    Stacy Jim is a 79 y.o. male admitted to [facility] on 11/18/2020. Patient was evaluated after falling at home and found to have severe cervical myelopathy dt mets with compression requiring surgery. He is currently awaiting surgery on Monday for C3-C6 laminectomy decompression with Dr. Schuyler Loredo. Fatigue   This is a chronic problem. The current episode started more than 1 year ago. The problem occurs constantly. The problem has been gradually worsening. Associated symptoms include fatigue, myalgias, neck pain, numbness and weakness. Pertinent negatives include no abdominal pain, chest pain, chills, congestion, coughing, diaphoresis, fever, headaches, nausea, rash, sore throat or vomiting. The symptoms are aggravated by walking. He has tried rest for the symptoms. The treatment provided mild relief. Neck Pain    Associated symptoms include numbness and weakness. Pertinent negatives include no chest pain, fever, headaches or photophobia. Neurologic Problem   The patient's primary symptoms include weakness. Associated symptoms include back pain, fatigue, neck pain and shortness of breath. Pertinent negatives include no abdominal pain, chest pain, diaphoresis, dizziness, fever, headaches, nausea, palpitations or vomiting. I reviewed recent nursing notes, \" Pt medicated for pain per orders  Pt states no improvement in LE weakness   \". Their inpatient work up has included:    Imaging:    Imaging and other studies reviewed and discussed with patient and staff    Ct Head  11/18/2020  CT Brain Contrast medium:  Not utilized.  History:  Weakness, fatigue Comparison:  None Findings: Extra-axial spaces:  Normal. Intracranial hemorrhage:  None. Ventricular system: Ventricles mildly enlarged. Sulci mildly prominent. Basal Cisterns:  Normal. Cerebral Parenchyma: Bilateral symmetric periventricular areas decreased attenuation. Midline Shift:  None. Cerebellum:  Normal. Paranasal sinuses and mastoid air cells:  Normal. Visualized Orbits:  Normal.     Impression: No acute findings. Mild cerebral atrophy. Chronic ischemic white matter disease. Mri Cervical Spine  11/20/2020  Image quality is compromised by motion. Mid to lower cervical spinal canal has a decreased caliber on a congenital basis accentuated by spondylitic changes detailed below. Cervical cord has myelomalacia with a diffuse decreased caliber and mild intrasubstance increased signal beginning just above the C3-4 disc level and extending to just below the C6-7 level secondary to severe canal stenosis and chronic cord compression at multiple disc levels. As an example, the AP dimension of the cord is 2.6 mm at the C5-6 disc level. Although evaluation is limited by motion there is a pinpoint focus of enhancement at the posterior cervical cord margin at the C4-5 disc level on sagittal image 9 and axial image 19 of the contrast-enhanced sequence. Equivocal enhancement just below the C3-4 disc level only on the sagittal image. This could alternatively represent enhancing pial vessels distended due to chronic cord compression. Metastatic osseous marrow replacing enhancement throughout most of the clivus on the edge of the study. T3 superior posterior vertebral body 1 cm low T1 signal, subsequently enhancing lesion compatible with metastasis. Cervical vertebral body heights are maintained.  Mid C4 vertebral body 8.8 x 5.7 mm focus of increased T1 and T2 signal without corresponding inversion recovery signal or enhancement compatible with fatty marrow rest. No sign of craniocervical junction stenosis or cerebellar tonsillar ectopia. C2-3 (C2): Disc is essentially unremarkable. No canal or foraminal stenosis. C3-4 (C3): Moderate to severe disc space narrowing. Anterior posterior disc bulging. Moderate canal stenosis accentuated by ligamentous hypertrophy with the AP dimension measuring 6 mm. Cord atrophy as previously described. No cord compression with cerebrospinal fluid signal anterior posterior to the cord. Bilateral uncovertebral joint arthrosis with associated moderate foraminal stenosis, evaluation limited by motion. C4-5 (C4): Moderate to severe disc space narrowing. Mild posterior disc bulging which along with ligamentous hypertrophy causes severe canal stenosis with a 5.4 mm in AP dimension. Cord compression with no cerebrospinal fluid signal anterior posterior to  the cord. At least moderate to severe foraminal stenosis due to uncovertebral and facet joint arthrosis, evaluation limited by motion. C5-6 (C5): Moderate to severe disc space narrowing. Mild posterior disc bulging. Severe canal stenosis with the AP dimension measuring 4.5 mm. Cerebrospinal fluid signal posterior to the cord due to marked cord atrophy. Severe foraminal stenosis due to uncovertebral and to lesser extent facet joint arthrosis, evaluation limited by motion. C6-7 (C6): Moderate to severe disc space narrowing. Mild anterior and slightly more pronounced posterior disc bulging. Moderate canal stenosis with the AP dimension measuring 6.2 mm. Cord atrophy as previously described. Uncovertebral joint arthrosis causes severe right and mild to moderate left foraminal stenosis, evaluation limited by motion. C7-T1 (C7): Disc is unremarkable. No canal or foraminal stenosis. T1-2 through the T3-4 disc levels are unremarkable with no canal or foraminal stenosis. Osseous metastases throughout the clivus and a 1 cm T3 vertebra metastasis. Please refer to prior whole spine MRI detailing more extensive metastasis.  C3-4 through C6-7 myelomalacia with cord atrophy, and abnormal signal due to canal stenosis and chronic cord compression. C3-4 through C6-7 varying degrees of significant foraminal stenosis predominantly due to uncovertebral joint arthrosis. Evaluation less than optimal due to motion. Punctate posterior peripheral cord enhancement at the C4-5 disc level and equivocal just below the C3-4 disc level detailed above. Xr Chest   11/18/2020  EXAMINATION: XR CHEST PORTABLE CLINICAL HISTORY: SHORTNESS OF BREATH COMPARISONS: CHEST RADIOGRAPH, SEPTEMBER 26, 2020 FINDINGS: Osseous structures are intact. Cardiopericardial silhouette is normal. Pulmonary vasculature is normal. Lungs are clear and hyperexpanded. NO ACUTE CARDIOPULMONARY DISEASE. Mri Cervical Thoracic Lumbar  11/20/2020 Craniocervical junction: Craniocervical junction is within normal limits. Bone marrow: T1 marrow signal loss involving T3, T4, T7 T10 and T11 consistent with metastatic bone disease. No pathologic fracture or associated canal stenosis at these levels. At C3-4, C4-5, C5-6, and C6-7 there is advanced cervical spondylosis with disc osteophyte complex with associated spinal cord deformity consistent with severe canal stenosis. Minimal canal diameter is less than 5 mm at several points throughout this area. Assessment of abnormal cord signal intensity is limited due to severe cord compression. The thoracic vertebral bodies are normally aligned with no evidence of pathologic fracture. There is no evidence for spinal canal stenosis in the thoracic spine. Scans of the lower thoracic spine was included due to the lumbar area show metastatic bone lesions involving all lumbar levels most significant at L3 and L4 without pathologic fracture or canal stenosis. Metastatic foci involving multiple sacral levels as well.  No paraspinal soft tissue mass or fluid collection     Advanced cervical spondylosis from C3-4 through C6-7 resulting in severe spinal canal stenosis at these levels with minimal spinal canal diameter approaching 4 to 5 mm. Multiple metastatic bone disease throughout the thoracolumbar spine with no pathologic fracture. Labs:     labs reviewed and discussed with patient and staff    Lab Results   Component Value Date    POCGLU 88 12/12/2019     Lab Results   Component Value Date     11/22/2020    K 3.8 11/22/2020    CL 99 11/22/2020    CO2 22 11/22/2020    BUN 10 11/22/2020    CREATININE 0.53 11/22/2020    CALCIUM 8.4 11/22/2020    LABALBU 3.6 11/18/2020    BILITOT 0.7 11/18/2020    ALKPHOS 233 11/18/2020    AST 26 11/18/2020    ALT 9 11/18/2020     Lab Results   Component Value Date    WBC 9.3 11/22/2020    RBC 3.26 11/22/2020    HGB 8.6 11/22/2020    HCT 24.9 11/22/2020    MCV 76.5 11/22/2020    MCH 26.3 11/22/2020    MCHC 34.3 11/22/2020    RDW 18.3 11/22/2020     11/22/2020    MPV 8.7 09/09/2015     No results found for: VITD25  Lab Results   Component Value Date    COLORU Yellow 09/26/2020    NITRU Negative 09/26/2020    GLUCOSEU Negative 09/26/2020    KETUA Negative 09/26/2020    UROBILINOGEN 1.0 09/26/2020    BILIRUBINUR Negative 09/26/2020     Lab Results   Component Value Date    PROTIME 12.3 02/26/2020     Lab Results   Component Value Date    INR 0.9 02/26/2020         I discussed results with patient. Current Rehabilitation Assessments:    Rehabilitation:  Physical therapy: FIMS:  BedMobility:      Transfers: Sit to Stand: Stand by assistance  Stand to sit: Stand by assistance, Ambulation 1  Surface: level tile  Device: No Device  Assistance: Contact guard assistance  Quality of Gait: increased lateral sway. guarded. Gait Deviations: Slow Yanet, Decreased step length  Distance: 15'  Comments: pt declines further distance, only wants to get to chair.,      FIMS: ,  , Assessment: pt needing encouragement to participate in therapy, pt agreeable to get into bedside chair only.       Occupational therapy: FIMS:   ,  , Assessment: Pt. is a 79 year old man from home alone who presents to [facility] with the above deficits which impact his ability to perform ADLs and IADLs. Pt. would benefit from continued OT to maximize independence and safety with ADL tasks. ADL  Feeding: Independent (11/19/20 1121)  Grooming: Supervision (11/19/20 1121)  UE Bathing: Stand by assistance (11/19/20 1121)  LE Bathing: Contact guard assistance (11/19/20 1121)  UE Dressing: Stand by assistance (11/19/20 1121)  LE Dressing: Contact guard assistance (11/19/20 1121)  Toileting: Contact guard assistance (11/19/20 1121)  Additional Comments: Simulated ADLs as above. Decreased overall endurance, strength and coordination. Fatigues quickly (11/19/20 1121)  OCCUPATIONAL THERAPY  Hand Dominance: Right  ADL  Feeding: Independent (11/19/20 1121)  Grooming: Supervision (11/19/20 1121)  UE Bathing: Stand by assistance (11/19/20 1121)  LE Bathing: Contact guard assistance (11/19/20 1121)  UE Dressing: Stand by assistance (11/19/20 1121)  LE Dressing: Contact guard assistance (11/19/20 1121)  Toileting: Contact guard assistance (11/19/20 1121)  Additional Comments: Simulated ADLs as above. Decreased overall endurance, strength and coordination.  Fatigues quickly (11/19/20 1121)  Toilet Transfers  Toilet - Technique: Ambulating (11/19/20 1123)  Equipment Used: Grab bars (11/19/20 1123)  Toilet Transfer: Stand by assistance (11/19/20 1123)  Toilet Transfers Comments: Increased time and effort for ambulation (11/19/20 1123)            LTG:                 Speech therapy: FIMS:          Past Medical History:          Diagnosis Date    PAF (paroxysmal atrial fibrillation) ([facility] [address]     ON XARELTO    Prostate cancer ([facility] [address] 11/2019         PastSurgical History:          Procedure Laterality Date    COLONOSCOPY N/A 9/29/2020    COLONOSCOPY DIAGNOSTIC performed by Yoselin Arevalo MD at [address] 11/2018    UPPER GASTROINTESTINAL ENDOSCOPY N/A 9/29/2020    EGD DIAGNOSTIC ONLY performed by Kennedi Paez MD at [facility]         Allergies:   No Known Allergies     CurrentMedications:     Current Facility-Administered Medications: sodium chloride flush 0.9 % injection 10 mL, 10 mL, Intravenous, BID  [START ON 11/23/2020] ceFAZolin (ANCEF) 2 g in dextrose 3 % 50 mL IVPB (duplex), 2 g, Intravenous, Once  enzalutamide (XTANDI) capsule 160 mg, 160 mg, Oral, Daily  ketorolac (TORADOL) injection 15 mg, 15 mg, Intravenous, Q6H PRN  polyethylene glycol (GLYCOLAX) packet 17 g, 17 g, Oral, Daily  senna (SENOKOT) tablet 8.6 mg, 1 tablet, Oral, Nightly  morphine (MS CONTIN) extended release tablet 15 mg, 15 mg, Oral, 2 times per day  oxyCODONE (ROXICODONE) immediate release tablet 5 mg, 5 mg, Oral, P9Y PRN  folic acid (FOLVITE) tablet 1 mg, 1 mg, Oral, Daily  sodium chloride flush 0.9 % injection 10 mL, 10 mL, Intravenous, 2 times per day  sodium chloride flush 0.9 % injection 10 mL, 10 mL, Intravenous, PRN  acetaminophen (TYLENOL) tablet 650 mg, 650 mg, Oral, Q6H PRN **OR** acetaminophen (TYLENOL) suppository 650 mg, 650 mg, Rectal, Q6H PRN  promethazine (PHENERGAN) tablet 12.5 mg, 12.5 mg, Oral, Q6H PRN **OR** ondansetron (ZOFRAN) injection 4 mg, 4 mg, Intravenous, Q6H PRN  enoxaparin (LOVENOX) injection 40 mg, 40 mg, Subcutaneous, Daily      Social History:    Social History     Socioeconomic History    Marital status: Single     Spouse name: Not on file    Number of children: Not on file    Years of education: Not on file    Highest education level: Not on file   Occupational History    Occupation: retired [employer] Financial resource strain: Not very hard    Food insecurity     Worry: Never true     Inability: Never true    Transportation needs     Medical: No     Non-medical: No   Tobacco Use    Smoking status: Never Smoker    Smokeless tobacco: Never Used    Tobacco comment: denies   Substance and Sexual Activity    Alcohol use: Not Currently     Frequency: for constipation. Negative for abdominal pain, blood in stool, diarrhea, nausea and vomiting. Endocrine: Negative for polydipsia. Genitourinary: Negative for dysuria, flank pain, frequency, hematuria and urgency. Musculoskeletal: Positive for back pain, gait problem, myalgias and neck pain. Skin: Negative for rash. Allergic/Immunologic: Positive for immunocompromised state. Negative for environmental allergies. Neurological: Positive for weakness and numbness. Negative for dizziness, tremors, seizures and headaches. Hematological: Does not bruise/bleed easily. Psychiatric/Behavioral: Negative for hallucinations and suicidal ideas. The patient is not nervous/anxious. Physical Exam:    BP (!) 142/72   Pulse 77   Temp 98.1 °F (36.7 °C) (Oral)   Resp 16   Ht 6' 1\" (1.854 m)   Wt 147 lb 4.3 oz (66.8 kg)   SpO2 100%   BMI 19.43 kg/m²      Physical Exam  Constitutional:       General: He is not in acute distress. Appearance: He is well-developed. He is ill-appearing. He is not toxic-appearing or diaphoretic. HENT:      Head: Normocephalic. Not macrocephalic and not microcephalic. No raccoon eyes or Pérez's sign. Mouth/Throat:      Pharynx: Oropharyngeal exudate present. Eyes:      General: No scleral icterus. Right eye: No discharge. Left eye: No discharge. Conjunctiva/sclera: Conjunctivae normal.      Pupils: Pupils are equal, round, and reactive to light. Neck:      Musculoskeletal: Normal range of motion. Spinous process tenderness and muscular tenderness present. Thyroid: No thyromegaly. Vascular: Normal carotid pulses. No carotid bruit, hepatojugular reflux or JVD. Trachea: No tracheal deviation. Pulmonary:      Effort: Pulmonary effort is normal.      Breath sounds: No stridor. Decreased breath sounds present. Musculoskeletal:      Cervical back: He exhibits decreased range of motion and tenderness.       Thoracic back: He exhibits decreased range of motion and tenderness. Lumbar back: He exhibits decreased range of motion and tenderness. Skin:     General: Skin is warm and dry. Coloration: Skin is pale. Findings: Bruising present. Comments: Old IV sites   Neurological:      Sensory: Sensory deficit present. Coordination: Coordination abnormal.      Gait: Gait abnormal.      Deep Tendon Reflexes:      Reflex Scores:       Tricep reflexes are 0 on the right side and 0 on the left side. Bicep reflexes are 1+ on the right side and 1+ on the left side. Brachioradialis reflexes are 1+ on the right side and 1+ on the left side. Patellar reflexes are 0 on the right side and 0 on the left side. Achilles reflexes are 0 on the right side and 0 on the left side. Psychiatric:         Attention and Perception: He is attentive. Mood and Affect: Mood is not anxious or depressed. Affect is not angry. Speech: Speech is delayed. Speech is not rapid and pressured. Behavior: Behavior is slowed. Behavior is not withdrawn. Thought Content: Thought content normal.         Cognition and Memory: Memory is not impaired. He exhibits impaired recent memory. He does not exhibit impaired remote memory. Judgment: Judgment is not impulsive or inappropriate. Ortho Exam  Neurologic Exam     Mental Status   Level of consciousness: drowsy  Knowledge: good. Cranial Nerves     CN III, IV, VI   Pupils are equal, round, and reactive to light.     Gait, Coordination, and Reflexes     Reflexes   Right brachioradialis: 1+  Left brachioradialis: 1+  Right biceps: 1+  Left biceps: 1+  Right triceps: 0  Left triceps: 0  Right patellar: 0  Left patellar: 0  Right achilles: 0  Left achilles: 0            Diagnostics:    Recent Results (from the past 24 hour(s))   CBC Auto Differential    Collection Time: 11/22/20  6:28 AM   Result Value Ref Range    WBC 9.3 4.8 - 10.8 K/uL    RBC 3.26 (L) 4.70 - 6.10 M/uL    Hemoglobin 8.6 (L) 14.0 - 18.0 g/dL    Hematocrit 24.9 (L) 42.0 - 52.0 %    MCV 76.5 (L) 80.0 - 100.0 fL    MCH 26.3 (L) 27.0 - 31.3 pg    MCHC 34.3 33.0 - 37.0 %    RDW 18.3 (H) 11.5 - 14.5 %    Platelets 582 659 - 868 K/uL    Neutrophils % 69.7 %    Lymphocytes % 17.3 %    Monocytes % 12.0 %    Eosinophils % 0.7 %    Basophils % 0.3 %    Neutrophils Absolute 6.5 1.4 - 6.5 K/uL    Lymphocytes Absolute 1.6 1.0 - 4.8 K/uL    Monocytes Absolute 1.1 (H) 0.2 - 0.8 K/uL    Eosinophils Absolute 0.1 0.0 - 0.7 K/uL    Basophils Absolute 0.0 0.0 - 0.2 K/uL   Basic Metabolic Panel w/ Reflex to MG    Collection Time: 11/22/20  6:28 AM   Result Value Ref Range    Sodium 131 (L) 135 - 144 mEq/L    Potassium reflex Magnesium 3.8 3.4 - 4.9 mEq/L    Chloride 99 95 - 107 mEq/L    CO2 22 20 - 31 mEq/L    Anion Gap 10 9 - 15 mEq/L    Glucose 102 (H) 70 - 99 mg/dL    BUN 10 8 - 23 mg/dL    CREATININE 0.53 (L) 0.70 - 1.20 mg/dL    GFR Non-African American >60.0 >60    GFR  >60.0 >60    Calcium 8.4 (L) 8.5 - 9.9 mg/dL   Phosphorus    Collection Time: 11/22/20  6:28 AM   Result Value Ref Range    Phosphorus 2.4 2.3 - 4.8 mg/dL              Impression:    1. Impaired mobility and ADLs due to cervical myelopathy-Osseous metastases throughout the clivus and a 1 cm T3 vertebra metastasis  2. Fatigue and Malaise      Complex Active General Medical Issues thatcomplicate care Assess & Plan:     1. Active Problems:    Palpitations    Weakness    Rectal mass    Metastatic cancer ([address]    Fall at home, initial encounter    Severe malnutrition ([address]    Falls, initial encounter    Myelopathy ([address]    Malignant tumor of prostate ([facility] [address]    Retention of urine    Urethritis    Gait abnormality  Resolved Problems:    * No resolved hospital problems. *            Recommendations:    1.  Considering all of the factors aboveincluding the patient's current medical status, social status/home envirnment, their functional needs and their ability to participate in a therapy program, I feel that they would best be served at a acute   Rehabilitationlevel of care. It is my opinion that they will be able to tolerate and benefit from 3 hours of therapy a day. I reviewed the varous local options re these levels of care with the patient and family. 2. Nursing care to focus on bowel and bladder issues. 3. Vitamin B12 shot times one  4. Improve hydration and Nutrition by adding Proteinex and push PO fluids       It was my pleasure toevaluate Deepak Sykes today. Please call [phone] with questions.     Giovana Meneses, DO

## 2020-11-21 NOTE — PROGRESS NOTES
Physician Progress Note    11/21/2020   1:58 PM    Name:  Ana Mak  MRN:    [id number]      Day: 1     Admit Date: 11/18/2020  2:44 PM  PCP: Marianna Gan MD    Code Status:  Full Code    Subjective:     NO complaints today.      Current Facility-Administered Medications   Medication Dose Route Frequency Provider Last Rate Last Dose    sodium chloride flush 0.9 % injection 10 mL  10 mL Intravenous BID Rory Benjamin, APRN - CNP       Floydene Ada [START ON 11/23/2020] ceFAZolin (ANCEF) 2 g in dextrose 3 % 50 mL IVPB (duplex)  2 g Intravenous Once Robyn Sorto MD       Floydene Ada enzalutamide [facility]) capsule 160 mg  160 mg Oral Daily Sha Santiago RN, NP   Stopped at 11/19/20 1242    ketorolac (TORADOL) injection 15 mg  15 mg Intravenous Q6H PRN Sha Santiago RN, NP   15 mg at 11/21/20 0634    polyethylene glycol (GLYCOLAX) packet 17 g  17 g Oral Daily Amina Durham DO   17 g at 11/20/20 0913    senna (SENOKOT) tablet 8.6 mg  1 tablet Oral Nightly Amina Durham DO   8.6 mg at 11/20/20 2051    morphine (MS CONTIN) extended release tablet 15 mg  15 mg Oral 2 times per day Amina Durham DO   15 mg at 11/21/20 9362    oxyCODONE (ROXICODONE) immediate release tablet 5 mg  5 mg Oral Q4H PRN Amina Durham DO        folic acid (FOLVITE) tablet 1 mg  1 mg Oral Daily Gilles Torres MD   1 mg at 11/21/20 0517    sodium chloride flush 0.9 % injection 10 mL  10 mL Intravenous 2 times per day Donte Sánchez MD   10 mL at 11/21/20 2136    sodium chloride flush 0.9 % injection 10 mL  10 mL Intravenous PRN Donte Sánchez MD   10 mL at 11/19/20 1516    acetaminophen (TYLENOL) tablet 650 mg  650 mg Oral Q6H PRN Donte Sánchez MD        Or    acetaminophen (TYLENOL) suppository 650 mg  650 mg Rectal Q6H PRN Donte Sánchez MD        [facility]) tablet 12.5 mg  12.5 mg Oral Q6H PRN Donte Sánchez MD        Or    ondansetron [facility]) injection 4 mg  4 mg Intravenous Q6H PRN Hernandez Mejia MD   4 mg at 20 1843    enoxaparin (LOVENOX) injection 40 mg  40 mg Subcutaneous Daily Hernandez Mejia MD   40 mg at 20 5650       Physical Examination:      Vitals:  /67   Pulse 82   Temp 97.9 °F (36.6 °C) (Oral)   Resp 16   Ht 6' 1\" (1.854 m)   Wt 147 lb 4.3 oz (66.8 kg)   SpO2 100%   BMI 19.43 kg/m²   Temp (24hrs), Av.4 °F (36.9 °C), Min:97.9 °F (36.6 °C), Max:98.8 °F (37.1 °C)      General appearance: alert, cooperative and no distress. thin  Mental Status: oriented to person, place and time and normal affect  Lungs: clear to auscultation bilaterally, normal effort  Heart: regular rate and rhythm, no murmur  Abdomen: soft, nontender, nondistended, bowel sounds present, no masses  Extremities: no edema, redness, tenderness in the calves  Skin: no gross lesions, rashes    Data:     Labs:  Recent Labs     20  1500 20  0708   WBC 9.4 10.5   HGB 11.2* 9.0*   * 399     Recent Labs     20  1500 20  0707   * 127*   K 4.7 3.9   CL 98 98   CO2 21 19*   BUN 12 13   CREATININE 0.68* 0.55*   GLUCOSE 125* 106*     Recent Labs     20  1500   AST 26   ALT 9   BILITOT 0.7   ALKPHOS 233*       Assessment and Plan:        1. Recurrent falls multifactorial in etiology: Orthostatic hypotension suspect due to autonomic dysfunction, spinal stenosis, advanced prostate cancer on chemotherapy, moderate protein calorie malnutrition  -C3-6 laminoplasty with reconstruction on Monday  -s/p IVF. Recheck orthostatic vitals remain positive- consider midodrine / florinef following surgery Monday  -Boost protein supplement  -PT/OT; rehab evaluation  -Palliative care referral    2. Constipation: resolved    3. Folic acid deficiency: Supplement    4.   Cancer related pain on opioids    Lovenox prophylaxis    Electronically signed by Petra Pugh DO on 2020 at 1:58 PM

## 2020-11-21 NOTE — PROGRESS NOTES
[facility] Neurology Daily Progress Note  Name: Suad Leroy  Age: 79 y.o. Gender: male  CodeStatus: Full Code  Allergies: No Known Allergies    Chief Complaint:Fatigue    Primary Care Provider: Minerva Butler MD  InpatientTreatment Team: Treatment Team: Attending Provider: Heaven Mathew DO; Consulting Physician: Bunny Miller DO; Utilization Reviewer: Oriana Garsia RN; Consulting Physician: Raina Small MD; Consulting Physician: Abi Murray MD; Surgeon: Abi Murray MD; Patient Care Tech: Arlington Felipe; Registered Nurse: Michelle Castaneda RN; : Marcin Aponte RN; Tech: Lisandro Trevon; Utilization Reviewer: Dary Fulton RN  Admission Date: 11/18/2020      Fatigue   Associated symptoms include fatigue and weakness. Pertinent negatives include no chest pain, coughing, fever, headaches, nausea, numbness, rash or vomiting. Pt seen and examined for neuro follow up for bilateral lower extremity weakness. Patient currently alert and oriented x3, no acute distress, cooperative. 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 He has developed progressive lower extremity weakness and upon examination he is areflexic in upper extremities and hyperreflexic in lower extremities. Screening by of the cervical spine identifies significant canal stenosis. Will obtain dedicated cervical spine MRI with and without contrast given his history of prostate cancer with mets to bone. Patient reports increasing weakness to lower extremities today. He remains hyperreflexic. On exam strength in lower extremities is 3-4 out of 5. Patient still unable to ambulate very well. Review of Systems   Constitutional: Positive for fatigue. Negative for fever. HENT: Negative for hearing loss and trouble swallowing. Eyes: Negative for visual disturbance.    Respiratory: Negative for cough, chest tightness, shortness of breath and wheezing. Cardiovascular: Negative for chest pain, palpitations and leg swelling. Gastrointestinal: Negative for nausea and vomiting. Genitourinary: Positive for difficulty urinating. Musculoskeletal: Positive for gait problem. Negative for back pain. Skin: Negative for color change and rash. Neurological: Positive for weakness. Negative for dizziness, tremors, seizures, syncope, facial asymmetry, speech difficulty, light-headedness, numbness and headaches. Psychiatric/Behavioral: Negative for agitation, confusion and hallucinations. The patient is not nervous/anxious. Physical Exam  Vitals signs and nursing note reviewed. Constitutional:       General: He is not in acute distress. Appearance: He is underweight. He is not diaphoretic. HENT:      Head: Normocephalic and atraumatic. Eyes:      Extraocular Movements: Extraocular movements intact. Pupils: Pupils are equal, round, and reactive to light. Cardiovascular:      Rate and Rhythm: Normal rate and regular rhythm. Pulmonary:      Effort: Pulmonary effort is normal. No respiratory distress. Breath sounds: Normal breath sounds. Skin:     General: Skin is warm and dry. Neurological:      Mental Status: He is alert and oriented to person, place, and time. Cranial Nerves: No cranial nerve deficit. Motor: Weakness present. No tremor or seizure activity. Gait: Gait abnormal.      Deep Tendon Reflexes:      Reflex Scores:       Bicep reflexes are 0 on the right side and 0 on the left side. Brachioradialis reflexes are 0 on the right side and 0 on the left side. Patellar reflexes are 3+ on the right side and 3+ on the left side.       /67   Pulse 82   Temp 97.9 °F (36.6 °C) (Oral)   Resp 16   Ht 6' 1\" (1.854 m)   Wt 147 lb 4.3 oz (66.8 kg)   SpO2 100%   BMI 19.43 kg/m²           Medications:  Reviewed    Infusion Medications:   Scheduled Medications:    sodium chloride flush  10 mL Intravenous BID    [START ON 11/23/2020] ceFAZolin  2 g Intravenous Once    enzalutamide  160 mg Oral Daily    polyethylene glycol  17 g Oral Daily    senna  1 tablet Oral Nightly    morphine  15 mg Oral 2 times per day    folic acid  1 mg Oral Daily    sodium chloride flush  10 mL Intravenous 2 times per day    enoxaparin  40 mg Subcutaneous Daily     PRN Meds: ketorolac, oxyCODONE, sodium chloride flush, acetaminophen **OR** acetaminophen, promethazine **OR** ondansetron    Labs:   Recent Labs     11/18/20  1500 11/20/20  0708   WBC 9.4 10.5   HGB 11.2* 9.0*   HCT 33.9* 27.9*   * 399     Recent Labs     11/18/20  1500 11/20/20  0707   * 127*   K 4.7 3.9   CL 98 98   CO2 21 19*   BUN 12 13   CREATININE 0.68* 0.55*   CALCIUM 9.4 8.6     Recent Labs     11/18/20  1500   AST 26   ALT 9   BILITOT 0.7   ALKPHOS 233*     No results for input(s): INR in the last 72 hours. Recent Labs     11/18/20  1500 11/19/20  1335   CKTOTAL  --  191*   TROPONINI <0.010  --        Urinalysis:   Lab Results   Component Value Date    NITRU Negative 09/26/2020    WBCUA 20-50 09/26/2020    BACTERIA MANY 09/26/2020    RBCUA 3-5 09/26/2020    BLOODU TRACE 09/26/2020    SPECGRAV 1.019 09/26/2020    GLUCOSEU Negative 09/26/2020       Radiology:   Most recent    EEG No procedure found. MRI of Brain No results found for this or any previous visit. No results found for this or any previous visit. MRA of the Head and Neck: No results found for this or any previous visit. No results found for this or any previous visit. No results found for this or any previous visit. CT of the Head:   Results for orders placed during the hospital encounter of 11/18/20   CT Head WO Contrast    Narrative CT Brain    Contrast medium:  Not utilized. History:  Weakness, fatigue    Comparison:  None    Findings:    Extra-axial spaces:  Normal.     Intracranial hemorrhage:  None.     Ventricular system: Ventricles mildly enlarged. Sulci mildly prominent. Basal Cisterns:  Normal.    Cerebral Parenchyma: Bilateral symmetric periventricular areas decreased attenuation. Midline Shift:  None. Cerebellum:  Normal.     Paranasal sinuses and mastoid air cells:  Normal.    Visualized Orbits:  Normal.        Impression Impression:    No acute findings. Mild cerebral atrophy. Chronic ischemic white matter disease. All CT scans at this facility use dose modulation, iterative reconstruction, and/or weight based dosing when appropriate to reduce radiation dose to as low as reasonably achievable. No results found for this or any previous visit. No results found for this or any previous visit. Carotid duplex: No results found for this or any previous visit. No results found for this or any previous visit. No results found for this or any previous visit. Echo No results found for this or any previous visit. Assessment/Plan:    11/19/20:  Generalized weakness with leg weakness with significant weight loss. Patient's examination suggests hyperreflexia in the lower extremity with areflexia in the upper extremity. Patient did receive chemotherapy and therefore may have been areflexic in the upper extremity though the lower extremity reflexes are of concern for upper motor neuron type of pathology is no sensory levels. Cord compression is a consideration.     We will arrange for a stat limited sagittal views of the cervical thoracic and lumbar spine to make sure he does not have cord compression and if they see anything specific a dedicated MRIs will further requested. Patient CPK levels are pending see thyroid tests and B12 levels are pending.     Of note patient has lost about 40 pounds in weight in the last 2 months which could add to generalized weakness.   We will follow patient with you    11/20/20:  Patient with history of prostate cancer with known liver and bone mets and was started on Xtandi in October 2020. Oncology is following and placed Will Renetta on hold to rule this out as contributing factor for lower extremity weakness and falls. Screening MRI of cervical and thoracic spine identified severe spinal canal stenosis at C3-4 and C6-7. Will obtain dedicated MRI of cervical spine with and without contrast given these findings and patient's history of prostate cancer  Will place neurosurgery on consult  TSH within normal limits  B12 within normal limits  Folate low at 3.1, will replace  Dedicated  MRI of the cervical spine was reviewed and shows significant stenosis at C3-4-5. Await neurosurgical opinion on this. Oscar Casas MD, Davide Petersen, American Board of Psychiatry & Neurology  Board Certified in Vascular Neurology  Board Certified in Neuromuscular Medicine  Certified in [address]       Electronically signed by Woo Liu MD on 11/21/2020 at 11:06 AM

## 2020-11-22 ENCOUNTER — ANESTHESIA EVENT (OUTPATIENT)
Dept: OPERATING ROOM | Age: 70
DRG: 515 | End: 2020-11-22
Payer: MEDICARE

## 2020-11-22 PROBLEM — C61 MALIGNANT TUMOR OF PROSTATE (HCC): Status: ACTIVE | Noted: 2020-11-22

## 2020-11-22 PROBLEM — N40.1 BENIGN PROSTATIC HYPERPLASIA WITH INCOMPLETE BLADDER EMPTYING: Status: ACTIVE | Noted: 2018-11-29

## 2020-11-22 PROBLEM — R39.14 BENIGN PROSTATIC HYPERPLASIA WITH INCOMPLETE BLADDER EMPTYING: Status: ACTIVE | Noted: 2018-11-29

## 2020-11-22 PROBLEM — R97.20 RAISED PROSTATE SPECIFIC ANTIGEN: Status: ACTIVE | Noted: 2020-11-22

## 2020-11-22 PROBLEM — R26.9 GAIT ABNORMALITY: Status: ACTIVE | Noted: 2020-11-22

## 2020-11-22 PROBLEM — R33.9 RETENTION OF URINE: Status: ACTIVE | Noted: 2020-11-22

## 2020-11-22 PROBLEM — N34.2 URETHRITIS: Status: ACTIVE | Noted: 2020-11-22

## 2020-11-22 LAB
ANION GAP SERPL CALCULATED.3IONS-SCNC: 10 MEQ/L (ref 9–15)
BASOPHILS ABSOLUTE: 0 K/UL (ref 0–0.2)
BASOPHILS RELATIVE PERCENT: 0.3 %
BUN BLDV-MCNC: 10 MG/DL (ref 8–23)
CALCIUM SERPL-MCNC: 8.4 MG/DL (ref 8.5–9.9)
CHLORIDE BLD-SCNC: 99 MEQ/L (ref 95–107)
CO2: 22 MEQ/L (ref 20–31)
CREAT SERPL-MCNC: 0.53 MG/DL (ref 0.7–1.2)
EOSINOPHILS ABSOLUTE: 0.1 K/UL (ref 0–0.7)
EOSINOPHILS RELATIVE PERCENT: 0.7 %
GFR AFRICAN AMERICAN: >60
GFR NON-AFRICAN AMERICAN: >60
GLUCOSE BLD-MCNC: 102 MG/DL (ref 70–99)
HCT VFR BLD CALC: 24.9 % (ref 42–52)
HEMOGLOBIN: 8.6 G/DL (ref 14–18)
LYMPHOCYTES ABSOLUTE: 1.6 K/UL (ref 1–4.8)
LYMPHOCYTES RELATIVE PERCENT: 17.3 %
MCH RBC QN AUTO: 26.3 PG (ref 27–31.3)
MCHC RBC AUTO-ENTMCNC: 34.3 % (ref 33–37)
MCV RBC AUTO: 76.5 FL (ref 80–100)
MONOCYTES ABSOLUTE: 1.1 K/UL (ref 0.2–0.8)
MONOCYTES RELATIVE PERCENT: 12 %
NEUTROPHILS ABSOLUTE: 6.5 K/UL (ref 1.4–6.5)
NEUTROPHILS RELATIVE PERCENT: 69.7 %
PDW BLD-RTO: 18.3 % (ref 11.5–14.5)
PHOSPHORUS: 2.4 MG/DL (ref 2.3–4.8)
PLATELET # BLD: 358 K/UL (ref 130–400)
POTASSIUM REFLEX MAGNESIUM: 3.8 MEQ/L (ref 3.4–4.9)
RBC # BLD: 3.26 M/UL (ref 4.7–6.1)
SODIUM BLD-SCNC: 131 MEQ/L (ref 135–144)
WBC # BLD: 9.3 K/UL (ref 4.8–10.8)

## 2020-11-22 PROCEDURE — 99233 SBSQ HOSP IP/OBS HIGH 50: CPT | Performed by: PSYCHIATRY & NEUROLOGY

## 2020-11-22 PROCEDURE — 1210000000 HC MED SURG R&B

## 2020-11-22 PROCEDURE — 80048 BASIC METABOLIC PNL TOTAL CA: CPT

## 2020-11-22 PROCEDURE — 2580000003 HC RX 258: Performed by: FAMILY MEDICINE

## 2020-11-22 PROCEDURE — 6370000000 HC RX 637 (ALT 250 FOR IP): Performed by: PSYCHIATRY & NEUROLOGY

## 2020-11-22 PROCEDURE — 36415 COLL VENOUS BLD VENIPUNCTURE: CPT

## 2020-11-22 PROCEDURE — 85025 COMPLETE CBC W/AUTO DIFF WBC: CPT

## 2020-11-22 PROCEDURE — 6360000002 HC RX W HCPCS: Performed by: FAMILY MEDICINE

## 2020-11-22 PROCEDURE — 6370000000 HC RX 637 (ALT 250 FOR IP): Performed by: INTERNAL MEDICINE

## 2020-11-22 PROCEDURE — 6360000002 HC RX W HCPCS: Performed by: NURSE PRACTITIONER

## 2020-11-22 PROCEDURE — 84100 ASSAY OF PHOSPHORUS: CPT

## 2020-11-22 RX ADMIN — MORPHINE SULFATE 15 MG: 15 TABLET, FILM COATED, EXTENDED RELEASE ORAL at 21:03

## 2020-11-22 RX ADMIN — Medication 10 ML: at 09:27

## 2020-11-22 RX ADMIN — KETOROLAC TROMETHAMINE 15 MG: 15 INJECTION, SOLUTION INTRAMUSCULAR; INTRAVENOUS at 15:21

## 2020-11-22 RX ADMIN — OXYCODONE HYDROCHLORIDE 5 MG: 5 TABLET ORAL at 23:41

## 2020-11-22 RX ADMIN — MORPHINE SULFATE 15 MG: 15 TABLET, FILM COATED, EXTENDED RELEASE ORAL at 09:26

## 2020-11-22 RX ADMIN — ENOXAPARIN SODIUM 40 MG: 40 INJECTION SUBCUTANEOUS at 09:26

## 2020-11-22 RX ADMIN — OXYCODONE HYDROCHLORIDE 5 MG: 5 TABLET ORAL at 05:28

## 2020-11-22 RX ADMIN — Medication 10 ML: at 21:03

## 2020-11-22 RX ADMIN — KETOROLAC TROMETHAMINE 15 MG: 15 INJECTION, SOLUTION INTRAMUSCULAR; INTRAVENOUS at 23:20

## 2020-11-22 RX ADMIN — KETOROLAC TROMETHAMINE 15 MG: 15 INJECTION, SOLUTION INTRAMUSCULAR; INTRAVENOUS at 06:40

## 2020-11-22 RX ADMIN — STANDARDIZED SENNA CONCENTRATE 8.6 MG: 8.6 TABLET ORAL at 21:02

## 2020-11-22 RX ADMIN — POLYETHYLENE GLYCOL 3350 17 G: 17 POWDER, FOR SOLUTION ORAL at 09:27

## 2020-11-22 RX ADMIN — FOLIC ACID 1 MG: 1 TABLET ORAL at 09:26

## 2020-11-22 RX ADMIN — OXYCODONE HYDROCHLORIDE 5 MG: 5 TABLET ORAL at 10:10

## 2020-11-22 RX ADMIN — OXYCODONE HYDROCHLORIDE 5 MG: 5 TABLET ORAL at 19:38

## 2020-11-22 SDOH — SOCIAL STABILITY: SOCIAL INSECURITY: WITHIN THE LAST YEAR, HAVE YOU BEEN AFRAID OF YOUR PARTNER OR EX-PARTNER?: NO

## 2020-11-22 SDOH — ECONOMIC STABILITY: TRANSPORTATION INSECURITY
IN THE PAST 12 MONTHS, HAS THE LACK OF TRANSPORTATION KEPT YOU FROM MEDICAL APPOINTMENTS OR FROM GETTING MEDICATIONS?: NO

## 2020-11-22 SDOH — ECONOMIC STABILITY: FOOD INSECURITY: WITHIN THE PAST 12 MONTHS, YOU WORRIED THAT YOUR FOOD WOULD RUN OUT BEFORE YOU GOT MONEY TO BUY MORE.: NEVER TRUE

## 2020-11-22 SDOH — SOCIAL STABILITY: SOCIAL INSECURITY
WITHIN THE LAST YEAR, HAVE TO BEEN RAPED OR FORCED TO HAVE ANY KIND OF SEXUAL ACTIVITY BY YOUR PARTNER OR EX-PARTNER?: NO

## 2020-11-22 SDOH — HEALTH STABILITY: PHYSICAL HEALTH: ON AVERAGE, HOW MANY DAYS PER WEEK DO YOU ENGAGE IN MODERATE TO STRENUOUS EXERCISE (LIKE A BRISK WALK)?: 0 DAYS

## 2020-11-22 SDOH — ECONOMIC STABILITY: INCOME INSECURITY: HOW HARD IS IT FOR YOU TO PAY FOR THE VERY BASICS LIKE FOOD, HOUSING, MEDICAL CARE, AND HEATING?: NOT VERY HARD

## 2020-11-22 SDOH — HEALTH STABILITY: MENTAL HEALTH
STRESS IS WHEN SOMEONE FEELS TENSE, NERVOUS, ANXIOUS, OR CAN'T SLEEP AT NIGHT BECAUSE THEIR MIND IS TROUBLED. HOW STRESSED ARE YOU?: ONLY A LITTLE

## 2020-11-22 SDOH — ECONOMIC STABILITY: TRANSPORTATION INSECURITY
IN THE PAST 12 MONTHS, HAS LACK OF TRANSPORTATION KEPT YOU FROM MEETINGS, WORK, OR FROM GETTING THINGS NEEDED FOR DAILY LIVING?: NO

## 2020-11-22 SDOH — ECONOMIC STABILITY: FOOD INSECURITY: WITHIN THE PAST 12 MONTHS, THE FOOD YOU BOUGHT JUST DIDN'T LAST AND YOU DIDN'T HAVE MONEY TO GET MORE.: NEVER TRUE

## 2020-11-22 SDOH — SOCIAL STABILITY: SOCIAL INSECURITY: WITHIN THE LAST YEAR, HAVE YOU BEEN HUMILIATED OR EMOTIONALLY ABUSED IN OTHER WAYS BY YOUR PARTNER OR EX-PARTNER?: NO

## 2020-11-22 SDOH — SOCIAL STABILITY: SOCIAL INSECURITY
WITHIN THE LAST YEAR, HAVE YOU BEEN KICKED, HIT, SLAPPED, OR OTHERWISE PHYSICALLY HURT BY YOUR PARTNER OR EX-PARTNER?: NO

## 2020-11-22 SDOH — HEALTH STABILITY: PHYSICAL HEALTH: ON AVERAGE, HOW MANY MINUTES DO YOU ENGAGE IN EXERCISE AT THIS LEVEL?: 0 MIN

## 2020-11-22 ASSESSMENT — ENCOUNTER SYMPTOMS
BACK PAIN: 0
NAUSEA: 0
COUGH: 0
CHEST TIGHTNESS: 0
WHEEZING: 0
COLOR CHANGE: 0
SHORTNESS OF BREATH: 0
TROUBLE SWALLOWING: 0
VOMITING: 0

## 2020-11-22 ASSESSMENT — PAIN SCALES - GENERAL
PAINLEVEL_OUTOF10: 9
PAINLEVEL_OUTOF10: 10
PAINLEVEL_OUTOF10: 6
PAINLEVEL_OUTOF10: 6
PAINLEVEL_OUTOF10: 9
PAINLEVEL_OUTOF10: 7
PAINLEVEL_OUTOF10: 6
PAINLEVEL_OUTOF10: 8
PAINLEVEL_OUTOF10: 6
PAINLEVEL_OUTOF10: 7

## 2020-11-22 NOTE — PROGRESS NOTES
[facility] Daily Progress Note  Name: Charles Abler  Age: 79 y.o. Gender: male  CodeStatus: Full Code  Allergies: No Known Allergies    Chief Complaint:Fatigue    Primary Care Provider: Joanie Hodgson MD  InpatientTreatment Team: Treatment Team: Attending Provider: Stefano Mitchell DO; Consulting Physician: Caterina Pearl DO; Utilization Reviewer: Nicki Moreno RN; Consulting Physician: Alexis Christopher MD; Consulting Physician: Fifi Arceo MD; Surgeon: Fifi Arceo MD; Utilization Reviewer: Navdeep Ellsworth, NEIDA; Registered Nurse: Richard Shepard, RN; Registered Nurse: Joanna Vazquez, RN; Tech: [facility]; : Angel White RN  Admission Date: 11/18/2020      Fatigue   Associated symptoms include fatigue and weakness. Pertinent negatives include no chest pain, coughing, fever, headaches, nausea, numbness, rash or vomiting. Pt seen and examined for neuro follow up for bilateral lower extremity weakness. Patient currently alert and oriented x3, no acute distress, cooperative. 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 He has developed progressive lower extremity weakness and upon examination he is areflexic in upper extremities and hyperreflexic in lower extremities. Screening by of the cervical spine identifies significant canal stenosis. Will obtain dedicated cervical spine MRI with and without contrast given his history of prostate cancer with mets to bone. Patient reports increasing weakness to lower extremities today. He remains hyperreflexic. On exam strength in lower extremities is 3-4 out of 5. Patient still unable to ambulate very well. Patient appears to be about the same. He has not had any new changes today he slept well is still not complain of any neck pain       Review of Systems   Constitutional: Positive for fatigue. Negative for fever.    HENT: Negative for hearing loss and trouble BMI 19.43 kg/m²       Medications:  Reviewed    Infusion Medications:   Scheduled Medications:    sodium chloride flush  10 mL Intravenous BID    [START ON 11/23/2020] ceFAZolin  2 g Intravenous Once    enzalutamide  160 mg Oral Daily    polyethylene glycol  17 g Oral Daily    senna  1 tablet Oral Nightly    morphine  15 mg Oral 2 times per day    folic acid  1 mg Oral Daily    sodium chloride flush  10 mL Intravenous 2 times per day    enoxaparin  40 mg Subcutaneous Daily     PRN Meds: ketorolac, oxyCODONE, sodium chloride flush, acetaminophen **OR** acetaminophen, promethazine **OR** ondansetron    Labs:   Recent Labs     11/20/20  0708 11/22/20  0628   WBC 10.5 9.3   HGB 9.0* 8.6*   HCT 27.9* 24.9*    358     Recent Labs     11/20/20  0707 11/22/20  0628   * 131*   K 3.9 3.8   CL 98 99   CO2 19* 22   BUN 13 10   CREATININE 0.55* 0.53*   CALCIUM 8.6 8.4*   PHOS  --  2.4     No results for input(s): AST, ALT, BILIDIR, BILITOT, ALKPHOS in the last 72 hours. No results for input(s): INR in the last 72 hours. Recent Labs     11/19/20  1335   CKTOTAL 191*       Urinalysis:   Lab Results   Component Value Date    NITRU Negative 09/26/2020    WBCUA 20-50 09/26/2020    BACTERIA MANY 09/26/2020    RBCUA 3-5 09/26/2020    BLOODU TRACE 09/26/2020    SPECGRAV 1.019 09/26/2020    GLUCOSEU Negative 09/26/2020       Radiology:   Most recent    EEG No procedure found. MRI of Brain No results found for this or any previous visit. No results found for this or any previous visit. MRA of the Head and Neck: No results found for this or any previous visit. No results found for this or any previous visit. No results found for this or any previous visit. CT of the Head:   Results for orders placed during the hospital encounter of 11/18/20   CT Head WO Contrast    Narrative CT Brain    Contrast medium:  Not utilized.      History:  Weakness, fatigue    Comparison: None    Findings:    Extra-axial spaces:  Normal.     Intracranial hemorrhage:  None. Ventricular system: Ventricles mildly enlarged. Sulci mildly prominent. Basal Cisterns:  Normal.    Cerebral Parenchyma: Bilateral symmetric periventricular areas decreased attenuation. Midline Shift:  None. Cerebellum:  Normal.     Paranasal sinuses and mastoid air cells:  Normal.    Visualized Orbits:  Normal.        Impression Impression:    No acute findings. Mild cerebral atrophy. Chronic ischemic white matter disease. All CT scans at this facility use dose modulation, iterative reconstruction, and/or weight based dosing when appropriate to reduce radiation dose to as low as reasonably achievable. No results found for this or any previous visit. No results found for this or any previous visit. Carotid duplex: No results found for this or any previous visit. No results found for this or any previous visit. No results found for this or any previous visit. Echo No results found for this or any previous visit. Assessment/Plan:    11/19/20:  Generalized weakness with leg weakness with significant weight loss. Patient's examination suggests hyperreflexia in the lower extremity with areflexia in the upper extremity. Patient did receive chemotherapy and therefore may have been areflexic in the upper extremity though the lower extremity reflexes are of concern for upper motor neuron type of pathology is no sensory levels. Cord compression is a consideration.     We will arrange for a stat limited sagittal views of the cervical thoracic and lumbar spine to make sure he does not have cord compression and if they see anything specific a dedicated MRIs will further requested. Patient CPK levels are pending see thyroid tests and B12 levels are pending.     Of note patient has lost about 40 pounds in weight in the last 2 months which could add to generalized weakness.   We will follow patient with you    11/20/20:  Patient with history of prostate cancer with known liver and bone mets and was started on Xtandi in October 2020. Oncology is following and placed Shahnaz Shallow on hold to rule this out as contributing factor for lower extremity weakness and falls. Screening MRI of cervical and thoracic spine identified severe spinal canal stenosis at C3-4 and C6-7. Will obtain dedicated MRI of cervical spine with and without contrast given these findings and patient's history of prostate cancer  Will place neurosurgery on consult  TSH within normal limits  B12 within normal limits  Folate low at 3.1, will replace  Dedicated  MRI of the cervical spine was reviewed and shows significant stenosis at C3-4-5. Await neurosurgical opinion on this. 11/22  Patient actually is doing quite well in terms of not becoming more weaker. Patient was seen by Dr. Shari Jones  and surgical intervention is planned. He had some questions which are answered and discussed that he will require rehabilitation after his procedure which will help him. Oscar Dunbar MD, Juan Aranda, American Board of Psychiatry & Neurology  Board Certified in Vascular Neurology  Board Certified in Neuromuscular Medicine  Certified in [address]       Electronically signed by Elisabet Cordon MD on 11/22/2020 at 12:10 PM

## 2020-11-22 NOTE — PROGRESS NOTES
[facility] Neurology Daily Progress Note  Name: Rachel Santana  Age: 79 y.o. Gender: male  CodeStatus: Full Code  Allergies: No Known Allergies    Chief Complaint:Fatigue    Primary Care Provider: Scott Clarke MD  InpatientTreatment Team: Treatment Team: Attending Provider: Rema Jeronimo DO; Consulting Physician: Terese Pugh DO; Utilization Reviewer: Mercy Anderson, RN; Consulting Physician: Jj Johnson MD; Consulting Physician: Courtney Suárez MD; Surgeon: Courtney Suárez MD; Utilization Reviewer: Eber Hood, RN; Registered Nurse: Rufino Arevalo, RN; Registered Nurse: Melissa Knott, RN; Tech: [employer]; : Fide Tuttle RN  Admission Date: 11/18/2020      Fatigue   Associated symptoms include fatigue and weakness. Pertinent negatives include no chest pain, coughing, fever, headaches, nausea, numbness, rash or vomiting. Pt seen and examined for neuro follow up for bilateral lower extremity weakness. Patient currently alert and oriented x3, no acute distress, cooperative. 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 He has developed progressive lower extremity weakness and upon examination he is areflexic in upper extremities and hyperreflexic in lower extremities. Screening by of the cervical spine identifies significant canal stenosis. Will obtain dedicated cervical spine MRI with and without contrast given his history of prostate cancer with mets to bone. Patient reports increasing weakness to lower extremities today. He remains hyperreflexic. On exam strength in lower extremities is 3-4 out of 5. Patient was seen by Dr. Sobia Lucero  and though he does not recall the conversation and I am not quite sure if he is some memory issues here. Review of Systems   Constitutional: Positive for fatigue. Negative for fever. HENT: Negative for hearing loss and trouble swallowing.     Eyes: Negative for visual disturbance. Respiratory: Negative for cough, chest tightness, shortness of breath and wheezing. Cardiovascular: Negative for chest pain, palpitations and leg swelling. Gastrointestinal: Negative for nausea and vomiting. Genitourinary: Positive for difficulty urinating. Musculoskeletal: Positive for gait problem. Negative for back pain. Skin: Negative for color change and rash. Neurological: Positive for weakness. Negative for dizziness, tremors, seizures, syncope, facial asymmetry, speech difficulty, light-headedness, numbness and headaches. Psychiatric/Behavioral: Negative for agitation, confusion and hallucinations. The patient is not nervous/anxious. Physical Exam  Vitals signs and nursing note reviewed. Constitutional:       General: He is not in acute distress. Appearance: He is underweight. He is not diaphoretic. HENT:      Head: Normocephalic and atraumatic. Eyes:      Extraocular Movements: Extraocular movements intact. Pupils: Pupils are equal, round, and reactive to light. Cardiovascular:      Rate and Rhythm: Normal rate and regular rhythm. Pulmonary:      Effort: Pulmonary effort is normal. No respiratory distress. Breath sounds: Normal breath sounds. Skin:     General: Skin is warm and dry. Neurological:      Mental Status: He is alert and oriented to person, place, and time. Cranial Nerves: No cranial nerve deficit. Motor: Weakness present. No tremor or seizure activity. Gait: Gait abnormal.      Deep Tendon Reflexes:      Reflex Scores:       Bicep reflexes are 0 on the right side and 0 on the left side. Brachioradialis reflexes are 0 on the right side and 0 on the left side. Patellar reflexes are 3+ on the right side and 3+ on the left side.       BP (!) 142/72   Pulse 77   Temp 98.1 °F (36.7 °C) (Oral)   Resp 16   Ht 6' 1\" (1.854 m)   Wt 147 lb 4.3 oz (66.8 kg)   SpO2 100%   BMI 19.43 kg/m²   Medications: Reviewed    Infusion Medications:   Scheduled Medications:    sodium chloride flush  10 mL Intravenous BID    [START ON 11/23/2020] ceFAZolin  2 g Intravenous Once    enzalutamide  160 mg Oral Daily    polyethylene glycol  17 g Oral Daily    senna  1 tablet Oral Nightly    morphine  15 mg Oral 2 times per day    folic acid  1 mg Oral Daily    sodium chloride flush  10 mL Intravenous 2 times per day    enoxaparin  40 mg Subcutaneous Daily     PRN Meds: ketorolac, oxyCODONE, sodium chloride flush, acetaminophen **OR** acetaminophen, promethazine **OR** ondansetron    Labs:   Recent Labs     11/20/20  0708 11/22/20  0628   WBC 10.5 9.3   HGB 9.0* 8.6*   HCT 27.9* 24.9*    358     Recent Labs     11/20/20  0707 11/22/20  0628   * 131*   K 3.9 3.8   CL 98 99   CO2 19* 22   BUN 13 10   CREATININE 0.55* 0.53*   CALCIUM 8.6 8.4*   PHOS  --  2.4     No results for input(s): AST, ALT, BILIDIR, BILITOT, ALKPHOS in the last 72 hours. No results for input(s): INR in the last 72 hours. Recent Labs     11/19/20  1335   CKTOTAL 191*       Urinalysis:   Lab Results   Component Value Date    NITRU Negative 09/26/2020    WBCUA 20-50 09/26/2020    BACTERIA MANY 09/26/2020    RBCUA 3-5 09/26/2020    BLOODU TRACE 09/26/2020    SPECGRAV 1.019 09/26/2020    GLUCOSEU Negative 09/26/2020       Radiology:   Most recent    EEG No procedure found. MRI of Brain No results found for this or any previous visit. No results found for this or any previous visit. MRA of the Head and Neck: No results found for this or any previous visit. No results found for this or any previous visit. No results found for this or any previous visit. CT of the Head:   Results for orders placed during the hospital encounter of 11/18/20   CT Head WO Contrast    Narrative CT Brain    Contrast medium:  Not utilized.      History:  Weakness, fatigue    Comparison:  None    Findings:    Extra-axial spaces:  Normal.     Intracranial hemorrhage:  None. Ventricular system: Ventricles mildly enlarged. Sulci mildly prominent. Basal Cisterns:  Normal.    Cerebral Parenchyma: Bilateral symmetric periventricular areas decreased attenuation. Midline Shift:  None. Cerebellum:  Normal.     Paranasal sinuses and mastoid air cells:  Normal.    Visualized Orbits:  Normal.        Impression Impression:    No acute findings. Mild cerebral atrophy. Chronic ischemic white matter disease. All CT scans at this facility use dose modulation, iterative reconstruction, and/or weight based dosing when appropriate to reduce radiation dose to as low as reasonably achievable. No results found for this or any previous visit. No results found for this or any previous visit. Carotid duplex: No results found for this or any previous visit. No results found for this or any previous visit. No results found for this or any previous visit. Echo No results found for this or any previous visit. Assessment/Plan:    11/19/20:  Generalized weakness with leg weakness with significant weight loss. Patient's examination suggests hyperreflexia in the lower extremity with areflexia in the upper extremity. Patient did receive chemotherapy and therefore may have been areflexic in the upper extremity though the lower extremity reflexes are of concern for upper motor neuron type of pathology is no sensory levels. Cord compression is a consideration.     We will arrange for a stat limited sagittal views of the cervical thoracic and lumbar spine to make sure he does not have cord compression and if they see anything specific a dedicated MRIs will further requested. Patient CPK levels are pending see thyroid tests and B12 levels are pending.     Of note patient has lost about 40 pounds in weight in the last 2 months which could add to generalized weakness.   We will follow patient with you    11/20/20:  Patient with history of prostate cancer with known liver and bone mets and was started on Xtandi in October 2020. Oncology is following and placed Trudy Espana on hold to rule this out as contributing factor for lower extremity weakness and falls. Screening MRI of cervical and thoracic spine identified severe spinal canal stenosis at C3-4 and C6-7. Will obtain dedicated MRI of cervical spine with and without contrast given these findings and patient's history of prostate cancer  Will place neurosurgery on consult  TSH within normal limits  B12 within normal limits  Folate low at 3.1, will replace  Dedicated  MRI of the cervical spine was reviewed and shows significant stenosis at C3-4-5. Await neurosurgical opinion on this. 11/21  Patient is not complaining of any new numbness or tingling. I am not quite sure what his post residual bladder issues are. He is still not able to ambulate very well is just generally weak though he is examination which would keep a close eye on to make sure he does not develop anterior spinal artery syndrome. We will see what can be done after his surgery for rehabilitation      Oscar Chavarria MD, Javon Barclay, American Board of Psychiatry & Neurology  Board Certified in Vascular Neurology  Board Certified in Neuromuscular Medicine  Certified in [address]       Electronically signed by Ashlie Kuo MD on 11/22/2020 at 12:12 PM

## 2020-11-22 NOTE — PROGRESS NOTES
Assessment completed. Patient is alert and oriented X4. Is complaining of pain; scheduled Morphine was given. Denies any SOB, N/V, or dizziness at this time. Call light is within reach.   Electronically signed by Martha Grant RN on 11/22/2020 at 3:28 AM

## 2020-11-22 NOTE — PROGRESS NOTES
Physician Progress Note    11/22/2020   1:53 PM    Name:  Petra Chaudhary  MRN:    [id number]     [address] Day: 2     Admit Date: 11/18/2020  2:44 PM  PCP: Shahida Gill MD    Code Status:  Full Code    Subjective:     No complaints today.      Current Facility-Administered Medications   Medication Dose Route Frequency Provider Last Rate Last Dose    sodium chloride flush 0.9 % injection 10 mL  10 mL Intravenous BID JOYCE Swenson - CNP   10 mL at 11/21/20 1955    [START ON 11/23/2020] ceFAZolin (ANCEF) 2 g in dextrose 3 % 50 mL IVPB (duplex)  2 g Intravenous Once Alyssa Parrish MD       [facility] enzalutamide Florestine Abts) capsule 160 mg  160 mg Oral Daily Sha Santiago RN, NP   Stopped at 11/19/20 1242    ketorolac (TORADOL) injection 15 mg  15 mg Intravenous Q6H PRN Sha Santiago RN, NP   15 mg at 11/22/20 0640    polyethylene glycol (GLYCOLAX) packet 17 g  17 g Oral Daily Carlean Dianne, DO   17 g at 11/22/20 4268    senna (SENOKOT) tablet 8.6 mg  1 tablet Oral Nightly Carlean Dianne, DO   8.6 mg at 11/21/20 1955    morphine (MS CONTIN) extended release tablet 15 mg  15 mg Oral 2 times per day Carlean Dianne, DO   15 mg at 11/22/20 0456    oxyCODONE (ROXICODONE) immediate release tablet 5 mg  5 mg Oral Q4H PRN Dieudonne Dianne, DO   5 mg at 75/17/51 1559    folic acid (FOLVITE) tablet 1 mg  1 mg Oral Daily Gil Tellez MD   1 mg at 11/22/20 7071    sodium chloride flush 0.9 % injection 10 mL  10 mL Intravenous 2 times per day Scott De La Cruz MD   10 mL at 11/22/20 0927    sodium chloride flush 0.9 % injection 10 mL  10 mL Intravenous PRN Scott De La Cruz MD   10 mL at 11/19/20 1516    acetaminophen (TYLENOL) tablet 650 mg  650 mg Oral Q6H PRN Scott De La Cruz MD        Or    acetaminophen (TYLENOL) suppository 650 mg  650 mg Rectal Q6H PRN Scott De La Cruz MD        [facility]) tablet 12.5 mg  12.5 mg Oral Q6H PRN Scott De La Cruz MD        Or    ondansetron [facility]

## 2020-11-23 ENCOUNTER — APPOINTMENT (OUTPATIENT)
Dept: GENERAL RADIOLOGY | Age: 70
DRG: 515 | End: 2020-11-23
Payer: MEDICARE

## 2020-11-23 ENCOUNTER — ANESTHESIA (OUTPATIENT)
Dept: OPERATING ROOM | Age: 70
DRG: 515 | End: 2020-11-23
Payer: MEDICARE

## 2020-11-23 VITALS
SYSTOLIC BLOOD PRESSURE: 106 MMHG | DIASTOLIC BLOOD PRESSURE: 62 MMHG | OXYGEN SATURATION: 100 % | RESPIRATION RATE: 17 BRPM | TEMPERATURE: 96.3 F

## 2020-11-23 LAB
ANION GAP SERPL CALCULATED.3IONS-SCNC: 12 MEQ/L (ref 9–15)
BASOPHILS ABSOLUTE: 0 K/UL (ref 0–0.2)
BASOPHILS RELATIVE PERCENT: 0.5 %
BUN BLDV-MCNC: 13 MG/DL (ref 8–23)
CALCIUM SERPL-MCNC: 8.3 MG/DL (ref 8.5–9.9)
CHLORIDE BLD-SCNC: 100 MEQ/L (ref 95–107)
CO2: 21 MEQ/L (ref 20–31)
CREAT SERPL-MCNC: 0.57 MG/DL (ref 0.7–1.2)
EOSINOPHILS ABSOLUTE: 0.2 K/UL (ref 0–0.7)
EOSINOPHILS RELATIVE PERCENT: 3.2 %
GFR AFRICAN AMERICAN: >60
GFR NON-AFRICAN AMERICAN: >60
GLUCOSE BLD-MCNC: 96 MG/DL (ref 70–99)
HCT VFR BLD CALC: 28.2 % (ref 42–52)
HEMOGLOBIN: 9.1 G/DL (ref 14–18)
LYMPHOCYTES ABSOLUTE: 1.7 K/UL (ref 1–4.8)
LYMPHOCYTES RELATIVE PERCENT: 22.5 %
MCH RBC QN AUTO: 25.2 PG (ref 27–31.3)
MCHC RBC AUTO-ENTMCNC: 32.3 % (ref 33–37)
MCV RBC AUTO: 78 FL (ref 80–100)
MONOCYTES ABSOLUTE: 0.9 K/UL (ref 0.2–0.8)
MONOCYTES RELATIVE PERCENT: 11.7 %
NEUTROPHILS ABSOLUTE: 4.6 K/UL (ref 1.4–6.5)
NEUTROPHILS RELATIVE PERCENT: 62.1 %
PDW BLD-RTO: 18.5 % (ref 11.5–14.5)
PLATELET # BLD: 416 K/UL (ref 130–400)
POTASSIUM REFLEX MAGNESIUM: 3.9 MEQ/L (ref 3.4–4.9)
RBC # BLD: 3.62 M/UL (ref 4.7–6.1)
SODIUM BLD-SCNC: 133 MEQ/L (ref 135–144)
WBC # BLD: 7.4 K/UL (ref 4.8–10.8)

## 2020-11-23 PROCEDURE — 99221 1ST HOSP IP/OBS SF/LOW 40: CPT | Performed by: NURSE PRACTITIONER

## 2020-11-23 PROCEDURE — 2580000003 HC RX 258: Performed by: NEUROLOGICAL SURGERY

## 2020-11-23 PROCEDURE — 6360000002 HC RX W HCPCS: Performed by: NURSE ANESTHETIST, CERTIFIED REGISTERED

## 2020-11-23 PROCEDURE — 6370000000 HC RX 637 (ALT 250 FOR IP): Performed by: INTERNAL MEDICINE

## 2020-11-23 PROCEDURE — 6360000002 HC RX W HCPCS: Performed by: NEUROLOGICAL SURGERY

## 2020-11-23 PROCEDURE — 3700000000 HC ANESTHESIA ATTENDED CARE: Performed by: NEUROLOGICAL SURGERY

## 2020-11-23 PROCEDURE — 2500000003 HC RX 250 WO HCPCS: Performed by: NEUROLOGICAL SURGERY

## 2020-11-23 PROCEDURE — 2580000003 HC RX 258: Performed by: NURSE ANESTHETIST, CERTIFIED REGISTERED

## 2020-11-23 PROCEDURE — 1210000000 HC MED SURG R&B

## 2020-11-23 PROCEDURE — 36415 COLL VENOUS BLD VENIPUNCTURE: CPT

## 2020-11-23 PROCEDURE — 2500000003 HC RX 250 WO HCPCS: Performed by: NURSE ANESTHETIST, CERTIFIED REGISTERED

## 2020-11-23 PROCEDURE — 6360000002 HC RX W HCPCS: Performed by: INTERNAL MEDICINE

## 2020-11-23 PROCEDURE — 7100000001 HC PACU RECOVERY - ADDTL 15 MIN: Performed by: NEUROLOGICAL SURGERY

## 2020-11-23 PROCEDURE — 99233 SBSQ HOSP IP/OBS HIGH 50: CPT | Performed by: PSYCHIATRY & NEUROLOGY

## 2020-11-23 PROCEDURE — C1713 ANCHOR/SCREW BN/BN,TIS/BN: HCPCS | Performed by: NEUROLOGICAL SURGERY

## 2020-11-23 PROCEDURE — 80048 BASIC METABOLIC PNL TOTAL CA: CPT

## 2020-11-23 PROCEDURE — 6360000002 HC RX W HCPCS: Performed by: NURSE PRACTITIONER

## 2020-11-23 PROCEDURE — 6360000002 HC RX W HCPCS: Performed by: STUDENT IN AN ORGANIZED HEALTH CARE EDUCATION/TRAINING PROGRAM

## 2020-11-23 PROCEDURE — 2720000010 HC SURG SUPPLY STERILE: Performed by: NEUROLOGICAL SURGERY

## 2020-11-23 PROCEDURE — 2580000003 HC RX 258

## 2020-11-23 PROCEDURE — 2580000003 HC RX 258: Performed by: FAMILY MEDICINE

## 2020-11-23 PROCEDURE — 7100000000 HC PACU RECOVERY - FIRST 15 MIN: Performed by: NEUROLOGICAL SURGERY

## 2020-11-23 PROCEDURE — 3600000004 HC SURGERY LEVEL 4 BASE: Performed by: NEUROLOGICAL SURGERY

## 2020-11-23 PROCEDURE — 6370000000 HC RX 637 (ALT 250 FOR IP): Performed by: NEUROLOGICAL SURGERY

## 2020-11-23 PROCEDURE — 3209999900 FLUORO FOR SURGICAL PROCEDURES

## 2020-11-23 PROCEDURE — 2580000003 HC RX 258: Performed by: NURSE PRACTITIONER

## 2020-11-23 PROCEDURE — 3700000001 HC ADD 15 MINUTES (ANESTHESIA): Performed by: NEUROLOGICAL SURGERY

## 2020-11-23 PROCEDURE — 2500000003 HC RX 250 WO HCPCS: Performed by: STUDENT IN AN ORGANIZED HEALTH CARE EDUCATION/TRAINING PROGRAM

## 2020-11-23 PROCEDURE — 2709999900 HC NON-CHARGEABLE SUPPLY: Performed by: NEUROLOGICAL SURGERY

## 2020-11-23 PROCEDURE — 85025 COMPLETE CBC W/AUTO DIFF WBC: CPT

## 2020-11-23 PROCEDURE — 2580000003 HC RX 258: Performed by: STUDENT IN AN ORGANIZED HEALTH CARE EDUCATION/TRAINING PROGRAM

## 2020-11-23 PROCEDURE — 3600000014 HC SURGERY LEVEL 4 ADDTL 15MIN: Performed by: NEUROLOGICAL SURGERY

## 2020-11-23 DEVICE — PLATE SPNL CRAN CAUD LAM 9.5MM M LEVERAGE LFS: Type: IMPLANTABLE DEVICE | Site: NECK | Status: FUNCTIONAL

## 2020-11-23 DEVICE — SCREW SPNL L7MM DIA2.6MM LUM LAT MASS LEVERAGE LFS: Type: IMPLANTABLE DEVICE | Site: NECK | Status: FUNCTIONAL

## 2020-11-23 DEVICE — SCREW SPNL L5MM DIA2.6MM LAM LEVERAGE LFS: Type: IMPLANTABLE DEVICE | Site: NECK | Status: FUNCTIONAL

## 2020-11-23 RX ORDER — FENTANYL CITRATE 50 UG/ML
50 INJECTION, SOLUTION INTRAMUSCULAR; INTRAVENOUS EVERY 10 MIN PRN
Status: DISCONTINUED | OUTPATIENT
Start: 2020-11-23 | End: 2020-11-23 | Stop reason: HOSPADM

## 2020-11-23 RX ORDER — PROPOFOL 10 MG/ML
INJECTION, EMULSION INTRAVENOUS PRN
Status: DISCONTINUED | OUTPATIENT
Start: 2020-11-23 | End: 2020-11-23 | Stop reason: SDUPTHER

## 2020-11-23 RX ORDER — MEPERIDINE HYDROCHLORIDE 25 MG/ML
12.5 INJECTION INTRAMUSCULAR; INTRAVENOUS; SUBCUTANEOUS EVERY 5 MIN PRN
Status: DISCONTINUED | OUTPATIENT
Start: 2020-11-23 | End: 2020-11-23 | Stop reason: HOSPADM

## 2020-11-23 RX ORDER — SODIUM CHLORIDE, SODIUM LACTATE, POTASSIUM CHLORIDE, CALCIUM CHLORIDE 600; 310; 30; 20 MG/100ML; MG/100ML; MG/100ML; MG/100ML
INJECTION, SOLUTION INTRAVENOUS CONTINUOUS
Status: DISCONTINUED | OUTPATIENT
Start: 2020-11-23 | End: 2020-11-24

## 2020-11-23 RX ORDER — SODIUM CHLORIDE 0.9 % (FLUSH) 0.9 %
10 SYRINGE (ML) INJECTION EVERY 12 HOURS SCHEDULED
Status: DISCONTINUED | OUTPATIENT
Start: 2020-11-23 | End: 2020-11-23 | Stop reason: HOSPADM

## 2020-11-23 RX ORDER — EPHEDRINE SULFATE/0.9% NACL/PF 50 MG/5 ML
SYRINGE (ML) INTRAVENOUS PRN
Status: DISCONTINUED | OUTPATIENT
Start: 2020-11-23 | End: 2020-11-23 | Stop reason: SDUPTHER

## 2020-11-23 RX ORDER — LIDOCAINE HYDROCHLORIDE 10 MG/ML
1 INJECTION, SOLUTION EPIDURAL; INFILTRATION; INTRACAUDAL; PERINEURAL
Status: DISCONTINUED | OUTPATIENT
Start: 2020-11-23 | End: 2020-11-23 | Stop reason: HOSPADM

## 2020-11-23 RX ORDER — METOCLOPRAMIDE HYDROCHLORIDE 5 MG/ML
10 INJECTION INTRAMUSCULAR; INTRAVENOUS
Status: DISCONTINUED | OUTPATIENT
Start: 2020-11-23 | End: 2020-11-23 | Stop reason: HOSPADM

## 2020-11-23 RX ORDER — ROCURONIUM BROMIDE 10 MG/ML
INJECTION, SOLUTION INTRAVENOUS PRN
Status: DISCONTINUED | OUTPATIENT
Start: 2020-11-23 | End: 2020-11-23 | Stop reason: SDUPTHER

## 2020-11-23 RX ORDER — GLYCOPYRROLATE 1 MG/5 ML
SYRINGE (ML) INTRAVENOUS PRN
Status: DISCONTINUED | OUTPATIENT
Start: 2020-11-23 | End: 2020-11-23 | Stop reason: SDUPTHER

## 2020-11-23 RX ORDER — MAGNESIUM HYDROXIDE 1200 MG/15ML
LIQUID ORAL CONTINUOUS PRN
Status: COMPLETED | OUTPATIENT
Start: 2020-11-23 | End: 2020-11-23

## 2020-11-23 RX ORDER — DEXAMETHASONE SODIUM PHOSPHATE 10 MG/ML
INJECTION INTRAMUSCULAR; INTRAVENOUS PRN
Status: DISCONTINUED | OUTPATIENT
Start: 2020-11-23 | End: 2020-11-23 | Stop reason: SDUPTHER

## 2020-11-23 RX ORDER — DIPHENHYDRAMINE HYDROCHLORIDE 50 MG/ML
12.5 INJECTION INTRAMUSCULAR; INTRAVENOUS
Status: DISCONTINUED | OUTPATIENT
Start: 2020-11-23 | End: 2020-11-23 | Stop reason: HOSPADM

## 2020-11-23 RX ORDER — ONDANSETRON 2 MG/ML
INJECTION INTRAMUSCULAR; INTRAVENOUS PRN
Status: DISCONTINUED | OUTPATIENT
Start: 2020-11-23 | End: 2020-11-23 | Stop reason: SDUPTHER

## 2020-11-23 RX ORDER — SODIUM CHLORIDE, SODIUM LACTATE, POTASSIUM CHLORIDE, CALCIUM CHLORIDE 600; 310; 30; 20 MG/100ML; MG/100ML; MG/100ML; MG/100ML
INJECTION, SOLUTION INTRAVENOUS
Status: COMPLETED
Start: 2020-11-23 | End: 2020-11-23

## 2020-11-23 RX ORDER — ONDANSETRON 2 MG/ML
4 INJECTION INTRAMUSCULAR; INTRAVENOUS
Status: DISCONTINUED | OUTPATIENT
Start: 2020-11-23 | End: 2020-11-23 | Stop reason: HOSPADM

## 2020-11-23 RX ORDER — FENTANYL CITRATE 50 UG/ML
INJECTION, SOLUTION INTRAMUSCULAR; INTRAVENOUS PRN
Status: DISCONTINUED | OUTPATIENT
Start: 2020-11-23 | End: 2020-11-23 | Stop reason: SDUPTHER

## 2020-11-23 RX ORDER — SODIUM CHLORIDE, SODIUM LACTATE, POTASSIUM CHLORIDE, CALCIUM CHLORIDE 600; 310; 30; 20 MG/100ML; MG/100ML; MG/100ML; MG/100ML
INJECTION, SOLUTION INTRAVENOUS CONTINUOUS PRN
Status: DISCONTINUED | OUTPATIENT
Start: 2020-11-23 | End: 2020-11-23 | Stop reason: SDUPTHER

## 2020-11-23 RX ORDER — MORPHINE SULFATE 4 MG/ML
4 INJECTION, SOLUTION INTRAMUSCULAR; INTRAVENOUS EVERY 4 HOURS PRN
Status: DISCONTINUED | OUTPATIENT
Start: 2020-11-23 | End: 2020-11-24

## 2020-11-23 RX ORDER — HYDROCODONE BITARTRATE AND ACETAMINOPHEN 5; 325 MG/1; MG/1
2 TABLET ORAL PRN
Status: DISCONTINUED | OUTPATIENT
Start: 2020-11-23 | End: 2020-11-23 | Stop reason: HOSPADM

## 2020-11-23 RX ORDER — LIDOCAINE HYDROCHLORIDE AND EPINEPHRINE 10; 10 MG/ML; UG/ML
INJECTION, SOLUTION INFILTRATION; PERINEURAL PRN
Status: DISCONTINUED | OUTPATIENT
Start: 2020-11-23 | End: 2020-11-23 | Stop reason: ALTCHOICE

## 2020-11-23 RX ORDER — HYDROCODONE BITARTRATE AND ACETAMINOPHEN 5; 325 MG/1; MG/1
1 TABLET ORAL PRN
Status: DISCONTINUED | OUTPATIENT
Start: 2020-11-23 | End: 2020-11-23 | Stop reason: HOSPADM

## 2020-11-23 RX ORDER — SODIUM CHLORIDE 0.9 % (FLUSH) 0.9 %
10 SYRINGE (ML) INJECTION PRN
Status: DISCONTINUED | OUTPATIENT
Start: 2020-11-23 | End: 2020-11-23 | Stop reason: HOSPADM

## 2020-11-23 RX ORDER — MIDAZOLAM HYDROCHLORIDE 1 MG/ML
INJECTION INTRAMUSCULAR; INTRAVENOUS PRN
Status: DISCONTINUED | OUTPATIENT
Start: 2020-11-23 | End: 2020-11-23 | Stop reason: SDUPTHER

## 2020-11-23 RX ORDER — LIDOCAINE HYDROCHLORIDE 20 MG/ML
INJECTION, SOLUTION INTRAVENOUS PRN
Status: DISCONTINUED | OUTPATIENT
Start: 2020-11-23 | End: 2020-11-23 | Stop reason: SDUPTHER

## 2020-11-23 RX ADMIN — PHENYLEPHRINE HYDROCHLORIDE 100 MCG: 10 INJECTION INTRAVENOUS at 16:21

## 2020-11-23 RX ADMIN — FENTANYL CITRATE 50 MCG: 50 INJECTION, SOLUTION INTRAMUSCULAR; INTRAVENOUS at 15:08

## 2020-11-23 RX ADMIN — FENTANYL CITRATE 50 MCG: 50 INJECTION, SOLUTION INTRAMUSCULAR; INTRAVENOUS at 15:51

## 2020-11-23 RX ADMIN — CEFAZOLIN SODIUM 2 G: 2 SOLUTION INTRAVENOUS at 15:05

## 2020-11-23 RX ADMIN — Medication 10 ML: at 09:46

## 2020-11-23 RX ADMIN — PHENYLEPHRINE HYDROCHLORIDE 200 MCG: 10 INJECTION INTRAVENOUS at 16:16

## 2020-11-23 RX ADMIN — PHENYLEPHRINE HYDROCHLORIDE 100 MCG: 10 INJECTION INTRAVENOUS at 16:03

## 2020-11-23 RX ADMIN — STANDARDIZED SENNA CONCENTRATE 8.6 MG: 8.6 TABLET ORAL at 20:59

## 2020-11-23 RX ADMIN — Medication 10 ML: at 21:00

## 2020-11-23 RX ADMIN — ROCURONIUM BROMIDE 50 MG: 10 INJECTION INTRAVENOUS at 15:08

## 2020-11-23 RX ADMIN — SODIUM CHLORIDE, POTASSIUM CHLORIDE, SODIUM LACTATE AND CALCIUM CHLORIDE: 600; 310; 30; 20 INJECTION, SOLUTION INTRAVENOUS at 15:05

## 2020-11-23 RX ADMIN — PHENYLEPHRINE HYDROCHLORIDE 200 MCG: 10 INJECTION INTRAVENOUS at 16:25

## 2020-11-23 RX ADMIN — PHENYLEPHRINE HYDROCHLORIDE 100 MCG: 10 INJECTION INTRAVENOUS at 15:36

## 2020-11-23 RX ADMIN — LIDOCAINE HYDROCHLORIDE 100 MG: 20 INJECTION, SOLUTION INTRAVENOUS at 15:08

## 2020-11-23 RX ADMIN — PHENYLEPHRINE HYDROCHLORIDE 200 MCG: 10 INJECTION INTRAVENOUS at 15:34

## 2020-11-23 RX ADMIN — Medication 10 MG: at 16:37

## 2020-11-23 RX ADMIN — SODIUM CHLORIDE, POTASSIUM CHLORIDE, SODIUM LACTATE AND CALCIUM CHLORIDE: 600; 310; 30; 20 INJECTION, SOLUTION INTRAVENOUS at 18:35

## 2020-11-23 RX ADMIN — OXYCODONE HYDROCHLORIDE 5 MG: 5 TABLET ORAL at 04:46

## 2020-11-23 RX ADMIN — PROPOFOL 70 MG: 10 INJECTION, EMULSION INTRAVENOUS at 15:23

## 2020-11-23 RX ADMIN — Medication 10 MG: at 16:26

## 2020-11-23 RX ADMIN — MORPHINE SULFATE 4 MG: 4 INJECTION, SOLUTION INTRAMUSCULAR; INTRAVENOUS at 09:46

## 2020-11-23 RX ADMIN — FENTANYL CITRATE 50 MCG: 50 INJECTION, SOLUTION INTRAMUSCULAR; INTRAVENOUS at 18:37

## 2020-11-23 RX ADMIN — FENTANYL CITRATE 50 MCG: 50 INJECTION, SOLUTION INTRAMUSCULAR; INTRAVENOUS at 18:23

## 2020-11-23 RX ADMIN — ROCURONIUM BROMIDE 10 MG: 10 INJECTION INTRAVENOUS at 16:30

## 2020-11-23 RX ADMIN — SUGAMMADEX 200 MG: 100 INJECTION, SOLUTION INTRAVENOUS at 17:22

## 2020-11-23 RX ADMIN — ROCURONIUM BROMIDE 10 MG: 10 INJECTION INTRAVENOUS at 15:45

## 2020-11-23 RX ADMIN — KETOROLAC TROMETHAMINE 15 MG: 15 INJECTION, SOLUTION INTRAMUSCULAR; INTRAVENOUS at 06:16

## 2020-11-23 RX ADMIN — KETOROLAC TROMETHAMINE 15 MG: 15 INJECTION, SOLUTION INTRAMUSCULAR; INTRAVENOUS at 23:42

## 2020-11-23 RX ADMIN — ONDANSETRON 4 MG: 2 INJECTION INTRAMUSCULAR; INTRAVENOUS at 17:04

## 2020-11-23 RX ADMIN — MIDAZOLAM HYDROCHLORIDE 2 MG: 2 INJECTION, SOLUTION INTRAMUSCULAR; INTRAVENOUS at 15:05

## 2020-11-23 RX ADMIN — Medication 0.2 MG: at 15:29

## 2020-11-23 RX ADMIN — PHENYLEPHRINE HYDROCHLORIDE 100 MCG: 10 INJECTION INTRAVENOUS at 15:16

## 2020-11-23 RX ADMIN — PROPOFOL 130 MG: 10 INJECTION, EMULSION INTRAVENOUS at 15:08

## 2020-11-23 RX ADMIN — SODIUM CHLORIDE, POTASSIUM CHLORIDE, SODIUM LACTATE AND CALCIUM CHLORIDE 1000 ML: 600; 310; 30; 20 INJECTION, SOLUTION INTRAVENOUS at 13:55

## 2020-11-23 RX ADMIN — MORPHINE SULFATE 15 MG: 15 TABLET, FILM COATED, EXTENDED RELEASE ORAL at 20:59

## 2020-11-23 RX ADMIN — SODIUM CHLORIDE, POTASSIUM CHLORIDE, SODIUM LACTATE AND CALCIUM CHLORIDE: 600; 310; 30; 20 INJECTION, SOLUTION INTRAVENOUS at 15:12

## 2020-11-23 RX ADMIN — DEXAMETHASONE SODIUM PHOSPHATE 10 MG: 10 INJECTION INTRAMUSCULAR; INTRAVENOUS at 15:15

## 2020-11-23 ASSESSMENT — PULMONARY FUNCTION TESTS
PIF_VALUE: 17
PIF_VALUE: 18
PIF_VALUE: 17
PIF_VALUE: 11
PIF_VALUE: 18
PIF_VALUE: 22
PIF_VALUE: 18
PIF_VALUE: 14
PIF_VALUE: 15
PIF_VALUE: 18
PIF_VALUE: 3
PIF_VALUE: 18
PIF_VALUE: 18
PIF_VALUE: 15
PIF_VALUE: 18
PIF_VALUE: 18
PIF_VALUE: 15
PIF_VALUE: 17
PIF_VALUE: 0
PIF_VALUE: 18
PIF_VALUE: 19
PIF_VALUE: 18
PIF_VALUE: 14
PIF_VALUE: 14
PIF_VALUE: 18
PIF_VALUE: 18
PIF_VALUE: 14
PIF_VALUE: 18
PIF_VALUE: 13
PIF_VALUE: 18
PIF_VALUE: 14
PIF_VALUE: 17
PIF_VALUE: 18
PIF_VALUE: 17
PIF_VALUE: 1
PIF_VALUE: 13
PIF_VALUE: 18
PIF_VALUE: 18
PIF_VALUE: 14
PIF_VALUE: 18
PIF_VALUE: 17
PIF_VALUE: 18
PIF_VALUE: 18
PIF_VALUE: 3
PIF_VALUE: 18
PIF_VALUE: 13
PIF_VALUE: 14
PIF_VALUE: 18
PIF_VALUE: 4
PIF_VALUE: 18
PIF_VALUE: 13
PIF_VALUE: 18
PIF_VALUE: 19
PIF_VALUE: 18
PIF_VALUE: 18
PIF_VALUE: 19
PIF_VALUE: 17
PIF_VALUE: 17
PIF_VALUE: 18
PIF_VALUE: 16
PIF_VALUE: 18
PIF_VALUE: 14
PIF_VALUE: 16
PIF_VALUE: 18
PIF_VALUE: 3
PIF_VALUE: 18
PIF_VALUE: 2
PIF_VALUE: 18
PIF_VALUE: 4
PIF_VALUE: 18
PIF_VALUE: 13
PIF_VALUE: 17
PIF_VALUE: 18
PIF_VALUE: 13
PIF_VALUE: 19
PIF_VALUE: 18
PIF_VALUE: 13
PIF_VALUE: 18
PIF_VALUE: 17
PIF_VALUE: 15
PIF_VALUE: 14
PIF_VALUE: 18
PIF_VALUE: 18
PIF_VALUE: 13
PIF_VALUE: 14
PIF_VALUE: 18
PIF_VALUE: 19
PIF_VALUE: 15
PIF_VALUE: 18
PIF_VALUE: 4
PIF_VALUE: 18
PIF_VALUE: 15
PIF_VALUE: 18
PIF_VALUE: 17
PIF_VALUE: 14
PIF_VALUE: 17
PIF_VALUE: 15
PIF_VALUE: 15
PIF_VALUE: 18
PIF_VALUE: 8
PIF_VALUE: 10
PIF_VALUE: 18
PIF_VALUE: 12
PIF_VALUE: 18
PIF_VALUE: 15
PIF_VALUE: 15
PIF_VALUE: 3
PIF_VALUE: 17
PIF_VALUE: 18
PIF_VALUE: 18
PIF_VALUE: 15
PIF_VALUE: 14
PIF_VALUE: 13
PIF_VALUE: 17
PIF_VALUE: 18
PIF_VALUE: 15
PIF_VALUE: 6
PIF_VALUE: 18
PIF_VALUE: 4
PIF_VALUE: 18
PIF_VALUE: 13
PIF_VALUE: 3
PIF_VALUE: 18

## 2020-11-23 ASSESSMENT — ENCOUNTER SYMPTOMS
RECTAL PAIN: 0
ANAL BLEEDING: 0
CHOKING: 0
DIARRHEA: 0
CHEST TIGHTNESS: 0
ABDOMINAL DISTENTION: 0
APNEA: 0
SORE THROAT: 0
STRIDOR: 0
VOMITING: 0
EYE DISCHARGE: 0
SHORTNESS OF BREATH: 0
BACK PAIN: 0
RESPIRATORY NEGATIVE: 1
COLOR CHANGE: 0
VOICE CHANGE: 0
GASTROINTESTINAL NEGATIVE: 1
TROUBLE SWALLOWING: 0
NAUSEA: 0
WHEEZING: 0
EYES NEGATIVE: 1
COUGH: 0
ABDOMINAL PAIN: 0
BLOOD IN STOOL: 0
CONSTIPATION: 0

## 2020-11-23 ASSESSMENT — PAIN SCALES - GENERAL
PAINLEVEL_OUTOF10: 7
PAINLEVEL_OUTOF10: 9
PAINLEVEL_OUTOF10: 6
PAINLEVEL_OUTOF10: 7
PAINLEVEL_OUTOF10: 8
PAINLEVEL_OUTOF10: 6
PAINLEVEL_OUTOF10: 6
PAINLEVEL_OUTOF10: 8
PAINLEVEL_OUTOF10: 6
PAINLEVEL_OUTOF10: 6

## 2020-11-23 ASSESSMENT — PAIN DESCRIPTION - ORIENTATION: ORIENTATION: UPPER;RIGHT

## 2020-11-23 ASSESSMENT — PAIN DESCRIPTION - LOCATION: LOCATION: BACK;LEG

## 2020-11-23 ASSESSMENT — PAIN DESCRIPTION - PAIN TYPE: TYPE: CHRONIC PAIN

## 2020-11-23 ASSESSMENT — PAIN DESCRIPTION - DESCRIPTORS: DESCRIPTORS: NUMBNESS

## 2020-11-23 NOTE — DISCHARGE INSTR - COC
Continuity of Care Form    Patient Name: Petra Chaudhary   :  1950  MRN:  [id number]    [facility] date:  2020  Discharge date:  12/10/2020      Code Status Order: Full Code   Advance Directives:   Advance Care Flowsheet Documentation     Date/Time Healthcare Directive Type of Healthcare Directive Copy in [address] Agent's Name Healthcare Agent's Phone Number    [phone]  No, patient does not have an advance directive for healthcare treatment -- -- -- -- --          Admitting Physician:  No admitting provider for patient encounter.   PCP: Shahida Gill MD    Discharging Nurse: [facility] Unit/Room#: [address] Unit Phone Number: [phone]      Emergency Contact:   Extended Emergency Contact Information  Primary Emergency Contact: Raynaangelique Schuyler [address] Phone: [phone]  Mobile Phone: [phone]  Relation: Other  Secondary Emergency Contact: [address] Phone: [phone]  Mobile Phone: [phone]  Relation: Brother/Sister    Past Surgical History:  Past Surgical History:   Procedure Laterality Date    COLONOSCOPY N/A 2020    COLONOSCOPY DIAGNOSTIC performed by Soo Schwartz MD at [address] [address] Right 2018    hip    UPPER GASTROINTESTINAL ENDOSCOPY N/A 2020    EGD DIAGNOSTIC ONLY performed by Soo Schwartz MD at [facility]       Immunization History:   Immunization History   Administered Date(s) Administered    Influenza, High Dose (Fluzone 65 yrs and older) 2018    Pneumococcal Polysaccharide ([id number]) 2018       Active Problems:  Patient Active Problem List   Diagnosis Code    Liver mass R16.0    Osteoarthritis of hip M16.9    Palpitations R00.2    Other specified disorders of bone density and structure, unspecified site  M85.80    Anemia D64.9    Generalized weakness R53.1    Gastric ulcer K25.9  Goals of care, counseling/discussion Z71.89    Gastrointestinal hemorrhage K92.2    Rectal mass K62.89    Acute cystitis without hematuria N30.00    Metastatic cancer ([facility] [address] C79.9    Fall at home, initial encounter W19. XXXA, Y92.009    Severe malnutrition ([facility] [address] E43    Falls, initial encounter W19. Michell Lim    Myelopathy ([facility] [address] G95.9    Benign prostatic hyperplasia with incomplete bladder emptying N40.1, R39.14    Malignant tumor of prostate ([facility] [address] C61    Raised prostate specific antigen R97.20    Retention of urine R33.9    Urethritis N34.2    Gait abnormality R26.9       Isolation/Infection:   Isolation          No Isolation        Patient Infection Status     Infection Onset Added Last Indicated Last Indicated By Review Planned Expiration Resolved Resolved By    COVID-19 Rule Out  11/20/20 11/20/20 Emerson Evangelista RN 11/27/20 12/04/20      Resolved    COVID-19 Rule Out 11/19/20 11/19/20 11/19/20 COVID-19 (Ordered)   11/19/20 Rule-Out Test Resulted    COVID-19 Rule Out 11/18/20 11/18/20 11/18/20 COVID-19 (Ordered)   11/18/20 Rule-Out Test Resulted          Nurse Assessment:  Last Vital Signs: /65   Pulse 68   Temp 97 °F (36.1 °C) (Oral)   Resp 16   Ht 6' 1\" (1.854 m)   Wt 147 lb 4.3 oz (66.8 kg)   SpO2 100%   BMI 19.43 kg/m²     Last documented pain score (0-10 scale): Pain Level: 6  Last Weight:   Wt Readings from Last 1 Encounters:   11/18/20 147 lb 4.3 oz (66.8 kg)     Mental Status:  oriented    IV Access:  - PICC - site  R Basilic, insertion date: 12/4/20    Nursing Mobility/ADLs:  Walking   Assisted  Transfer  Assisted  Bathing  Assisted  Dressing  Assisted  Toileting  Assisted  Feeding  Independent  Med Admin  Assisted  Med Delivery   whole    Wound Care Documentation and Therapy:        Elimination:  Continence:   · Bowel: Yes and No  · Bladder: No  Urinary Catheter: Patient straight caths a couple times a day and is incontinent.    Colostomy/Ileostomy/Ileal Conduit: No       Date of After Discharge: ***    Physician Certification: I certify the above information and transfer of Primo Sharpe  is necessary for the continuing treatment of the diagnosis listed and that he requires {Admit to Appropriate Level of Care:13013} for {GREATER/LESS:447289053} 30 days.      Update Admission H&P: {CHP DME Changes in APUAQ:[id number]}    PHYSICIAN SIGNATURE:  Electronically signed by Idalia Galindo MD on 11/23/20 at 5:28 PM EST

## 2020-11-23 NOTE — PROGRESS NOTES
[id number] [facility] Neurology Daily Progress Note  Name: Monique Tomlinson  Age: 79 y.o. Gender: male  CodeStatus: Full Code  Allergies: No Known Allergies    Chief Complaint:Fatigue    Primary Care Provider: Wilda Sahu MD  InpatientTreatment Team: Treatment Team: Attending Provider: Chayito Valadez MD; Consulting Physician: Servando Nugent DO; Utilization Reviewer: Daxa Nicole RN; Consulting Physician: Pito Benz MD; Consulting Physician: Bernardo Godoy MD; Surgeon: Bernardo Godoy MD; Tech: Carolann Manus; Registered Nurse: Corrie Thompson RN; LPN: Dangelo Escobar LPN; Registered Nurse: Austin Barber RN; : Mirela Caruso RN; Patient Care Tech: Yumiko Moise; Registered Nurse: Gildardo Bates RN; : Carlitos Marquez RN; : RUBÉN Vela  Admission Date: 11/18/2020      HPI     Patient is due for neurosurgery today with Dr. Santos Collado for spinal stenosis of C2-C3 and C6-C7. He continues to have weakness, fatigue, and paresthesias. He states he feels the weakness has gotten worse since he was hospitalized Wednesday. Unable to ambulate during physical therapy. Currently on Lovenox for DVT prophylaxis. He denies chest pain, shortness of breath, N/V, or fever. Patient currently alert and oriented x3, no acute distress, cooperative. 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 He has developed progressive lower extremity weakness and upon examination he is areflexic in upper extremities and hyperreflexic in lower extremities. Vitals:    11/23/20 0822   BP: 129/65   Pulse: 68   Resp: 16   Temp: 97 °F (36.1 °C)   SpO2: 100%      Review of Systems   Constitutional: Positive for fatigue. HENT: Negative. Eyes: Negative. Respiratory: Negative. Cardiovascular: Negative. Gastrointestinal: Negative. Endocrine: Negative. Genitourinary: Positive for difficulty urinating. Musculoskeletal: Positive for gait problem. Neurological: Positive for weakness and numbness. Psychiatric/Behavioral: Negative. Physical Exam  Vitals signs and nursing note reviewed. Constitutional:       Comments: underweight   HENT:      Head: Normocephalic and atraumatic. Eyes:      Conjunctiva/sclera: Conjunctivae normal.   Cardiovascular:      Rate and Rhythm: Normal rate and regular rhythm. Pulmonary:      Effort: Pulmonary effort is normal.   Skin:     General: Skin is warm and dry. Neurological:      General: No focal deficit present. Mental Status: He is alert and oriented to person, place, and time. Motor: Weakness present. Gait: Gait abnormal.      Deep Tendon Reflexes:      Reflex Scores:       Tricep reflexes are 0 on the right side and 0 on the left side. Brachioradialis reflexes are 0 on the right side and 0 on the left side. Patellar reflexes are 3+ on the right side and 3+ on the left side. Neurologic Exam     Mental Status   Oriented to person, place, and time.      Gait, Coordination, and Reflexes     Reflexes   Right brachioradialis: 0  Left brachioradialis: 0  Right triceps: 0  Left triceps: 0  Right patellar: 3+  Left patellar: 3+  Right plantar: normal  Left plantar: upgoing          Medications:  Reviewed    Infusion Medications:   Scheduled Medications:    sodium chloride flush  10 mL Intravenous BID    ceFAZolin  2 g Intravenous Once    enzalutamide  160 mg Oral Daily    polyethylene glycol  17 g Oral Daily    senna  1 tablet Oral Nightly    morphine  15 mg Oral 2 times per day    folic acid  1 mg Oral Daily    sodium chloride flush  10 mL Intravenous 2 times per day    enoxaparin  40 mg Subcutaneous Daily     PRN Meds: morphine, ketorolac, oxyCODONE, sodium chloride flush, acetaminophen **OR** acetaminophen, promethazine **OR** ondansetron    Labs:   Recent Labs     11/22/20  0628 11/23/20  0654   WBC 9.3 7.4   HGB 8.6* 9.1*   HCT 24.9* 28.2*    416*     Recent Labs     11/22/20  0628 11/23/20  0654   * 133*   K 3.8 3.9   CL 99 100   CO2 22 21   BUN 10 13   CREATININE 0.53* 0.57*   CALCIUM 8.4* 8.3*   PHOS 2.4  --      No results for input(s): AST, ALT, BILIDIR, BILITOT, ALKPHOS in the last 72 hours. No results for input(s): INR in the last 72 hours. No results for input(s): Connee Matar in the last 72 hours. Urinalysis:   Lab Results   Component Value Date    NITRU Negative 09/26/2020    WBCUA 20-50 09/26/2020    BACTERIA MANY 09/26/2020    RBCUA 3-5 09/26/2020    BLOODU TRACE 09/26/2020    SPECGRAV 1.019 09/26/2020    GLUCOSEU Negative 09/26/2020       Radiology:   Most recent    EEG No procedure found. MRI of Brain No results found for this or any previous visit. No results found for this or any previous visit. MRA of the Head and Neck: No results found for this or any previous visit. No results found for this or any previous visit. No results found for this or any previous visit. CT of the Head:   Results for orders placed during the hospital encounter of 11/18/20   CT Head WO Contrast    Narrative CT Brain    Contrast medium:  Not utilized. History:  Weakness, fatigue    Comparison:  None    Findings:    Extra-axial spaces:  Normal.     Intracranial hemorrhage:  None. Ventricular system: Ventricles mildly enlarged. Sulci mildly prominent. Basal Cisterns:  Normal.    Cerebral Parenchyma: Bilateral symmetric periventricular areas decreased attenuation. Midline Shift:  None. Cerebellum:  Normal.     Paranasal sinuses and mastoid air cells:  Normal.    Visualized Orbits:  Normal.        Impression Impression:    No acute findings. Mild cerebral atrophy. Chronic ischemic white matter disease.       All CT scans at this facility use dose modulation, iterative reconstruction, and/or weight based dosing when appropriate to reduce radiation dose to as low as reasonably achievable. No results found for this or any previous visit. No results found for this or any previous visit. Carotid duplex: No results found for this or any previous visit. No results found for this or any previous visit. No results found for this or any previous visit. Echo No results found for this or any previous visit. Assessment/Plan:  11/19/20:  Generalized weakness with leg weakness with significant weight loss.  Patient's examination suggests hyperreflexia in the lower extremity with areflexia in the upper extremity.  Patient did receive chemotherapy and therefore may have been areflexic in the upper extremity though the lower extremity reflexes are of concern for upper motor neuron type of pathology is no sensory levels.  Cord compression is a consideration.     We will arrange for a stat limited sagittal views of the cervical thoracic and lumbar spine to make sure he does not have cord compression and if they see anything specific a dedicated MRIs will further requested.  Patient CPK levels are pending see thyroid tests and B12 levels are pending.     Of note patient has lost about 40 pounds in weight in the last 2 months which could add to generalized weakness.  We will follow patient with you     11/20/20:  Patient with history of prostate cancer with known liver and bone mets and was started on Xtandi in October 2020. Oncology is following and placed Leon Amaro on hold to rule this out as contributing factor for lower extremity weakness and falls. Screening MRI of cervical and thoracic spine identified severe spinal canal stenosis at C3-4 and C6-7.     Will obtain dedicated MRI of cervical spine with and without contrast given these findings and patient's history of prostate cancer  Will place neurosurgery on consult  TSH within normal limits  B12 within normal limits  Folate low at 3.1, will replace  Dedicated  MRI of the cervical spine was reviewed and shows significant stenosis at C3-4-5. Await neurosurgical opinion on this.     11/22  Patient actually is doing quite well in terms of not becoming more weaker. Patient was seen by Dr. Carmen Sandy  and surgical intervention is planned. He had some questions which are answered and discussed that he will require rehabilitation after his procedure which will help him. 11/23  1) cervical stenosis C3-4, C6-7:   To have surgery with Dr. Carmen Sandy today. We will continue to follow post surgery. Order placed for inpatient rehabilitation status post laminectomy. 2) Folate deficiency:   Continue on 1 mg PO QD    I independently performed an evaluation on this patient. I have reviewed the above documentation completed by the PA. Please see my additional contributions to the HPI, physical exam, assessment/medical decision making. Oscar Sorensen MD, Jaime Gipson, American Board of Psychiatry & Neurology  Board Certified in Vascular Neurology  Board Certified in Neuromuscular Medicine  Certified in Neurorehabilitation         Collaborating physicians: Dr Vic Sorensen    Electronically signed by Arvind Jaquez PA-C on 11/23/2020 at 9:41 AM

## 2020-11-23 NOTE — PROGRESS NOTES
Patient straight cathed self for 200. He was also incontinent so brief was changed. Urine is dark and has a strong smell. Walked patient to schmidt and back to bed with minimal assistance. Patient tolerated well. Pain is currently well controlled. Patient taken down for laminoplasty.  Electronically signed by Eder Knox RN on 11/23/20 at 1:20 PM EST

## 2020-11-23 NOTE — CONSULTS
Palliative Care Consult Note  Patient: Torrie Bamberger  Gender: male  YOB: 1950  Unit/Bed: M458/W688-63  Code Status: Full Code  Inpatient Treatment Team: Treatment Team: Attending Provider: Bruno Gage MD; Consulting Physician: Michelle Wick DO; Utilization Reviewer: Dangelo Burroughs RN; Consulting Physician: Cynthia Calderon MD; Consulting Physician: Shantell Alexander MD; Surgeon: Shantell Alexander MD; Tech: Chuck Joe; Registered Nurse: Nancy Hernandez, NEIDA; LPN: Dani Li LPN; Registered Nurse: Violette Foy, RN; Registered Nurse: Ailyn Pa, RN; : RUBÉN Petit; : Mary Begum RN; Patient Care Tech: Arvind Kuo; : Joao Raymundo RN  Admit Date:  11/18/2020    Chief Complaint: Pain    History of Presenting Illness:      Torrie Bamberger is a 79 y.o. male on hospital day 3 with a history of androgen independent prostate cancer. Known liver and bone mets, severe malnutrition, falls, urine retention self caths, gastric ulcer with hemorrhage. Presented with  progressive quadriparesis weight loss inability to ambulate. His legs will not support him anymore. Every few weeks he is getting weaker and weaker. He is now unable to ambulate. Found to have severe spinal canal stenosis at C3-C7 with cord atrophy and is scheduled for laminoplasty with reconstruction today. He is well known to palliative care as he is a current patient. Complains of discomfort \"my entire body, especially the right side\" describes it as a numbness, no aggravating or alleviating factors. He feels discomfort is moderate, ' same as it always is\". Today he has used Toradol 15 mg IV once, MS contin 15 mg it is scheduled bid, Morphine 4 mg IV once, Oxycodone 5 mg  po once. He has a history of OA joint pain and pain from cancer bone mets as well. Those seem controlled today. No GI or  complaints. Appetite is poor.  He was started on mirtazapine at his last Pall care visit for anorexia, he never started taking it. Negative significant emotional, spiritual, or sleep disturbance. Review of Systems:       Review of Systems   Constitutional: Negative for activity change, appetite change, chills, diaphoresis, fatigue, fever and unexpected weight change. HENT: Negative for drooling, hearing loss, mouth sores, sore throat, trouble swallowing and voice change. Eyes: Negative for discharge and visual disturbance. Respiratory: Negative for apnea, cough, choking, chest tightness, shortness of breath, wheezing and stridor. Cardiovascular: Negative for chest pain, palpitations and leg swelling. Gastrointestinal: Negative for abdominal distention, abdominal pain, anal bleeding, blood in stool, constipation, diarrhea, nausea, rectal pain and vomiting. Genitourinary: Negative for difficulty urinating, dysuria, enuresis, frequency and hematuria. Musculoskeletal: Negative for arthralgias, back pain, gait problem, joint swelling and myalgias. Skin: Negative for color change, pallor, rash and wound. Allergic/Immunologic: Negative for food allergies and immunocompromised state. Neurological: Negative for dizziness, tremors, seizures, syncope, facial asymmetry, speech difficulty, weakness, light-headedness, numbness and headaches. Hematological: Negative for adenopathy. Does not bruise/bleed easily. Psychiatric/Behavioral: Negative for agitation, behavioral problems, confusion, decreased concentration, dysphoric mood, hallucinations, self-injury, sleep disturbance and suicidal ideas. The patient is not nervous/anxious and is not hyperactive. Physical Examination:       /65   Pulse 68   Temp 97 °F (36.1 °C) (Oral)   Resp 16   Ht 6' 1\" (1.854 m)   Wt 147 lb 4.3 oz (66.8 kg)   SpO2 100%   BMI 19.43 kg/m²    Physical Exam  Constitutional:       General: He is not in acute distress. Appearance: He is underweight. He is not diaphoretic. HENT:      Head: Normocephalic and atraumatic. Right Ear: External ear normal.      Left Ear: External ear normal.      Nose: Nose normal.      Mouth/Throat:      Pharynx: No oropharyngeal exudate. Eyes:      General: No scleral icterus. Right eye: No discharge. Left eye: No discharge. Conjunctiva/sclera: Conjunctivae normal.      Pupils: Pupils are equal, round, and reactive to light. Neck:      Musculoskeletal: Normal range of motion and neck supple. Thyroid: No thyromegaly. Vascular: No JVD. Trachea: No tracheal deviation. Cardiovascular:      Rate and Rhythm: Normal rate and regular rhythm. Heart sounds: Normal heart sounds. Pulmonary:      Effort: Pulmonary effort is normal. No respiratory distress. Breath sounds: Normal breath sounds. No stridor. No wheezing or rales. Chest:      Chest wall: No tenderness. Abdominal:      General: Bowel sounds are normal. There is no distension. Palpations: Abdomen is soft. There is no mass. Tenderness: There is no abdominal tenderness. There is no guarding or rebound. Musculoskeletal: Normal range of motion. General: No tenderness or deformity. Lymphadenopathy:      Cervical: No cervical adenopathy. Skin:     General: Skin is warm and dry. Findings: No erythema or rash. Neurological:      Mental Status: He is alert and oriented to person, place, and time. Cranial Nerves: No cranial nerve deficit. Coordination: Coordination normal.   Psychiatric:         Behavior: Behavior normal.         Thought Content:  Thought content normal.         Judgment: Judgment normal.         Allergies:      No Known Allergies    Medications:      Current Facility-Administered Medications   Medication Dose Route Frequency Provider Last Rate Last Dose    morphine injection 4 mg  4 mg Intravenous Q4H PRN Chip Moralez MD   4 mg at 11/23/20 0946    sodium chloride flush 0.9 % injection 10 mL  10 mL Intravenous BID JOYCE Middleton - CNP   10 mL at 11/21/20 1955    ceFAZolin (ANCEF) 2 g in dextrose 3 % 50 mL IVPB (duplex)  2 g Intravenous Once Reuben Ugarte MD        ketorolac (TORADOL) injection 15 mg  15 mg Intravenous Q6H PRN Sha Santiago RN, NP   15 mg at 11/23/20 0616    polyethylene glycol (GLYCOLAX) packet 17 g  17 g Oral Daily Erica Chuyita, DO   17 g at 11/22/20 1812    senna (SENOKOT) tablet 8.6 mg  1 tablet Oral Nightly Erica Chuyita, DO   8.6 mg at 11/22/20 2102    morphine (MS CONTIN) extended release tablet 15 mg  15 mg Oral 2 times per day Erica Chuyita, DO   15 mg at 11/22/20 2103    oxyCODONE (ROXICODONE) immediate release tablet 5 mg  5 mg Oral Q4H PRN Erica Chuyita, DO   5 mg at 43/47/94 9022    folic acid (FOLVITE) tablet 1 mg  1 mg Oral Daily Magdalena Foster MD   1 mg at 11/22/20 0992    sodium chloride flush 0.9 % injection 10 mL  10 mL Intravenous 2 times per day Liss Munroe MD   10 mL at 11/23/20 0946    sodium chloride flush 0.9 % injection 10 mL  10 mL Intravenous PRN Liss Munroe MD   10 mL at 11/19/20 1516    acetaminophen (TYLENOL) tablet 650 mg  650 mg Oral Q6H PRN Liss Munroe MD        Or    acetaminophen (TYLENOL) suppository 650 mg  650 mg Rectal Q6H PRN Liss Munroe MD        promethazine [facility]) tablet 12.5 mg  12.5 mg Oral Q6H PRN Liss Munroe MD        Or    ondansetron [facility]) injection 4 mg  4 mg Intravenous Q6H PRN Liss Munroe MD   4 mg at 11/18/20 1843    enoxaparin (LOVENOX) injection 40 mg  40 mg Subcutaneous Daily Liss Munroe MD   40 mg at 11/22/20 317       History:       PMHx:  Past Medical History:   Diagnosis Date    PAF (paroxysmal atrial fibrillation) ([address]     ON XARELTO    Prostate cancer ([facility] [address] [address] 11/2019       PSHx:  Past Surgical History:   Procedure Laterality Date    COLONOSCOPY N/A 9/29/2020    COLONOSCOPY DIAGNOSTIC performed by Tonja Cates MD at [address] [place] [address],[address] JOINT REPLACEMENT Right 11/2018    UPPER GASTROINTESTINAL ENDOSCOPY N/A 9/29/2020    EGD DIAGNOSTIC ONLY performed by Lu Burrows MD at [facility] [address] Hx:  Social History     Socioeconomic History    Marital status: Single     Spouse name: None    Number of children: None    Years of education: None    Highest education level: None   Occupational History    Occupation: retired [employer]   Social Needs    Financial resource strain: Not very hard    Food insecurity     Worry: Never true     Inability: Never true    Transportation needs     Medical: No     Non-medical: No   Tobacco Use    Smoking status: Never Smoker    Smokeless tobacco: Never Used    Tobacco comment: denies   Substance and Sexual Activity    Alcohol use: Not Currently     Frequency: 2-4 times a month     Comment: denies    Drug use: No     Comment: denies    Sexual activity: None   Lifestyle    Physical activity     Days per week: 0 days     Minutes per session: 0 min    Stress:  Only a little   Relationships    Social connections     Talks on phone: None     Gets together: None     Attends Worship service: None     Active member of club or organization: None     Attends meetings of clubs or organizations: None     Relationship status: None    Intimate partner violence     Fear of current or ex partner: No     Emotionally abused: No     Physically abused: No     Forced sexual activity: No   Other Topics Concern    None   Social History Narrative    Retired from [employer] With: Alone    Type of Home: [address] in [address] [place] [address] Blvd: One level    Home Access: Level entry    Bathroom Shower/Tub: Tub/Shower unit    [employer]: Grab bars in [facility]: [facility]: Independent    Transfer Assistance: Independent    Active : No    Patient's  Info: Sister    Additional Comments: Pt. reports he has been having near falls but no falls in the last few weeks, but progressively more weakness hte last few weeks       Family Hx:  History reviewed. No pertinent family history.     LABS: Reviewed     CBC:  Lab Results   Component Value Date    WBC 7.4 11/23/2020    RBC 3.62 11/23/2020    HGB 9.1 11/23/2020    HCT 28.2 11/23/2020    MCV 78.0 11/23/2020    MCH 25.2 11/23/2020    MCHC 32.3 11/23/2020    RDW 18.5 11/23/2020     11/23/2020    MPV 8.7 09/09/2015     CBC with Differential:   Lab Results   Component Value Date    WBC 7.4 11/23/2020    RBC 3.62 11/23/2020    HGB 9.1 11/23/2020    HCT 28.2 11/23/2020     11/23/2020    MCV 78.0 11/23/2020    MCH 25.2 11/23/2020    MCHC 32.3 11/23/2020    RDW 18.5 11/23/2020    METASPCT 1 09/26/2020    LYMPHOPCT 22.5 11/23/2020    MONOPCT 11.7 11/23/2020    MYELOPCT 1 09/26/2020    BASOPCT 0.5 11/23/2020    MONOSABS 0.9 11/23/2020    LYMPHSABS 1.7 11/23/2020    EOSABS 0.2 11/23/2020    BASOSABS 0.0 11/23/2020     CMP:    Lab Results   Component Value Date     11/23/2020    K 3.9 11/23/2020     11/23/2020    CO2 21 11/23/2020    BUN 13 11/23/2020    CREATININE 0.57 11/23/2020    GFRAA >60.0 11/23/2020    LABGLOM >60.0 11/23/2020    GLUCOSE 96 11/23/2020    PROT 8.0 11/18/2020    LABALBU 3.6 11/18/2020    CALCIUM 8.3 11/23/2020    BILITOT 0.7 11/18/2020    ALKPHOS 233 11/18/2020    AST 26 11/18/2020    ALT 9 11/18/2020     BMP:    Lab Results   Component Value Date     11/23/2020    K 3.9 11/23/2020     11/23/2020    CO2 21 11/23/2020    BUN 13 11/23/2020    LABALBU 3.6 11/18/2020    CREATININE 0.57 11/23/2020    CALCIUM 8.3 11/23/2020    GFRAA >60.0 11/23/2020    LABGLOM >60.0 11/23/2020    GLUCOSE 96 11/23/2020     TSH:   Lab Results   Component Value Date    TSH 1.210 11/19/2020     Vitamin B12 and Folate: No components found for: FOLIC,  J12  Urinalysis:   Lab Results   Component Value Date    NITRU Negative 09/26/2020    WBCUA 20-50 09/26/2020    BACTERIA MANY 09/26/2020    RBCUA 3-5 09/26/2020    BLOODU TRACE 09/26/2020    SPECGRAV 1.019 09/26/2020    GLUCOSEU Negative 09/26/2020           FUNCTIONAL ADL´S:     Independent: [ x ] Eating, [   ] Dressing, [   ] Transferring, [   ] Venida Manges, [   ] Bathing, [   ] Continence  Dependent   : [  ] Eating, [   ] Dressing, [   ] Transferring, [   ] Venida Manges, [   ] Humberto Shahana, [   ] Continence  W. assistant : [  ] Eating, [  x ] Dressing, [ x  ] Transferring, [  x ] Toileting, [  x ] Bathing, [   x] Continence    Radiology: Reviewed      No results found. Assessment and plan:     Recurrent falls multifactorial in etiology: Orthostatic hypotension suspect due to autonomic dysfunction,spinal stenosis, advanced prostate cancer on chemotherapy, moderate protein calorie malnutrition  Follow with primary medical team  Continue PT    Prostate Cancer with mets  Oncology Following    Anorexia and Malnutrition  Continue nutritional Supplementation  Will start mirtazapine after surgery      Spinal Stenosis with weakness and paraesthesia  Neurosurgery following    Pain rt neoplasm and spinal stenosis  Continue current POC  Hopefully surgery will help relieve some discomfort and help him regain some mobility  Will reevaluate pain plan after surgery, OTC currently 484      -Advance Care Planning  Discussed goals of care with patient. Explained in extensive detail nuances between full code, DNR CCA and DNR CC. Patient has made the decision to be  FULL CODE      -Goals of Care Discussion:  Disease process and goals of treatment were discussed in basic terms. Herman's goal is to optimize available comfort care measures to maximize comfort and function ,  We discussed the palliative care philosophy in light of those goals. We discussed all care options contingent on treatment response and QOL. Much active listening, presence, and emotional support were given.          Electronically signed by Ben Man APRN - CNP on 11/23/2020 at 12:07 PM

## 2020-11-23 NOTE — PROGRESS NOTES
Subjective: The patient complains of severe  acute on chronic neck pain and weakness partially relieved by rest, PT, OT and meds and exacerbated by exertion and recent illness. I am concerned about patients medical complxities. Reviewed recent nursing note, \"  Patient back to 4W following Laminoplasty at 1930. Assessment complete. Alert and oriented, calm and cooperative. No complaints of nausea, SOB, or dizziness. He is experiencing 8/10 generalized pain, particularly in his neck, for which he has received Morphine per MAR. He is resting comfortably in bed, \". ROS x10: The patient also complains of severely impaired mobility and activities of daily living. Otherwise no new problems with vision, hearing, nose, mouth, throat, dermal, cardiovascular, GI, , pulmonary, musculoskeletal, psychiatric or neurological. See Rehab consult on Rehab chart . Vital signs:  /65   Pulse 68   Temp 97 °F (36.1 °C) (Oral)   Resp 16   Ht 6' 1\" (1.854 m)   Wt 147 lb 4.3 oz (66.8 kg)   SpO2 100%   BMI 19.43 kg/m²   I/O:   PO/Intake:    fair PO intake,       Bowel/Bladder:   continent,    General:  Patient is well developed, adequately nourished, non-obese and     well kempt. HEENT:    PERRLA, hearing intact to loud voice, external inspection of ear     and nose benign. Inspection of lips, tongue and gums benign  Musculoskeletal: No significant change in strength or tone. All joints stable. Inspection and palpation of digits and nails show no clubbing,       cyanosis or inflammatory conditions. Neuro/Psychiatric: Affect: flat-  Alert and oriented to self and     Situation. No significant change in deep tendon reflexes or     sensation  Lungs:  Diminished, CTA-B. Respiration effort is normal at rest.     Heart:   S1 = S2,  RRR. No loud murmurs. Abdomen:  Soft, non-tender, no enlargement of liver or spleen. Extremities:  No significant lower extremity edema or tenderness.   Skin: BUE bruises dt blood draws, c spine incision    Rehabilitation:  Physical therapy: FIMS:  Bed Mobility:      Transfers: Sit to Stand: Stand by assistance  Stand to sit: Stand by assistance, Ambulation 1  Surface: level tile  Device: No Device  Assistance: Contact guard assistance  Quality of Gait: increased lateral sway. guarded. Gait Deviations: Slow Yanet, Decreased step length  Distance: 15'  Comments: pt declines further distance, only wants to get to chair.,      FIMS:  ,  , Assessment: pt needing encouragement to participate in therapy, pt agreeable to get into bedside chair only. Occupational therapy: FIMS:   ,  , Assessment: Pt. is a 79year old man from home alone who presents to [facility] with the above deficits which impact his ability to perform ADLs and IADLs. Pt. would benefit from continued OT to maximize independence and safety with ADL tasks.     Speech therapy: FIMS:        Lab/X-ray studies reviewed, analyzed and discussed with patient and staff:   Recent Results (from the past 24 hour(s))   CBC Auto Differential    Collection Time: 11/23/20  6:54 AM   Result Value Ref Range    WBC 7.4 4.8 - 10.8 K/uL    RBC 3.62 (L) 4.70 - 6.10 M/uL    Hemoglobin 9.1 (L) 14.0 - 18.0 g/dL    Hematocrit 28.2 (L) 42.0 - 52.0 %    MCV 78.0 (L) 80.0 - 100.0 fL    MCH 25.2 (L) 27.0 - 31.3 pg    MCHC 32.3 (L) 33.0 - 37.0 %    RDW 18.5 (H) 11.5 - 14.5 %    Platelets 890 (H) 818 - 400 K/uL    Neutrophils % 62.1 %    Lymphocytes % 22.5 %    Monocytes % 11.7 %    Eosinophils % 3.2 %    Basophils % 0.5 %    Neutrophils Absolute 4.6 1.4 - 6.5 K/uL    Lymphocytes Absolute 1.7 1.0 - 4.8 K/uL    Monocytes Absolute 0.9 (H) 0.2 - 0.8 K/uL    Eosinophils Absolute 0.2 0.0 - 0.7 K/uL    Basophils Absolute 0.0 0.0 - 0.2 K/uL   Basic Metabolic Panel w/ Reflex to MG    Collection Time: 11/23/20  6:54 AM   Result Value Ref Range    Sodium 133 (L) 135 - 144 mEq/L    Potassium reflex Magnesium 3.9 3.4 - 4.9 mEq/L    Chloride 100 95 - 107 injury    Complex Active General Medical Issues that complicate care Assess & Plan:     1. Active Problems:    Palpitations    Anemia    Weakness    Rectal mass    Metastatic cancer ([facility]    Fall at home, initial encounter    Severe malnutrition ([facility]    Falls, initial encounter    Myelopathy ([facility] [facility]    Malignant tumor of prostate ([facility] [facility]    Retention of urine    Urethritis    Gait abnormality  Resolved Problems:    * No resolved hospital problems.  Jeny Luciano D.O., PM&R     Attending    [address]

## 2020-11-23 NOTE — BRIEF OP NOTE
Brief Postoperative Note      Patient: Marcy Herrmann  YOB: 1950  MRN: [id number]    Date of Procedure: 11/23/2020    Pre-Op Diagnosis: INPATIENT    Post-Op Diagnosis: Same       Procedure(s):  CERVICAL C3-4,4-5,5-6  LAMINOPLASTY WITH RECONSTRUCTION    Surgeon(s):  Montez Carlos MD    Assistant:  First Assistant: Sharif Lemus    Anesthesia: General    Estimated Blood Loss (mL): less than 50     Complications: None    Specimens:   * No specimens in log *    Implants:  Implant Name Type Inv. Item Serial No.  Lot No. LRB No. Used Action   SCREW SPNL L7MM DIA2. 6MM LUM LAT MASS LEVERAGE LFS  SCREW SPNL L7MM DIA2. 6MM LUM LAT MASS LEVERAGE LFS  [employer]  N/A 8 Implanted   SCREW SPNL L5MM DIA2. 6MM FERRARI LEVERAGE LFS  SCREW SPNL L5MM DIA2. 6MM FERRARI LEVERAGE LFS  [employer]  N/A 4 Implanted   PLATE SPNL CRAN CAUD FERRARI 9.5MM M LEVERAGE LFS  PLATE SPNL CRAN CAUD FERRARI 9.5MM M LEVERAGE LFS  [employer]  N/A 4 Implanted         Drains:   Closed/Suction Drain Midline; Posterior Neck Accordion 10 Latvian (Active)       Findings: stenosis      Electronically signed by Desean Loaiza MD on 11/23/2020 at 5:22 PM

## 2020-11-23 NOTE — PLAN OF CARE
Nutrition Problem #1: Severe malnutrition, In context of chronic illness  Intervention: Food and/or Nutrient Delivery: Continue NPO(Resume General diet as tolerated post-op. Resume Clear ONS @ B, Frozen ONS @ L, High Calorie ONS @ D.  Consider initation of nutrition support if intake < 50%)  Nutritional Goals: po > 50% meals and supplements, wt > 147#, resolve constipation

## 2020-11-23 NOTE — PROGRESS NOTES
Comprehensive Nutrition Assessment    Type and Reason for Visit:  Reassess    Nutrition Recommendations/Plan:   Continue NPO  Resume General diet as tolerated post-op. Resume Clear ONS @ B, Frozen ONS @ L, High Calorie ONS @ D. Consider initation of nutrition support if intake < 50%    Nutrition Assessment:  Severe malnutrition continues. NPO at present for surgery ( C3-6 laminoplasty) follow for post up plan of care, may need to start nutrition support    Malnutrition Assessment:  Malnutrition Status:  Severe malnutrition    Context:  Chronic Illness     Findings of the 6 clinical characteristics of malnutrition:  Energy Intake:  7 - 75% or less estimated energy requirements for 1 month or longer  Weight Loss:  7 - Greater than 10% over 6 months     Body Fat Loss:  Unable to assess     Muscle Mass Loss:  Unable to assess    Fluid Accumulation:  No significant fluid accumulation     Strength:  Not Performed    Estimated Daily Nutrient Needs:  Energy (kcal):  6353-8829 kcals @ 28-30 kcal/kg; Weight Used for Energy Requirements:  Current(67 kg)     Protein (g):   g protein @ 1.3-1.5 g/kg; Weight Used for Protein Requirements:  Current(67 kg)        Fluid (ml/day):  ~1900 ( 28 ml/kg);  Method Used for Fluid Requirements:  ml/Kg      Nutrition Related Findings:  Hx of metastateic prostatte Ca, + droplet precautions, unable to reach by phone to determins supplement acceptance      Wounds:  None       Current Nutrition Therapies:    Diet NPO, After Midnight Exceptions are: Sips with Meds    Anthropometric Measures:  · Height: 6' 1\" (185.4 cm)  · Current Body Weight: (no new)   · Admission Body Weight: 147 lb (66.7 kg)(stated)    · Usual Body Weight: 177 lb (80.3 kg)((2/20), 182# ( 6/20), 166# ( 9/20))     · Ideal Body Weight: 184 lbs; % Ideal Body Weight  80%  · BMI: 19.4  · BMI Categories: Underweight (BMI less than 22) age over 72       Nutrition Diagnosis:   · Severe malnutrition, In context of chronic illness related to catabolic illness, inadequate protein-energy intake as evidenced by poor intake prior to admission, BMI, weight loss greater than or equal to 10% in 6 months      Nutrition Interventions:   Food and/or Nutrient Delivery:  Continue NPO(Resume General diet as tolerated post-op. Resume Clear ONS @ B, Frozen ONS @ L, High Calorie ONS @ D. Consider initation of nutrition support if intake < 50%)  Nutrition Education/Counseling:  No recommendation at this time, Education not indicated   Coordination of Nutrition Care:  Continue to monitor while inpatient    Goals:  po > 50% meals and supplements, wt > 147#, resolve constipation       Nutrition Monitoring and Evaluation:     Food/Nutrient Intake Outcomes:  Diet Advancement/Tolerance, Food and Nutrient Intake, Supplement Intake  Physical Signs/Symptoms Outcomes:  GI Status, Meal Time Behavior, Weight     Discharge Planning:     Too soon to determine     Electronically signed by Salena Grijalva RD, LD on 11/23/20 at 11:53 AM EST

## 2020-11-23 NOTE — PROGRESS NOTES
Patient's belongings (2 rings and a necklace) in labeled specimen cup in plastic bag with the chart.

## 2020-11-23 NOTE — PROGRESS NOTES
Hospitalist Progress Note      PCP: Sofia Moran MD    Date of Admission: 11/18/2020    Chief Complaint:  No acute events, afebrile, stable HD, scheduled for C-spine surgery today    Medications:  Reviewed    Infusion Medications    lactated ringers       Scheduled Medications    sodium chloride flush  10 mL Intravenous 2 times per day    sodium chloride flush  10 mL Intravenous BID    ceFAZolin  2 g Intravenous Once    polyethylene glycol  17 g Oral Daily    senna  1 tablet Oral Nightly    morphine  15 mg Oral 2 times per day    folic acid  1 mg Oral Daily    sodium chloride flush  10 mL Intravenous 2 times per day    enoxaparin  40 mg Subcutaneous Daily     PRN Meds: morphine, sodium chloride flush, lidocaine PF, fentanNYL, HYDROmorphone, HYDROcodone 5 mg - acetaminophen **OR** HYDROcodone 5 mg - acetaminophen, diphenhydrAMINE, ondansetron, metoclopramide, meperidine, ketorolac, oxyCODONE, sodium chloride flush, acetaminophen **OR** acetaminophen, promethazine **OR** ondansetron      Intake/Output Summary (Last 24 hours) at 11/23/2020 1411  Last data filed at 11/23/2020 1317  Gross per 24 hour   Intake --   Output 200 ml   Net -200 ml       Exam:    /65   Pulse 68   Temp 97 °F (36.1 °C) (Oral)   Resp 16   Ht 6' 1\" (1.854 m)   Wt 147 lb 4.3 oz (66.8 kg)   SpO2 100%   BMI 19.43 kg/m²     General appearance: appears stated age and cooperative. Respiratory: clear to auscultation, bilaterally   Cardiovascular: Regular rate and rhythm with normal S1/S2  Abdomen: Soft, active bowel sounds. Extremities: no edema      Labs:   Recent Labs     11/22/20 0628 11/23/20  0654   WBC 9.3 7.4   HGB 8.6* 9.1*   HCT 24.9* 28.2*    416*     Recent Labs     11/22/20 0628 11/23/20  0654   * 133*   K 3.8 3.9   CL 99 100   CO2 22 21   BUN 10 13   CREATININE 0.53* 0.57*   CALCIUM 8.4* 8.3*   PHOS 2.4  --      No results for input(s): AST, ALT, BILIDIR, BILITOT, ALKPHOS in the last 72 hours.   No metastatic prostate cancer who presented with:    Recurrent falls and weakness  - multifactorial in etiology including orthostatic hypotension suspect due to autonomic dysfunction, severe C-spine stenosis, advanced prostate cancer on chemotherapy, moderate protein calorie malnutrition  - MRI of the spine showed diffuse mets  - scheduled for C-spine laminoplasty with reconstruction today  - treated with IVFs  - repeat orthostatic vitals remained positive, consider midodrine / florinef if orthostatic vitals remain positive after surgery    Hyponatremia   - improving    Anemia   - follow H/H    Metastatic prostate cancer   - Xtandi on hold  - followed by oncology     Constipation  - continue bowel regimen     Folic acid deficiency  - continue supplement     Cancer related pain   - on opioids    Severe malnutrition  - followed by dietitian                 Electronically signed by Tashi Traylor MD on 11/23/2020 at 2:11 PM

## 2020-11-23 NOTE — PROGRESS NOTES
Hematology/Oncology   Progress Note        CHIEF COMPLAINT/HPI:  Follow up of prostate cancer. Hemoglobin at 9.1. Grier Favre is on hold for now until after surgery and to allow him to recover. REVIEW OF SYSTEMS:    Unremarkable except for symptoms mentioned in HPI.     Current Inpatient Medications:    Current Facility-Administered Medications   Medication Dose Route Frequency Provider Last Rate Last Dose    morphine injection 4 mg  4 mg Intravenous Q4H PRN Mima Cramre MD   4 mg at 11/23/20 0946    sodium chloride flush 0.9 % injection 10 mL  10 mL Intravenous BID JOYCE Avitia - CNP   10 mL at 11/21/20 1955    ceFAZolin (ANCEF) 2 g in dextrose 3 % 50 mL IVPB (duplex)  2 g Intravenous Once Kimberly Bowen MD        ketorolac (TORADOL) injection 15 mg  15 mg Intravenous Q6H PRN Sha Santiago RN, NP   15 mg at 11/23/20 0616    polyethylene glycol (GLYCOLAX) packet 17 g  17 g Oral Daily [facility], DO   17 g at 11/22/20 5303    senna (SENOKOT) tablet 8.6 mg  1 tablet Oral Nightly [facility], DO   8.6 mg at 11/22/20 2102    morphine (MS CONTIN) extended release tablet 15 mg  15 mg Oral 2 times per day Georgiann Kenney, DO   15 mg at 11/22/20 2103    oxyCODONE (ROXICODONE) immediate release tablet 5 mg  5 mg Oral Q4H PRN [facility], DO   5 mg at 22/52/09 3643    folic acid (FOLVITE) tablet 1 mg  1 mg Oral Daily Mike Arndt MD   1 mg at 11/22/20 8147    sodium chloride flush 0.9 % injection 10 mL  10 mL Intravenous 2 times per day Alix Traylor MD   10 mL at 11/23/20 0946    sodium chloride flush 0.9 % injection 10 mL  10 mL Intravenous PRN Alix Traylor MD   10 mL at 11/19/20 1516    acetaminophen (TYLENOL) tablet 650 mg  650 mg Oral Q6H PRN Alix Traylor MD        Or    acetaminophen (TYLENOL) suppository 650 mg  650 mg Rectal Q6H PRN lAix Traylor MD        [facility]) tablet 12.5 mg  12.5 mg Oral Q6H PRN Alix Traylor MD        Or   Elida La ondansetron (ZOFRAN) injection 4 mg  4 mg Intravenous Q6H PRN Spencer May MD   4 mg at 11/18/20 1843    enoxaparin (LOVENOX) injection 40 mg  40 mg Subcutaneous Daily Spencer May MD   40 mg at 11/22/20 8660       PHYSICAL EXAM:    EYES:  Lids and lashes normal, pupils equal, round and reactive to light, extra ocular muscles intact, sclera clear, conjunctiva normal    ENT:  Normocephalic, without obvious abnormality, atraumatic, sinuses nontender on palpation, external ears without lesions, oral pharynx with moist mucus membranes, tonsils without erythema or exudates, gums normal and good dentition. NECK:  Supple, symmetrical, trachea midline, no adenopathy, thyroid symmetric, not enlarged and no tenderness, skin normal    CHEST:    LUNGS:  No increased work of breathing, good air exchange, clear to auscultation bilaterally, no crackles or wheezing    CARDIOVASCULAR:  Normal apical impulse, regular rate and rhythm, normal S1 and S2, no S3 or S4, and no murmur noted    ABDOMEN:  No scars, normal bowel sounds, soft, non-distended, non-tender, no masses palpated, no hepatosplenomegally    MUSCULOSKELETAL:  There is no redness, warmth, or swelling of the joints. Full range of motion noted. Motor strength is 5 out of 5 all extremities bilaterally.   Tone is normal.  EXTREMITIES:    NEURO:    DATA:      PT/INR:  No results found for: PTINR  PTT:  No results found for: APTT  CMP:    Lab Results   Component Value Date     11/23/2020    K 3.9 11/23/2020     11/23/2020    CO2 21 11/23/2020    BUN 13 11/23/2020    PROT 8.0 11/18/2020     Magnesium:    Lab Results   Component Value Date    MG 2.2 11/18/2020     Phosphorus:  No components found for: PO4  Calcium:  No components found for: CA  CBC:    Lab Results   Component Value Date    WBC 7.4 11/23/2020    RBC 3.62 11/23/2020    HGB 9.1 11/23/2020    HCT 28.2 11/23/2020    MCV 78.0 11/23/2020    RDW 18.5 11/23/2020     11/23/2020     DIFF:

## 2020-11-23 NOTE — PROGRESS NOTES
Physical Therapy Missed Treatment   Facility/Department: [facility] MED SURG C198/I122-00    NAME: Jenny Malhotra    : 1950 (79 y.o.)  MRN: [id number]    Account: [de-identified]  Gender: male    Chart reviewed, attempted PT at 8:40. Patient unavailable 2° to:        [x] Pt declined stating \"I am having surgery on my neck today so can we wait. \"        Will attempt PT treatment again at earliest convenience.       Electronically signed by Berenice Hammond PTA on 20 at 8:46 AM EST

## 2020-11-23 NOTE — ANESTHESIA PRE PROCEDURE
 ketorolac (TORADOL) injection 15 mg  15 mg Intravenous Q6H PRN Sha Santiago RN, NP   15 mg at 11/23/20 0616    polyethylene glycol (GLYCOLAX) packet 17 g  17 g Oral Daily Stefano Bascom, DO   17 g at 11/22/20 6144    senna (SENOKOT) tablet 8.6 mg  1 tablet Oral Nightly Stefano Stephen, DO   8.6 mg at 11/22/20 2102    morphine (MS CONTIN) extended release tablet 15 mg  15 mg Oral 2 times per day Stefano Stephen, DO   15 mg at 11/22/20 2103    oxyCODONE (ROXICODONE) immediate release tablet 5 mg  5 mg Oral Q4H PRN Stefano Stephen, DO   5 mg at 04/37/23 3101    folic acid (FOLVITE) tablet 1 mg  1 mg Oral Daily Alexis Christopher MD   1 mg at 11/22/20 6008    sodium chloride flush 0.9 % injection 10 mL  10 mL Intravenous 2 times per day Bruno Rivera MD   10 mL at 11/23/20 0946    sodium chloride flush 0.9 % injection 10 mL  10 mL Intravenous PRN Bruno Rivera MD   10 mL at 11/19/20 1516    acetaminophen (TYLENOL) tablet 650 mg  650 mg Oral Q6H PRN Bruno Rivera MD        Or    acetaminophen (TYLENOL) suppository 650 mg  650 mg Rectal Q6H PRN Bruno Rivera MD        [facility]) tablet 12.5 mg  12.5 mg Oral Q6H PRN Bruno Rivera MD        Or    ondansetron [facility]) injection 4 mg  4 mg Intravenous Q6H PRN Bruno Rivera MD   4 mg at 11/18/20 1843    enoxaparin (LOVENOX) injection 40 mg  40 mg Subcutaneous Daily Bruno Rivera MD   40 mg at 11/22/20 8323       Allergies:  No Known Allergies    Problem List:    Patient Active Problem List   Diagnosis Code    Liver mass R16.0    Osteoarthritis of hip M16.9    Palpitations R00.2    Other specified disorders of bone density and structure, unspecified site  M85.80    Anemia D64.9    Generalized weakness R53.1    Gastric ulcer K25.9    Goals of care, counseling/discussion Z71.89    Gastrointestinal hemorrhage K92.2    Rectal mass K62.89    Acute cystitis without hematuria N30.00    [facility] C79.9    Fall at home, initial encounter [address] XXXA, Y92.009    Severe malnutrition ([facility] E43    Falls, initial encounter [address]19. Haider Rung    Myelopathy ([facility] G95.9    Benign prostatic hyperplasia with incomplete bladder emptying N40.1, R39.14    Malignant tumor of prostate ([facility] C61    Raised prostate specific antigen R97.20    Retention of urine R33.9    Urethritis N34.2    Gait abnormality R26.9       Past Medical History:        Diagnosis Date    PAF (paroxysmal atrial fibrillation) ([facility]     ON XARELTO    Prostate cancer ([facility] 11/2019       Past Surgical History:        Procedure Laterality Date    COLONOSCOPY N/A 9/29/2020    COLONOSCOPY DIAGNOSTIC performed by Mikey Gillespie MD at [address] 11/2018    hip    UPPER GASTROINTESTINAL ENDOSCOPY N/A 9/29/2020    EGD DIAGNOSTIC ONLY performed by Mikey Gillespie MD at [address] History:    Social History     Tobacco Use    Smoking status: Never Smoker    Smokeless tobacco: Never Used    Tobacco comment: denies   Substance Use Topics    Alcohol use: Not Currently     Frequency: 2-4 times a month     Comment: denies                                Counseling given: Not Answered  Comment: denies      Vital Signs (Current):   Vitals:    11/1950 11/22/20 0751 11/22/20 1938 11/23/20 0822   BP: 137/68 (!) 142/72 125/67 129/65   Pulse: 85 77 76 68   Resp: 16  16 16   Temp: 99.1 °F (37.3 °C) 98.1 °F (36.7 °C) 98.2 °F (36.8 °C) 97 °F (36.1 °C)   TempSrc: Oral Oral Oral Oral   SpO2: 100%  100% 100%   Weight:       Height:                                                  BP Readings from Last 3 Encounters:   11/23/20 129/65   10/09/20 (!) 94/59   09/30/20 134/76       NPO Status: Time of last liquid consumption: 1200                        Time of last solid consumption: 1200                        Date of last liquid consumption: 11/22/20                        Date of last solid food consumption: 11/22/20    BMI:   Wt Readings from Last 3 Encounters:   11/18/20 147 lb 4.3 oz (66.8 kg)   09/29/20 166 lb (75.3 kg)   09/23/20 166 lb 3.2 oz (75.4 kg)     Body mass index is 19.43 kg/m². CBC:   Lab Results   Component Value Date    WBC 7.4 11/23/2020    RBC 3.62 11/23/2020    HGB 9.1 11/23/2020    HCT 28.2 11/23/2020    MCV 78.0 11/23/2020    RDW 18.5 11/23/2020     11/23/2020       CMP:   Lab Results   Component Value Date     11/23/2020    K 3.9 11/23/2020     11/23/2020    CO2 21 11/23/2020    BUN 13 11/23/2020    CREATININE 0.57 11/23/2020    GFRAA >60.0 11/23/2020    LABGLOM >60.0 11/23/2020    GLUCOSE 96 11/23/2020    PROT 8.0 11/18/2020    CALCIUM 8.3 11/23/2020    BILITOT 0.7 11/18/2020    ALKPHOS 233 11/18/2020    AST 26 11/18/2020    ALT 9 11/18/2020       POC Tests: No results for input(s): POCGLU, POCNA, POCK, POCCL, POCBUN, POCHEMO, POCHCT in the last 72 hours.     Coags:   Lab Results   Component Value Date    PROTIME 12.3 02/26/2020    INR 0.9 02/26/2020       HCG (If Applicable): No results found for: PREGTESTUR, PREGSERUM, HCG, HCGQUANT     ABGs: No results found for: PHART, PO2ART, FPO3TSN, AJM8NXL, BEART, F6QZLFGL     Type & Screen (If Applicable):  No results found for: LABABO, LABRH    Drug/Infectious Status (If Applicable):  No results found for: HIV, HEPCAB    COVID-19 Screening (If Applicable):   Lab Results   Component Value Date    COVID19 Not Detected 11/19/2020         Anesthesia Evaluation  Patient summary reviewed and Nursing notes reviewed no history of anesthetic complications:   Airway: Mallampati: II  TM distance: >3 FB   Neck ROM: full  Mouth opening: > = 3 FB Dental: normal exam         Pulmonary:Negative Pulmonary ROS and normal exam  breath sounds clear to auscultation                             Cardiovascular:Negative CV ROS  Exercise tolerance: good (>4 METS),   (+) dysrhythmias: atrial fibrillation,       ECG reviewed  Rhythm: regular  Rate: normal           Beta Blocker:  Not on Beta Blocker      ROS comment: Sinus rhythm with premature atrial complexes  Nonspecific ST and T wave abnormality  Prolonged QT  Abnormal ECG  When compared with ECG of 28-SEP-2020 08:38,  premature atrial complexes are now present  Nonspecific T wave abnormality has replaced inverted T waves in Inferior leads  T wave inversion no longer evident in Anterior leads  Nonspecific T wave abnormality now evident in Lateral leads     Neuro/Psych:   Negative Neuro/Psych ROS  (+) neuromuscular disease:,             GI/Hepatic/Renal: Neg GI/Hepatic/Renal ROS  (+) PUD,           Endo/Other:    (+) blood dyscrasia: anticoagulation therapy and anemia, arthritis:., .          Pt had PAT visit. Abdominal:           Vascular: negative vascular ROS. Anesthesia Plan      general     ASA 3     (ETT  Discussed risk of post operative visual disturbances, facial trauma, facial edema and pressure sores)  Induction: intravenous. MIPS: Postoperative opioids intended. Anesthetic plan and risks discussed with patient. Plan discussed with CRNA.     Attending anesthesiologist reviewed and agrees with Dionte Wilkinson DO   11/23/2020

## 2020-11-24 PROBLEM — M48.02 STENOSIS OF CERVICAL SPINE WITH MYELOPATHY (HCC): Status: ACTIVE | Noted: 2020-11-24

## 2020-11-24 PROBLEM — G99.2 STENOSIS OF CERVICAL SPINE WITH MYELOPATHY (HCC): Status: ACTIVE | Noted: 2020-11-24

## 2020-11-24 LAB
ANION GAP SERPL CALCULATED.3IONS-SCNC: 11 MEQ/L (ref 9–15)
BASOPHILS ABSOLUTE: 0 K/UL (ref 0–0.2)
BASOPHILS RELATIVE PERCENT: 0.2 %
BUN BLDV-MCNC: 11 MG/DL (ref 8–23)
CALCIUM SERPL-MCNC: 7.9 MG/DL (ref 8.5–9.9)
CHLORIDE BLD-SCNC: 100 MEQ/L (ref 95–107)
CO2: 22 MEQ/L (ref 20–31)
CREAT SERPL-MCNC: 0.65 MG/DL (ref 0.7–1.2)
EOSINOPHILS ABSOLUTE: 0 K/UL (ref 0–0.7)
EOSINOPHILS RELATIVE PERCENT: 0.1 %
GFR AFRICAN AMERICAN: >60
GFR NON-AFRICAN AMERICAN: >60
GLUCOSE BLD-MCNC: 131 MG/DL (ref 70–99)
HCT VFR BLD CALC: 28 % (ref 42–52)
HEMOGLOBIN: 9 G/DL (ref 14–18)
LYMPHOCYTES ABSOLUTE: 1.1 K/UL (ref 1–4.8)
LYMPHOCYTES RELATIVE PERCENT: 13 %
MCH RBC QN AUTO: 24.9 PG (ref 27–31.3)
MCHC RBC AUTO-ENTMCNC: 32 % (ref 33–37)
MCV RBC AUTO: 77.6 FL (ref 80–100)
MONOCYTES ABSOLUTE: 0.9 K/UL (ref 0.2–0.8)
MONOCYTES RELATIVE PERCENT: 10.1 %
NEUTROPHILS ABSOLUTE: 6.5 K/UL (ref 1.4–6.5)
NEUTROPHILS RELATIVE PERCENT: 76.6 %
PDW BLD-RTO: 18.4 % (ref 11.5–14.5)
PLATELET # BLD: 429 K/UL (ref 130–400)
POTASSIUM REFLEX MAGNESIUM: 4.1 MEQ/L (ref 3.4–4.9)
RBC # BLD: 3.61 M/UL (ref 4.7–6.1)
SODIUM BLD-SCNC: 133 MEQ/L (ref 135–144)
WBC # BLD: 8.5 K/UL (ref 4.8–10.8)

## 2020-11-24 PROCEDURE — 6370000000 HC RX 637 (ALT 250 FOR IP): Performed by: INTERNAL MEDICINE

## 2020-11-24 PROCEDURE — 2580000003 HC RX 258: Performed by: NURSE PRACTITIONER

## 2020-11-24 PROCEDURE — 97166 OT EVAL MOD COMPLEX 45 MIN: CPT

## 2020-11-24 PROCEDURE — 6370000000 HC RX 637 (ALT 250 FOR IP): Performed by: NURSE PRACTITIONER

## 2020-11-24 PROCEDURE — 6370000000 HC RX 637 (ALT 250 FOR IP): Performed by: PSYCHIATRY & NEUROLOGY

## 2020-11-24 PROCEDURE — 97162 PT EVAL MOD COMPLEX 30 MIN: CPT

## 2020-11-24 PROCEDURE — 2580000003 HC RX 258: Performed by: NEUROLOGICAL SURGERY

## 2020-11-24 PROCEDURE — 6360000002 HC RX W HCPCS: Performed by: NEUROLOGICAL SURGERY

## 2020-11-24 PROCEDURE — 99232 SBSQ HOSP IP/OBS MODERATE 35: CPT | Performed by: NURSE PRACTITIONER

## 2020-11-24 PROCEDURE — 6370000000 HC RX 637 (ALT 250 FOR IP): Performed by: NEUROLOGICAL SURGERY

## 2020-11-24 PROCEDURE — 1210000000 HC MED SURG R&B

## 2020-11-24 PROCEDURE — 36415 COLL VENOUS BLD VENIPUNCTURE: CPT

## 2020-11-24 PROCEDURE — 6360000002 HC RX W HCPCS: Performed by: INTERNAL MEDICINE

## 2020-11-24 PROCEDURE — 2580000003 HC RX 258

## 2020-11-24 PROCEDURE — 6370000000 HC RX 637 (ALT 250 FOR IP): Performed by: PHYSICAL MEDICINE & REHABILITATION

## 2020-11-24 PROCEDURE — 6360000002 HC RX W HCPCS: Performed by: FAMILY MEDICINE

## 2020-11-24 PROCEDURE — 85025 COMPLETE CBC W/AUTO DIFF WBC: CPT

## 2020-11-24 PROCEDURE — 6360000002 HC RX W HCPCS: Performed by: PHYSICAL MEDICINE & REHABILITATION

## 2020-11-24 PROCEDURE — 99231 SBSQ HOSP IP/OBS SF/LOW 25: CPT | Performed by: PHYSICAL MEDICINE & REHABILITATION

## 2020-11-24 PROCEDURE — 2580000003 HC RX 258: Performed by: FAMILY MEDICINE

## 2020-11-24 PROCEDURE — 6360000002 HC RX W HCPCS: Performed by: NURSE PRACTITIONER

## 2020-11-24 PROCEDURE — 80048 BASIC METABOLIC PNL TOTAL CA: CPT

## 2020-11-24 PROCEDURE — 2700000000 HC OXYGEN THERAPY PER DAY

## 2020-11-24 PROCEDURE — APPSS30 APP SPLIT SHARED TIME 16-30 MINUTES: Performed by: NURSE PRACTITIONER

## 2020-11-24 PROCEDURE — 99233 SBSQ HOSP IP/OBS HIGH 50: CPT | Performed by: PSYCHIATRY & NEUROLOGY

## 2020-11-24 RX ORDER — OXYCODONE HYDROCHLORIDE 5 MG/1
5 TABLET ORAL EVERY 4 HOURS PRN
Status: DISCONTINUED | OUTPATIENT
Start: 2020-11-24 | End: 2020-11-24

## 2020-11-24 RX ORDER — ONDANSETRON 2 MG/ML
4 INJECTION INTRAMUSCULAR; INTRAVENOUS EVERY 6 HOURS PRN
Status: DISCONTINUED | OUTPATIENT
Start: 2020-11-24 | End: 2020-12-10 | Stop reason: HOSPADM

## 2020-11-24 RX ORDER — MIRTAZAPINE 15 MG/1
7.5 TABLET, FILM COATED ORAL NIGHTLY
Status: DISCONTINUED | OUTPATIENT
Start: 2020-11-24 | End: 2020-12-10 | Stop reason: HOSPADM

## 2020-11-24 RX ORDER — VITAMIN B COMPLEX
2000 TABLET ORAL
Status: DISCONTINUED | OUTPATIENT
Start: 2020-11-24 | End: 2020-12-10 | Stop reason: HOSPADM

## 2020-11-24 RX ORDER — SODIUM CHLORIDE 9 MG/ML
INJECTION, SOLUTION INTRAVENOUS CONTINUOUS
Status: DISCONTINUED | OUTPATIENT
Start: 2020-11-24 | End: 2020-11-25

## 2020-11-24 RX ORDER — UBIDECARENONE 100 MG
100 CAPSULE ORAL DAILY
Status: DISCONTINUED | OUTPATIENT
Start: 2020-11-24 | End: 2020-12-10 | Stop reason: HOSPADM

## 2020-11-24 RX ORDER — GABAPENTIN 100 MG/1
100 CAPSULE ORAL 3 TIMES DAILY
Status: DISCONTINUED | OUTPATIENT
Start: 2020-11-24 | End: 2020-11-25

## 2020-11-24 RX ORDER — LIDOCAINE 4 G/G
3 PATCH TOPICAL DAILY
Status: DISCONTINUED | OUTPATIENT
Start: 2020-11-24 | End: 2020-12-10 | Stop reason: HOSPADM

## 2020-11-24 RX ORDER — SODIUM CHLORIDE 0.9 % (FLUSH) 0.9 %
10 SYRINGE (ML) INJECTION PRN
Status: DISCONTINUED | OUTPATIENT
Start: 2020-11-24 | End: 2020-12-10 | Stop reason: HOSPADM

## 2020-11-24 RX ORDER — CEFAZOLIN SODIUM 2 G/50ML
2 SOLUTION INTRAVENOUS EVERY 8 HOURS
Status: COMPLETED | OUTPATIENT
Start: 2020-11-24 | End: 2020-11-24

## 2020-11-24 RX ORDER — ACETAMINOPHEN 325 MG/1
650 TABLET ORAL EVERY 4 HOURS PRN
Status: DISCONTINUED | OUTPATIENT
Start: 2020-11-24 | End: 2020-12-10 | Stop reason: HOSPADM

## 2020-11-24 RX ORDER — SODIUM CHLORIDE 0.9 % (FLUSH) 0.9 %
10 SYRINGE (ML) INJECTION EVERY 12 HOURS SCHEDULED
Status: DISCONTINUED | OUTPATIENT
Start: 2020-11-24 | End: 2020-12-10 | Stop reason: HOSPADM

## 2020-11-24 RX ORDER — MORPHINE SULFATE 15 MG/1
15 TABLET, FILM COATED, EXTENDED RELEASE ORAL EVERY 8 HOURS
Status: DISCONTINUED | OUTPATIENT
Start: 2020-11-24 | End: 2020-12-10 | Stop reason: HOSPADM

## 2020-11-24 RX ORDER — OXYCODONE AND ACETAMINOPHEN 7.5; 325 MG/1; MG/1
1 TABLET ORAL EVERY 4 HOURS PRN
Status: DISCONTINUED | OUTPATIENT
Start: 2020-11-24 | End: 2020-12-04

## 2020-11-24 RX ORDER — ACETAMINOPHEN 650 MG/1
650 SUPPOSITORY RECTAL EVERY 6 HOURS PRN
Status: DISCONTINUED | OUTPATIENT
Start: 2020-11-24 | End: 2020-12-10 | Stop reason: HOSPADM

## 2020-11-24 RX ORDER — ONDANSETRON 4 MG/1
4 TABLET, ORALLY DISINTEGRATING ORAL EVERY 8 HOURS PRN
Status: DISCONTINUED | OUTPATIENT
Start: 2020-11-24 | End: 2020-12-10 | Stop reason: HOSPADM

## 2020-11-24 RX ORDER — CYANOCOBALAMIN 1000 UG/ML
1000 INJECTION INTRAMUSCULAR; SUBCUTANEOUS WEEKLY
Status: DISCONTINUED | OUTPATIENT
Start: 2020-11-24 | End: 2020-12-10 | Stop reason: HOSPADM

## 2020-11-24 RX ORDER — ACETAMINOPHEN 325 MG/1
650 TABLET ORAL EVERY 4 HOURS PRN
Status: DISCONTINUED | OUTPATIENT
Start: 2020-11-24 | End: 2020-11-24

## 2020-11-24 RX ORDER — MAGNESIUM HYDROXIDE 1200 MG/15ML
LIQUID ORAL
Status: COMPLETED
Start: 2020-11-24 | End: 2020-11-24

## 2020-11-24 RX ORDER — POLYETHYLENE GLYCOL 3350 17 G/17G
17 POWDER, FOR SOLUTION ORAL DAILY PRN
Status: DISCONTINUED | OUTPATIENT
Start: 2020-11-24 | End: 2020-12-10 | Stop reason: HOSPADM

## 2020-11-24 RX ADMIN — BISACODYL 5 MG: 5 TABLET, COATED ORAL at 09:01

## 2020-11-24 RX ADMIN — MORPHINE SULFATE 15 MG: 15 TABLET, FILM COATED, EXTENDED RELEASE ORAL at 09:01

## 2020-11-24 RX ADMIN — GABAPENTIN 100 MG: 100 CAPSULE ORAL at 13:41

## 2020-11-24 RX ADMIN — CEFAZOLIN SODIUM 2 G: 2 SOLUTION INTRAVENOUS at 09:09

## 2020-11-24 RX ADMIN — ENOXAPARIN SODIUM 40 MG: 40 INJECTION SUBCUTANEOUS at 09:01

## 2020-11-24 RX ADMIN — MORPHINE SULFATE 15 MG: 15 TABLET, FILM COATED, EXTENDED RELEASE ORAL at 17:08

## 2020-11-24 RX ADMIN — MORPHINE SULFATE 4 MG: 4 INJECTION, SOLUTION INTRAMUSCULAR; INTRAVENOUS at 01:24

## 2020-11-24 RX ADMIN — MIRTAZAPINE 7.5 MG: 15 TABLET, FILM COATED ORAL at 21:09

## 2020-11-24 RX ADMIN — WATER 1000 ML: 100 IRRIGANT IRRIGATION at 13:49

## 2020-11-24 RX ADMIN — POLYETHYLENE GLYCOL 3350 17 G: 17 POWDER, FOR SOLUTION ORAL at 09:02

## 2020-11-24 RX ADMIN — OXYCODONE HYDROCHLORIDE AND ACETAMINOPHEN 1 TABLET: 7.5; 325 TABLET ORAL at 18:11

## 2020-11-24 RX ADMIN — HYDROMORPHONE HYDROCHLORIDE 0.5 MG: 1 INJECTION, SOLUTION INTRAMUSCULAR; INTRAVENOUS; SUBCUTANEOUS at 21:07

## 2020-11-24 RX ADMIN — HYDROMORPHONE HYDROCHLORIDE 0.5 MG: 1 INJECTION, SOLUTION INTRAMUSCULAR; INTRAVENOUS; SUBCUTANEOUS at 11:24

## 2020-11-24 RX ADMIN — Medication 10 ML: at 09:02

## 2020-11-24 RX ADMIN — Medication 10 ML: at 21:49

## 2020-11-24 RX ADMIN — CYANOCOBALAMIN 1000 MCG: 1000 INJECTION, SOLUTION INTRAMUSCULAR; SUBCUTANEOUS at 13:41

## 2020-11-24 RX ADMIN — Medication 100 MG: at 13:41

## 2020-11-24 RX ADMIN — Medication 10 ML: at 21:48

## 2020-11-24 RX ADMIN — OXYCODONE HYDROCHLORIDE AND ACETAMINOPHEN 1 TABLET: 7.5; 325 TABLET ORAL at 13:41

## 2020-11-24 RX ADMIN — CEFAZOLIN SODIUM 2 G: 2 SOLUTION INTRAVENOUS at 17:09

## 2020-11-24 RX ADMIN — Medication 2000 UNITS: at 17:08

## 2020-11-24 RX ADMIN — GABAPENTIN 100 MG: 100 CAPSULE ORAL at 21:09

## 2020-11-24 RX ADMIN — FOLIC ACID 1 MG: 1 TABLET ORAL at 09:01

## 2020-11-24 ASSESSMENT — ENCOUNTER SYMPTOMS
ANAL BLEEDING: 0
WHEEZING: 0
VOICE CHANGE: 0
COLOR CHANGE: 0
SHORTNESS OF BREATH: 0
APNEA: 0
BACK PAIN: 0
COUGH: 0
ABDOMINAL DISTENTION: 0
STRIDOR: 0
EYE DISCHARGE: 0
BLOOD IN STOOL: 0
ABDOMINAL PAIN: 0
CONSTIPATION: 0
NAUSEA: 0
VOMITING: 0
RECTAL PAIN: 0
CHOKING: 0
SORE THROAT: 0
DIARRHEA: 0
CHEST TIGHTNESS: 0
TROUBLE SWALLOWING: 0

## 2020-11-24 ASSESSMENT — PAIN SCALES - GENERAL
PAINLEVEL_OUTOF10: 7
PAINLEVEL_OUTOF10: 8
PAINLEVEL_OUTOF10: 7
PAINLEVEL_OUTOF10: 7
PAINLEVEL_OUTOF10: 8
PAINLEVEL_OUTOF10: 9
PAINLEVEL_OUTOF10: 7

## 2020-11-24 ASSESSMENT — PAIN DESCRIPTION - PAIN TYPE
TYPE: ACUTE PAIN;CHRONIC PAIN
TYPE: ACUTE PAIN;CHRONIC PAIN
TYPE: CHRONIC PAIN

## 2020-11-24 ASSESSMENT — PAIN DESCRIPTION - ONSET: ONSET: ON-GOING

## 2020-11-24 ASSESSMENT — PAIN DESCRIPTION - FREQUENCY
FREQUENCY: CONTINUOUS

## 2020-11-24 ASSESSMENT — PAIN DESCRIPTION - LOCATION
LOCATION: BACK;NECK

## 2020-11-24 ASSESSMENT — PAIN DESCRIPTION - DESCRIPTORS: DESCRIPTORS: NUMBNESS

## 2020-11-24 NOTE — PROGRESS NOTES
Patient back to 4W following Laminoplasty at 1930. Assessment complete. Alert and oriented, calm and cooperative. No complaints of nausea, SOB, or dizziness. He is experiencing 8/10 generalized pain, particularly in his neck, for which he has received Morphine per MAR. He is resting comfortably in bed, denying further needs at this time. Call light within reach. Safety maintained. Will continue to monitor.  Electronically signed by Elizabeth Evans RN on 11/24/2020 at 4:40 AM

## 2020-11-24 NOTE — PROGRESS NOTES
Postoperative day #1. Wound drain removal today. Incision looks good. Patient has same preoperative weakness of quadriparesis in the upper and lower extremities. Start his physical therapies PTOT  rehab consultations. May be discharged transferred to rehab anytime. Bladder control is intact.

## 2020-11-24 NOTE — DISCHARGE SUMMARY
[address]                      [address], [address] [facility]                               DISCHARGE SUMMARY    PATIENT NAME: Jodee Jara                   :        1950  MED REC NO:   [id number]                            ROOM:       [id number]  ACCOUNT NO:   [de-identified]                           ADMIT DATE: 2020  PROVIDER:     Chyna Simmons MD                      [facility] DATE:    The patient was seen in consultation for cervical canal stenosis with  myelopathy. 2020, laminoplasty C3, C4, C5, C6 with reconstruction performed. The patient tolerated the procedure well. Once he is stabilized, we  will plan on discharge. Please see the hospitalist notes for further  evaluation, treatment plan and reconciliation. I will plan to see the patient recheck exam in approximately 1 month. He is to keep the wound clean and dry by changing the dressing daily. He will increase his activities as tolerated. If he has any questions  or problems, he will contact the office.         Idalia Galindo MD    D: 2020 17:32:21       T: 2020 17:38:01     [email or web address]  Job#: [id number]     Doc#: [id number]    CC:

## 2020-11-24 NOTE — PROGRESS NOTES
[facility] Neurology Daily Progress Note  Name: Audi Donahue  Age: 79 y.o. Gender: male  CodeStatus: Full Code  Allergies: No Known Allergies    Chief Complaint:Fatigue    Primary Care Provider: Andrea Montana MD  InpatientTreatment Team: Treatment Team: Attending Provider: Janes Wall MD; Consulting Physician: Chrissy Abdul DO; Utilization Reviewer: Maria Luz Amin, RN; Consulting Physician: Abraham Dyer MD; Consulting Physician: Zuleyka Garcia MD; Surgeon: Zuleyka Garcia MD; : Kenzie Layton, RN; : Olivia Cortez, RN; Registered Nurse: Natalya Ram, NEIDA; Patient Care Tech: Alexsander Coil; : RUBÉN Valdes  Admission Date: 11/18/2020      HPI   Pt seen and examined for neuro follow up for bilateral lower extremity weakness with severe cervical spinal canal stenosis with spinal metastasis. Patient is now status post cervical laminoplasty with reconstruction at C3, C4, C5 and C6. Currently alert and oriented x3, no acute distress, cooperative. Bilateral lower extremities remain weak. He needs to be fatigued. Some paresthesias noted in the lower extremities. Patient does self cath for urinary retention. Vitals:    11/24/20 0722   BP: (!) 154/76   Pulse: 87   Resp: 17   Temp: 97.5 °F (36.4 °C)   SpO2: 100%      Review of Systems   Constitutional: Positive for fatigue. Negative for fever. HENT: Negative for hearing loss and trouble swallowing. Eyes: Negative for visual disturbance. Respiratory: Negative for cough, chest tightness, shortness of breath and wheezing. Cardiovascular: Negative for chest pain, palpitations and leg swelling. Gastrointestinal: Negative for abdominal distention, abdominal pain, constipation, diarrhea, nausea and vomiting. Genitourinary: Positive for difficulty urinating. Musculoskeletal: Positive for gait problem. Skin: Negative for color change and rash. Neurological: Positive for numbness.  Negative for dizziness, tremors, seizures, syncope, facial asymmetry, speech difficulty, weakness, light-headedness and headaches. Psychiatric/Behavioral: Negative for agitation, confusion and hallucinations. The patient is not nervous/anxious. Physical Exam  Vitals signs and nursing note reviewed. Constitutional:       General: He is not in acute distress. Appearance: He is underweight. He is ill-appearing. He is not diaphoretic. HENT:      Head: Normocephalic and atraumatic. Eyes:      General: No visual field deficit. Extraocular Movements: Extraocular movements intact. Pupils: Pupils are equal, round, and reactive to light. Cardiovascular:      Rate and Rhythm: Normal rate and regular rhythm. Pulmonary:      Effort: Pulmonary effort is normal. No respiratory distress. Breath sounds: Normal breath sounds. Skin:     General: Skin is warm and dry. Neurological:      Mental Status: He is alert and oriented to person, place, and time. Cranial Nerves: No dysarthria or facial asymmetry. Motor: Weakness present. No tremor or seizure activity.       Gait: Gait abnormal.               Medications:  Reviewed    Infusion Medications:    sodium chloride       Scheduled Medications:    sodium chloride flush  10 mL Intravenous 2 times per day    ceFAZolin (ANCEF) IVPB  2 g Intravenous Q8H    bisacodyl  5 mg Oral Daily    morphine  15 mg Oral Q8H    gabapentin  100 mg Oral TID    mirtazapine  7.5 mg Oral Nightly    Vitamin D  2,000 Units Oral Dinner    cyanocobalamin  1,000 mcg Intramuscular Weekly    coenzyme Q10  100 mg Oral Daily    lidocaine  3 patch Transdermal Daily    sodium chloride flush  10 mL Intravenous BID    polyethylene glycol  17 g Oral Daily    senna  1 tablet Oral Nightly    folic acid  1 mg Oral Daily    sodium chloride flush  10 mL Intravenous 2 times per day    enoxaparin  40 mg Subcutaneous Daily     PRN Meds: sodium chloride flush, magnesium hydroxide, polyethylene glycol, ondansetron **OR** ondansetron, HYDROmorphone, acetaminophen **OR** acetaminophen, oxyCODONE-acetaminophen, sodium chloride flush, promethazine **OR** ondansetron    Labs:   Recent Labs     11/22/20 0628 11/23/20  0654 11/24/20  0655   WBC 9.3 7.4 8.5   HGB 8.6* 9.1* 9.0*   HCT 24.9* 28.2* 28.0*    416* 429*     Recent Labs     11/22/20 0628 11/23/20  0654 11/24/20  0655   * 133* 133*   K 3.8 3.9 4.1   CL 99 100 100   CO2 22 21 22   BUN 10 13 11   CREATININE 0.53* 0.57* 0.65*   CALCIUM 8.4* 8.3* 7.9*   PHOS 2.4  --   --      No results for input(s): AST, ALT, BILIDIR, BILITOT, ALKPHOS in the last 72 hours. No results for input(s): INR in the last 72 hours. No results for input(s): Connee Matar in the last 72 hours. Urinalysis:   Lab Results   Component Value Date    NITRU Negative 09/26/2020    WBCUA 20-50 09/26/2020    BACTERIA MANY 09/26/2020    RBCUA 3-5 09/26/2020    BLOODU TRACE 09/26/2020    SPECGRAV 1.019 09/26/2020    GLUCOSEU Negative 09/26/2020       Radiology:   Most recent    EEG No procedure found. MRI of Brain No results found for this or any previous visit. No results found for this or any previous visit. MRA of the Head and Neck: No results found for this or any previous visit. No results found for this or any previous visit. No results found for this or any previous visit. CT of the Head:   Results for orders placed during the hospital encounter of 11/18/20   CT Head WO Contrast    Narrative CT Brain    Contrast medium:  Not utilized. History:  Weakness, fatigue    Comparison:  None    Findings:    Extra-axial spaces:  Normal.     Intracranial hemorrhage:  None. Ventricular system: Ventricles mildly enlarged. Sulci mildly prominent. Basal Cisterns:  Normal.    Cerebral Parenchyma: Bilateral symmetric periventricular areas decreased attenuation. Midline Shift:  None.     Cerebellum:  Normal.     Paranasal sinuses and mastoid air cells:  Normal.    Visualized Orbits:  Normal.        Impression Impression:    No acute findings. Mild cerebral atrophy. Chronic ischemic white matter disease. All CT scans at this facility use dose modulation, iterative reconstruction, and/or weight based dosing when appropriate to reduce radiation dose to as low as reasonably achievable. No results found for this or any previous visit. No results found for this or any previous visit. Carotid duplex: No results found for this or any previous visit. No results found for this or any previous visit. No results found for this or any previous visit. Echo No results found for this or any previous visit.           Assessment/Plan:  11/19/20:  Generalized weakness with leg weakness with significant weight loss.  Patient's examination suggests hyperreflexia in the lower extremity with areflexia in the upper extremity.  Patient did receive chemotherapy and therefore may have been areflexic in the upper extremity though the lower extremity reflexes are of concern for upper motor neuron type of pathology is no sensory levels.  Cord compression is a consideration.     We will arrange for a stat limited sagittal views of the cervical thoracic and lumbar spine to make sure he does not have cord compression and if they see anything specific a dedicated MRIs will further requested.  Patient CPK levels are pending see thyroid tests and B12 levels are pending.     Of note patient has lost about 40 pounds in weight in the last 2 months which could add to generalized weakness.  We will follow patient with you     11/20/20:  Patient with history of prostate cancer with known liver and bone mets and was started on Xtandi in October 2020.  Oncology is following and placed [place] [facility] on hold to rule this out as contributing factor for lower extremity weakness and falls.   Screening MRI of cervical and thoracic spine identified severe spinal canal stenosis at C3-4 and C6-7.    Will obtain dedicated MRI of cervical spine with and without contrast given these findings and patient's history of prostate cancer  Will place neurosurgery on consult  TSH within normal limits  B12 within normal limits  Folate low at 3.1, will replace  Dedicated [facility] of the cervical spine was reviewed and shows significant stenosis at C3-4-5.  Await neurosurgical opinion on this.     11/22  Patient actually is doing quite well in terms of not becoming more weaker.  Patient was seen by Dr. Chrystine Lefort surgical intervention is planned. Carlos Speak had some questions which are answered and discussed that he will require rehabilitation after his procedure which will help him.     11/23  1) cervical stenosis C3-4, C6-7:   To have surgery with Dr. William Eddy today. We will continue to follow post surgery. Order placed for inpatient rehabilitation status post laminectomy.        2) Folate deficiency:   Continue on 1 mg PO QD    11/24/20:  Status post cervical laminoplasty with reconstruction C3, C4, C5 and C6 on 11/23/2020  MRI of spine shows metastasis  Palliative care following  Patient still has considerable walking issues with gait ataxia and a neurogenic bladder. The bladder issues may be secondary to his prostate malignancy as well. We await his rehabitation evaluations. This may take several weeks to notice that he is walking better since this has been decompressed. I independently performed an evaluation on this patient. I have reviewed the above documentation completed by the Nurse Practitioner. Please see my additional contributions to the HPI, physical exam, assessment/medical decision making. Oscar Velez MD, [address] [employer] Board of Psychiatry & Neurology  Board Certified in Vascular Neurology  Board Certified in Neuromuscular Medicine  Certified in Neurorehabilitation         Collaborating physicians: Dr Lorenzo Velez    Electronically signed by JOYCE Cho CNP on 11/24/2020 at 2:57 PM

## 2020-11-24 NOTE — OP NOTE
[address]                      [address], [address] [facility]                                OPERATIVE REPORT    PATIENT NAME: Felicitas Shaver                   :        1950  MED REC NO:   [id number]                            ROOM:       [id number]  ACCOUNT NO:   [de-identified]                           ADMIT DATE: 2020  PROVIDER:     Erma Aaron MD    DATE OF PROCEDURE:  2020    PREOPERATIVE DIAGNOSIS:  Cervical stenosis with myelopathy. POSTOPERATIVE DIAGNOSIS:  Cervical stenosis with myelopathy. OPERATION PERFORMED:  Cervical laminoplasty with reconstruction C3, C4,  C5 and C6. DESCRIPTION OF PROCEDURE:  The patient was given general endotracheal  anesthesia in supine position. Three-pin head nunez was placed and  turned to the prone position. The back of the neck was prepped and  draped. Time-out and patient identified. Needle was placed. Lateral  x-rays obtained to confirm level and needle was withdrawn. Skin  infiltrated with solution 0.5% Marcaine with epinephrine. Skin incision  made over the tips of spinous processes of inferior C2 to superior C7. Dissection was carried down the spinous process tips. First on the left  than on the right side a spinous processes, lamina and lateral masses  were exposed bilaterally at C3, C4, C5 and C6. Working on the patient's  right side, identified the junction of the lamina with the lateral mass. The outer or posterior cortex was cut with high-speed bone dissector. I  then did this at C3, C4, C5 and then C6. The interspinous ligament and  some of the interlaminar ligament was removed with the 1- and 2-mm  Kerrison rongeurs between C2-C3 and between C6-C7. Then working on the  patient's left side, I have found the junction of the lamina with the  lateral mass. I used 1- and 2-mm Kerrison rongeurs. The posterior  cortex was then removed with a high-speed bone dissector.   With the use  of the

## 2020-11-24 NOTE — PROGRESS NOTES
Pt has complained of 8/10 pain throughout the day, PRN meds given. Drain removed, dry dressing is clean, dry, and intact. He was up to the chair, tolerated well. Waiting for Rehab precert. Call light in reach, bed alarm on.

## 2020-11-24 NOTE — PROGRESS NOTES
Hospitalist Progress Note      PCP: Estrella Rizo MD    Date of Admission: 11/18/2020    Chief Complaint:  No acute events, afebrile, stable HD, s/p C3-6 laminoplasty yesterday    Medications:  Reviewed    Infusion Medications    sodium chloride       Scheduled Medications    sodium chloride flush  10 mL Intravenous 2 times per day    ceFAZolin (ANCEF) IVPB  2 g Intravenous Q8H    bisacodyl  5 mg Oral Daily    morphine  15 mg Oral Q8H    gabapentin  100 mg Oral TID    mirtazapine  7.5 mg Oral Nightly    sodium chloride flush  10 mL Intravenous BID    polyethylene glycol  17 g Oral Daily    senna  1 tablet Oral Nightly    folic acid  1 mg Oral Daily    sodium chloride flush  10 mL Intravenous 2 times per day    enoxaparin  40 mg Subcutaneous Daily     PRN Meds: sodium chloride flush, acetaminophen, magnesium hydroxide, polyethylene glycol, ondansetron **OR** ondansetron, oxyCODONE, HYDROmorphone, sodium chloride flush, acetaminophen **OR** acetaminophen, promethazine **OR** ondansetron      Intake/Output Summary (Last 24 hours) at 11/24/2020 1124  Last data filed at 11/24/2020 0552  Gross per 24 hour   Intake 2700 ml   Output 700 ml   Net 2000 ml       Exam:    BP (!) 154/76   Pulse 87   Temp 97.5 °F (36.4 °C)   Resp 14   Ht 6' 1\" (1.854 m)   Wt 147 lb 4.3 oz (66.8 kg)   SpO2 100%   BMI 19.43 kg/m²     General appearance: appears stated age and cooperative. Respiratory: clear to auscultation, bilaterally   Cardiovascular: Regular rate and rhythm with normal S1/S2  Abdomen: Soft, active bowel sounds.   Extremities: no edema      Labs:   Recent Labs     11/22/20 0628 11/23/20  0654 11/24/20  0655   WBC 9.3 7.4 8.5   HGB 8.6* 9.1* 9.0*   HCT 24.9* 28.2* 28.0*    416* 429*     Recent Labs     11/22/20 0628 11/23/20  0654 11/24/20  0655   * 133* 133*   K 3.8 3.9 4.1   CL 99 100 100   CO2 22 21 22   BUN 10 13 11   CREATININE 0.53* 0.57* 0.65*   CALCIUM 8.4* 8.3* 7.9*   PHOS 2.4  -- --      No results for input(s): AST, ALT, BILIDIR, BILITOT, ALKPHOS in the last 72 hours. No results for input(s): INR in the last 72 hours. No results for input(s): Somali Pappas in the last 72 hours. Urinalysis:      Lab Results   Component Value Date    NITRU Negative 09/26/2020    WBCUA 20-50 09/26/2020    BACTERIA MANY 09/26/2020    RBCUA 3-5 09/26/2020    BLOODU TRACE 09/26/2020    SPECGRAV 1.019 09/26/2020    GLUCOSEU Negative 09/26/2020       Radiology:  RADIOLOGY REPORT   Final Result      FLUORO FOR SURGICAL PROCEDURES   Preliminary Result   POST-OPERATIVE APPEARANCE AS SHOWN.            MRI CERVICAL SPINE W WO CONTRAST   Final Result      Osseous metastases throughout the clivus and a 1 cm T3 vertebra metastasis. Please refer to prior whole spine MRI detailing more extensive metastasis. C3-4 through C6-7 myelomalacia with cord atrophy, and abnormal signal due to canal stenosis and chronic cord compression. C3-4 through C6-7 varying degrees of significant foraminal stenosis predominantly due to uncovertebral joint arthrosis. Evaluation less than optimal due to motion. Punctate posterior peripheral cord enhancement at the C4-5 disc level and equivocal just below the C3-4 disc level detailed above. MRI CERVICAL THORACIC LUMBAR SPINE WO CONTRAST LIMITED   Final Result      Advanced cervical spondylosis from C3-4 through C6-7 resulting in severe spinal canal stenosis at these levels with minimal spinal canal diameter approaching 4 to 5 mm. Multiple metastatic bone disease throughout the thoracolumbar spine with no pathologic fracture. CT Head WO Contrast   Final Result   Impression:      No acute findings. Mild cerebral atrophy. Chronic ischemic white matter disease.          All CT scans at this facility use dose modulation, iterative reconstruction, and/or weight based dosing when appropriate to reduce radiation dose to as low as reasonably achievable. XR CHEST PORTABLE   Final Result   NO ACUTE CARDIOPULMONARY DISEASE.               Assessment/Plan:    80 y/o man with history of metastatic prostate cancer who presented with:    Recurrent falls and generalized weakness  - multifactorial in etiology including orthostatic hypotension suspect due to autonomic dysfunction, severe C-spine stenosis, advanced prostate cancer on chemotherapy, moderate protein calorie malnutrition  - MRI of the spine showed diffuse mets  - s/p C3-6 laminoplasty with reconstruction on 11/23  - followed by neurosurgery    Orthostatic hypotension  - s/p IV hydration  - repeat orthostatics, if still  positive, consider midodrine / florinef     Hyponatremia   - mild, stable    Microcytic anemia   - follow iron studies    Metastatic prostate cancer with severe bone pain  - Xtandi on hold  - on MS contin, oxycodone, Dilaudid,gabapentin  - followed by oncology and palliative care     Constipation  - continue bowel regimen     Folic acid deficiency  - continue supplement     Cancer related pain   - on opioids    Severe malnutrition  - followed by dietitian       Disposition - acute rehab          Electronically signed by Luz Marina Loza MD on 11/24/2020 at 11:24 AM

## 2020-11-24 NOTE — PROGRESS NOTES
[facility] OCCUPATIONAL THERAPY EVALUATION - ACUTE     NAME: Cory Rodriguez  : 1950 (83 y.o.)  MRN: [id number]  CODE STATUS: Full Code  Room: N3/Z059-15    Date of Service: 2020    Patient Diagnosis(es): Fall at home, initial encounter [W19. XXXA, H55.136]  Falls, initial encounter [A15. XXXA]   Chief Complaint   Patient presents with    Fatigue     Patient Active Problem List    Diagnosis Date Noted    Stenosis of cervical spine with myelopathy ([address] 2020    Malignant tumor of prostate ([address] 2020    Raised prostate specific antigen 2020    Retention of urine 2020    Urethritis 2020    Gait abnormality 2020    Myelopathy ([address]     Falls, initial encounter 2020    Severe malnutrition ([address] 2020    Fall at home, initial encounter 2020    Goals of care, counseling/discussion     Gastrointestinal hemorrhage     Rectal mass     Acute cystitis without hematuria     Metastatic cancer ([address]     Gastric ulcer 2020    Weakness     Anemia 2020    Other specified disorders of bone density and structure, unspecified site      Liver mass 2020    Osteoarthritis of hip 2020    Benign prostatic hyperplasia with incomplete bladder emptying 2018    Palpitations 10/19/2010        Past Medical History:   Diagnosis Date    PAF (paroxysmal atrial fibrillation) ([address]     ON XARELTO    Prostate cancer ([address] 2019     Past Surgical History:   Procedure Laterality Date    COLONOSCOPY N/A 2020    COLONOSCOPY DIAGNOSTIC performed by Ingrid Ricks MD at [facility] Right 2018    hip    UPPER GASTROINTESTINAL ENDOSCOPY N/A 2020    EGD DIAGNOSTIC ONLY performed by [facility]grid [facility] at [facility]        Restrictions  Restrictions/Precautions: Fall Risk  Position Activity Restriction  Other position/activity restrictions: Limit cervical ROM to within pain tolerable range Safety Devices: Safety Devices  Safety Devices in place: Yes  Type of devices: All fall risk precautions in place        Subjective  Pre Treatment Pain Screening  Pain at present: 8  Scale Used: Numeric Score  Intervention List: Patient able to continue with treatment, Patient declined any intervention    Pain Reassessment:   Pain Assessment  Patient Currently in Pain: Yes  Pain Assessment: 0-10  Pain Level: 8  Pain Type: Chronic pain  Pain Location: Back, Neck  Pain Descriptors: Numbness  Pain Frequency: Continuous       Prior Level of Function:  Social/Functional History  Lives With: Alone  Type of Home: Apartment  Home Layout: One level  Home Access: Level entry  Bathroom Shower/Tub: Tub/Shower unit  Bathroom Equipment: Grab bars in shower  Home Equipment: (NA)  ADL Assistance: Independent  Homemaking Assistance: Independent  Ambulation Assistance: Independent  Transfer Assistance: Independent  Active : No  Patient's  Info: Sister  Additional Comments: Pt. reports he has been having near falls but no falls in the last few weeks, but progressively more weakness hte last few weeks    OBJECTIVE:     Orientation Status:  Orientation  Overall Orientation Status: Within Functional Limits    Observation:  Observation/Palpation  Posture: Good  Observation: No acute distress noted.     Cognition Status:  Cognition  Overall Cognitive Status: WFL    Perception Status:  Perception  Overall Perceptual Status: WFL    Sensation Status:  Sensation  Overall Sensation Status: Impaired(Reports numbness in B hands)    Vision and Hearing Status:  Vision  Vision: Within Functional Limits  Hearing  Hearing: Within functional limits     ROM:   LUE AROM (degrees)  LUE AROM : WFL  Left Hand AROM (degrees)  Left Hand AROM: WFL  RUE AROM (degrees)  RUE AROM : WFL  Right Hand AROM (degrees)  Right Hand AROM: WFL    Strength:  LUE Strength  Gross LUE Strength: Exceptions to [facility] Hand General: 3+/5  LUE Strength Comment: 3+/5 grossly, decreased overall  RUE Strength  Gross RUE Strength: Exceptions to [facility]  R Hand General: 3+/5  RUE Strength Comment: 3+/5 grossly, decreased overall    Coordination, Tone, Quality of Movement: Tone RUE  RUE Tone: Normotonic  Tone LUE  LUE Tone: Normotonic  Coordination  Movements Are Fluid And Coordinated: No  Coordination and Movement description: Fine motor impairments, Decreased speed, Decreased accuracy, Right UE, Left UE    Hand Dominance:  Hand Dominance  Hand Dominance: Right    ADL Status:  ADL  Feeding: Independent  Grooming: Supervision  UE Bathing: Stand by assistance  LE Bathing: Minimal assistance  UE Dressing: Stand by assistance  LE Dressing: Minimal assistance  Toileting: Minimal assistance  Additional Comments: Simulated ADLs as above. Decreased overall endurance, strength and coordination.  Fatigues quickly, limited by neck pain  Toilet Transfers  Toilet - Technique: Ambulating  Equipment Used: Grab bars  Toilet Transfer: Unable to assess  Toilet Transfers Comments: Did not ambulate to toilet; increased time for mobility, anticipate CGA for mobility       Therapy key for assistance levels -   Independent = Pt. is able to perform task with no assistance but may require a device   Stand by assistance = Pt. does not perform task at an independent level but does not need physical assistance, requires verbal cues  Minimal, Moderate, Maximal Assistance = Pt. requires physical assistance (25%, 50%, 75% assist from helper) for task but is able to actively participate in task   Dependent = Pt. requires total assistance with task and is not able to actively participate with task completion     Functional Mobility:  Functional Mobility  Functional - Mobility Device: No device  Activity: Other  Assist Level: Contact guard assistance  Functional Mobility Comments: CGA to chair only, decreased mobility partly limited by room setup  Transfers  Sit to stand: Contact guard assistance  Stand to sit: Contact guard assistance    Bed Mobility  Bed mobility  Rolling to Left: Minimal assistance  Supine to Sit: Minimal assistance  Sit to Supine: Stand by assistance  Comment: Instructed in log roll technique; up to chair at end of tx    Seated and Standing Balance:  Balance  Sitting Balance: Supervision  Standing Balance: Contact guard assistance    Functional Endurance:  Activity Tolerance  Activity Tolerance: Patient Tolerated treatment well    D/C Recommendations:  OT D/C RECOMMENDATIONS  REQUIRES OT FOLLOW UP: Yes    Equipment Recommendations:  OT Equipment Recommendations  Other: Continue to assess    OT Education:   OT Education  OT Education: Plan of Care, OT Role  Patient Education: Educated pt. on role of acute care OT  Barriers to Learning: None    OT Follow Up:  OT D/C RECOMMENDATIONS  REQUIRES OT FOLLOW UP: Yes       Assessment/Discharge Disposition:  Assessment: Pt. seen post spinal sx for re-evaluation. Pt. demonstrates the above deficits which impact his ability to perform ADLs and IADLs. Pt. would benefit from continued OT to maximize independence and safety with ADL tasks.   Performance deficits / Impairments: Decreased functional mobility , Decreased ADL status, Decreased strength, Decreased endurance, Decreased balance, Decreased high-level IADLs, Decreased fine motor control, Decreased sensation  Prognosis: Good  Discharge Recommendations: Continue to assess pending progress  Decision Making: Medium Complexity  History: Pt's medical history is moderately complex  Exam: Pt. has 8 performance deficits  Assistance / Modification: Pt. requires min A    Six Click Score   How much help for putting on and taking off regular lower body clothing?: A Little  How much help for Bathing?: A Little  How much help for Toileting?: A Little  How much help for putting on and taking off regular upper body clothing?: A Little  How much help for taking care of personal grooming?: A Little  How much help for eating meals?: A Little  AM-[facility] Inpatient Daily Activity Raw Score: 18  AM-PAC Inpatient ADL T-Scale Score : 38.66  ADL Inpatient CMS 0-100% Score: 46.65    Plan:  Plan  Times per week: 1-3x  Plan weeks: Length of acute stay  Current Treatment Recommendations: Strengthening, Balance Training, Functional Mobility Training, Endurance Training, Safety Education & Training, Patient/Caregiver Education & Training, Equipment Evaluation, Education, & procurement, Pain Management, Self-Care / ADL, Home Management Training, Cognitive/Perceptual Training, Neuromuscular Re-education    Goals:   Patient will:    - Improve functional endurance to tolerate/complete 30 mins of ADL's  - Be Mod I in UB ADLs   - Be SBA in LB ADLs  - Be SBA in ADL transfers without LOB  - Be SBA in toileting tasks  - Improve B hand fine motor coordination to [facility] in order to manage clothing fasteners/self-care containers in a timely manner  - Improve B UE strength and endurance to 4-/5 in order to participate in self-care activities as projected. - Access appropriate D/C site with as few architectural barriers as possible. - Sequence self-care tasks with no verbal cues for safety    Patient Goal: Patient goals : \"I want to get home\"     Discussed and agreed upon: Yes Comments:     Therapy Time:   OT Individual Minutes  Time In: 0957  Time Out: 1005  Minutes: 8    Eval: 8 minutes     Electronically signed by:     GIFTY Silveira  11/24/2020, 10:25 AM

## 2020-11-24 NOTE — PROGRESS NOTES
Physical Therapy Med Surg Initial Assessment  Facility/Department: [facility]  Room: [place] [id number]       NAME: Bob Vines  : 1950 (21 y.o.)  MRN: [id number]  CODE STATUS: Full Code    Date of Service: 2020    Patient Diagnosis(es): Fall at home, initial encounter [W19. XXXA, Z93.492]  Falls, initial encounter [W16. XXXA]   Chief Complaint   Patient presents with    Fatigue     Patient Active Problem List    Diagnosis Date Noted    Stenosis of cervical spine with myelopathy ([address] 2020    Malignant tumor of prostate ([address] 2020    Raised prostate specific antigen 2020    Retention of urine 2020    Urethritis 2020    Gait abnormality 2020    Myelopathy ([address]     Falls, initial encounter 2020    Severe malnutrition ([address] 2020    Fall at home, initial encounter 2020    Goals of care, counseling/discussion     Gastrointestinal hemorrhage     Rectal mass     Acute cystitis without hematuria     Metastatic cancer ([address]     Gastric ulcer 2020    Weakness     Anemia 2020    Other specified disorders of bone density and structure, unspecified site      Liver mass 2020    Osteoarthritis of hip 2020    Benign prostatic hyperplasia with incomplete bladder emptying 2018    Palpitations 10/19/2010        Past Medical History:   Diagnosis Date    PAF (paroxysmal atrial fibrillation) ([address]     ON XARELTO    Prostate cancer ([address] 2019     Past Surgical History:   Procedure Laterality Date    COLONOSCOPY N/A 2020    COLONOSCOPY DIAGNOSTIC performed by Sameer Sanchez MD at [facility] Right 2018    hip    UPPER GASTROINTESTINAL ENDOSCOPY N/A 2020    EGD DIAGNOSTIC ONLY performed by Sameer Sanchez MD at [facility]       Patient assessed for rehabilitation services?: Yes    Restrictions:  Restrictions/Precautions: Fall Risk  Position Activity Restriction  Other position/activity restrictions: Limit cervical ROM to within pain tolerable range     SUBJECTIVE:      Pain       Post Treatment Pain Screening:        Prior Level of Function:  Social/Functional History  Lives With: Alone  Type of Home: Apartment  Home Layout: One level  Home Access: Level entry  Bathroom Shower/Tub: Tub/Shower unit  Bathroom Equipment: Grab bars in shower  Home Equipment: (NA)  ADL Assistance: Independent  Homemaking Assistance: Independent  Ambulation Assistance: Independent  Transfer Assistance: Independent  Active : No  Patient's  Info: Sister    OBJECTIVE:   Vision: Within Functional Limits  Hearing: Within functional limits    Cognition:  Overall Orientation Status: Within Functional Limits  Follows Commands: Within Functional Limits    Observation/Palpation  Observation: No acute distress noted. ROM:  RLE PROM: WFL  LLE PROM: WFL  Spine  Cervical: painful and limited all ranges    Strength:  Strength RLE  Strength RLE: WFL  Strength LLE  Strength LLE: WFL    Neuro:  Balance  Sitting - Static: Good  Sitting - Dynamic: Fair;Good  Standing - Static: Good;Fair  Standing - Dynamic: Fair  Comments: Limited OBOS to pain     Motor Control  Gross Motor?: WFL  Sensation  Overall Sensation Status: (numbness in hands)    Bed mobility  Rolling to Left: Minimal assistance  Supine to Sit: Minimal assistance  Comment: Instructed in logroll technique    Transfers  Sit to Stand: Contact guard assistance  Stand to sit: Contact guard assistance  Bed to Chair: Contact guard assistance    Ambulation  Ambulation?: Yes  Ambulation 1  Surface: level tile  Device: No Device  Assistance: Contact guard assistance;Stand by assistance  Quality of Gait: Shuffling steps with minimal trunk rotation.   Distance: 3ft  Comments: further distance limited to small working space for mobility    Stairs/Curb  Stairs?: No         Activity Tolerance  Activity Tolerance: Patient Tolerated treatment well          PT Education  PT Education: Goals;PT Role;Plan of Care    ASSESSMENT:   Body structures, Functions, Activity limitations: Decreased functional mobility ; Decreased strength;Decreased posture;Decreased balance;Decreased ROM; Decreased safe awareness;Decreased coordination;Decreased endurance;Decreased ADL status; Increased pain  Decision Making: Medium Complexity  History: Med  Exam: Med  Clinical Presentation: Med    Prognosis: Good  Barriers to Learning: None    DISCHARGE RECOMMENDATIONS:  Discharge Recommendations: Continue to assess pending progress, Patient would benefit from continued therapy after discharge    Assessment: Continued PT indicated to progress mobility and facilitate DC at highest level of indep and safety. Rec therapy stay prior to DC home. REQUIRES PT FOLLOW UP: Yes      PLAN OF CARE:  Plan  Times per week: 3-6  Current Treatment Recommendations: Strengthening, Functional Mobility Training, Neuromuscular Re-education, Manual Therapy - Joint Manipulation, Safety Education & Training, Gait Training, Transfer Training, Balance Training, Stair training, Patient/Caregiver Education & Training, Equipment Evaluation, Education, & procurement, Positioning, ADL/Self-care Training, Endurance Training, ROM, Modalities, Manual Therapy - Soft Tissue Mobilization  Safety Devices  Type of devices:  All fall risk precautions in place    Goals:  Short term goals  Short term goal 1: Pt to complete HEP with indep  Long term goals  Long term goal 1: Pt to complete bed mobility with indep  Long term goal 2: Pt to complete transfers with indep  Long term goal 3: Pt to ambulate 150ft with LRD and indep    AMPAC (6 CLICK) BASIC MOBILITY  AM-PAC Inpatient Mobility Raw Score : 18     Therapy Time:   Individual   Time In 0957   Time Out 1005   Minutes Ricarda [address] [place] [address], 11/24/20 at 10:22 AM         Definitions for assistance levels  Independent = pt does not require any physical supervision or assistance from another person for activity completion. Device may be needed.   Stand by assistance = pt requires verbal cues or instructions from another person, close to but not touching, to perform the activity  Minimal assistance= pt performs 75% or more of the activity; assistance is required to complete the activity  Moderate assistance= pt performs 50% of the activity; assistance is required to complete the activity  Maximal assistance = pt performs 25% of the activity; assistance is required to complete the activity  Dependent = pt requires total physical assistance to accomplish the task

## 2020-11-24 NOTE — PROGRESS NOTES
Palliative Care Progress Note  Patient: Ana Mak  Gender: male  YOB: 1950  Unit/Bed: S542/U807-27  Code Status: Full Code  Inpatient Treatment Team: Treatment Team: Attending Provider: Chuyita Martinez MD; Consulting Physician: Harshad Flores DO; Utilization Reviewer: Parveen Garcia, NEIDA; Consulting Physician: Gilles Torres MD; Consulting Physician: Robyn Sorto MD; Surgeon: Robyn Sorto MD; : Deandre Cunningham, NEIDA; : Kulwinder Black RN; Registered Nurse: Breann Glynn RN; Patient Care Tech: [employer]; : Tania Vance [facility]  Admit Date:  11/18/2020    Chief Complaint: Pain    History of Presenting Illness:      Ana Mak is a 79 y.o. male on hospital day 4 with a history of androgen independent prostate cancer. Known liver and bone mets, severe malnutrition, falls, urine retention self caths, gastric ulcer with hemorrhage. Presented with  progressive quadriparesis weight loss inability to ambulate. His legs will not support him anymore. Every few weeks he is getting weaker and weaker. He is now unable to ambulate. Found to have severe spinal canal stenosis at C3-C7 with cord atrophy      Laminoplasty with reconstruction was done yesterday, he is upright in chair, has same preoperative weakness of quadriparesis in the upper and lower extremities, still has same complaints of numbness. He is being evaluated for PT and OT. He agrees that his bone pain related to neoplasm is  Controlled \" pretty well, could be better\", but \" numbness is making me nuts:. As far as he can remember he has never tried anything specifically for neuropathic pain. He describes the numbness discomfort as severe, the pain the surgical area is severe     Today he admits to not having a Bm for several days.             Review of Systems:       Review of Systems   Constitutional: Negative for activity change, appetite change, chills, diaphoresis, fatigue, fever and unexpected weight change. HENT: Negative for drooling, hearing loss, mouth sores, sore throat, trouble swallowing and voice change. Eyes: Negative for discharge and visual disturbance. Respiratory: Negative for apnea, cough, choking, chest tightness, shortness of breath, wheezing and stridor. Cardiovascular: Negative for chest pain, palpitations and leg swelling. Gastrointestinal: Negative for abdominal distention, abdominal pain, anal bleeding, blood in stool, constipation, diarrhea, nausea, rectal pain and vomiting. Genitourinary: Negative for difficulty urinating, dysuria, enuresis, frequency and hematuria. Musculoskeletal: Negative for arthralgias, back pain, gait problem, joint swelling and myalgias. Skin: Negative for color change, pallor, rash and wound. Allergic/Immunologic: Negative for food allergies and immunocompromised state. Neurological: Negative for dizziness, tremors, seizures, syncope, facial asymmetry, speech difficulty, weakness, light-headedness, numbness and headaches. Hematological: Negative for adenopathy. Does not bruise/bleed easily. Psychiatric/Behavioral: Negative for agitation, behavioral problems, confusion, decreased concentration, dysphoric mood, hallucinations, self-injury, sleep disturbance and suicidal ideas. The patient is not nervous/anxious and is not hyperactive. Physical Examination:       BP (!) 154/76   Pulse 87   Temp 97.5 °F (36.4 °C)   Resp 14   Ht 6' 1\" (1.854 m)   Wt 147 lb 4.3 oz (66.8 kg)   SpO2 100%   BMI 19.43 kg/m²    Physical Exam  Constitutional:       General: He is not in acute distress. Appearance: He is underweight. He is not diaphoretic. HENT:      Head: Normocephalic and atraumatic. Right Ear: External ear normal.      Left Ear: External ear normal.      Nose: Nose normal.      Mouth/Throat:      Pharynx: No oropharyngeal exudate. Eyes:      General: No scleral icterus. Right eye: No discharge.          Left eye: No discharge. Conjunctiva/sclera: Conjunctivae normal.      Pupils: Pupils are equal, round, and reactive to light. Neck:      Musculoskeletal: Normal range of motion and neck supple. Thyroid: No thyromegaly. Vascular: No JVD. Trachea: No tracheal deviation. Cardiovascular:      Rate and Rhythm: Normal rate and regular rhythm. Heart sounds: Normal heart sounds. Pulmonary:      Effort: Pulmonary effort is normal. No respiratory distress. Breath sounds: Normal breath sounds. No stridor. No wheezing or rales. Chest:      Chest wall: No tenderness. Abdominal:      General: Bowel sounds are normal. There is no distension. Palpations: Abdomen is soft. There is no mass. Tenderness: There is no abdominal tenderness. There is no guarding or rebound. Musculoskeletal: Normal range of motion. General: No tenderness or deformity. Lymphadenopathy:      Cervical: No cervical adenopathy. Skin:     General: Skin is warm and dry. Findings: No erythema or rash. Neurological:      Mental Status: He is alert and oriented to person, place, and time. Cranial Nerves: No cranial nerve deficit. Coordination: Coordination normal.   Psychiatric:         Behavior: Behavior normal.         Thought Content:  Thought content normal.         Judgment: Judgment normal.         Allergies:      No Known Allergies    Medications:      Current Facility-Administered Medications   Medication Dose Route Frequency Provider Last Rate Last Dose    0.9 % sodium chloride infusion   Intravenous Continuous Montez Carlos MD        sodium chloride flush 0.9 % injection 10 mL  10 mL Intravenous 2 times per day Montez Carlos MD   10 mL at 11/24/20 0902    sodium chloride flush 0.9 % injection 10 mL  10 mL Intravenous PRN Montez Carlos MD        ceFAZolin (ANCEF) 2 g in dextrose 3 % 50 mL IVPB (duplex)  2 g Intravenous Q8H Montez Carlos  mL/hr at 11/24/20 0909 2 g at 11/24/20 3417  HYDROmorphone (DILAUDID) injection 0.25 mg  0.25 mg Intravenous Q3H PRN Kimberly Bowen MD        Or    HYDROmorphone (DILAUDID) injection 0.5 mg  0.5 mg Intravenous Q3H PRN Kimberly Bowen MD        acetaminophen (TYLENOL) tablet 650 mg  650 mg Oral Q4H PRN Kimberly Bowen MD        oxyCODONE (ROXICODONE) immediate release tablet 5 mg  5 mg Oral Q4H PRN Kimberly Bowen MD        bisacodyl (DULCOLAX) EC tablet 5 mg  5 mg Oral Daily Kimberly Bowen MD   5 mg at 11/24/20 0901    magnesium hydroxide (MILK OF MAGNESIA) 400 MG/5ML suspension 30 mL  30 mL Oral Daily PRN Kimberly Bowen MD        polyethylene glycol [facility]) packet 17 g  17 g Oral Daily PRN Kimberly Bowen MD        ondansetron (ZOFRAN-ODT) disintegrating tablet 4 mg  4 mg Oral Q8H PRN Kimberly Bowen MD        Or    ondansetron [facility]) injection 4 mg  4 mg Intravenous Q6H PRN Kimberly Bowen MD        morphine injection 4 mg  4 mg Intravenous Q4H PRN Mima Cramer MD   4 mg at 11/24/20 0124    sodium chloride flush 0.9 % injection 10 mL  10 mL Intravenous BID JOYCE Avitia - CNP   10 mL at 11/23/20 2100    polyethylene glycol (GLYCOLAX) packet 17 g  17 g Oral Daily [employer], DO   17 g at 11/24/20 0902    senna (SENOKOT) tablet 8.6 mg  1 tablet Oral Nightly [employer], DO   8.6 mg at 11/23/20 2059    morphine (MS CONTIN) extended release tablet 15 mg  15 mg Oral 2 times per day [employer], DO   15 mg at 11/24/20 0901    oxyCODONE (ROXICODONE) immediate release tablet 5 mg  5 mg Oral Q4H PRN [employer], DO   5 mg at 11/44/53 9406    folic acid (FOLVITE) tablet 1 mg  1 mg Oral Daily Mike Arndt MD   1 mg at 11/24/20 0901    sodium chloride flush 0.9 % injection 10 mL  10 mL Intravenous 2 times per day Alix Traylor MD   10 mL at 11/23/20 2100    sodium chloride flush 0.9 % injection 10 mL  10 mL Intravenous PRN Alix Traylor MD   10 mL at 11/19/20 1516    acetaminophen (TYLENOL) tablet 650 mg  650 mg Oral Q6H PRN Kamlesh Left Nolan Daly MD        Or    acetaminophen (TYLENOL) suppository 650 mg  650 mg Rectal Q6H PRN Ashlie Tutlte MD        [facility]) tablet 12.5 mg  12.5 mg Oral Q6H PRN Ashlie Tuttle MD        Or    ondansetron [facility] injection 4 mg  4 mg Intravenous Q6H PRN Ashlie Tuttle MD   4 mg at 11/18/20 1843    enoxaparin (LOVENOX) injection 40 mg  40 mg Subcutaneous Daily Ashlie Tuttle MD   40 mg at 11/24/20 0901       History: PMHx:  Past Medical History:   Diagnosis Date    PA[address] ([facility]oxysmal atrial fibrillation) ([facility] [address]     ON XARELTO    Prostate cancer ([facility] [address] 11/2019       PSHx:  Past Surgical History:   Procedure Laterality Date    COLONOSCOPY N/A 9/29/2020    COLONOSCOPY DIAGNOSTIC performed by Kamlesh Geronimo MD at [address] Right 11/2018    hip    UPPER GASTROINTESTINAL ENDOSCOPY N/A 9/29/2020    EGD DIAGNOSTIC ONLY performed by Kamlesh Geronimo MD at [address] Hx:  Social History     Socioeconomic History    Marital status: Single     Spouse name: None    Number of children: None    Years of education: None    Highest education level: None   Occupational History    Occupation: retired [employer]   Social Needs    Financial resource strain: Not very hard    Food insecurity     Worry: Never true     Inability: Never true    Transportation needs     Medical: No     Non-medical: No   Tobacco Use    Smoking status: Never Smoker    Smokeless tobacco: Never Used    Tobacco comment: denies   Substance and Sexual Activity    Alcohol use: Not Currently     Frequency: 2-4 times a month     Comment: denies    Drug use: No     Comment: denies    Sexual activity: None   Lifestyle    Physical activity     Days per week: 0 days     Minutes per session: 0 min    Stress:  Only a little   Relationships    Social connections     Talks on phone: None     Gets together: None     Attends Nondenominational service: None     Active member of club or organization: None     Attends meetings of clubs or organizations: None     Relationship status: None    Intimate partner violence     Fear of current or ex partner: No     Emotionally abused: No     Physically abused: No     Forced sexual activity: No   Other Topics Concern    None   Social History Narrative    Retired from [employer] With: Alone    Type of Home: [address]: One level    Home Access: Level entry    Bathroom Shower/Tub: Tub/Shower unit    [employer]: Grab bars in [facility]: Danielle: Independent    Transfer Assistance: Independent    Active : No    Patient's  Info: Sister    Additional Comments: Pt. reports he has been having near falls but no falls in the last few weeks, but progressively more weakness hte last few weeks       Family Hx:  History reviewed. No pertinent family history.     LABS: Reviewed     CBC:  Lab Results   Component Value Date    WBC 8.5 11/24/2020    RBC 3.61 11/24/2020    HGB 9.0 11/24/2020    HCT 28.0 11/24/2020    MCV 77.6 11/24/2020    MCH 24.9 11/24/2020    MCHC 32.0 11/24/2020    RDW 18.4 11/24/2020     11/24/2020    MPV 8.7 09/09/2015     CBC with Differential:   Lab Results   Component Value Date    WBC 8.5 11/24/2020    RBC 3.61 11/24/2020    HGB 9.0 11/24/2020    HCT 28.0 11/24/2020     11/24/2020    MCV 77.6 11/24/2020    MCH 24.9 11/24/2020    MCHC 32.0 11/24/2020    RDW 18.4 11/24/2020    METASPCT 1 09/26/2020    LYMPHOPCT 13.0 11/24/2020    MONOPCT 10.1 11/24/2020    MYELOPCT 1 09/26/2020    BASOPCT 0.2 11/24/2020    MONOSABS 0.9 11/24/2020    LYMPHSABS 1.1 11/24/2020    EOSABS 0.0 11/24/2020    BASOSABS 0.0 11/24/2020     CMP:    Lab Results   Component Value Date     11/24/2020    K 4.1 11/24/2020     11/24/2020    CO2 22 11/24/2020    BUN 11 11/24/2020    CREATININE 0.65 11/24/2020 every 8 hours  Oxycodone IR 5 mg po every 4 hours for moderate to severe breakthrough pain  Hydromorphone 0.5 mg IV every 4 hours prn severe breakthrough pain  Start Gabapentin 100 mg po tid    Constipation  Continue current POC  Will reevaluate tomorrow    -Advance Care Planning  Discussed goals of care with patient. Explained in extensive detail nuances between full code, DNR CCA and DNR CC. Patient has made the decision to be  FULL CODE      -Goals of Care Discussion:  Disease process and goals of treatment were discussed in basic terms. Herman's goal is to optimize available comfort care measures to maximize comfort and function ,  We discussed the palliative care philosophy in light of those goals. We discussed all care options contingent on treatment response and QOL. Much active listening, presence, and emotional support were given.          Electronically signed by JOYCE Singer CNP on 11/24/2020 at 10:27 AM

## 2020-11-24 NOTE — PROGRESS NOTES
Subjective: The patient complains of severe  acute on chronic neck pain and weakness partially relieved by rest, PT, OT and meds and exacerbated by exertion and recent illness. I am concerned about patients medical complexities. Precert started with [facility] Medicare for Acute Inpatient Rehab, no waivers in place at this time. Reviewed recent nursing note, \"  Patient back to 4W following Laminoplasty at 1930. Assessment complete. Alert and oriented, calm and cooperative. No complaints of nausea, SOB, or dizziness. He is experiencing 8/10 generalized pain, particularly in his neck, for which he has received Morphine per MAR. He is resting comfortably in bed, \". Pain is not controlled with OXY 5 mg. ROS x10: The patient also complains of severely impaired mobility and activities of daily living. Otherwise no new problems with vision, hearing, nose, mouth, throat, dermal, cardiovascular, GI, , pulmonary, musculoskeletal, psychiatric or neurological. See Rehab consult on Rehab chart . Vital signs:  BP (!) 154/76   Pulse 87   Temp 97.5 °F (36.4 °C)   Resp 17   Ht 6' 1\" (1.854 m)   Wt 147 lb 4.3 oz (66.8 kg)   SpO2 100%   BMI 19.43 kg/m²   I/O:   PO/Intake:    fair PO intake,       Bowel/Bladder:   continent,    General:  Patient is well developed, adequately nourished, non-obese and     well kempt. HEENT:    PERRLA, hearing intact to loud voice, external inspection of ear     and nose benign. Inspection of lips, tongue and gums benign  Musculoskeletal: No significant change in strength or tone. All joints stable. Inspection and palpation of digits and nails show no clubbing,       cyanosis or inflammatory conditions. Neuro/Psychiatric: Affect: flat-  Alert and oriented to self and     Situation. No significant change in deep tendon reflexes or     sensation  Lungs:  Diminished, CTA-B. Respiration effort is normal at rest.     Heart:   S1 = S2,  RRR.   No loud murmurs. Abdomen:  Soft, non-tender, no enlargement of liver or spleen. Extremities:  No significant lower extremity edema or tenderness. Skin:    BUE bruises dt blood draws, c spine incision    Rehabilitation:  Physical therapy: FIMS:  Bed Mobility:      Transfers: Sit to Stand: Contact guard assistance  Stand to sit: Contact guard assistance  Bed to Chair: Contact guard assistance, Ambulation 1  Surface: level tile  Device: No Device  Assistance: Contact guard assistance, Stand by assistance  Quality of Gait: Shuffling steps with minimal trunk rotation. Gait Deviations: Slow Yanet, Decreased step length  Distance: 3ft  Comments: further distance limited to small working space for mobility,      FIMS:  ,  , Assessment: Continued PT indicated to progress mobility and facilitate DC at highest level of indep and safety. Rec therapy stay prior to DC home. Occupational therapy: FIMS:   ,  , Assessment: Pt. seen post spinal sx for re-evaluation. Pt. demonstrates the above deficits which impact his ability to perform ADLs and IADLs. Pt. would benefit from continued OT to maximize independence and safety with ADL tasks.     Speech therapy: FIMS:        Lab/X-ray studies reviewed, analyzed and discussed with patient and staff:   Recent Results (from the past 24 hour(s))   CBC Auto Differential    Collection Time: 11/24/20  6:55 AM   Result Value Ref Range    WBC 8.5 4.8 - 10.8 K/uL    RBC 3.61 (L) 4.70 - 6.10 M/uL    Hemoglobin 9.0 (L) 14.0 - 18.0 g/dL    Hematocrit 28.0 (L) 42.0 - 52.0 %    MCV 77.6 (L) 80.0 - 100.0 fL    MCH 24.9 (L) 27.0 - 31.3 pg    MCHC 32.0 (L) 33.0 - 37.0 %    RDW 18.4 (H) 11.5 - 14.5 %    Platelets 164 (H) 501 - 400 K/uL    Neutrophils % 76.6 %    Lymphocytes % 13.0 %    Monocytes % 10.1 %    Eosinophils % 0.1 %    Basophils % 0.2 %    Neutrophils Absolute 6.5 1.4 - 6.5 K/uL    Lymphocytes Absolute 1.1 1.0 - 4.8 K/uL    Monocytes Absolute 0.9 (H) 0.2 - 0.8 K/uL    Eosinophils Absolute 0.0 0.0 - 0.7 K/uL    Basophils Absolute 0.0 0.0 - 0.2 K/uL   Basic Metabolic Panel w/ Reflex to MG    Collection Time: 11/24/20  6:55 AM   Result Value Ref Range    Sodium 133 (L) 135 - 144 mEq/L    Potassium reflex Magnesium 4.1 3.4 - 4.9 mEq/L    Chloride 100 95 - 107 mEq/L    CO2 22 20 - 31 mEq/L    Anion Gap 11 9 - 15 mEq/L    Glucose 131 (H) 70 - 99 mg/dL    BUN 11 8 - 23 mg/dL    CREATININE 0.65 (L) 0.70 - 1.20 mg/dL    GFR Non-African American >60.0 >60    GFR  >60.0 >60    Calcium 7.9 (L) 8.5 - 9.9 mg/dL       Previous extensive, complex labs, notes and diagnostics reviewed and analyzed. ALLERGIES:    Allergies as of 11/18/2020    (No Known Allergies)      (please also verify by checking [facility])     Complex Physical Medicine & Rehab Issues Assess & Plan:   1. Severe abnormality of gait and mobility and impaired self-care and ADL's secondary to progressive cervical myelopathy. Functional and medical status reassessed regarding patients ability to participate in therapies and patient found to be able to participate in  acute intensive comprehensive inpatient rehabilitation program including PT/OT to improve balance, ambulation, ADLs, and to improve the P/AROM. It is my opinion that they will be able to tolerate 3 hours of therapy a day and benefit from it at an acute level. 2. Bowel constipation and Bladder dysfunction atonic bladder:  frequent toileting, ambulate to bathroom with assistance, check post void residuals. Check for C.difficile x1 if >2 loose stools in 24 hours, continue bowel & bladder program.  Monitor for UTI symptoms including lethargy and confusion  3. Severe postoperative neck pain and generalized OA pain: reassess pain every shift and prior to and after each therapy session, give scheduled MS Contin, prn Percocet  or Tylenol, modalities prn in therapy, consider Lidoderm, K-pad prn.  Consider increase to Dilaudid 1 mg.  4. Skin breakdown risk:  continue pressure relief program.  Daily skin exams and reports from nursing. 5. Severe fatigue due to immobility and nutritional deficits: Add vitamin B12 vitamin D and CoQ10 titrate dosing and add protein supplementation with low carb content. 6. Complex discharge planning: Risk for urinary retention given spinal cord injury-Precert started with [facility] Medicare for Acute Inpatient Rehab, no waivers in place at this time    Complex Active General Medical Issues that complicate care Assess & Plan:     1. Principal Problem:    Stenosis of cervical spine with myelopathy [facility])  Active Problems:    Palpitations    Anemia    Generalized weakness    Rectal mass    Metastatic cancer ([address]    Fall at home, initial encounter    Severe malnutrition ([address]    Falls, initial encounter    Myelopathy ([facility] [address]    Malignant tumor of prostate ([facility] [address]    Retention of urine    Urethritis    Gait abnormality  Resolved Problems:    * No resolved hospital problems.  Lian Suárez D.O., PM&R     Attending    [address]

## 2020-11-24 NOTE — PROGRESS NOTES
Hematology/Oncology   Progress Note        CHIEF COMPLAINT/HPI:  Follow up of prostate cancer. Had laminoplasty C3-6 for cervical stenosis. PSA at 3. Xtandi temperarily held for 1 week. Needs follow up in 1-2 weeks after discharged. REVIEW OF SYSTEMS:    Unremarkable except for symptoms mentioned in HPI.     Current Inpatient Medications:    Current Facility-Administered Medications   Medication Dose Route Frequency Provider Last Rate Last Dose    0.9 % sodium chloride infusion   Intravenous Continuous Lai Bales MD        sodium chloride flush 0.9 % injection 10 mL  10 mL Intravenous 2 times per day Lai Bales MD   10 mL at 11/24/20 0902    sodium chloride flush 0.9 % injection 10 mL  10 mL Intravenous PRN Lai Bales MD        ceFAZolin (ANCEF) 2 g in dextrose 3 % 50 mL IVPB (duplex)  2 g Intravenous Q8H Lai Bales  mL/hr at 11/24/20 0909 2 g at 11/24/20 0909    HYDROmorphone (DILAUDID) injection 0.25 mg  0.25 mg Intravenous Q3H PRN Lai Bales MD        Or    HYDROmorphone (DILAUDID) injection 0.5 mg  0.5 mg Intravenous Q3H PRN Lai Bales MD        acetaminophen (TYLENOL) tablet 650 mg  650 mg Oral Q4H PRN Lai Bales MD        oxyCODONE (ROXICODONE) immediate release tablet 5 mg  5 mg Oral Q4H PRN Lai Bales MD        bisacodyl (DULCOLAX) EC tablet 5 mg  5 mg Oral Daily Lai Bales MD   5 mg at 11/24/20 0901    magnesium hydroxide (MILK OF MAGNESIA) 400 MG/5ML suspension 30 mL  30 mL Oral Daily PRN Lai Bales MD        polyethylene glycol [facility]) packet 17 g  17 g Oral Daily PRN Lai Bales MD        ondansetron (ZOFRAN-ODT) disintegrating tablet 4 mg  4 mg Oral Q8H PRN Lai Bales MD        Or    ondansetron [facility]) injection 4 mg  4 mg Intravenous Q6H PRN Lai Bales MD        morphine injection 4 mg  4 mg Intravenous Q4H PRN Kristin Cornelius MD   4 mg at 11/24/20 0124    sodium chloride flush 0.9 % injection 10 mL  10 mL Intravenous BID JOYCE Fitzpatrick - IDA   10 mL at bilaterally, no crackles or wheezing    CARDIOVASCULAR:  Normal apical impulse, regular rate and rhythm, normal S1 and S2, no S3 or S4, and no murmur noted    ABDOMEN:  No scars, normal bowel sounds, soft, non-distended, non-tender, no masses palpated, no hepatosplenomegally    MUSCULOSKELETAL:  There is no redness, warmth, or swelling of the joints. Full range of motion noted. Motor strength is 5 out of 5 all extremities bilaterally. Tone is normal.  EXTREMITIES:    NEURO:    DATA:      PT/INR:  No results found for: PTINR  PTT:  No results found for: APTT  CMP:    Lab Results   Component Value Date     11/24/2020    K 4.1 11/24/2020     11/24/2020    CO2 22 11/24/2020    BUN 11 11/24/2020    PROT 8.0 11/18/2020     Magnesium:    Lab Results   Component Value Date    MG 2.2 11/18/2020     Phosphorus:  No components found for: PO4  Calcium:  No components found for: CA  CBC:    Lab Results   Component Value Date    WBC 8.5 11/24/2020    RBC 3.61 11/24/2020    HGB 9.0 11/24/2020    HCT 28.0 11/24/2020    MCV 77.6 11/24/2020    RDW 18.4 11/24/2020     11/24/2020     DIFF:    Lab Results   Component Value Date    MCV 77.6 11/24/2020    RDW 18.4 11/24/2020      LDH:  No results found for: LDH  Uric Acid:  No components found for: URIC    EKG Reviewed  Appropriate Radiology Reviewed      Pathology: Reviewed where indicated      ASSESSMENT:  Principal Problem:    Stenosis of cervical spine with myelopathy (HCC)  Active Problems:    Palpitations    Anemia    Weakness    Rectal mass    Metastatic cancer ([address]    Fall at home, initial encounter    Severe malnutrition ([address]    Falls, initial encounter    Myelopathy ([address]    Malignant tumor of prostate ([address]    Retention of urine    Urethritis    Gait abnormality  Resolved Problems:    * No resolved hospital problems.  *    Patient Active Problem List   Diagnosis    Liver mass    Osteoarthritis of hip    Palpitations    Other specified disorders of bone density and structure, unspecified site     Anemia    Weakness    Gastric ulcer    Goals of care, counseling/discussion    Gastrointestinal hemorrhage    Rectal mass    Acute cystitis without hematuria    Metastatic cancer ([address]    Fall at home, initial encounter    Severe malnutrition ([address]    Falls, initial encounter    Myelopathy ([address]    Benign prostatic hyperplasia with incomplete bladder emptying    Malignant tumor of prostate ([address]    Raised prostate specific antigen    Retention of urine    Urethritis    Gait abnormality    Stenosis of cervical spine with myelopathy (HCC)       PLAN:  Ok to discharged and follow up in our office in 1-2 weeks.            Electronically signed by Dario Mcdonald MD on 11/24/20 at 9:15 AM EST

## 2020-11-25 PROBLEM — R26.0 ATAXIC GAIT: Status: ACTIVE | Noted: 2020-11-22

## 2020-11-25 LAB
ANION GAP SERPL CALCULATED.3IONS-SCNC: 8 MEQ/L (ref 9–15)
BASOPHILS ABSOLUTE: 0.1 K/UL (ref 0–0.2)
BASOPHILS RELATIVE PERCENT: 0.6 %
BUN BLDV-MCNC: 6 MG/DL (ref 8–23)
CALCIUM SERPL-MCNC: 8.6 MG/DL (ref 8.5–9.9)
CHLORIDE BLD-SCNC: 100 MEQ/L (ref 95–107)
CO2: 23 MEQ/L (ref 20–31)
CREAT SERPL-MCNC: 0.48 MG/DL (ref 0.7–1.2)
EOSINOPHILS ABSOLUTE: 0.2 K/UL (ref 0–0.7)
EOSINOPHILS RELATIVE PERCENT: 1.6 %
FERRITIN: 2000 NG/ML (ref 30–400)
GFR AFRICAN AMERICAN: >60
GFR NON-AFRICAN AMERICAN: >60
GLUCOSE BLD-MCNC: 106 MG/DL (ref 70–99)
HCT VFR BLD CALC: 29.5 % (ref 42–52)
HEMOGLOBIN: 9.6 G/DL (ref 14–18)
IRON SATURATION: 14 % (ref 11–46)
IRON: 19 UG/DL (ref 59–158)
LYMPHOCYTES ABSOLUTE: 1.5 K/UL (ref 1–4.8)
LYMPHOCYTES RELATIVE PERCENT: 15.4 %
MCH RBC QN AUTO: 25.5 PG (ref 27–31.3)
MCHC RBC AUTO-ENTMCNC: 32.5 % (ref 33–37)
MCV RBC AUTO: 78.4 FL (ref 80–100)
MONOCYTES ABSOLUTE: 1.1 K/UL (ref 0.2–0.8)
MONOCYTES RELATIVE PERCENT: 11.7 %
NEUTROPHILS ABSOLUTE: 6.9 K/UL (ref 1.4–6.5)
NEUTROPHILS RELATIVE PERCENT: 70.7 %
PDW BLD-RTO: 18.7 % (ref 11.5–14.5)
PLATELET # BLD: 471 K/UL (ref 130–400)
POTASSIUM REFLEX MAGNESIUM: 4.2 MEQ/L (ref 3.4–4.9)
RBC # BLD: 3.76 M/UL (ref 4.7–6.1)
SODIUM BLD-SCNC: 131 MEQ/L (ref 135–144)
TOTAL IRON BINDING CAPACITY: 139 UG/DL (ref 178–450)
WBC # BLD: 9.8 K/UL (ref 4.8–10.8)

## 2020-11-25 PROCEDURE — 6370000000 HC RX 637 (ALT 250 FOR IP): Performed by: PHYSICAL MEDICINE & REHABILITATION

## 2020-11-25 PROCEDURE — APPSS30 APP SPLIT SHARED TIME 16-30 MINUTES: Performed by: STUDENT IN AN ORGANIZED HEALTH CARE EDUCATION/TRAINING PROGRAM

## 2020-11-25 PROCEDURE — 6360000002 HC RX W HCPCS: Performed by: FAMILY MEDICINE

## 2020-11-25 PROCEDURE — 6370000000 HC RX 637 (ALT 250 FOR IP): Performed by: INTERNAL MEDICINE

## 2020-11-25 PROCEDURE — 97535 SELF CARE MNGMENT TRAINING: CPT

## 2020-11-25 PROCEDURE — 99232 SBSQ HOSP IP/OBS MODERATE 35: CPT | Performed by: NURSE PRACTITIONER

## 2020-11-25 PROCEDURE — 6370000000 HC RX 637 (ALT 250 FOR IP): Performed by: NEUROLOGICAL SURGERY

## 2020-11-25 PROCEDURE — 80048 BASIC METABOLIC PNL TOTAL CA: CPT

## 2020-11-25 PROCEDURE — 6360000002 HC RX W HCPCS: Performed by: NURSE PRACTITIONER

## 2020-11-25 PROCEDURE — 99232 SBSQ HOSP IP/OBS MODERATE 35: CPT | Performed by: PSYCHIATRY & NEUROLOGY

## 2020-11-25 PROCEDURE — 36415 COLL VENOUS BLD VENIPUNCTURE: CPT

## 2020-11-25 PROCEDURE — 1210000000 HC MED SURG R&B

## 2020-11-25 PROCEDURE — 83550 IRON BINDING TEST: CPT

## 2020-11-25 PROCEDURE — 85025 COMPLETE CBC W/AUTO DIFF WBC: CPT

## 2020-11-25 PROCEDURE — 2580000003 HC RX 258: Performed by: NEUROLOGICAL SURGERY

## 2020-11-25 PROCEDURE — 82728 ASSAY OF FERRITIN: CPT

## 2020-11-25 PROCEDURE — 99232 SBSQ HOSP IP/OBS MODERATE 35: CPT | Performed by: PHYSICAL MEDICINE & REHABILITATION

## 2020-11-25 PROCEDURE — 83540 ASSAY OF IRON: CPT

## 2020-11-25 PROCEDURE — 6370000000 HC RX 637 (ALT 250 FOR IP): Performed by: NURSE PRACTITIONER

## 2020-11-25 PROCEDURE — 6370000000 HC RX 637 (ALT 250 FOR IP): Performed by: PSYCHIATRY & NEUROLOGY

## 2020-11-25 RX ORDER — DIAZEPAM 2 MG/1
2 TABLET ORAL EVERY 6 HOURS PRN
Status: DISCONTINUED | OUTPATIENT
Start: 2020-11-25 | End: 2020-12-10

## 2020-11-25 RX ORDER — GABAPENTIN 100 MG/1
200 CAPSULE ORAL 3 TIMES DAILY
Status: DISCONTINUED | OUTPATIENT
Start: 2020-11-25 | End: 2020-11-30

## 2020-11-25 RX ADMIN — OXYCODONE HYDROCHLORIDE AND ACETAMINOPHEN 1 TABLET: 7.5; 325 TABLET ORAL at 21:27

## 2020-11-25 RX ADMIN — GABAPENTIN 200 MG: 100 CAPSULE ORAL at 14:50

## 2020-11-25 RX ADMIN — Medication 100 MG: at 09:24

## 2020-11-25 RX ADMIN — BISACODYL 5 MG: 5 TABLET, COATED ORAL at 09:24

## 2020-11-25 RX ADMIN — Medication 10 ML: at 09:24

## 2020-11-25 RX ADMIN — GABAPENTIN 200 MG: 100 CAPSULE ORAL at 21:27

## 2020-11-25 RX ADMIN — MIRTAZAPINE 7.5 MG: 15 TABLET, FILM COATED ORAL at 21:28

## 2020-11-25 RX ADMIN — OXYCODONE HYDROCHLORIDE AND ACETAMINOPHEN 1 TABLET: 7.5; 325 TABLET ORAL at 12:04

## 2020-11-25 RX ADMIN — MORPHINE SULFATE 15 MG: 15 TABLET, FILM COATED, EXTENDED RELEASE ORAL at 18:21

## 2020-11-25 RX ADMIN — GABAPENTIN 100 MG: 100 CAPSULE ORAL at 09:21

## 2020-11-25 RX ADMIN — MORPHINE SULFATE 15 MG: 15 TABLET, FILM COATED, EXTENDED RELEASE ORAL at 09:21

## 2020-11-25 RX ADMIN — Medication 10 ML: at 21:28

## 2020-11-25 RX ADMIN — POLYETHYLENE GLYCOL 3350 17 G: 17 POWDER, FOR SOLUTION ORAL at 09:24

## 2020-11-25 RX ADMIN — STANDARDIZED SENNA CONCENTRATE 8.6 MG: 8.6 TABLET ORAL at 21:27

## 2020-11-25 RX ADMIN — OXYCODONE HYDROCHLORIDE AND ACETAMINOPHEN 1 TABLET: 7.5; 325 TABLET ORAL at 06:53

## 2020-11-25 RX ADMIN — FOLIC ACID 1 MG: 1 TABLET ORAL at 09:21

## 2020-11-25 RX ADMIN — HYDROMORPHONE HYDROCHLORIDE 0.5 MG: 1 INJECTION, SOLUTION INTRAMUSCULAR; INTRAVENOUS; SUBCUTANEOUS at 18:26

## 2020-11-25 RX ADMIN — ENOXAPARIN SODIUM 40 MG: 40 INJECTION SUBCUTANEOUS at 09:22

## 2020-11-25 RX ADMIN — OXYCODONE HYDROCHLORIDE AND ACETAMINOPHEN 1 TABLET: 7.5; 325 TABLET ORAL at 00:57

## 2020-11-25 RX ADMIN — MORPHINE SULFATE 15 MG: 15 TABLET, FILM COATED, EXTENDED RELEASE ORAL at 01:21

## 2020-11-25 ASSESSMENT — ENCOUNTER SYMPTOMS
SORE THROAT: 0
SHORTNESS OF BREATH: 0
BLOOD IN STOOL: 0
ANAL BLEEDING: 0
RECTAL PAIN: 0
TROUBLE SWALLOWING: 0
RESPIRATORY NEGATIVE: 1
EYES NEGATIVE: 1
VOMITING: 0
COUGH: 0
APNEA: 0
CHEST TIGHTNESS: 0
STRIDOR: 0
EYE DISCHARGE: 0
ABDOMINAL DISTENTION: 0
VOICE CHANGE: 0
CONSTIPATION: 0
GASTROINTESTINAL NEGATIVE: 1
BACK PAIN: 1
ABDOMINAL PAIN: 0
DIARRHEA: 0
NAUSEA: 0
CHOKING: 0
WHEEZING: 0
BACK PAIN: 0
COLOR CHANGE: 0

## 2020-11-25 ASSESSMENT — PAIN SCALES - GENERAL
PAINLEVEL_OUTOF10: 8
PAINLEVEL_OUTOF10: 9
PAINLEVEL_OUTOF10: 7
PAINLEVEL_OUTOF10: 8
PAINLEVEL_OUTOF10: 10
PAINLEVEL_OUTOF10: 7
PAINLEVEL_OUTOF10: 10

## 2020-11-25 ASSESSMENT — PAIN DESCRIPTION - DESCRIPTORS: DESCRIPTORS: ACHING

## 2020-11-25 ASSESSMENT — PAIN DESCRIPTION - LOCATION: LOCATION: NECK

## 2020-11-25 ASSESSMENT — PAIN DESCRIPTION - PAIN TYPE: TYPE: ACUTE PAIN;SURGICAL PAIN

## 2020-11-25 NOTE — PROGRESS NOTES
Physical Therapy Med Surg Daily Treatment Note  Facility/Department[facility]  [facility]       NAME: Verónica Nieto  : 1950 (80 y.o.)  MRN: [id number]  CODE STATUS: Full Code    Date of Service: 2020    Patient Diagnosis(es): Fall at home, initial encounter [W19. XXXA, Q93.739]  Falls, initial encounter [Y17. XXXA]   Chief Complaint   Patient presents with    Fatigue     Patient Active Problem List    Diagnosis Date Noted    Stenosis of cervical spine with myelopathy ([address] 2020    Malignant tumor of prostate ([address] 2020    Raised prostate specific antigen 2020    Retention of urine 2020    Urethritis 2020    Gait abnormality 2020    Myelopathy ([address]     Falls, initial encounter 2020    Severe malnutrition ([address] 2020    Fall at home, initial encounter 2020    Goals of care, counseling/discussion     Gastrointestinal hemorrhage     Rectal mass     Acute cystitis without hematuria     Metastatic cancer ([address]     Gastric ulcer 2020    Generalized weakness     Anemia 2020    Other specified disorders of bone density and structure, unspecified site      Liver mass 2020    Osteoarthritis of hip 2020    Benign prostatic hyperplasia with incomplete bladder emptying 2018    Palpitations 10/19/2010        Past Medical History:   Diagnosis Date    PAF (paroxysmal atrial fibrillation) ([address]     ON XARELTO    Prostate cancer ([address] 2019     Past Surgical History:   Procedure Laterality Date    CERVICAL LAMINECTOMY N/A 2020    CERVICAL C3-4,4-5,5-6  LAMINOPLASTY WITH RECONSTRUCTION performed by Carolyn Moreau MD at [facility] N/A 2020    COLONOSCOPY DIAGNOSTIC [facility]    hip    UPPER GASTROINTESTINAL ENDOSCOPY N/A 2020    EGD DIAGNOSTIC ONLY performed by Mikey Gillespie MD at [facility] and improve overall functional mobility. Discharge Recommendations:  Continue to assess pending progress, Patient would benefit from continued therapy after discharge    Goals  Short term goals  Short term goal 1: Pt to complete HEP with indep  Long term goals  Long term goal 1: Pt to complete bed mobility with indep  Long term goal 2: Pt to complete transfers with indep  Long term goal 3: Pt to ambulate 150ft with LRD and indep  Long term goal 4: TUG <15.0 seconds to demo decreased risk for falls    PLAN    Times per week: 3-6  Plan Comment: Cont. POC  Safety Devices  Type of devices: All fall risk precautions in place, Bed alarm in place, Call light within reach, Left in bed     [facility] (6 CLICK) Maria GErik Radha Garcia 28 Inpatient Mobility Raw Score : 18     Therapy Time   Individual   Time In 0918   Time Out 0934   Minutes 16      BM/Trsf: 7  Gait: 4  There ex: [facility] [address] [place] [address], [facility], 11/25/20 at 9:41 AM         Definitions for assistance levels  Independent = pt does not require any physical supervision or assistance from another person for activity completion. Device may be needed.   Stand by assistance = pt requires verbal cues or instructions from another person, close to but not touching, to perform the activity  Minimal assistance= pt performs 75% or more of the activity; assistance is required to complete the activity  Moderate assistance= pt performs 50% of the activity; assistance is required to complete the activity  Maximal assistance = pt performs 25% of the activity; assistance is required to complete the activity  Dependent = pt requires total physical assistance to accomplish the task

## 2020-11-25 NOTE — PROGRESS NOTES
Subjective: The patient complains of severe  acute on chronic neck pain and weakness partially relieved by rest, PT, OT and meds and exacerbated by exertion and recent illness. I am concerned about patients medical complexities. Precert started with [facility] Medicare for Acute Inpatient Rehab, no waivers in place at this time. Reviewed recent nursing note, \"  Patient back to 4W following Laminoplasty at 1930. Assessment complete. Alert and oriented, calm and cooperative. No complaints of nausea, SOB, or dizziness. He is experiencing 8/10 generalized pain, particularly in his neck, for which he has received Morphine per MAR. He is resting comfortably in bed, \". Pain is not controlled with OXY 5 mg. Percocet 7.5 is better. ROS x10: The patient also complains of severely impaired mobility and activities of daily living. Otherwise no new problems with vision, hearing, nose, mouth, throat, dermal, cardiovascular, GI, , pulmonary, musculoskeletal, psychiatric or neurological. See Rehab consult on Rehab chart . Vital signs:  BP (!) 153/91   Pulse 96   Temp 100 °F (37.8 °C) (Oral)   Resp 16   Ht 6' 1\" (1.854 m)   Wt 147 lb 4.3 oz (66.8 kg)   SpO2 98%   BMI 19.43 kg/m²   I/O:   PO/Intake:    fair PO intake,       Bowel/Bladder:   continent,    General:  Patient is well developed, adequately nourished, non-obese and     well kempt. HEENT:    PERRLA, hearing intact to loud voice, external inspection of ear     and nose benign. Inspection of lips, tongue and gums benign  Musculoskeletal: No significant change in strength or tone. All joints stable. Inspection and palpation of digits and nails show no clubbing,       cyanosis or inflammatory conditions. Neuro/Psychiatric: Affect: flat-  Alert and oriented to self and     Situation. No significant change in deep tendon reflexes or     sensation  Lungs:  Diminished, CTA-B.  Respiration effort is normal at rest.     Heart:   S1 = S2,  RRR. No loud murmurs. Abdomen:  Soft, non-tender, no enlargement of liver or spleen. Extremities:  No significant lower extremity edema or tenderness. Skin:    BUE bruises dt blood draws, c spine incision    Rehabilitation:  Physical therapy: FIMS:  Bed Mobility: Scooting: Contact guard assistance    Transfers: Sit to Stand: Contact guard assistance  Stand to sit: Contact guard assistance  Bed to Chair: Contact guard assistance, Ambulation 1  Surface: level tile  Device: No Device  Assistance: Contact guard assistance  Quality of Gait: Shuffling steps, decreased trunk rotation, nbos, decreased brinda heel strike  Gait Deviations: Slow Yanet, Decreased step length  Distance: 10' x 2  Comments: Distance limited to in room tx secondary to pt request,      FIMS:  ,  , Assessment: Pt displays an improvement in strength and endurance this tx secondary to increasing ambulatory distance. Good follow through demonstrated throughout tx when pt given vc's for improving technique and safety. Ther ex utilized to increased strength in BLE and improve overall functional mobility. Occupational therapy: FIMS:   ,  , Assessment: Pt. seen post spinal sx for re-evaluation. Pt. demonstrates the above deficits which impact his ability to perform ADLs and IADLs. Pt. would benefit from continued OT to maximize independence and safety with ADL tasks.     Speech therapy: FIMS:        Lab/X-ray studies reviewed, analyzed and discussed with patient and staff:   Recent Results (from the past 24 hour(s))   CBC Auto Differential    Collection Time: 11/25/20  6:39 AM   Result Value Ref Range    WBC 9.8 4.8 - 10.8 K/uL    RBC 3.76 (L) 4.70 - 6.10 M/uL    Hemoglobin 9.6 (L) 14.0 - 18.0 g/dL    Hematocrit 29.5 (L) 42.0 - 52.0 %    MCV 78.4 (L) 80.0 - 100.0 fL    MCH 25.5 (L) 27.0 - 31.3 pg    MCHC 32.5 (L) 33.0 - 37.0 %    RDW 18.7 (H) 11.5 - 14.5 %    Platelets 806 (H) 756 - 400 K/uL    Neutrophils % 70.7 %    Lymphocytes % 15.4 % Monocytes % 11.7 %    Eosinophils % 1.6 %    Basophils % 0.6 %    Neutrophils Absolute 6.9 (H) 1.4 - 6.5 K/uL    Lymphocytes Absolute 1.5 1.0 - 4.8 K/uL    Monocytes Absolute 1.1 (H) 0.2 - 0.8 K/uL    Eosinophils Absolute 0.2 0.0 - 0.7 K/uL    Basophils Absolute 0.1 0.0 - 0.2 K/uL       Previous extensive, complex labs, notes and diagnostics reviewed and analyzed. ALLERGIES:    Allergies as of 11/18/2020    (No Known Allergies)      (please also verify by checking [facility])     Complex Physical Medicine & Rehab Issues Assess & Plan:   1. Severe abnormality of gait and mobility and impaired self-care and ADL's secondary to progressive cervical myelopathy. Functional and medical status reassessed regarding patients ability to participate in therapies and patient found to be able to participate in  acute intensive comprehensive inpatient rehabilitation program including PT/OT to improve balance, ambulation, ADLs, and to improve the P/AROM. It is my opinion that they will be able to tolerate 3 hours of therapy a day and benefit from it at an acute level. 2. Bowel constipation and Bladder dysfunction atonic bladder:  frequent toileting, ambulate to bathroom with assistance, check post void residuals. Check for C.difficile x1 if >2 loose stools in 24 hours, continue bowel & bladder program.  Monitor for UTI symptoms including lethargy and confusion  3. Severe postoperative neck pain and generalized OA pain: reassess pain every shift and prior to and after each therapy session, give scheduled MS Contin, prn Percocet  or Tylenol, modalities prn in therapy, consider Lidoderm, K-pad prn. Consider increase to Dilaudid 1 mg.  4. Skin breakdown risk:  continue pressure relief program.  Daily skin exams and reports from nursing. 5. Severe fatigue due to immobility and nutritional deficits: Add vitamin B12 vitamin D and CoQ10 titrate dosing and add protein supplementation with low carb content.   6. Complex discharge planning: Risk for urinary retention given spinal cord injury-Precert started with [facility] Medicare for Acute Inpatient Rehab, no waivers in place at this time    Complex Active General Medical Issues that complicate care Assess & Plan:     1. Principal Problem:    Stenosis of cervical spine with myelopathy [facility])  Active Problems:    Palpitations    Anemia    Generalized weakness    Rectal mass    [facility]ast[facility]    Fall at home, initial encounter    Severe malnutrition ([facility]    Falls, initial encounter    Myelopathy ([facility]    Malignant tumor of prostate ([facility]    Retention of urine    Urethritis    Gait abnormality  Resolved Problems:    * No resolved hospital problems.  Eulalia Feliciano D.O., PM&R     Attending    [address]

## 2020-11-25 NOTE — PROGRESS NOTES
[address] [facility] Neurology Daily Progress Note  Name: Primo Sharpe  Age: 79 y.o. Gender: male  CodeStatus: Full Code  Allergies: No Known Allergies    Chief Complaint:Fatigue    Primary Care Provider: Fozia Fitzgerald MD  InpatientTreatment Team: Treatment Team: Attending Provider: Bo Chavez MD; Consulting Physician: Irven Boast, DO; Utilization Reviewer: Alexsander Beal RN; Consulting Physician: Vanessa Huynh MD; Consulting Physician: Chyna Simmons MD; Surgeon: Chyna Simmons MD; : Yodit Tomlinson, RN; Registered Nurse: Antony Ho, RN; Registered Nurse: Jeffrey Benson, NEIDA; : Donavan Darby; Patient Care Tech: Darian Arie; : Ramy Mohamud  Admission Date: 11/18/2020      [facility] states that his pain is not well controlled. Describes severe pain in his neck and when he inhales deeply. Is having trouble pivoting his body or moving his neck due to the severity of the pain. States that he continues to have weakness, numbness, and tingling post surgically and states that he feels \"if anything, it has gotten worse. \" Patient is tearful, stating he \"has never been in this much pain in my life. \" Also describes the feeling like he is \"wasting away\" due to a 30 pound weight loss over the past 2 months. States that he lives alone but does have his sister who loves near him as support. Denies SOB and is 98 on room air. Patient is febrile but denies symptoms. States he has \"hot flashes\" due to his prostate cancer treatment. Denies new neuro symptoms. Denies cough, double vision, chills, blurry vision, or changes in speech. Vitals:    11/25/20 0721   BP: (!) 153/91   Pulse: 96   Resp: 16   Temp: 100 °F (37.8 °C)   SpO2: 98%      Review of Systems   Constitutional: Positive for fever and unexpected weight change. Negative for chills. HENT: Negative. Eyes: Negative. Respiratory: Negative. Cardiovascular: Negative. Gastrointestinal: Negative. 10 mL Intravenous 2 times per day    bisacodyl  5 mg Oral Daily    morphine  15 mg Oral Q8H    mirtazapine  7.5 mg Oral Nightly    Vitamin D  2,000 Units Oral Dinner    cyanocobalamin  1,000 mcg Intramuscular Weekly    coenzyme Q10  100 mg Oral Daily    lidocaine  3 patch Transdermal Daily    sodium chloride flush  10 mL Intravenous BID    polyethylene glycol  17 g Oral Daily    senna  1 tablet Oral Nightly    folic acid  1 mg Oral Daily    sodium chloride flush  10 mL Intravenous 2 times per day    enoxaparin  40 mg Subcutaneous Daily     PRN Meds: diazePAM, sodium chloride flush, magnesium hydroxide, polyethylene glycol, ondansetron **OR** ondansetron, HYDROmorphone, acetaminophen **OR** acetaminophen, oxyCODONE-acetaminophen, sodium chloride flush, promethazine **OR** ondansetron    Labs:   Recent Labs     11/23/20  0654 11/24/20  0655 11/25/20  0639   WBC 7.4 8.5 9.8   HGB 9.1* 9.0* 9.6*   HCT 28.2* 28.0* 29.5*   * 429* 471*     Recent Labs     11/23/20  0654 11/24/20  0655 11/25/20  0639   * 133* 131*   K 3.9 4.1 4.2    100 100   CO2 21 22 23   BUN 13 11 6*   CREATININE 0.57* 0.65* 0.48*   CALCIUM 8.3* 7.9* 8.6     No results for input(s): AST, ALT, BILIDIR, BILITOT, ALKPHOS in the last 72 hours. No results for input(s): INR in the last 72 hours. No results for input(s): Rayna Renuka in the last 72 hours. Urinalysis:   Lab Results   Component Value Date    NITRU Negative 09/26/2020    WBCUA 20-50 09/26/2020    BACTERIA MANY 09/26/2020    RBCUA 3-5 09/26/2020    BLOODU TRACE 09/26/2020    SPECGRAV 1.019 09/26/2020    GLUCOSEU Negative 09/26/2020       Radiology:   Most recent    EEG No procedure found. MRI of Brain No results found for this or any previous visit. No results found for this or any previous visit. MRA of the Head and Neck: No results found for this or any previous visit. No results found for this or any previous visit. No results found for this or any previous visit. CT of the Head:   Results for orders placed during the hospital encounter of 11/18/20   CT Head WO Contrast    Narrative CT Brain    Contrast medium:  Not utilized. History:  Weakness, fatigue    Comparison:  None    Findings:    Extra-axial spaces:  Normal.     Intracranial hemorrhage:  None. Ventricular system: Ventricles mildly enlarged. Sulci mildly prominent. Basal Cisterns:  Normal.    Cerebral Parenchyma: Bilateral symmetric periventricular areas decreased attenuation. Midline Shift:  None. Cerebellum:  Normal.     Paranasal sinuses and mastoid air cells:  Normal.    Visualized Orbits:  Normal.        Impression Impression:    No acute findings. Mild cerebral atrophy. Chronic ischemic white matter disease. All CT scans at this facility use dose modulation, iterative reconstruction, and/or weight based dosing when appropriate to reduce radiation dose to as low as reasonably achievable. No results found for this or any previous visit. No results found for this or any previous visit. Carotid duplex: No results found for this or any previous visit. No results found for this or any previous visit. No results found for this or any previous visit. Echo No results found for this or any previous visit.           Assessment/Plan:  11/19/20:  Generalized weakness with leg weakness with significant weight loss.  Patient's examination suggests hyperreflexia in the lower extremity with areflexia in the upper extremity.  Patient did receive chemotherapy and therefore may have been areflexic in the upper extremity though the lower extremity reflexes are of concern for upper motor neuron type of pathology is no sensory levels.  Cord compression is a consideration.     We will arrange for a stat limited sagittal views of the cervical thoracic and lumbar spine to make sure he does not have cord compression and if they see anything specific a dedicated MRIs will further requested.  Patient CPK levels are pending see thyroid tests and B12 levels are pending.     Of note patient has lost about 40 pounds in weight in the last 2 months which could add to generalized weakness.  We will follow patient with you     11/20/20:  Patient with history of prostate cancer with known liver and bone mets and was started on Salvadore Martins in October 2020.  Oncology is following and placed Salvadore Martins on hold to rule this out as contributing factor for lower extremity weakness and falls.   Screening MRI of cervical and thoracic spine identified severe spinal canal stenosis at C3-4 and C6-7.    Will obtain dedicated MRI of cervical spine with and without contrast given these findings and patient's history of prostate cancer  Will place neurosurgery on consult  TSH within normal limits  B12 within normal limits  Folate low at 3.1, will replace  Dedicated [facility] of the cervical spine was reviewed and shows significant stenosis at C3-4-5.  Await neurosurgical opinion on this.     11/22  Patient actually is doing quite well in terms of not becoming more weaker.  Patient was seen by Dr. Karen Manzano surgical intervention is planned. Nargis Elliott had some questions which are answered and discussed that he will require rehabilitation after his procedure which will help him.     11/23  1) cervical stenosis C3-4, C6-7:   To have surgery with Dr. Alicia De La Rosa. We will continue to follow post surgery. Order placed for inpatient rehabilitation status post laminectomy.        2) Folate deficiency:   Continue on 1 mg PO QD     11/24/20:  Status post cervical laminoplasty with reconstruction C3, C4, C5 and C6 on 11/23/2020  MRI of spine shows metastasis  Palliative care following  Patient still has considerable walking issues with gait ataxia and a neurogenic bladder. The bladder issues may be secondary to his prostate malignancy as well. We await his rehabitation evaluations.   This may take several weeks to notice that he is walking better since this has been decompressed. 11/25:  Status post cervical laminoplasty with reconstruction C3, C4, C5 and C6 on 11/23/2020  MRI of spine shows metastasis  Palliative care following  Discussed fever and uncontrolled pain with nurse, Morgan Hernandez. Will recheck temperature. Also discussed with patient that weakness could take several weeks to improve. He voiced understanding. I independently performed an evaluation on this patient. I have reviewed the above documentation completed by the Nurse Practitioner. Please see my additional contributions to the HPI, physical exam, assessment/medical decision making. Oscar Verdugo MD, [id number], American Board of Psychiatry & Neurology  Board Certified in Vascular Neurology  Board Certified in Neuromuscular Medicine  Certified in Neurorehabilitation       Collaborating physicians: Dr Ti Verdugo    Electronically signed by Cynthia Mensah PA-C on 11/25/2020 at 11:40 AM

## 2020-11-25 NOTE — PROGRESS NOTES
Assessment completed. Patient complained of pain 7/10; Dilaudid given. Dressing assessed and is clean dry and intact with no drainage noted. Patient denies any other needs at this time. Patient is resting comfortably.   .Electronically signed by Corrie Thompson RN on 11/24/2020 at 11:20 PM

## 2020-11-25 NOTE — PROGRESS NOTES
11 6*   CREATININE 0.57* 0.65* 0.48*   CALCIUM 8.3* 7.9* 8.6     No results for input(s): AST, ALT, BILIDIR, BILITOT, ALKPHOS in the last 72 hours. No results for input(s): INR in the last 72 hours. No results for input(s): Jia Benny in the last 72 hours. Urinalysis:      Lab Results   Component Value Date    NITRU Negative 09/26/2020    WBCUA 20-50 09/26/2020    BACTERIA MANY 09/26/2020    RBCUA 3-5 09/26/2020    BLOODU TRACE 09/26/2020    SPECGRAV 1.019 09/26/2020    GLUCOSEU Negative 09/26/2020       Radiology:  RADIOLOGY REPORT   Final Result      FLUORO FOR SURGICAL PROCEDURES   Final Result   POST-OPERATIVE APPEARANCE AS SHOWN.            MRI CERVICAL SPINE W WO CONTRAST   Final Result      Osseous metastases throughout the clivus and a 1 cm T3 vertebra metastasis. Please refer to prior whole spine MRI detailing more extensive metastasis. C3-4 through C6-7 myelomalacia with cord atrophy, and abnormal signal due to canal stenosis and chronic cord compression. C3-4 through C6-7 varying degrees of significant foraminal stenosis predominantly due to uncovertebral joint arthrosis. Evaluation less than optimal due to motion. Punctate posterior peripheral cord enhancement at the C4-5 disc level and equivocal just below the C3-4 disc level detailed above. MRI CERVICAL THORACIC LUMBAR SPINE WO CONTRAST LIMITED   Final Result      Advanced cervical spondylosis from C3-4 through C6-7 resulting in severe spinal canal stenosis at these levels with minimal spinal canal diameter approaching 4 to 5 mm. Multiple metastatic bone disease throughout the thoracolumbar spine with no pathologic fracture. CT Head WO Contrast   Final Result   Impression:      No acute findings. Mild cerebral atrophy. Chronic ischemic white matter disease.          All CT scans at this facility use dose modulation, iterative reconstruction, and/or weight based dosing when appropriate to reduce radiation dose to as low as reasonably achievable. XR CHEST PORTABLE   Final Result   NO ACUTE CARDIOPULMONARY DISEASE.               Assessment/Plan:    78 y/o man with history of metastatic prostate cancer who presented with:    Recurrent falls and generalized weakness  - multifactorial in etiology including orthostatic hypotension suspect due to autonomic dysfunction, severe C-spine stenosis, advanced prostate cancer on chemotherapy, moderate protein calorie malnutrition  - MRI of the spine showed diffuse mets  - s/p C3-6 laminoplasty with reconstruction on 11/23  - followed by neurosurgery    Orthostatic hypotension  - treated with IVFs    Hyponatremia   - mild, stable    Microcytic anemia   - studies not suggestive of iron deficiency  - stable H/H    Metastatic prostate cancer with severe bone pain  - Xtandi on hold  - on MS contin, oxycodone, Dilaudid,gabapentin  - followed by oncology and palliative care     Constipation  - continue bowel regimen     Folic acid deficiency  - continue supplement     Cancer related pain   - on opioids    Severe malnutrition  - followed by dietitian       Disposition - acute rehab, waiting for precert          Electronically signed by Brandt Mata MD on 11/25/2020 at 11:46 AM

## 2020-11-25 NOTE — PROGRESS NOTES
Palliative Care Progress Note  Patient: Tiffani Todd  Gender: male  YOB: 1950  Unit/Bed: P789/H551-57  Code Status: Full Code  Inpatient Treatment Team: Treatment Team: Attending Provider: Tavia Lopez MD; Consulting Physician: Rina Roche DO; Utilization Reviewer: Mario Hickey, RN; Consulting Physician: Santiago Bejarano MD; Consulting Physician: Lai Bales MD; Surgeon: Lai Bales MD; : Colonel Malone, RN; Registered Nurse: Samantha Raymundo, RN; Registered Nurse: Sandra Meza, NEIDA; : Sherrell Cisse; Patient Care Tech: Urbano Shelby; : Nellie Kuhn [place]  Admit Date:  11/18/2020    Chief Complaint: Pain    History of Presenting Illness:      Tiffani Todd is a 79 y.o. male on hospital day 5 with a history of androgen independent prostate cancer. Known liver and bone mets, severe malnutrition, falls, urine retention self caths, gastric ulcer with hemorrhage. Presented with  progressive quadriparesis weight loss inability to ambulate. His legs will not support him anymore. Every few weeks he is getting weaker and weaker. He is now unable to ambulate. Found to have severe spinal canal stenosis at C3-C7 with cord atrophy      Laminoplasty with reconstruction was done. He tells me he has had a BM, no abdominal pain or nausea. Numbness slighlty improved, Pain in neck \" severe\" he is hesitant to turn his head, but he has only used one dose of percocet for BTP in the last 24 hours. Negative significant emotional, spiritual, or sleep disturbance. Review of Systems:       Review of Systems   Constitutional: Negative for activity change, appetite change, chills, diaphoresis, fatigue, fever and unexpected weight change. HENT: Negative for drooling, hearing loss, mouth sores, sore throat, trouble swallowing and voice change. Eyes: Negative for discharge and visual disturbance.    Respiratory: Negative for apnea, cough, choking, chest tightness, shortness of breath, wheezing and stridor. Cardiovascular: Negative for chest pain, palpitations and leg swelling. Gastrointestinal: Negative for abdominal distention, abdominal pain, anal bleeding, blood in stool, constipation, diarrhea, nausea, rectal pain and vomiting. Genitourinary: Negative for difficulty urinating, dysuria, enuresis, frequency and hematuria. Musculoskeletal: Negative for arthralgias, back pain, gait problem, joint swelling and myalgias. Skin: Negative for color change, pallor, rash and wound. Allergic/Immunologic: Negative for food allergies and immunocompromised state. Neurological: Negative for dizziness, tremors, seizures, syncope, facial asymmetry, speech difficulty, weakness, light-headedness, numbness and headaches. Hematological: Negative for adenopathy. Does not bruise/bleed easily. Psychiatric/Behavioral: Negative for agitation, behavioral problems, confusion, decreased concentration, dysphoric mood, hallucinations, self-injury, sleep disturbance and suicidal ideas. The patient is not nervous/anxious and is not hyperactive. Physical Examination:       BP (!) 153/91   Pulse 96   Temp 100 °F (37.8 °C) (Oral)   Resp 16   Ht 6' 1\" (1.854 m)   Wt 147 lb 4.3 oz (66.8 kg)   SpO2 98%   BMI 19.43 kg/m²    Physical Exam  Constitutional:       General: He is not in acute distress. Appearance: He is underweight. He is not diaphoretic. HENT:      Head: Normocephalic and atraumatic. Right Ear: External ear normal.      Left Ear: External ear normal.      Nose: Nose normal.      Mouth/Throat:      Pharynx: No oropharyngeal exudate. Eyes:      General: No scleral icterus. Right eye: No discharge. Left eye: No discharge. Conjunctiva/sclera: Conjunctivae normal.      Pupils: Pupils are equal, round, and reactive to light. Neck:      Musculoskeletal: Normal range of motion and neck supple. Thyroid: No thyromegaly. Vascular: No JVD. Trachea: No tracheal deviation. Cardiovascular:      Rate and Rhythm: Normal rate and regular rhythm. Heart sounds: Normal heart sounds. Pulmonary:      Effort: Pulmonary effort is normal. No respiratory distress. Breath sounds: Normal breath sounds. No stridor. No wheezing or rales. Chest:      Chest wall: No tenderness. Abdominal:      General: Bowel sounds are normal. There is no distension. Palpations: Abdomen is soft. There is no mass. Tenderness: There is no abdominal tenderness. There is no guarding or rebound. Musculoskeletal: Normal range of motion. General: No tenderness or deformity. Lymphadenopathy:      Cervical: No cervical adenopathy. Skin:     General: Skin is warm and dry. Findings: No erythema or rash. Neurological:      Mental Status: He is alert and oriented to person, place, and time. Cranial Nerves: No cranial nerve deficit. Coordination: Coordination normal.   Psychiatric:         Behavior: Behavior normal.         Thought Content:  Thought content normal.         Judgment: Judgment normal.         Allergies:      No Known Allergies    Medications:      Current Facility-Administered Medications   Medication Dose Route Frequency Provider Last Rate Last Dose    diazePAM (VALIUM) tablet 2 mg  2 mg Oral Q6H PRN JOYCE Weston - CNP        0.9 % sodium chloride infusion   Intravenous Continuous El Kraft MD        sodium chloride flush 0.9 % injection 10 mL  10 mL Intravenous 2 times per day El Kraft MD   10 mL at 11/25/20 0924    sodium chloride flush 0.9 % injection 10 mL  10 mL Intravenous PRN El Kraft MD        bisacodyl (DULCOLAX) EC tablet 5 mg  5 mg Oral Daily El Kraft MD   5 mg at 11/25/20 0924    magnesium hydroxide (MILK OF MAGNESIA) 400 MG/5ML suspension 30 mL  30 mL Oral Daily PRN El Kraft MD        polyethylene glycol [facility]) packet 17 g  17 g Oral Daily PRN El Kraft MD % injection 10 mL  10 mL Intravenous 2 times per day Bruno Rivera MD   10 mL at 11/24/20 2149    sodium chloride flush 0.9 % injection 10 mL  10 mL Intravenous PRN Bruno Rivera MD   10 mL at 11/19/20 1516    promethazine (PHENERGAN) tablet 12.5 mg  12.5 mg Oral Q6H PRN Bruno Rivera MD        Or    [facility]ron [facility] injection 4 mg  4 mg Intravenous Q6H PRN Bruno Rivera MD   4 mg at 11/18/20 1843    enoxaparin (LOVENOX) injection 40 mg  40 mg Subcutaneous Daily Bruno Rivera MD   40 mg at 11/25/20 9291       History:       PMHx:  Past Medical History:   Diagnosis Date    PAF (paroxysmal atrial fibrillation) ([facility] [address]     ON XARELTO    Prostate cancer ([facility] [address] 11/2019       PSHx:  Past Surgical History:   Procedure Laterality Date    CERVICAL LAMINECTOMY N/A 11/23/2020    CERVICAL C3-4,4-5,5-6  LAMINOPLASTY WITH RECONSTRUCTION performed by Fifi Arceo MD at [address] [place] [address] COLONOSCOPY N/A 9/29/2020    COLONOSCOPY DIAGNOSTIC performed by Yoselin Arevalo MD at [facility] [address] Right 11/2018    hip    UPPER GASTROINTESTINAL ENDOSCOPY N/A 9/29/2020    EGD DIAGNOSTIC ONLY performed by Yoselin Arevalo MD at [address] Hx:  Social History     Socioeconomic History    Marital status: Single     Spouse name: None    Number of children: None    Years of education: None    Highest education level: None   Occupational History    Occupation: retired [employer]   Social Needs    Financial resource strain: Not very hard    Food insecurity     Worry: Never true     Inability: Never true    Transportation needs     Medical: No     Non-medical: No   Tobacco Use    Smoking status: Never Smoker    Smokeless tobacco: Never Used    Tobacco comment: denies   Substance and Sexual Activity    Alcohol use: Not Currently     Frequency: 2-4 times a month     Comment: denies    Drug use: No     Comment: denies    Sexual activity: None   Lifestyle    Physical activity     Days per week: 0 days     Minutes per session: 0 min    Stress: Only a little   Relationships    Social connections     Talks on phone: None     Gets together: None     Attends Restorationist service: None     Active member of club or organization: None     Attends meetings of clubs or organizations: None     Relationship status: None    Intimate partner violence     Fear of current or ex partner: No     Emotionally abused: No     Physically abused: No     Forced sexual activity: No   Other Topics Concern    None   Social History Narrative    Retired from [employer] With: Alone    Type of Home: [address] [facility] [address]: One level    Home Access: Level entry    Bathroom Shower/Tub: Tub/Shower unit    [employer]: Grab bars in [facility]: [place]: Independent    Transfer Assistance: Independent    Active : No    Patient's  Info: Sister    Additional Comments: Pt. reports he has been having near falls but no falls in the last few weeks, but progressively more weakness hte last few weeks       Family Hx:  History reviewed. No pertinent family history.     LABS: Reviewed     CBC:  Lab Results   Component Value Date    WBC 9.8 11/25/2020    RBC 3.76 11/25/2020    HGB 9.6 11/25/2020    HCT 29.5 11/25/2020    MCV 78.4 11/25/2020    MCH 25.5 11/25/2020    MCHC 32.5 11/25/2020    RDW 18.7 11/25/2020     11/25/2020    MPV 8.7 09/09/2015     CBC with Differential:   Lab Results   Component Value Date    WBC 9.8 11/25/2020    RBC 3.76 11/25/2020    HGB 9.6 11/25/2020    HCT 29.5 11/25/2020     11/25/2020    MCV 78.4 11/25/2020    MCH 25.5 11/25/2020    MCHC 32.5 11/25/2020    RDW 18.7 11/25/2020    METASPCT 1 09/26/2020    LYMPHOPCT 15.4 11/25/2020    MONOPCT 11.7 11/25/2020    MYELOPCT 1 09/26/2020    BASOPCT 0.6 11/25/2020    MONOSABS 1.1 11/25/2020    LYMPHSABS 1.5 11/25/2020    EOSABS

## 2020-11-25 NOTE — PROGRESS NOTES
Hematology/Oncology   Progress Note        CHIEF COMPLAINT/HPI:  Follow up of prostate cancer with bone metastasis. S/p cervical laminectomy for stenosis. Still has pain in post area. Undergoing rehab. Hemoglobin at 9.6 . [facility] is held due to weakness. REVIEW OF SYSTEMS:    Unremarkable except for symptoms mentioned in HPI.     Current Inpatient Medications:    Current Facility-Administered Medications   Medication Dose Route Frequency Provider Last Rate Last Dose    diazePAM (VALIUM) tablet 2 mg  2 mg Oral Q6H PRN Adjondi Sheree Aschoff, APRN - CNP        0.9 % sodium chloride infusion   Intravenous Continuous Alyssa Parrish MD        sodium chloride flush 0.9 % injection 10 mL  10 mL Intravenous 2 times per day Alyssa Parrish MD   10 mL at 11/25/20 0924    sodium chloride flush 0.9 % injection 10 mL  10 mL Intravenous PRN Alyssa Parrish MD        bisacodyl (DULCOLAX) EC tablet 5 mg  5 mg Oral Daily Alyssa Parrish MD   5 mg at 11/25/20 0924    magnesium hydroxide (MILK OF MAGNESIA) 400 MG/5ML suspension 30 mL  30 mL Oral Daily PRN Alyssa Parrish MD        polyethylene glycol [facility]) packet 17 g  17 g Oral Daily PRN Alyssa Parrish MD        ondansetron (ZOFRAN-ODT) disintegrating tablet 4 mg  4 mg Oral Q8H PRN Alyssa Parrish MD        Or    [facility]) [facility] 4 mg  4 mg Intravenous Q6H PRN Alyssa Parrish MD        morphine (MS CONTIN) extended release tablet 15 mg  15 mg Oral Q8H Nura Shoshana Alvares, APRN - CNP   15 mg at 11/25/20 6762    gabapentin (NEURONTIN) capsule 100 mg  100 mg Oral TID [place], APRN - CNP   100 mg at 11/25/20 0614    HYDROmorphone (DILAUDID) injection 0.5 mg  0.5 mg Intravenous Q4H PRN [place], APRN - CNP   0.5 mg at 11/24/20 2107    mirtazapine (REMERON) tablet 7.5 mg  7.5 mg Oral Nightly [place], APRN - CNP   7.5 mg at 11/24/20 2109    Vitamin D (CHOLECALCIFEROL) tablet 2,000 Units  2,000 Units Oral Dinner Nicoleher Changin, DO   2,000 Units at 11/24/20 1708    cyanocobalamin injection 1,000 mcg  1,000 mcg Intramuscular Weekly Nicole Scullin, DO   1,000 mcg at 11/24/20 1341    coenzyme Q10 capsule 100 mg  100 mg Oral Daily Nicole Scullin, DO   100 mg at 11/25/20 0198    lidocaine 4 % external patch 3 patch  3 patch Transdermal Daily Nicole Scullin, DO   3 patch at 11/25/20 9168    acetaminophen (TYLENOL) tablet 650 mg  650 mg Oral Q4H PRN Nicole Scullin, DO        Or    acetaminophen (TYLENOL) suppository 650 mg  650 mg Rectal Q6H PRN Nicole Scullin, DO        oxyCODONE-acetaminophen (PERCOCET) 7.5-325 MG per tablet 1 tablet  1 tablet Oral Q4H PRN Nicole Scullin, DO   1 tablet at 11/25/20 0653    sodium chloride flush 0.9 % injection 10 mL  10 mL Intravenous BID JOYCE Bryant - CNP   10 mL at 11/24/20 2149    polyethylene glycol (GLYCOLAX) packet 17 g  17 g Oral Daily Linnell Griffins, DO   17 g at 11/25/20 1777    senna (SENOKOT) tablet 8.6 mg  1 tablet Oral Nightly Linnell Griffins, DO   8.6 mg at 95/10/37 7681    folic acid (FOLVITE) tablet 1 mg  1 mg Oral Daily Sameer Coleman MD   1 mg at 11/25/20 4246    sodium chloride flush 0.9 % injection 10 mL  10 mL Intravenous 2 times per day Greg Abernathy MD   10 mL at 11/24/20 2149    sodium chloride flush 0.9 % injection 10 mL  10 mL Intravenous PRN Greg Abernathy MD   10 mL at 11/19/20 1516    promethazine (PHENERGAN) tablet 12.5 mg  12.5 mg Oral Q6H PRN Greg Abernathy MD        Or    ondansetron [facility]) injection 4 mg  4 mg Intravenous Q6H PRN Greg Abernathy MD   4 mg at 11/18/20 1843    enoxaparin (LOVENOX) injection 40 mg  40 mg Subcutaneous Daily Greg Abernathy MD   40 mg at 11/25/20 1614       PHYSICAL EXAM:    EYES:  Lids and lashes normal, pupils equal, round and reactive to light, extra ocular muscles intact, sclera clear, conjunctiva normal    ENT:  Normocephalic, without obvious abnormality, atraumatic, sinuses nontender on palpation, external ears without lesions, oral pharynx with moist mucus membranes, tonsils without erythema or exudates, gums normal and good dentition. NECK:  Supple, symmetrical, trachea midline, no adenopathy, thyroid symmetric, not enlarged and no tenderness, skin normal    CHEST:    LUNGS:  No increased work of breathing, good air exchange, clear to auscultation bilaterally, no crackles or wheezing    CARDIOVASCULAR:  Normal apical impulse, regular rate and rhythm, normal S1 and S2, no S3 or S4, and no murmur noted    ABDOMEN:  No scars, normal bowel sounds, soft, non-distended, non-tender, no masses palpated, no hepatosplenomegally    MUSCULOSKELETAL:  There is no redness, warmth, or swelling of the joints. Full range of motion noted. Motor strength is 5 out of 5 all extremities bilaterally.   Tone is normal.  EXTREMITIES:    NEURO:    DATA:      PT/INR:  No results found for: PTINR  PTT:  No results found for: APTT  CMP:    Lab Results   Component Value Date     11/24/2020    K 4.1 11/24/2020     11/24/2020    CO2 22 11/24/2020    BUN 11 11/24/2020    PROT 8.0 11/18/2020     Magnesium:    Lab Results   Component Value Date    MG 2.2 11/18/2020     Phosphorus:  No components found for: PO4  Calcium:  No components found for: CA  CBC:    Lab Results   Component Value Date    WBC 9.8 11/25/2020    RBC 3.76 11/25/2020    HGB 9.6 11/25/2020    HCT 29.5 11/25/2020    MCV 78.4 11/25/2020    RDW 18.7 11/25/2020     11/25/2020     DIFF:    Lab Results   Component Value Date    MCV 78.4 11/25/2020    RDW 18.7 11/25/2020      LDH:  No results found for: LDH  Uric Acid:  No components found for: URIC    EKG Reviewed  Appropriate Radiology Reviewed      Pathology: Reviewed where indicated      ASSESSMENT:  Principal Problem:    Stenosis of cervical spine with myelopathy [facility])  Active Problems:    Palpitations    Anemia    Generalized weakness    Rectal mass    Metastatic cancer ([facility] [address]    Fall at home, initial encounter    Severe malnutrition ([address]    Falls, initial encounter    Myelopathy ([address]    Malignant tumor of prostate ([address]    Retention of urine    Urethritis    Gait abnormality  Resolved Problems:    * No resolved hospital problems. *    Patient Active Problem List   Diagnosis    Liver mass    Osteoarthritis of hip    Palpitations    Other specified disorders of bone density and structure, unspecified site     Anemia    Generalized weakness    Gastric ulcer    Goals of care, counseling/discussion    Gastrointestinal hemorrhage    Rectal mass    Acute cystitis without hematuria    Metastatic cancer ([address]    Fall at home, initial encounter    Severe malnutrition ([address]    Falls, initial encounter    Myelopathy ([address]    Benign prostatic hyperplasia with incomplete bladder emptying    Malignant tumor of prostate ([address]    Raised prostate specific antigen    Retention of urine    Urethritis    Gait abnormality    Stenosis of cervical spine with myelopathy ([address]       PLAN:  Continue to hold xtandi.           Electronically signed by Valentina Foster MD on 11/25/20 at 9:25 AM EST

## 2020-11-25 NOTE — CARE COORDINATION
Rounds done this am with nursing and I also called Sarah on rehab and we are awaiting precert but she will call and check on it. I let her know pt would likely be dc when precert is back. She will let us know status of the precert.

## 2020-11-25 NOTE — CARE COORDINATION
Call placed to [facility] for Acute Inpatient Rehab is still pending review per Melissa Clement. Reference # [id number].  Electronically signed by Juan Euceda RN on 11/25/20 at 12:02 PM EST

## 2020-11-25 NOTE — CARE COORDINATION
Received call from Magali Clifton on rehab that insurance denied with peer to peer available. Magali Clifton will contact  to see if she can do peer to peer. She will let us know outcome.

## 2020-11-25 NOTE — CARE COORDINATION
LSW met with pt to get SNF choice due to insurance denial of Rehab. Pt would like DC to [facility] in [address] [place] [address]. Initial referral made.

## 2020-11-25 NOTE — PROGRESS NOTES
Department of Neurosurgery  Nurse Practitioner Progress Note  POD 2  Cervical laminoplasty with reconstruction C3, C4,  C5 and C6.    SUBJECTIVE:  Patient complaints of severe cervical incisional pain that he describes as a constant aching sensation that is exacerbated by movement and relieved somewhat by rest and pain medication. Patient notes weakness to bilateral lower extremities which was present prior to surgery He denies any chest pain,shortness of breath, N/V?D, abdominal pain urinary or bowel issues. OBJECTIVE    Patient in no acute distress. He appears to be in a moderate amount of pain and grimacing with every movement. He is hemodynamically stable with slightly elevated blood pressure which could be secondary to pain. Bilateral lower extremities remain weak. Physical  VITALS:  BP (!) 153/91   Pulse 96   Temp 100 °F (37.8 °C) (Oral)   Resp 16   Ht 6' 1\" (1.854 m)   Wt 147 lb 4.3 oz (66.8 kg)   SpO2 98%   BMI 19.43 kg/m²   CONSTITUTIONAL:  awake, alert, cooperative, no apparent distress, and appears stated age  NECK:  supple, symmetrical, trachea midline  NEUROLOGIC:   Sensory:  Sensory intact  Coordination:  Heel-Knee-Shin:  Right:  Normal  Surgical incision well approximated with no drainage. Dressing clean dry and intact.        Data  CBC:   Lab Results   Component Value Date    WBC 9.8 11/25/2020    RBC 3.76 11/25/2020    HGB 9.6 11/25/2020    HCT 29.5 11/25/2020    MCV 78.4 11/25/2020    MCH 25.5 11/25/2020    MCHC 32.5 11/25/2020    RDW 18.7 11/25/2020     11/25/2020    MPV 8.7 09/09/2015     CMP:    Lab Results   Component Value Date     11/24/2020    K 4.1 11/24/2020     11/24/2020    CO2 22 11/24/2020    BUN 11 11/24/2020    CREATININE 0.65 11/24/2020    GFRAA >60.0 11/24/2020    LABGLOM >60.0 11/24/2020    GLUCOSE 131 11/24/2020    PROT 8.0 11/18/2020    LABALBU 3.6 11/18/2020    CALCIUM 7.9 11/24/2020    BILITOT 0.7 11/18/2020    ALKPHOS 233 11/18/2020    AST 26 11/18/2020    ALT 9 11/18/2020     BMP:    Lab Results   Component Value Date     11/24/2020    K 4.1 11/24/2020     11/24/2020    CO2 22 11/24/2020    BUN 11 11/24/2020    LABALBU 3.6 11/18/2020    CREATININE 0.65 11/24/2020    CALCIUM 7.9 11/24/2020    GFRAA >60.0 11/24/2020    LABGLOM >60.0 11/24/2020    GLUCOSE 131 11/24/2020     U/A:    Lab Results   Component Value Date    COLORU Yellow 09/26/2020    PROTEINU Negative 09/26/2020    PHUR 6.0 09/26/2020    LABCAST 1-3 Hyaline 09/20/2019    WBCUA 20-50 09/26/2020    RBCUA 3-5 09/26/2020    MUCUS Present 09/07/2015    BACTERIA MANY 09/26/2020    CLARITYU CLOUDY 09/26/2020    SPECGRAV 1.019 09/26/2020    LEUKOCYTESUR SMALL 09/26/2020    UROBILINOGEN 1.0 09/26/2020    BILIRUBINUR Negative 09/26/2020    BLOODU TRACE 09/26/2020    GLUCOSEU Negative 09/26/2020     Urine Culture:  No components found for: SIVA  Current Inpatient Medications  Current Facility-Administered Medications: diazePAM (VALIUM) tablet 2 mg, 2 mg, Oral, Q6H PRN  0.9 % sodium chloride infusion, , Intravenous, Continuous  sodium chloride flush 0.9 % injection 10 mL, 10 mL, Intravenous, 2 times per day  sodium chloride flush 0.9 % injection 10 mL, 10 mL, Intravenous, PRN  bisacodyl (DULCOLAX) EC tablet 5 mg, 5 mg, Oral, Daily  magnesium hydroxide (MILK OF MAGNESIA) 400 MG/5ML suspension 30 mL, 30 mL, Oral, Daily PRN  polyethylene glycol (GLYCOLAX) packet 17 g, 17 g, Oral, Daily PRN  ondansetron (ZOFRAN-ODT) disintegrating tablet 4 mg, 4 mg, Oral, Q8H PRN **OR** ondansetron (ZOFRAN) injection 4 mg, 4 mg, Intravenous, Q6H PRN  morphine (MS CONTIN) extended release tablet 15 mg, 15 mg, Oral, Q8H  gabapentin (NEURONTIN) capsule 100 mg, 100 mg, Oral, TID  HYDROmorphone (DILAUDID) injection 0.5 mg, 0.5 mg, Intravenous, Q4H PRN  mirtazapine (REMERON) tablet 7.5 mg, 7.5 mg, Oral, Nightly  Vitamin D (CHOLECALCIFEROL) tablet 2,000 Units, 2,000 Units, Oral, Dinner  cyanocobalamin injection 1,000 mcg, 1,000 mcg, Intramuscular, Weekly  coenzyme Q10 capsule 100 mg, 100 mg, Oral, Daily  lidocaine 4 % external patch 3 patch, 3 patch, Transdermal, Daily  acetaminophen (TYLENOL) tablet 650 mg, 650 mg, Oral, Q4H PRN **OR** acetaminophen (TYLENOL) suppository 650 mg, 650 mg, Rectal, Q6H PRN  oxyCODONE-acetaminophen (PERCOCET) 7.5-325 MG per tablet 1 tablet, 1 tablet, Oral, Q4H PRN  sodium chloride flush 0.9 % injection 10 mL, 10 mL, Intravenous, BID  polyethylene glycol (GLYCOLAX) packet 17 g, 17 g, Oral, Daily  senna (SENOKOT) tablet 8.6 mg, 1 tablet, Oral, Nightly  folic acid (FOLVITE) tablet 1 mg, 1 mg, Oral, Daily  sodium chloride flush 0.9 % injection 10 mL, 10 mL, Intravenous, 2 times per day  sodium chloride flush 0.9 % injection 10 mL, 10 mL, Intravenous, PRN  promethazine (PHENERGAN) tablet 12.5 mg, 12.5 mg, Oral, Q6H PRN **OR** ondansetron (ZOFRAN) injection 4 mg, 4 mg, Intravenous, Q6H PRN  enoxaparin (LOVENOX) injection 40 mg, 40 mg, Subcutaneous, Daily  ASSESSMENT AND PLAN    1. Continue pain control, added 2 mg of valium every 6 hours for muscle spasm  2. Continue PT/OT  3. Continue discharge planning. 4. Continue daily dressing changes.

## 2020-11-26 PROCEDURE — 2580000003 HC RX 258: Performed by: FAMILY MEDICINE

## 2020-11-26 PROCEDURE — 1210000000 HC MED SURG R&B

## 2020-11-26 PROCEDURE — 2580000003 HC RX 258: Performed by: NEUROLOGICAL SURGERY

## 2020-11-26 PROCEDURE — 6370000000 HC RX 637 (ALT 250 FOR IP): Performed by: INTERNAL MEDICINE

## 2020-11-26 PROCEDURE — 6370000000 HC RX 637 (ALT 250 FOR IP): Performed by: PSYCHIATRY & NEUROLOGY

## 2020-11-26 PROCEDURE — 6370000000 HC RX 637 (ALT 250 FOR IP): Performed by: NEUROLOGICAL SURGERY

## 2020-11-26 PROCEDURE — 6370000000 HC RX 637 (ALT 250 FOR IP): Performed by: NURSE PRACTITIONER

## 2020-11-26 PROCEDURE — 6370000000 HC RX 637 (ALT 250 FOR IP): Performed by: PHYSICAL MEDICINE & REHABILITATION

## 2020-11-26 PROCEDURE — 99232 SBSQ HOSP IP/OBS MODERATE 35: CPT | Performed by: PHYSICAL MEDICINE & REHABILITATION

## 2020-11-26 PROCEDURE — 6360000002 HC RX W HCPCS: Performed by: FAMILY MEDICINE

## 2020-11-26 RX ADMIN — BISACODYL 5 MG: 5 TABLET, COATED ORAL at 08:44

## 2020-11-26 RX ADMIN — Medication 100 MG: at 08:44

## 2020-11-26 RX ADMIN — OXYCODONE HYDROCHLORIDE AND ACETAMINOPHEN 1 TABLET: 7.5; 325 TABLET ORAL at 06:39

## 2020-11-26 RX ADMIN — MIRTAZAPINE 7.5 MG: 15 TABLET, FILM COATED ORAL at 20:20

## 2020-11-26 RX ADMIN — STANDARDIZED SENNA CONCENTRATE 8.6 MG: 8.6 TABLET ORAL at 20:19

## 2020-11-26 RX ADMIN — Medication 10 ML: at 08:45

## 2020-11-26 RX ADMIN — MORPHINE SULFATE 15 MG: 15 TABLET, FILM COATED, EXTENDED RELEASE ORAL at 08:44

## 2020-11-26 RX ADMIN — GABAPENTIN 200 MG: 100 CAPSULE ORAL at 08:44

## 2020-11-26 RX ADMIN — OXYCODONE HYDROCHLORIDE AND ACETAMINOPHEN 1 TABLET: 7.5; 325 TABLET ORAL at 20:19

## 2020-11-26 RX ADMIN — Medication 10 ML: at 20:21

## 2020-11-26 RX ADMIN — ENOXAPARIN SODIUM 40 MG: 40 INJECTION SUBCUTANEOUS at 08:45

## 2020-11-26 RX ADMIN — MORPHINE SULFATE 15 MG: 15 TABLET, FILM COATED, EXTENDED RELEASE ORAL at 18:10

## 2020-11-26 RX ADMIN — FOLIC ACID 1 MG: 1 TABLET ORAL at 08:44

## 2020-11-26 RX ADMIN — MORPHINE SULFATE 15 MG: 15 TABLET, FILM COATED, EXTENDED RELEASE ORAL at 01:47

## 2020-11-26 RX ADMIN — GABAPENTIN 200 MG: 100 CAPSULE ORAL at 20:20

## 2020-11-26 RX ADMIN — POLYETHYLENE GLYCOL 3350 17 G: 17 POWDER, FOR SOLUTION ORAL at 08:45

## 2020-11-26 ASSESSMENT — PAIN SCALES - GENERAL
PAINLEVEL_OUTOF10: 10
PAINLEVEL_OUTOF10: 0
PAINLEVEL_OUTOF10: 7
PAINLEVEL_OUTOF10: 8

## 2020-11-26 NOTE — CARE COORDINATION
Received a VMM from [address] last evening that peer to peer was also denied with Dr. Stephanie Fernandez. Pt gave SNF choice and referral was made to [facility] in [facility].

## 2020-11-26 NOTE — FLOWSHEET NOTE
2130 Shift assessment complete. Vs are stable, BP is slightly elevated. Pain is 8/10 medicated with percocet. Will recheck shortly. Meds given per order. Dressing on the back of neck is CDI. Pt is very stiff. He is resting in bed at this time. Call light within reach. Will continue to monitor. Electronically signed by Radhika Kwon RN on 11/25/2020 at 10:17 PM    0643 pt had two episodes of incontinence through the night. Didn't try to straight cath at all last night. Medicated with percocet for 7/10 pain. Very stiff in bed. No BM. Will continue to monitor.      Electronically signed by Radhika Kwon RN on 11/26/2020 at 6:49 AM

## 2020-11-26 NOTE — PLAN OF CARE
Problem: Pain:  Goal: Pain level will decrease  Description: Pain level will decrease  Outcome: Ongoing  Goal: Control of acute pain  Description: Control of acute pain  Outcome: Ongoing  Goal: Control of chronic pain  Description: Control of chronic pain  Outcome: Ongoing     Problem: Falls - Risk of:  Goal: Will remain free from falls  Description: Will remain free from falls  Outcome: Ongoing

## 2020-11-26 NOTE — PROGRESS NOTES
Subjective: The patient complains of severe  acute on chronic neck pain and weakness partially relieved by rest, PT, OT and meds and exacerbated by exertion and recent illness. I am concerned about patients medical complexities. Precert started with [facility] Medicare for Acute Inpatient Rehab, no waivers in place at this time. He ICs at home --need to reume this in the hospital to prevent UTI and sepsis. Reviewed recent nursing note, \"  2130 Shift assessment complete. Vs are stable, BP is slightly elevated. Pain is 8/10 medicated with percocet. Will recheck shortly. Meds given per order. Dressing on the back of neck is CDI. Pt is very stiff. He is resting in bed at this time. Call light within reach. Will continue to monitor. ..2804 pt had two episodes of incontinence through the night. Didn't try to straight cath at all last night. Medicated with percocet for 7/10 pain. Very stiff in bed. No BM. Will continue to monitor. \".  Pain is not controlled with OXY 5 mg. Percocet 7.5 is better. ROS x10: The patient also complains of severely impaired mobility and activities of daily living. Otherwise no new problems with vision, hearing, nose, mouth, throat, dermal, cardiovascular, GI, , pulmonary, musculoskeletal, psychiatric or neurological. See Rehab consult on Rehab chart . Vital signs:  BP (!) 149/75   Pulse 89   Temp 98.6 °F (37 °C) (Oral)   Resp 18   Ht 6' 1\" (1.854 m)   Wt 147 lb 4.3 oz (66.8 kg)   SpO2 98%   BMI 19.43 kg/m²   I/O:   PO/Intake:    fair PO intake,       Bowel/Bladder:   Incontinent-neurogenic bladder-needs assist with IC q 6-8 hours scheduled. ,    General:  Patient is well developed, adequately nourished, non-obese and     well kempt. HEENT:    PERRLA, hearing intact to loud voice, external inspection of ear     and nose benign. Inspection of lips, tongue and gums benign  Musculoskeletal: No significant change in strength or tone. All joints stable. Inspection and palpation of digits and nails show no clubbing,       cyanosis or inflammatory conditions. Neuro/Psychiatric: Affect: flat-  Alert and oriented to self and     Situation. No significant change in deep tendon reflexes or     sensation  Lungs:  Diminished, CTA-B. Respiration effort is normal at rest.     Heart:   S1 = S2,  RRR. No loud murmurs. Abdomen:  Soft, non-tender, no enlargement of liver or spleen. Extremities:  No significant lower extremity edema or tenderness. Skin:    BUE bruises dt blood draws, c spine incision    Rehabilitation:  Physical therapy: FIMS:  Bed Mobility: Scooting: Contact guard assistance    Transfers: Sit to Stand: Contact guard assistance  Stand to sit: Contact guard assistance  Bed to Chair: Contact guard assistance, Ambulation 1  Surface: level tile  Device: No Device  Assistance: Contact guard assistance  Quality of Gait: Shuffling steps, decreased trunk rotation, nbos, decreased brinda heel strike  Gait Deviations: Slow Yanet, Decreased step length  Distance: 10' x 2  Comments: Distance limited to in room tx secondary to pt request,      FIMS:  ,  , Assessment: Pt displays an improvement in strength and endurance this tx secondary to increasing ambulatory distance. Good follow through demonstrated throughout tx when pt given vc's for improving technique and safety. Ther ex utilized to increased strength in BLE and improve overall functional mobility. Occupational therapy: FIMS:   ,  , Assessment: Pt. seen post spinal sx for re-evaluation. Pt. demonstrates the above deficits which impact his ability to perform ADLs and IADLs. Pt. would benefit from continued OT to maximize independence and safety with ADL tasks. Speech therapy: FIMS:        Lab/X-ray studies reviewed, analyzed and discussed with patient and staff:   No results found for this or any previous visit (from the past 24 hour(s)).     Previous extensive, complex labs, notes and diagnostics reviewed and analyzed. ALLERGIES:    Allergies as of 11/18/2020    (No Known Allergies)      (please also verify by checking [facility])     Complex Physical Medicine & Rehab Issues Assess & Plan:   1. Severe abnormality of gait and mobility and impaired self-care and ADL's secondary to progressive cervical myelopathy. Functional and medical status reassessed regarding patients ability to participate in therapies and patient found to be able to participate in  acute intensive comprehensive inpatient rehabilitation program including PT/OT to improve balance, ambulation, ADLs, and to improve the P/AROM. It is my opinion that they will be able to tolerate 3 hours of therapy a day and benefit from it at an acute level. 2. Bowel constipation and Bladder dysfunction atonic bladder: Incontinent-neurogenic bladder-needs assist with IC q 6-8 hours scheduled. ,   frequent toileting, ambulate to bathroom with assistance, check post void residuals. Check for C.difficile x1 if >2 loose stools in 24 hours, continue bowel & bladder program.  Monitor for UTI symptoms including lethargy and confusion  3. Severe postoperative neck pain and generalized OA pain: reassess pain every shift and prior to and after each therapy session, give scheduled MS Contin, prn Percocet  or Tylenol, modalities prn in therapy, consider Lidoderm, K-pad prn. Consider increase to Dilaudid 1 mg.  4. Skin breakdown risk:  continue pressure relief program.  Daily skin exams and reports from nursing. 5. Severe fatigue due to immobility and nutritional deficits: Add vitamin B12 vitamin D and CoQ10 titrate dosing and add protein supplementation with low carb content. 6. Complex discharge planning: Risk for urinary retention given spinal cord injury-Precert started with [facility] Medicare for Acute Inpatient Rehab, no waivers in place at this time--they did not call us back yesterday during the window of time that they gave us.     Complex Active General Medical Issues that complicate care Assess & Plan:     1. Principal Problem:    Stenosis of cervical spine with myelopathy [facility])  Active Problems:    Palpitations    Anemia    Weakness    Rectal mass    Metastatic cancer ([facility] [address]    Fall at home, initial encounter    Severe malnutrition ([facility] [address]    Falls, initial encounter    Myelopathy ([facility] [address]    Malignant tumor of prostate ([facility] [address]    Retention of urine    Urethritis    Ataxic gait    Neurogenic bladder  Resolved Problems:    * No resolved hospital problems.  Terri Varghese D.O., PM&R     Attending    [address]

## 2020-11-26 NOTE — PROGRESS NOTES
Hospitalist Progress Note      PCP: Lonny Berry MD    Date of Admission: 11/18/2020    Chief Complaint:  No acute events, afebrile, stable HD,     Medications:  Reviewed    Infusion Medications     Scheduled Medications    gabapentin  200 mg Oral TID    sodium chloride flush  10 mL Intravenous 2 times per day    bisacodyl  5 mg Oral Daily    morphine  15 mg Oral Q8H    mirtazapine  7.5 mg Oral Nightly    Vitamin D  2,000 Units Oral Dinner    cyanocobalamin  1,000 mcg Intramuscular Weekly    coenzyme Q10  100 mg Oral Daily    lidocaine  3 patch Transdermal Daily    sodium chloride flush  10 mL Intravenous BID    polyethylene glycol  17 g Oral Daily    senna  1 tablet Oral Nightly    folic acid  1 mg Oral Daily    sodium chloride flush  10 mL Intravenous 2 times per day    enoxaparin  40 mg Subcutaneous Daily     PRN Meds: diazePAM, sodium chloride flush, magnesium hydroxide, polyethylene glycol, ondansetron **OR** ondansetron, HYDROmorphone, acetaminophen **OR** acetaminophen, oxyCODONE-acetaminophen, sodium chloride flush, promethazine **OR** ondansetron      Intake/Output Summary (Last 24 hours) at 11/26/2020 1220  Last data filed at 11/26/2020 0645  Gross per 24 hour   Intake 370 ml   Output --   Net 370 ml       Exam:    BP (!) 149/75   Pulse 89   Temp 98.6 °F (37 °C) (Oral)   Resp 18   Ht 6' 1\" (1.854 m)   Wt 147 lb 4.3 oz (66.8 kg)   SpO2 98%   BMI 19.43 kg/m²     General appearance: appears stated age and cooperative. Respiratory: clear to auscultation, bilaterally   Cardiovascular: Regular rate and rhythm with normal S1/S2  Abdomen: Soft, active bowel sounds.   Extremities: no edema      Labs:   Recent Labs     11/24/20  0655 11/25/20  0639   WBC 8.5 9.8   HGB 9.0* 9.6*   HCT 28.0* 29.5*   * 471*     Recent Labs     11/24/20  0655 11/25/20  0639   * 131*   K 4.1 4.2    100   CO2 22 23   BUN 11 6*   CREATININE 0.65* 0.48*   CALCIUM 7.9* 8.6     No results for input(s): AST, ALT, BILIDIR, BILITOT, ALKPHOS in the last 72 hours. No results for input(s): INR in the last 72 hours. No results for input(s): Yumiko Belts in the last 72 hours. Urinalysis:      Lab Results   Component Value Date    NITRU Negative 09/26/2020    WBCUA 20-50 09/26/2020    BACTERIA MANY 09/26/2020    RBCUA 3-5 09/26/2020    BLOODU TRACE 09/26/2020    SPECGRAV 1.019 09/26/2020    GLUCOSEU Negative 09/26/2020       Radiology:  RADIOLOGY REPORT   Final Result      FLUORO FOR SURGICAL PROCEDURES   Final Result   POST-OPERATIVE APPEARANCE AS SHOWN.            MRI CERVICAL SPINE W WO CONTRAST   Final Result      Osseous metastases throughout the clivus and a 1 cm T3 vertebra metastasis. Please refer to prior whole spine MRI detailing more extensive metastasis. C3-4 through C6-7 myelomalacia with cord atrophy, and abnormal signal due to canal stenosis and chronic cord compression. C3-4 through C6-7 varying degrees of significant foraminal stenosis predominantly due to uncovertebral joint arthrosis. Evaluation less than optimal due to motion. Punctate posterior peripheral cord enhancement at the C4-5 disc level and equivocal just below the C3-4 disc level detailed above. MRI CERVICAL THORACIC LUMBAR SPINE WO CONTRAST LIMITED   Final Result      Advanced cervical spondylosis from C3-4 through C6-7 resulting in severe spinal canal stenosis at these levels with minimal spinal canal diameter approaching 4 to 5 mm. Multiple metastatic bone disease throughout the thoracolumbar spine with no pathologic fracture. CT Head WO Contrast   Final Result   Impression:      No acute findings. Mild cerebral atrophy. Chronic ischemic white matter disease. All CT scans at this facility use dose modulation, iterative reconstruction, and/or weight based dosing when appropriate to reduce radiation dose to as low as reasonably achievable.       XR CHEST PORTABLE Final Result   NO ACUTE CARDIOPULMONARY DISEASE.               Assessment/Plan:    78 y/o man with history of metastatic prostate cancer who presented with:    Recurrent falls and generalized weakness  - multifactorial in etiology including orthostatic hypotension suspect due to autonomic dysfunction, severe C-spine stenosis, advanced prostate cancer on chemotherapy, moderate protein calorie malnutrition  - MRI of the spine showed diffuse mets  - s/p C3-6 laminoplasty with reconstruction on 11/23  - followed by neurosurgery    Orthostatic hypotension  - treated with IVFs    Hyponatremia   - mild, stable    Microcytic anemia   - studies not suggestive of iron deficiency  - stable H/H    Metastatic prostate cancer with severe bone pain  - Xtandi on hold  - on MS contin, oxycodone, Dilaudid,gabapentin  - followed by oncology and palliative care     Constipation  - continue bowel regimen     Folic acid deficiency  - continue supplement     Cancer related pain   - on opioids    Severe malnutrition  - followed by dietitian       Disposition - acute rehab denied, waiting for SNF placement          Electronically signed by Veena Khan MD on 11/26/2020 at 12:20 PM

## 2020-11-26 NOTE — PROGRESS NOTES
Hematology/Oncology   Progress Note        CHIEF COMPLAINT/HPI:  Follow up of prostate cancer s/p cervical laminectomy. Hemoglobin at 9.6. Review of iron studies suggest anemia of chronic disease. REVIEW OF SYSTEMS:    Unremarkable except for symptoms mentioned in HPI.     Current Inpatient Medications:    Current Facility-Administered Medications   Medication Dose Route Frequency Provider Last Rate Last Dose    diazePAM (VALIUM) tablet 2 mg  2 mg Oral Q6H PRN Yue Jackson, APRN - CNP        gabapentin (NEURONTIN) capsule 200 mg  200 mg Oral TID Rico Actis, APRN - CNP   200 mg at 11/26/20 0844    sodium chloride flush 0.9 % injection 10 mL  10 mL Intravenous 2 times per day Shantell Alexander MD   10 mL at 11/25/20 2128    sodium chloride flush 0.9 % injection 10 mL  10 mL Intravenous PRN Shantell Alexander MD        bisacodyl (DULCOLAX) EC tablet 5 mg  5 mg Oral Daily Shantell Alexander MD   5 mg at 11/26/20 0844    magnesium hydroxide (MILK OF MAGNESIA) 400 MG/5ML suspension 30 mL  30 mL Oral Daily PRN Shantell Alexander MD        polyethylene glycol [facility]) packet 17 g  17 g Oral Daily PRN Shantell Alexander MD        ondansetron (ZOFRAN-ODT) disintegrating tablet 4 mg  4 mg Oral Q8H PRN Shantell Alexander MD        Or    ondansetron [facility]) injection 4 mg  4 mg Intravenous Q6H PRN Shantell Alexander MD        morphine (MS CONTIN) extended release tablet 15 mg  15 mg Oral Q8H Rico Actis, APRN - CNP   15 mg at 11/26/20 0844    HYDROmorphone (DILAUDID) injection 0.5 mg  0.5 mg Intravenous Q4H PRN Sage Hansen, APRN - CNP   0.5 mg at 11/25/20 1826    mirtazapine (REMERON) tablet 7.5 mg  7.5 mg Oral Nightly Rico Actis, APRN - CNP   7.5 mg at 11/25/20 2128    Vitamin D (CHOLECALCIFEROL) tablet 2,000 Units  2,000 Units Oral Dinner Nicole Scgenetin, DO   2,000 Units at 11/24/20 1708    cyanocobalamin injection 1,000 mcg  1,000 mcg Intramuscular Weekly Nicole Scullin, DO   1,000 mcg at 11/24/20 1341    coenzyme Q10 capsule 100 mg  100 mg Oral Daily Nicole Scullin, DO   100 mg at 11/26/20 0844    lidocaine 4 % external patch 3 patch  3 patch Transdermal Daily Nicole Scullin, DO   3 patch at 11/25/20 0921    acetaminophen (TYLENOL) tablet 650 mg  650 mg Oral Q4H PRN Nicole Scullin, DO        Or    acetaminophen (TYLENOL) suppository 650 mg  650 mg Rectal Q6H PRN Nicole Scullin, DO        oxyCODONE-acetaminophen (PERCOCET) 7.5-325 MG per tablet 1 tablet  1 tablet Oral Q4H PRN Nicole Scullin, DO   1 tablet at 11/26/20 0639    sodium chloride flush 0.9 % injection 10 mL  10 mL Intravenous BID JOYCE Rae - CNP   10 mL at 11/24/20 2149    polyethylene glycol (GLYCOLAX) packet 17 g  17 g Oral Daily Dawna Crosser, DO   17 g at 11/26/20 0845    senna (SENOKOT) tablet 8.6 mg  1 tablet Oral Nightly Dawna Crosser, DO   8.6 mg at 67/92/86 4545    folic acid (FOLVITE) tablet 1 mg  1 mg Oral Daily Pito Benz MD   1 mg at 11/26/20 0844    sodium chloride flush 0.9 % injection 10 mL  10 mL Intravenous 2 times per day Thien Jason MD   10 mL at 11/26/20 0845    sodium chloride flush 0.9 % injection 10 mL  10 mL Intravenous PRN Thien Jason MD   10 mL at 11/19/20 1516    promethazine (PHENERGAN) tablet 12.5 mg  12.5 mg Oral Q6H PRN Thien Jason MD        Or    ondansetron [facility]) injection 4 mg  4 mg Intravenous Q6H PRN Thien Jason MD   4 mg at 11/18/20 1843    enoxaparin (LOVENOX) injection 40 mg  40 mg Subcutaneous Daily Thien Jason MD   40 mg at 11/26/20 0845       PHYSICAL EXAM:    EYES:  Lids and lashes normal, pupils equal, round and reactive to light, extra ocular muscles intact, sclera clear, conjunctiva normal    ENT:  Normocephalic, without obvious abnormality, atraumatic, sinuses nontender on palpation, external ears without lesions, oral pharynx with moist mucus membranes, tonsils without erythema or exudates, gums normal and good dentition. NECK:  Supple, symmetrical, trachea midline, no adenopathy, thyroid symmetric, not enlarged and no tenderness, skin normal    CHEST:    LUNGS:  No increased work of breathing, good air exchange, clear to auscultation bilaterally, no crackles or wheezing    CARDIOVASCULAR:  Normal apical impulse, regular rate and rhythm, normal S1 and S2, no S3 or S4, and no murmur noted    ABDOMEN:  No scars, normal bowel sounds, soft, non-distended, non-tender, no masses palpated, no hepatosplenomegally    MUSCULOSKELETAL:  There is no redness, warmth, or swelling of the joints. Full range of motion noted. Motor strength is 5 out of 5 all extremities bilaterally.   Tone is normal.  EXTREMITIES:    NEURO:    DATA:      PT/INR:  No results found for: PTINR  PTT:  No results found for: APTT  CMP:    Lab Results   Component Value Date     11/25/2020    K 4.2 11/25/2020     11/25/2020    CO2 23 11/25/2020    BUN 6 11/25/2020    PROT 8.0 11/18/2020     Magnesium:    Lab Results   Component Value Date    MG 2.2 11/18/2020     Phosphorus:  No components found for: PO4  Calcium:  No components found for: CA  CBC:    Lab Results   Component Value Date    WBC 9.8 11/25/2020    RBC 3.76 11/25/2020    HGB 9.6 11/25/2020    HCT 29.5 11/25/2020    MCV 78.4 11/25/2020    RDW 18.7 11/25/2020     11/25/2020     DIFF:    Lab Results   Component Value Date    MCV 78.4 11/25/2020    RDW 18.7 11/25/2020      LDH:  No results found for: LDH  Uric Acid:  No components found for: URIC    EKG Reviewed  Appropriate Radiology Reviewed      Pathology: Reviewed where indicated      ASSESSMENT:  Principal Problem:    Stenosis of cervical spine with myelopathy (HCC)  Active Problems:    Palpitations    Anemia    Weakness    Rectal mass    Metastatic cancer ([address]    Fall at home, initial encounter    Severe malnutrition ([address]    Falls, initial encounter    Myelopathy ([address]    Malignant tumor of prostate ([address]    Retention of

## 2020-11-26 NOTE — PROGRESS NOTES
Patient assessment and vitals complete and stable. Patient medicated for pain per [facility], voiced relief. C/o little appetite    Pt bladder scan for 364 mL, straight cathd self for 295 mL.

## 2020-11-27 LAB
ALBUMIN SERPL-MCNC: 2.7 G/DL (ref 3.5–4.6)
ANION GAP SERPL CALCULATED.3IONS-SCNC: 11 MEQ/L (ref 9–15)
BUN BLDV-MCNC: 9 MG/DL (ref 8–23)
CALCIUM SERPL-MCNC: 8.5 MG/DL (ref 8.5–9.9)
CHLORIDE BLD-SCNC: 100 MEQ/L (ref 95–107)
CO2: 20 MEQ/L (ref 20–31)
CREAT SERPL-MCNC: 0.54 MG/DL (ref 0.7–1.2)
GFR AFRICAN AMERICAN: >60
GFR NON-AFRICAN AMERICAN: >60
GLUCOSE BLD-MCNC: 112 MG/DL (ref 70–99)
HCT VFR BLD CALC: 30.3 % (ref 42–52)
HEMOGLOBIN: 9.6 G/DL (ref 14–18)
MAGNESIUM: 2.2 MG/DL (ref 1.7–2.4)
MCH RBC QN AUTO: 24.7 PG (ref 27–31.3)
MCHC RBC AUTO-ENTMCNC: 31.6 % (ref 33–37)
MCV RBC AUTO: 78.2 FL (ref 80–100)
PDW BLD-RTO: 18.8 % (ref 11.5–14.5)
PHOSPHORUS: 2.6 MG/DL (ref 2.3–4.8)
PLATELET # BLD: 557 K/UL (ref 130–400)
POTASSIUM SERPL-SCNC: 4 MEQ/L (ref 3.4–4.9)
PROSTATE SPECIFIC ANTIGEN: 3.22 NG/ML (ref 0–6.22)
RBC # BLD: 3.87 M/UL (ref 4.7–6.1)
SODIUM BLD-SCNC: 131 MEQ/L (ref 135–144)
WBC # BLD: 13.9 K/UL (ref 4.8–10.8)

## 2020-11-27 PROCEDURE — 99232 SBSQ HOSP IP/OBS MODERATE 35: CPT | Performed by: PHYSICAL MEDICINE & REHABILITATION

## 2020-11-27 PROCEDURE — 6370000000 HC RX 637 (ALT 250 FOR IP): Performed by: NEUROLOGICAL SURGERY

## 2020-11-27 PROCEDURE — 6370000000 HC RX 637 (ALT 250 FOR IP): Performed by: PHYSICAL MEDICINE & REHABILITATION

## 2020-11-27 PROCEDURE — 6370000000 HC RX 637 (ALT 250 FOR IP): Performed by: PSYCHIATRY & NEUROLOGY

## 2020-11-27 PROCEDURE — 1210000000 HC MED SURG R&B

## 2020-11-27 PROCEDURE — 85027 COMPLETE CBC AUTOMATED: CPT

## 2020-11-27 PROCEDURE — 6360000002 HC RX W HCPCS: Performed by: NURSE PRACTITIONER

## 2020-11-27 PROCEDURE — 97110 THERAPEUTIC EXERCISES: CPT

## 2020-11-27 PROCEDURE — 6370000000 HC RX 637 (ALT 250 FOR IP): Performed by: NURSE PRACTITIONER

## 2020-11-27 PROCEDURE — 97535 SELF CARE MNGMENT TRAINING: CPT

## 2020-11-27 PROCEDURE — 83735 ASSAY OF MAGNESIUM: CPT

## 2020-11-27 PROCEDURE — 6370000000 HC RX 637 (ALT 250 FOR IP): Performed by: INTERNAL MEDICINE

## 2020-11-27 PROCEDURE — 84153 ASSAY OF PSA TOTAL: CPT

## 2020-11-27 PROCEDURE — APPSS30 APP SPLIT SHARED TIME 16-30 MINUTES: Performed by: STUDENT IN AN ORGANIZED HEALTH CARE EDUCATION/TRAINING PROGRAM

## 2020-11-27 PROCEDURE — 99233 SBSQ HOSP IP/OBS HIGH 50: CPT | Performed by: PSYCHIATRY & NEUROLOGY

## 2020-11-27 PROCEDURE — 2580000003 HC RX 258: Performed by: NEUROLOGICAL SURGERY

## 2020-11-27 PROCEDURE — 36415 COLL VENOUS BLD VENIPUNCTURE: CPT

## 2020-11-27 PROCEDURE — 6360000002 HC RX W HCPCS: Performed by: FAMILY MEDICINE

## 2020-11-27 PROCEDURE — 80069 RENAL FUNCTION PANEL: CPT

## 2020-11-27 RX ADMIN — BISACODYL 5 MG: 5 TABLET, COATED ORAL at 09:02

## 2020-11-27 RX ADMIN — GABAPENTIN 200 MG: 100 CAPSULE ORAL at 09:02

## 2020-11-27 RX ADMIN — FOLIC ACID 1 MG: 1 TABLET ORAL at 09:02

## 2020-11-27 RX ADMIN — MIRTAZAPINE 7.5 MG: 15 TABLET, FILM COATED ORAL at 22:13

## 2020-11-27 RX ADMIN — MORPHINE SULFATE 15 MG: 15 TABLET, FILM COATED, EXTENDED RELEASE ORAL at 01:37

## 2020-11-27 RX ADMIN — HYDROMORPHONE HYDROCHLORIDE 0.5 MG: 1 INJECTION, SOLUTION INTRAMUSCULAR; INTRAVENOUS; SUBCUTANEOUS at 23:36

## 2020-11-27 RX ADMIN — STANDARDIZED SENNA CONCENTRATE 8.6 MG: 8.6 TABLET ORAL at 22:12

## 2020-11-27 RX ADMIN — Medication 10 ML: at 22:13

## 2020-11-27 RX ADMIN — HYDROMORPHONE HYDROCHLORIDE 0.5 MG: 1 INJECTION, SOLUTION INTRAMUSCULAR; INTRAVENOUS; SUBCUTANEOUS at 09:02

## 2020-11-27 RX ADMIN — Medication 10 ML: at 09:10

## 2020-11-27 RX ADMIN — OXYCODONE HYDROCHLORIDE AND ACETAMINOPHEN 1 TABLET: 7.5; 325 TABLET ORAL at 22:12

## 2020-11-27 RX ADMIN — OXYCODONE HYDROCHLORIDE AND ACETAMINOPHEN 1 TABLET: 7.5; 325 TABLET ORAL at 04:30

## 2020-11-27 RX ADMIN — Medication 100 MG: at 09:02

## 2020-11-27 RX ADMIN — OXYCODONE HYDROCHLORIDE AND ACETAMINOPHEN 1 TABLET: 7.5; 325 TABLET ORAL at 11:57

## 2020-11-27 RX ADMIN — POLYETHYLENE GLYCOL 3350 17 G: 17 POWDER, FOR SOLUTION ORAL at 09:01

## 2020-11-27 RX ADMIN — ENOXAPARIN SODIUM 40 MG: 40 INJECTION SUBCUTANEOUS at 09:02

## 2020-11-27 RX ADMIN — GABAPENTIN 200 MG: 100 CAPSULE ORAL at 22:12

## 2020-11-27 RX ADMIN — MORPHINE SULFATE 15 MG: 15 TABLET, FILM COATED, EXTENDED RELEASE ORAL at 09:02

## 2020-11-27 RX ADMIN — MORPHINE SULFATE 15 MG: 15 TABLET, FILM COATED, EXTENDED RELEASE ORAL at 19:37

## 2020-11-27 RX ADMIN — GABAPENTIN 200 MG: 100 CAPSULE ORAL at 15:32

## 2020-11-27 ASSESSMENT — PAIN DESCRIPTION - LOCATION
LOCATION: NECK
LOCATION: NECK

## 2020-11-27 ASSESSMENT — PAIN DESCRIPTION - PROGRESSION: CLINICAL_PROGRESSION: NOT CHANGED

## 2020-11-27 ASSESSMENT — PAIN DESCRIPTION - ONSET: ONSET: ON-GOING

## 2020-11-27 ASSESSMENT — PAIN DESCRIPTION - FREQUENCY: FREQUENCY: CONTINUOUS

## 2020-11-27 ASSESSMENT — PAIN SCALES - GENERAL
PAINLEVEL_OUTOF10: 8
PAINLEVEL_OUTOF10: 9
PAINLEVEL_OUTOF10: 7
PAINLEVEL_OUTOF10: 10
PAINLEVEL_OUTOF10: 8
PAINLEVEL_OUTOF10: 7

## 2020-11-27 ASSESSMENT — PAIN DESCRIPTION - PAIN TYPE: TYPE: ACUTE PAIN;SURGICAL PAIN

## 2020-11-27 ASSESSMENT — PAIN DESCRIPTION - DESCRIPTORS: DESCRIPTORS: ACHING

## 2020-11-27 ASSESSMENT — ENCOUNTER SYMPTOMS
GASTROINTESTINAL NEGATIVE: 1
RESPIRATORY NEGATIVE: 1

## 2020-11-27 ASSESSMENT — PAIN DESCRIPTION - ORIENTATION: ORIENTATION: RIGHT;UPPER

## 2020-11-27 NOTE — PROGRESS NOTES
[facility] Neurology Daily Progress Note  Name: Sandra Peres  Age: 79 y.o. Gender: male  CodeStatus: Full Code  Allergies: No Known Allergies    Chief Complaint:Fatigue    Primary Care Provider: Javon Espinoza MD  InpatientTreatment Team: Treatment Team: Attending Provider: Jhony Pitt MD; Consulting Physician: Henry Pitt DO; Utilization Reviewer: Eveline Casas RN; Consulting Physician: Vandana Greene MD; Consulting Physician: Bennie Miller MD; Surgeon: Bennie Miller MD; Registered Nurse: Karen Montenegro, RN; Registered Nurse: Sanju Mcmanus, RN; : Olga Vazquez RN; : Esa Sanders RN; : RUBÉN Apodaca  Admission Date: 11/18/2020      HPI     Tawanda Maldonado continues to have uncontrolled pain that interferes with PT. Spoke with Bonny Chao from PT, who stated that extent of exercise today was sitting forward in bed from a reclined position. Exercise limited due to pain. This is a decrease in activity from previous days where PT could get him to sit on the edge of the bed. Patient denies any new symptoms. No headache, double vision, blurry vision, difficulty with speech, difficulty with swallowing, nausea, vomiting, choking, or dizziness    Complains of considerable neck pain and he tells me that he cannot stand because of the neck pain. He has not developed any new numbness of the lower extremity. He still has bladder issues. He has lost some weight again. Vitals:    11/27/20 0729   BP: 113/75   Pulse: 112   Resp:    Temp:    SpO2: 100%      Review of Systems   Constitutional: Negative. HENT: Negative. Respiratory: Negative. Cardiovascular: Negative. Gastrointestinal: Negative. Musculoskeletal: Positive for neck pain. Neurological: Positive for weakness and numbness. Psychiatric/Behavioral: Negative. Complainsof considerable neck pain and stiffness  Physical Exam  Vitals signs and nursing note reviewed.    Constitutional: Appearance: Normal appearance. HENT:      Head: Normocephalic and atraumatic. Eyes:      Extraocular Movements: Extraocular movements intact. Conjunctiva/sclera: Conjunctivae normal.   Pulmonary:      Effort: Pulmonary effort is normal.   Musculoskeletal: Normal range of motion. Neurological:      Mental Status: He is alert and oriented to person, place, and time. Psychiatric:         Mood and Affect: Mood normal.         Speech: Speech normal.         Behavior: Behavior normal.       Neurologic Exam     Mental Status   Oriented to person, place, and time. Speech: speech is normal   Level of consciousness: alert  Knowledge: good. Motor Exam     Strength   Right biceps: 5/5  Left biceps: 5/5  Right triceps: 5/5  Left triceps: 5/5  Right interossei: 5/5  Left interossei: 5/5  Right hamstrin/5  Left hamstrin/5  Right anterior tibial: 5/5  Left anterior tibial: 5/5  Right posterior tibial: 5/5  Left posterior tibial: 5/5  Right gastroc: 5/5  Left gastroc: 5/5    He  has significant weakness in the lower extremities still of about 4/5. He is areflexic in the lower extremity without any to sensory levels though there is a patchy. But he is hyperreflexic in the upper extreme with a positive Georgi signs. Bilateral Solange Pilsner Babinski signs are notable this expected given that patient has cervical myelopathy with cord compression and well described in the neurological literature.   Hopefully this will improve over time as decompression has been done      Medications:  Reviewed    Infusion Medications:   Scheduled Medications:    gabapentin  200 mg Oral TID    sodium chloride flush  10 mL Intravenous 2 times per day    bisacodyl  5 mg Oral Daily    morphine  15 mg Oral Q8H    mirtazapine  7.5 mg Oral Nightly    Vitamin D  2,000 Units Oral Dinner    cyanocobalamin  1,000 mcg Intramuscular Weekly    coenzyme Q10  100 mg Oral Daily    lidocaine  3 patch Transdermal Daily    sodium None.    Ventricular system: Ventricles mildly enlarged. Sulci mildly prominent. Basal Cisterns:  Normal.    Cerebral Parenchyma: Bilateral symmetric periventricular areas decreased attenuation. Midline Shift:  None. Cerebellum:  Normal.     Paranasal sinuses and mastoid air cells:  Normal.    Visualized Orbits:  Normal.        Impression Impression:    No acute findings. Mild cerebral atrophy. Chronic ischemic white matter disease. All CT scans at this facility use dose modulation, iterative reconstruction, and/or weight based dosing when appropriate to reduce radiation dose to as low as reasonably achievable. No results found for this or any previous visit. No results found for this or any previous visit. Carotid duplex: No results found for this or any previous visit. No results found for this or any previous visit. No results found for this or any previous visit. Echo No results found for this or any previous visit.           Assessment/Plan:    11/19/20:  Generalized weakness with leg weakness with significant weight loss.  Patient's examination suggests hyperreflexia in the lower extremity with areflexia in the upper extremity.  Patient did receive chemotherapy and therefore may have been areflexic in the upper extremity though the lower extremity reflexes are of concern for upper motor neuron type of pathology is no sensory levels.  Cord compression is a consideration.     We will arrange for a stat limited sagittal views of the cervical thoracic and lumbar spine to make sure he does not have cord compression and if they see anything specific a dedicated MRIs will further requested.  Patient CPK levels are pending see thyroid tests and B12 levels are pending.     Of note patient has lost about 40 pounds in weight in the last 2 months which could add to generalized weakness.  We will follow patient with you     11/20/20:  Patient with history of prostate cancer with known liver and bone mets and was started on Kianna Campos in October 2020.  Oncology is following and placed Pauleen Andres on hold to rule this out as contributing factor for lower extremity weakness and falls.   Screening MRI of cervical and thoracic spine identified severe spinal canal stenosis at C3-4 and C6-7.    Will obtain dedicated MRI of cervical spine with and without contrast given these findings and patient's history of prostate cancer  Will place neurosurgery on consult  TSH within normal limits  B12 within normal limits  Folate low at 3.1, will replace  Dedicated [facility] of the cervical spine was reviewed and shows significant stenosis at C3-4-5.  Await neurosurgical opinion on this.     11/22  Patient actually is doing quite well in terms of not becoming more weaker.  Patient was seen by Dr. Cari Rodríguez surgical intervention is planned. Lola Greenfield had some questions which are answered and discussed that he will require rehabilitation after his procedure which will help him.     11/23  1) cervical stenosis C3-4, C6-7:   To have surgery with Dr. Gardner Fails. We will continue to follow post surgery. Order placed for inpatient rehabilitation status post laminectomy.        2) Folate deficiency:   Continue on 1 mg PO QD     11/24/20:  Status post cervical laminoplasty with reconstruction C3, C4, C5 and C6 on 11/23/2020  MRI of spine shows metastasis  Palliative care following  Patient still has considerable walking issues with gait ataxia and a neurogenic bladder.  The bladder issues may be secondary to his prostate malignancy as well.  We await his rehabitation evaluations.  This may take several weeks to notice that he is walking better since this has been decompressed.     11/25:  Status post cervical laminoplasty with reconstruction C3, C4, C5 and C6 on 11/23/2020  MRI of spine shows metastasis  Palliative care following  Discussed fever and uncontrolled pain with nurse, Anibal Cast. Will recheck temperature.   Also discussed with patient that weakness could take several weeks to improve. He voiced understanding. 11/27:  Continue to work with PT/OT   Will discuss pain level with palliative care. Patient remains afebrile. Discussed importance of participating in PT/OT as much as possible with patient. Patient was seen face-to-face and my examination evaluations are noted as above. Patient is followed by neurology for multiple neurological issues including cervical myelopathy gait ataxia falls neurogenic bladder. Patient requires to be followed very closely as significant changes can occur in this patient with anterior spinal artery compressions and may require urgent evaluations. We keep an eye on his examination and therefore close follow-up. Oscar Savage MD, [id number], American Board of Psychiatry & Neurology  Board Certified in Vascular Neurology  Board Certified in Neuromuscular Medicine  Certified in Neurorehabilitation           Collaborating physicians: Dr Vinicio Savage    Electronically signed by Tylor Sanchez PA-C on 11/27/2020 at 11:54 AM

## 2020-11-27 NOTE — PROGRESS NOTES
Subjective: The patient complains of severe  acute on chronic neck pain and weakness partially relieved by rest, PT, OT and meds and exacerbated by exertion and recent illness. I am concerned about patients medical complexities. Precert started with [facility] Medicare for Acute Inpatient Rehab, no waivers in place at this time-we waited for their call for the [id number] all day on Wednesday they finally called on unpublished number went straight to voicemail did not leave a call back number after hours. Several attempts were made to call them back finally the doctor reach the doctor but the doctor from the insurance company refused to talk me. He ICs at home --need to reume this in the hospital to prevent UTI and sepsis. Is requiring intermittent catheterization which is the style of bladder management that he used at home but is needing to have extra assistance because of his inability to look down and do it himself and he will need OT and nursing from a rehabilitation standpoint to work with him on this in a rehab unit. Reviewed recent nursing note, \"   Pt resting in bed . Turned and repositioned for comfort. Dressing to back of neck CDI. Scheduled MS contin and Percocet PRN for pain control. Pt self caths self as needed. Fall precautions in place. Call light in reach, hourly rounds continue  \". Pain is not controlled with OXY 5 mg. Percocet 7.5 is better. ROS x10: The patient also complains of severely impaired mobility and activities of daily living. Otherwise no new problems with vision, hearing, nose, mouth, throat, dermal, cardiovascular, GI, , pulmonary, musculoskeletal, psychiatric or neurological. See Rehab consult on Rehab chart .        Vital signs:  /75   Pulse 112   Temp 98.2 °F (36.8 °C) (Oral)   Resp 18   Ht 6' 1\" (1.854 m)   Wt 147 lb 4.3 oz (66.8 kg)   SpO2 100%   BMI 19.43 kg/m²   I/O:   PO/Intake:    fair PO intake,       Bowel/Bladder: Incontinent-neurogenic bladder-needs assist with IC q 6-8 hours scheduled. ,    General:  Patient is well developed, adequately nourished, non-obese and     well kempt. HEENT:    PERRLA, hearing intact to loud voice, external inspection of ear     and nose benign. Inspection of lips, tongue and gums benign  Musculoskeletal: No significant change in strength or tone. All joints stable. Inspection and palpation of digits and nails show no clubbing,       cyanosis or inflammatory conditions. Neuro/Psychiatric: Affect: flat-  Alert and oriented to self and     Situation. No significant change in deep tendon reflexes or     sensation  Lungs:  Diminished, CTA-B. Respiration effort is normal at rest.     Heart:   S1 = S2,  RRR. No loud murmurs. Abdomen:  Soft, non-tender, no enlargement of liver or spleen. Extremities:  No significant lower extremity edema or tenderness. Skin:    BUE bruises dt blood draws, c spine incision    Rehabilitation:  Physical therapy: FIMS:  Bed Mobility: Scooting: Contact guard assistance    Transfers: Sit to Stand: Contact guard assistance  Stand to sit: Contact guard assistance  Bed to Chair: Contact guard assistance, Ambulation 1  Surface: level tile  Device: No Device  Assistance: Contact guard assistance  Quality of Gait: Shuffling steps, decreased trunk rotation, nbos, decreased brinda heel strike  Gait Deviations: Slow Yanet, Decreased step length  Distance: 10' x 2  Comments: Distance limited to in room tx secondary to pt request,      FIMS:  ,  , Assessment: Pt displays an improvement in strength and endurance this tx secondary to increasing ambulatory distance. Good follow through demonstrated throughout tx when pt given vc's for improving technique and safety. Ther ex utilized to increased strength in BLE and improve overall functional mobility. Occupational therapy: FIMS:   ,  , Assessment: Pt. seen post spinal sx for re-evaluation.  Pt. demonstrates the above deficits which impact his ability to perform ADLs and IADLs. Pt. would benefit from continued OT to maximize independence and safety with ADL tasks. Speech therapy: FIMS:        Lab/X-ray studies reviewed, analyzed and discussed with patient and staff:   Recent Results (from the past 24 hour(s))   CBC    Collection Time: 11/27/20  6:27 AM   Result Value Ref Range    WBC 13.9 (H) 4.8 - 10.8 K/uL    RBC 3.87 (L) 4.70 - 6.10 M/uL    Hemoglobin 9.6 (L) 14.0 - 18.0 g/dL    Hematocrit 30.3 (L) 42.0 - 52.0 %    MCV 78.2 (L) 80.0 - 100.0 fL    MCH 24.7 (L) 27.0 - 31.3 pg    MCHC 31.6 (L) 33.0 - 37.0 %    RDW 18.8 (H) 11.5 - 14.5 %    Platelets 401 (H) 288 - 400 K/uL   RENAL FUNCTION PANEL    Collection Time: 11/27/20  6:27 AM   Result Value Ref Range    Sodium 131 (L) 135 - 144 mEq/L    Potassium 4.0 3.4 - 4.9 mEq/L    Chloride 100 95 - 107 mEq/L    CO2 20 20 - 31 mEq/L    Anion Gap 11 9 - 15 mEq/L    Glucose 112 (H) 70 - 99 mg/dL    BUN 9 8 - 23 mg/dL    CREATININE 0.54 (L) 0.70 - 1.20 mg/dL    GFR Non-African American >60.0 >60    GFR  >60.0 >60    Calcium 8.5 8.5 - 9.9 mg/dL    Phosphorus 2.6 2.3 - 4.8 mg/dL    Alb 2.7 (L) 3.5 - 4.6 g/dL   Magnesium    Collection Time: 11/27/20  6:27 AM   Result Value Ref Range    Magnesium 2.2 1.7 - 2.4 mg/dL       Previous extensive, complex labs, notes and diagnostics reviewed and analyzed. ALLERGIES:    Allergies as of 11/18/2020    (No Known Allergies)      (please also verify by checking [facility])     Complex Physical Medicine & Rehab Issues Assess & Plan:   1. Severe abnormality of gait and mobility and impaired self-care and ADL's secondary to progressive cervical myelopathy. Functional and medical status reassessed regarding patients ability to participate in therapies and patient found to be able to participate in  acute intensive comprehensive inpatient rehabilitation program including PT/OT to improve balance, ambulation, ADLs, and to improve the P/AROM. It is my opinion that they will be able to tolerate 3 hours of therapy a day and benefit from it at an acute level. 2. Bowel constipation and Bladder dysfunction atonic bladder: Incontinent-neurogenic bladder-needs assist with IC q 6-8 hours scheduled. ,   frequent toileting, ambulate to bathroom with assistance, check post void residuals. Check for C.difficile x1 if >2 loose stools in 24 hours, continue bowel & bladder program.  Monitor for UTI symptoms including lethargy and confusion  3. Severe postoperative neck pain and generalized OA pain: reassess pain every shift and prior to and after each therapy session, give scheduled MS Contin, prn Percocet  or Tylenol, modalities prn in therapy, consider Lidoderm, K-pad prn. Consider increase to Dilaudid 1 mg.  4. Skin breakdown risk:  continue pressure relief program.  Daily skin exams and reports from nursing. 5. Severe fatigue due to immobility and nutritional deficits: Add vitamin B12 vitamin D and CoQ10 titrate dosing and add protein supplementation with low carb content. 6. Complex discharge planning: Risk for urinary retention given spinal cord injury-Precert started with [facility] Medicare for Acute Inpatient Rehab, no waivers in place at this time--they did not call us back yesterday during the window of time that they gave us. Complex Active General Medical Issues that complicate care Assess & Plan:     1. Principal Problem:    Stenosis of cervical spine with myelopathy [facility])  Active Problems:    Palpitations    Anemia    Weakness    Rectal mass    Metastatic cancer ([facility] [address]    Fall at home, initial encounter    Severe malnutrition ([address]    Falls, initial encounter    Myelopathy ([facility] [address]    Malignant tumor of prostate ([facility] [address]    Retention of urine    Urethritis    Ataxic gait    Neurogenic bladder  Resolved Problems:    * No resolved hospital problems.  Ange Ortiz D.O., PM&R     Attending    [address]

## 2020-11-27 NOTE — PROGRESS NOTES
Pt resting in bed . Turned and repositioned for comfort. Dressing to back of neck CDI. Scheduled MS contin and Percocet PRN for pain control. Pt self caths self as needed. Fall precautions in place. Call light in reach, hourly rounds continue.

## 2020-11-27 NOTE — PROGRESS NOTES
Weekly Nicole Scullin, DO   1,000 mcg at 11/24/20 1341    coenzyme Q10 capsule 100 mg  100 mg Oral Daily Nicole Scullin, DO   100 mg at 11/27/20 0902    lidocaine 4 % external patch 3 patch  3 patch Transdermal Daily Nicole Scullin, DO   3 patch at 11/27/20 0902    acetaminophen (TYLENOL) tablet 650 mg  650 mg Oral Q4H PRN Nicole Scullin, DO        Or    acetaminophen (TYLENOL) suppository 650 mg  650 mg Rectal Q6H PRN Nicole Scullin, DO        oxyCODONE-acetaminophen (PERCOCET) 7.5-325 MG per tablet 1 tablet  1 tablet Oral Q4H PRN Nicole Scullin, DO   1 tablet at 11/27/20 0430    sodium chloride flush 0.9 % injection 10 mL  10 mL Intravenous BID Nanine Kidney, APRN - CNP   10 mL at 11/24/20 2149    polyethylene glycol (GLYCOLAX) packet 17 g  17 g Oral Daily Martinez Credit, DO   17 g at 11/27/20 0901    senna (SENOKOT) tablet 8.6 mg  1 tablet Oral Nightly Martinez Credit, DO   8.6 mg at 44/31/65 0662    folic acid (FOLVITE) tablet 1 mg  1 mg Oral Daily Reyna Trevino MD   1 mg at 11/27/20 0902    sodium chloride flush 0.9 % injection 10 mL  10 mL Intravenous 2 times per day Aileen Paredes MD   10 mL at 11/26/20 0845    sodium chloride flush 0.9 % injection 10 mL  10 mL Intravenous PRN Aileen Paredes MD   10 mL at 11/19/20 1516    promethazine (PHENERGAN) tablet 12.5 mg  12.5 mg Oral Q6H PRN Aileen Paredes MD        Or    ondansetron [facility]) injection 4 mg  4 mg Intravenous Q6H PRN Aileen Paredes MD   4 mg at 11/18/20 1843    enoxaparin (LOVENOX) injection 40 mg  40 mg Subcutaneous Daily Aileen Paredes MD   40 mg at 11/27/20 0902       PHYSICAL EXAM:    EYES:  Lids and lashes normal, pupils equal, round and reactive to light, extra ocular muscles intact, sclera clear, conjunctiva normal    ENT:  Normocephalic, without obvious abnormality, atraumatic, sinuses nontender on palpation, external ears without lesions, oral pharynx with moist mucus membranes, tonsils without erythema or exudates, gums normal and good dentition. NECK:  Supple, symmetrical, trachea midline, no adenopathy, thyroid symmetric, not enlarged and no tenderness, skin normal    CHEST:    LUNGS:  No increased work of breathing, good air exchange, clear to auscultation bilaterally, no crackles or wheezing    CARDIOVASCULAR:  Normal apical impulse, regular rate and rhythm, normal S1 and S2, no S3 or S4, and no murmur noted    ABDOMEN:  No scars, normal bowel sounds, soft, non-distended, non-tender, no masses palpated, no hepatosplenomegally    MUSCULOSKELETAL:  There is no redness, warmth, or swelling of the joints. Full range of motion noted. Motor strength is 5 out of 5 all extremities bilaterally.   Tone is normal.  EXTREMITIES:    NEURO:    DATA:      PT/INR:  No results found for: PTINR  PTT:  No results found for: APTT  CMP:    Lab Results   Component Value Date     11/27/2020    K 4.0 11/27/2020    K 4.2 11/25/2020     11/27/2020    CO2 20 11/27/2020    BUN 9 11/27/2020    PROT 8.0 11/18/2020     Magnesium:    Lab Results   Component Value Date    MG 2.2 11/27/2020     Phosphorus:  No components found for: PO4  Calcium:  No components found for: CA  CBC:    Lab Results   Component Value Date    WBC 13.9 11/27/2020    RBC 3.87 11/27/2020    HGB 9.6 11/27/2020    HCT 30.3 11/27/2020    MCV 78.2 11/27/2020    RDW 18.8 11/27/2020     11/27/2020     DIFF:    Lab Results   Component Value Date    MCV 78.2 11/27/2020    RDW 18.8 11/27/2020      LDH:  No results found for: LDH  Uric Acid:  No components found for: URIC    EKG Reviewed  Appropriate Radiology Reviewed      Pathology: Reviewed where indicated      ASSESSMENT:  Principal Problem:    Stenosis of cervical spine with myelopathy (HCC)  Active Problems:    Palpitations    Anemia    Weakness    Rectal mass    Metastatic cancer ([address]    Fall at home, initial encounter    Severe malnutrition ([address]    Falls, initial encounter    Myelopathy ([facility] [address] Malignant tumor of prostate ([address]    Retention of urine    Urethritis    Ataxic gait    Neurogenic bladder  Resolved Problems:    * No resolved hospital problems. *    Patient Active Problem List   Diagnosis    Liver mass    Osteoarthritis of hip    Palpitations    Other specified disorders of bone density and structure, unspecified site     Anemia    Weakness    Gastric ulcer    Goals of care, counseling/discussion    Gastrointestinal hemorrhage    Rectal mass    Acute cystitis without hematuria    Metastatic cancer ([address]    Fall at home, initial encounter    Severe malnutrition ([address]    Falls, initial encounter    Myelopathy ([address]    Benign prostatic hyperplasia with incomplete bladder emptying    Malignant tumor of prostate ([address]    Raised prostate specific antigen    Retention of urine    Urethritis    Ataxic gait    Stenosis of cervical spine with myelopathy (HCC)    Neurogenic bladder       PLAN:  Continue laxatives.  Check PSA          Electronically signed by Cory Willams MD on 11/27/20 at 11:00 AM EST

## 2020-11-27 NOTE — PROGRESS NOTES
Physical Therapy  Physical Therapy Med Surg Daily Treatment Note  Facility/Department: [facility]  Room: [place] [address]       NAME: Ana Mak  : 1950 (24 y.o.)  MRN: [id number]  CODE STATUS: Full Code    Date of Service: 2020    Patient Diagnosis(es): Fall at home, initial encounter [W19. XXXA, H95.053]  Falls, initial encounter [I46. XXXA]   Chief Complaint   Patient presents with    Fatigue     Patient Active Problem List    Diagnosis Date Noted    Neurogenic bladder     Stenosis of cervical spine with myelopathy ([address] 2020    Malignant tumor of prostate ([address] 2020    Raised prostate specific antigen 2020    Retention of urine 2020    Urethritis 2020    Ataxic gait 2020    Myelopathy ([address]     Falls, initial encounter 2020    Severe malnutrition ([address] 2020    Fall at home, initial encounter 2020    Goals of care, counseling/discussion     Gastrointestinal hemorrhage     Rectal mass     Acute cystitis without hematuria     Metastatic cancer ([address]     Gastric ulcer 2020    Weakness     Anemia 2020    Other specified disorders of bone density and structure, unspecified site      Liver mass 2020    Osteoarthritis of hip 2020    Benign prostatic hyperplasia with incomplete bladder emptying 2018    Palpitations 10/19/2010        Past Medical History:   Diagnosis Date    PAF (paroxysmal atrial fibrillation) ([address]     ON XARELTO    Prostate cancer ([address] 2019     Past Surgical History:   Procedure Laterality Date    CERVICAL LAMINECTOMY N/A 2020    CERVICAL C3-4,4-5,5-6  LAMINOPLASTY WITH RECONSTRUCTION performed by Robyn Sorto MD at [address] [place] [address] N/A 2020    COLONOSCOPY DIAGNOSTIC performed by Fannie Hernandez MD at [facility] [address] Right 2018    hip    UPPER GASTROINTESTINAL ENDOSCOPY N/A 2020    EGD DIAGNOSTIC ONLY performed by Christie Marsh MD at [facility]       Restrictions  Restrictions/Precautions: Fall Risk    SUBJECTIVE   General  Chart Reviewed: Yes  Subjective  Subjective: \"I'm in pain my neck and legs. I just cant get up it hurts too much. General Comment  Comments: Pt resting in bed/agreeable to PT    Pre-Session Pain Report   7/10 neck and BLE's sharp/dull, increases with movement. Has had pain meds      Post-Session Pain Report    7/10 neck and BLE's sharp/dull. Staff notified     OBJECTIVE    Bed mobility  Supine to Sit: Moderate assistance  Comment: Unable to complete manuver secondary to pain. Exercises  Quad Sets: x 15  Heelslides: x 15  Gluteal Sets: x 15  Hip Flexion: Heelslides x 15  Hip Abduction: x 15  Knee Short Arc Quad: x 15  Ankle Pumps: x 15          ASSESSMENT   Body structures, Functions, Activity limitations: Decreased functional mobility ; Decreased strength;Decreased posture;Decreased balance;Decreased ROM; Decreased safe awareness;Decreased coordination;Decreased endurance;Decreased ADL status; Increased pain  Assessment: Pt unable to sit up secondary to pain, attempted but declined half way through. good follow though with exercises. Discharge Recommendations:  Continue to assess pending progress, Patient would benefit from continued therapy after discharge    Goals  Short term goals  Short term goal 1: Pt to complete HEP with indep  Long term goals  Long term goal 1: Pt to complete bed mobility with indep  Long term goal 2: Pt to complete transfers with indep  Long term goal 3: Pt to ambulate 150ft with LRD and indep  Long term goal 4: TUG <15.0 seconds to demo decreased risk for falls    PLAN    Times per week: 3-6  Safety Devices  Type of devices:  All fall risk precautions in place, Bed alarm in place, Call light within reach, Left in bed     [facility] (6 CLICK) [address] Mobility Raw Score : 10   Therapy Time   Individual   Time In 1110   Time Out 1130   Minutes 20 TR 7   TE13       Rolf Bell, PTA, 11/27/20 at 11:32 AM         Definitions for assistance levels  Independent = pt does not require any physical supervision or assistance from another person for activity completion. Device may be needed.   Stand by assistance = pt requires verbal cues or instructions from another person, close to but not touching, to perform the activity  Minimal assistance= pt performs 75% or more of the activity; assistance is required to complete the activity  Moderate assistance= pt performs 50% of the activity; assistance is required to complete the activity  Maximal assistance = pt performs 25% of the activity; assistance is required to complete the activity  Dependent = pt requires total physical assistance to accomplish the task

## 2020-11-27 NOTE — PLAN OF CARE
Nutrition Problem #1: Severe malnutrition, In context of chronic illness  Intervention: Food and/or Nutrient Delivery: Modify Current Diet, Continue Oral Nutrition Supplement(Contine General diet, discontinue carb control restriction, continue current ONS's)  Nutritional Goals: po > 50% meals and supplements, wt > 147#, resolve constipation

## 2020-11-27 NOTE — PROGRESS NOTES
Hospitalist Progress Note      PCP: Jose G Ma MD    Date of Admission: 11/18/2020    Chief Complaint:  No acute events, afebrile, stable HD,     Medications:  Reviewed    Infusion Medications     Scheduled Medications    gabapentin  200 mg Oral TID    sodium chloride flush  10 mL Intravenous 2 times per day    bisacodyl  5 mg Oral Daily    morphine  15 mg Oral Q8H    mirtazapine  7.5 mg Oral Nightly    Vitamin D  2,000 Units Oral Dinner    cyanocobalamin  1,000 mcg Intramuscular Weekly    coenzyme Q10  100 mg Oral Daily    lidocaine  3 patch Transdermal Daily    sodium chloride flush  10 mL Intravenous BID    polyethylene glycol  17 g Oral Daily    senna  1 tablet Oral Nightly    folic acid  1 mg Oral Daily    sodium chloride flush  10 mL Intravenous 2 times per day    enoxaparin  40 mg Subcutaneous Daily     PRN Meds: diazePAM, sodium chloride flush, magnesium hydroxide, polyethylene glycol, ondansetron **OR** ondansetron, HYDROmorphone, acetaminophen **OR** acetaminophen, oxyCODONE-acetaminophen, sodium chloride flush, promethazine **OR** ondansetron      Intake/Output Summary (Last 24 hours) at 11/27/2020 1239  Last data filed at 11/27/2020 0729  Gross per 24 hour   Intake 120 ml   Output 250 ml   Net -130 ml       Exam:    /75   Pulse 112   Temp 98.2 °F (36.8 °C) (Oral)   Resp 18   Ht 6' 1\" (1.854 m)   Wt 147 lb 4.3 oz (66.8 kg)   SpO2 100%   BMI 19.43 kg/m²     General appearance: appears stated age and cooperative. Respiratory: clear to auscultation, bilaterally   Cardiovascular: Regular rate and rhythm with normal S1/S2  Abdomen: Soft, active bowel sounds.   Extremities: no edema      Labs:   Recent Labs     11/25/20  0639 11/27/20 0627   WBC 9.8 13.9*   HGB 9.6* 9.6*   HCT 29.5* 30.3*   * 557*     Recent Labs     11/25/20 0639 11/27/20 0627   * 131*   K 4.2 4.0    100   CO2 23 20   BUN 6* 9   CREATININE 0.48* 0.54*   CALCIUM 8.6 8.5   PHOS  --  2.6 No results for input(s): AST, ALT, BILIDIR, BILITOT, ALKPHOS in the last 72 hours. No results for input(s): INR in the last 72 hours. No results for input(s): Shellia Bugler in the last 72 hours. Urinalysis:      Lab Results   Component Value Date    NITRU Negative 09/26/2020    WBCUA 20-50 09/26/2020    BACTERIA MANY 09/26/2020    RBCUA 3-5 09/26/2020    BLOODU TRACE 09/26/2020    SPECGRAV 1.019 09/26/2020    GLUCOSEU Negative 09/26/2020       Radiology:  RADIOLOGY REPORT   Final Result      FLUORO FOR SURGICAL PROCEDURES   Final Result   POST-OPERATIVE APPEARANCE AS SHOWN.            MRI CERVICAL SPINE W WO CONTRAST   Final Result      Osseous metastases throughout the clivus and a 1 cm T3 vertebra metastasis. Please refer to prior whole spine MRI detailing more extensive metastasis. C3-4 through C6-7 myelomalacia with cord atrophy, and abnormal signal due to canal stenosis and chronic cord compression. C3-4 through C6-7 varying degrees of significant foraminal stenosis predominantly due to uncovertebral joint arthrosis. Evaluation less than optimal due to motion. Punctate posterior peripheral cord enhancement at the C4-5 disc level and equivocal just below the C3-4 disc level detailed above. MRI CERVICAL THORACIC LUMBAR SPINE WO CONTRAST LIMITED   Final Result      Advanced cervical spondylosis from C3-4 through C6-7 resulting in severe spinal canal stenosis at these levels with minimal spinal canal diameter approaching 4 to 5 mm. Multiple metastatic bone disease throughout the thoracolumbar spine with no pathologic fracture. CT Head WO Contrast   Final Result   Impression:      No acute findings. Mild cerebral atrophy. Chronic ischemic white matter disease. All CT scans at this facility use dose modulation, iterative reconstruction, and/or weight based dosing when appropriate to reduce radiation dose to as low as reasonably achievable. XR CHEST PORTABLE   Final Result   NO ACUTE CARDIOPULMONARY DISEASE.               Assessment/Plan:    78 y/o man with history of metastatic prostate cancer who presented with:    Recurrent falls and generalized weakness  - multifactorial in etiology including orthostatic hypotension suspect due to autonomic dysfunction, severe C-spine stenosis, advanced prostate cancer on chemotherapy, moderate protein calorie malnutrition  - MRI of the spine showed diffuse mets  - s/p C3-6 laminoplasty with reconstruction on 11/23  - followed by neurosurgery    Orthostatic hypotension  - treated with IVFs    Hyponatremia   - mild, stable    Microcytic anemia   - studies not suggestive of iron deficiency  - stable H/H    Metastatic prostate cancer with severe bone pain  - Xtandi on hold  - on MS contin, oxycodone, Dilaudid,gabapentin  - followed by oncology and palliative care     Constipation  - continue bowel regimen     Folic acid deficiency  - continue supplement     Cancer related pain   - on opioids    Severe malnutrition  - followed by dietitian       Disposition - acute rehab denied, waiting for SNF placement          Electronically signed by Tashi Traylor MD on 11/27/2020 at 12:39 PM

## 2020-11-27 NOTE — CARE COORDINATION
Phone call from [facility]. They currently are not accepting new patients. Looked online for [employer] and presented options to pt. He would like to DC to [facility] if they can accept. Initial referral made. Pre-cert needed. [id number] complete. At 3:05 pm [facility] notified that no staff is working at their facility to [employer] or start pre-cert so it cannot be done until 11/30.

## 2020-11-27 NOTE — PROGRESS NOTES
Comprehensive Nutrition Assessment    Type and Reason for Visit:  Reassess    Nutrition Recommendations/Plan:   Contine General diet, discontinue carb control restriction, continue current ONS's    Nutrition Assessment:  Severe malnutrition continues. NPO at present for surgery ( C3-6 laminoplasty) follow for post up plan of care, may need to start nutrition support    Malnutrition Assessment:  Malnutrition Status:  Severe malnutrition    Context:  Chronic Illness     Findings of the 6 clinical characteristics of malnutrition:  Energy Intake:  7 - 75% or less estimated energy requirements for 1 month or longer  Weight Loss:  7 - Greater than 10% over 6 months     Body Fat Loss:  Unable to assess     Muscle Mass Loss:  Unable to assess    Fluid Accumulation:  No significant fluid accumulation     Strength:  Not Performed    Estimated Daily Nutrient Needs:  Energy (kcal):  8487-5221 kcals @ 28-30 kcal/kg; Weight Used for Energy Requirements:  Current(67 kg)     Protein (g):   g protein @ 1.3-1.5 g/kg; Weight Used for Protein Requirements:  Current(67 kg)        Fluid (ml/day):  ~1900 ( 28 ml/kg);  Method Used for Fluid Requirements:  ml/Kg      Nutrition Related Findings:  Hx of metastateic prostatte Ca, s/p laminoplasty (11/23), was prescribed remeron for anorexia pta but never started, Last BM (11/21, +dulcolax, colace, labs/meds reviewed      Wounds:  None       Current Nutrition Therapies:    DIET GENERAL; Carb Control: 4 carbs/meal (approximate 1800 kcals/day)  Dietary Nutrition Supplements: Clear Liquid Oral Supplement  Dietary Nutrition Supplements: Frozen Oral Supplement  Dietary Nutrition Supplements: Standard High Calorie Oral Supplement    Anthropometric Measures:  · Height: 6' 1\" (185.4 cm)  · Current Body Weight: (No new wt)   · Admission Body Weight: 147 lb (66.7 kg)(stated)    · Usual Body Weight: 177 lb (80.3 kg)((2/20), 182# ( 6/20), 166# ( 9/20))     · Ideal Body Weight: 184 lbs; % Ideal Body Weight  < 100%   · BMI: 19.4  · BMI Categories: Underweight (BMI less than 22) age over 72       Nutrition Diagnosis:   · Severe malnutrition, In context of chronic illness related to catabolic illness, inadequate protein-energy intake as evidenced by poor intake prior to admission, BMI, weight loss greater than or equal to 10% in 6 months    Nutrition Interventions:   Food and/or Nutrient Delivery:  Modify Current Diet, Continue Oral Nutrition Supplement(Contine General diet, discontinue carb control restriction, continue current ONS's)  Nutrition Education/Counseling:  No recommendation at this time, Education not indicated   Coordination of Nutrition Care:  Continue to monitor while inpatient    Goals:  po > 50% meals and supplements, wt > 147#, resolve constipation       Nutrition Monitoring and Evaluation:   Food/Nutrient Intake Outcomes:  Food and Nutrient Intake, Supplement Intake  Physical Signs/Symptoms Outcomes:  Biochemical Data, Constipation, Weight     Discharge Planning:     Too soon to determine     Electronically signed by Kim Pascual RD, LD on 11/27/20 at 3:26 PM EST

## 2020-11-27 NOTE — PROGRESS NOTES
[facility]CCUPATIONAL THERAP[facility] MED SURG TREATMENT NOTE     Date: 2020  Patient Name: Gayle Wilkinson        MRN: [id number]  Account: [de-identified]   : 1950  (79 y.o.)  Room: [place] [id number]    Chart Review:  Diagnosis:  The encounter diagnosis was Generalized weakness. Restrictions:    Restrictions/Precautions  Restrictions/Precautions: Fall Risk  Position Activity Restriction  Other position/activity restrictions: Limit cervical ROM to within pain tolerable range      Subjective:  Patient states:  \"I just feel stiff from napping\"  Pain:  Start of tx:  Pre Treatment Pain Screening  Pain at present: 7  Scale Used: Numeric Score  Intervention List: Patient able to continue with treatment, Patient declined any intervention  Comments / Details: Pt. states\" I'm just sore because I took a nap and that makes me stiff\"    End of tx:  Pain Assessment  Patient Currently in Pain: Yes  Pain Assessment: 0-10  Pain Level: 7  Pain Type: Chronic pain  Pain Location: Neck  Pain Orientation: Right, Upper  Pain Descriptors: Aching  Pain Frequency: Continuous    Objective:    ADL:  ADL  Feeding: Setup  Grooming: Setup  UE Bathing: Setup  LE Bathing: Minimal assistance  UE Dressing: Minimal assistance  LE Dressing: Moderate assistance  Toileting: Minimal assistance  Additional Comments: Simulated ADLs as above. Pt. with increased difficulty with trunk control, impacting his ability to perform UE dressing. Is able to roll in bed for julio-hygiene. Pt. states he intermittent caths himself and has been doing so in the hospital, denies difficulty.   Toilet Transfers  Toilet - Technique: Ambulating  Equipment Used: Grab bars  Toilet Transfer: Unable to assess  Toilet Transfers Comments: Did not ambulate to toilet; increased time for mobility, anticipate CGA for mobility     Simulated toileting; pt. able to describe straight cath as well as roll to simulate placement of bed pan; did not attempt transfer to [facility] due to pt's increased pain this date. ROXANE hose donned: No  If no, why: Not ordered    Therapy key for assistance levels -   Independent = Pt. is able to perform task with no assistance but may require a device   Stand by assistance = Pt. does not perform task at an independent level but does not need physical assistance, requires verbal cues  Minimal, Moderate, Maximal Assistance = Pt. requires physical assistance (25%, 50%, 75% assist from helper) for task but is able to actively participate in task   Dependent = Pt. requires total assistance with task and is not able to actively participate with task completion    Functional Balance:  Balance  Sitting Balance: Supervision  Standing Balance: Contact guard assistance   Functional Mobility  Functional - Mobility Device: No device  Activity: Other  Assist Level: Contact guard assistance  Functional Mobility Comments: CGA to chair only, decreased mobility partly limited by room setup    Cognition:  Cognition  Overall Cognitive Status: WFL    Bed Mobility  Bed mobility  Bridging: Minimal assistance  Rolling to Left: Moderate assistance  Rolling to Right: Moderate assistance  Supine to Sit: Unable to assess  Sit to Supine: Unable to assess  Comment: Pt. requires significant increased time and verbal cuing. Pt. states he is in more pain now than previously and this is impacting his reaching. Treatment consisted of:  ADL Training    Assessment/Discharge Disposition:  Assessment: Pt. seen for follow up treatment this date and completed ADL bed bath due to pain. Pt. with limited mobility and safety. Pt. continues to benefit from OT to maximize independence with bathing and improve safety and sequencing.   Performance deficits / Impairments: Decreased functional mobility , Decreased ADL status, Decreased strength, Decreased endurance, Decreased balance, Decreased high-level IADLs, Decreased fine motor control, Decreased sensation  Prognosis: Good  Discharge Recommendations: Continue to assess pending progress  History: Pt's medical history is moderately complex  Exam: Pt. has 8 performance deficits  Assistance / Modification: Pt. requires min A      6-Click  How much help for putting on and taking off regular lower body clothing?: A Lot  How much help for Bathing?: A Little  How much help for Toileting?: A Little  How much help for putting on and taking off regular upper body clothing?: A Little  How much help for taking care of personal grooming?: A Little  How much help for eating meals?: A Little  AM-PAC Inpatient Daily Activity Raw Score: 17  AM-PAC Inpatient ADL T-Scale Score : 37.26  ADL Inpatient CMS 0-100% Score: 50.11    Plan:  Continue OT per POC    Goals/Plan:    Improve Balance  Improve Strength  Improve Functional Transfers  Improve ADL [place]    Minutes:    OT Individual Minutes  Time In: 1315  Time Out: 1331  Minutes: 16    ADL trainin minutes    Electronically signed by:     Anjum Melo  2020, 1:49 PM

## 2020-11-28 LAB
ALBUMIN SERPL-MCNC: 2.4 G/DL (ref 3.5–4.6)
ANION GAP SERPL CALCULATED.3IONS-SCNC: 14 MEQ/L (ref 9–15)
BUN BLDV-MCNC: 12 MG/DL (ref 8–23)
CALCIUM SERPL-MCNC: 8.9 MG/DL (ref 8.5–9.9)
CHLORIDE BLD-SCNC: 101 MEQ/L (ref 95–107)
CO2: 20 MEQ/L (ref 20–31)
CREAT SERPL-MCNC: 0.5 MG/DL (ref 0.7–1.2)
GFR AFRICAN AMERICAN: >60
GFR NON-AFRICAN AMERICAN: >60
GLUCOSE BLD-MCNC: 93 MG/DL (ref 70–99)
HCT VFR BLD CALC: 28.1 % (ref 42–52)
HEMOGLOBIN: 9.1 G/DL (ref 14–18)
MAGNESIUM: 2.3 MG/DL (ref 1.7–2.4)
MCH RBC QN AUTO: 25.4 PG (ref 27–31.3)
MCHC RBC AUTO-ENTMCNC: 32.3 % (ref 33–37)
MCV RBC AUTO: 78.7 FL (ref 80–100)
PDW BLD-RTO: 19 % (ref 11.5–14.5)
PHOSPHORUS: 2.5 MG/DL (ref 2.3–4.8)
PLATELET # BLD: 534 K/UL (ref 130–400)
POTASSIUM SERPL-SCNC: 4.1 MEQ/L (ref 3.4–4.9)
RBC # BLD: 3.57 M/UL (ref 4.7–6.1)
SODIUM BLD-SCNC: 135 MEQ/L (ref 135–144)
WBC # BLD: 9.5 K/UL (ref 4.8–10.8)

## 2020-11-28 PROCEDURE — 6360000002 HC RX W HCPCS: Performed by: NURSE PRACTITIONER

## 2020-11-28 PROCEDURE — 85027 COMPLETE CBC AUTOMATED: CPT

## 2020-11-28 PROCEDURE — 36415 COLL VENOUS BLD VENIPUNCTURE: CPT

## 2020-11-28 PROCEDURE — 2580000003 HC RX 258: Performed by: NEUROLOGICAL SURGERY

## 2020-11-28 PROCEDURE — 6370000000 HC RX 637 (ALT 250 FOR IP): Performed by: INTERNAL MEDICINE

## 2020-11-28 PROCEDURE — 1210000000 HC MED SURG R&B

## 2020-11-28 PROCEDURE — 6360000002 HC RX W HCPCS: Performed by: FAMILY MEDICINE

## 2020-11-28 PROCEDURE — 83735 ASSAY OF MAGNESIUM: CPT

## 2020-11-28 PROCEDURE — 6370000000 HC RX 637 (ALT 250 FOR IP): Performed by: NURSE PRACTITIONER

## 2020-11-28 PROCEDURE — 6370000000 HC RX 637 (ALT 250 FOR IP): Performed by: NEUROLOGICAL SURGERY

## 2020-11-28 PROCEDURE — 6370000000 HC RX 637 (ALT 250 FOR IP): Performed by: PSYCHIATRY & NEUROLOGY

## 2020-11-28 PROCEDURE — 80069 RENAL FUNCTION PANEL: CPT

## 2020-11-28 PROCEDURE — 6370000000 HC RX 637 (ALT 250 FOR IP): Performed by: PHYSICAL MEDICINE & REHABILITATION

## 2020-11-28 RX ADMIN — OXYCODONE HYDROCHLORIDE AND ACETAMINOPHEN 1 TABLET: 7.5; 325 TABLET ORAL at 02:29

## 2020-11-28 RX ADMIN — POLYETHYLENE GLYCOL 3350 17 G: 17 POWDER, FOR SOLUTION ORAL at 08:58

## 2020-11-28 RX ADMIN — GABAPENTIN 200 MG: 100 CAPSULE ORAL at 14:48

## 2020-11-28 RX ADMIN — Medication 10 ML: at 08:57

## 2020-11-28 RX ADMIN — GABAPENTIN 200 MG: 100 CAPSULE ORAL at 08:57

## 2020-11-28 RX ADMIN — Medication 10 ML: at 21:13

## 2020-11-28 RX ADMIN — OXYCODONE HYDROCHLORIDE AND ACETAMINOPHEN 1 TABLET: 7.5; 325 TABLET ORAL at 12:54

## 2020-11-28 RX ADMIN — HYDROMORPHONE HYDROCHLORIDE 0.5 MG: 1 INJECTION, SOLUTION INTRAMUSCULAR; INTRAVENOUS; SUBCUTANEOUS at 16:42

## 2020-11-28 RX ADMIN — MIRTAZAPINE 7.5 MG: 15 TABLET, FILM COATED ORAL at 21:13

## 2020-11-28 RX ADMIN — STANDARDIZED SENNA CONCENTRATE 8.6 MG: 8.6 TABLET ORAL at 21:13

## 2020-11-28 RX ADMIN — BISACODYL 5 MG: 5 TABLET, COATED ORAL at 08:57

## 2020-11-28 RX ADMIN — MORPHINE SULFATE 15 MG: 15 TABLET, FILM COATED, EXTENDED RELEASE ORAL at 11:07

## 2020-11-28 RX ADMIN — MORPHINE SULFATE 15 MG: 15 TABLET, FILM COATED, EXTENDED RELEASE ORAL at 21:13

## 2020-11-28 RX ADMIN — MORPHINE SULFATE 15 MG: 15 TABLET, FILM COATED, EXTENDED RELEASE ORAL at 03:38

## 2020-11-28 RX ADMIN — Medication 2000 UNITS: at 16:45

## 2020-11-28 RX ADMIN — Medication 100 MG: at 08:57

## 2020-11-28 RX ADMIN — FOLIC ACID 1 MG: 1 TABLET ORAL at 08:57

## 2020-11-28 RX ADMIN — ENOXAPARIN SODIUM 40 MG: 40 INJECTION SUBCUTANEOUS at 08:58

## 2020-11-28 RX ADMIN — HYDROMORPHONE HYDROCHLORIDE 0.5 MG: 1 INJECTION, SOLUTION INTRAMUSCULAR; INTRAVENOUS; SUBCUTANEOUS at 08:57

## 2020-11-28 RX ADMIN — GABAPENTIN 200 MG: 100 CAPSULE ORAL at 21:13

## 2020-11-28 ASSESSMENT — PAIN SCALES - GENERAL
PAINLEVEL_OUTOF10: 10
PAINLEVEL_OUTOF10: 7
PAINLEVEL_OUTOF10: 8
PAINLEVEL_OUTOF10: 6
PAINLEVEL_OUTOF10: 7
PAINLEVEL_OUTOF10: 8
PAINLEVEL_OUTOF10: 7

## 2020-11-28 ASSESSMENT — PAIN DESCRIPTION - PAIN TYPE
TYPE: ACUTE PAIN
TYPE: ACUTE PAIN
TYPE: ACUTE PAIN;SURGICAL PAIN
TYPE: ACUTE PAIN

## 2020-11-28 ASSESSMENT — PAIN DESCRIPTION - LOCATION
LOCATION: NECK;BACK
LOCATION: NECK
LOCATION: BACK;NECK
LOCATION: BACK;NECK

## 2020-11-28 NOTE — PROGRESS NOTES
Hematology/Oncology   Progress Note        CHIEF COMPLAINT/HPI:  Follow up of prostate cancer with bone metastasis. Post op , laminectomy of cervical spine stenosis. Still has pain in post op area and leg weakness. Jorge A Pitt is temporarily held due to weakness. Hemoglobin at 9.1. REVIEW OF SYSTEMS:    Unremarkable except for symptoms mentioned in HPI.     Current Inpatient Medications:    Current Facility-Administered Medications   Medication Dose Route Frequency Provider Last Rate Last Dose    diazePAM (VALIUM) tablet 2 mg  2 mg Oral Q6H PRN Yue Jackson APRN - CNP        gabapentin (NEURONTIN) capsule 200 mg  200 mg Oral TID Rico Victor, APRN - CNP   200 mg at 11/28/20 0857    sodium chloride flush 0.9 % injection 10 mL  10 mL Intravenous 2 times per day Shantell Alexander MD   10 mL at 11/28/20 0857    sodium chloride flush 0.9 % injection 10 mL  10 mL Intravenous PRN Shantell Alexander MD        bisacodyl (DULCOLAX) EC tablet 5 mg  5 mg Oral Daily Shantell Alexander MD   5 mg at 11/28/20 0857    magnesium hydroxide (MILK OF MAGNESIA) 400 MG/5ML suspension 30 mL  30 mL Oral Daily PRN Shantell Alexander MD        polyethylene glycol [facility]) packet 17 g  17 g Oral Daily PRN Shantell Alexander MD        ondansetron (ZOFRAN-ODT) disintegrating tablet 4 mg  4 mg Oral Q8H PRN Shantell Alexander MD        Or    ondansetron [facility]) injection 4 mg  4 mg Intravenous Q6H PRN Shantell Alexander MD        morphine (MS CONTIN) extended release tablet 15 mg  15 mg Oral Q8H Rico Victor APRN - CNP   15 mg at 11/28/20 1107    HYDROmorphone (DILAUDID) injection 0.5 mg  0.5 mg Intravenous Q4H PRN Sage Hansen APRN - CNP   0.5 mg at 11/28/20 0857    mirtazapine (REMERON) tablet 7.5 mg  7.5 mg Oral Nightly Sage Hansen, APRN - CNP   7.5 mg at 11/27/20 2213    Vitamin D (CHOLECALCIFEROL) tablet 2,000 Units  2,000 Units Oral Dinner Nicole Reyes DO   2,000 Units at 11/24/20 1708    cyanocobalamin injection 1,000 mcg  1,000 mcg Intramuscular Weekly Nicole Scullin, DO   1,000 mcg at 11/24/20 1341    coenzyme Q10 capsule 100 mg  100 mg Oral Daily Nicole Scullin, DO   100 mg at 11/28/20 0857    lidocaine 4 % external patch 3 patch  3 patch Transdermal Daily Nicole Scullin, DO   3 patch at 11/28/20 0858    acetaminophen (TYLENOL) tablet 650 mg  650 mg Oral Q4H PRN Nicole Scullin, DO        Or    acetaminophen (TYLENOL) suppository 650 mg  650 mg Rectal Q6H PRN Nicole Scullin, DO        oxyCODONE-acetaminophen (PERCOCET) 7.5-325 MG per tablet 1 tablet  1 tablet Oral Q4H PRN Nicole Scullin, DO   1 tablet at 11/28/20 0229    sodium chloride flush 0.9 % injection 10 mL  10 mL Intravenous BID JOYCE Mcfarland - CNP   10 mL at 11/24/20 2149    polyethylene glycol (GLYCOLAX) packet 17 g  17 g Oral Daily Rema Cove, DO   17 g at 11/28/20 0858    senna (SENOKOT) tablet 8.6 mg  1 tablet Oral Nightly Rema Cove, DO   8.6 mg at 17/20/20 1416    folic acid (FOLVITE) tablet 1 mg  1 mg Oral Daily Jj Johnson MD   1 mg at 11/28/20 0857    sodium chloride flush 0.9 % injection 10 mL  10 mL Intravenous 2 times per day Kimberlee Ochoa MD   10 mL at 11/26/20 0845    sodium chloride flush 0.9 % injection 10 mL  10 mL Intravenous PRN Kimberlee Ochoa MD   10 mL at 11/19/20 1516    promethazine (PHENERGAN) tablet 12.5 mg  12.5 mg Oral Q6H PRN Kimberlee Ochoa MD        Or    ondansetron [facility]) injection 4 mg  4 mg Intravenous Q6H PRN Kimberlee Ochoa MD   4 mg at 11/18/20 1843    enoxaparin (LOVENOX) injection 40 mg  40 mg Subcutaneous Daily Kimberlee Ochoa MD   40 mg at 11/28/20 0858       PHYSICAL EXAM:    EYES:  Lids and lashes normal, pupils equal, round and reactive to light, extra ocular muscles intact, sclera clear, conjunctiva normal    ENT:  Normocephalic, without obvious abnormality, atraumatic, sinuses nontender on palpation, external ears without lesions, oral pharynx with moist mucus membranes, tonsils without erythema or exudates, gums normal and good dentition. NECK:  Supple, symmetrical, trachea midline, no adenopathy, thyroid symmetric, not enlarged and no tenderness, skin normal    CHEST:    LUNGS:  No increased work of breathing, good air exchange, clear to auscultation bilaterally, no crackles or wheezing    CARDIOVASCULAR:  Normal apical impulse, regular rate and rhythm, normal S1 and S2, no S3 or S4, and no murmur noted    ABDOMEN:  No scars, normal bowel sounds, soft, non-distended, non-tender, no masses palpated, no hepatosplenomegally    MUSCULOSKELETAL:  There is no redness, warmth, or swelling of the joints. Full range of motion noted. Motor strength is 5 out of 5 all extremities bilaterally.   Tone is normal.  EXTREMITIES:    NEURO:    DATA:      PT/INR:  No results found for: PTINR  PTT:  No results found for: APTT  CMP:    Lab Results   Component Value Date     11/28/2020    K 4.1 11/28/2020    K 4.2 11/25/2020     11/28/2020    CO2 20 11/28/2020    BUN 12 11/28/2020    PROT 8.0 11/18/2020     Magnesium:    Lab Results   Component Value Date    MG 2.3 11/28/2020     Phosphorus:  No components found for: PO4  Calcium:  No components found for: CA  CBC:    Lab Results   Component Value Date    WBC 9.5 11/28/2020    RBC 3.57 11/28/2020    HGB 9.1 11/28/2020    HCT 28.1 11/28/2020    MCV 78.7 11/28/2020    RDW 19.0 11/28/2020     11/28/2020     DIFF:    Lab Results   Component Value Date    MCV 78.7 11/28/2020    RDW 19.0 11/28/2020      LDH:  No results found for: LDH  Uric Acid:  No components found for: URIC    EKG Reviewed  Appropriate Radiology Reviewed      Pathology: Reviewed where indicated      ASSESSMENT:  Principal Problem:    Stenosis of cervical spine with myelopathy (HCC)  Active Problems:    Palpitations    Anemia    Weakness    Rectal mass    Metastatic cancer ([facility] [address] [address]    Fall at home, initial encounter    Severe malnutrition ([facility] [address]    Falls, initial encounter    Myelopathy ([address]    Malignant tumor of prostate ([address]    Retention of urine    Urethritis    Ataxic gait    Neurogenic bladder  Resolved Problems:    * No resolved hospital problems. *    Patient Active Problem List   Diagnosis    Liver mass    Osteoarthritis of hip    Palpitations    Other specified disorders of bone density and structure, unspecified site     Anemia    Weakness    Gastric ulcer    Goals of care, counseling/discussion    Gastrointestinal hemorrhage    Rectal mass    Acute cystitis without hematuria    Metastatic cancer ([address]    Fall at home, initial encounter    Severe malnutrition ([address]    Falls, initial encounter    Myelopathy ([address]    Benign prostatic hyperplasia with incomplete bladder emptying    Malignant tumor of prostate ([address]    Raised prostate specific antigen    Retention of urine    Urethritis    Ataxic gait    Stenosis of cervical spine with myelopathy (HCC)    Neurogenic bladder       PLAN:  Plan to resume Kelly Gonzalez when more medically stable.            Electronically signed by Richard Smiley MD on 11/28/20 at 11:58 AM EST

## 2020-11-28 NOTE — PROGRESS NOTES
Pt. AxO x4. VSS. Afebrile. Assessment complete. Dressing to surgical site at back of neck is CDI. Lidocaine patch placed just below the dressing. Pt. Also offered an ice pack which he previously had behind his neck. Pt. Rates his pain between a 7-10 out of 10 on the pain scale. Pain is either localized at his neck or on his R side. On assessment, RUE side also noted to be weaker than the LUE. No tremors noted. Attempt to reposition pt. Q2h, however, he has refused at times. See flow sheets. Skin is intact. No redness noted. Pt. Is incontinent at times with orders to straight cath PRN. Pt. Has voided twice this shift. Urine is vadim and malodorous. No BM this shift. Scheduled stool softener given at H.S.  Pt. Denies nausea. Pt. Snacking throughout the night with minimal intake on catie crackers and popsicles with sips of water with meds. Falls precautions in place. Bed alarm on. Call light within reach. Pt. Awaiting precert to [facility]. Will continue to monitor.

## 2020-11-28 NOTE — CARE COORDINATION
Phoned Omid Ferrell at [facility]. She verified that she is unsure if they are contracted with [facility] and it cannot be verified until Monday, 11/30/20. Will notify RN. Will reach out to find other possible in network SNFs.

## 2020-11-28 NOTE — PROGRESS NOTES
Hospitalist Progress Note      PCP: Leilani Ferrell MD    Date of Admission: 11/18/2020    Chief Complaint:  No acute events, afebrile, stable HD,     Medications:  Reviewed    Infusion Medications     Scheduled Medications    gabapentin  200 mg Oral TID    sodium chloride flush  10 mL Intravenous 2 times per day    bisacodyl  5 mg Oral Daily    morphine  15 mg Oral Q8H    mirtazapine  7.5 mg Oral Nightly    Vitamin D  2,000 Units Oral Dinner    cyanocobalamin  1,000 mcg Intramuscular Weekly    coenzyme Q10  100 mg Oral Daily    lidocaine  3 patch Transdermal Daily    sodium chloride flush  10 mL Intravenous BID    polyethylene glycol  17 g Oral Daily    senna  1 tablet Oral Nightly    folic acid  1 mg Oral Daily    sodium chloride flush  10 mL Intravenous 2 times per day    enoxaparin  40 mg Subcutaneous Daily     PRN Meds: diazePAM, sodium chloride flush, magnesium hydroxide, polyethylene glycol, ondansetron **OR** ondansetron, HYDROmorphone, acetaminophen **OR** acetaminophen, oxyCODONE-acetaminophen, sodium chloride flush, promethazine **OR** ondansetron      Intake/Output Summary (Last 24 hours) at 11/28/2020 1211  Last data filed at 11/28/2020 1055  Gross per 24 hour   Intake 240 ml   Output --   Net 240 ml       Exam:    /62   Pulse 86   Temp 97.3 °F (36.3 °C)   Resp 18   Ht 6' 1\" (1.854 m)   Wt 147 lb 4.3 oz (66.8 kg)   SpO2 100%   BMI 19.43 kg/m²     General appearance: appears stated age and cooperative. Respiratory: clear to auscultation, bilaterally   Cardiovascular: Regular rate and rhythm with normal S1/S2  Abdomen: Soft, active bowel sounds.   Extremities: no edema      Labs:   Recent Labs     11/27/20  0627 11/28/20  0635   WBC 13.9* 9.5   HGB 9.6* 9.1*   HCT 30.3* 28.1*   * 534*     Recent Labs     11/27/20  0627 11/28/20  0636   * 135   K 4.0 4.1    101   CO2 20 20   BUN 9 12   CREATININE 0.54* 0.50*   CALCIUM 8.5 8.9   PHOS 2.6 2.5     No results for input(s): AST, ALT, BILIDIR, BILITOT, ALKPHOS in the last 72 hours. No results for input(s): INR in the last 72 hours. No results for input(s): Chava [place] in the last 72 hours. Urinalysis:      Lab Results   Component Value Date    NITRU Negative 09/26/2020    WBCUA 20-50 09/26/2020    BACTERIA MANY 09/26/2020    RBCUA 3-5 09/26/2020    BLOODU TRACE 09/26/2020    SPECGRAV 1.019 09/26/2020    GLUCOSEU Negative 09/26/2020       Radiology:  RADIOLOGY REPORT   Final Result      FLUORO FOR SURGICAL PROCEDURES   Final Result   POST-OPERATIVE APPEARANCE AS SHOWN.            MRI CERVICAL SPINE W WO CONTRAST   Final Result      Osseous metastases throughout the clivus and a 1 cm T3 vertebra metastasis. Please refer to prior whole spine MRI detailing more extensive metastasis. C3-4 through C6-7 myelomalacia with cord atrophy, and abnormal signal due to canal stenosis and chronic cord compression. C3-4 through C6-7 varying degrees of significant foraminal stenosis predominantly due to uncovertebral joint arthrosis. Evaluation less than optimal due to motion. Punctate posterior peripheral cord enhancement at the C4-5 disc level and equivocal just below the C3-4 disc level detailed above. MRI CERVICAL THORACIC LUMBAR SPINE WO CONTRAST LIMITED   Final Result      Advanced cervical spondylosis from C3-4 through C6-7 resulting in severe spinal canal stenosis at these levels with minimal spinal canal diameter approaching 4 to 5 mm. Multiple metastatic bone disease throughout the thoracolumbar spine with no pathologic fracture. CT Head WO Contrast   Final Result   Impression:      No acute findings. Mild cerebral atrophy. Chronic ischemic white matter disease. All CT scans at this facility use dose modulation, iterative reconstruction, and/or weight based dosing when appropriate to reduce radiation dose to as low as reasonably achievable.       XR CHEST PORTABLE Final Result   NO ACUTE CARDIOPULMONARY DISEASE.               Assessment/Plan:    78 y/o man with history of metastatic prostate cancer who presented with:    Recurrent falls and generalized weakness  - multifactorial in etiology including orthostatic hypotension suspect due to autonomic dysfunction, severe C-spine stenosis, advanced prostate cancer on chemotherapy, moderate protein calorie malnutrition  - MRI of the spine showed diffuse mets  - s/p C3-6 laminoplasty with reconstruction on 11/23  - followed by neurosurgery    Orthostatic hypotension  - treated with IVFs    Hyponatremia   - mild, improving    Microcytic anemia   - studies not suggestive of iron deficiency  - stable H/H    Metastatic prostate cancer with severe bone pain  - Xtandi on hold  - on MS contin, oxycodone, Dilaudid,gabapentin  - followed by oncology and palliative care     Constipation  - continue bowel regimen     Folic acid deficiency  - continue supplement     Cancer related pain   - on opioids    Severe malnutrition  - followed by dietitian       Disposition - acute rehab denied, waiting for SNF placement          Electronically signed by Maria Elena Ordonez MD on 11/28/2020 at 12:11 PM

## 2020-11-28 NOTE — PROGRESS NOTES
Subjective: The patient complains of severe  acute on chronic neck pain and weakness partially relieved by rest, PT, OT and meds and exacerbated by exertion and recent illness. I am concerned about patients medical complexities. Precert started with [facility] Medicare for Acute Inpatient Rehab, no waivers in place at this time-we waited for their call for the [id number] all day on Wednesday they finally called on unpublished number went straight to voicemail did not leave a call back number after hours. Several attempts were made to call them back finally the doctor reach the doctor but the doctor from the insurance company refused to talk me. ROS x10: The patient also complains of severely impaired mobility and activities of daily living. Otherwise no new problems with vision, hearing, nose, mouth, throat, dermal, cardiovascular, GI, , pulmonary, musculoskeletal, psychiatric or neurological. See Rehab consult on Rehab chart . Vital signs:  /62   Pulse 86   Temp 97.3 °F (36.3 °C)   Resp 18   Ht 6' 1\" (1.854 m)   Wt 147 lb 4.3 oz (66.8 kg)   SpO2 100%   BMI 19.43 kg/m²   I/O:   PO/Intake:    fair PO intake,       Bowel/Bladder:   Incontinent-neurogenic bladder-needs assist with IC q 6-8 hours scheduled. ,    General:  Patient is well developed, adequately nourished, non-obese and     well kempt. HEENT:    PERRLA, hearing intact to loud voice, external inspection of ear     and nose benign. Inspection of lips, tongue and gums benign  Musculoskeletal: No significant change in strength or tone. All joints stable. Inspection and palpation of digits and nails show no clubbing,       cyanosis or inflammatory conditions. Neuro/Psychiatric: Affect: flat-  Alert and oriented to self and     Situation. No significant change in deep tendon reflexes or     sensation  Lungs:  Diminished, CTA-B. Respiration effort is normal at rest.     Heart:   S1 = S2,  RRR.   No loud murmurs. Abdomen:  Soft, non-tender, no enlargement of liver or spleen. Extremities:  No significant lower extremity edema or tenderness. Skin:    BUE bruises dt blood draws, c spine incision    Rehabilitation:  Physical therapy: FIMS:  Bed Mobility: Scooting: Contact guard assistance    Transfers: Sit to Stand: Contact guard assistance  Stand to sit: Contact guard assistance  Bed to Chair: Contact guard assistance, Ambulation 1  Surface: level tile  Device: No Device  Assistance: Contact guard assistance  Quality of Gait: Shuffling steps, decreased trunk rotation, nbos, decreased brinda heel strike  Gait Deviations: Slow Yanet, Decreased step length  Distance: 10' x 2  Comments: Distance limited to in room tx secondary to pt request,      FIMS:  ,  , Assessment: Pt unable to sit up secondary to pain, attempted but declined half way through. good follow though with exercises. Occupational therapy: FIMS:   ,  , Assessment: Pt. seen for follow up treatment this date and completed ADL bed bath due to pain. Pt. with limited mobility and safety. Pt. continues to benefit from OT to maximize independence with bathing and improve safety and sequencing.     Speech therapy: FIMS:        Lab/X-ray studies reviewed, analyzed and discussed with patient and staff:   Recent Results (from the past 24 hour(s))   CBC    Collection Time: 11/28/20  6:35 AM   Result Value Ref Range    WBC 9.5 4.8 - 10.8 K/uL    RBC 3.57 (L) 4.70 - 6.10 M/uL    Hemoglobin 9.1 (L) 14.0 - 18.0 g/dL    Hematocrit 28.1 (L) 42.0 - 52.0 %    MCV 78.7 (L) 80.0 - 100.0 fL    MCH 25.4 (L) 27.0 - 31.3 pg    MCHC 32.3 (L) 33.0 - 37.0 %    RDW 19.0 (H) 11.5 - 14.5 %    Platelets 083 (H) 917 - 400 K/uL   RENAL FUNCTION PANEL    Collection Time: 11/28/20  6:36 AM   Result Value Ref Range    Sodium 135 135 - 144 mEq/L    Potassium 4.1 3.4 - 4.9 mEq/L    Chloride 101 95 - 107 mEq/L    CO2 20 20 - 31 mEq/L    Anion Gap 14 9 - 15 mEq/L    Glucose 93 70 - 99 mg/dL BUN 12 8 - 23 mg/dL    CREATININE 0.50 (L) 0.70 - 1.20 mg/dL    GFR Non-African American >60.0 >60    GFR  >60.0 >60    Calcium 8.9 8.5 - 9.9 mg/dL    Phosphorus 2.5 2.3 - 4.8 mg/dL    Alb 2.4 (L) 3.5 - 4.6 g/dL   Magnesium    Collection Time: 11/28/20  6:36 AM   Result Value Ref Range    Magnesium 2.3 1.7 - 2.4 mg/dL       Previous extensive, complex labs, notes and diagnostics reviewed and analyzed. ALLERGIES:    Allergies as of 11/18/2020    (No Known Allergies)      (please also verify by checking [facility])     Complex Physical Medicine & Rehab Issues Assess & Plan:   1. Severe abnormality of gait and mobility and impaired self-care and ADL's secondary to progressive cervical myelopathy. Functional and medical status reassessed regarding patients ability to participate in therapies and patient found to be able to participate in  acute intensive comprehensive inpatient rehabilitation program including PT/OT to improve balance, ambulation, ADLs, and to improve the P/AROM. It is my opinion that they will be able to tolerate 3 hours of therapy a day and benefit from it at an acute level. 2. Bowel constipation and Bladder dysfunction atonic bladder: Incontinent-neurogenic bladder-needs assist with IC q 6-8 hours scheduled. ,   frequent toileting, ambulate to bathroom with assistance, check post void residuals. Check for C.difficile x1 if >2 loose stools in 24 hours, continue bowel & bladder program.  Monitor for UTI symptoms including lethargy and confusion  3. Severe postoperative neck pain and generalized OA pain: reassess pain every shift and prior to and after each therapy session, give scheduled MS Contin, prn Percocet  or Tylenol, modalities prn in therapy, consider Lidoderm, K-pad prn. Consider increase to Dilaudid 1 mg.  4. Skin breakdown risk:  continue pressure relief program.  Daily skin exams and reports from nursing. 5. Severe fatigue due to immobility and nutritional deficits:  Add vitamin B12 vitamin D and CoQ10 titrate dosing and add protein supplementation with low carb content. 6. Complex discharge planning: Risk for urinary retention given spinal cord injury-Precert started with [facility] Medicare for Acute Inpatient Rehab, no waivers in place at this time--they did not call us back yesterday during the window of time that they gave us. Complex Active General Medical Issues that complicate care Assess & Plan:     1. Principal Problem:    Stenosis of cervical spine with myelopathy [facility])  Active Problems:    Palpitations    Anemia    Weakness    Rectal mass    Metastatic cancer ([address]    Fall at home, initial encounter    Severe malnutrition ([address]    Falls, initial encounter    Myelopathy ([address]    Malignant tumor of prostate ([facility]    Retention of urine    Urethritis    Ataxic gait    Neurogenic bladder  Resolved Problems:    * No resolved hospital problems.  Reyes Nieves D.O., PM&R     Attending    [address]

## 2020-11-29 LAB
ALBUMIN SERPL-MCNC: 2.8 G/DL (ref 3.5–4.6)
ANION GAP SERPL CALCULATED.3IONS-SCNC: 9 MEQ/L (ref 9–15)
BACTERIA: ABNORMAL /HPF
BILIRUBIN URINE: NEGATIVE
BLOOD, URINE: NEGATIVE
BUN BLDV-MCNC: 10 MG/DL (ref 8–23)
CALCIUM SERPL-MCNC: 8.7 MG/DL (ref 8.5–9.9)
CHLORIDE BLD-SCNC: 98 MEQ/L (ref 95–107)
CLARITY: ABNORMAL
CO2: 25 MEQ/L (ref 20–31)
COLOR: YELLOW
CREAT SERPL-MCNC: 0.69 MG/DL (ref 0.7–1.2)
CRYSTALS, UA: ABNORMAL /HPF
EPITHELIAL CELLS, UA: ABNORMAL /HPF (ref 0–5)
GFR AFRICAN AMERICAN: >60
GFR NON-AFRICAN AMERICAN: >60
GLUCOSE BLD-MCNC: 111 MG/DL (ref 70–99)
GLUCOSE URINE: NEGATIVE MG/DL
HCT VFR BLD CALC: 29.5 % (ref 42–52)
HEMOGLOBIN: 9.5 G/DL (ref 14–18)
HYALINE CASTS: ABNORMAL /HPF (ref 0–5)
KETONES, URINE: NEGATIVE MG/DL
LEUKOCYTE ESTERASE, URINE: ABNORMAL
MAGNESIUM: 2.3 MG/DL (ref 1.7–2.4)
MCH RBC QN AUTO: 24.8 PG (ref 27–31.3)
MCHC RBC AUTO-ENTMCNC: 32.1 % (ref 33–37)
MCV RBC AUTO: 77.3 FL (ref 80–100)
NITRITE, URINE: POSITIVE
PDW BLD-RTO: 18.9 % (ref 11.5–14.5)
PH UA: >=9 (ref 5–9)
PHOSPHORUS: 2.8 MG/DL (ref 2.3–4.8)
PLATELET # BLD: 542 K/UL (ref 130–400)
POTASSIUM SERPL-SCNC: 5 MEQ/L (ref 3.4–4.9)
PROTEIN UA: 100 MG/DL
RBC # BLD: 3.82 M/UL (ref 4.7–6.1)
SODIUM BLD-SCNC: 132 MEQ/L (ref 135–144)
SPECIFIC GRAVITY UA: 1.01 (ref 1–1.03)
UROBILINOGEN, URINE: 1 E.U./DL
WBC # BLD: 13.9 K/UL (ref 4.8–10.8)
WBC UA: >100 /HPF (ref 0–5)

## 2020-11-29 PROCEDURE — 6370000000 HC RX 637 (ALT 250 FOR IP): Performed by: PHYSICAL MEDICINE & REHABILITATION

## 2020-11-29 PROCEDURE — 36415 COLL VENOUS BLD VENIPUNCTURE: CPT

## 2020-11-29 PROCEDURE — 6370000000 HC RX 637 (ALT 250 FOR IP): Performed by: NEUROLOGICAL SURGERY

## 2020-11-29 PROCEDURE — 87040 BLOOD CULTURE FOR BACTERIA: CPT

## 2020-11-29 PROCEDURE — 83735 ASSAY OF MAGNESIUM: CPT

## 2020-11-29 PROCEDURE — 87086 URINE CULTURE/COLONY COUNT: CPT

## 2020-11-29 PROCEDURE — 87077 CULTURE AEROBIC IDENTIFY: CPT

## 2020-11-29 PROCEDURE — 6370000000 HC RX 637 (ALT 250 FOR IP): Performed by: NURSE PRACTITIONER

## 2020-11-29 PROCEDURE — 1210000000 HC MED SURG R&B

## 2020-11-29 PROCEDURE — 87186 SC STD MICRODIL/AGAR DIL: CPT

## 2020-11-29 PROCEDURE — 6370000000 HC RX 637 (ALT 250 FOR IP): Performed by: INTERNAL MEDICINE

## 2020-11-29 PROCEDURE — 81001 URINALYSIS AUTO W/SCOPE: CPT

## 2020-11-29 PROCEDURE — 6360000002 HC RX W HCPCS: Performed by: INTERNAL MEDICINE

## 2020-11-29 PROCEDURE — 2580000003 HC RX 258: Performed by: INTERNAL MEDICINE

## 2020-11-29 PROCEDURE — 80069 RENAL FUNCTION PANEL: CPT

## 2020-11-29 PROCEDURE — 85027 COMPLETE CBC AUTOMATED: CPT

## 2020-11-29 PROCEDURE — 2580000003 HC RX 258: Performed by: FAMILY MEDICINE

## 2020-11-29 PROCEDURE — 99233 SBSQ HOSP IP/OBS HIGH 50: CPT | Performed by: PSYCHIATRY & NEUROLOGY

## 2020-11-29 PROCEDURE — 6360000002 HC RX W HCPCS: Performed by: FAMILY MEDICINE

## 2020-11-29 RX ADMIN — OXYCODONE HYDROCHLORIDE AND ACETAMINOPHEN 1 TABLET: 7.5; 325 TABLET ORAL at 03:08

## 2020-11-29 RX ADMIN — MORPHINE SULFATE 15 MG: 15 TABLET, FILM COATED, EXTENDED RELEASE ORAL at 20:43

## 2020-11-29 RX ADMIN — GABAPENTIN 200 MG: 100 CAPSULE ORAL at 13:29

## 2020-11-29 RX ADMIN — GABAPENTIN 200 MG: 100 CAPSULE ORAL at 09:04

## 2020-11-29 RX ADMIN — Medication 10 ML: at 20:12

## 2020-11-29 RX ADMIN — OXYCODONE HYDROCHLORIDE AND ACETAMINOPHEN 1 TABLET: 7.5; 325 TABLET ORAL at 17:20

## 2020-11-29 RX ADMIN — CEFTRIAXONE SODIUM 1 G: 1 INJECTION, POWDER, FOR SOLUTION INTRAMUSCULAR; INTRAVENOUS at 14:48

## 2020-11-29 RX ADMIN — POLYETHYLENE GLYCOL 3350 17 G: 17 POWDER, FOR SOLUTION ORAL at 09:05

## 2020-11-29 RX ADMIN — MORPHINE SULFATE 15 MG: 15 TABLET, FILM COATED, EXTENDED RELEASE ORAL at 05:40

## 2020-11-29 RX ADMIN — ENOXAPARIN SODIUM 40 MG: 40 INJECTION SUBCUTANEOUS at 09:05

## 2020-11-29 RX ADMIN — Medication 2000 UNITS: at 17:17

## 2020-11-29 RX ADMIN — MORPHINE SULFATE 15 MG: 15 TABLET, FILM COATED, EXTENDED RELEASE ORAL at 13:29

## 2020-11-29 RX ADMIN — Medication 100 MG: at 09:04

## 2020-11-29 RX ADMIN — MIRTAZAPINE 7.5 MG: 15 TABLET, FILM COATED ORAL at 20:11

## 2020-11-29 RX ADMIN — STANDARDIZED SENNA CONCENTRATE 8.6 MG: 8.6 TABLET ORAL at 20:11

## 2020-11-29 RX ADMIN — BISACODYL 5 MG: 5 TABLET, COATED ORAL at 09:04

## 2020-11-29 RX ADMIN — GABAPENTIN 200 MG: 100 CAPSULE ORAL at 20:11

## 2020-11-29 RX ADMIN — DIAZEPAM 2 MG: 2 TABLET ORAL at 20:11

## 2020-11-29 ASSESSMENT — PAIN SCALES - GENERAL
PAINLEVEL_OUTOF10: 9
PAINLEVEL_OUTOF10: 8
PAINLEVEL_OUTOF10: 9
PAINLEVEL_OUTOF10: 7
PAINLEVEL_OUTOF10: 8
PAINLEVEL_OUTOF10: 2

## 2020-11-29 ASSESSMENT — ENCOUNTER SYMPTOMS
RESPIRATORY NEGATIVE: 1
GASTROINTESTINAL NEGATIVE: 1
BACK PAIN: 1
EYES NEGATIVE: 1

## 2020-11-29 ASSESSMENT — PAIN DESCRIPTION - LOCATION: LOCATION: NECK

## 2020-11-29 ASSESSMENT — PAIN DESCRIPTION - PAIN TYPE: TYPE: ACUTE PAIN;CHRONIC PAIN;SURGICAL PAIN

## 2020-11-29 ASSESSMENT — PAIN DESCRIPTION - ORIENTATION: ORIENTATION: POSTERIOR

## 2020-11-29 NOTE — PROGRESS NOTES
Pt straight cath'd for 50cc. Urine specimen obtained and sent to lab. Depends also wet, julio care given. Pt medicated with Percocet 7.5 po for c/o: pain \"all over. \"

## 2020-11-29 NOTE — PROGRESS NOTES
2113    Vitamin D (CHOLECALCIFEROL) tablet 2,000 Units  2,000 Units Oral Dinner Nicole Scullin, DO   2,000 Units at 11/28/20 1645    cyanocobalamin injection 1,000 mcg  1,000 mcg Intramuscular Weekly Nicole Scullin, DO   1,000 mcg at 11/24/20 1341    coenzyme Q10 capsule 100 mg  100 mg Oral Daily Nicole Scullin, DO   100 mg at 11/29/20 0904    lidocaine 4 % external patch 3 patch  3 patch Transdermal Daily Nicole Scullin, DO   3 patch at 11/29/20 0911    acetaminophen (TYLENOL) tablet 650 mg  650 mg Oral Q4H PRN Nicole Scullin, DO        Or    acetaminophen (TYLENOL) suppository 650 mg  650 mg Rectal Q6H PRN Nicole Scullin, DO        oxyCODONE-acetaminophen (PERCOCET) 7.5-325 MG per tablet 1 tablet  1 tablet Oral Q4H PRN Nicole Scullin, DO   1 tablet at 11/29/20 0308    sodium chloride flush 0.9 % injection 10 mL  10 mL Intravenous BID JOYCE Sauceda - CNP   10 mL at 11/24/20 2149    polyethylene glycol (GLYCOLAX) packet 17 g  17 g Oral Daily Petra Pugh, DO   17 g at 11/29/20 9838    senna (SENOKOT) tablet 8.6 mg  1 tablet Oral Nightly Petra Pugh, DO   8.6 mg at 28/29/62 2570    folic acid (FOLVITE) tablet 1 mg  1 mg Oral Daily Dahlia Hobson MD   1 mg at 11/28/20 0857    sodium chloride flush 0.9 % injection 10 mL  10 mL Intravenous 2 times per day Hernandez Mejia MD   10 mL at 11/26/20 0845    sodium chloride flush 0.9 % injection 10 mL  10 mL Intravenous PRN Hernandez Mejia MD   10 mL at 11/19/20 1516    promethazine (PHENERGAN) tablet 12.5 mg  12.5 mg Oral Q6H PRN Hernandez Mejia MD        Or    [facility]ron [facility] injection 4 mg  4 mg Intravenous Q6H PRN Hernandez Mejia MD   4 mg at 11/18/20 1843    enoxaparin (LOVENOX) injection 40 mg  40 mg Subcutaneous Daily Hernandez Mejia MD   40 mg at 11/29/20 9885       PHYSICAL EXAM:    EYES:  Lids and lashes normal, pupils equal, round and reactive to light, extra ocular muscles intact, sclera clear, conjunctiva normal    ENT:  Normocephalic, without obvious abnormality, atraumatic, sinuses nontender on palpation, external ears without lesions, oral pharynx with moist mucus membranes, tonsils without erythema or exudates, gums normal and good dentition. NECK:  Supple, symmetrical, trachea midline, no adenopathy, thyroid symmetric, not enlarged and no tenderness, skin normal    CHEST:    LUNGS:  No increased work of breathing, good air exchange, clear to auscultation bilaterally, no crackles or wheezing    CARDIOVASCULAR:  Normal apical impulse, regular rate and rhythm, normal S1 and S2, no S3 or S4, and no murmur noted    ABDOMEN:  No scars, normal bowel sounds, soft, non-distended, non-tender, no masses palpated, no hepatosplenomegally    MUSCULOSKELETAL:  There is no redness, warmth, or swelling of the joints. Full range of motion noted. Motor strength is 5 out of 5 all extremities bilaterally.   Tone is normal.  EXTREMITIES:    NEURO:    DATA:      PT/INR:  No results found for: PTINR  PTT:  No results found for: APTT  CMP:    Lab Results   Component Value Date     11/29/2020    K 5.0 11/29/2020    K 4.2 11/25/2020    CL 98 11/29/2020    CO2 25 11/29/2020    BUN 10 11/29/2020    PROT 8.0 11/18/2020     Magnesium:    Lab Results   Component Value Date    MG 2.3 11/29/2020     Phosphorus:  No components found for: PO4  Calcium:  No components found for: CA  CBC:    Lab Results   Component Value Date    WBC 13.9 11/29/2020    RBC 3.82 11/29/2020    HGB 9.5 11/29/2020    HCT 29.5 11/29/2020    MCV 77.3 11/29/2020    RDW 18.9 11/29/2020     11/29/2020     DIFF:    Lab Results   Component Value Date    MCV 77.3 11/29/2020    RDW 18.9 11/29/2020      LDH:  No results found for: LDH  Uric Acid:  No components found for: URIC    EKG Reviewed  Appropriate Radiology Reviewed      Pathology: Reviewed where indicated      ASSESSMENT:  Principal Problem:    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 *    Patient Active Problem List   Diagnosis    Liver mass    Osteoarthritis of hip    Palpitations    Other specified disorders of bone density and structure, unspecified site     Anemia    Generalized weakness    Gastric ulcer    Goals of care, counseling/discussion    Gastrointestinal hemorrhage    Rectal mass    Acute cystitis without hematuria    Metastatic cancer ([address]    Fall at home, initial encounter    Severe malnutrition ([address]    Falls, initial encounter    Cervical myelopathy ([address]    Benign prostatic hyperplasia with incomplete bladder emptying    Malignant tumor of prostate ([address]    Raised prostate specific antigen    Retention of urine    Urethritis    Ataxic gait    Stenosis of cervical spine with myelopathy ([address]    Neurogenic bladder       PLAN:  Herb Sic still on hold.           Electronically signed by Rl Lindquist MD on 11/29/20 at 2:31 PM EST

## 2020-11-29 NOTE — CARE COORDINATION
Needs follow up with [facility] for precert. Per rehab noted a peer 2 peer was attempted to be completed. Need clarification for destination at discharge.

## 2020-11-29 NOTE — PROGRESS NOTES
Pt temp now 99 degrees. Peripheral IV outdated and discontinued. Was medicated earlier with scheduled MS Contin but has not required further analgesia despite the offer. Has declined assist with straight cath but informed pt of order for UA/C and S. Is agreeable just \"not right now\".

## 2020-11-29 NOTE — PROGRESS NOTES
Negative for behavioral problems. Physical Exam  Constitutional:       Appearance: Normal appearance. Comments: underweight   HENT:      Head: Normocephalic and atraumatic. Eyes:      Conjunctiva/sclera: Conjunctivae normal.   Neck:      Musculoskeletal: Muscular tenderness present. Cardiovascular:      Rate and Rhythm: Normal rate and regular rhythm. Heart sounds: Normal heart sounds. Pulmonary:      Breath sounds: Normal breath sounds. Skin:     General: Skin is warm and dry. Neurological:      General: No focal deficit present. Mental Status: He is alert and oriented to person, place, and time. Deep Tendon Reflexes:      Reflex Scores:       Patellar reflexes are 3+ on the right side and 2+ on the left side. Comments: We tried to actually stand him up with assistance of 2. Is legs appear to be quite weak. He is ataxic. He complains of more neck pain when he stands up. Is not had any recent falls injuries or trauma. Psychiatric:         Speech: Speech normal.         Behavior: Behavior normal.         Thought Content: Thought content normal.         Judgment: Judgment normal.       Neurologic Exam     Mental Status   Oriented to person, place, and time. Speech: speech is normal   Level of consciousness: alert  Knowledge: good.      Motor Exam   Right arm pronator drift: absent  Left arm pronator drift: absent    Strength   Right wrist flexion: 5/5  Left wrist flexion: 5/5  Right interossei: 5/5  Left interossei: 5/5  Right quadriceps: 4/5  Left quadriceps: 5/5  Right anterior tibial: 5/5  Left anterior tibial: 5/5  Right gastroc: 5/5  Left gastroc: 5/5    Gait, Coordination, and Reflexes     Tremor   Resting tremor: absent    Reflexes   Right patellar: 3+  Left patellar: 2+  Right plantar: normal  Left plantar: normal    The above examinationSee       Medications:  Reviewed    Infusion Medications:     Scheduled Medications:    gabapentin  200 mg Oral TID    sodium chloride flush  10 mL Intravenous 2 times per day    bisacodyl  5 mg Oral Daily    morphine  15 mg Oral Q8H    mirtazapine  7.5 mg Oral Nightly    Vitamin D  2,000 Units Oral Dinner    cyanocobalamin  1,000 mcg Intramuscular Weekly    coenzyme Q10  100 mg Oral Daily    lidocaine  3 patch Transdermal Daily    sodium chloride flush  10 mL Intravenous BID    polyethylene glycol  17 g Oral Daily    senna  1 tablet Oral Nightly    folic acid  1 mg Oral Daily    sodium chloride flush  10 mL Intravenous 2 times per day    enoxaparin  40 mg Subcutaneous Daily     PRN Meds: diazePAM, sodium chloride flush, magnesium hydroxide, polyethylene glycol, ondansetron **OR** ondansetron, HYDROmorphone, acetaminophen **OR** acetaminophen, oxyCODONE-acetaminophen, sodium chloride flush, promethazine **OR** ondansetron    Labs:   Recent Labs     11/27/20  0627 11/28/20  0635 11/29/20  0721   WBC 13.9* 9.5 13.9*   HGB 9.6* 9.1* 9.5*   HCT 30.3* 28.1* 29.5*   * 534* 542*     Recent Labs     11/27/20  0627 11/28/20  0636 11/29/20  0721   * 135 132*   K 4.0 4.1 5.0*    101 98   CO2 20 20 25   BUN 9 12 10   CREATININE 0.54* 0.50* 0.69*   CALCIUM 8.5 8.9 8.7   PHOS 2.6 2.5 2.8     No results for input(s): AST, ALT, BILIDIR, BILITOT, ALKPHOS in the last 72 hours. No results for input(s): INR in the last 72 hours. No results for input(s): Donah Keens in the last 72 hours. Urinalysis:   Lab Results   Component Value Date    NITRU POSITIVE 11/29/2020    WBCUA >100 11/29/2020    BACTERIA MANY 11/29/2020    RBCUA 3-5 09/26/2020    BLOODU Negative 11/29/2020    SPECGRAV 1.014 11/29/2020    GLUCOSEU Negative 11/29/2020       Radiology:   Most recent    EEG No procedure found. MRI of Brain No results found for this or any previous visit. No results found for this or any previous visit. MRA of the Head and Neck: No results found for this or any previous visit. No results found for this or any previous visit. No results found for this or any previous visit. CT of the Head:   Results for orders placed during the hospital encounter of 11/18/20   CT Head WO Contrast    Narrative CT Brain    Contrast medium:  Not utilized. History:  Weakness, fatigue    Comparison:  None    Findings:    Extra-axial spaces:  Normal.     Intracranial hemorrhage:  None. Ventricular system: Ventricles mildly enlarged. Sulci mildly prominent. Basal Cisterns:  Normal.    Cerebral Parenchyma: Bilateral symmetric periventricular areas decreased attenuation. Midline Shift:  None. Cerebellum:  Normal.     Paranasal sinuses and mastoid air cells:  Normal.    Visualized Orbits:  Normal.        Impression Impression:    No acute findings. Mild cerebral atrophy. Chronic ischemic white matter disease. All CT scans at this facility use dose modulation, iterative reconstruction, and/or weight based dosing when appropriate to reduce radiation dose to as low as reasonably achievable. No results found for this or any previous visit. No results found for this or any previous visit. Carotid duplex: No results found for this or any previous visit. No results found for this or any previous visit. No results found for this or any previous visit. Echo No results found for this or any previous visit. Assessment/Plan:    Gait ataxia with cervical myelopathy. Patient was seen by us initially for lower extremity weakness and a complete spine evaluation was done and patient had cervical spinal stenosis which was operated we very called in to evaluate due to his continued weakness and pain. This is an aftermath of cervical myelopathy and may not improve or may take several weeks to months to improve. Patient's main issues appears to be pain and requires pain management.   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 The prognosis cannot be ascertained at this time given the timeframe of events and patient require adequate and excessive physical therapy. For now patient is a very high falls risk requirement of 2 to assist.  We will keep an eye on this and he may require further evaluation with a repeat CT spine if he worsens. Surgery is already aware of this      [facility] CenterErik Mora MD, Micki Brown, American Board of Psychiatry & Neurology  Board Certified in Vascular Neurology  Board Certified in Neuromuscular Medicine  Certified in Neurorehabilitation       Collaborating physicians: Dr Carmen Mora    Electronically signed by Gil Tellez MD on 11/29/2020 at 12:23 PM

## 2020-11-29 NOTE — PROGRESS NOTES
Subjective: The patient complains of severe  acute on chronic neck pain and weakness partially relieved by rest, PT, OT and meds and exacerbated by exertion and recent illness. I am concerned about patients medical complexities. Precert started with [facility] Medicare for Acute Inpatient Rehab, no waivers in place at this time-we waited for their call for the [id number] all day on Wednesday they finally called on unpublished number went straight to voicemail did not leave a call back number after hours. Several attempts were made to call them back finally the doctor reach the doctor but the doctor from the insurance company refused to talk me. ROS x10: The patient also complains of severely impaired mobility and activities of daily living. Otherwise no new problems with vision, hearing, nose, mouth, throat, dermal, cardiovascular, GI, , pulmonary, musculoskeletal, psychiatric or neurological. See Rehab consult on Rehab chart . Vital signs:  BP (!) 146/73   Pulse 77   Temp 97.5 °F (36.4 °C) (Oral)   Resp 18   Ht 6' 1\" (1.854 m)   Wt 147 lb 4.3 oz (66.8 kg)   SpO2 100%   BMI 19.43 kg/m²   I/O:   PO/Intake:    fair PO intake,       Bowel/Bladder:   Incontinent-neurogenic bladder-needs assist with IC q 6-8 hours scheduled. ,    General:  Patient is well developed, adequately nourished, non-obese and     well kempt. HEENT:    PERRLA, hearing intact to loud voice, external inspection of ear     and nose benign. Inspection of lips, tongue and gums benign  Musculoskeletal: No significant change in strength or tone. All joints stable. Inspection and palpation of digits and nails show no clubbing,       cyanosis or inflammatory conditions. Neuro/Psychiatric: Affect: flat-  Alert and oriented to self and     Situation. No significant change in deep tendon reflexes or     sensation  Lungs:  Diminished, CTA-B. Respiration effort is normal at rest.     Heart:   S1 = S2,  RRR.   No loud murmurs. Abdomen:  Soft, non-tender, no enlargement of liver or spleen. Extremities:  No significant lower extremity edema or tenderness. Skin:    BUE bruises dt blood draws, c spine incision    Rehabilitation:  Physical therapy: FIMS:  Bed Mobility: Scooting: Contact guard assistance    Transfers: Sit to Stand: Contact guard assistance  Stand to sit: Contact guard assistance  Bed to Chair: Contact guard assistance, Ambulation 1  Surface: level tile  Device: No Device  Assistance: Contact guard assistance  Quality of Gait: Shuffling steps, decreased trunk rotation, nbos, decreased brinda heel strike  Gait Deviations: Slow Yanet, Decreased step length  Distance: 10' x 2  Comments: Distance limited to in room tx secondary to pt request,      FIMS:  ,  , Assessment: Pt unable to sit up secondary to pain, attempted but declined half way through. good follow though with exercises. Occupational therapy: FIMS:   ,  , Assessment: Pt. seen for follow up treatment this date and completed ADL bed bath due to pain. Pt. with limited mobility and safety. Pt. continues to benefit from OT to maximize independence with bathing and improve safety and sequencing.     Speech therapy: FIMS:        Lab/X-ray studies reviewed, analyzed and discussed with patient and staff:   Recent Results (from the past 24 hour(s))   Urinalysis    Collection Time: 11/29/20  3:31 AM   Result Value Ref Range    Color, UA Yellow Straw/Yellow    Clarity, UA CLOUDY (A) Clear    Glucose, Ur Negative Negative mg/dL    Bilirubin Urine Negative Negative    Ketones, Urine Negative Negative mg/dL    Specific Gravity, UA 1.014 1.005 - 1.030    Blood, Urine Negative Negative    pH, UA >=9.0 5.0 - 9.0    Protein,  (A) Negative mg/dL    Urobilinogen, Urine 1.0 <2.0 E.U./dL    Nitrite, Urine POSITIVE (A) Negative    Leukocyte Esterase, Urine LARGE (A) Negative   Microscopic Urinalysis    Collection Time: 11/29/20  3:31 AM   Result Value Ref Range Bacteria, UA MANY (A) Negative /HPF    Crystals, UA 2+ Triple Phos (A) None Seen /HPF    Hyaline Casts, UA 3-5 0 - 5 /HPF    WBC, UA >100 (H) 0 - 5 /HPF    Epithelial Cells, UA 0-2 0 - 5 /HPF   CBC    Collection Time: 11/29/20  7:21 AM   Result Value Ref Range    WBC 13.9 (H) 4.8 - 10.8 K/uL    RBC 3.82 (L) 4.70 - 6.10 M/uL    Hemoglobin 9.5 (L) 14.0 - 18.0 g/dL    Hematocrit 29.5 (L) 42.0 - 52.0 %    MCV 77.3 (L) 80.0 - 100.0 fL    MCH 24.8 (L) 27.0 - 31.3 pg    MCHC 32.1 (L) 33.0 - 37.0 %    RDW 18.9 (H) 11.5 - 14.5 %    Platelets 239 (H) 923 - 400 K/uL   RENAL FUNCTION PANEL    Collection Time: 11/29/20  7:21 AM   Result Value Ref Range    Sodium 132 (L) 135 - 144 mEq/L    Potassium 5.0 (H) 3.4 - 4.9 mEq/L    Chloride 98 95 - 107 mEq/L    CO2 25 20 - 31 mEq/L    Anion Gap 9 9 - 15 mEq/L    Glucose 111 (H) 70 - 99 mg/dL    BUN 10 8 - 23 mg/dL    CREATININE 0.69 (L) 0.70 - 1.20 mg/dL    GFR Non-African American >60.0 >60    GFR  >60.0 >60    Calcium 8.7 8.5 - 9.9 mg/dL    Phosphorus 2.8 2.3 - 4.8 mg/dL    Alb 2.8 (L) 3.5 - 4.6 g/dL   Magnesium    Collection Time: 11/29/20  7:21 AM   Result Value Ref Range    Magnesium 2.3 1.7 - 2.4 mg/dL       Previous extensive, complex labs, notes and diagnostics reviewed and analyzed. ALLERGIES:    Allergies as of 11/18/2020    (No Known Allergies)      (please also verify by checking [facility])     Complex Physical Medicine & Rehab Issues Assess & Plan:   1. Severe abnormality of gait and mobility and impaired self-care and ADL's secondary to progressive cervical myelopathy. Functional and medical status reassessed regarding patients ability to participate in therapies and patient found to be able to participate in  acute intensive comprehensive inpatient rehabilitation program including PT/OT to improve balance, ambulation, ADLs, and to improve the P/AROM.    It is my opinion that they will be able to tolerate 3 hours of therapy a day and benefit from it at an acute level. 2. Bowel constipation and Bladder dysfunction atonic bladder: Incontinent-neurogenic bladder-needs assist with IC q 6-8 hours scheduled. ,   frequent toileting, ambulate to bathroom with assistance, check post void residuals. Check for C.difficile x1 if >2 loose stools in 24 hours, continue bowel & bladder program.  Monitor for UTI symptoms including lethargy and confusion  3. Severe postoperative neck pain and generalized OA pain: reassess pain every shift and prior to and after each therapy session, give scheduled MS Contin, prn Percocet  or Tylenol, modalities prn in therapy, consider Lidoderm, K-pad prn. Consider increase to Dilaudid 1 mg.  4. Skin breakdown risk:  continue pressure relief program.  Daily skin exams and reports from nursing. 5. Severe fatigue due to immobility and nutritional deficits: Add vitamin B12 vitamin D and CoQ10 titrate dosing and add protein supplementation with low carb content. 6. Complex discharge planning: Risk for urinary retention given spinal cord injury-Precert started with [facility] Medicare for Acute Inpatient Rehab, no waivers in place at this time--they did not call us back yesterday during the window of time that they gave us. Complex Active General Medical Issues that complicate care Assess & Plan:     1. Principal Problem:    Stenosis of cervical spine with myelopathy [facility])  Active Problems:    Palpitations    Anemia    Generalized weakness    Rectal mass    Metastatic cancer ([facility] [address]    Fall at home, initial encounter    Severe malnutrition ([address]    Falls, initial encounter    Cervical myelopathy (HCC)    Malignant tumor of prostate ([facility] [address]    Retention of urine    Urethritis    Ataxic gait    Neurogenic bladder  Resolved Problems:    * No resolved hospital problems.  Reyes Nieves D.O., PM&R     Attending    [address]

## 2020-11-29 NOTE — PROGRESS NOTES
Hospitalist Progress Note      PCP: Jade Boogie MD    Date of Admission: 11/18/2020    Chief Complaint:  Patient was febrile overnight with Tmax-39.3C, blood and urine culture pending, afebrile, stable HD,     Medications:  Reviewed    Infusion Medications     Scheduled Medications    cefTRIAXone (ROCEPHIN) IV  1 g Intravenous Q24H    gabapentin  200 mg Oral TID    sodium chloride flush  10 mL Intravenous 2 times per day    bisacodyl  5 mg Oral Daily    morphine  15 mg Oral Q8H    mirtazapine  7.5 mg Oral Nightly    Vitamin D  2,000 Units Oral Dinner    cyanocobalamin  1,000 mcg Intramuscular Weekly    coenzyme Q10  100 mg Oral Daily    lidocaine  3 patch Transdermal Daily    sodium chloride flush  10 mL Intravenous BID    polyethylene glycol  17 g Oral Daily    senna  1 tablet Oral Nightly    folic acid  1 mg Oral Daily    sodium chloride flush  10 mL Intravenous 2 times per day    enoxaparin  40 mg Subcutaneous Daily     PRN Meds: diazePAM, sodium chloride flush, magnesium hydroxide, polyethylene glycol, ondansetron **OR** ondansetron, HYDROmorphone, acetaminophen **OR** acetaminophen, oxyCODONE-acetaminophen, sodium chloride flush, promethazine **OR** ondansetron      Intake/Output Summary (Last 24 hours) at 11/29/2020 1237  Last data filed at 11/29/2020 0153  Gross per 24 hour   Intake 240 ml   Output --   Net 240 ml       Exam:    BP (!) 146/73   Pulse 77   Temp 97.5 °F (36.4 °C) (Oral)   Resp 18   Ht 6' 1\" (1.854 m)   Wt 147 lb 4.3 oz (66.8 kg)   SpO2 100%   BMI 19.43 kg/m²     General appearance: appears stated age and cooperative. Respiratory: clear to auscultation, bilaterally   Cardiovascular: Regular rate and rhythm with normal S1/S2  Abdomen: Soft, active bowel sounds.   Extremities: no edema      Labs:   Recent Labs     11/27/20  0627 11/28/20  0635 11/29/20  0721   WBC 13.9* 9.5 13.9*   HGB 9.6* 9.1* 9.5*   HCT 30.3* 28.1* 29.5*   * 534* 542*     Recent Labs 11/27/20  0627 11/28/20  0636 11/29/20  0721   * 135 132*   K 4.0 4.1 5.0*    101 98   CO2 20 20 25   BUN 9 12 10   CREATININE 0.54* 0.50* 0.69*   CALCIUM 8.5 8.9 8.7   PHOS 2.6 2.5 2.8     No results for input(s): AST, ALT, BILIDIR, BILITOT, ALKPHOS in the last 72 hours. No results for input(s): INR in the last 72 hours. No results for input(s): Margette Dec in the last 72 hours. Urinalysis:      Lab Results   Component Value Date    NITRU POSITIVE 11/29/2020    WBCUA >100 11/29/2020    BACTERIA MANY 11/29/2020    RBCUA 3-5 09/26/2020    BLOODU Negative 11/29/2020    SPECGRAV 1.014 11/29/2020    GLUCOSEU Negative 11/29/2020       Radiology:  RADIOLOGY REPORT   Final Result      FLUORO FOR SURGICAL PROCEDURES   Final Result   POST-OPERATIVE APPEARANCE AS SHOWN.            MRI CERVICAL SPINE W WO CONTRAST   Final Result      Osseous metastases throughout the clivus and a 1 cm T3 vertebra metastasis. Please refer to prior whole spine MRI detailing more extensive metastasis. C3-4 through C6-7 myelomalacia with cord atrophy, and abnormal signal due to canal stenosis and chronic cord compression. C3-4 through C6-7 varying degrees of significant foraminal stenosis predominantly due to uncovertebral joint arthrosis. Evaluation less than optimal due to motion. Punctate posterior peripheral cord enhancement at the C4-5 disc level and equivocal just below the C3-4 disc level detailed above. MRI CERVICAL THORACIC LUMBAR SPINE WO CONTRAST LIMITED   Final Result      Advanced cervical spondylosis from C3-4 through C6-7 resulting in severe spinal canal stenosis at these levels with minimal spinal canal diameter approaching 4 to 5 mm. Multiple metastatic bone disease throughout the thoracolumbar spine with no pathologic fracture. CT Head WO Contrast   Final Result   Impression:      No acute findings. Mild cerebral atrophy.       Chronic ischemic white matter disease. All CT scans at this facility use dose modulation, iterative reconstruction, and/or weight based dosing when appropriate to reduce radiation dose to as low as reasonably achievable. XR CHEST PORTABLE   Final Result   NO ACUTE CARDIOPULMONARY DISEASE.               Assessment/Plan:    80 y/o man with history of metastatic prostate cancer who presented with:    Recurrent falls and generalized weakness  - multifactorial in etiology including orthostatic hypotension suspect due to autonomic dysfunction, severe C-spine stenosis, advanced prostate cancer on chemotherapy, moderate protein calorie malnutrition  - MRI of the spine showed diffuse mets  - s/p C3-6 laminoplasty with reconstruction on 11/23  - followed by neurosurgery and neurology    Sepsis with fever and leukocytosis  - likely due to UTI  - started on IV Rocephin  - follow urine and blood cultures    Orthostatic hypotension  - treated with IVFs    Hyponatremia   - mild, improving    Microcytic anemia   - studies not suggestive of iron deficiency  - stable H/H    Metastatic prostate cancer with severe bone pain  - Xtandi on hold  - on MS contin, oxycodone, Dilaudid,gabapentin  - followed by oncology and palliative care     Constipation  - continue bowel regimen     Folic acid deficiency  - continue supplement     Cancer related pain   - on opioids    Severe malnutrition  - followed by dietitian       Disposition - acute rehab denied, waiting for SNF placement          Electronically signed by Adriana Gutierrez MD on 11/29/2020 at 12:37 PM

## 2020-11-29 NOTE — FLOWSHEET NOTE
Patient resting quietly in bed; assisted earlier to the [facility] , to try for BM but was unsuccessful. Alert and oriented x4. Lungs clear. No edema except for some behine the neck . Neck dressing clean, dry and intacted. Feels constipated but has had laxatives.

## 2020-11-30 LAB
ALBUMIN SERPL-MCNC: 2.5 G/DL (ref 3.5–4.6)
ANION GAP SERPL CALCULATED.3IONS-SCNC: 16 MEQ/L (ref 9–15)
BUN BLDV-MCNC: 11 MG/DL (ref 8–23)
C-REACTIVE PROTEIN, HIGH SENSITIVITY: 270 MG/L (ref 0–5)
CALCIUM SERPL-MCNC: 8.4 MG/DL (ref 8.5–9.9)
CHLORIDE BLD-SCNC: 96 MEQ/L (ref 95–107)
CO2: 19 MEQ/L (ref 20–31)
CREAT SERPL-MCNC: 0.5 MG/DL (ref 0.7–1.2)
GFR AFRICAN AMERICAN: >60
GFR NON-AFRICAN AMERICAN: >60
GLUCOSE BLD-MCNC: 113 MG/DL (ref 70–99)
HCT VFR BLD CALC: 28.7 % (ref 42–52)
HEMOGLOBIN: 9.1 G/DL (ref 14–18)
LACTIC ACID, SEPSIS: 2.7 MMOL/L (ref 0.5–1.9)
MAGNESIUM: 2.3 MG/DL (ref 1.7–2.4)
MCH RBC QN AUTO: 24.6 PG (ref 27–31.3)
MCHC RBC AUTO-ENTMCNC: 31.9 % (ref 33–37)
MCV RBC AUTO: 77.2 FL (ref 80–100)
PDW BLD-RTO: 19 % (ref 11.5–14.5)
PHOSPHORUS: 2.5 MG/DL (ref 2.3–4.8)
PLATELET # BLD: 576 K/UL (ref 130–400)
POTASSIUM SERPL-SCNC: 4 MEQ/L (ref 3.4–4.9)
PROCALCITONIN: 0.41 NG/ML (ref 0–0.15)
RBC # BLD: 3.71 M/UL (ref 4.7–6.1)
SODIUM BLD-SCNC: 131 MEQ/L (ref 135–144)
WBC # BLD: 17.9 K/UL (ref 4.8–10.8)

## 2020-11-30 PROCEDURE — 2580000003 HC RX 258: Performed by: INTERNAL MEDICINE

## 2020-11-30 PROCEDURE — 99232 SBSQ HOSP IP/OBS MODERATE 35: CPT | Performed by: PSYCHIATRY & NEUROLOGY

## 2020-11-30 PROCEDURE — 99232 SBSQ HOSP IP/OBS MODERATE 35: CPT | Performed by: NURSE PRACTITIONER

## 2020-11-30 PROCEDURE — 6360000002 HC RX W HCPCS: Performed by: INTERNAL MEDICINE

## 2020-11-30 PROCEDURE — 6370000000 HC RX 637 (ALT 250 FOR IP): Performed by: PSYCHIATRY & NEUROLOGY

## 2020-11-30 PROCEDURE — 84145 PROCALCITONIN (PCT): CPT

## 2020-11-30 PROCEDURE — 2580000003 HC RX 258: Performed by: NEUROLOGICAL SURGERY

## 2020-11-30 PROCEDURE — 85027 COMPLETE CBC AUTOMATED: CPT

## 2020-11-30 PROCEDURE — 83605 ASSAY OF LACTIC ACID: CPT

## 2020-11-30 PROCEDURE — 99232 SBSQ HOSP IP/OBS MODERATE 35: CPT | Performed by: PHYSICAL MEDICINE & REHABILITATION

## 2020-11-30 PROCEDURE — 6360000002 HC RX W HCPCS: Performed by: FAMILY MEDICINE

## 2020-11-30 PROCEDURE — 36415 COLL VENOUS BLD VENIPUNCTURE: CPT

## 2020-11-30 PROCEDURE — 1210000000 HC MED SURG R&B

## 2020-11-30 PROCEDURE — 6370000000 HC RX 637 (ALT 250 FOR IP): Performed by: NURSE PRACTITIONER

## 2020-11-30 PROCEDURE — 83735 ASSAY OF MAGNESIUM: CPT

## 2020-11-30 PROCEDURE — 80069 RENAL FUNCTION PANEL: CPT

## 2020-11-30 PROCEDURE — 6370000000 HC RX 637 (ALT 250 FOR IP): Performed by: INTERNAL MEDICINE

## 2020-11-30 PROCEDURE — 97535 SELF CARE MNGMENT TRAINING: CPT

## 2020-11-30 PROCEDURE — 86141 C-REACTIVE PROTEIN HS: CPT

## 2020-11-30 PROCEDURE — 6370000000 HC RX 637 (ALT 250 FOR IP): Performed by: NEUROLOGICAL SURGERY

## 2020-11-30 PROCEDURE — 97110 THERAPEUTIC EXERCISES: CPT

## 2020-11-30 PROCEDURE — 6370000000 HC RX 637 (ALT 250 FOR IP): Performed by: PHYSICAL MEDICINE & REHABILITATION

## 2020-11-30 PROCEDURE — 6360000002 HC RX W HCPCS: Performed by: PHYSICAL MEDICINE & REHABILITATION

## 2020-11-30 RX ORDER — GABAPENTIN 300 MG/1
300 CAPSULE ORAL 3 TIMES DAILY
Status: DISCONTINUED | OUTPATIENT
Start: 2020-11-30 | End: 2020-12-10 | Stop reason: HOSPADM

## 2020-11-30 RX ORDER — 0.9 % SODIUM CHLORIDE 0.9 %
500 INTRAVENOUS SOLUTION INTRAVENOUS ONCE
Status: COMPLETED | OUTPATIENT
Start: 2020-11-30 | End: 2020-11-30

## 2020-11-30 RX ADMIN — GABAPENTIN 200 MG: 100 CAPSULE ORAL at 08:59

## 2020-11-30 RX ADMIN — OXYCODONE HYDROCHLORIDE AND ACETAMINOPHEN 1 TABLET: 7.5; 325 TABLET ORAL at 09:03

## 2020-11-30 RX ADMIN — Medication 100 MG: at 08:59

## 2020-11-30 RX ADMIN — ENOXAPARIN SODIUM 40 MG: 40 INJECTION SUBCUTANEOUS at 08:59

## 2020-11-30 RX ADMIN — FOLIC ACID 1 MG: 1 TABLET ORAL at 08:59

## 2020-11-30 RX ADMIN — GABAPENTIN 300 MG: 300 CAPSULE ORAL at 13:56

## 2020-11-30 RX ADMIN — MORPHINE SULFATE 15 MG: 15 TABLET, FILM COATED, EXTENDED RELEASE ORAL at 21:22

## 2020-11-30 RX ADMIN — GABAPENTIN 300 MG: 300 CAPSULE ORAL at 21:23

## 2020-11-30 RX ADMIN — Medication 10 ML: at 21:22

## 2020-11-30 RX ADMIN — Medication 10 ML: at 09:49

## 2020-11-30 RX ADMIN — HYDROMORPHONE HYDROCHLORIDE 1 MG: 1 INJECTION, SOLUTION INTRAMUSCULAR; INTRAVENOUS; SUBCUTANEOUS at 10:12

## 2020-11-30 RX ADMIN — OXYCODONE HYDROCHLORIDE AND ACETAMINOPHEN 1 TABLET: 7.5; 325 TABLET ORAL at 14:51

## 2020-11-30 RX ADMIN — STANDARDIZED SENNA CONCENTRATE 8.6 MG: 8.6 TABLET ORAL at 21:23

## 2020-11-30 RX ADMIN — MIRTAZAPINE 7.5 MG: 15 TABLET, FILM COATED ORAL at 21:23

## 2020-11-30 RX ADMIN — CEFTRIAXONE SODIUM 1 G: 1 INJECTION, POWDER, FOR SOLUTION INTRAMUSCULAR; INTRAVENOUS at 13:56

## 2020-11-30 RX ADMIN — SODIUM CHLORIDE 500 ML: 9 INJECTION, SOLUTION INTRAVENOUS at 09:49

## 2020-11-30 RX ADMIN — SODIUM CHLORIDE 500 ML: 9 INJECTION, SOLUTION INTRAVENOUS at 14:33

## 2020-11-30 RX ADMIN — Medication 2000 UNITS: at 16:49

## 2020-11-30 RX ADMIN — POLYETHYLENE GLYCOL 3350 17 G: 17 POWDER, FOR SOLUTION ORAL at 08:59

## 2020-11-30 RX ADMIN — BISACODYL 5 MG: 5 TABLET, COATED ORAL at 08:59

## 2020-11-30 RX ADMIN — HYDROMORPHONE HYDROCHLORIDE 1 MG: 1 INJECTION, SOLUTION INTRAMUSCULAR; INTRAVENOUS; SUBCUTANEOUS at 23:24

## 2020-11-30 RX ADMIN — MORPHINE SULFATE 15 MG: 15 TABLET, FILM COATED, EXTENDED RELEASE ORAL at 05:13

## 2020-11-30 RX ADMIN — MORPHINE SULFATE 15 MG: 15 TABLET, FILM COATED, EXTENDED RELEASE ORAL at 13:55

## 2020-11-30 ASSESSMENT — PAIN SCALES - GENERAL
PAINLEVEL_OUTOF10: 8
PAINLEVEL_OUTOF10: 7
PAINLEVEL_OUTOF10: 8
PAINLEVEL_OUTOF10: 10
PAINLEVEL_OUTOF10: 10
PAINLEVEL_OUTOF10: 8
PAINLEVEL_OUTOF10: 10
PAINLEVEL_OUTOF10: 7
PAINLEVEL_OUTOF10: 5

## 2020-11-30 ASSESSMENT — ENCOUNTER SYMPTOMS
APNEA: 0
CHOKING: 0
ABDOMINAL PAIN: 0
VOICE CHANGE: 0
SORE THROAT: 0
WHEEZING: 0
EYES NEGATIVE: 1
RESPIRATORY NEGATIVE: 1
VOMITING: 0
DIARRHEA: 0
CONSTIPATION: 0
GASTROINTESTINAL NEGATIVE: 1
BACK PAIN: 0
BLOOD IN STOOL: 0
EYE DISCHARGE: 0
SHORTNESS OF BREATH: 0
ANAL BLEEDING: 0
TROUBLE SWALLOWING: 0
NAUSEA: 0
ABDOMINAL DISTENTION: 0
COUGH: 0
COLOR CHANGE: 0
CHEST TIGHTNESS: 0
STRIDOR: 0
BACK PAIN: 1
RECTAL PAIN: 0

## 2020-11-30 ASSESSMENT — PAIN DESCRIPTION - ORIENTATION: ORIENTATION: POSTERIOR

## 2020-11-30 ASSESSMENT — PAIN DESCRIPTION - LOCATION
LOCATION: NECK;BACK
LOCATION: NECK;BACK
LOCATION: NECK

## 2020-11-30 ASSESSMENT — PAIN DESCRIPTION - PAIN TYPE
TYPE: ACUTE PAIN
TYPE: ACUTE PAIN;SURGICAL PAIN
TYPE: ACUTE PAIN

## 2020-11-30 ASSESSMENT — PAIN DESCRIPTION - DESCRIPTORS: DESCRIPTORS: ACHING

## 2020-11-30 NOTE — PROGRESS NOTES
Physical Therapy Med Surg Daily Treatment Note  Facility/Department: [facility] MED SURG UNIT  Room: [place] [address]       NAME: Jess Mcfarlane  : 1950 (85 y.o.)  MRN: [id number]  CODE STATUS: Full Code    Date of Service: 2020    Patient Diagnosis(es): Fall at home, initial encounter [W19. XXXA, M44.501]  Falls, initial encounter [Y84. XXXA]   Chief Complaint   Patient presents with    Fatigue     Patient Active Problem List    Diagnosis Date Noted    Neurogenic bladder     Stenosis of cervical spine with myelopathy ([address] 2020    Malignant tumor of prostate ([address] 2020    Raised prostate specific antigen 2020    Retention of urine 2020    Urethritis 2020    Ataxic gait 2020    Cervical myelopathy ([address]     Falls, initial encounter 2020    Severe malnutrition ([address] 2020    Fall at home, initial encounter 2020    Goals of care, counseling/discussion     Gastrointestinal hemorrhage     Rectal mass     Acute cystitis without hematuria     Metastatic cancer (HCC)     Gastric ulcer 2020    Generalized weakness     Anemia 2020    Other specified disorders of bone density and structure, unspecified site      Liver mass 2020    Osteoarthritis of hip 2020    Benign prostatic hyperplasia with incomplete bladder emptying 2018    Palpitations 10/19/2010        Past Medical History:   Diagnosis Date    PAF (paroxysmal atrial fibrillation) ([address]     ON XARELTO    Prostate cancer ([address] 2019     Past Surgical History:   Procedure Laterality Date    CERVICAL LAMINECTOMY N/A 2020    CERVICAL C3-4,4-5,5-6  LAMINOPLASTY WITH RECONSTRUCTION performed by Kit Hutchison MD at [address] N/A 2020    COLONOSCOPY DIAGNOSTIC performed by Devan Hartley MD at [facility] [address] 2018    hip    UPPER GASTROINTESTINAL ENDOSCOPY N/A 2020    EGD DIAGNOSTIC ONLY performed by Tonja Cates MD at [facility]       Restrictions  Restrictions/Precautions: Fall Risk  Position Activity Restriction  Other position/activity restrictions: Limit cervical ROM to within pain tolerable range    SUBJECTIVE   General  Chart Reviewed: Yes  Family / Caregiver Present: No  Subjective  Subjective: \"I'm really hurting. \"    Pre-Session Pain Report  Pre Treatment Pain Screening  Pain at present: 10  Intervention List: Patient able to continue with treatment;Nurse called to administer meds  Comments / Details: Nsg administering meds prior to tx  Pain Screening  Patient Currently in Pain: Yes       Post-Session Pain Report  Pain Assessment  Pain Assessment: 0-10  Pain Level: 10  Pain Type: Acute pain;Surgical pain  Pain Location: Neck  Pain Orientation: Posterior  Pain Descriptors: Aching         OBJECTIVE        Bed mobility  Rolling to Left: Minimal assistance  Supine to Sit: Minimal assistance  Sit to Supine: Contact guard assistance  Scooting: Contact guard assistance  Comment: VC's for improving technique, good follow through. HOB elevated. Min A for rolling to R secondary to utilizing PTA to pull self towards R    Transfers  Sit to Stand: Unable to assess(safety concerns)  Stand to sit: Unable to assess  Comment: NT secondary to high BP and pt complaining of dizziness    Ambulation  Ambulation?: No    Neuromuscular Education  Neuromuscular Comments: Static sitting balance EOB, varying UE support. Pt able to maintain midline ~6 min     Exercises  Quad Sets: x15  Gluteal Sets: x15  Ankle Pumps: x 15  Neurodevelopmental Techniques: Gentle cervical PROM to R within tolerable range         ASSESSMENT   Assessment: Pt limited seconary to pain and dizziness this session. While sitting EOB, pt complaining of dizziness. BP taken sitting with reading of 182/134. Pt returned to supine and BP taken again for reading of 123/66. Pt unsafe to stand at this time, NSG notified of BP readings and pt's complaints.

## 2020-11-30 NOTE — PROGRESS NOTES
Pt's vitals are stable. This morning his pain was 10/10. PRN meds & ekaterina meds given and he is now down to about 7/10. Cervical collar applied, pt finds this helps a lot. Dry dressing changed, incision is clean, dry, and intact. We have been continuing to turn him in the bed every 2 hours, bed extender ordered. He stood up with therapy & declined the chair after I gave him a lot of encouragement. Call light in reach, bed alarm on.

## 2020-11-30 NOTE — CARE COORDINATION
Per Lestine Cushing at [facility], they are not accepting patients until Saturday. Still no info on whether they are in network. Continued research on which facilities may be in network for pt. Some that are do not have beds available. Referrals made to [facility] [place] [facility] and [facility]. [facility] cannot accept. [employer] [facility] is reviewing, but [employer] system is down so they are waiting to verify benefits. They also had questions about chemo. Pt states he takes a chemo pill. He just got a month supply and could take it with him if need be.

## 2020-11-30 NOTE — FLOWSHEET NOTE
Pt c/o 8/10 pain in the back of his neck. Pt states he has pain, numbness, tingling in all extremities, not new. Pt also stated he felt nervous. PRN Valium administered. Scheduled MS Contin administered for pain. Pt assisted to turn in bed with pillow support. Fall protocol in place, call light within reach.

## 2020-11-30 NOTE — PROGRESS NOTES
[facility] Neurology Daily Progress Note  Name: Petra Chaudhary  Age: 79 y.o. Gender: male  CodeStatus: Full Code  Allergies: No Known Allergies    Chief Complaint:Fatigue    Primary Care Provider: Shahida Gill MD  InpatientTreatment Team: Treatment Team: Attending Provider: Dieudonne Dean DO; Consulting Physician: Keshia Thomas DO; Utilization Reviewer: Louise Banks RN; Consulting Physician: Gil Tellez MD; Consulting Physician: Alyssa Parrish MD; Surgeon: Alyssa Parrish MD; Registered Nurse: Osmany King RN; Tech: Evelyne Merissas; Patient Care Tech: Gladis Leslie; : Mayur Galicia RN; : Minoo Thomson RN; : RUBÉN Lopes  Admission Date: 11/18/2020      Fatigue   Associated symptoms include fatigue, myalgias, neck pain, numbness and weakness. Pertinent negatives include no chills. We were called to reassess as patient's gait has not improved. He continues complain of considerable pain in his neck and shoulders and requires assistance to transfer. He is in fact become weaker. Is been bed at least 15 days. Patient states that pain is limiting factor in participating in PT. States that he has not taking the maximum amount of pain medication he is being prescribed, but states he now plans to, so that he can participate more in physical therapy. Says that today he tried very hard in physical therapy and was able to sit at the edge of the bed. He denies headache, shortness of breath, cough, or N/V. Vitals:    11/30/20 0730   BP:    Pulse:    Resp:    Temp:    SpO2: 100%      Review of Systems   Constitutional: Positive for fatigue and unexpected weight change. Negative for chills. HENT: Negative. Eyes: Negative. Respiratory: Negative. Cardiovascular: Negative. Gastrointestinal: Negative. Musculoskeletal: Positive for back pain, gait problem, myalgias, neck pain and neck stiffness.    Neurological: Positive for weakness and numbness. Negative for speech difficulty. Psychiatric/Behavioral: Positive for dysphoric mood and sleep disturbance. Negative for behavioral problems. Physical Exam  Constitutional:       Appearance: Normal appearance. Comments: underweight   HENT:      Head: Normocephalic and atraumatic. Eyes:      Conjunctiva/sclera: Conjunctivae normal.   Neck:      Musculoskeletal: Muscular tenderness present. Cardiovascular:      Rate and Rhythm: Normal rate and regular rhythm. Heart sounds: Normal heart sounds. Pulmonary:      Breath sounds: Normal breath sounds. Skin:     General: Skin is warm and dry. Neurological:      General: No focal deficit present. Mental Status: He is alert and oriented to person, place, and time. Deep Tendon Reflexes:      Reflex Scores:       Patellar reflexes are 3+ on the right side and 2+ on the left side. Psychiatric:         Mood and Affect: Mood normal.         Speech: Speech normal.         Behavior: Behavior normal.         Thought Content: Thought content normal.         Judgment: Judgment normal.       Neurologic Exam     Mental Status   Oriented to person, place, and time. Speech: speech is normal   Level of consciousness: alert  Knowledge: good.      Motor Exam   Right arm pronator drift: absent  Left arm pronator drift: absent    Strength   Right wrist flexion: 5/5  Left wrist flexion: 5/5  Right interossei: 5/5  Left interossei: 5/5  Right quadriceps: 4/5  Left quadriceps: 5/5  Right anterior tibial: 5/5  Left anterior tibial: 5/5  Right gastroc: 5/5  Left gastroc: 5/5    Gait, Coordination, and Reflexes     Tremor   Resting tremor: absent    Reflexes   Right patellar: 3+  Left patellar: 2+  Right plantar: normal  Left plantar: normal    The above examinationSee       Medications:  Reviewed    Infusion Medications:     Scheduled Medications:    cefTRIAXone (ROCEPHIN) IV  1 g Intravenous Q24H    gabapentin  200 mg Oral TID    sodium chloride any previous visit. No results found for this or any previous visit. CT of the Head:   Results for orders placed during the hospital encounter of 11/18/20   CT Head WO Contrast    Narrative CT Brain    Contrast medium:  Not utilized. History:  Weakness, fatigue    Comparison:  None    Findings:    Extra-axial spaces:  Normal.     Intracranial hemorrhage:  None. Ventricular system: Ventricles mildly enlarged. Sulci mildly prominent. Basal Cisterns:  Normal.    Cerebral Parenchyma: Bilateral symmetric periventricular areas decreased attenuation. Midline Shift:  None. Cerebellum:  Normal.     Paranasal sinuses and mastoid air cells:  Normal.    Visualized Orbits:  Normal.        Impression Impression:    No acute findings. Mild cerebral atrophy. Chronic ischemic white matter disease. All CT scans at this facility use dose modulation, iterative reconstruction, and/or weight based dosing when appropriate to reduce radiation dose to as low as reasonably achievable. No results found for this or any previous visit. No results found for this or any previous visit. Carotid duplex: No results found for this or any previous visit. No results found for this or any previous visit. No results found for this or any previous visit. Echo No results found for this or any previous visit. Assessment/Plan:  Gait ataxia with cervical myelopathy in the presence of metastatic prostate cancer. Patient was seen by us initially for lower extremity weakness and a complete spine evaluation was done and patient had cervical spinal stenosis which was operated we very called in to evaluate due to his continued weakness and pain. This is an aftermath of cervical myelopathy and may not improve or may take several weeks to months to improve. Patient's main issues appears to be pain and requires pain management.      Exam does not show any acute changes, and patient remains areflexic in bilateral upper extremities, with gait ataxia, and weakness. 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 He continues to be a high fall risk. Patient is more motivated today to participate in PT. I will order a CT cervical spine tomorrow if patient's pain has not improved after maximizing analgesics. I have personally performed a face to face diagnostic evaluation on this patient, reviewed all data and investigations, and am the sole provider of all clinical decisions on the neurological status of this patient. Pt reexamined, and reassessed for any complications      Oscar Vleez MD, Aissatou Liriano, American Board of Psychiatry & Neurology  Board Certified in Vascular Neurology  Board Certified in Neuromuscular Medicine  Certified in [address] [place] [address].  Lorenzo Velez MD, Aissatou Liriano, American Board of Psychiatry & Neurology  Board Certified in Vascular Neurology  Board Certified in Neuromuscular Medicine  Certified in Neurorehabilitation       Collaborating physicians: Dr Lorenzo Velez    Electronically signed by Laura Vee PA-C on 11/30/2020 at 9:12 AM

## 2020-11-30 NOTE — PROGRESS NOTES
Subjective: The patient complains of severe  acute on chronic neck pain and weakness partially relieved by rest, PT, OT and meds and exacerbated by exertion and recent illness. I am concerned about patients medical complexities. Precert started with [facility] Medicare for Acute Inpatient Rehab, no waivers in place at this time-we waited for their call for the [id number] all day on Wednesday they finally called on unpublished number went straight to voicemail did not leave a call back number after hours. Several attempts were made to call them back finally the doctor reach the doctor but the doctor from the insurance company refused to talk me. He ICs at home --need to reume this in the hospital to prevent UTI and sepsis. Is requiring intermittent catheterization which is the style of bladder management that he used at home but is needing to have extra assistance because of his inability to look down and do it himself and he will need OT and nursing from a rehabilitation standpoint to work with him on this in a rehab unit. Reviewed recent nursing note, \"  Pt c/o 8/10 pain in the back of his neck. Pt states he has pain, numbness, tingling in all extremities, not new. Pt also stated he felt nervous. PRN Valium administered. Scheduled MS Contin administered for pain. Pt assisted to turn in bed with pillow support. Fall protocol in place, call light within reach. \".  Pain is not controlled with   Percocet 7.5--consider 10 mg  . ROS x10: The patient also complains of severely impaired mobility and activities of daily living. Otherwise no new problems with vision, hearing, nose, mouth, throat, dermal, cardiovascular, GI, , pulmonary, musculoskeletal, psychiatric or neurological. See Rehab consult on Rehab chart .        Vital signs:  /64   Pulse 104   Temp 99.1 °F (37.3 °C)   Resp 18   Ht 6' 1\" (1.854 m)   Wt 147 lb 4.3 oz (66.8 kg)   SpO2 99%   BMI 19.43 kg/m²   I/O:   PO/Intake: bathing and improve safety and sequencing. Speech therapy: FIMS:        Lab/X-ray studies reviewed, analyzed and discussed with patient and staff:   Recent Results (from the past 24 hour(s))   CBC    Collection Time: 11/30/20  6:30 AM   Result Value Ref Range    WBC 17.9 (H) 4.8 - 10.8 K/uL    RBC 3.71 (L) 4.70 - 6.10 M/uL    Hemoglobin 9.1 (L) 14.0 - 18.0 g/dL    Hematocrit 28.7 (L) 42.0 - 52.0 %    MCV 77.2 (L) 80.0 - 100.0 fL    MCH 24.6 (L) 27.0 - 31.3 pg    MCHC 31.9 (L) 33.0 - 37.0 %    RDW 19.0 (H) 11.5 - 14.5 %    Platelets 427 (H) 825 - 400 K/uL   High sensitivity CRP    Collection Time: 11/30/20  6:30 AM   Result Value Ref Range    CRP High Sensitivity 270.0 (H) 0.0 - 5.0 mg/L   PROCALCITONIN    Collection Time: 11/30/20  6:30 AM   Result Value Ref Range    Procalcitonin 0.41 (H) 0.00 - 0.15 ng/mL       Previous extensive, complex labs, notes and diagnostics reviewed and analyzed. ALLERGIES:    Allergies as of 11/18/2020    (No Known Allergies)      (please also verify by checking [facility])     Complex Physical Medicine & Rehab Issues Assess & Plan:   1. Severe abnormality of gait and mobility and impaired self-care and ADL's secondary to progressive cervical myelopathy. Functional and medical status reassessed regarding patients ability to participate in therapies and patient found to be able to participate in  acute intensive comprehensive inpatient rehabilitation program including PT/OT to improve balance, ambulation, ADLs, and to improve the P/AROM. It is my opinion that they will be able to tolerate 3 hours of therapy a day and benefit from it at an acute level. 2. Bowel constipation and Bladder dysfunction atonic bladder: Incontinent-neurogenic bladder-needs assist with IC q 6-8 hours scheduled. ,   frequent toileting, ambulate to bathroom with assistance, check post void residuals.   Check for C.difficile x1 if >2 loose stools in 24 hours, continue bowel & bladder program.  Monitor for UTI symptoms including lethargy and confusion  3. Severe postoperative neck pain and generalized OA pain: reassess pain every shift and prior to and after each therapy session, give scheduled MS Contin, prn Percocet  or Tylenol, modalities prn in therapy, consider Lidoderm, K-pad prn. Consider increase to Dilaudid 1 mg.consider percocet 10--vs increased MScontin--also add soft cervical collar. 4. Skin breakdown risk:  continue pressure relief program.  Daily skin exams and reports from nursing. 5. Severe fatigue due to immobility and nutritional deficits: Add vitamin B12 vitamin D and CoQ10 titrate dosing and add protein supplementation with low carb content. 6. Complex discharge planning: Risk for urinary retention given spinal cord injury-Precert  Denied with [facility] Medicare for Acute Inpatient Rehab, no waivers in place at this time--they did not call us back yesterday during the window of time that they gave us. Complex Active General Medical Issues that complicate care Assess & Plan:     1. Principal Problem:    Stenosis of cervical spine with myelopathy [facility])  Active Problems:    Palpitations    Anemia    Generalized weakness    Rectal mass    Metastatic cancer ([address]    Fall at home, initial encounter    Severe malnutrition ([address]    Falls, initial encounter    Cervical myelopathy (HCC)    Malignant tumor of prostate ([facility]    Retention of urine    Urethritis    Ataxic gait    Neurogenic bladder  Resolved Problems:    * No resolved hospital problems.  Marla Elizabeth D.O., PM&R     Attending    [address]

## 2020-11-30 NOTE — PROGRESS NOTES
Palliative Care Progress Note  Patient: Otto Phoenix  Gender: male  YOB: 1950  Unit/Bed: H040/G701-70  Code Status: Full Code  Inpatient Treatment Team: Treatment Team: Attending Provider: Christi Blue DO; Consulting Physician: Jacinda Kelley DO; Utilization Reviewer: Kathy Damon, NEIDA; Consulting Physician: Sameer Coleman MD; Consulting Physician: Bolivar Coreas MD; Surgeon: Bolivar Coreas MD; Registered Nurse: Jojo Love RN; Tech: Alok Oneill; Patient Care Tech: Zane Weber; : Tirso Acevedo RN; : [facility], RN; : Ramy Knowles  Admit Date:  11/18/2020    Chief Complaint: Pain    History of Presenting Illness:      Otto Phoenix is a 79 y.o. male on hospital day 10 with a history of androgen independent prostate cancer. Known liver and bone mets, severe malnutrition, falls, urine retention self caths, gastric ulcer with hemorrhage. Presented with  progressive quadriparesis weight loss inability to ambulate. His legs will not support him anymore. Every few weeks he is getting weaker and weaker. He is now unable to ambulate. Found to have severe spinal canal stenosis at C3-C7 with cord atrophy      Laminoplasty with reconstruction was done. He tells me he has had a BM, no abdominal pain or nausea. Numbness slightly improved, Pain in neck \" severe\" he is hesitant to turn his head, but he has only used two doses of percocet for BTP in the last 24 hours. Negative significant emotional, spiritual, or sleep disturbance. Review of Systems:       Review of Systems   Constitutional: Negative for activity change, appetite change, chills, diaphoresis, fatigue, fever and unexpected weight change. HENT: Negative for drooling, hearing loss, mouth sores, sore throat, trouble swallowing and voice change. Eyes: Negative for discharge and visual disturbance.    Respiratory: Negative for apnea, cough, choking, chest tightness, JVD.      Trachea: No tracheal deviation. Cardiovascular:      Rate and Rhythm: Normal rate and regular rhythm. Heart sounds: Normal heart sounds. Pulmonary:      Effort: Pulmonary effort is normal. No respiratory distress. Breath sounds: Normal breath sounds. No stridor. No wheezing or rales. Chest:      Chest wall: No tenderness. Abdominal:      General: Bowel sounds are normal. There is no distension. Palpations: Abdomen is soft. There is no mass. Tenderness: There is no abdominal tenderness. There is no guarding or rebound. Musculoskeletal: Normal range of motion. General: No tenderness or deformity. Lymphadenopathy:      Cervical: No cervical adenopathy. Skin:     General: Skin is warm and dry. Findings: No erythema or rash. Neurological:      Mental Status: He is alert and oriented to person, place, and time. Cranial Nerves: No cranial nerve deficit. Coordination: Coordination normal.   Psychiatric:         Behavior: Behavior normal.         Thought Content:  Thought content normal.         Judgment: Judgment normal.         Allergies:      No Known Allergies    Medications:      Current Facility-Administered Medications   Medication Dose Route Frequency Provider Last Rate Last Dose    0.9 % sodium chloride bolus  500 mL Intravenous Once Dieudonne Dean  mL/hr at 11/30/20 0949 500 mL at 11/30/20 0949    HYDROmorphone (DILAUDID) injection 1 mg  1 mg Intravenous Q4H PRN Nicole Reyes DO        cefTRIAXone (ROCEPHIN) 1 g IVPB in 50 mL D5W minibag  1 g Intravenous Q24H Vane Davidson MD   Stopped at 11/29/20 1529    diazePAM (VALIUM) tablet 2 mg  2 mg Oral Q6H PRN Adjondi Sheree Aschoff, APRN - CNP   2 mg at 11/29/20 2011    gabapentin (NEURONTIN) capsule 200 mg  200 mg Oral TID Nura Hansen APRN - CNP   200 mg at 11/30/20 0859    sodium chloride flush 0.9 % injection 10 mL  10 mL Intravenous 2 times per day Alyssa Parrish MD   10 mL at 11/30/20 0949    sodium chloride flush 0.9 % injection 10 mL  10 mL Intravenous PRN Carolyn Moreau MD        bisacodyl (DULCOLAX) EC tablet 5 mg  5 mg Oral Daily Carolyn Moreau MD   5 mg at 11/30/20 0859    magnesium hydroxide (MILK OF MAGNESIA) 400 MG/5ML suspension 30 mL  30 mL Oral Daily PRN Carolyn Moreau MD        polyethylene glycol [facility]) packet 17 g  17 g Oral Daily PRN Carolyn Moreau MD        ondansetron (ZOFRAN-ODT) disintegrating tablet 4 mg  4 mg Oral Q8H PRN Carolyn Moreau MD        Or    [facility]dansetron [facility]) injection 4 mg  4 mg Intravenous Q6H PRN Carolyn Moreau MD        morphine (MS CONTIN) extended release tablet 15 mg  15 mg Oral Q8H Starr Ohs, APRN - CNP   15 mg at 11/30/20 0562    mirtazapine (REMERON) tablet 7.5 mg  7.5 mg Oral Nightly Starr Ohs, APRN - CNP   7.5 mg at 11/29/20 2011    Vitamin D (CHOLECALCIFEROL) tablet 2,000 Units  2,000 Units Oral Dinner Nicole Scullin, DO   2,000 Units at 11/29/20 1717    cyanocobalamin injection 1,000 mcg  1,000 mcg Intramuscular Weekly Nicole Scullin, DO   1,000 mcg at 11/24/20 1341    coenzyme Q10 capsule 100 mg  100 mg Oral Daily Nicole Scullin, DO   100 mg at 11/30/20 0859    lidocaine 4 % external patch 3 patch  3 patch Transdermal Daily Nicole Scullin, DO   3 patch at 11/30/20 0900    acetaminophen (TYLENOL) tablet 650 mg  650 mg Oral Q4H PRN Nicole Scullin, DO        Or    acetaminophen (TYLENOL) suppository 650 mg  650 mg Rectal Q6H PRN Nicole Scullin, DO        oxyCODONE-acetaminophen (PERCOCET) 7.5-325 MG per tablet 1 tablet  1 tablet Oral Q4H PRN Nicole Scullin, DO   1 tablet at 11/30/20 0903    sodium chloride flush 0.9 % injection 10 mL  10 mL Intravenous BID Elease Faden, APRN - CNP   10 mL at 11/24/20 2149    polyethylene glycol (GLYCOLAX) packet 17 g  17 g Oral Daily Heri St DO   17 g at 11/30/20 0859    senna (SENOKOT) tablet 8.6 mg  1 tablet Oral Nightly Rolly Lemus DO   8.6 mg at 50/41/68 2803    folic acid (FOLVITE) tablet 1 mg  1 mg Oral Daily Jj Johnson MD   1 mg at 11/30/20 0859    sodium chloride flush 0.9 % injection 10 mL  10 mL Intravenous 2 times per day Kimberlee Ochoa MD   10 mL at 11/29/20 2012    sodium chloride flush 0.9 % injection 10 mL  10 mL Intravenous PRN Kimberlee Ochoa MD   10 mL at 11/19/20 1516    promethazine (PHENERGAN) tablet 12.5 mg  12.5 mg Oral Q6H PRN Kimberlee Ochoa MD        Or    ondansetron [facility]) injection 4 mg  4 mg Intravenous Q6H PRN Kimberlee Ochoa MD   4 mg at 11/18/20 1843    enoxaparin (LOVENOX) injection 40 mg  40 mg Subcutaneous Daily Kimberlee Ochoa MD   40 mg at 11/30/20 8532       History:       PMHx:  Past Medical History:   Diagnosis Date    PAF (paroxysmal atrial fibrillation) ([facility] [address]     ON XARELTO    Prostate cancer ([address] 11/2019       PSHx:  Past Surgical History:   Procedure Laterality Date    CERVICAL LAMINECTOMY N/A 11/23/2020    CERVICAL C3-4,4-5,5-6  LAMINOPLASTY WITH RECONSTRUCTION performed by Courtney Suárez MD at [address] [place] [address] COLONOSCOPY N/A 9/29/2020    COLONOSCOPY DIAGNOSTIC performed by Sol Hutson MD at [facility] Right 11/2018    hip    UPPER GASTROINTESTINAL ENDOSCOPY N/A 9/29/2020    EGD DIAGNOSTIC ONLY performed by Sol Hutson MD at [facility] Hx:  Social History     Socioeconomic History    Marital status: Single     Spouse name: None    Number of children: None    Years of education: None    Highest education level: None   Occupational History    Occupation: retired [employer]   Social Needs    Financial resource strain: Not very hard    Food insecurity     Worry: Never true     Inability: Never true    Transportation needs     Medical: No     Non-medical: No   Tobacco Use    Smoking status: Never Smoker    Smokeless tobacco: Never Used    Tobacco comment: denies   Substance and Sexual Activity    Alcohol use: Not Currently     Frequency: 2-4 times a month     Comment: denies    Drug use: No     Comment: denies    Sexual activity: None   Lifestyle    Physical activity     Days per week: 0 days     Minutes per session: 0 min    Stress: Only a little   Relationships    Social connections     Talks on phone: None     Gets together: None     Attends Religion service: None     Active member of club or organization: None     Attends meetings of clubs or organizations: None     Relationship status: None    Intimate partner violence     Fear of current or ex partner: No     Emotionally abused: No     Physically abused: No     Forced sexual activity: No   Other Topics Concern    None   Social History Narrative    Retired from [employer] With: Alone    Type of Home: [address] in [address]: One level    Home Access: Level entry    Bathroom Shower/Tub: Tub/Shower unit    [employer]: Grab bars in [facility]: [facility]: Independent    Transfer Assistance: Independent    Active : No    Patient's  Info: Sister    Additional Comments: Pt. reports he has been having near falls but no falls in the last few weeks, but progressively more weakness hte last few weeks       Family Hx:  History reviewed. No pertinent family history.     LABS: Reviewed     CBC:  Lab Results   Component Value Date    WBC 17.9 11/30/2020    RBC 3.71 11/30/2020    HGB 9.1 11/30/2020    HCT 28.7 11/30/2020    MCV 77.2 11/30/2020    MCH 24.6 11/30/2020    MCHC 31.9 11/30/2020    RDW 19.0 11/30/2020     11/30/2020    MPV 8.7 09/09/2015     CBC with Differential:   Lab Results   Component Value Date    WBC 17.9 11/30/2020    RBC 3.71 11/30/2020    HGB 9.1 11/30/2020    HCT 28.7 11/30/2020     11/30/2020    MCV 77.2 11/30/2020    MCH 24.6 11/30/2020    MCHC 31.9 11/30/2020    RDW 19.0 11/30/2020    METASPCT 1 09/26/2020    LYMPHOPCT 15.4 11/25/2020 MONOPCT 11.7 11/25/2020    MYELOPCT 1 09/26/2020    BASOPCT 0.6 11/25/2020    MONOSABS 1.1 11/25/2020    LYMPHSABS 1.5 11/25/2020    EOSABS 0.2 11/25/2020    BASOSABS 0.1 11/25/2020     CMP:    Lab Results   Component Value Date     11/30/2020    K 4.0 11/30/2020    K 4.2 11/25/2020    CL 96 11/30/2020    CO2 19 11/30/2020    BUN 11 11/30/2020    CREATININE 0.50 11/30/2020    GFRAA >60.0 11/30/2020    LABGLOM >60.0 11/30/2020    GLUCOSE 113 11/30/2020    PROT 8.0 11/18/2020    LABALBU 2.5 11/30/2020    CALCIUM 8.4 11/30/2020    BILITOT 0.7 11/18/2020    ALKPHOS 233 11/18/2020    AST 26 11/18/2020    ALT 9 11/18/2020     BMP:    Lab Results   Component Value Date     11/30/2020    K 4.0 11/30/2020    K 4.2 11/25/2020    CL 96 11/30/2020    CO2 19 11/30/2020    BUN 11 11/30/2020    LABALBU 2.5 11/30/2020    CREATININE 0.50 11/30/2020    CALCIUM 8.4 11/30/2020    GFRAA >60.0 11/30/2020    LABGLOM >60.0 11/30/2020    GLUCOSE 113 11/30/2020     TSH:   Lab Results   Component Value Date    TSH 1.210 11/19/2020     Vitamin B12 and Folate: No components found for: FOLIC,  O47  Urinalysis:   Lab Results   Component Value Date    NITRU POSITIVE 11/29/2020    WBCUA >100 11/29/2020    BACTERIA MANY 11/29/2020    RBCUA 3-5 09/26/2020    BLOODU Negative 11/29/2020    SPECGRAV 1.014 11/29/2020    GLUCOSEU Negative 11/29/2020           FUNCTIONAL ADL´S:     Independent: [ x ] Eating, [   ] Dressing, [   ] Transferring, [   ] Vinh Mendoza, [   ] Larence Benny, [   ] Continence  Dependent   : [  ] Eating, [   ] Dressing, [   ] Transferring, [   ] Vinh Mendoza, [   ] Larence [place], [   ] Continence  W. assistant : [  ] Eating, [  x ] Dressing, [ x  ] Transferring, [  x ] Toileting, [  x ] Bathing, [   x] Continence    Radiology: Reviewed      No results found.        Assessment and plan:     Recurrent falls multifactorial in etiology: Orthostatic hypotension suspect due to autonomic dysfunction,spinal stenosis, advanced prostate cancer on chemotherapy, moderate protein calorie malnutrition  Follow with primary medical team  Continue PT    Prostate Cancer with mets  Oncology Following    Anorexia and Malnutrition  Start Mirtazapine 7.5 mg po daily at HS  Continue nutritional Supplementation    Spinal Stenosis with weakness and paraesthesia  Neurosurgery following    Pain rt neoplasm and spinal stenosis    Much discussion that he should utilize the medication in place for breakthrough pain. Discussed need for breakthrough pain medication with nursing staff. He should medicate one hour prior to PT so he can have maximum participation. Continue MS Contin to 15 mg po every 8 hours  Continue Percocet 7.5/325 mg po every 4 hours for moderate to severe breakthrough pain  Hydromorphone 1.0mg IV every 4 hours prn severe breakthrough pain  Increase Gabapentin to 300 mg po tid    Constipation  Continue current POC  Increase fiber and fluid in diet    -Advance Care Planning  Remains FULL CODE      -Goals of Care Discussion:  We discussed all care options contingent on treatment response and QOL. Much active listening, presence, and emotional support were given.          Electronically signed by JOYCE Guo CNP on 11/30/2020 at 9:59 AM

## 2020-11-30 NOTE — PROGRESS NOTES
Recent Labs     11/28/20  0636 11/29/20  0721 11/30/20  0630    132* 131*   K 4.1 5.0* 4.0    98 96   CO2 20 25 19*   BUN 12 10 11   CREATININE 0.50* 0.69* 0.50*   CALCIUM 8.9 8.7 8.4*   PHOS 2.5 2.8 2.5     No results for input(s): AST, ALT, BILIDIR, BILITOT, ALKPHOS in the last 72 hours. No results for input(s): INR in the last 72 hours. No results for input(s): Joeline Reeks in the last 72 hours. Urinalysis:      Lab Results   Component Value Date    NITRU POSITIVE 11/29/2020    WBCUA >100 11/29/2020    BACTERIA MANY 11/29/2020    RBCUA 3-5 09/26/2020    BLOODU Negative 11/29/2020    SPECGRAV 1.014 11/29/2020    GLUCOSEU Negative 11/29/2020       Radiology:  RADIOLOGY REPORT   Final Result      FLUORO FOR SURGICAL PROCEDURES   Final Result   POST-OPERATIVE APPEARANCE AS SHOWN.            MRI CERVICAL SPINE W WO CONTRAST   Final Result      Osseous metastases throughout the clivus and a 1 cm T3 vertebra metastasis. Please refer to prior whole spine MRI detailing more extensive metastasis. C3-4 through C6-7 myelomalacia with cord atrophy, and abnormal signal due to canal stenosis and chronic cord compression. C3-4 through C6-7 varying degrees of significant foraminal stenosis predominantly due to uncovertebral joint arthrosis. Evaluation less than optimal due to motion. Punctate posterior peripheral cord enhancement at the C4-5 disc level and equivocal just below the C3-4 disc level detailed above. MRI CERVICAL THORACIC LUMBAR SPINE WO CONTRAST LIMITED   Final Result      Advanced cervical spondylosis from C3-4 through C6-7 resulting in severe spinal canal stenosis at these levels with minimal spinal canal diameter approaching 4 to 5 mm. Multiple metastatic bone disease throughout the thoracolumbar spine with no pathologic fracture. CT Head WO Contrast   Final Result   Impression:      No acute findings. Mild cerebral atrophy.       Chronic ischemic white matter disease. All CT scans at this facility use dose modulation, iterative reconstruction, and/or weight based dosing when appropriate to reduce radiation dose to as low as reasonably achievable. XR CHEST PORTABLE   Final Result   NO ACUTE CARDIOPULMONARY DISEASE. Assessment/Plan:    80 y/o man with history of metastatic prostate cancer who presented with:    Recurrent falls and generalized weakness  - multifactorial in etiology including orthostatic hypotension suspect due to autonomic dysfunction, severe C-spine stenosis, advanced prostate cancer on chemotherapy, moderate protein calorie malnutrition  - MRI of the spine showed diffuse mets  - s/p C3-6 laminoplasty with reconstruction on 11/23  - followed by neurosurgery and neurology    Sepsis secondary to postoperative gram-negative UTI  -Continue IV Rocephin  - follow urine and blood cultures  -1 L IVF bolus. Check lactate    Orthostatic hypotension  - treated with IVFs    Hyponatremia   - mild, improving    Microcytic anemia   - studies not suggestive of iron deficiency  - stable H/H    Metastatic prostate cancer with severe bone pain  - Xtandi on hold  - on MS contin, oxycodone, Dilaudid,gabapentin  - followed by oncology and palliative care     Constipation  - continue bowel regimen     Folic acid deficiency  - continue supplement     Cancer related pain   - on opioids    Severe malnutrition  - followed by dietitian       Disposition -waiting on SNF placement.           Electronically signed by Katie Odell DO on 11/30/2020 at 1:20 PM

## 2020-12-01 ENCOUNTER — TELEPHONE (OUTPATIENT)
Dept: FAMILY MEDICINE CLINIC | Age: 70
End: 2020-12-01

## 2020-12-01 PROBLEM — N39.0 SEPSIS DUE TO GRAM-NEGATIVE UTI (HCC): Status: ACTIVE | Noted: 2020-12-01

## 2020-12-01 PROBLEM — A41.50 SEPSIS DUE TO GRAM-NEGATIVE UTI (HCC): Status: ACTIVE | Noted: 2020-12-01

## 2020-12-01 LAB
ALBUMIN SERPL-MCNC: 2.7 G/DL (ref 3.5–4.6)
ANION GAP SERPL CALCULATED.3IONS-SCNC: 14 MEQ/L (ref 9–15)
BASOPHILS ABSOLUTE: 0 K/UL (ref 0–0.2)
BASOPHILS RELATIVE PERCENT: 0.2 %
BUN BLDV-MCNC: 9 MG/DL (ref 8–23)
CALCIUM SERPL-MCNC: 9 MG/DL (ref 8.5–9.9)
CHLORIDE BLD-SCNC: 94 MEQ/L (ref 95–107)
CO2: 23 MEQ/L (ref 20–31)
CREAT SERPL-MCNC: 0.42 MG/DL (ref 0.7–1.2)
EOSINOPHILS ABSOLUTE: 0.1 K/UL (ref 0–0.7)
EOSINOPHILS RELATIVE PERCENT: 0.7 %
GFR AFRICAN AMERICAN: >60
GFR NON-AFRICAN AMERICAN: >60
GLUCOSE BLD-MCNC: 111 MG/DL (ref 70–99)
HCT VFR BLD CALC: 26.3 % (ref 42–52)
HEMOGLOBIN: 8.4 G/DL (ref 14–18)
LYMPHOCYTES ABSOLUTE: 1.9 K/UL (ref 1–4.8)
LYMPHOCYTES RELATIVE PERCENT: 13.2 %
MAGNESIUM: 2.2 MG/DL (ref 1.7–2.4)
MCH RBC QN AUTO: 24.6 PG (ref 27–31.3)
MCHC RBC AUTO-ENTMCNC: 32 % (ref 33–37)
MCV RBC AUTO: 76.7 FL (ref 80–100)
MONOCYTES ABSOLUTE: 1.5 K/UL (ref 0.2–0.8)
MONOCYTES RELATIVE PERCENT: 10.3 %
NEUTROPHILS ABSOLUTE: 10.9 K/UL (ref 1.4–6.5)
NEUTROPHILS RELATIVE PERCENT: 75.6 %
ORGANISM: ABNORMAL
PDW BLD-RTO: 19 % (ref 11.5–14.5)
PHOSPHORUS: 2.4 MG/DL (ref 2.3–4.8)
PLATELET # BLD: 554 K/UL (ref 130–400)
POTASSIUM SERPL-SCNC: 3.9 MEQ/L (ref 3.4–4.9)
RBC # BLD: 3.43 M/UL (ref 4.7–6.1)
SODIUM BLD-SCNC: 131 MEQ/L (ref 135–144)
URINE CULTURE, ROUTINE: ABNORMAL
WBC # BLD: 14.4 K/UL (ref 4.8–10.8)

## 2020-12-01 PROCEDURE — 6370000000 HC RX 637 (ALT 250 FOR IP): Performed by: NURSE PRACTITIONER

## 2020-12-01 PROCEDURE — 6370000000 HC RX 637 (ALT 250 FOR IP): Performed by: PHYSICAL MEDICINE & REHABILITATION

## 2020-12-01 PROCEDURE — 85025 COMPLETE CBC W/AUTO DIFF WBC: CPT

## 2020-12-01 PROCEDURE — 36415 COLL VENOUS BLD VENIPUNCTURE: CPT

## 2020-12-01 PROCEDURE — 6370000000 HC RX 637 (ALT 250 FOR IP): Performed by: PSYCHIATRY & NEUROLOGY

## 2020-12-01 PROCEDURE — 99231 SBSQ HOSP IP/OBS SF/LOW 25: CPT | Performed by: PHYSICAL MEDICINE & REHABILITATION

## 2020-12-01 PROCEDURE — 6360000002 HC RX W HCPCS: Performed by: FAMILY MEDICINE

## 2020-12-01 PROCEDURE — 6360000002 HC RX W HCPCS: Performed by: PHYSICAL MEDICINE & REHABILITATION

## 2020-12-01 PROCEDURE — 80069 RENAL FUNCTION PANEL: CPT

## 2020-12-01 PROCEDURE — 6360000002 HC RX W HCPCS: Performed by: INTERNAL MEDICINE

## 2020-12-01 PROCEDURE — 6370000000 HC RX 637 (ALT 250 FOR IP): Performed by: INTERNAL MEDICINE

## 2020-12-01 PROCEDURE — 2580000003 HC RX 258: Performed by: INTERNAL MEDICINE

## 2020-12-01 PROCEDURE — 97110 THERAPEUTIC EXERCISES: CPT

## 2020-12-01 PROCEDURE — 83735 ASSAY OF MAGNESIUM: CPT

## 2020-12-01 PROCEDURE — 1210000000 HC MED SURG R&B

## 2020-12-01 PROCEDURE — 2580000003 HC RX 258: Performed by: NEUROLOGICAL SURGERY

## 2020-12-01 PROCEDURE — 6370000000 HC RX 637 (ALT 250 FOR IP): Performed by: NEUROLOGICAL SURGERY

## 2020-12-01 PROCEDURE — 99233 SBSQ HOSP IP/OBS HIGH 50: CPT | Performed by: PSYCHIATRY & NEUROLOGY

## 2020-12-01 PROCEDURE — APPSS30 APP SPLIT SHARED TIME 16-30 MINUTES: Performed by: NURSE PRACTITIONER

## 2020-12-01 RX ORDER — POLYETHYLENE GLYCOL 3350 17 G/17G
17 POWDER, FOR SOLUTION ORAL DAILY PRN
Qty: 527 G | Refills: 1 | Status: ON HOLD | DISCHARGE
Start: 2020-12-01 | End: 2020-12-18 | Stop reason: HOSPADM

## 2020-12-01 RX ORDER — SULFAMETHOXAZOLE AND TRIMETHOPRIM 800; 160 MG/1; MG/1
1 TABLET ORAL 2 TIMES DAILY
Qty: 14 TABLET | Refills: 0 | DISCHARGE
Start: 2020-12-01 | End: 2020-12-06 | Stop reason: HOSPADM

## 2020-12-01 RX ADMIN — MORPHINE SULFATE 15 MG: 15 TABLET, FILM COATED, EXTENDED RELEASE ORAL at 04:19

## 2020-12-01 RX ADMIN — CYANOCOBALAMIN 1000 MCG: 1000 INJECTION, SOLUTION INTRAMUSCULAR; SUBCUTANEOUS at 20:39

## 2020-12-01 RX ADMIN — GABAPENTIN 300 MG: 300 CAPSULE ORAL at 11:54

## 2020-12-01 RX ADMIN — ENOXAPARIN SODIUM 40 MG: 40 INJECTION SUBCUTANEOUS at 20:38

## 2020-12-01 RX ADMIN — OXYCODONE HYDROCHLORIDE AND ACETAMINOPHEN 1 TABLET: 7.5; 325 TABLET ORAL at 01:00

## 2020-12-01 RX ADMIN — CEFTRIAXONE SODIUM 1 G: 1 INJECTION, POWDER, FOR SOLUTION INTRAMUSCULAR; INTRAVENOUS at 11:55

## 2020-12-01 RX ADMIN — MIRTAZAPINE 7.5 MG: 15 TABLET, FILM COATED ORAL at 20:39

## 2020-12-01 RX ADMIN — GABAPENTIN 300 MG: 300 CAPSULE ORAL at 17:25

## 2020-12-01 RX ADMIN — HYDROMORPHONE HYDROCHLORIDE 1 MG: 1 INJECTION, SOLUTION INTRAMUSCULAR; INTRAVENOUS; SUBCUTANEOUS at 09:50

## 2020-12-01 RX ADMIN — Medication 2000 UNITS: at 18:17

## 2020-12-01 RX ADMIN — OXYCODONE HYDROCHLORIDE AND ACETAMINOPHEN 1 TABLET: 7.5; 325 TABLET ORAL at 11:54

## 2020-12-01 RX ADMIN — MORPHINE SULFATE 15 MG: 15 TABLET, FILM COATED, EXTENDED RELEASE ORAL at 20:39

## 2020-12-01 RX ADMIN — GABAPENTIN 300 MG: 300 CAPSULE ORAL at 20:39

## 2020-12-01 RX ADMIN — Medication 10 ML: at 20:40

## 2020-12-01 RX ADMIN — STANDARDIZED SENNA CONCENTRATE 8.6 MG: 8.6 TABLET ORAL at 20:39

## 2020-12-01 RX ADMIN — BISACODYL 5 MG: 5 TABLET, COATED ORAL at 11:59

## 2020-12-01 RX ADMIN — HYDROMORPHONE HYDROCHLORIDE 1 MG: 1 INJECTION, SOLUTION INTRAMUSCULAR; INTRAVENOUS; SUBCUTANEOUS at 04:19

## 2020-12-01 RX ADMIN — Medication 100 MG: at 11:59

## 2020-12-01 RX ADMIN — FOLIC ACID 1 MG: 1 TABLET ORAL at 11:54

## 2020-12-01 RX ADMIN — MORPHINE SULFATE 15 MG: 15 TABLET, FILM COATED, EXTENDED RELEASE ORAL at 11:53

## 2020-12-01 ASSESSMENT — ENCOUNTER SYMPTOMS
BACK PAIN: 1
GASTROINTESTINAL NEGATIVE: 1
RESPIRATORY NEGATIVE: 1
EYES NEGATIVE: 1

## 2020-12-01 ASSESSMENT — PAIN SCALES - GENERAL
PAINLEVEL_OUTOF10: 9
PAINLEVEL_OUTOF10: 8
PAINLEVEL_OUTOF10: 10
PAINLEVEL_OUTOF10: 6
PAINLEVEL_OUTOF10: 8
PAINLEVEL_OUTOF10: 6
PAINLEVEL_OUTOF10: 6
PAINLEVEL_OUTOF10: 10

## 2020-12-01 ASSESSMENT — PAIN DESCRIPTION - PAIN TYPE: TYPE: ACUTE PAIN

## 2020-12-01 ASSESSMENT — PAIN DESCRIPTION - ORIENTATION: ORIENTATION: RIGHT

## 2020-12-01 NOTE — CARE COORDINATION
Phone call to [facility] to see if any new info on whether or not they are in network. Per Heri Garber, she does not know and states pt was denied their SNF, but cannot say why. Met with pt who is agreeable to continue looking for any facility in network. Per online research, message left for [employer] and [employer]. Referral faxed to [phone].   Referral faxed to [employer] [phone]  [phone]

## 2020-12-01 NOTE — PROGRESS NOTES
Moraima sent to Palliative care; regarding prescriptions. They stated they would write them. They were informed of patient able to be discharged today.

## 2020-12-01 NOTE — PROGRESS NOTES
Subjective: The patient complains of severe  acute on chronic neck pain and weakness partially relieved by rest, PT, OT and meds and exacerbated by exertion and recent illness. I ordered him a soft collar it seems to be helping though he is not able to tolerate wearing it at night and takes it off. He still having a hard time turning to the right. I am concerned about patients medical complexities. Reviewed recent nursing note, \" Pt's vitals are stable. This morning his pain was 10/10. PRN meds & ekaterina meds given and he is now down to about 7/10. Cervical collar applied, pt finds this helps a lot. Dry dressing changed, incision is clean, dry, and intact. We have been continuing to turn him in the bed every 2 hours, bed extender ordered. He stood up with therapy & declined the chair after I gave him a lot of encouragement. \" .    ROS x10: The patient also complains of severely impaired mobility and activities of daily living. Otherwise no new problems with vision, hearing, nose, mouth, throat, dermal, cardiovascular, GI, , pulmonary, musculoskeletal, psychiatric or neurological. See Rehab consult on Rehab chart . Vital signs:  /85   Pulse 99   Temp 98.2 °F (36.8 °C) (Oral)   Resp 17   Ht 6' 1\" (1.854 m)   Wt 147 lb 4.3 oz (66.8 kg)   SpO2 100%   BMI 19.43 kg/m²   I/O:   PO/Intake:    fair PO intake,       Bowel/Bladder:   Incontinent-neurogenic bladder-needs assist with IC q 6-8 hours scheduled. ,    General:  Patient is well developed, adequately nourished, non-obese and     well kempt. HEENT:    PERRLA, hearing intact to loud voice, external inspection of ear     and nose benign. Inspection of lips, tongue and gums benign  Musculoskeletal: No significant change in strength or tone. All joints stable. Inspection and palpation of digits and nails show no clubbing,       cyanosis or inflammatory conditions.    Neuro/Psychiatric: Affect: flat-  Alert and oriented to self and     Situation. No significant change in deep tendon reflexes or     sensation  Lungs:  Diminished, CTA-B. Respiration effort is normal at rest.     Heart:   S1 = S2,  RRR. No loud murmurs. Abdomen:  Soft, non-tender, no enlargement of liver or spleen. Extremities:  No significant lower extremity edema or tenderness. Skin:    BUE bruises dt blood draws, c spine incision--head turned to the left. Rehabilitation:  Physical therapy: FIMS:  Bed Mobility: Scooting: Contact guard assistance    Transfers: Sit to Stand: Unable to assess(safety concerns)  Stand to sit: Unable to assess  Bed to Chair: Contact guard assistance, Ambulation 1  Surface: level tile  Device: No Device  Assistance: Contact guard assistance  Quality of Gait: Shuffling steps, decreased trunk rotation, nbos, decreased brinda heel strike  Gait Deviations: Slow Yanet, Decreased step length  Distance: 10' x 2  Comments: Distance limited to in room tx secondary to pt request,      FIMS:  ,  , Assessment: Pt limited seconary to pain and dizziness this session. While sitting EOB, pt complaining of dizziness. BP taken sitting with reading of 182/134. Pt returned to supine and BP taken again for reading of 123/66. Pt unsafe to stand at this time, NSG notified of BP readings and pt's complaints. Occupational therapy: FIMS:   ,  , Assessment: Pt. seen for follow up treatment this date and completed ADL bed bath due to pain. Pt. with limited mobility and safety. Pt. continues to benefit from OT to maximize independence with bathing and improve safety and sequencing.     Speech therapy: FIMS:        Lab/X-ray studies reviewed, analyzed and discussed with patient and staff:   Recent Results (from the past 24 hour(s))   Lactate, Sepsis    Collection Time: 11/30/20 11:15 AM   Result Value Ref Range    Lactic Acid, Sepsis 2.7 (H) 0.5 - 1.9 mmol/L   RENAL FUNCTION PANEL    Collection Time: 12/01/20  6:01 AM   Result Value Ref Range    Sodium 131 (L) 135 - 3 hours of therapy a day and benefit from it at an acute level. 2. Bowel constipation and Bladder dysfunction atonic bladder: Incontinent-neurogenic bladder-needs assist with IC q 6-8 hours scheduled. ,   frequent toileting, ambulate to bathroom with assistance, check post void residuals. Check for C.difficile x1 if >2 loose stools in 24 hours, continue bowel & bladder program.  Monitor for UTI symptoms including lethargy and confusion  3. Severe postoperative neck pain and generalized OA pain: reassess pain every shift and prior to and after each therapy session, give scheduled MS Contin, prn Percocet  or Tylenol, modalities prn in therapy, consider Lidoderm, K-pad prn. Consider increase to Dilaudid 1 mg.consider percocet 10--vs increased MScontin--also add soft cervical collar. 4. Skin breakdown risk:  continue pressure relief program.  Daily skin exams and reports from nursing. 5. Severe fatigue due to immobility and nutritional deficits: Add vitamin B12 vitamin D and CoQ10 titrate dosing and add protein supplementation with low carb content. 6. Complex discharge planning: Risk for urinary retention given spinal cord injury-Precert  Denied with [facility] Medicare for Acute Inpatient Rehab, no waivers in place at this time--they did not call us back yesterday during the window of time that they gave us. I have attempted to improve his pain management while here and hopefully he will do better at a skilled facility with those interventions however I strongly feel that his insurance company is doing he did this for service by not approving acute rehab. Secondarily I also feel that they were not forthright in their attempts to achieve a peer to peer. Complex Active General Medical Issues that complicate care Assess & Plan:     1.  Principal Problem:    Stenosis of cervical spine with myelopathy [facility])  Active Problems:    Palpitations    Anemia    Weakness    Rectal mass    Metastatic cancer ([facility] [address]    Fall at home, initial encounter    Severe malnutrition ([address]    Falls, initial encounter    Myelopathy ([address]    Malignant tumor of prostate ([address]    Retention of urine    Urethritis    Ataxic gait    Neurogenic bladder  Resolved Problems:    * No resolved hospital problems.  Huan Moore D.O., PM&R     Attending    [address]

## 2020-12-01 NOTE — PROGRESS NOTES
Physical Therapy Med Surg Daily Treatment Note  Facility/Department: [facility]  Room: [place] [id number]       NAME: Alexis Nevarez  : 1950 (67 y.o.)  MRN: [id number]  CODE STATUS: Full Code    Date of Service: 2020    Patient Diagnosis(es): Fall at home, initial encounter [W19. XXXA, Y00.551]  Falls, initial encounter [H66. XXXA]   Chief Complaint   Patient presents with    Fatigue     Patient Active Problem List    Diagnosis Date Noted    Sepsis due to gram-negative UTI ([address] 2020    Neurogenic bladder     Stenosis of cervical spine with myelopathy ([address] 2020    Malignant tumor of prostate ([address] 2020    Raised prostate specific antigen 2020    Retention of urine 2020    Urethritis 2020    Ataxic gait 2020    Myelopathy ([address]     Falls, initial encounter 2020    Severe malnutrition ([address] 2020    Fall at home, initial encounter 2020    Goals of care, counseling/discussion     Gastrointestinal hemorrhage     Rectal mass     Acute cystitis     Metastatic cancer ([address]     Gastric ulcer 2020    Weakness     Anemia 2020    Other specified disorders of bone density and structure, unspecified site      Liver mass 2020    Osteoarthritis of hip 2020    Benign prostatic hyperplasia with incomplete bladder emptying 2018    Palpitations 10/19/2010        Past Medical History:   Diagnosis Date    PAF (paroxysmal atrial fibrillation) ([address]     ON XARELTO    Prostate cancer ([address] 2019     Past Surgical History:   Procedure Laterality Date    CERVICAL LAMINECTOMY N/A 2020    CERVICAL C3-4,4-5,5-6  LAMINOPLASTY WITH RECONSTRUCTION performed by Angela Ferraro MD at [address] [place] [address] N/A 2020    COLONOSCOPY DIAGNOSTIC performed by Beverley Corbin MD at [address] Right 2018    hip    UPPER GASTROINTESTINAL ENDOSCOPY N/A 2020    EGD DIAGNOSTIC ONLY performed by Fannie Hernandez MD at [facility]       Restrictions  Restrictions/Precautions: Fall Risk  Position Activity Restriction  Other position/activity restrictions: Limit cervical ROM to within pain tolerable range    SUBJECTIVE   General  Chart Reviewed: Yes  Family / Caregiver Present: No  Subjective  Subjective: \"I'll try\" I hurt my whole right side hurts. General Comment  Comments: Pt agreeable to try supine exercises, declined to sit up or move in bed at this time due to pain levels. Pre-Session Pain Report     Pain Screening  Patient Currently in Pain: Yes       Post-Session Pain Report  Pain Assessment  Pain Assessment: 0-10  Pain Level: 9  Pain Type: Acute pain  Pain Orientation: Right         OBJECTIVE        Bed mobility  Comment: Pt declined to attempt    Transfers  Comment: NT due to pt declined due to pain levels. Exercises  Quad Sets: x15  Heelslides: x5  Gluteal Sets: x15  Ankle Pumps: x 15  Comments: Pt educated on performing supine exercises HEP of QS,GS, AP throughout the day                    Activity Tolerance  Activity Tolerance: Patient limited by pain          ASSESSMENT   Body structures, Functions, Activity limitations: Decreased functional mobility ; Decreased strength;Decreased posture;Decreased balance;Decreased ROM; Decreased safe awareness;Decreased coordination;Decreased endurance;Decreased ADL status; Increased pain  Assessment: Pt agreeable to perform exercises only at this time laying in bed, pt reports pain level of 9/10 at this time, limited AROM with heel slides due to pain levels. Encouraged pt on performing HEP exercises to maintain strength and ROM as able.      Discharge Recommendations:  Continue to assess pending progress, Patient would benefit from continued therapy after discharge    Goals  Short term goals  Short term goal 1: Pt to complete HEP with indep  Long term goals  Long term goal 1: Pt to complete bed mobility with indep  Long term goal 2: Pt to complete transfers with indep  Long term goal 3: Pt to ambulate 150ft with LRD and indep  Long term goal 4: TUG <15.0 seconds to demo decreased risk for falls    PLAN    Times per week: 3-6  Safety Devices  Type of devices: All fall risk precautions in place, Bed alarm in place, Call light within reach, Left in bed, Nurse notified     [facility] (6 CLICK) [address] Mobility Raw Score : 12     Therapy Time   Individual   Time In 1112   Time Out 1135   Minutes 23      TherEx: [address] [place] [address]  12/01/20 at 11:42 AM         Definitions for assistance levels  Independent = pt does not require any physical supervision or assistance from another person for activity completion. Device may be needed.   Stand by assistance = pt requires verbal cues or instructions from another person, close to but not touching, to perform the activity  Minimal assistance= pt performs 75% or more of the activity; assistance is required to complete the activity  Moderate assistance= pt performs 50% of the activity; assistance is required to complete the activity  Maximal assistance = pt performs 25% of the activity; assistance is required to complete the activity  Dependent = pt requires total physical assistance to accomplish the task

## 2020-12-01 NOTE — PROGRESS NOTES
Comprehensive Nutrition Assessment    Type and Reason for Visit:  Reassess    Nutrition Recommendations/Plan:   Pt unable to consume adequate kcal/protien with po intake alone  Recommend NG placement for TF due to severe malnutrition      Nutrition Assessment:  Severe malnutrition continues, pt continues to report poor appetite and inadequate consumption of oral nutriton supplements    Malnutrition Assessment:  Malnutrition Status:  Severe malnutrition    Context:  Chronic Illness     Findings of the 6 clinical characteristics of malnutrition:  Energy Intake:  7 - 75% or less estimated energy requirements for 1 month or longer  Weight Loss:  7 - 10% over 6 months     Body Fat Loss:  1 - Mild body fat loss Buccal region, Orbital(per visual assessment)   Muscle Mass Loss:  1 - Mild muscle mass loss Temples (temporalis), Clavicles (pectoralis & deltoids), Thigh (quadraceps)(per visual assessment)  Fluid Accumulation:  No significant fluid accumulation     Strength:  Not Performed    Estimated Daily Nutrient Needs:  Energy (kcal):  6865-2264 kcals @ 28-30 kcal/kg; Weight Used for Energy Requirements:  Current(67 kg)     Protein (g):   g protein @ 1.3-1.5 g/kg; Weight Used for Protein Requirements:  Current(67 kg)        Fluid (ml/day):  ~1900 ( 28 ml/kg); Method Used for Fluid Requirements:  ml/Kg      Nutrition Related Findings:  visual wasting of fat and muscle stores, contiunue to have issues with pain control, is able to take cerivcal collar off for meals and feed himself.  continues to eat poorly, pt states' just not hungry', reported taking ONS, but oberved several unopned at bedsde, glucose controlled, BM 11/28      Wounds:  Surgical Incision       Current Nutrition Therapies:    DIET GENERAL; Carb Control: 4 carbs/meal (approximate 1800 kcals/day)  Dietary Nutrition Supplements: Clear Liquid Oral Supplement  Dietary Nutrition Supplements: Frozen Oral Supplement  Dietary Nutrition Supplements: Standard High Calorie Oral Supplement    Anthropometric Measures:  · Height: 6' 1\" (185.4 cm)  · Current Body Weight: (No new wt)   · Admission Body Weight: 147 lb (66.7 kg)(stated)    · Usual Body Weight: 177 lb (80.3 kg)((2/20), 182# ( 6/20), 166# ( 9/20))     · Ideal Body Weight: 184 lbs; % Ideal Body Weight   80%  · BMI: 19.4  · BMI Categories: Underweight (BMI less than 22) age over 72       Nutrition Diagnosis:   · Severe malnutrition, In context of chronic illness related to catabolic illness, inadequate protein-energy intake as evidenced by poor intake prior to admission, BMI, weight loss greater than or equal to 10% in 6 months      Nutrition Interventions:   Food and/or Nutrient Delivery:  Continue Current Diet, Continue Oral Nutrition Supplement, Start Tube Feeding  Nutrition Education/Counseling:  Education not indicated   Coordination of Nutrition Care:  No recommendation at this time, Continue to monitor while inpatient    Goals:  initation on nutrition support       Nutrition Monitoring and Evaluation:     Food/Nutrient Intake Outcomes:  Food and Nutrient Intake, Supplement Intake  Physical Signs/Symptoms Outcomes:  Biochemical Data, Constipation, Weight     Discharge Planning:     Too soon to determine     Electronically signed by Jim Higgins RD, LD on 12/1/20 at 12:32 PM EST

## 2020-12-01 NOTE — PROGRESS NOTES
[facility] Daily Progress Note  Name: Cory Rodriguez  Age: 79 y.o. Gender: male  CodeStatus: Full Code  Allergies: No Known Allergies    Chief Complaint:Fatigue    Primary Care Provider: Leilani Ferrell MD  InpatientTreatment Team: Treatment Team: Attending Provider: Marian Sanchez DO; Consulting Physician: Zack Chavez DO; Utilization Reviewer: Levon Edwards, RN; Consulting Physician: Mendy Duarte MD; Registered Nurse: Rica Prieto RN; : RUBÉN Mays; : Levi Tomlinson, RN; Registered Nurse: Arturo Perez, RN; Registered Nurse: Dannie Block, RN; : Nicholas Leon RN  Admission Date: 11/18/2020      Fatigue   Associated symptoms include fatigue, myalgias, neck pain, numbness and weakness. Pertinent negatives include no chills. We were called to reassess as patient's gait has not improved. He continues complain of considerable pain in his neck and shoulders and requires assistance to transfer. He is in fact become weaker. Is been bed at least 15 days. Patient states that pain is limiting factor in participating in PT. States that he has not taking the maximum amount of pain medication he is being prescribed, but states he now plans to, so that he can participate more in physical therapy. Says that today he tried very hard in physical therapy and was able to sit at the edge of the bed. He denies headache, shortness of breath, cough, or N/V. Patient alert and oriented x3, still having moderate pain to lower extremities. Complains of numbness to bilateral lower extremities up to the level of the knee not worsening. Continues to have weakness of lower extremities and difficulty standing. Vitals:    12/01/20 0727   BP: 136/85   Pulse: 99   Resp: 17   Temp: 98.2 °F (36.8 °C)   SpO2: 100%      Review of Systems   Constitutional: Positive for fatigue and unexpected weight change. Negative for chills. HENT: Negative. Eyes: Negative. normal    The above examinationSee       Medications:  Reviewed    Infusion Medications:     Scheduled Medications:    gabapentin  300 mg Oral TID    enzalutamide  160 mg Oral Daily    cefTRIAXone (ROCEPHIN) IV  1 g Intravenous Q24H    sodium chloride flush  10 mL Intravenous 2 times per day    bisacodyl  5 mg Oral Daily    morphine  15 mg Oral Q8H    mirtazapine  7.5 mg Oral Nightly    Vitamin D  2,000 Units Oral Dinner    cyanocobalamin  1,000 mcg Intramuscular Weekly    coenzyme Q10  100 mg Oral Daily    lidocaine  3 patch Transdermal Daily    polyethylene glycol  17 g Oral Daily    senna  1 tablet Oral Nightly    folic acid  1 mg Oral Daily    enoxaparin  40 mg Subcutaneous Daily     PRN Meds: HYDROmorphone, diazePAM, sodium chloride flush, magnesium hydroxide, polyethylene glycol, ondansetron **OR** ondansetron, acetaminophen **OR** acetaminophen, oxyCODONE-acetaminophen, promethazine **OR** ondansetron    Labs:   Recent Labs     11/29/20  0721 11/30/20  0630 12/01/20  0601   WBC 13.9* 17.9* 14.4*   HGB 9.5* 9.1* 8.4*   HCT 29.5* 28.7* 26.3*   * 576* 554*     Recent Labs     11/29/20  0721 11/30/20  0630 12/01/20  0601   * 131* 131*   K 5.0* 4.0 3.9   CL 98 96 94*   CO2 25 19* 23   BUN 10 11 9   CREATININE 0.69* 0.50* 0.42*   CALCIUM 8.7 8.4* 9.0   PHOS 2.8 2.5 2.4     No results for input(s): AST, ALT, BILIDIR, BILITOT, ALKPHOS in the last 72 hours. No results for input(s): INR in the last 72 hours. No results for input(s): Joanne Lowers in the last 72 hours. Urinalysis:   Lab Results   Component Value Date    NITRU POSITIVE 11/29/2020    WBCUA >100 11/29/2020    BACTERIA MANY 11/29/2020    RBCUA 3-5 09/26/2020    BLOODU Negative 11/29/2020    SPECGRAV 1.014 11/29/2020    GLUCOSEU Negative 11/29/2020       Radiology:   Most recent    EEG No procedure found. MRI of Brain No results found for this or any previous visit. No results found for this or any previous visit. MRA of the Head and Neck: No results found for this or any previous visit. No results found for this or any previous visit. No results found for this or any previous visit. CT of the Head:   Results for orders placed during the hospital encounter of 11/18/20   CT Head WO Contrast    Narrative CT Brain    Contrast medium:  Not utilized. History:  Weakness, fatigue    Comparison:  None    Findings:    Extra-axial spaces:  Normal.     Intracranial hemorrhage:  None. Ventricular system: Ventricles mildly enlarged. Sulci mildly prominent. Basal Cisterns:  Normal.    Cerebral Parenchyma: Bilateral symmetric periventricular areas decreased attenuation. Midline Shift:  None. Cerebellum:  Normal.     Paranasal sinuses and mastoid air cells:  Normal.    Visualized Orbits:  Normal.        Impression Impression:    No acute findings. Mild cerebral atrophy. Chronic ischemic white matter disease. All CT scans at this facility use dose modulation, iterative reconstruction, and/or weight based dosing when appropriate to reduce radiation dose to as low as reasonably achievable. No results found for this or any previous visit. No results found for this or any previous visit. Carotid duplex: No results found for this or any previous visit. No results found for this or any previous visit. No results found for this or any previous visit. Echo No results found for this or any previous visit. Assessment/Plan:  Gait ataxia with cervical myelopathy in the presence of metastatic prostate cancer. Patient was seen by us initially for lower extremity weakness and a complete spine evaluation was done and patient had cervical spinal stenosis which was operated we very called in to evaluate due to his continued weakness and pain. This is an aftermath of cervical myelopathy and may not improve or may take several weeks to months to improve.   Patient's main issues appears to be pain and requires pain management. Exam does not show any acute changes, and patient remains areflexic in bilateral upper extremities, with gait ataxia, and weakness. 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 He continues to be a high fall risk. Patient is more motivated today to participate in PT. I will order a CT cervical spine tomorrow if patient's pain has not improved after maximizing analgesics. Palliative care has been following patient and adjustments to pain meds have been made. Discharge planning in process for skilled nursing facility. Awaiting precertification    I examined this patient again and I see that he still has significant issues with his lower extremity though these are likely to stay for a while we are not quite quite concerned about a new cord compression at least at this time. There is no fever and there is no suggestion of any infective process that we are worried about in the cervical spine. This again falls under neurological restriction and therefore close neurological follow-up. We are contemplating obtaining a CT scan of the neck though given that these findings have not changed to be concerned about a spinal abscess we have not done this. I have personally performed a face to face diagnostic evaluation on this patient, reviewed all data and investigations, and am the sole provider of all clinical decisions on the neurological status of this patient. These feel free to call us if there are any neurological changes on nodular examination which would be completed again within 24 hours. Oscar Velez MD, [address] [employer] Board of [employer]iatry & Neurology  Board Certified in Vascular Neurology  Board Certified in Neuromuscular Medicine  Certified in Neurorehabilitation     Collaborating physicians: Dr Lorenzo Velez    Electronically signed by JOYCE Cho CNP on 12/1/2020 at 2:00 PM

## 2020-12-01 NOTE — DISCHARGE SUMMARY
[facility] Discharge Summary    Deandre Castillo  :  1950  MRN:  [id number]    Admit date:  2020    Discharge date:  2020    Admitting Physician:  Mauricio Menjivar MD  Primary Care Physician:  Licha Lake MD    Discharge Diagnoses:    Principal Problem:    Stenosis of cervical spine with myelopathy [facility])  Active Problems:    Palpitations    Anemia    Weakness    Rectal mass    Acute cystitis    Metastatic cancer ([address]    Fall at home, initial encounter    Severe malnutrition ([address]    Falls, initial encounter    Myelopathy ([facility]    Malignant tumor of prostate ([facility] [address]    Retention of urine    Urethritis    Ataxic gait    Neurogenic bladder    Sepsis due to gram-negative UTI ([facility] [address]  Resolved Problems:    * No resolved hospital problems. *    Chief Complaint   Patient presents with    Fatigue       Condition: improved  Activity: no strenuous activity   Diet: regular high calorie  Disposition: SNF  Functional Status: ambulatory with assistance    Significant Findings:     MRI C spine:  Osseous metastases throughout the clivus and a 1 cm T3 vertebra metastasis. Please refer to prior whole spine MRI detailing more extensive metastasis.         C3-4 through C6-7 myelomalacia with cord atrophy, and abnormal signal due to canal stenosis and chronic cord compression.         C3-4 through C6-7 varying degrees of significant foraminal stenosis predominantly due to uncovertebral joint arthrosis. Evaluation less than optimal due to motion.         Punctate posterior peripheral cord enhancement at the C4-5 disc level and equivocal just below the C3-4 disc level detailed above. Hospital Course:   51-year-old male with history of metastatic prostate cancer presented with recurrent falls and generalized weakness which was felt to be multifactorial due to orthostatic hypotension from autonomic dysfunction, moderate protein calorie malnutrition, and severe C-spine stenosis. He ultimately underwent C3-6 laminoplasty with reconstruction on 11/23. His postoperative course was complicated by sepsis from Proteus UTI-he will complete a course of Bactrim at discharge. He will be discharged once skilled nursing bed is obtained. His pain medications will be prescribed by palliative care who will continue to follow him as outpatient. He was medically discharged on 12/1. Subsequent days where spent awaiting bed availability    Exam on Discharge:   /85   Pulse 99   Temp 98.2 °F (36.8 °C) (Oral)   Resp 17   Ht 6' 1\" (1.854 m)   Wt 147 lb 4.3 oz (66.8 kg)   SpO2 100%   BMI 19.43 kg/m²   General appearance: alert, cooperative and no distress. Thin, chronically ill appearing  Mental Status: oriented to person, place and time and normal affect  Lungs: clear to auscultation bilaterally, normal effort  Heart: regular rate and rhythm, no murmur  Abdomen: soft, nontender, nondistended, bowel sounds present, no masses  Extremities: no edema, redness, tenderness in the calves  Skin: no gross lesions, rashes    Discharge Medication List:   [place]   [place] Medication Instructions [id number]    Printed on:12/01/20 4121   Medication Information                      enzalutamide (XTANDI) 40 MG capsule  Take 160 mg by mouth daily             leuprolide (LUPRON) 7.5 MG injection  Inject 7.5 mg into the muscle every 28 days             Misc. Devices (ROLLATOR ULTRA-LIGHT) MISC  1 Device by Does not apply route daily             ondansetron (ZOFRAN) 4 MG tablet  Take 1 tablet by mouth daily as needed for Nausea or Vomiting             oxyCODONE (OXY-IR) 15 MG immediate release tablet  Take 1 tablet by mouth every 6 hours as needed for Pain (moderate to severe pain) for up to 120 days.              polyethylene glycol (GLYCOLAX) 17 g packet  Take 17 g by mouth daily as needed for Constipation             Polyethylene Glycol 3350 POWD  Take by mouth             prochlorperazine (COMPAZINE) 10 MG tablet  Take 10 mg by mouth every 6 hours as needed             sennosides-docusate sodium (SENOKOT-S) 8.6-50 MG tablet  Take 2 tablets by mouth daily as needed for Constipation             sulfamethoxazole-trimethoprim (BACTRIM DS;SEPTRA DS) 800-160 MG per tablet  Take 1 tablet by mouth 2 times daily for 10 days                 DC time 37 minutes    Signed:  Alta Ferrell  12/1/2020, 3:28 PM

## 2020-12-01 NOTE — PLAN OF CARE
Nutrition Problem #1: Severe malnutrition, In context of chronic illness  Intervention: Food and/or Nutrient Delivery: Continue Current Diet, Continue Oral Nutrition Supplement, Start Tube Feeding  Nutritional Goals: initation on nutrition support

## 2020-12-02 ENCOUNTER — APPOINTMENT (OUTPATIENT)
Dept: CT IMAGING | Age: 70
DRG: 515 | End: 2020-12-02
Payer: MEDICARE

## 2020-12-02 LAB
ALBUMIN SERPL-MCNC: 2.7 G/DL (ref 3.5–4.6)
ANION GAP SERPL CALCULATED.3IONS-SCNC: 13 MEQ/L (ref 9–15)
BASOPHILS ABSOLUTE: 0 K/UL (ref 0–0.2)
BASOPHILS RELATIVE PERCENT: 0.2 %
BUN BLDV-MCNC: 10 MG/DL (ref 8–23)
CALCIUM SERPL-MCNC: 9.1 MG/DL (ref 8.5–9.9)
CHLORIDE BLD-SCNC: 96 MEQ/L (ref 95–107)
CO2: 24 MEQ/L (ref 20–31)
CREAT SERPL-MCNC: 0.47 MG/DL (ref 0.7–1.2)
EOSINOPHILS ABSOLUTE: 0.1 K/UL (ref 0–0.7)
EOSINOPHILS RELATIVE PERCENT: 1 %
GFR AFRICAN AMERICAN: >60
GFR NON-AFRICAN AMERICAN: >60
GLUCOSE BLD-MCNC: 103 MG/DL (ref 70–99)
HCT VFR BLD CALC: 27.1 % (ref 42–52)
HEMOGLOBIN: 8.7 G/DL (ref 14–18)
LYMPHOCYTES ABSOLUTE: 2 K/UL (ref 1–4.8)
LYMPHOCYTES RELATIVE PERCENT: 14.8 %
MAGNESIUM: 2.2 MG/DL (ref 1.7–2.4)
MCH RBC QN AUTO: 24.7 PG (ref 27–31.3)
MCHC RBC AUTO-ENTMCNC: 31.9 % (ref 33–37)
MCV RBC AUTO: 77.5 FL (ref 80–100)
MONOCYTES ABSOLUTE: 1.3 K/UL (ref 0.2–0.8)
MONOCYTES RELATIVE PERCENT: 9.4 %
NEUTROPHILS ABSOLUTE: 9.9 K/UL (ref 1.4–6.5)
NEUTROPHILS RELATIVE PERCENT: 74.6 %
PDW BLD-RTO: 18.8 % (ref 11.5–14.5)
PHOSPHORUS: 3.2 MG/DL (ref 2.3–4.8)
PLATELET # BLD: 578 K/UL (ref 130–400)
POTASSIUM SERPL-SCNC: 4 MEQ/L (ref 3.4–4.9)
RBC # BLD: 3.5 M/UL (ref 4.7–6.1)
SODIUM BLD-SCNC: 133 MEQ/L (ref 135–144)
WBC # BLD: 13.3 K/UL (ref 4.8–10.8)

## 2020-12-02 PROCEDURE — 2580000003 HC RX 258: Performed by: NEUROLOGICAL SURGERY

## 2020-12-02 PROCEDURE — 6360000004 HC RX CONTRAST MEDICATION: Performed by: PSYCHIATRY & NEUROLOGY

## 2020-12-02 PROCEDURE — 1210000000 HC MED SURG R&B

## 2020-12-02 PROCEDURE — 36415 COLL VENOUS BLD VENIPUNCTURE: CPT

## 2020-12-02 PROCEDURE — 97535 SELF CARE MNGMENT TRAINING: CPT

## 2020-12-02 PROCEDURE — 6360000002 HC RX W HCPCS: Performed by: INTERNAL MEDICINE

## 2020-12-02 PROCEDURE — 6360000002 HC RX W HCPCS: Performed by: PHYSICAL MEDICINE & REHABILITATION

## 2020-12-02 PROCEDURE — 83735 ASSAY OF MAGNESIUM: CPT

## 2020-12-02 PROCEDURE — 6370000000 HC RX 637 (ALT 250 FOR IP): Performed by: PSYCHIATRY & NEUROLOGY

## 2020-12-02 PROCEDURE — 72126 CT NECK SPINE W/DYE: CPT

## 2020-12-02 PROCEDURE — 6360000002 HC RX W HCPCS: Performed by: NURSE PRACTITIONER

## 2020-12-02 PROCEDURE — 99233 SBSQ HOSP IP/OBS HIGH 50: CPT | Performed by: PSYCHIATRY & NEUROLOGY

## 2020-12-02 PROCEDURE — 6360000002 HC RX W HCPCS: Performed by: FAMILY MEDICINE

## 2020-12-02 PROCEDURE — 2580000003 HC RX 258: Performed by: INTERNAL MEDICINE

## 2020-12-02 PROCEDURE — 85025 COMPLETE CBC W/AUTO DIFF WBC: CPT

## 2020-12-02 PROCEDURE — 97110 THERAPEUTIC EXERCISES: CPT

## 2020-12-02 PROCEDURE — 6370000000 HC RX 637 (ALT 250 FOR IP): Performed by: INTERNAL MEDICINE

## 2020-12-02 PROCEDURE — 80069 RENAL FUNCTION PANEL: CPT

## 2020-12-02 PROCEDURE — 99231 SBSQ HOSP IP/OBS SF/LOW 25: CPT | Performed by: PHYSICAL MEDICINE & REHABILITATION

## 2020-12-02 PROCEDURE — 6370000000 HC RX 637 (ALT 250 FOR IP): Performed by: PHYSICAL MEDICINE & REHABILITATION

## 2020-12-02 PROCEDURE — 6370000000 HC RX 637 (ALT 250 FOR IP): Performed by: NURSE PRACTITIONER

## 2020-12-02 PROCEDURE — APPSS30 APP SPLIT SHARED TIME 16-30 MINUTES: Performed by: STUDENT IN AN ORGANIZED HEALTH CARE EDUCATION/TRAINING PROGRAM

## 2020-12-02 PROCEDURE — 6370000000 HC RX 637 (ALT 250 FOR IP): Performed by: NEUROLOGICAL SURGERY

## 2020-12-02 PROCEDURE — 99232 SBSQ HOSP IP/OBS MODERATE 35: CPT | Performed by: NURSE PRACTITIONER

## 2020-12-02 RX ADMIN — DIAZEPAM 2 MG: 2 TABLET ORAL at 03:31

## 2020-12-02 RX ADMIN — STANDARDIZED SENNA CONCENTRATE 8.6 MG: 8.6 TABLET ORAL at 20:25

## 2020-12-02 RX ADMIN — HYDROMORPHONE HYDROCHLORIDE 1 MG: 1 INJECTION, SOLUTION INTRAMUSCULAR; INTRAVENOUS; SUBCUTANEOUS at 10:31

## 2020-12-02 RX ADMIN — Medication 2000 UNITS: at 16:53

## 2020-12-02 RX ADMIN — HYDROMORPHONE HYDROCHLORIDE 1 MG: 1 INJECTION, SOLUTION INTRAMUSCULAR; INTRAVENOUS; SUBCUTANEOUS at 06:19

## 2020-12-02 RX ADMIN — HYDROMORPHONE HYDROCHLORIDE 1 MG: 1 INJECTION, SOLUTION INTRAMUSCULAR; INTRAVENOUS; SUBCUTANEOUS at 18:40

## 2020-12-02 RX ADMIN — OXYCODONE HYDROCHLORIDE AND ACETAMINOPHEN 1 TABLET: 7.5; 325 TABLET ORAL at 16:53

## 2020-12-02 RX ADMIN — MORPHINE SULFATE 15 MG: 15 TABLET, FILM COATED, EXTENDED RELEASE ORAL at 04:44

## 2020-12-02 RX ADMIN — Medication 100 MG: at 08:48

## 2020-12-02 RX ADMIN — CEFTRIAXONE SODIUM 1 G: 1 INJECTION, POWDER, FOR SOLUTION INTRAMUSCULAR; INTRAVENOUS at 13:05

## 2020-12-02 RX ADMIN — HYDROMORPHONE HYDROCHLORIDE 1 MG: 1 INJECTION, SOLUTION INTRAMUSCULAR; INTRAVENOUS; SUBCUTANEOUS at 14:43

## 2020-12-02 RX ADMIN — GABAPENTIN 300 MG: 300 CAPSULE ORAL at 08:48

## 2020-12-02 RX ADMIN — GABAPENTIN 300 MG: 300 CAPSULE ORAL at 20:25

## 2020-12-02 RX ADMIN — MIRTAZAPINE 7.5 MG: 15 TABLET, FILM COATED ORAL at 20:26

## 2020-12-02 RX ADMIN — OXYCODONE HYDROCHLORIDE AND ACETAMINOPHEN 1 TABLET: 7.5; 325 TABLET ORAL at 08:49

## 2020-12-02 RX ADMIN — MORPHINE SULFATE 15 MG: 15 TABLET, FILM COATED, EXTENDED RELEASE ORAL at 12:55

## 2020-12-02 RX ADMIN — IOPAMIDOL 100 ML: 612 INJECTION, SOLUTION INTRAVENOUS at 19:49

## 2020-12-02 RX ADMIN — MORPHINE SULFATE 15 MG: 15 TABLET, FILM COATED, EXTENDED RELEASE ORAL at 20:25

## 2020-12-02 RX ADMIN — HYDROMORPHONE HYDROCHLORIDE 1 MG: 1 INJECTION, SOLUTION INTRAMUSCULAR; INTRAVENOUS; SUBCUTANEOUS at 01:30

## 2020-12-02 RX ADMIN — FOLIC ACID 1 MG: 1 TABLET ORAL at 08:48

## 2020-12-02 RX ADMIN — BISACODYL 5 MG: 5 TABLET, COATED ORAL at 08:48

## 2020-12-02 RX ADMIN — Medication 10 ML: at 08:55

## 2020-12-02 RX ADMIN — POLYETHYLENE GLYCOL 3350 17 G: 17 POWDER, FOR SOLUTION ORAL at 08:48

## 2020-12-02 RX ADMIN — Medication 10 ML: at 20:25

## 2020-12-02 RX ADMIN — GABAPENTIN 300 MG: 300 CAPSULE ORAL at 13:05

## 2020-12-02 RX ADMIN — DIAZEPAM 2 MG: 2 TABLET ORAL at 20:25

## 2020-12-02 RX ADMIN — ENOXAPARIN SODIUM 40 MG: 40 INJECTION SUBCUTANEOUS at 08:48

## 2020-12-02 ASSESSMENT — ENCOUNTER SYMPTOMS
TROUBLE SWALLOWING: 0
EYES NEGATIVE: 1
COUGH: 0
ABDOMINAL DISTENTION: 0
CONSTIPATION: 0
BACK PAIN: 1
ABDOMINAL PAIN: 0
CHEST TIGHTNESS: 0
STRIDOR: 0
BLOOD IN STOOL: 0
VOICE CHANGE: 0
APNEA: 0
CHOKING: 0
NAUSEA: 0
ANAL BLEEDING: 0
RECTAL PAIN: 0
SORE THROAT: 0
COLOR CHANGE: 0
EYE DISCHARGE: 0
WHEEZING: 0
VOMITING: 0
DIARRHEA: 0
SHORTNESS OF BREATH: 0

## 2020-12-02 ASSESSMENT — PAIN DESCRIPTION - PAIN TYPE
TYPE: ACUTE PAIN;CHRONIC PAIN
TYPE: ACUTE PAIN

## 2020-12-02 ASSESSMENT — PAIN SCALES - GENERAL
PAINLEVEL_OUTOF10: 8
PAINLEVEL_OUTOF10: 9
PAINLEVEL_OUTOF10: 8
PAINLEVEL_OUTOF10: 7
PAINLEVEL_OUTOF10: 8
PAINLEVEL_OUTOF10: 7
PAINLEVEL_OUTOF10: 8
PAINLEVEL_OUTOF10: 7
PAINLEVEL_OUTOF10: 7

## 2020-12-02 ASSESSMENT — PAIN DESCRIPTION - LOCATION
LOCATION: NECK;BACK
LOCATION: NECK;LEG

## 2020-12-02 ASSESSMENT — PAIN DESCRIPTION - ORIENTATION: ORIENTATION: LEFT

## 2020-12-02 ASSESSMENT — PAIN DESCRIPTION - DESCRIPTORS: DESCRIPTORS: DULL;ACHING

## 2020-12-02 NOTE — PROGRESS NOTES
[facility] Neurology Daily Progress Note  Name: Audi Donahue  Age: 79 y.o. Gender: male  CodeStatus: Full Code  Allergies: No Known Allergies    Chief Complaint:Fatigue    Primary Care Provider: Andrea Montana MD  InpatientTreatment Team: Treatment Team: Attending Provider: Elizabeth Bernal DO; Consulting Physician: Chrissy Abdul DO; Utilization Reviewer: Maria Luz Amin RN; Consulting Physician: Abraham Dyer MD; : Olivia Cortez RN; Registered Nurse: Tete Espinoza RN; : Kenzie Layton RN; LPN: Denys Hayward LPN; Patient Care Tech: Alexsander Coil; : RUBÉN Boss  Admission Date: 11/18/2020      HPI   Associated symptoms include fatigue, myalgias, neck pain, numbness and weakness. We were called to reassess as patient's gait has not improved. He continues complain of considerable pain in his neck and shoulders and requires assistance to transfer. He is in fact become weaker. Is been bed at least 15 days.     Patient states that pain is limiting factor in participating in PT. States pain has not improved and he is taking all the pain medication he is provided. Participated in PT and was able to sit at the edge of the bed, but could not stand due to significant increase in pain.       Patient alert and oriented x3, continues to have numbness in his upper extremities and weakness of lower extremities with difficulty standing. Herman's WBC is elevated at 13.3. He is afebrile and all vitals are within normal limits. He denies feeling sick or headache, sore throat, cough, shortness of breath, chest pain, fever/chills, or N/V. Pt seen and examined for neuro follow up.   NO Headache, double vision, blurry vision, difficulty with speech, difficulty with swallowing, weakness, numbness, pain, nausea, vomiting, choking, neck pain, dizziness    Pt has severe pain in neck  No fever  Vitals:    12/02/20 0733   BP: 134/81   Pulse: 88   Resp: 16   Temp: 98.2 °F (36.8 °C)   SpO2: 100%      Review of Systems   Constitutional: Positive for fatigue. Negative for chills and fever. HENT: Negative for congestion and sore throat. Eyes: Negative. Respiratory: Negative for cough, shortness of breath and wheezing. Cardiovascular: Negative. Gastrointestinal: Negative for diarrhea, nausea and vomiting. Genitourinary: Negative. Musculoskeletal: Positive for gait problem, neck pain and neck stiffness. Skin: Negative. Neurological: Positive for weakness and numbness. Negative for dizziness, seizures and headaches. Psychiatric/Behavioral: Negative. complaints of neck pain  Physical Exam  Constitutional:       Appearance: Normal appearance. HENT:      Head: Normocephalic and atraumatic. Eyes:      Extraocular Movements: Extraocular movements intact and EOM normal.      Conjunctiva/sclera: Conjunctivae normal.   Cardiovascular:      Rate and Rhythm: Normal rate and regular rhythm. Heart sounds: Normal heart sounds. Pulmonary:      Effort: Pulmonary effort is normal.      Breath sounds: Normal breath sounds. Skin:     General: Skin is warm and dry. Neurological:      Mental Status: He is alert and oriented to person, place, and time. Psychiatric:         Mood and Affect: Mood normal.         Speech: Speech normal.         Behavior: Behavior normal.         Thought Content: Thought content normal.       Neurologic Exam     Mental Status   Oriented to person, place, and time.    Speech: speech is normal     Cranial Nerves     CN III, IV, VI   Extraocular motions are normal.     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      He continues to be a high fall risk. Patient is more motivated today to participate in PT.     Palliative care has been following patient and adjustments to pain meds have been made. Discharge planning in process for skilled nursing facility. Awaiting precertification.     I examined this patient again and I see that he still has significant issues with his lower extremity though these are likely to stay for a while we are not quite quite concerned about a new cord compression at least at this time. There is no fever and there is no suggestion of any infective process that we are worried about in the cervical spine. This again falls under neurological restriction and therefore close neurological follow-up. We are contemplating obtaining a CT scan of the neck though given that these findings have not changed to be concerned about a spinal abscess we have not done this.     Patient continues to have pain despite maximizing analgesics. CT of the cervical spine today shows rim-enhancing fluid collection containing small foci of air within the subcutaneous soft tissues at the C1-C5 levels. Concerning for abscess. Will order MRI cervical spine with and without contrast and consult neurosurgery (Dr. Quintin Gayle). pts CT was reviewed / could be abscess/  MRI ordered  Will consult neurosurgery    I have personally performed a face to face diagnostic evaluation on this patient, reviewed all data and investigations, and am the sole provider of all clinical decisions on the neurological status of this patient. Pt is followed for the above as no one else is , and it is of concern to us      Oscar Joseph MD, Krystian Blunt, American Board of Psychiatry & Neurology  Board Certified in Vascular Neurology  Board Certified in Neuromuscular Medicine  Certified in Neurorehabilitation       Collaborating physicians: Dr Dk Joseph    Electronically signed by Billie Casey PA-C on 12/2/2020 at 12:05 PM

## 2020-12-02 NOTE — PROGRESS NOTES
Palliative Care Progress Note  Patient: Anya Hernandez  Gender: male  YOB: 1950  Unit/Bed: Y281/Q365-62  Code Status: Full Code  Inpatient Treatment Team: Treatment Team: Attending Provider: Nick Escobar DO; Consulting Physician: Rudi Hutchison DO; Utilization Reviewer: Gali Rapp RN; Consulting Physician: Hollis Wilkinson MD; : Yanira Alcala RN; Registered Nurse: Madie Chinchilla RN; : Hui Rock RN; LPN: Nile Ramirez LPN; Patient Care Tech: [place]; : Magdalena Christianson [place]  Admit Date:  11/18/2020    Chief Complaint: Pain    History of Presenting Illness:      Anya Hernandez is a 79 y.o. male on hospital day 15 with a history of androgen independent prostate cancer. Known liver and bone mets, severe malnutrition, falls, urine retention self caths, gastric ulcer with hemorrhage. Presented with  progressive quadriparesis weight loss inability to ambulate. His legs will not support him anymore. Every few weeks he is getting weaker and weaker. He is now unable to ambulate. Found to have severe spinal canal stenosis at C3-C7 with cord atrophy      Laminoplasty with reconstruction was done. patient observed laying in bed. He is pleasant and cooperative. He is in NAD. He tells me that he is having Pain in neck \" severe\" he is hesitant to turn his head, but he has been taking percocet 7.5/325 mg, Gabapentin, MS Contin 15 mg every 8 hours, and iv dilaudid 1 mg every fours hours as needed. His last dose of dilaudid was 1.5 hours ago. He is not due for his percocet for another hour. He tells me that his pain is currently 9/10. His current pain regime takes his pain to 7 or 8 out of ten. We discussed including repositioning and heat packs to assist in alleviating his pain. He tells me that he is afraid to move due to his pain. Negative significant emotional, spiritual, or sleep disturbance.     Review of Systems:       Review of Systems   Constitutional: Negative for activity change, appetite change, chills, diaphoresis, fatigue, fever and unexpected weight change. HENT: Negative for drooling, hearing loss, mouth sores, sore throat, trouble swallowing and voice change. Eyes: Negative for discharge and visual disturbance. Respiratory: Negative for apnea, cough, choking, chest tightness, shortness of breath, wheezing and stridor. Cardiovascular: Negative for chest pain, palpitations and leg swelling. Gastrointestinal: Negative for abdominal distention, abdominal pain, anal bleeding, blood in stool, constipation, diarrhea, nausea, rectal pain and vomiting. Genitourinary: Negative for difficulty urinating, dysuria, enuresis, frequency and hematuria. Musculoskeletal: Positive for arthralgias, back pain, gait problem, neck pain and neck stiffness. Negative for joint swelling and myalgias. Skin: Negative for color change, pallor, rash and wound. Allergic/Immunologic: Negative for food allergies and immunocompromised state. Neurological: Positive for weakness. Negative for dizziness, tremors, seizures, syncope, facial asymmetry, speech difficulty, light-headedness, numbness and headaches. Hematological: Negative for adenopathy. Does not bruise/bleed easily. Psychiatric/Behavioral: Negative for agitation, behavioral problems, confusion, decreased concentration, dysphoric mood, hallucinations, self-injury, sleep disturbance and suicidal ideas. The patient is not nervous/anxious and is not hyperactive. Physical Examination:       /81   Pulse 88   Temp 98.2 °F (36.8 °C) (Oral)   Resp 16   Ht 6' 1\" (1.854 m)   Wt 147 lb 4.3 oz (66.8 kg)   SpO2 100%   BMI 19.43 kg/m²    Physical Exam  Constitutional:       General: He is not in acute distress. Appearance: He is underweight. He is not diaphoretic. HENT:      Head: Normocephalic and atraumatic.       Right Ear: External ear normal.      Left Ear: External ear normal.      Nose: Nose normal.      Mouth/Throat:      Pharynx: No oropharyngeal exudate. Eyes:      General: No scleral icterus. Right eye: No discharge. Left eye: No discharge. Conjunctiva/sclera: Conjunctivae normal.      Pupils: Pupils are equal, round, and reactive to light. Neck:      Musculoskeletal: Normal range of motion and neck supple. Thyroid: No thyromegaly. Vascular: No JVD. Trachea: No tracheal deviation. Cardiovascular:      Rate and Rhythm: Normal rate and regular rhythm. Heart sounds: Normal heart sounds. Pulmonary:      Effort: Pulmonary effort is normal. No respiratory distress. Breath sounds: Normal breath sounds. No stridor. No wheezing or rales. Chest:      Chest wall: No tenderness. Abdominal:      General: Bowel sounds are normal. There is no distension. Palpations: Abdomen is soft. There is no mass. Tenderness: There is no abdominal tenderness. There is no guarding or rebound. Musculoskeletal: Normal range of motion. General: No tenderness or deformity. Lymphadenopathy:      Cervical: No cervical adenopathy. Skin:     General: Skin is warm and dry. Findings: No erythema or rash. Neurological:      Mental Status: He is alert and oriented to person, place, and time. Cranial Nerves: No cranial nerve deficit. Coordination: Coordination normal.   Psychiatric:         Behavior: Behavior normal.         Thought Content:  Thought content normal.         Judgment: Judgment normal.         Allergies:      No Known Allergies    Medications:      Current Facility-Administered Medications   Medication Dose Route Frequency Provider Last Rate Last Dose    HYDROmorphone (DILAUDID) injection 1 mg  1 mg Intravenous Q3H PRN JOYCE Fernandes CNP        gabapentin (NEURONTIN) capsule 300 mg  300 mg Oral TID JOYCE Noriega CNP   300 mg at 12/02/20 0848    enzalutamide Manish Salazar capsule 160 mg  160 mg Oral Daily Selvin Comber,         cefTRIAXone (ROCEPHIN) 1 g IVPB in 50 mL D5W minibag  1 g Intravenous Q24H Bernice Matta MD   Stopped at 12/01/20 1305    diazePAM (VALIUM) tablet 2 mg  2 mg Oral Q6H PRN Adjondi Anneliese Forte, APRN - CNP   2 mg at 12/02/20 0331    sodium chloride flush 0.9 % injection 10 mL  10 mL Intravenous 2 times per day Adria Curling, MD   10 mL at 12/02/20 0855    sodium chloride flush 0.9 % injection 10 mL  10 mL Intravenous PRN Adria Curling, MD        bisacodyl (DULCOLAX) EC tablet 5 mg  5 mg Oral Daily Adria Curling, MD   5 mg at 12/02/20 0848    magnesium hydroxide (MILK OF MAGNESIA) 400 MG/5ML suspension 30 mL  30 mL Oral Daily PRN Adria Curling, MD        polyethylene glycol [facility]) packet 17 g  17 g Oral Daily PRN Adria Curling, MD        ondansetron (ZOFRAN-ODT) disintegrating tablet 4 mg  4 mg Oral Q8H PRN Adria Curling, MD        Or    ondansetron [facility]) injection 4 mg  4 mg Intravenous Q6H PRN Adria Curling, MD        morphine (MS CONTIN) extended release tablet 15 mg  15 mg Oral Q8H Freddy Horner APRN - CNP   15 mg at 12/02/20 0444    mirtazapine (REMERON) tablet 7.5 mg  7.5 mg Oral Nightly Sarahi Hansen, APRN - CNP   7.5 mg at 12/01/20 2039    Vitamin D (CHOLECALCIFEROL) tablet 2,000 Units  2,000 Units Oral Dinner Nicole Scullin, DO   2,000 Units at 12/01/20 1817    cyanocobalamin injection 1,000 mcg  1,000 mcg Intramuscular Weekly Nicole Scullin, DO   1,000 mcg at 12/01/20 2039    coenzyme Q10 capsule 100 mg  100 mg Oral Daily Nicole Scullin, DO   100 mg at 12/02/20 0848    lidocaine 4 % external patch 3 patch  3 patch Transdermal Daily Nicole Scullin, DO   3 patch at 11/30/20 0900    acetaminophen (TYLENOL) tablet 650 mg  650 mg Oral Q4H PRN Nicole Scullin, DO        Or    acetaminophen (TYLENOL) suppository 650 mg  650 mg Rectal Q6H PRN Nicole Scullin, DO        oxyCODONE-acetaminophen (PERCOCET) 7.5-325 MG per tablet 1 tablet  1 tablet Oral Q4H PRN Nicole Scullin, DO   1 tablet at 12/02/20 0849    polyethylene glycol (GLYCOLAX) packet 17 g  17 g Oral Daily Pravin Barefoot, DO   17 g at 12/02/20 0848    senna (SENOKOT) tablet 8.6 mg  1 tablet Oral Nightly Pravin Barefoot, DO   8.6 mg at 71/36/57 2254    folic acid (FOLVITE) tablet 1 mg  1 mg Oral Daily Shana Mcnamara MD   1 mg at 12/02/20 0848    promethazine (PHENERGAN) tablet 12.5 mg  12.5 mg Oral Q6H PRN Gretchen Sánchez MD        Or    ondansetron [facility] injection 4 mg  4 mg Intravenous Q6H PRN Gretchen Sánchez MD   4 mg at 11/18/20 1843    enoxaparin (LOVENOX) injection 40 mg  40 mg Subcutaneous Daily Gretchen Sánchez MD   40 mg at 12/02/20 0848       History:       PMHx:  Past Medical History:   Diagnosis Date    PAF ([facility]oxysmal atrial fibrillation) ([facility]     ON XARELTO    Prostate cancer ([facility] [address] 11/2019       PSHx:  Past Surgical History:   Procedure Laterality Date    CERVICAL LAMINECTOMY N/A 11/23/2020    CERVICAL C3-4,4-5,5-6  LAMINOPLASTY WITH RECONSTRUCTION performed by Annia Marsh MD at [address] [place] [address] COLONOSCOPY N/A 9/29/2020    COLONOSCOPY DIAGNOSTIC performed by Donnie Saba MD at [address] [id number] Right 11/2018    hip    UPPER GASTROINTESTINAL ENDOSCOPY N/A 9/29/2020    EGD DIAGNOSTIC ONLY [facility] Hx:  Social History     Socioeconomic History    Marital status: Single     Spouse name: None    Number of children: None    Years of education: None    Highest education level: None   Occupational History    Occupation: retired [employer]   Social Needs    Financial resource strain: Not very hard    Food insecurity     Worry: Never true     Inability: Never true    Transportation needs     Medical: No     Non-medical: No   Tobacco Use    Smoking status: Never Smoker    Smokeless tobacco: Never Used    Tobacco comment: denies   Substance and Sexual Activity    Alcohol use: Not Currently     Frequency: 2-4 times a month     Comment: denies    Drug use: No     Comment: denies    Sexual activity: None   Lifestyle    Physical activity     Days per week: 0 days     Minutes per session: 0 min    Stress: Only a little   Relationships    Social connections     Talks on phone: None     Gets together: None     Attends [employer] service: None     Active member of club or organization: None     Attends meetings of clubs or organizations: None     Relationship status: None    Intimate partner violence     Fear of current or ex partner: No     Emotionally abused: No     Physically abused: No     Forced sexual activity: No   Other Topics Concern    None   Social History Narrative    Retired from [employer] With: Alone    Type of Home: [address]: One level    Home Access: Level entry    Bathroom Shower/Tub: Tub/Shower unit    [employer]: Grab bars in [facility]: [place]: Independent    Transfer Assistance: Independent    Active : No    Patient's  Info: Sister    Additional Comments: Pt. reports he has been having near falls but no falls in the last few weeks, but progressively more weakness hte last few weeks       Family Hx:  History reviewed. No pertinent family history.     LABS: Reviewed     CBC:  Lab Results   Component Value Date    WBC 13.3 12/02/2020    RBC 3.50 12/02/2020    HGB 8.7 12/02/2020    HCT 27.1 12/02/2020    MCV 77.5 12/02/2020    MCH 24.7 12/02/2020    MCHC 31.9 12/02/2020    RDW 18.8 12/02/2020     12/02/2020    MPV 8.7 09/09/2015     CBC with Differential:   Lab Results   Component Value Date    WBC 13.3 12/02/2020    RBC 3.50 12/02/2020    HGB 8.7 12/02/2020    HCT 27.1 12/02/2020     12/02/2020    MCV 77.5 12/02/2020    MCH 24.7 12/02/2020    MCHC 31.9 12/02/2020    RDW 18.8 12/02/2020    METASPCT 1 09/26/2020 LYMPHOPCT 14.8 12/02/2020    MONOPCT 9.4 12/02/2020    MYELOPCT 1 09/26/2020    BASOPCT 0.2 12/02/2020    MONOSABS 1.3 12/02/2020    LYMPHSABS 2.0 12/02/2020    EOSABS 0.1 12/02/2020    BASOSABS 0.0 12/02/2020     CMP:    Lab Results   Component Value Date     12/02/2020    K 4.0 12/02/2020    K 4.2 11/25/2020    CL 96 12/02/2020    CO2 24 12/02/2020    BUN 10 12/02/2020    CREATININE 0.47 12/02/2020    GFRAA >60.0 12/02/2020    LABGLOM >60.0 12/02/2020    GLUCOSE 103 12/02/2020    PROT 8.0 11/18/2020    LABALBU 2.7 12/02/2020    CALCIUM 9.1 12/02/2020    BILITOT 0.7 11/18/2020    ALKPHOS 233 11/18/2020    AST 26 11/18/2020    ALT 9 11/18/2020     BMP:    Lab Results   Component Value Date     12/02/2020    K 4.0 12/02/2020    K 4.2 11/25/2020    CL 96 12/02/2020    CO2 24 12/02/2020    BUN 10 12/02/2020    LABALBU 2.7 12/02/2020    CREATININE 0.47 12/02/2020    CALCIUM 9.1 12/02/2020    GFRAA >60.0 12/02/2020    LABGLOM >60.0 12/02/2020    GLUCOSE 103 12/02/2020     TSH:   Lab Results   Component Value Date    TSH 1.210 11/19/2020     Vitamin B12 and Folate: No components found for: FOLIC,  S22  Urinalysis:   Lab Results   Component Value Date    NITRU POSITIVE 11/29/2020    WBCUA >100 11/29/2020    BACTERIA MANY 11/29/2020    RBCUA 3-5 09/26/2020    BLOODU Negative 11/29/2020    SPECGRAV 1.014 11/29/2020    GLUCOSEU Negative 11/29/2020           FUNCTIONAL ADL´S:     Independent: [ x ] Eating, [   ] Dressing, [   ] Transferring, [   ] Vinh Mendoza, [   ] Larence Piermont, [   ] Continence  Dependent   : [  ] Eating, [   ] Dressing, [   ] Transferring, [   ] Vinh Mendoza, [   ] Larence Piermont, [   ] Continence  W. assistant : [  ] Eating, [  x ] Dressing, [ x  ] Transferring, [  x ] Toileting, [  x ] Bathing, [   x] Continence    Radiology: Reviewed      No results found. Assessment and plan:    1.   Recurrent falls multifactorial in etiology: Orthostatic hypotension suspect due to autonomic dysfunction,spinal stenosis, advanced prostate cancer on chemotherapy, moderate protein calorie malnutrition  Follow with primary medical team  Continue PT    2. Prostate Cancer with mets  -Oncology Following    3. Anorexia and Malnutrition  -Start Mirtazapine 7.5 mg po daily at HS  -Continue nutritional Supplementation    4. Spinal Stenosis with weakness and paraesthesia  -Neurosurgery following  -Currently taking Gabapentin 300 mg TID    5. Pain rt neoplasm and spinal stenosis  Much discussion that he should utilize the medication in place for breakthrough pain. Discussed need for breakthrough pain medication with nursing staff. He should medicate one hour prior to PT so he can have maximum participation.   -Continue MS Contin to 15 mg po every 8 hours  -Continue Percocet 7.5/325 mg po every 4 hours for moderate to severe breakthrough pain  -Change Hydromorphone 1.0mg IV every 3 hours prn severe breakthrough pain  -Continue Gabapentin to 300 mg po tid  -Will start heat pack to affected area and Lidocaine patch.  -Will consider increasing percocet to 10/325  if current regime is not effective    6. Constipation  -Continue current POC  -Increase fiber and fluid in diet    -Advance Care Planning  Remains FULL CODE      -Goals of Care Discussion:  We discussed all care options contingent on treatment response and QOL. Much active listening, presence, and emotional support were given.      Electronically signed by JOYCE Whipple CNP on 12/2/2020 at 12:16 PM

## 2020-12-02 NOTE — PROGRESS NOTES
Hospitalist Progress Note      PCP: Andrea Montana MD    Date of Admission: 11/18/2020    Chief Complaint: Pain and stiffness on right-sided neck. Otherwise no complaints. Medications:  Reviewed    Infusion Medications     Scheduled Medications    gabapentin  300 mg Oral TID    enzalutamide  160 mg Oral Daily    cefTRIAXone (ROCEPHIN) IV  1 g Intravenous Q24H    sodium chloride flush  10 mL Intravenous 2 times per day    bisacodyl  5 mg Oral Daily    morphine  15 mg Oral Q8H    mirtazapine  7.5 mg Oral Nightly    Vitamin D  2,000 Units Oral Dinner    cyanocobalamin  1,000 mcg Intramuscular Weekly    coenzyme Q10  100 mg Oral Daily    lidocaine  3 patch Transdermal Daily    polyethylene glycol  17 g Oral Daily    senna  1 tablet Oral Nightly    folic acid  1 mg Oral Daily    enoxaparin  40 mg Subcutaneous Daily     PRN Meds: HYDROmorphone, diazePAM, sodium chloride flush, magnesium hydroxide, polyethylene glycol, ondansetron **OR** ondansetron, acetaminophen **OR** acetaminophen, oxyCODONE-acetaminophen, promethazine **OR** ondansetron      Intake/Output Summary (Last 24 hours) at 12/2/2020 1248  Last data filed at 12/2/2020 0447  Gross per 24 hour   Intake 240 ml   Output 0 ml   Net 240 ml       Exam:    /81   Pulse 88   Temp 98.2 °F (36.8 °C) (Oral)   Resp 16   Ht 6' 1\" (1.854 m)   Wt 147 lb 4.3 oz (66.8 kg)   SpO2 100%   BMI 19.43 kg/m²     General appearance: appears stated age and cooperative. Respiratory: clear to auscultation, bilaterally   Cardiovascular: Regular rate and rhythm with normal S1/S2  Abdomen: Soft, active bowel sounds.   Extremities: no edema      Labs:   Recent Labs     11/30/20  0630 12/01/20  0601 12/02/20  0635   WBC 17.9* 14.4* 13.3*   HGB 9.1* 8.4* 8.7*   HCT 28.7* 26.3* 27.1*   * 554* 578*     Recent Labs     11/30/20  0630 12/01/20  0601 12/02/20  0635   * 131* 133*   K 4.0 3.9 4.0   CL 96 94* 96   CO2 19* 23 24   BUN 11 9 10   CREATININE reduce radiation dose to as low as reasonably achievable. XR CHEST PORTABLE   Final Result   NO ACUTE CARDIOPULMONARY DISEASE. Assessment/Plan:    78 y/o man with history of metastatic prostate cancer who presented with:    Recurrent falls and generalized weakness  - multifactorial in etiology including orthostatic hypotension suspect due to autonomic dysfunction, severe C-spine stenosis, advanced prostate cancer on chemotherapy, moderate protein calorie malnutrition  - MRI of the spine showed diffuse mets  - s/p C3-6 laminoplasty with reconstruction on 11/23  - followed by neurosurgery and neurology    Sepsis secondary to postoperative proteus UTI  -Continue IV Rocephin and transition to bactrim at discharge    Orthostatic hypotension  - treated with IVFs    Metastatic prostate cancer with severe bone pain  - Xtandi on hold  - on MS contin, oxycodone, Dilaudid,gabapentin  - followed by oncology and palliative care     Constipation  - continue bowel regimen     Folic acid deficiency  - continue supplement     Cancer related pain   - on opioids    Severe malnutrition  - followed by dietitian       Disposition - medically discharged.  Awaiting bed      Electronically signed by Leila Duron DO on 12/2/2020 at 12:48 PM

## 2020-12-02 NOTE — PROGRESS NOTES
9/29/2020    EGD DIAGNOSTIC ONLY performed by Marcela Becerra MD at [facility]       Restrictions  Restrictions/Precautions: Fall Risk    SUBJECTIVE   General  Chart Reviewed: yes  Family / Caregiver Present: No  Subjective  Subjective: \"I'll try I hurt my neck and down my left\"  General Comment  Comments: Pt agreeable to try treatment. Pre-Session Pain Report     Pain Screening  Patient Currently in Pain: Yes    Pain Assessment  Pain Level: 7  Patient's Stated Pain Goal: No pain  Pain Type: Acute pain  Pain Location: Neck;Leg  Pain Orientation: Left  Pain Descriptors: Dull;Aching    Post-Session Pain Report  Pain Assessment  Pain Level: 7  Patient's Stated Pain Goal: No pain  Pain Type: Acute pain  Pain Location: Neck;Leg  Pain Orientation: Left  Pain Descriptors: Dull;Aching         OBJECTIVE        Bed mobility  Rolling to Right: Contact guard assistance  Supine to Sit: Contact guard assistance  Sit to Supine: Minimal assistance  Comment: Pt became very dizzy apon sitting up. /71, P 104, SaO2 100%. Dizziness abated when transfered supine. Exercises  Quad Sets: x15  Heelslides: x15  Gluteal Sets: x15  Hip Flexion: Heelslides x 15  Hip Abduction: x15  Knee Short Arc Quad: x15  Ankle Pumps: x15  Comments: Pt educated on performing supine exercises HEP of QS,GS, AP throughout the day          ASSESSMENT         Discharge Recommendations:  Continue to assess pending progress, Patient would benefit from continued therapy after discharge    Goals  Short term goals  Short term goal 1: Pt to complete HEP with indep  Long term goals  Long term goal 1: Pt to complete bed mobility with indep  Long term goal 2: Pt to complete transfers with indep  Long term goal 3: Pt to ambulate 150ft with LRD and indep  Long term goal 4: TUG <15.0 seconds to demo decreased risk for falls    PLAN    Times per week: 3-6  Safety Devices  Type of devices:  All fall risk precautions in place, Bed alarm in place, Call light within reach, Left in bed, Nurse notified     [facility] (6 CLICK) [address] Mobility Raw Score : 14     Therapy Time   Individual   Time In 0935   Time Out 1000   Minutes 25      BM 8    [place], [place], 12/02/20 at 9:59 AM         Definitions for assistance levels  Independent = pt does not require any physical supervision or assistance from another person for activity completion. Device may be needed.   Stand by assistance = pt requires verbal cues or instructions from another person, close to but not touching, to perform the activity  Minimal assistance= pt performs 75% or more of the activity; assistance is required to complete the activity  Moderate assistance= pt performs 50% of the activity; assistance is required to complete the activity  Maximal assistance = pt performs 25% of the activity; assistance is required to complete the activity  Dependent = pt requires total physical assistance to accomplish the task

## 2020-12-02 NOTE — CARE COORDINATION
Social work note: Left message with Steff, admissions at [facility] in [facility] regarding referral made. Electronically signed by RUBÉN Amanda on 12/2/2020 at 9:59 AM     [phone]: Refaxed packet to [facility]. Fax: [phone]. Electronically signed by RUBÉN Amanda on 12/2/2020 at 11:56 AM     1400: [facility] admissions reports they do not have a bed open but can send patient to [facility] their sister facility. Updated patient and he is NOT agreeable to go to [facility] which is in [place]. . Electronically signed by RUBÉN Amanda on 12/2/2020 at 2:07 PM     1410: Left another message for Steff admissions at [facility]. Awaiting call back. Electronically signed by RUBÉN Amanda on 12/2/2020 at 2:11 PM     2039 1304: Spoke with Gwinda Mohs, central intake director for [facility]. She does have information on referral.  She would like to be called [phone], do not call main phone at [facility]. She reports she needs to know plan for chemo pill. Furthermore, she said she just acquired the [facility] contract this date and is not clear how they handle cases. Updated Gwinda Mohs that patient has one month worth of chemo med at home he can take with him to the facility. Must communicate with her tomorrow for updates on [facility] and patient care. Electronically signed by RUBÉN Amanda on 12/2/2020 at 4:47 PM     1651: Left message for [facility] that patient does not want [facility] their sister facility.  Electronically signed by RUBÉN Amanda on 12/2/2020 at 4:52 PM

## 2020-12-02 NOTE — PROGRESS NOTES
Subjective: The patient complains of severe  acute on chronic neck pain and weakness partially relieved by rest, PT, OT and meds and exacerbated by exertion and recent illness. I ordered him a soft collar it seems to be helping though he is not able to tolerate wearing it at night and takes it off. He still having a hard time turning to the right. I am concerned about patients medical complexities. Reviewed recent nursing note, \"  Pt incont of urine  Changed several times  Repositioned  q2h  Medicated for pain per orders  Dressing C/D/I  Pt does not want cervical collar on at this time \" . I reminded nursing that he needs assistance with intermittent catheterization. I still strongly disagree with his insurance Praneeth Dyer he did not place a good angella effort into his acute rehab for his cervical myelopathy and neurogenic bowel and bladder. May have to go to [facility] skilled units. ROS x10: The patient also complains of severely impaired mobility and activities of daily living. Otherwise no new problems with vision, hearing, nose, mouth, throat, dermal, cardiovascular, GI, , pulmonary, musculoskeletal, psychiatric or neurological. See Rehab consult on Rehab chart . Vital signs:  /81   Pulse 88   Temp 98.2 °F (36.8 °C) (Oral)   Resp 16   Ht 6' 1\" (1.854 m)   Wt 147 lb 4.3 oz (66.8 kg)   SpO2 100%   BMI 19.43 kg/m²   I/O:   PO/Intake:    fair PO intake,       Bowel/Bladder:   Incontinent-neurogenic bladder-needs assist with IC q 6-8 hours scheduled. ,    General:  Patient is well developed, adequately nourished, non-obese and     well kempt. HEENT:    PERRLA, hearing intact to loud voice, external inspection of ear     and nose benign. Inspection of lips, tongue and gums benign  Musculoskeletal: No significant change in strength or tone. All joints stable. Inspection and palpation of digits and nails show no clubbing,       cyanosis or inflammatory conditions. Neuro/Psychiatric: Affect: flat-  Alert and oriented to self and     Situation. No significant change in deep tendon reflexes or     sensation  Lungs:  Diminished, CTA-B. Respiration effort is normal at rest.     Heart:   S1 = S2,  RRR. No loud murmurs. Abdomen:  Soft, non-tender, no enlargement of liver or spleen. Extremities:  No significant lower extremity edema or tenderness. Skin:    BUE bruises dt blood draws, c spine incision--head turned to the left. Rehabilitation:  Physical therapy: FIMS:  Bed Mobility: Scooting: Contact guard assistance    Transfers: Sit to Stand: Unable to assess(safety concerns)  Stand to sit: Unable to assess  Bed to Chair: Contact guard assistance, Ambulation 1  Surface: level tile  Device: No Device  Assistance: Contact guard assistance  Quality of Gait: Shuffling steps, decreased trunk rotation, nbos, decreased brinda heel strike  Gait Deviations: Slow Yanet, Decreased step length  Distance: 10' x 2  Comments: Distance limited to in room tx secondary to pt request,      FIMS:  ,  , Assessment: Pt agreeable to perform exercises only at this time laying in bed, pt reports pain level of 9/10 at this time, limited AROM with heel slides due to pain levels. Encouraged pt on performing HEP exercises to maintain strength and ROM as able. Occupational therapy: FIMS:   ,  , Assessment: Pt. seen for follow up treatment this date and completed ADL bed bath due to pain. Pt. with limited mobility and safety. Pt. continues to benefit from OT to maximize independence with bathing and improve safety and sequencing.     Speech therapy: FIMS:        Lab/X-ray studies reviewed, analyzed and discussed with patient and staff:   Recent Results (from the past 24 hour(s))   RENAL FUNCTION PANEL    Collection Time: 12/02/20  6:35 AM   Result Value Ref Range    Sodium 133 (L) 135 - 144 mEq/L    Potassium 4.0 3.4 - 4.9 mEq/L    Chloride 96 95 - 107 mEq/L    CO2 24 20 - 31 mEq/L    Anion Gap 13 9 - 15 mEq/L    Glucose 103 (H) 70 - 99 mg/dL    BUN 10 8 - 23 mg/dL    CREATININE 0.47 (L) 0.70 - 1.20 mg/dL    GFR Non-African American >60.0 >60    GFR  >60.0 >60    Calcium 9.1 8.5 - 9.9 mg/dL    Phosphorus 3.2 2.3 - 4.8 mg/dL    Alb 2.7 (L) 3.5 - 4.6 g/dL   Magnesium    Collection Time: 12/02/20  6:35 AM   Result Value Ref Range    Magnesium 2.2 1.7 - 2.4 mg/dL   CBC Auto Differential    Collection Time: 12/02/20  6:35 AM   Result Value Ref Range    WBC 13.3 (H) 4.8 - 10.8 K/uL    RBC 3.50 (L) 4.70 - 6.10 M/uL    Hemoglobin 8.7 (L) 14.0 - 18.0 g/dL    Hematocrit 27.1 (L) 42.0 - 52.0 %    MCV 77.5 (L) 80.0 - 100.0 fL    MCH 24.7 (L) 27.0 - 31.3 pg    MCHC 31.9 (L) 33.0 - 37.0 %    RDW 18.8 (H) 11.5 - 14.5 %    Platelets 141 (H) 751 - 400 K/uL    Neutrophils % 74.6 %    Lymphocytes % 14.8 %    Monocytes % 9.4 %    Eosinophils % 1.0 %    Basophils % 0.2 %    Neutrophils Absolute 9.9 (H) 1.4 - 6.5 K/uL    Lymphocytes Absolute 2.0 1.0 - 4.8 K/uL    Monocytes Absolute 1.3 (H) 0.2 - 0.8 K/uL    Eosinophils Absolute 0.1 0.0 - 0.7 K/uL    Basophils Absolute 0.0 0.0 - 0.2 K/uL       Previous extensive, complex labs, notes and diagnostics reviewed and analyzed. ALLERGIES:    Allergies as of 11/18/2020    (No Known Allergies)      (please also verify by checking [facility])     Complex Physical Medicine & Rehab Issues Assess & Plan:   1. Severe abnormality of gait and mobility and impaired self-care and ADL's secondary to progressive cervical myelopathy. Functional and medical status reassessed regarding patients ability to participate in therapies and patient found to be able to participate in  acute intensive comprehensive inpatient rehabilitation program including PT/OT to improve balance, ambulation, ADLs, and to improve the P/AROM. It is my opinion that they will be able to tolerate 3 hours of therapy a day and benefit from it at an acute level.   2. Bowel constipation and Bladder dysfunction atonic bladder: Incontinent-neurogenic bladder-needs assist with IC q 6-8 hours scheduled. ,   frequent toileting, ambulate to bathroom with assistance, check post void residuals. Check for C.difficile x1 if >2 loose stools in 24 hours, continue bowel & bladder program.  Monitor for UTI symptoms including lethargy and confusion nursing needs to assist patient with intermittent catheterizations. 3. Severe postoperative neck pain and generalized OA pain: reassess pain every shift and prior to and after each therapy session, give scheduled MS Contin, prn Percocet  or Tylenol, modalities prn in therapy, consider Lidoderm, K-pad prn. Consider increase to Dilaudid 1 mg.consider percocet 10--vs increased MScontin--also add soft cervical collar. 4. Skin breakdown risk:  continue pressure relief program.  Daily skin exams and reports from nursing. 5. Severe fatigue due to immobility and nutritional deficits: Add vitamin B12 vitamin D and CoQ10 titrate dosing and add protein supplementation with low carb content. 6. Complex discharge planning: Risk for urinary retention given spinal cord injury-Precert  Denied with [facility] Medicare for Acute Inpatient Rehab, no waivers in place at this time--they did not call us back yesterday during the window of time that they gave us. I have attempted to improve his pain management while here and hopefully he will do better at a skilled facility with those interventions however I strongly feel that his insurance company is doing he did this for service by not approving acute rehab. Secondarily I also feel that they were not forthright in their attempts to achieve a peer to peer. Complex Active General Medical Issues that complicate care Assess & Plan:     1.  Principal Problem:    Stenosis of cervical spine with myelopathy [facility])  Active Problems:    Palpitations    Anemia    Weakness    Rectal mass    Acute cystitis    Metastatic cancer ([facility] [address]    Fall at home, initial encounter    Severe malnutrition ([facility] [address]    Falls, initial encounter    Cervical myelopathy ([facility] [address]    Malignant tumor of prostate ([facility] [address]    Retention of urine    Urethritis    Ataxic gait    Neurogenic bladder    Sepsis due to gram-negative UTI ([facility] [address]  Resolved Problems:    * No resolved hospital problems.  Rambo Unger D.O., PM&R     Attending    [facility]

## 2020-12-02 NOTE — PROGRESS NOTES
Spiritual Care Services     Summary of Visit:      Spiritual Assessment/Intervention/Outcomes:    Encounter Summary  Services provided to[de-identified] Patient  Referral/Consult From[de-identified] Rounding  Support System: Family members, Friends/neighbors  Continue Visiting: No  Complexity of Encounter: Low  Length of Encounter: 15 minutes  Routine  Type: Initial  Assessment: Calm, Approachable  Intervention: [place], Sustaining presence/ Ministry of presence  Outcome: Receptive              Advance Directives (For Healthcare)  Pre-existing DNR Comfort Care/DNR Arrest/DNI Order: No  Healthcare Directive: No, patient does not have an advance directive for healthcare treatment  Information on Healthcare Directives Requested: No  Patient Requests Assistance: No  Advance Directives: Pt. not interested at this time                Care Plan:        [id number]   Electronically signed by Marce Rey on 12/2/20 at 3:31 PM EST     To reach a  for emotional and spiritual support, place an [facility] for [facility] consult request.   If a  is needed immediately, dial 0 and ask to page the on-call .

## 2020-12-03 ENCOUNTER — APPOINTMENT (OUTPATIENT)
Dept: MRI IMAGING | Age: 70
DRG: 515 | End: 2020-12-03
Payer: MEDICARE

## 2020-12-03 ENCOUNTER — APPOINTMENT (OUTPATIENT)
Dept: ULTRASOUND IMAGING | Age: 70
DRG: 515 | End: 2020-12-03
Payer: MEDICARE

## 2020-12-03 LAB
ALBUMIN SERPL-MCNC: 2.6 G/DL (ref 3.5–4.6)
ANION GAP SERPL CALCULATED.3IONS-SCNC: 10 MEQ/L (ref 9–15)
ANISOCYTOSIS: ABNORMAL
BASOPHILS ABSOLUTE: 0 K/UL (ref 0–0.2)
BASOPHILS RELATIVE PERCENT: 0.5 %
BUN BLDV-MCNC: 9 MG/DL (ref 8–23)
CALCIUM SERPL-MCNC: 8.6 MG/DL (ref 8.5–9.9)
CHLORIDE BLD-SCNC: 103 MEQ/L (ref 95–107)
CO2: 25 MEQ/L (ref 20–31)
CREAT SERPL-MCNC: 0.46 MG/DL (ref 0.7–1.2)
EOSINOPHILS ABSOLUTE: 0.1 K/UL (ref 0–0.7)
EOSINOPHILS RELATIVE PERCENT: 1 %
GFR AFRICAN AMERICAN: >60
GFR NON-AFRICAN AMERICAN: >60
GLUCOSE BLD-MCNC: 125 MG/DL (ref 70–99)
HCT VFR BLD CALC: 26.9 % (ref 42–52)
HEMOGLOBIN: 8.4 G/DL (ref 14–18)
LYMPHOCYTES ABSOLUTE: 1.6 K/UL (ref 1–4.8)
LYMPHOCYTES RELATIVE PERCENT: 13 %
MAGNESIUM: 2.2 MG/DL (ref 1.7–2.4)
MCH RBC QN AUTO: 24.2 PG (ref 27–31.3)
MCHC RBC AUTO-ENTMCNC: 31.3 % (ref 33–37)
MCV RBC AUTO: 77.5 FL (ref 80–100)
MONOCYTES ABSOLUTE: 1.6 K/UL (ref 0.2–0.8)
MONOCYTES RELATIVE PERCENT: 13 %
NEUTROPHILS ABSOLUTE: 9.1 K/UL (ref 1.4–6.5)
NEUTROPHILS RELATIVE PERCENT: 73 %
PDW BLD-RTO: 19.1 % (ref 11.5–14.5)
PHOSPHORUS: 3.1 MG/DL (ref 2.3–4.8)
PLATELET # BLD: 660 K/UL (ref 130–400)
PLATELET SLIDE REVIEW: ABNORMAL
POTASSIUM SERPL-SCNC: 3.7 MEQ/L (ref 3.4–4.9)
RBC # BLD: 3.47 M/UL (ref 4.7–6.1)
SEDIMENTATION RATE, ERYTHROCYTE: 114 MM (ref 0–20)
SLIDE REVIEW: ABNORMAL
SODIUM BLD-SCNC: 138 MEQ/L (ref 135–144)
WBC # BLD: 12.4 K/UL (ref 4.8–10.8)

## 2020-12-03 PROCEDURE — 85652 RBC SED RATE AUTOMATED: CPT

## 2020-12-03 PROCEDURE — 6370000000 HC RX 637 (ALT 250 FOR IP): Performed by: NURSE PRACTITIONER

## 2020-12-03 PROCEDURE — 2580000003 HC RX 258: Performed by: INTERNAL MEDICINE

## 2020-12-03 PROCEDURE — 99222 1ST HOSP IP/OBS MODERATE 55: CPT | Performed by: INTERNAL MEDICINE

## 2020-12-03 PROCEDURE — A9577 INJ MULTIHANCE: HCPCS | Performed by: NEUROLOGICAL SURGERY

## 2020-12-03 PROCEDURE — 6370000000 HC RX 637 (ALT 250 FOR IP): Performed by: PHYSICAL MEDICINE & REHABILITATION

## 2020-12-03 PROCEDURE — 36415 COLL VENOUS BLD VENIPUNCTURE: CPT

## 2020-12-03 PROCEDURE — 2500000003 HC RX 250 WO HCPCS: Performed by: RADIOLOGY

## 2020-12-03 PROCEDURE — 6360000002 HC RX W HCPCS: Performed by: NEUROLOGICAL SURGERY

## 2020-12-03 PROCEDURE — 85025 COMPLETE CBC W/AUTO DIFF WBC: CPT

## 2020-12-03 PROCEDURE — 2580000003 HC RX 258: Performed by: NEUROLOGICAL SURGERY

## 2020-12-03 PROCEDURE — 6360000004 HC RX CONTRAST MEDICATION: Performed by: NEUROLOGICAL SURGERY

## 2020-12-03 PROCEDURE — 1210000000 HC MED SURG R&B

## 2020-12-03 PROCEDURE — 86140 C-REACTIVE PROTEIN: CPT

## 2020-12-03 PROCEDURE — 87205 SMEAR GRAM STAIN: CPT

## 2020-12-03 PROCEDURE — 6370000000 HC RX 637 (ALT 250 FOR IP): Performed by: PSYCHIATRY & NEUROLOGY

## 2020-12-03 PROCEDURE — 6370000000 HC RX 637 (ALT 250 FOR IP): Performed by: INTERNAL MEDICINE

## 2020-12-03 PROCEDURE — 6360000002 HC RX W HCPCS: Performed by: INTERNAL MEDICINE

## 2020-12-03 PROCEDURE — 87070 CULTURE OTHR SPECIMN AEROBIC: CPT

## 2020-12-03 PROCEDURE — 99232 SBSQ HOSP IP/OBS MODERATE 35: CPT | Performed by: NURSE PRACTITIONER

## 2020-12-03 PROCEDURE — 2709999900 US ABSCESS DRAINAGE W CATH S&I

## 2020-12-03 PROCEDURE — 6370000000 HC RX 637 (ALT 250 FOR IP): Performed by: NEUROLOGICAL SURGERY

## 2020-12-03 PROCEDURE — 80069 RENAL FUNCTION PANEL: CPT

## 2020-12-03 PROCEDURE — 99233 SBSQ HOSP IP/OBS HIGH 50: CPT | Performed by: PSYCHIATRY & NEUROLOGY

## 2020-12-03 PROCEDURE — APPSS30 APP SPLIT SHARED TIME 16-30 MINUTES: Performed by: NURSE PRACTITIONER

## 2020-12-03 PROCEDURE — 83735 ASSAY OF MAGNESIUM: CPT

## 2020-12-03 PROCEDURE — 72156 MRI NECK SPINE W/O & W/DYE: CPT

## 2020-12-03 RX ORDER — LORAZEPAM 2 MG/ML
2 INJECTION INTRAMUSCULAR ONCE
Status: COMPLETED | OUTPATIENT
Start: 2020-12-03 | End: 2020-12-03

## 2020-12-03 RX ORDER — LIDOCAINE HYDROCHLORIDE 20 MG/ML
INJECTION, SOLUTION INFILTRATION; PERINEURAL
Status: COMPLETED | OUTPATIENT
Start: 2020-12-03 | End: 2020-12-03

## 2020-12-03 RX ORDER — 0.9 % SODIUM CHLORIDE 0.9 %
10 VIAL (ML) INJECTION ONCE
Status: DISCONTINUED | OUTPATIENT
Start: 2020-12-03 | End: 2020-12-10 | Stop reason: HOSPADM

## 2020-12-03 RX ADMIN — MORPHINE SULFATE 15 MG: 15 TABLET, FILM COATED, EXTENDED RELEASE ORAL at 05:13

## 2020-12-03 RX ADMIN — VANCOMYCIN HYDROCHLORIDE 1000 MG: 1 INJECTION, POWDER, LYOPHILIZED, FOR SOLUTION INTRAVENOUS at 15:10

## 2020-12-03 RX ADMIN — CEFEPIME 2 G: 2 INJECTION, POWDER, FOR SOLUTION INTRAVENOUS at 12:53

## 2020-12-03 RX ADMIN — LIDOCAINE HYDROCHLORIDE 2 ML: 20 INJECTION, SOLUTION INFILTRATION; PERINEURAL at 11:47

## 2020-12-03 RX ADMIN — MORPHINE SULFATE 15 MG: 15 TABLET, FILM COATED, EXTENDED RELEASE ORAL at 20:29

## 2020-12-03 RX ADMIN — GABAPENTIN 300 MG: 300 CAPSULE ORAL at 20:29

## 2020-12-03 RX ADMIN — LORAZEPAM 2 MG: 2 INJECTION INTRAMUSCULAR; INTRAVENOUS at 06:46

## 2020-12-03 RX ADMIN — POLYETHYLENE GLYCOL 3350 17 G: 17 POWDER, FOR SOLUTION ORAL at 10:19

## 2020-12-03 RX ADMIN — STANDARDIZED SENNA CONCENTRATE 8.6 MG: 8.6 TABLET ORAL at 20:29

## 2020-12-03 RX ADMIN — GABAPENTIN 300 MG: 300 CAPSULE ORAL at 13:18

## 2020-12-03 RX ADMIN — Medication 100 MG: at 10:20

## 2020-12-03 RX ADMIN — GADOBENATE DIMEGLUMINE 15 ML: 529 INJECTION, SOLUTION INTRAVENOUS at 08:59

## 2020-12-03 RX ADMIN — Medication 10 ML: at 20:30

## 2020-12-03 RX ADMIN — MORPHINE SULFATE 15 MG: 15 TABLET, FILM COATED, EXTENDED RELEASE ORAL at 13:18

## 2020-12-03 RX ADMIN — MIRTAZAPINE 7.5 MG: 15 TABLET, FILM COATED ORAL at 20:29

## 2020-12-03 RX ADMIN — OXYCODONE HYDROCHLORIDE AND ACETAMINOPHEN 1 TABLET: 7.5; 325 TABLET ORAL at 10:20

## 2020-12-03 RX ADMIN — GABAPENTIN 300 MG: 300 CAPSULE ORAL at 10:20

## 2020-12-03 RX ADMIN — FOLIC ACID 1 MG: 1 TABLET ORAL at 10:20

## 2020-12-03 RX ADMIN — BISACODYL 5 MG: 5 TABLET, COATED ORAL at 10:20

## 2020-12-03 RX ADMIN — OXYCODONE HYDROCHLORIDE AND ACETAMINOPHEN 1 TABLET: 7.5; 325 TABLET ORAL at 16:31

## 2020-12-03 RX ADMIN — Medication 2000 UNITS: at 16:31

## 2020-12-03 ASSESSMENT — ENCOUNTER SYMPTOMS
WHEEZING: 0
COLOR CHANGE: 0
SORE THROAT: 0
SHORTNESS OF BREATH: 0
COUGH: 0
GASTROINTESTINAL NEGATIVE: 1
EYE DISCHARGE: 0
APNEA: 0
NAUSEA: 0
VOMITING: 0
ABDOMINAL PAIN: 0
TROUBLE SWALLOWING: 0
ABDOMINAL DISTENTION: 0
RECTAL PAIN: 0
CHEST TIGHTNESS: 0
RESPIRATORY NEGATIVE: 1
CHOKING: 0
BACK PAIN: 1
BLOOD IN STOOL: 0
EYES NEGATIVE: 1
STRIDOR: 0
ANAL BLEEDING: 0
DIARRHEA: 0
CONSTIPATION: 0
VOICE CHANGE: 0

## 2020-12-03 ASSESSMENT — PAIN SCALES - GENERAL
PAINLEVEL_OUTOF10: 6
PAINLEVEL_OUTOF10: 8
PAINLEVEL_OUTOF10: 7

## 2020-12-03 ASSESSMENT — PAIN DESCRIPTION - PAIN TYPE: TYPE: ACUTE PAIN;SURGICAL PAIN;CHRONIC PAIN

## 2020-12-03 ASSESSMENT — PAIN DESCRIPTION - LOCATION: LOCATION: NECK;BACK

## 2020-12-03 NOTE — PROGRESS NOTES
Have reviewed the patient's CT scan MRI of the cervical spine. Sedimentation rate is elevated at 114. On the MRI scan there is very little and no significant inflammatory component around the cystic structure. This does not appear to be an abscess at this time or it is very early. Discussed with , radiologist, who will proceed with therapeutic and diagnostic percutaneous aspiration and leave a small drainage tube in place. We'll obtain cultures and Gram stain. I discussed this procedure with the patient this morning procedure indications and risks of needle aspiration of this fluid for diagnostic purposes and perhaps therapeutic with drainage of fluid. I discussed the risks are small but still present including something serious including cardiopulmonary complications pain bleeding infection and so forth. I discussed alternative procedures and benefits answers questions. He agrees to proceed.

## 2020-12-03 NOTE — PROGRESS NOTES
Physician Progress Note    12/3/2020   2:02 PM    Name:  Jaswant Elizabeth  MRN:    [id number]      Day: 15     Admit Date: 11/18/2020  2:44 PM  PCP: Ciarra Porter MD    Code Status:  Full Code    Subjective:     Imaging showed postoperative cervical abscess. Patient seen after drain has been placed-some discomfort but otherwise no complaints.     Current Facility-Administered Medications   Medication Dose Route Frequency Provider Last Rate Last Dose    sodium chloride (PF) 0.9 % injection 10 mL  10 mL Intravenous Once Erma Aaron MD        vancomycin 1000 mg IVPB in 250 mL D5W addavial  1,000 mg Intravenous Q12H Jose Rodriguez MD        cefepime (MAXIPIME) 2 g IVPB minibag  2 g Intravenous Q12H Jose Rodriguez MD   Stopped at 12/03/20 1323    vancomycin (VANCOCIN) intermittent dosing (placeholder)   Other RX Lupe Bryant MD        [Held by provider] HYDROmorphone (DILAUDID) injection 1 mg  1 mg Intravenous Q3H PRN JOYCE Mitchell - CNP   1 mg at 12/02/20 1840    gabapentin (NEURONTIN) capsule 300 mg  300 mg Oral TID Fredichristine Chen, APRN - CNP   300 mg at 12/03/20 1318    enzalutamide (XTANDI) capsule 160 mg  160 mg Oral Daily Deniz Mendez DO        diazePAM (VALIUM) tablet 2 mg  2 mg Oral Q6H PRN Yue Baker APRN - CNP   2 mg at 12/02/20 2025    sodium chloride flush 0.9 % injection 10 mL  10 mL Intravenous 2 times per day Erma Aaron MD   10 mL at 12/02/20 2025    sodium chloride flush 0.9 % injection 10 mL  10 mL Intravenous PRN Erma Aaron MD        bisacodyl (DULCOLAX) EC tablet 5 mg  5 mg Oral Daily Erma Aaron MD   5 mg at 12/03/20 1020    magnesium hydroxide (MILK OF MAGNESIA) 400 MG/5ML suspension 30 mL  30 mL Oral Daily PRN Erma Aaron MD        polyethylene glycol [facility]) packet 17 g  17 g Oral Daily PRN Erma Aaron MD        ondansetron (ZOFRAN-ODT) disintegrating tablet 4 mg  4 mg Oral Q8H PRN Erma Aaron MD        Or    ondansetron [facility] injection 4 mg  4 mg Intravenous Q6H PRN Bennie Miller MD        morphine (MS CONTIN) extended release tablet 15 mg  15 mg Oral Q8H Vic Cipro, APRN - CNP   15 mg at 12/03/20 1318    mirtazapine (REMERON) tablet 7.5 mg  7.5 mg Oral Nightly Vic Cipro, APRN - CNP   7.5 mg at 12/02/20 2026    Vitamin D (CHOLECALCIFEROL) tablet 2,000 Units  2,000 Units Oral Dinner Nicole Scullin, DO   2,000 Units at 12/02/20 1653    cyanocobalamin injection 1,000 mcg  1,000 mcg Intramuscular Weekly Nicole Scullin, DO   1,000 mcg at 12/01/20 2039    coenzyme Q10 capsule 100 mg  100 mg Oral Daily Nicole Scullin, DO   100 mg at 12/03/20 1020    lidocaine 4 % external patch 3 patch  3 patch Transdermal Daily Nicole Scullin, DO   3 patch at 11/30/20 0900    acetaminophen (TYLENOL) tablet 650 mg  650 mg Oral Q4H PRN Nicole Scullin, DO        Or    acetaminophen (TYLENOL) suppository 650 mg  650 mg Rectal Q6H PRN Nicole Scullin, DO        oxyCODONE-acetaminophen (PERCOCET) 7.5-325 MG per tablet 1 tablet  1 tablet Oral Q4H PRN Nicole Scullin, DO   1 tablet at 12/03/20 1020    polyethylene glycol (GLYCOLAX) packet 17 g  17 g Oral Daily Colon Evelyne, DO   17 g at 12/03/20 1019    senna (SENOKOT) tablet 8.6 mg  1 tablet Oral Nightly Colon Evelyne, DO   8.6 mg at 86/91/30 3233    folic acid (FOLVITE) tablet 1 mg  1 mg Oral Daily Vandana Greene MD   1 mg at 12/03/20 1020    promethazine (PHENERGAN) tablet 12.5 mg  12.5 mg Oral Q6H PRN Cici Maher MD        Or    ondansetron [facility]) injection 4 mg  4 mg Intravenous Q6H PRN Cici Maher MD   4 mg at 11/18/20 1843    enoxaparin (LOVENOX) injection 40 mg  40 mg Subcutaneous Daily Cici Maher MD   40 mg at 12/02/20 0848       Physical Examination:      Vitals:  BP (!) 170/98   Pulse 95   Temp 99.1 °F (37.3 °C) (Oral)   Resp 16   Ht 6' 1\" (1.854 m)   Wt 147 lb 4.3 oz (66.8 kg)   SpO2 100%   BMI 19.43 kg/m²   Temp (24hrs), Av.1 °F (37.3 °C), Min:99.1 °F (37.3 °C), Max:99.1 °F (37.3 °C)      General appearance: Thin, chronically ill-appearing, malnourished, in mild distress. Cervical drain in place. Patient looking to the left. Mental Status: oriented to person, place and time and normal affect  Lungs: clear to auscultation bilaterally, normal effort  Heart: regular rate and rhythm, no murmur  Abdomen: soft, nontender, nondistended, bowel sounds present, no masses  Extremities: no edema, redness, tenderness in the calves  Skin: no gross lesions, rashes    Data:     Labs:  Recent Labs     20  0635 20  0603   WBC 13.3* 12.4*   HGB 8.7* 8.4*   * 660*     Recent Labs     2035 20  0603   * 138   K 4.0 3.7   CL 96 103   CO2 24 25   BUN 10 9   CREATININE 0.47* 0.46*   GLUCOSE 103* 125*     No results for input(s): AST, ALT, ALB, BILITOT, ALKPHOS in the last 72 hours. Assessment and Plan:        70-year-old male with history of metastatic prostate cancer presented with generalized weakness and recurrent falls. He was found to have orthostatic hypotension, severe protein calorie malnutrition, and severe C-spine stenosis for which she underwent C3-6 laminoplasty with reconstruction . His postoperative course was complicated by fevers and persistent neck pain-he was found to have Proteus UTI and cervical neck abscess for which he is s/p aspiration and drain placement on 12/3.    1.  Sepsis secondary to postoperative cervical abscess and Proteus UTI  -IV antibiotics. Infectious disease consulted.   Culture data from aspiration is pending  -Drain management per neurosurgery  -Pain control per neurosurgeon and palliative medicine  -Continue goals of care discussions per palliative medicine     Metastatic prostate cancer  Constipation  Folic acid deficiency  Severe protein calorie malnutrition  C-spine stenosis s/p C3-6 laminoplasty  Orthostatic hypotension    Diet: DIET GENERAL; Carb Control: 4 carbs/meal (approximate 1800 kcals/day)  Ppx: Lovenox  Full Code    Dispo: Inpatient.   He will be okay to discharge once abscess stabilized and antibiotic plan is in place    Electronically signed by Hellen Chapa DO on 12/3/2020 at 2:02 PM

## 2020-12-03 NOTE — PROGRESS NOTES
[facility] Daily Progress Note  Name: Earle Betts  Age: 79 y.o. Gender: male  CodeStatus: Full Code  Allergies: No Known Allergies    Chief Complaint:Fatigue    Primary Care Provider: Zita Villar MD  InpatientTreatment Team: Treatment Team: Attending Provider: Juan Corral DO; Consulting Physician: Vincent Osorio DO; Utilization Reviewer: Alexia Davis RN; Consulting Physician: Reyna Trevino MD; Consulting Physician: Al Carrillo MD; : Dede Mcdowell RN; Consulting Physician: Jozef Samuel MD; Patient Care Tech: Marah Sprain; Consulting Physician: Juan F Simon MD; Registered Nurse: Orin Painting RN; : RUBÉN Huynh  Admission Date: 11/18/2020      Fatigue   Associated symptoms include fatigue, numbness and weakness. Pertinent negatives include no chills, congestion, coughing, fever, headaches, nausea, neck pain, sore throat or vomiting. Associated symptoms include fatigue, myalgias, neck pain, numbness and weakness. Pt sen and he is ill and he cannot tolerate pain     We were called to reassess as patient's gait has not improved. He continues complain of considerable pain in his neck and shoulders and requires assistance to transfer. He is in fact become weaker. Is been bed at least 15 days.     Patient states that pain is limiting factor in participating in PT. States pain has not improved and he is taking all the pain medication he is provided. Participated in PT and was able to sit at the edge of the bed, but could not stand due to significant increase in pain.       Patient alert and oriented x3, continues to have numbness in his upper extremities and weakness of lower extremities with difficulty standing. Herman's WBC is elevated at 13.3. He is afebrile and all vitals are within normal limits. He denies feeling sick or headache, sore throat, cough, shortness of breath, chest pain, fever/chills, or N/V.     Pt has severe pain in neck and more on movement   No fever    Patient seen and examined for neurology follow-up. Currently alert and oriented x3, no acute distress, cooperative. He is status post drainage of cervical spine abscess today. Afebrile. WBC count 12.4 today. Sed rate 114. He denies neck pain currently. Denies headache. Has minimal paresthesias to bilateral fingers. No weakness of upper extremities. Continues to complain of numbness and tingling and weakness to bilateral lower extremities up to the level of the knee. Vitals:    12/03/20 1206   BP: (!) 170/98   Pulse: 95   Resp: 16   Temp:    SpO2: 100%      Review of Systems   Constitutional: Positive for fatigue. Negative for chills and fever. HENT: Negative for congestion and sore throat. Eyes: Negative. Respiratory: Negative for cough, shortness of breath and wheezing. Cardiovascular: Negative. Gastrointestinal: Negative for diarrhea, nausea and vomiting. Genitourinary: Negative. Musculoskeletal: Positive for gait problem and neck stiffness. Negative for neck pain. Skin: Negative. Neurological: Positive for weakness and numbness. Negative for dizziness, seizures and headaches. Psychiatric/Behavioral: Negative. complaints of neck pain  Physical Exam  Constitutional:       Appearance: Normal appearance. HENT:      Head: Normocephalic and atraumatic. Eyes:      General: No visual field deficit. Extraocular Movements: Extraocular movements intact and EOM normal.      Conjunctiva/sclera: Conjunctivae normal.   Cardiovascular:      Rate and Rhythm: Normal rate and regular rhythm. Heart sounds: Normal heart sounds. Pulmonary:      Effort: Pulmonary effort is normal.      Breath sounds: Normal breath sounds. Skin:     General: Skin is warm and dry. Neurological:      Mental Status: He is alert and oriented to person, place, and time. Cranial Nerves: No dysarthria or facial asymmetry. Motor: Weakness present.  No tremor or seizure activity. Psychiatric:         Mood and Affect: Mood normal.         Speech: Speech normal.         Behavior: Behavior normal.         Thought Content: Thought content normal.       Neurologic Exam     Mental Status   Oriented to person, place, and time.    Speech: speech is normal     Cranial Nerves     CN III, IV, VI   Extraocular motions are normal.     Gait, Coordination, and Reflexes     Tremor   Resting tremor: absent  Intention tremor: absent  Action tremor: absent    Gait ataxia ,  Unable to walk    Complains of pain on movement,  Neck restrictated      Medications:  Reviewed    Infusion Medications:   Scheduled Medications:    sodium chloride (PF)  10 mL Intravenous Once    vancomycin  1,000 mg Intravenous Q12H    cefepime  2 g Intravenous Q12H    vancomycin (VANCOCIN) intermittent dosing (placeholder)   Other RX Placeholder    gabapentin  300 mg Oral TID    enzalutamide  160 mg Oral Daily    sodium chloride flush  10 mL Intravenous 2 times per day    bisacodyl  5 mg Oral Daily    morphine  15 mg Oral Q8H    mirtazapine  7.5 mg Oral Nightly    Vitamin D  2,000 Units Oral Dinner    cyanocobalamin  1,000 mcg Intramuscular Weekly    coenzyme Q10  100 mg Oral Daily    lidocaine  3 patch Transdermal Daily    polyethylene glycol  17 g Oral Daily    senna  1 tablet Oral Nightly    folic acid  1 mg Oral Daily    enoxaparin  40 mg Subcutaneous Daily     PRN Meds: [Held by provider] HYDROmorphone, diazePAM, sodium chloride flush, magnesium hydroxide, polyethylene glycol, ondansetron **OR** ondansetron, acetaminophen **OR** acetaminophen, oxyCODONE-acetaminophen, promethazine **OR** ondansetron    Labs:   Recent Labs     12/01/20  0601 12/02/20  0635 12/03/20  0603   WBC 14.4* 13.3* 12.4*   HGB 8.4* 8.7* 8.4*   HCT 26.3* 27.1* 26.9*   * 578* 660*     Recent Labs     12/01/20  0601 12/02/20  0635 12/03/20  0603   * 133* 138   K 3.9 4.0 3.7   CL 94* 96 103   CO2 23 24 25 BUN 9 10 9   CREATININE 0.42* 0.47* 0.46*   CALCIUM 9.0 9.1 8.6   PHOS 2.4 3.2 3.1     No results for input(s): AST, ALT, BILIDIR, BILITOT, ALKPHOS in the last 72 hours. No results for input(s): INR in the last 72 hours. No results for input(s): Zettie Binet in the last 72 hours. Urinalysis:   Lab Results   Component Value Date    NITRU POSITIVE 11/29/2020    WBCUA >100 11/29/2020    BACTERIA MANY 11/29/2020    RBCUA 3-5 09/26/2020    BLOODU Negative 11/29/2020    SPECGRAV 1.014 11/29/2020    GLUCOSEU Negative 11/29/2020       Radiology:   Most recent    EEG No procedure found. MRI of Brain No results found for this or any previous visit. No results found for this or any previous visit. MRA of the Head and Neck: No results found for this or any previous visit. No results found for this or any previous visit. No results found for this or any previous visit. CT of the Head:   Results for orders placed during the hospital encounter of 11/18/20   CT Head WO Contrast    Narrative CT Brain    Contrast medium:  Not utilized. History:  Weakness, fatigue    Comparison:  None    Findings:    Extra-axial spaces:  Normal.     Intracranial hemorrhage:  None. Ventricular system: Ventricles mildly enlarged. Sulci mildly prominent. Basal Cisterns:  Normal.    Cerebral Parenchyma: Bilateral symmetric periventricular areas decreased attenuation. Midline Shift:  None. Cerebellum:  Normal.     Paranasal sinuses and mastoid air cells:  Normal.    Visualized Orbits:  Normal.        Impression Impression:    No acute findings. Mild cerebral atrophy. Chronic ischemic white matter disease. All CT scans at this facility use dose modulation, iterative reconstruction, and/or weight based dosing when appropriate to reduce radiation dose to as low as reasonably achievable. No results found for this or any previous visit. No results found for this or any maximizing analgesics. CT of the cervical spine today shows rim-enhancing fluid collection containing small foci of air within the subcutaneous soft tissues at the C1-C5 levels. Concerning for abscess. Will order MRI cervical spine with and without contrast and consult neurosurgery (Dr. Hafsa Canseco). pts CT was reviewed / could be abscess/  MRI ordered  Will consult neurosurgery    MRI of cervical spine show 7.5 x 4.5 x 3.5 cm postoperative fluid collection status post recent cervical laminoplasty. Patient had abscess drainage done today with neurosurgery. Infectious diseases has been consulted. Patient now on cefepime and vancomycin. Cultures pending. MRI seen and discussed   Will have neurosurgery to see, findings as suspected clinically    I have personally performed a face to face diagnostic evaluation on this patient, reviewed all data and investigations, and am the sole provider of all clinical decisions on the neurological status of this patient. I examined the pts and I am following his neuro status and that's how I knew he has an abscess      Oscar Casas MD, Shashank Morrow, American Board of Psychiatry & Neurology  Board Certified in Vascular Neurology  Board Certified in Neuromuscular Medicine  Certified in Neurorehabilitation       Electronically signed by Freddy Shore, APRN - [facility] on 12/3/2020 at 2:12 PM

## 2020-12-03 NOTE — PROGRESS NOTES
Palliative Care Progress Note  Patient: Otto Phoenix  Gender: male  YOB: 1950  Unit/Bed: G089/F291-33  Code Status: Full Code  Inpatient Treatment Team: Treatment Team: Attending Provider: Christi Blue DO; Consulting Physician: Jacinda Kelley DO; Utilization Reviewer: Kathy Damon RN; Consulting Physician: Sameer Coleman MD; Consulting Physician: Bolivar Coreas MD; Registered Nurse: Farzana Segovia RN; : Bianca Bates RN; Consulting Physician: Darrell Baxter MD; Patient Care Tech: Deanne Duron; Consulting Physician: Jimmy Mayorga MD; : Chandana Harley [place]  Admit Date:  11/18/2020    Chief Complaint: Pain    History of Presenting Illness:      Otto Phoenix is a 79 y.o. male on hospital day 15 with a history of androgen independent prostate cancer. Known liver and bone mets, severe malnutrition, falls, urine retention self caths, gastric ulcer with hemorrhage. Presented with  progressive quadriparesis weight loss inability to ambulate. His legs will not support him anymore. Every few weeks he is getting weaker and weaker. He is now unable to ambulate. Found to have severe spinal canal stenosis at C3-C7 with cord atrophy      Laminoplasty with reconstruction was done. patient observed laying in bed. He is pleasant and cooperative. He is in NAD. He tells me that he is still having neck and right hip pain. Reports that his pain has improved today. He has been taking percocet 7.5/325 mg, Gabapentin 300 mg TID, MS Contin 15 mg every 8 hours, and iv dilaudid 1 mg every three hours as needed. Today he reports that his pain is 6/10. He has used his IV Dilaudid two times in the past twenty four hours. The last dose was at [address] on 12/2. He has used his percocet x 3 in the past twenty four hours. His last dose was at 1020 this morning. The plan is for discharge to a SNF. He will not be able to use IV Dilaudid at the SNF.  Will put the dilaudid on hold and monitor his pain control for twenty four hours. Will continue to titrate his oral medication regime to control his pain. Negative significant emotional, spiritual, or sleep disturbance. Review of Systems:       Review of Systems   Constitutional: Negative for activity change, appetite change, chills, diaphoresis, fatigue, fever and unexpected weight change. HENT: Negative for drooling, hearing loss, mouth sores, sore throat, trouble swallowing and voice change. Eyes: Negative for discharge and visual disturbance. Respiratory: Negative for apnea, cough, choking, chest tightness, shortness of breath, wheezing and stridor. Cardiovascular: Negative for chest pain, palpitations and leg swelling. Gastrointestinal: Negative for abdominal distention, abdominal pain, anal bleeding, blood in stool, constipation, diarrhea, nausea, rectal pain and vomiting. Genitourinary: Negative for difficulty urinating, dysuria, enuresis, frequency and hematuria. Musculoskeletal: Positive for arthralgias, back pain, gait problem, neck pain and neck stiffness. Negative for joint swelling and myalgias. Skin: Negative for color change, pallor, rash and wound. Allergic/Immunologic: Negative for food allergies and immunocompromised state. Neurological: Positive for weakness. Negative for dizziness, tremors, seizures, syncope, facial asymmetry, speech difficulty, light-headedness, numbness and headaches. Hematological: Negative for adenopathy. Does not bruise/bleed easily. Psychiatric/Behavioral: Negative for agitation, behavioral problems, confusion, decreased concentration, dysphoric mood, hallucinations, self-injury, sleep disturbance and suicidal ideas. The patient is not nervous/anxious and is not hyperactive.         Physical Examination:       BP (!) 170/98   Pulse 95   Temp 99.1 °F (37.3 °C) (Oral)   Resp 16   Ht 6' 1\" (1.854 m)   Wt 147 lb 4.3 oz (66.8 kg)   SpO2 100%   BMI 19.43 kg/m²    Physical Exam  Constitutional:       General: He is not in acute distress. Appearance: He is underweight. He is not diaphoretic. HENT:      Head: Normocephalic and atraumatic. Right Ear: External ear normal.      Left Ear: External ear normal.      Nose: Nose normal.      Mouth/Throat:      Pharynx: No oropharyngeal exudate. Eyes:      General: No scleral icterus. Right eye: No discharge. Left eye: No discharge. Conjunctiva/sclera: Conjunctivae normal.      Pupils: Pupils are equal, round, and reactive to light. Neck:      Musculoskeletal: Normal range of motion and neck supple. Thyroid: No thyromegaly. Vascular: No JVD. Trachea: No tracheal deviation. Cardiovascular:      Rate and Rhythm: Normal rate and regular rhythm. Heart sounds: Normal heart sounds. Pulmonary:      Effort: Pulmonary effort is normal. No respiratory distress. Breath sounds: Normal breath sounds. No stridor. No wheezing or rales. Chest:      Chest wall: No tenderness. Abdominal:      General: Bowel sounds are normal. There is no distension. Palpations: Abdomen is soft. There is no mass. Tenderness: There is no abdominal tenderness. There is no guarding or rebound. Musculoskeletal: Normal range of motion. General: No tenderness or deformity. Lymphadenopathy:      Cervical: No cervical adenopathy. Skin:     General: Skin is warm and dry. Findings: No erythema or rash. Neurological:      Mental Status: He is alert and oriented to person, place, and time. Cranial Nerves: No cranial nerve deficit. Coordination: Coordination normal.   Psychiatric:         Behavior: Behavior normal.         Thought Content:  Thought content normal.         Judgment: Judgment normal.         Allergies:      No Known Allergies    Medications:      Current Facility-Administered Medications   Medication Dose Route Frequency Provider Last Rate Last Dose    sodium chloride (PF) 0.9 % injection 10 mL  10 mL Intravenous Once Robyn Sorto MD        HYDROmorphone (DILAUDID) injection 1 mg  1 mg Intravenous Q3H PRN JOYCE Pena CNP   1 mg at 12/02/20 1840    gabapentin (NEURONTIN) capsule 300 mg  300 mg Oral TID JOYCE Martinez - CNP   300 mg at 12/03/20 1020    enzalutamide (XTANDI) capsule 160 mg  160 mg Oral Daily Amina Durham DO        cefTRIAXone (ROCEPHIN) 1 g IVPB in 50 mL D5W minibag  1 g Intravenous Q24H Chuyita Martinez MD   Stopped at 12/02/20 1335    diazePAM (VALIUM) tablet 2 mg  2 mg Oral Q6H PRN JOYCE Riggs CNP   2 mg at 12/02/20 2025    sodium chloride flush 0.9 % injection 10 mL  10 mL Intravenous 2 times per day Robyn Sorto MD   10 mL at 12/02/20 2025    sodium chloride flush 0.9 % injection 10 mL  10 mL Intravenous PRN Robyn Sorto MD        bisacodyl (DULCOLAX) EC tablet 5 mg  5 mg Oral Daily Robyn Sorto MD   5 mg at 12/03/20 1020    magnesium hydroxide (MILK OF MAGNESIA) 400 MG/5ML suspension 30 mL  30 mL Oral Daily PRN Robyn Sorto MD        polyethylene glycol [facility]) packet 17 g  17 g Oral Daily PRN Robyn Sorto MD        ondansetron (ZOFRAN-ODT) disintegrating tablet 4 mg  4 mg Oral Q8H PRN Robyn Sorto MD        Or    [facility]ron [facility]) injection 4 mg  4 mg Intravenous Q6H PRN Robyn Sorto MD        morphine (MS CONTIN) extended release tablet 15 mg  15 mg Oral Q8H Idella Stake HEATHER AlvaresN - CNP   15 mg at 12/03/20 0513    mirtazapine (REMERON) tablet 7.5 mg  7.5 mg Oral Nightly Idella Stake HEATHER HansenN - CNP   7.5 mg at 12/02/20 2026    Vitamin D (CHOLECALCIFEROL) tablet 2,000 Units  2,000 Units Oral Dinner Nicole Reyes DO   2,000 Units at 12/02/20 1653    cyanocobalamin injection 1,000 mcg  1,000 mcg Intramuscular Weekly Nicole Scullin, DO   1,000 mcg at 12/01/20 2039    coenzyme Q10 capsule 100 mg  100 mg Oral Daily Nicole Scullin, DO   100 mg at 12/03/20 1020    lidocaine 4 % external patch 3 patch  3 patch Transdermal Daily Nicole Scullin, DO   3 patch at 11/30/20 0900    acetaminophen (TYLENOL) tablet 650 mg  650 mg Oral Q4H PRN Nicole Scullin, DO        Or    acetaminophen (TYLENOL) suppository 650 mg  650 mg Rectal Q6H PRN Nicole Scullin, DO        oxyCODONE-acetaminophen (PERCOCET) 7.5-325 MG per tablet 1 tablet  1 tablet Oral Q4H PRN Nicole Scullin, DO   1 tablet at 12/03/20 1020    polyethylene glycol (GLYCOLAX) packet 17 g  17 g Oral Daily Ladonna Skates, DO   17 g at 12/03/20 1019    senna (SENOKOT) tablet 8.6 mg  1 tablet Oral Nightly Ladonna Skates, DO   8.6 mg at 15/83/01 7966    folic acid (FOLVITE) tablet 1 mg  1 mg Oral Daily Bruno Inman MD   1 mg at 12/03/20 1020    promethazine (PHENERGAN) tablet 12.5 mg  12.5 mg Oral Q6H PRN Shanita Chester MD        Or    ondansetron [facility]) injection 4 mg  4 mg Intravenous Q6H PRN Shanita Chester MD   4 mg at 11/18/20 1843    enoxaparin (LOVENOX) injection 40 mg  40 mg Subcutaneous Daily Shanita Chester MD   40 mg at 12/02/20 0848       History:       PMHx:  Past Medical History:   Diagnosis Date    PAF (paroxysmal atrial fibrillation) ([facility] [address]     ON XARELTO    Prostate cancer ([facility] [address] 11/2019       PSHx:  Past Surgical History:   Procedure Laterality Date    CERVICAL LAMINECTOMY N/A 11/23/2020    CERVICAL C3-4,4-5,5-6  LAMINOPLASTY WITH RECONSTRUCTION performed by Rubin Mensah MD at [facility] COLONOSCOPY N/A 9/29/2020    COLONOSCOPY DIAGNOSTIC performed by Sameer Sanchez MD at [facility] Right 11/2018    hip    UPPER GASTROINTESTINAL ENDOSCOPY N/A 9/29/2020    EGD DIAGNOSTIC ONLY performed by Sameer Sanchez MD at [facility] [address] Hx:  Social History     Socioeconomic History    Marital status: Single     Spouse name: None    Number of children: None    Years of education: None    Highest education level: None   Occupational History    Occupation: retired [employer]   Social Needs  12/03/2020    MPV 8.7 09/09/2015     CBC with Differential:   Lab Results   Component Value Date    WBC 12.4 12/03/2020    RBC 3.47 12/03/2020    HGB 8.4 12/03/2020    HCT 26.9 12/03/2020     12/03/2020    MCV 77.5 12/03/2020    MCH 24.2 12/03/2020    MCHC 31.3 12/03/2020    RDW 19.1 12/03/2020    METASPCT 1 09/26/2020    LYMPHOPCT 13.0 12/03/2020    MONOPCT 13.0 12/03/2020    MYELOPCT 1 09/26/2020    BASOPCT 0.5 12/03/2020    MONOSABS 1.6 12/03/2020    LYMPHSABS 1.6 12/03/2020    EOSABS 0.1 12/03/2020    BASOSABS 0.0 12/03/2020     CMP:    Lab Results   Component Value Date     12/03/2020    K 3.7 12/03/2020    K 4.2 11/25/2020     12/03/2020    CO2 25 12/03/2020    BUN 9 12/03/2020    CREATININE 0.46 12/03/2020    GFRAA >60.0 12/03/2020    LABGLOM >60.0 12/03/2020    GLUCOSE 125 12/03/2020    PROT 8.0 11/18/2020    LABALBU 2.6 12/03/2020    CALCIUM 8.6 12/03/2020    BILITOT 0.7 11/18/2020    ALKPHOS 233 11/18/2020    AST 26 11/18/2020    ALT 9 11/18/2020     BMP:    Lab Results   Component Value Date     12/03/2020    K 3.7 12/03/2020    K 4.2 11/25/2020     12/03/2020    CO2 25 12/03/2020    BUN 9 12/03/2020    LABALBU 2.6 12/03/2020    CREATININE 0.46 12/03/2020    CALCIUM 8.6 12/03/2020    GFRAA >60.0 12/03/2020    LABGLOM >60.0 12/03/2020    GLUCOSE 125 12/03/2020     TSH:   Lab Results   Component Value Date    TSH 1.210 11/19/2020     Vitamin B12 and Folate: No components found for: FOLIC,  C62  Urinalysis:   Lab Results   Component Value Date    NITRU POSITIVE 11/29/2020    WBCUA >100 11/29/2020    BACTERIA MANY 11/29/2020    RBCUA 3-5 09/26/2020    BLOODU Negative 11/29/2020    SPECGRAV 1.014 11/29/2020    GLUCOSEU Negative 11/29/2020           FUNCTIONAL ADL´S:     Independent: [ x ] Eating, [   ] Dressing, [   ] Transferring, [   ] Toileting, [   ] Gillianur Drones, [   ] Continence  Dependent   : [  ] Eating, [   ] Dressing, [   ] Transferring, [   ] Santiago Shield, [   ] Bathing, [   ] Continence  W. assistant : [  ] Eating, [  x ] Dressing, [ x  ] Transferring, [  x ] Toileting, [  x ] Bathing, [   x] Continence    Radiology: Reviewed      No results found. Assessment and plan:    1. Recurrent falls multifactorial in etiology: Orthostatic hypotension suspect due to autonomic dysfunction,spinal stenosis, advanced prostate cancer on chemotherapy, moderate protein calorie malnutrition  Follow with primary medical team  Continue PT    2. Prostate Cancer with mets  -Oncology Following    3. Anorexia and Malnutrition  -Start Mirtazapine 7.5 mg po daily at HS  -Continue nutritional Supplementation    4. Spinal Stenosis with weakness and paraesthesia  -Neurosurgery following  -Currently taking Gabapentin 300 mg TID    5. Pain rt neoplasm and spinal stenosis  Much discussion that he should utilize the medication in place for breakthrough pain. Discussed need for breakthrough pain medication with nursing staff. He should medicate one hour prior to PT so he can have maximum participation.   -Continue MS Contin to 15 mg po every 8 hours  -Continue Percocet 7.5/325 mg po every 4 hours for moderate to severe breakthrough pain  -Change Hydromorphone 1.0mg IV every 3 hours prn severe breakthrough pain  -Continue Gabapentin to 300 mg po tid  -Will start heat pack to affected area and Lidocaine patch.  -The plan is for discharge to a SNF. He will not be able to use IV Dilaudid at the SNF. Will put the dilaudid on hold and monitor his pain control for twenty four hours. Will continue to titrate his oral medication regime to control his pain.  -Will consider increasing percocet to 10/325  if current regime is not effective    6. Constipation  -Continue current POC  -Increase fiber and fluid in diet    -Advance Care Planning  Remains FULL CODE      -Goals of Care Discussion:  We discussed all care options contingent on treatment response and QOL.  Much active listening, presence, and emotional support were given.      Electronically signed by JOYCE Lan CNP on 12/3/2020 at 12:14 PM

## 2020-12-03 NOTE — CONSULTS
Resistant  128  mcg/mL      trimethoprim-sulfamethoxazole  Sensitive  <=20  mcg/mL       12/3/2020  7:04 AM - Ry, Chpo Incoming Lab Results From Soft     Component  Value  Ref Range & Units  Status  Collected  Lab    Sed Rate  114High    0 - 20 mm               Review of Systems   Constitutional: Negative for chills, diaphoresis, fatigue and fever. HENT: Negative. Eyes: Negative. Respiratory: Negative. Cardiovascular: Negative. Gastrointestinal: Negative. Endocrine: Negative. Musculoskeletal: Positive for back pain and neck pain. Neurological: Positive for weakness. Hematological: Negative. Review of Systems: All 14 review of systems negative other than as stated above    Social History     Tobacco Use    Smoking status: Never Smoker    Smokeless tobacco: Never Used    Tobacco comment: denies   Substance Use Topics    Alcohol use: Not Currently     Frequency: 2-4 times a month     Comment: denies    Drug use: No     Comment: denies         Past Medical History:   Diagnosis Date    PAF (paroxysmal atrial fibrillation) ([facility] [address]     ON XARELTO    Prostate cancer ([facility] [address] 11/2019           Past Surgical History:   Procedure Laterality Date    CERVICAL LAMINECTOMY N/A 11/23/2020    CERVICAL C3-4,4-5,5-6  LAMINOPLASTY WITH RECONSTRUCTION performed by Adria Curling, MD at [address] [place] [address] N/A 9/29/2020    COLONOSCOPY DIAGNOSTIC performed by Linda Healy MD at [address] [address] Right 11/2018    hip    UPPER GASTROINTESTINAL ENDOSCOPY N/A 9/29/2020    EGD DIAGNOSTIC ONLY performed by Linda Healy MD at [facility]         No current facility-administered medications on file prior to encounter.       Current Outpatient Medications on File Prior to Encounter   Medication Sig Dispense Refill    enzalutamide (XTANDI) 40 MG capsule Take 160 mg by mouth daily      oxyCODONE (OXY-IR) 15 MG immediate release tablet Take 1 tablet by mouth every 6 hours as needed for Pain (moderate to severe pain) for up to 120 days. 120 tablet 0    leuprolide (LUPRON) 7.5 MG injection Inject 7.5 mg into the muscle every 28 days      Misc. Devices (ROLLATOR ULTRA-LIGHT) MISC 1 Device by Does not apply route daily 1 each 0    sennosides-docusate sodium (SENOKOT-S) 8.6-50 MG tablet Take 2 tablets by mouth daily as needed for Constipation 120 tablet 3    Polyethylene Glycol 3350 POWD Take by mouth      ondansetron (ZOFRAN) 4 MG tablet Take 1 tablet by mouth daily as needed for Nausea or Vomiting 30 tablet 0    prochlorperazine (COMPAZINE) 10 MG tablet Take 10 mg by mouth every 6 hours as needed         No Known Allergies      History reviewed. No pertinent family history. Physical Exam:      Physical Exam   Constitutional: No distress. HENT:   Head: Normocephalic. Eyes: Pupils are equal, round, and reactive to light. Neck: Normal range of motion. No JVD present. No tracheal deviation present. No thyromegaly present. Cardiovascular:   No murmur heard. Pulmonary/Chest: Effort normal and breath sounds normal. No respiratory distress. He has no wheezes. He has no rales. He exhibits no tenderness. Abdominal: Soft. Bowel sounds are normal. He exhibits no distension and no mass. There is no abdominal tenderness. There is no rebound and no guarding. Musculoskeletal:         General: No tenderness or edema. Lymphadenopathy:     He has no cervical adenopathy. Neurological: He is alert. Skin: Skin is warm. No rash noted. He is not diaphoretic. No erythema. No pallor. Blood pressure (!) 170/98, pulse 95, temperature 99.1 °F (37.3 °C), temperature source Oral, resp. rate 16, height 6' 1\" (1.854 m), weight 147 lb 4.3 oz (66.8 kg), SpO2 100 %.       .   Lab Results   Component Value Date    WBC 12.4 (H) 12/03/2020    HGB 8.4 (L) 12/03/2020    HCT 26.9 (L) 12/03/2020    MCV 77.5 (L) 12/03/2020     (H) 12/03/2020     Lab Results   Component Value Date     12/03/2020    K 3.7 12/03/2020    K 4.2 11/25/2020     12/03/2020    CO2 25 12/03/2020    BUN 9 12/03/2020    CREATININE 0.46 12/03/2020    GLUCOSE 125 12/03/2020    CALCIUM 8.6 12/03/2020 11/29/2020  4:27 AM - Ry, Chpo Incoming Lab Results From Soft     Component  Value  Ref Range & Units  Status  Collected  Lab    Color, UA  Yellow  Straw/Yellow  Final  11/29/2020  3:31 AM   - [address] [place]  CLOUDYAbnormal    Clear  Final  11/29/2020  3:31 AM  [facility]    Glucose, Ur  Negative  Negative mg/dL  Final  11/29/2020  3:31 AM   - [place] [facility]    Bilirubin Urine  Negative  Negative  Final  11/29/2020  3:31 AM  [facility]    Ketones, Urine  Negative  Negative mg/dL  Final  11/29/2020  3:31 AM  [facility]    Specific Gravity, UA  1.014  1.005 - 1.030  Final  11/29/2020  3:31 AM  [facility]    Blood, Urine  Negative  Negative  Final  11/29/2020  3:31 AM  [facility]    pH, UA  >=9.0  5.0 - 9.0  Final  11/29/2020  3:31 AM  [facility]    Protein, UA  100Abnormal    Negative mg/dL  Final  11/29/2020  3:31 AM  [facility]    Urobilinogen, Urine  1.0  <2.0 E.U./dL  Final  11/29/2020  3:31 AM  [facility]    Nitrite, Urine  POSITIVEAbnormal    Negative  Final  11/29/2020  3:31 AM  [facility]    Leukocyte Esterase, Urine  LARGEAbnormal    Negative  Final  11/29/2020  3:31 AM             MRI CERVICAL SPINE W WO CONTRAST : 12/3/2020         COMPARISON: Cervical spine CT with contrast 12/2/2020         REASON FOR EXAMINATION:   possible cervical spinal abscess           TECHNIQUE: Multiplanar MR imaging of the cervical spine was performed before and after intravenous administration of approximately 15 mL of MultiHance gadolinium contrast.         FINDINGS:           Postsurgical changes of posterior decompression left laminoplasty at C3-C5 are again noted.         A mildly marginally enhancing organized fluid collection within the posterior soft tissues measures approximately 4.5 x 3.5 x 7.5 cm (transverse, AP and sagittal dimension).         This fluid collection does not appear to be contiguous with the spinal canal, and there is no residual spinal stenosis, epidural fluid collection or other significant changes identified.         Multilevel degenerative changes of the cervical spine described in detail on prior studies are again noted.                        Impression         APPROXIMATELY 7.5 X 4.5 X 3.5 CM POSTOPERATIVE FLUID COLLECTION AFTER RECENT CERVICAL LAMINOPLASTY.         INFECTION SHOULD BE EXCLUDED CLINICALLY.         THIS DOES NOT APPEAR TO COMMUNICATE TO THE SPINAL CANAL, AND THERE IS NO EPIDURAL COLLECTION OR OTHER COMPLICATION IDENTIFIED.           ASSESSMENT:  PLAN:    Postoperative abscess of neck  Given findings of MRI and elevated sedimentation rate    Scheduled for drainage of abscess today we will follow up on cultures    Changed to cefepime vancomycin      UTI with Proteus

## 2020-12-03 NOTE — PLAN OF CARE
Problem: Pain:  Goal: Pain level will decrease  Description: Pain level will decrease  12/3/2020 0101 by Devante Colón RN  Outcome: Ongoing  12/3/2020 0101 by Devante Colón RN  Outcome: Ongoing

## 2020-12-03 NOTE — PROGRESS NOTES
Pharmacy Note  Vancomycin Consult    Stacy Jim is a 79 y.o. male started on Vancomycin for UTI; consult received from Dr. Dayna Walsh MD  to manage therapy. Also receiving the following antibiotics: Cefepime. Patient Active Problem List   Diagnosis    Liver mass    Osteoarthritis of hip    Palpitations    Other specified disorders of bone density and structure, unspecified site     Anemia    Weakness    Gastric ulcer    Goals of care, counseling/discussion    Gastrointestinal hemorrhage    Rectal mass    Acute cystitis    Metastatic cancer (HCC)    Fall at home, initial encounter    Severe malnutrition ([facility] [address]    Falls, initial encounter    Myelopathy ([facility] [address]    Benign prostatic hyperplasia with incomplete bladder emptying    Malignant tumor of prostate ([facility] [address]    Raised prostate specific antigen    Retention of urine    Urethritis    Ataxic gait    Stenosis of cervical spine with myelopathy (HCC)    Neurogenic bladder    Sepsis due to gram-negative UTI (HCC)       Allergies:  Patient has no known allergies.      Temp max: 99.1 F    Recent Labs     12/02/20  0635 12/03/20  0603   BUN 10 9       Recent Labs     12/02/20  0635 12/03/20  0603   CREATININE 0.47* 0.46*       Recent Labs     12/02/20  0635 12/03/20  0603   WBC 13.3* 12.4*         Intake/Output Summary (Last 24 hours) at 12/3/2020 1339  Last data filed at 12/3/2020 4540  Gross per 24 hour   Intake 120 ml   Output 1000 ml   Net -880 ml       Culture Date      Source                       Results  Contains abnormal data  Culture, Urine   Order: [id number]   Status:  Final result   Visible to patient:  No (not released) Next appt:  None   Specimen Information:  Urine, clean catch          Component  11/29/20 0422   Organism  Proteus hauseriAbnormal       Urine Culture, Routine  >100,000 CFU/ml     Susceptibility      Proteus hauseri      BACTERIAL SUSCEPTIBILITY PANEL BY MADELYN      amoxicillin-clavulanate  8 mcg/mL  Sensitive      ampicillin  >=32 mcg/mL Resistant      ceFAZolin  >=64 mcg/mL  Resistant      cefTRIAXone  <=1 mcg/mL  Sensitive      ciprofloxacin  <=0.25 mcg/mL  Sensitive      gentamicin  <=1 mcg/mL  Sensitive      nitrofurantoin  128 mcg/mL  Resistant      trimethoprim-sulfamethoxazole  <=20 mcg/mL  Sensitive               Narrative             Ht Readings from Last 1 Encounters:   11/18/20 6' 1\" (1.854 m)        Wt Readings from Last 1 Encounters:   11/18/20 147 lb 4.3 oz (66.8 kg)         Body mass index is 19.43 kg/m². Estimated Creatinine Clearance: 141 mL/min (A) (based on SCr of 0.46 mg/dL (L)). Goal Trough Level: 10-20 mcg/mL    Assessment/Plan:  Will initiate Vancomycin 1000 mg IV every q12 hours. Timing of trough level will be prior to 4th dose. Dose adjustments will be determined based on culture results, renal function, and clinical response. Thank you for the consult. Will continue to follow.    Valerie WyattD

## 2020-12-03 NOTE — PROGRESS NOTES
Physical Therapy Missed Treatment   Facility/Department: [facility] MED SURG [id number]    NAME: Hima Shaver    : 1950 (79 y.o.)  MRN: [id number]    Account: [de-identified]  Gender: male    Chart reviewed, attempted PT at 1450. Patient unavailable 2° to:    [] Hold per nsg request    [x] Pt declined pt with procedure this AM, unwilling to work with therapy at this time. [] Pt. . off floor for test/procedure. [] Pt. Unavailable       Will attempt PT treatment again at earliest convenience.       Electronically signed by Duncan MAYRA on 12/3/20 at 2:59 PM EST

## 2020-12-03 NOTE — CARE COORDINATION
Patient to have procedure today. SW to work on placement for possible discharge 12/4.   Electronically signed by Bo Arnold RN on 12/3/2020 at 11:32 AM

## 2020-12-03 NOTE — PROGRESS NOTES
Physical Therapy Missed Treatment   Facility/Department: [facility] MED SURG [id number]    NAME: Kinga Falcon    : 1950 (79 y.o.)  MRN: [id number]    Account: [de-identified]  Gender: male    Chart reviewed, attempted PT at [facility]. Patient unavailable 2° to:    [x] Pt. . off floor for test/procedure. MRI        Will attempt PT treatment again at earliest convenience.       Electronically signed by Travis Torres PTA on 12/3/20 at 8:51 AM EST

## 2020-12-03 NOTE — FLOWSHEET NOTE
1925- Pt to CT    Pt back from CT, MRI of C Spine ordered. Writer spoke to Viktor in [address]  Stated she would not get to complete the MRI tonight. MRI checklist  Completed with the patient. Pt aware of NPO after midnight order. Incision on the back of pt's neck well approximated, ROGELIO. Pt c/o neck pain, PRN and scheduled paid medication administered as ordered. Pt assisted to turn Q2. Pt assisted to straight cath. 5611- MRI called, sending for pt. Writer to administer 2 mg IV Ativan. Pt tearful about MRI and possible surgery. Pt reminded he makes the decisions regarding his care. He states he wants to get well. Questions answered for pt.

## 2020-12-03 NOTE — PROGRESS NOTES
Patient straight caths himself at home as well as in the hospital.   Asked patient if he wanted help straight cathing now and he declined stating he didn't feel he had to go.   Will continue to ask

## 2020-12-03 NOTE — PROGRESS NOTES
Status post 11/23/2020 cervical laminoplasty with reconstruction. Patient is very slow to move difficult to get out of bed. Complaining of cervical pain. He does have gross movement in the upper and lower extremities but he is not initiating or is he able to cooperate with physical therapy. Examination the wound shows it is healing very well. Palpation shows no tenderness induration swelling. He complains of pain with head rotations and prefers to stay fixed rotated to the left. No external evidence of infection. CT of the neck 12/2/2020 does show fluid collection subcutaneously. CT is not a good study for abscess and we are awaiting results of MRI study. Other studies pending are C-reactive protein sed rate. WBC 13.3 12/2/2020. At this time I cannot ascertain if there is an abscess postoperatively. Examination shows wound appears to be healing very well. Assessment in progress.

## 2020-12-03 NOTE — SEDATION DOCUMENTATION
NO SEDATION    1130- Patient to Ultrasound via bed. Monitors and oxygen applied. Consent verified. 1139-Dr. William Hart in. Procedure explained, all questions and concerns addressed with patient. 1142-Time out complete. Initial images obtained by Dr. William Hart. 1143-Area generously prepped with chlorhexidine per Dr. William Hart. 1146-Patient draped with full body in sterile fashion. 1148-Lidocaine 2 % given by Dr William Hart at the marked site with ultrasound guidance. 1149-Skin knick made by Dr. William Hart to posterior cervical area. 1150-Flexima APDL prepared for insertion. 1151-Flexima APDL locking pigtail 6 Fr by 25 cm drain (Lot #[id number], expires 06/26/2021) inserted into fluid collection under ultrasound guidance. 1157-Specimen obtained by aspirating fluid content thin, mildly turbid, red color noted. 20cc total obtained. 1158-Uresil drain attached to APDL locking pigtail by Dr. William Hart. 1200-Stay fix dressing applied, guaze, and tegaderm. No bleeding noted. 1217-Report given. Patient tolerated well. Specimens taken to the lab.

## 2020-12-04 ENCOUNTER — APPOINTMENT (OUTPATIENT)
Dept: INTERVENTIONAL RADIOLOGY/VASCULAR | Age: 70
DRG: 515 | End: 2020-12-04
Payer: MEDICARE

## 2020-12-04 LAB
ALBUMIN SERPL-MCNC: 2.6 G/DL (ref 3.5–4.6)
ANION GAP SERPL CALCULATED.3IONS-SCNC: 11 MEQ/L (ref 9–15)
BASOPHILS ABSOLUTE: 0 K/UL (ref 0–0.2)
BASOPHILS RELATIVE PERCENT: 0.6 %
BLOOD CULTURE, ROUTINE: NORMAL
BUN BLDV-MCNC: 9 MG/DL (ref 8–23)
C-REACTIVE PROTEIN: 130.1 MG/L (ref 0–5)
CALCIUM SERPL-MCNC: 8.7 MG/DL (ref 8.5–9.9)
CHLORIDE BLD-SCNC: 100 MEQ/L (ref 95–107)
CO2: 25 MEQ/L (ref 20–31)
CREAT SERPL-MCNC: 0.42 MG/DL (ref 0.7–1.2)
CULTURE, BLOOD 2: NORMAL
EOSINOPHILS ABSOLUTE: 0.2 K/UL (ref 0–0.7)
EOSINOPHILS RELATIVE PERCENT: 2.8 %
GFR AFRICAN AMERICAN: >60
GFR NON-AFRICAN AMERICAN: >60
GLUCOSE BLD-MCNC: 101 MG/DL (ref 70–99)
GRAM STAIN RESULT: NORMAL
HCT VFR BLD CALC: 26.3 % (ref 42–52)
HEMOGLOBIN: 8.4 G/DL (ref 14–18)
LYMPHOCYTES ABSOLUTE: 1.6 K/UL (ref 1–4.8)
LYMPHOCYTES RELATIVE PERCENT: 20.1 %
MAGNESIUM: 2.2 MG/DL (ref 1.7–2.4)
MCH RBC QN AUTO: 24.8 PG (ref 27–31.3)
MCHC RBC AUTO-ENTMCNC: 32.1 % (ref 33–37)
MCV RBC AUTO: 77.4 FL (ref 80–100)
MONOCYTES ABSOLUTE: 0.8 K/UL (ref 0.2–0.8)
MONOCYTES RELATIVE PERCENT: 10.5 %
NEUTROPHILS ABSOLUTE: 5.3 K/UL (ref 1.4–6.5)
NEUTROPHILS RELATIVE PERCENT: 66 %
PDW BLD-RTO: 19 % (ref 11.5–14.5)
PHOSPHORUS: 2.9 MG/DL (ref 2.3–4.8)
PLATELET # BLD: 635 K/UL (ref 130–400)
POTASSIUM SERPL-SCNC: 3.6 MEQ/L (ref 3.4–4.9)
RBC # BLD: 3.39 M/UL (ref 4.7–6.1)
SODIUM BLD-SCNC: 136 MEQ/L (ref 135–144)
WBC # BLD: 8.1 K/UL (ref 4.8–10.8)

## 2020-12-04 PROCEDURE — 6370000000 HC RX 637 (ALT 250 FOR IP): Performed by: PHYSICAL MEDICINE & REHABILITATION

## 2020-12-04 PROCEDURE — 99232 SBSQ HOSP IP/OBS MODERATE 35: CPT | Performed by: INTERNAL MEDICINE

## 2020-12-04 PROCEDURE — 6360000002 HC RX W HCPCS: Performed by: INTERNAL MEDICINE

## 2020-12-04 PROCEDURE — 36415 COLL VENOUS BLD VENIPUNCTURE: CPT

## 2020-12-04 PROCEDURE — 99233 SBSQ HOSP IP/OBS HIGH 50: CPT | Performed by: PSYCHIATRY & NEUROLOGY

## 2020-12-04 PROCEDURE — 6370000000 HC RX 637 (ALT 250 FOR IP): Performed by: INTERNAL MEDICINE

## 2020-12-04 PROCEDURE — 2500000003 HC RX 250 WO HCPCS: Performed by: INTERNAL MEDICINE

## 2020-12-04 PROCEDURE — 2580000003 HC RX 258: Performed by: INTERNAL MEDICINE

## 2020-12-04 PROCEDURE — 6370000000 HC RX 637 (ALT 250 FOR IP): Performed by: NURSE PRACTITIONER

## 2020-12-04 PROCEDURE — 97535 SELF CARE MNGMENT TRAINING: CPT

## 2020-12-04 PROCEDURE — 02HV33Z INSERTION OF INFUSION DEVICE INTO SUPERIOR VENA CAVA, PERCUTANEOUS APPROACH: ICD-10-PCS | Performed by: RADIOLOGY

## 2020-12-04 PROCEDURE — 80069 RENAL FUNCTION PANEL: CPT

## 2020-12-04 PROCEDURE — 85025 COMPLETE CBC W/AUTO DIFF WBC: CPT

## 2020-12-04 PROCEDURE — 6370000000 HC RX 637 (ALT 250 FOR IP): Performed by: NEUROLOGICAL SURGERY

## 2020-12-04 PROCEDURE — 36573 INSJ PICC RS&I 5 YR+: CPT

## 2020-12-04 PROCEDURE — 99232 SBSQ HOSP IP/OBS MODERATE 35: CPT | Performed by: NURSE PRACTITIONER

## 2020-12-04 PROCEDURE — 1210000000 HC MED SURG R&B

## 2020-12-04 PROCEDURE — 97110 THERAPEUTIC EXERCISES: CPT

## 2020-12-04 PROCEDURE — 6370000000 HC RX 637 (ALT 250 FOR IP): Performed by: PSYCHIATRY & NEUROLOGY

## 2020-12-04 PROCEDURE — 2580000003 HC RX 258: Performed by: NEUROLOGICAL SURGERY

## 2020-12-04 PROCEDURE — 6360000002 HC RX W HCPCS: Performed by: FAMILY MEDICINE

## 2020-12-04 PROCEDURE — 83735 ASSAY OF MAGNESIUM: CPT

## 2020-12-04 PROCEDURE — 2709999900 IR PICC WO SQ PORT/PUMP > 5 YEARS

## 2020-12-04 RX ORDER — SODIUM CHLORIDE 0.9 % (FLUSH) 0.9 %
10 SYRINGE (ML) INJECTION EVERY 12 HOURS SCHEDULED
Status: DISCONTINUED | OUTPATIENT
Start: 2020-12-04 | End: 2020-12-10 | Stop reason: HOSPADM

## 2020-12-04 RX ORDER — OXYCODONE AND ACETAMINOPHEN 10; 325 MG/1; MG/1
1 TABLET ORAL EVERY 4 HOURS PRN
Status: DISCONTINUED | OUTPATIENT
Start: 2020-12-04 | End: 2020-12-10 | Stop reason: HOSPADM

## 2020-12-04 RX ORDER — SODIUM CHLORIDE 0.9 % (FLUSH) 0.9 %
10 SYRINGE (ML) INJECTION PRN
Status: DISCONTINUED | OUTPATIENT
Start: 2020-12-04 | End: 2020-12-10 | Stop reason: HOSPADM

## 2020-12-04 RX ORDER — SODIUM CHLORIDE 9 MG/ML
250 INJECTION, SOLUTION INTRAVENOUS ONCE
Status: COMPLETED | OUTPATIENT
Start: 2020-12-04 | End: 2020-12-04

## 2020-12-04 RX ORDER — LIDOCAINE HYDROCHLORIDE 20 MG/ML
5 INJECTION, SOLUTION INFILTRATION; PERINEURAL ONCE
Status: COMPLETED | OUTPATIENT
Start: 2020-12-04 | End: 2020-12-04

## 2020-12-04 RX ADMIN — OXYCODONE HYDROCHLORIDE AND ACETAMINOPHEN 1 TABLET: 7.5; 325 TABLET ORAL at 10:26

## 2020-12-04 RX ADMIN — SODIUM CHLORIDE 250 ML: 9 INJECTION, SOLUTION INTRAVENOUS at 15:06

## 2020-12-04 RX ADMIN — Medication 2000 UNITS: at 16:05

## 2020-12-04 RX ADMIN — Medication 10 ML: at 20:49

## 2020-12-04 RX ADMIN — GABAPENTIN 300 MG: 300 CAPSULE ORAL at 20:47

## 2020-12-04 RX ADMIN — FOLIC ACID 1 MG: 1 TABLET ORAL at 10:26

## 2020-12-04 RX ADMIN — VANCOMYCIN HYDROCHLORIDE 1000 MG: 1 INJECTION, POWDER, LYOPHILIZED, FOR SOLUTION INTRAVENOUS at 16:04

## 2020-12-04 RX ADMIN — OXYCODONE HYDROCHLORIDE AND ACETAMINOPHEN 1 TABLET: 7.5; 325 TABLET ORAL at 01:40

## 2020-12-04 RX ADMIN — Medication 100 MG: at 10:26

## 2020-12-04 RX ADMIN — BISACODYL 5 MG: 5 TABLET, COATED ORAL at 10:26

## 2020-12-04 RX ADMIN — MIRTAZAPINE 7.5 MG: 15 TABLET, FILM COATED ORAL at 20:47

## 2020-12-04 RX ADMIN — GABAPENTIN 300 MG: 300 CAPSULE ORAL at 13:21

## 2020-12-04 RX ADMIN — CEFEPIME 2 G: 2 INJECTION, POWDER, FOR SOLUTION INTRAVENOUS at 01:30

## 2020-12-04 RX ADMIN — MORPHINE SULFATE 15 MG: 15 TABLET, FILM COATED, EXTENDED RELEASE ORAL at 20:47

## 2020-12-04 RX ADMIN — Medication 10 ML: at 10:27

## 2020-12-04 RX ADMIN — POLYETHYLENE GLYCOL 3350 17 G: 17 POWDER, FOR SOLUTION ORAL at 10:27

## 2020-12-04 RX ADMIN — MORPHINE SULFATE 15 MG: 15 TABLET, FILM COATED, EXTENDED RELEASE ORAL at 13:21

## 2020-12-04 RX ADMIN — STANDARDIZED SENNA CONCENTRATE 8.6 MG: 8.6 TABLET ORAL at 20:48

## 2020-12-04 RX ADMIN — ENOXAPARIN SODIUM 40 MG: 40 INJECTION SUBCUTANEOUS at 20:49

## 2020-12-04 RX ADMIN — LIDOCAINE HYDROCHLORIDE 5 ML: 20 INJECTION, SOLUTION INFILTRATION; PERINEURAL at 15:04

## 2020-12-04 RX ADMIN — VANCOMYCIN HYDROCHLORIDE 1000 MG: 1 INJECTION, POWDER, LYOPHILIZED, FOR SOLUTION INTRAVENOUS at 02:00

## 2020-12-04 RX ADMIN — Medication 10 ML: at 20:50

## 2020-12-04 RX ADMIN — CEFEPIME 2 G: 2 INJECTION, POWDER, FOR SOLUTION INTRAVENOUS at 13:21

## 2020-12-04 RX ADMIN — GABAPENTIN 300 MG: 300 CAPSULE ORAL at 10:26

## 2020-12-04 RX ADMIN — MORPHINE SULFATE 15 MG: 15 TABLET, FILM COATED, EXTENDED RELEASE ORAL at 05:47

## 2020-12-04 ASSESSMENT — PAIN SCALES - GENERAL
PAINLEVEL_OUTOF10: 0
PAINLEVEL_OUTOF10: 7
PAINLEVEL_OUTOF10: 8
PAINLEVEL_OUTOF10: 5
PAINLEVEL_OUTOF10: 8
PAINLEVEL_OUTOF10: 7
PAINLEVEL_OUTOF10: 6

## 2020-12-04 ASSESSMENT — PAIN DESCRIPTION - LOCATION
LOCATION: NECK
LOCATION: NECK

## 2020-12-04 ASSESSMENT — PAIN DESCRIPTION - PAIN TYPE
TYPE: ACUTE PAIN;CHRONIC PAIN;SURGICAL PAIN
TYPE: ACUTE PAIN;SURGICAL PAIN

## 2020-12-04 ASSESSMENT — ENCOUNTER SYMPTOMS
COUGH: 0
COLOR CHANGE: 0
ABDOMINAL PAIN: 0
BLOOD IN STOOL: 0
VOICE CHANGE: 0
GASTROINTESTINAL NEGATIVE: 1
EYE DISCHARGE: 0
SHORTNESS OF BREATH: 0
DIARRHEA: 0
ANAL BLEEDING: 0
ABDOMINAL DISTENTION: 0
APNEA: 0
STRIDOR: 0
VOMITING: 0
CHEST TIGHTNESS: 0
SORE THROAT: 0
TROUBLE SWALLOWING: 0
RECTAL PAIN: 0
CHOKING: 0
RESPIRATORY NEGATIVE: 1
WHEEZING: 0
BACK PAIN: 1
NAUSEA: 0
CONSTIPATION: 0

## 2020-12-04 NOTE — PROGRESS NOTES
Pt. Assessment complete. Accordion drain pulled this AM by Dr. Konrad Snow. Patient complaining of pain 7/10 in neck. Pt. Stated his pain in his neck is doing a lot better than before and he is able to turn his neck to the right side. Percocet given and increased by palliative care to 10mg. Pt turned to R side after being on left. Tears on L and R buttocks with preventative dressing on sacrum. Pt. Is requesting a smith catheter instead of straight cathing and a message was sent in [employer] to Dr. Hannah Maxwell requesting that. Pt. Is getting a PICC placed today and will get the consent signed. Blood cultures still pending. Will continue to monitor.

## 2020-12-04 NOTE — FLOWSHEET NOTE
Back of neck covered with dry dressing, c/d/i. Accordion drain in place, serosang drainage seen in tubing, none in collection bag. Pt continues to rate neck pain 8/10 scheduled MS Contin administered. PRN Percocet administered for breakthrough pain. Pt assisted to turn in bed every 2 hours. Assisted pt to straight cath, 700 vadim hazy urine obtained. IV site changed per protocol, ATB completed.

## 2020-12-04 NOTE — PROGRESS NOTES
Palliative Care Progress Note  Patient: Joselyn Euceda  Gender: male  YOB: 1950  Unit/Bed: M205/G111-48  Code Status: Full Code  Inpatient Treatment Team: Treatment Team: Attending Provider: Hellen Chapa DO; Consulting Physician: Andrew Henry DO; Utilization Reviewer: Godwin Ceballos RN; Consulting Physician: Rob Montaño MD; Consulting Physician: Corbin Whitman MD; Consulting Physician: Stefanie Riley MD; Consulting Physician: Hue Singh MD; Registered Nurse: Kate Choi, RN; : Sami Durham, RN; Registered Nurse: Lilliana Mas, RN; : Alexei Castaneda, RN; : Ramy Mcnamara  Admit Date:  11/18/2020    Chief Complaint: Pain    History of Presenting Illness:      Joselyn Euceda is a 79 y.o. male on hospital day 15 with a history of androgen independent prostate cancer. Known liver and bone mets, severe malnutrition, falls, urine retention self caths, gastric ulcer with hemorrhage. Presented with  progressive quadriparesis weight loss inability to ambulate. His legs will not support him anymore. Every few weeks he is getting weaker and weaker. He is now unable to ambulate. Found to have severe spinal canal stenosis at C3-C7 with cord atrophy      Laminoplasty with reconstruction was done. patient observed laying in bed. He is pleasant and cooperative. He is in NAD. He tells me that he is still having neck and right hip pain. Reports that his pain has improved today. He has been taking percocet 7.5/325 mg, Gabapentin 300 mg TID, MS Contin 15 mg every 8 hours, and iv dilaudid 1 mg every three hours as needed. Today he reports that his pain is 10/10. His Dilaudid is still on hold. He is scheduled to have PICC line placed today for antibiotic therapy. Culture of cervical abscess fluid is pending. This may be contributing to his severe neck pain. The plan is for discharge to a SNF.  He will not be able to use IV Dilaudid at Rate Last Dose    lidocaine 2 % injection 5 mL  5 mL Intradermal Once Suzette Mcduffie, DO        sodium chloride flush 0.9 % injection 10 mL  10 mL Intravenous 2 times per day Suzette Mcduffie, DO        sodium chloride flush 0.9 % injection 10 mL  10 mL Intravenous PRN Suzette Mcduffie, DO        0.9 % sodium chloride infusion  250 mL Intravenous Once Suzette Mcduffie, DO        oxyCODONE-acetaminophen (PERCOCET)  MG per tablet 1 tablet  1 tablet Oral Q4H PRN JOYCE Calderon CNP        sodium chloride (PF) 0.9 % injection 10 mL  10 mL Intravenous Once Lincoln Lange MD        vancomycin 1000 mg IVPB in 250 mL D5W addavial  1,000 mg Intravenous Q12H Pepe Villanueva MD   Stopped at 12/04/20 0300    cefepime (MAXIPIME) 2 g IVPB minibag  2 g Intravenous Q12H Pepe Villanueva MD   Stopped at 12/04/20 0200    vancomycin (VANCOCIN) intermittent dosing (placeholder)   Other RX Placeholder Pepe Villanueva MD        [Held by provider] HYDROmorphone (DILAUDID) injection 1 mg  1 mg Intravenous Q3H PRN JOYCE Calderon CNP   1 mg at 12/02/20 1840    gabapentin (NEURONTIN) capsule 300 mg  300 mg Oral TID JOYCE Lizarraga CNP   300 mg at 12/04/20 1026    enzalutamide (XTANDI) capsule 160 mg  160 mg Oral Daily Suzette Mcduffie,         diazePAM (VALIUM) tablet 2 mg  2 mg Oral Q6H PRN JOYCE Healy CNP   2 mg at 12/02/20 2025    sodium chloride flush 0.9 % injection 10 mL  10 mL Intravenous 2 times per day Lincoln Lange MD   10 mL at 12/04/20 1027    sodium chloride flush 0.9 % injection 10 mL  10 mL Intravenous PRN Lincoln Lange MD        bisacodyl (DULCOLAX) EC tablet 5 mg  5 mg Oral Daily Lincoln Lange MD   5 mg at 12/04/20 1026    magnesium hydroxide (MILK OF MAGNESIA) 400 MG/5ML suspension 30 mL  30 mL Oral Daily PRN Lincoln Lange MD        polyethylene glycol [facility]) packet 17 g  17 g Oral Daily PRN Lincoln Lange MD        ondansetron (ZOFRAN-ODT) disintegrating tablet 4 mg  4 mg Oral Q8H PRN Robyn Sorto MD        Or    ondansetron [facility]) injection 4 mg  4 mg Intravenous Q6H PRN Robyn Sorto MD        morphine (MS CONTIN) extended release tablet 15 mg  15 mg Oral Q8H Farhana Latter-day, APRN - CNP   15 mg at 12/04/20 0547    mirtazapine (REMERON) tablet 7.5 mg  7.5 mg Oral Nightly Farhana Latter-day, APRN - CNP   7.5 mg at 12/03/20 2029    Vitamin D (CHOLECALCIFEROL) tablet 2,000 Units  2,000 Units Oral Dinner Nicole Scullin, DO   2,000 Units at 12/03/20 1631    cyanocobalamin injection 1,000 mcg  1,000 mcg Intramuscular Weekly Nicole Scullin, DO   1,000 mcg at 12/01/20 2039    coenzyme Q10 capsule 100 mg  100 mg Oral Daily Nicole Scullin, DO   100 mg at 12/04/20 1026    lidocaine 4 % external patch 3 patch  3 patch Transdermal Daily Nicole Scullin, DO   3 patch at 11/30/20 0900    acetaminophen (TYLENOL) tablet 650 mg  650 mg Oral Q4H PRN Nicole Scullin, DO        Or    acetaminophen (TYLENOL) suppository 650 mg  650 mg Rectal Q6H PRN Nicole Scullin, DO        polyethylene glycol (GLYCOLAX) packet 17 g  17 g Oral Daily Amina Herminio, DO   17 g at 12/04/20 1027    senna (SENOKOT) tablet 8.6 mg  1 tablet Oral Nightly Amina Herminio, DO   8.6 mg at 26/86/32 3162    folic acid (FOLVITE) tablet 1 mg  1 mg Oral Daily Gilles Torres MD   1 mg at 12/04/20 1026    promethazine (PHENERGAN) tablet 12.5 mg  12.5 mg Oral Q6H PRN Donte Sánchez MD        Or    ondansetron [facility]) injection 4 mg  4 mg Intravenous Q6H PRN Donte Sánchez MD   4 mg at 11/18/20 1843    enoxaparin (LOVENOX) injection 40 mg  40 mg Subcutaneous Daily Donte Sánchez MD   40 mg at 12/02/20 0848       History:       PMHx:  Past Medical History:   Diagnosis Date    PAF (paroxysmal atrial fibrillation) ([facility] [address]     ON XARELTO    Prostate cancer ([facility] [address] 11/2019       PSHx:  Past Surgical History:   Procedure Laterality Date    CERVICAL LAMINECTOMY N/A 11/23/2020    CERVICAL C3-4,4-5,5-6 LAMINOPLASTY WITH RECONSTRUCTION performed by Lai Bales MD at [address] COLONOSCOPY N/A 9/29/2020    COLONOSCOPY DIAGNOSTIC performed by Damain Valentine MD at [address] Right 11/2018    hip    UPPER GASTROINTESTINAL ENDOSCOPY N/A 9/29/2020    EGD DIAGNOSTIC ONLY performed by Damian Valentine MD at [address] Hx:  Social History     Socioeconomic History    Marital status: Single     Spouse name: None    Number of children: None    Years of education: None    Highest education level: None   Occupational History    Occupation: retired [employer]   Social Needs    Financial resource strain: Not very hard    Food insecurity     Worry: Never true     Inability: Never true    Transportation needs     Medical: No     Non-medical: No   Tobacco Use    Smoking status: Never Smoker    Smokeless tobacco: Never Used    Tobacco comment: denies   Substance and Sexual Activity    Alcohol use: Not Currently     Frequency: 2-4 times a month     Comment: denies    Drug use: No     Comment: denies    Sexual activity: None   Lifestyle    Physical activity     Days per week: 0 days     Minutes per session: 0 min    Stress:  Only a little   Relationships    Social connections     Talks on phone: None     Gets together: None     Attends Mor[employer] service: None     Active member of club or organization: None     Attends meetings of clubs or organizations: None     Relationship status: None    Intimate partner violence     Fear of current or ex partner: No     Emotionally abused: No     Physically abused: No     Forced sexual activity: No   Other Topics Concern    None   Social History Narrative    Retired from [employer] With: Alone    [address] of Home[address]: One level    Home Access: Level entry    Bathroom Shower/Tub: Tub/Shower unit    [employer]: Grab bars in [facility]: [address] Assistance: Independent    Ambulation Assistance: Independent    Transfer Assistance: Independent    Active : No    Patient's  Info: Sister    Additional Comments: Pt. reports he has been having near falls but no falls in the last few weeks, but progressively more weakness hte last few weeks       Family Hx:  History reviewed. No pertinent family history.     LABS: Reviewed     CBC:  Lab Results   Component Value Date    WBC 8.1 12/04/2020    RBC 3.39 12/04/2020    HGB 8.4 12/04/2020    HCT 26.3 12/04/2020    MCV 77.4 12/04/2020    MCH 24.8 12/04/2020    MCHC 32.1 12/04/2020    RDW 19.0 12/04/2020     12/04/2020    MPV 8.7 09/09/2015     CBC with Differential:   Lab Results   Component Value Date    WBC 8.1 12/04/2020    RBC 3.39 12/04/2020    HGB 8.4 12/04/2020    HCT 26.3 12/04/2020     12/04/2020    MCV 77.4 12/04/2020    MCH 24.8 12/04/2020    MCHC 32.1 12/04/2020    RDW 19.0 12/04/2020    METASPCT 1 09/26/2020    LYMPHOPCT 20.1 12/04/2020    MONOPCT 10.5 12/04/2020    MYELOPCT 1 09/26/2020    BASOPCT 0.6 12/04/2020    MONOSABS 0.8 12/04/2020    LYMPHSABS 1.6 12/04/2020    EOSABS 0.2 12/04/2020    BASOSABS 0.0 12/04/2020     CMP:    Lab Results   Component Value Date     12/04/2020    K 3.6 12/04/2020    K 4.2 11/25/2020     12/04/2020    CO2 25 12/04/2020    BUN 9 12/04/2020    CREATININE 0.42 12/04/2020    GFRAA >60.0 12/04/2020    LABGLOM >60.0 12/04/2020    GLUCOSE 101 12/04/2020    PROT 8.0 11/18/2020    LABALBU 2.6 12/04/2020    CALCIUM 8.7 12/04/2020    BILITOT 0.7 11/18/2020    ALKPHOS 233 11/18/2020    AST 26 11/18/2020    ALT 9 11/18/2020     BMP:    Lab Results   Component Value Date     12/04/2020    K 3.6 12/04/2020    K 4.2 11/25/2020     12/04/2020    CO2 25 12/04/2020    BUN 9 12/04/2020    LABALBU 2.6 12/04/2020    CREATININE 0.42 12/04/2020    CALCIUM 8.7 12/04/2020    GFRAA >60.0 12/04/2020    LABGLOM >60.0 12/04/2020    GLUCOSE 101 12/04/2020 TSH:   Lab Results   Component Value Date    TSH 1.210 11/19/2020     Vitamin B12 and Folate: No components found for: FOLIC,  O98  Urinalysis:   Lab Results   Component Value Date    NITRU POSITIVE 11/29/2020    WBCUA >100 11/29/2020    BACTERIA MANY 11/29/2020    RBCUA 3-5 09/26/2020    BLOODU Negative 11/29/2020    SPECGRAV 1.014 11/29/2020    GLUCOSEU Negative 11/29/2020           FUNCTIONAL ADL´S:     Independent: [ x ] Eating, [   ] Dressing, [   ] Transferring, [   ] Marlene Marjorie, [   ] Deandra Malone, [   ] Continence  Dependent   : [  ] Eating, [   ] Dressing, [   ] Transferring, [   ] Marlene Marjorie, [   ] Bathing, [   ] Continence  W. assistant : [  ] Eating, [  x ] Dressing, [ x  ] Transferring, [  x ] Toileting, [  x ] Bathing, [   x] Continence    Radiology: Reviewed      No results found. Assessment and plan:    1. Recurrent falls multifactorial in etiology: Orthostatic hypotension suspect due to autonomic dysfunction,spinal stenosis, advanced prostate cancer on chemotherapy, moderate protein calorie malnutrition  Follow with primary medical team  Continue PT    2. Prostate Cancer with mets  -Oncology Following    3. Anorexia and Malnutrition  -Start Mirtazapine 7.5 mg po daily at HS  -Continue nutritional Supplementation    4. Spinal Stenosis with weakness and paraesthesia  -Neurosurgery following  -Currently taking Gabapentin 300 mg TID    5. Pain rt neoplasm and spinal stenosis  Much discussion that he should utilize the medication in place for breakthrough pain. Discussed need for breakthrough pain medication with nursing staff.  He should medicate one hour prior to PT so he can have maximum participation.   -Continue MS Contin to 15 mg po every 8 hours  -Will increase Percocet to 10/325 mg po every 4 hours for moderate to severe breakthrough pain  -Continue to hold Hydromorphone 1.0mg IV every 3 hours prn severe breakthrough pain  -Continue Gabapentin to 300 mg po tid  -Will start heat pack to affected area and Lidocaine patch.  -The plan is for discharge to a SNF. He will not be able to use IV Dilaudid at the SNF. Will put the dilaudid on hold and monitor his pain control for twenty four hours. Will continue to titrate his oral medication regime to control his pain. 6. Constipation  -Continue current POC  -Increase fiber and fluid in diet    -Palliative Care Encounter  Patient is pending discharge to SNF facility. He is scheduled to have PICC line placed today for long term antibiotic. Culture of cervical abscess fluid is pending. -Advance Care Planning  Remains FULL CODE      -Goals of Care Discussion:  We discussed all care options contingent on treatment response and QOL. Much active listening, presence, and emotional support were given.      Electronically signed by JOYCE Turner CNP on 12/4/2020 at 11:44 AM

## 2020-12-04 NOTE — PROGRESS NOTES
[facility] Neurology Daily Progress Note  Name: Gayle Wilkinson  Age: 79 y.o. Gender: male  CodeStatus: Full Code  Allergies: No Known Allergies    Chief Complaint:Fatigue    Primary Care Provider: Mary Robin MD  InpatientTreatment Team: Treatment Team: Attending Provider: Selvin Buck DO; Consulting Physician: Gelacio Levine DO; Utilization Reviewer: Nancy Jacob RN; Consulting Physician: Shi Fine MD; Consulting Physician: Adria Curling, MD; Consulting Physician: Spring George MD; Consulting Physician: Celestino Maldonado MD; Registered Nurse: Genia Nevarez, RN; : Saumya Heard, RN; Registered Nurse: Andrez Ackerman, RN; : Mendy Phan, RN; : RUBÉN Kim  Admission Date: 11/18/2020      HPI   Fatigue   Associated symptoms include fatigue, numbness and weakness. Pertinent negatives include no chills, congestion, coughing, fever, headaches, nausea, neck pain, sore throat or vomiting. Associated symptoms include fatigue, myalgias, neck pain, numbness and weakness.      Pt sen and he is ill and he cannot tolerate pain     We were called to reassess as patient's gait has not improved.  He continues complain of considerable pain in his neck and shoulders and requires assistance to transfer. Albert Mcdonaldr is in fact become weaker.  Is been bed at least 15 days.     Patient states that pain is limiting factor in participating in PT. States pain has not improved and he is taking all the pain medication he is provided. Participated in PT and was able to sit at the edge of the bed, but could not stand due to significant increase in pain.       Patient alert and oriented x3, continues to have numbness in his upper extremities and weakness of lower extremities with difficulty standing.     Herman's WBC is elevated at 13.3. He is afebrile and all vitals are within normal limits.  He denies feeling sick or headache, sore throat, cough, shortness of breath, chest pain, fever/chills, or N/V.     Pt has severe pain in neck and more on movement   No fever     Patient seen and examined for neurology follow-up. Currently alert and oriented x3, no acute distress, cooperative. He is status post drainage of cervical spine abscess today. Afebrile. WBC count 12.4 today. Sed rate 114. He denies neck pain currently. Denies headache. Has minimal paresthesias to bilateral fingers. No weakness of upper extremities. Continues to complain of numbness and tingling and weakness to bilateral lower extremities up to the level of the knee. Bethena Leyden is O&Ax3, alert, and cooperative. He continues to complain on neck pain S/p abscess drainage, but states that the pain is \"much better. \" Describes pain as a 7/10, but has improved range of motion in his neck. Was able to stand and walk a few steps in PT which is a considerable improvement. He states his declining health is overwhelming and was quite tearful. Expressed fear about \"withering away. \"    Denies any new symptoms including HA, fevers/chills, chest pain, SOB, or N/V. Patient was seen and he actually is resting in bed. He has not yet ambulated well. His neck pain appears to be somewhat better and his affect is better since the abscess drainage. No fevers are reported. He is able to eat and is not choking      Vitals:    12/04/20 0747   BP: 136/72   Pulse: 83   Resp: 16   Temp: 97.1 °F (36.2 °C)   SpO2: 100%      Review of Systems   Constitutional: Positive for unexpected weight change. Negative for chills and fever. HENT: Negative for congestion, sore throat and trouble swallowing. Eyes: Negative for visual disturbance. Respiratory: Negative for shortness of breath. Cardiovascular: Negative for chest pain. Gastrointestinal: Negative for diarrhea, nausea and vomiting. Musculoskeletal: Positive for gait problem and neck pain. Neurological: Positive for weakness.  Negative for dizziness, tremors, seizures, facial asymmetry and headaches. Psychiatric/Behavioral: Positive for dysphoric mood. Negative for agitation, behavioral problems and confusion. Continues to complain of neck pain. Physical Exam  Constitutional:       Comments: underweight   HENT:      Head: Normocephalic and atraumatic. Eyes:      Extraocular Movements: Extraocular movements intact and EOM normal.      Conjunctiva/sclera: Conjunctivae normal.   Neck:      Comments: Increased ROM in neck  Cardiovascular:      Rate and Rhythm: Normal rate and regular rhythm. Heart sounds: Normal heart sounds. Pulmonary:      Effort: Pulmonary effort is normal.      Breath sounds: Normal breath sounds. Skin:     General: Skin is warm and dry. Neurological:      Mental Status: He is alert and oriented to person, place, and time. Motor: Weakness present. Coordination: Finger-Nose-Finger Test normal.   Psychiatric:         Speech: Speech normal.         Behavior: Behavior normal.         Thought Content: Thought content normal.       Neurologic Exam     Mental Status   Oriented to person, place, and time. Speech: speech is normal     Cranial Nerves     CN III, IV, VI   Extraocular motions are normal.     Motor Exam   Right arm pronator drift: absent  Left arm pronator drift: absent    Gait, Coordination, and Reflexes     Coordination   Finger to nose coordination: normal    Tremor   Resting tremor: absent      Well strength in the lower extremities 4/5. He still myelopathic in the upper extremity but not the lower extremity.   Sensory levels are difficult to ascertain which are patchy    Medications:  Reviewed    Infusion Medications:    sodium chloride       Scheduled Medications:    lidocaine  5 mL Intradermal Once    sodium chloride flush  10 mL Intravenous 2 times per day    sodium chloride (PF)  10 mL Intravenous Once    vancomycin  1,000 mg Intravenous Q12H    cefepime  2 g Intravenous Q12H    vancomycin (VANCOCIN) intermittent visit. No results found for this or any previous visit. No results found for this or any previous visit. CT of the Head:   Results for orders placed during the hospital encounter of 11/18/20   CT Head WO Contrast    Narrative CT Brain    Contrast medium:  Not utilized. History:  Weakness, fatigue    Comparison:  None    Findings:    Extra-axial spaces:  Normal.     Intracranial hemorrhage:  None. Ventricular system: Ventricles mildly enlarged. Sulci mildly prominent. Basal Cisterns:  Normal.    Cerebral Parenchyma: Bilateral symmetric periventricular areas decreased attenuation. Midline Shift:  None. Cerebellum:  Normal.     Paranasal sinuses and mastoid air cells:  Normal.    Visualized Orbits:  Normal.        Impression Impression:    No acute findings. Mild cerebral atrophy. Chronic ischemic white matter disease. All CT scans at this facility use dose modulation, iterative reconstruction, and/or weight based dosing when appropriate to reduce radiation dose to as low as reasonably achievable. No results found for this or any previous visit. No results found for this or any previous visit. Carotid duplex: No results found for this or any previous visit. No results found for this or any previous visit. No results found for this or any previous visit. Echo No results found for this or any previous visit.           Assessment/Plan:  Gait ataxia with cervical myelopathy in the presence of metastatic prostate cancer.       Patient was seen by us initially for lower extremity weakness and a complete spine evaluation was done and patient had cervical spinal stenosis which was operated we very called in to evaluate due to his continued weakness and pain.  This is an aftermath of cervical myelopathy and may not improve or may take several weeks to months to improve.  Patient's main issues appears to be pain and requires pain management.      Exam does not show any acute changes, and patient remains areflexic in bilateral upper extremities, with gait ataxia, and weakness. 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      He continues to be a high fall risk. Patient is more motivated today to participate in PT.     Palliative care has been following patient and adjustments to pain meds have been made.  Discharge planning in process for skilled nursing facility.  Awaiting precertification.     I examined this patient again and I see that he still has significant issues with his lower extremity though these are likely to stay for a while we are not quite quite concerned about a new cord compression at least at this time. Ivis Ragland is no fever and there is no suggestion of any infective process that we are worried about in the cervical spine.  This again falls under neurological restriction and therefore close neurological follow-up.  We are contemplating obtaining a CT scan of the neck though given that these findings have not changed to be concerned about a spinal abscess we have not done this.     Patient continues to have pain despite maximizing analgesics.      CT of the cervical spine today shows rim-enhancing fluid collection containing small foci of air within the subcutaneous soft tissues at the C1-C5 levels. Concerning for abscess.     Will order MRI cervical spine with and without contrast and consult neurosurgery (Dr. Marifer Deleon).    pts CT was reviewed / could be abscess/  MRI ordered  Will consult neurosurgery     MRI of cervical spine show 7.5 x 4.5 x 3.5 cm postoperative fluid collection status post recent cervical laminoplasty. Patient had abscess drainage done today with neurosurgery. Infectious diseases has been consulted. Patient now on cefepime and vancomycin. Cultures pending.   MRI seen and discussed   Will have neurosurgery to see, findings as suspected clinically     He is making gains in strength and has increased neck ROM S/p abscess drainage. He is to have a PICC line placed today. He is okay to be discharged to rehab from a neurological standpoint. I have personally performed a face to face diagnostic evaluation on this patient, reviewed all data and investigations, and am the sole provider of all clinical decisions on the neurological status of this patient. We are following this patient as we are the one who actually diagnosed his abscess due to continued neurological follow-up and examination which is very important and the patient was postoperative. Due to our close follow-up of his examination we were able to identify the abscess. He therefore will be followed by us very closely again. Oscar Joseph MD, Krystian Blunt, American Board of Psychiatry & Neurology  Board Certified in Vascular Neurology  Board Certified in Neuromuscular Medicine  Certified in Neurorehabilitation       Collaborating physicians: Dr Dk Joseph    Electronically signed by Billie Casey PA-C on 12/4/2020 at 10:55 AM

## 2020-12-04 NOTE — PROGRESS NOTES
Physician Progress Note    12/4/2020   12:50 PM    Name:  Suad Leroy  MRN:    [id number]     [facility] Day: 15     Admit Date: 11/18/2020  2:44 PM  PCP: Minerva Butler MD    Code Status:  Full Code    Subjective:     He is working with therapy this morning. He is having difficulty standing.     Current Facility-Administered Medications   Medication Dose Route Frequency Provider Last Rate Last Dose    lidocaine 2 % injection 5 mL  5 mL Intradermal Once Francella Needs, DO        sodium chloride flush 0.9 % injection 10 mL  10 mL Intravenous 2 times per day Francella Needs, DO        sodium chloride flush 0.9 % injection 10 mL  10 mL Intravenous PRN Francella Needs, DO        0.9 % sodium chloride infusion  250 mL Intravenous Once Francella Needs, DO        oxyCODONE-acetaminophen (PERCOCET)  MG per tablet 1 tablet  1 tablet Oral Q4H PRN JOYCE Myles CNP        sodium chloride (PF) 0.9 % injection 10 mL  10 mL Intravenous Once Abi Murray MD        vancomycin 1000 mg IVPB in 250 mL D5W addavial  1,000 mg Intravenous Q12H Angelia Taylor MD   Stopped at 12/04/20 0300    cefepime (MAXIPIME) 2 g IVPB minibag  2 g Intravenous Q12H Angelia Taylor MD   Stopped at 12/04/20 0200    vancomycin (VANCOCIN) intermittent dosing (placeholder)   Other RX Placeholder Angelia Taylor MD        [Held by provider] HYDROmorphone (DILAUDID) injection 1 mg  1 mg Intravenous Q3H PRN JOYCE Myles CNP   1 mg at 12/02/20 1840    gabapentin (NEURONTIN) capsule 300 mg  300 mg Oral TID JOYCE Bar - CNP   300 mg at 12/04/20 1026    enzalutamide (XTANDI) capsule 160 mg  160 mg Oral Daily Tinoa Needs, DO        diazePAM (VALIUM) tablet 2 mg  2 mg Oral Q6H PRN JOYCE Claros - CNP   2 mg at 12/02/20 2025    sodium chloride flush 0.9 % injection 10 mL  10 mL Intravenous 2 times per day Abi Murray MD   10 mL at 12/04/20 1027    sodium chloride flush 0.9 % injection 10 mL  10 mL Intravenous PRN Robyn Sorto MD        bisacodyl (DULCOLAX) EC tablet 5 mg  5 mg Oral Daily Robyn Sorto MD   5 mg at 12/04/20 1026    magnesium hydroxide (MILK OF MAGNESIA) 400 MG/5ML suspension 30 mL  30 mL Oral Daily PRN Robyn Sorto MD        polyethylene glycol [facility]) packet 17 g  17 g Oral Daily PRN Robyn Sorto MD        ondansetron (ZOFRAN-ODT) disintegrating tablet 4 mg  4 mg Oral Q8H PRN Robyn Sorto MD        Or    ondansetron [facility]) injection 4 mg  4 mg Intravenous Q6H PRN Robyn Sorto MD        morphine (MS CONTIN) extended release tablet 15 mg  15 mg Oral Q8H Farhana HEATHER ParkN - CNP   15 mg at 12/04/20 0547    mirtazapine (REMERON) tablet 7.5 mg  7.5 mg Oral Nightly Farhana Denominational APRN - CNP   7.5 mg at 12/03/20 2029    Vitamin D (CHOLECALCIFEROL) tablet 2,000 Units  2,000 Units Oral Dinner Nicole Scullin, DO   2,000 Units at 12/03/20 1631    cyanocobalamin injection 1,000 mcg  1,000 mcg Intramuscular Weekly Nicole Scullin, DO   1,000 mcg at 12/01/20 2039    coenzyme Q10 capsule 100 mg  100 mg Oral Daily Nicole Scullin, DO   100 mg at 12/04/20 1026    lidocaine 4 % external patch 3 patch  3 patch Transdermal Daily Nicole Scullin, DO   3 patch at 11/30/20 0900    acetaminophen (TYLENOL) tablet 650 mg  650 mg Oral Q4H PRN Nicole Scullin, DO        Or    acetaminophen (TYLENOL) suppository 650 mg  650 mg Rectal Q6H PRN Nicole Scullin, DO        polyethylene glycol (GLYCOLAX) packet 17 g  17 g Oral Daily Amina Herminio, DO   17 g at 12/04/20 1027    senna (SENOKOT) tablet 8.6 mg  1 tablet Oral Nightly Amina Durham, DO   8.6 mg at 16/04/32 1894    folic acid (FOLVITE) tablet 1 mg  1 mg Oral Daily Gilles Torres MD   1 mg at 12/04/20 1026    promethazine (PHENERGAN) tablet 12.5 mg  12.5 mg Oral Q6H PRN Donte Sánchez MD        Or    ondansetron [facility]) injection 4 mg  4 mg Intravenous Q6H PRN Donte Sánchez MD   4 mg at 11/18/20 4433    enoxaparin (LOVENOX) injection 40 mg  40 mg Subcutaneous Daily Stefano Salazar MD   40 mg at 20 0848       Physical Examination:      Vitals:  /72   Pulse 83   Temp 97.1 °F (36.2 °C) (Oral)   Resp 16   Ht 6' 1\" (1.854 m)   Wt 147 lb 4.3 oz (66.8 kg)   SpO2 100%   BMI 19.43 kg/m²   Temp (24hrs), Av.2 °F (36.2 °C), Min:97.1 °F (36.2 °C), Max:97.3 °F (36.3 °C)      General appearance: Thin, chronically ill-appearing, malnourished, in mild distress. Cervical drain in place. Patient looking to the left. Mental Status: oriented to person, place and time and normal affect  Lungs: clear to auscultation bilaterally, normal effort  Heart: regular rate and rhythm, no murmur  Abdomen: soft, nontender, nondistended, bowel sounds present, no masses  Extremities: no edema, redness, tenderness in the calves  Skin: no gross lesions, rashes    Data:     Labs:  Recent Labs     20  0603 20  0703   WBC 12.4* 8.1   HGB 8.4* 8.4*   * 635*     Recent Labs     20  0603 20  0703    136   K 3.7 3.6    100   CO2 25 25   BUN 9 9   CREATININE 0.46* 0.42*   GLUCOSE 125* 101*     No results for input(s): AST, ALT, ALB, BILITOT, ALKPHOS in the last 72 hours. Assessment and Plan:        19-year-old male with history of metastatic prostate cancer presented with generalized weakness and recurrent falls. He was found to have orthostatic hypotension, severe protein calorie malnutrition, and severe C-spine stenosis for which she underwent C3-6 laminoplasty with reconstruction . His postoperative course was complicated by fevers and persistent neck pain-he was found to have Proteus UTI and cervical neck abscess for which he is s/p aspiration and drain placement on 12/3.    1.  Sepsis secondary to possible postoperative cervical soft tissue abscess and Proteus UTI  -IV antibiotics. Infectious disease consulted. Culture data from aspiration is pending.   PICC line ordered  -Drain removed this morning by neurosurgery. Return to see neurosurgery next week as outpatient  -Pain control per neurosurgeon and palliative medicine  -Continue goals of care discussions per palliative medicine     Metastatic prostate cancer  Constipation  Folic acid deficiency  Severe protein calorie malnutrition  C-spine stenosis s/p C3-6 laminoplasty  Orthostatic hypotension    Diet: Dietary Nutrition Supplements: Clear Liquid Oral Supplement  Dietary Nutrition Supplements: Frozen Oral Supplement  DIET GENERAL; Carb Control: 4 carbs/meal (approximate 1800 kcals/day)  Dietary Nutrition Supplements: Standard High Calorie Oral Supplement  Ppx: Lovenox  Full Code    Dispo: Inpatient.   He will be okay to discharge once antibiotic plan is in place    Electronically signed by Ladonna Zhang DO on 12/4/2020 at 12:50 PM

## 2020-12-04 NOTE — PLAN OF CARE
Nutrition Problem #1: Severe malnutrition, In context of chronic, non-illness related  Intervention: Food and/or Nutrient Delivery: Continue Current Diet, Continue Oral Nutrition Supplement  Nutritional Goals: PO intake >75% meals and ONS.

## 2020-12-04 NOTE — PROGRESS NOTES
Patient did undergo wound aspiration by radiologist Dr. Deidre Arango. Drain removed this morning. Patient does feel much better with less pain in his neck and starting to move more. Start physical therapy rehabilitation. I will follow-up with him for possible developing cervical abscess. At this time he does not require surgical drainage. CRP is elevated at 130. Sed rate 114. Continue antibiotics and infectious disease evaluation and treatment. The patient may or may not require surgical drainage of this wound. He does have UTI with Proteus. Currently on cefepime and vancomycin.

## 2020-12-04 NOTE — PROGRESS NOTES
Physical Therapy Med Surg Daily Treatment Note  Facility/Department: [facility] MED SURG UNIT  Room: [facility]/P455-       NAME: Tiffani Todd  : 1950 (15 y.o.)  MRN: [id number]  CODE STATUS: Full Code    Date of Service: 2020    Patient Diagnosis(es): Fall at home, initial encounter [W19. XXXA, W72.695]  Falls, initial encounter [U25. XXXA]   Chief Complaint   Patient presents with    Fatigue     Patient Active Problem List    Diagnosis Date Noted    Abscess     Sepsis due to gram-negative UTI ([address] 2020    Neurogenic bladder     Stenosis of cervical spine with myelopathy ([address] 2020    Malignant tumor of prostate ([address] 2020    Raised prostate specific antigen 2020    Retention of urine 2020    Urethritis 2020    Ataxic gait 2020    Cervical myelopathy ([address]     Falls, initial encounter 2020    Severe malnutrition ([address] 2020    Fall at home, initial encounter 2020    Goals of care, counseling/discussion     Gastrointestinal hemorrhage     Rectal mass     Acute cystitis     Metastatic cancer ([address]     Gastric ulcer 2020    Weakness     Anemia 2020    Other specified disorders of bone density and structure, unspecified site      Liver mass 2020    Osteoarthritis of hip 2020    Benign prostatic hyperplasia with incomplete bladder emptying 2018    Palpitations 10/19/2010        Past Medical History:   Diagnosis Date    PAF (paroxysmal atrial fibrillation) ([address]     ON XARELTO    Prostate cancer ([address] 2019     Past Surgical History:   Procedure Laterality Date    CERVICAL LAMINECTOMY N/A 2020    CERVICAL C3-4,4-5,5-6  LAMINOPLASTY WITH RECONSTRUCTION performed by Lai Bales MD at [address] [place] [address] N/A 2020    COLONOSCOPY DIAGNOSTIC performed by Damian Valentine MD at [address] Right 2018    hip    UPPER GASTROINTESTINAL ENDOSCOPY N/A 2020 EGD DIAGNOSTIC ONLY performed by Jj Medley MD at [facility]       Restrictions  Restrictions/Precautions: Fall Risk  Position Activity Restriction  Other position/activity restrictions: Limit cervical ROM to within pain tolerable range    SUBJECTIVE   General  Chart Reviewed: Yes  Family / Caregiver Present: No  Subjective  Subjective: \"I can try. \"    Pre-Session Pain Report  Pre Treatment Pain Screening  Pain at present: 5  Scale Used: Numeric Score  Intervention List: Patient able to continue with treatment  Pain Screening  Patient Currently in Pain: Yes       Post-Session Pain Report  Pain Assessment  Pain Assessment: 0-10  Pain Level: 5  Pain Type: Acute pain;Surgical pain  Pain Location: Neck         OBJECTIVE        Bed mobility  Supine to Sit: Contact guard assistance  Sit to Supine: Minimal assistance  Comment: increased time and effort needed to complete. HOB elevated at pt request.    Transfers  Sit to Stand: Moderate Assistance  Stand to sit: Moderate Assistance  Comment: with [address], pt with BLE weakeness. STS x3    Ambulation  Ambulation?: Yes(unsafe to attempt ambulation away from the bed at this time, but is able to complete sidesteps along EOB, Leonardo noted in L knee.)                    Exercises  Straight Leg Raise: x 5  Quad Sets: x 10  Heelslides: x 10  Gluteal Sets: x 10  Hip Flexion: x 10  Hip Abduction: x 10  Knee Long Arc Quad: x 10  Ankle Pumps: x 10  Comments: pt given written HEP for supine/seated therex, instructed through technique dosage and frequency of all, pt verbalizes understanding. Activity Tolerance  Activity Tolerance: Patient Tolerated treatment well          ASSESSMENT   Assessment: pt with weakness in BLE unsafe to attempt ambulation away from the bed at this time.      Discharge Recommendations:  Continue to assess pending progress, Patient would benefit from continued therapy after discharge    Goals  Short term goals  Short term goal 1: Pt to

## 2020-12-04 NOTE — CARE COORDINATION
Social work note: Rounds completed on patient. RN reports patient is getting a PICC today. Amna King from [facility] asking about facilities in this area. LSW went over all notes in Epic. LSW spoke with Adrian Pérez director of central intake regarding their ability to take new admissions due to [address] This is pending and she will contact LSW as soon as she knows. Confirmed with [facility], [facility] not in network. Paulo Little denied patient. Patient refused [facility] and [facility] due to their locations and star ratings. Clem Jerez did not have bed. [facility]wait[facility]. Dorota Maldonado liaison for [facility] has no beds and Daniel Fuchs is out of network with [facility]. Spoke with [employer] who reports Raul Seals is handling case and will return LSWs call. Electronically signed by RUBÉN Flor Mc on 12/4/2020 at 11:27 AM     [phone]: Update from [employer] and no buildings are in network for [facility]. Electronically signed by RUBÉN Flor Mc on 12/4/2020 at 11:49 AM     [phone]: [facility] liaison confirmed again they cannot accept patient. Spoke with Talbot Runner at [employer]. She is asking patient sign a secondary payer form and this is to be faxed to [place] in the [facility]. Electronically signed by RUBÉN Flor Mc on 12/4/2020 at 1:53 PM     1500: Called [facility] to see if fax came in. No fax. At 1515, Raul Seals at [employer] [facility] called and said she was refaxing. Electronically signed by RUBÉN Flor Mc on 12/4/2020 at 3:19 PM     1640: [facility] a medicaid application for patient. It was placed in patient's chart. [facility] [place] liaison reports she has no updates about whether they can accept patients as they had a COVID outbreak. Yusuf Zhou does have a contract with [facility].   Electronically signed by RUBÉN Flor Mc on 12/4/2020 at 4:42 PM

## 2020-12-04 NOTE — PROGRESS NOTES
Resistant  128  mcg/mL      trimethoprim-sulfamethoxazole  Sensitive  <=20  mcg/mL       12/3/2020  7:04 AM - Ry, Chpo Incoming Lab Results From Soft     Component  Value  Ref Range & Units  Status  Collected  Lab    Sed Rate  114High    0 - 20 mm               Review of Systems   Constitutional: Negative for chills, diaphoresis, fatigue and fever. Respiratory: Negative. Cardiovascular: Negative. Gastrointestinal: Negative. Musculoskeletal: Positive for back pain and neck pain. Neurological: Positive for weakness. Physical Exam   Neck:   Drainage from neck in drain appears somewhat cloudy   Cardiovascular:   No murmur heard. Pulmonary/Chest: Effort normal and breath sounds normal. No respiratory distress. He has no wheezes. He has no rales. He exhibits no tenderness. Abdominal: Soft. Bowel sounds are normal. He exhibits no distension and no mass. There is no abdominal tenderness. There is no rebound and no guarding. Blood pressure 136/72, pulse 83, temperature 97.1 °F (36.2 °C), temperature source Oral, resp. rate 16, height 6' 1\" (1.854 m), weight 147 lb 4.3 oz (66.8 kg), SpO2 100 %.       .   Lab Results   Component Value Date    WBC 8.1 12/04/2020    HGB 8.4 (L) 12/04/2020    HCT 26.3 (L) 12/04/2020    MCV 77.4 (L) 12/04/2020     (H) 12/04/2020     Lab Results   Component Value Date     12/04/2020    K 3.6 12/04/2020    K 4.2 11/25/2020     12/04/2020    CO2 25 12/04/2020    BUN 9 12/04/2020    CREATININE 0.42 12/04/2020    GLUCOSE 101 12/04/2020    CALCIUM 8.7 12/04/2020          MRI CERVICAL SPINE W WO CONTRAST : 12/3/2020         COMPARISON: Cervical spine CT with contrast 12/2/2020         REASON FOR EXAMINATION:   possible cervical spinal abscess           TECHNIQUE: Multiplanar MR imaging of the cervical spine was performed before and after intravenous administration of approximately 15 mL of MultiHance gadolinium contrast.         FINDINGS:        Postsurgical changes of posterior decompression left laminoplasty at C3-C5 are again noted.         A mildly marginally enhancing organized fluid collection within the posterior soft tissues measures approximately 4.5 x 3.5 x 7.5 cm (transverse, AP and sagittal dimension).         This fluid collection does not appear to be contiguous with the spinal canal, and there is no residual spinal stenosis, epidural fluid collection or other significant changes identified.         Multilevel degenerative changes of the cervical spine described in detail on prior studies are again noted.                        Impression         APPROXIMATELY 7.5 X 4.5 X 3.5 CM POSTOPERATIVE FLUID COLLECTION AFTER RECENT CERVICAL LAMINOPLASTY.         INFECTION SHOULD BE EXCLUDED CLINICALLY.         THIS DOES NOT APPEAR TO COMMUNICATE TO THE SPINAL CANAL, AND THERE IS NO EPIDURAL COLLECTION OR OTHER COMPLICATION IDENTIFIED. Culture, Body Fluid [[id number]]   Collected: 12/03/20 1358    Order Status: Completed  Specimen:  Body Fluid  Updated: 12/04/20 1057     Body Fluid Culture, Sterile  No growth 24 hours          ASSESSMENT:  PLAN:    Postoperative abscess of neck  Given findings of MRI and elevated sedimentation rate     drainage of abscess cultures are no growth at 24 hours    Changed to cefepime vancomycin      UTI with Proteus

## 2020-12-05 LAB
ALBUMIN SERPL-MCNC: 2.8 G/DL (ref 3.5–4.6)
ANION GAP SERPL CALCULATED.3IONS-SCNC: 10 MEQ/L (ref 9–15)
BASOPHILS ABSOLUTE: 0.1 K/UL (ref 0–0.2)
BASOPHILS RELATIVE PERCENT: 0.8 %
BUN BLDV-MCNC: 7 MG/DL (ref 8–23)
CALCIUM SERPL-MCNC: 9 MG/DL (ref 8.5–9.9)
CHLORIDE BLD-SCNC: 99 MEQ/L (ref 95–107)
CO2: 27 MEQ/L (ref 20–31)
CREAT SERPL-MCNC: 0.43 MG/DL (ref 0.7–1.2)
EOSINOPHILS ABSOLUTE: 0.3 K/UL (ref 0–0.7)
EOSINOPHILS RELATIVE PERCENT: 2.6 %
GFR AFRICAN AMERICAN: >60
GFR NON-AFRICAN AMERICAN: >60
GLUCOSE BLD-MCNC: 94 MG/DL (ref 70–99)
HCT VFR BLD CALC: 26.7 % (ref 42–52)
HEMOGLOBIN: 8.6 G/DL (ref 14–18)
LYMPHOCYTES ABSOLUTE: 2.2 K/UL (ref 1–4.8)
LYMPHOCYTES RELATIVE PERCENT: 21.8 %
MAGNESIUM: 2 MG/DL (ref 1.7–2.4)
MCH RBC QN AUTO: 25.1 PG (ref 27–31.3)
MCHC RBC AUTO-ENTMCNC: 32.3 % (ref 33–37)
MCV RBC AUTO: 77.7 FL (ref 80–100)
MONOCYTES ABSOLUTE: 1 K/UL (ref 0.2–0.8)
MONOCYTES RELATIVE PERCENT: 9.7 %
NEUTROPHILS ABSOLUTE: 6.6 K/UL (ref 1.4–6.5)
NEUTROPHILS RELATIVE PERCENT: 65.1 %
PDW BLD-RTO: 19.1 % (ref 11.5–14.5)
PHOSPHORUS: 2.8 MG/DL (ref 2.3–4.8)
PLATELET # BLD: 664 K/UL (ref 130–400)
POTASSIUM SERPL-SCNC: 3.5 MEQ/L (ref 3.4–4.9)
RBC # BLD: 3.44 M/UL (ref 4.7–6.1)
SODIUM BLD-SCNC: 136 MEQ/L (ref 135–144)
VANCOMYCIN TROUGH: 6.8 UG/ML (ref 10–20)
WBC # BLD: 10.1 K/UL (ref 4.8–10.8)

## 2020-12-05 PROCEDURE — 36415 COLL VENOUS BLD VENIPUNCTURE: CPT

## 2020-12-05 PROCEDURE — 6370000000 HC RX 637 (ALT 250 FOR IP): Performed by: PHYSICAL MEDICINE & REHABILITATION

## 2020-12-05 PROCEDURE — 97535 SELF CARE MNGMENT TRAINING: CPT

## 2020-12-05 PROCEDURE — 85025 COMPLETE CBC W/AUTO DIFF WBC: CPT

## 2020-12-05 PROCEDURE — 2580000003 HC RX 258: Performed by: INTERNAL MEDICINE

## 2020-12-05 PROCEDURE — 80202 ASSAY OF VANCOMYCIN: CPT

## 2020-12-05 PROCEDURE — 6360000002 HC RX W HCPCS: Performed by: INTERNAL MEDICINE

## 2020-12-05 PROCEDURE — 1210000000 HC MED SURG R&B

## 2020-12-05 PROCEDURE — 6370000000 HC RX 637 (ALT 250 FOR IP): Performed by: INTERNAL MEDICINE

## 2020-12-05 PROCEDURE — 99232 SBSQ HOSP IP/OBS MODERATE 35: CPT | Performed by: INTERNAL MEDICINE

## 2020-12-05 PROCEDURE — 2580000003 HC RX 258: Performed by: NEUROLOGICAL SURGERY

## 2020-12-05 PROCEDURE — 6370000000 HC RX 637 (ALT 250 FOR IP): Performed by: NEUROLOGICAL SURGERY

## 2020-12-05 PROCEDURE — 99232 SBSQ HOSP IP/OBS MODERATE 35: CPT | Performed by: PSYCHIATRY & NEUROLOGY

## 2020-12-05 PROCEDURE — 6370000000 HC RX 637 (ALT 250 FOR IP): Performed by: PSYCHIATRY & NEUROLOGY

## 2020-12-05 PROCEDURE — 6360000002 HC RX W HCPCS: Performed by: FAMILY MEDICINE

## 2020-12-05 PROCEDURE — 83735 ASSAY OF MAGNESIUM: CPT

## 2020-12-05 PROCEDURE — 6370000000 HC RX 637 (ALT 250 FOR IP): Performed by: NURSE PRACTITIONER

## 2020-12-05 PROCEDURE — 80069 RENAL FUNCTION PANEL: CPT

## 2020-12-05 RX ORDER — MIRTAZAPINE 7.5 MG/1
7.5 TABLET, FILM COATED ORAL NIGHTLY
Qty: 30 TABLET | Refills: 3 | DISCHARGE
Start: 2020-12-05

## 2020-12-05 RX ADMIN — MORPHINE SULFATE 15 MG: 15 TABLET, FILM COATED, EXTENDED RELEASE ORAL at 13:29

## 2020-12-05 RX ADMIN — GABAPENTIN 300 MG: 300 CAPSULE ORAL at 21:49

## 2020-12-05 RX ADMIN — MORPHINE SULFATE 15 MG: 15 TABLET, FILM COATED, EXTENDED RELEASE ORAL at 05:04

## 2020-12-05 RX ADMIN — GABAPENTIN 300 MG: 300 CAPSULE ORAL at 09:39

## 2020-12-05 RX ADMIN — Medication 2000 UNITS: at 17:24

## 2020-12-05 RX ADMIN — POLYETHYLENE GLYCOL 3350 17 G: 17 POWDER, FOR SOLUTION ORAL at 09:39

## 2020-12-05 RX ADMIN — BISACODYL 5 MG: 5 TABLET, COATED ORAL at 09:39

## 2020-12-05 RX ADMIN — GABAPENTIN 300 MG: 300 CAPSULE ORAL at 13:28

## 2020-12-05 RX ADMIN — Medication 100 MG: at 09:39

## 2020-12-05 RX ADMIN — OXYCODONE HYDROCHLORIDE AND ACETAMINOPHEN 1 TABLET: 10; 325 TABLET ORAL at 09:39

## 2020-12-05 RX ADMIN — MORPHINE SULFATE 15 MG: 15 TABLET, FILM COATED, EXTENDED RELEASE ORAL at 21:49

## 2020-12-05 RX ADMIN — FOLIC ACID 1 MG: 1 TABLET ORAL at 09:39

## 2020-12-05 RX ADMIN — VANCOMYCIN HYDROCHLORIDE 1000 MG: 1 INJECTION, POWDER, LYOPHILIZED, FOR SOLUTION INTRAVENOUS at 05:48

## 2020-12-05 RX ADMIN — STANDARDIZED SENNA CONCENTRATE 8.6 MG: 8.6 TABLET ORAL at 21:49

## 2020-12-05 RX ADMIN — Medication 10 ML: at 21:51

## 2020-12-05 RX ADMIN — CEFEPIME 2 G: 2 INJECTION, POWDER, FOR SOLUTION INTRAVENOUS at 13:29

## 2020-12-05 RX ADMIN — CEFEPIME 2 G: 2 INJECTION, POWDER, FOR SOLUTION INTRAVENOUS at 01:29

## 2020-12-05 RX ADMIN — MIRTAZAPINE 7.5 MG: 15 TABLET, FILM COATED ORAL at 21:49

## 2020-12-05 RX ADMIN — ENOXAPARIN SODIUM 40 MG: 40 INJECTION SUBCUTANEOUS at 21:50

## 2020-12-05 ASSESSMENT — PAIN DESCRIPTION - ONSET: ONSET: ON-GOING

## 2020-12-05 ASSESSMENT — ENCOUNTER SYMPTOMS
SORE THROAT: 0
SHORTNESS OF BREATH: 0
RESPIRATORY NEGATIVE: 1
GASTROINTESTINAL NEGATIVE: 1
BACK PAIN: 1
TROUBLE SWALLOWING: 0
VOMITING: 0
NAUSEA: 0
DIARRHEA: 0

## 2020-12-05 ASSESSMENT — PAIN DESCRIPTION - LOCATION: LOCATION: NECK

## 2020-12-05 ASSESSMENT — PAIN DESCRIPTION - FREQUENCY: FREQUENCY: CONTINUOUS

## 2020-12-05 ASSESSMENT — PAIN DESCRIPTION - ORIENTATION: ORIENTATION: LEFT

## 2020-12-05 ASSESSMENT — PAIN DESCRIPTION - DESCRIPTORS: DESCRIPTORS: ACHING;DULL

## 2020-12-05 ASSESSMENT — PAIN SCALES - GENERAL
PAINLEVEL_OUTOF10: 6
PAINLEVEL_OUTOF10: 7
PAINLEVEL_OUTOF10: 0
PAINLEVEL_OUTOF10: 7
PAINLEVEL_OUTOF10: 5
PAINLEVEL_OUTOF10: 8

## 2020-12-05 ASSESSMENT — PAIN DESCRIPTION - PAIN TYPE: TYPE: ACUTE PAIN;SURGICAL PAIN

## 2020-12-05 NOTE — PROGRESS NOTES
Pharmacy Vancomycin Consult     Vancomycin Day: 3  Current Dosing: vancomycin 1000mg IV q12hr    Temp max:  98.2F    Recent Labs     12/03/20  0603 12/04/20  0703   BUN 9 9       Recent Labs     12/03/20  0603 12/04/20  0703   CREATININE 0.46* 0.42*       Recent Labs     12/03/20  0603 12/04/20  0703   WBC 12.4* 8.1         Intake/Output Summary (Last 24 hours) at 12/5/2020 0630  Last data filed at 12/5/2020 0531  Gross per 24 hour   Intake 390 ml   Output 1730 ml   Net -1340 ml       Culture Date      Source                       Results  Specimen Information:  Body Fluid          Component  12/3/20 1358   Gram Stain Result  Moderate WBC's, No organisms seen        Narrative     ORDER#: [id number]                          ORDERED BY: Tho Matos   SOURCE: Fluid Cervical Spine Aspiration    COLLECTED:  12/03/20 13:58            Blood Culture, Routine   Date/Time Value Ref Range Status   11/29/2020 07:20 AM No growth after 5 days of incubation. Final     Culture, Blood 2   Date/Time Value Ref Range Status   11/29/2020 07:21 AM No growth after 5 days of incubation. Final         Ht Readings from Last 1 Encounters:   11/18/20 6' 1\" (1.854 m)        Wt Readings from Last 1 Encounters:   11/18/20 147 lb 4.3 oz (66.8 kg)         Body mass index is 19.43 kg/m². Estimated Creatinine Clearance: 155 mL/min (A) (based on SCr of 0.42 mg/dL (L)). Trough: 6.8    Assessment/Plan:  Trough is below goal 10-20. Will increase to vancomycin 1000mg IV q8hr. Trough prior to 4th dose 12/6 0600. Thank you,    ELEANOR Lima. Ph.  12/5/2020  6:37 AM

## 2020-12-05 NOTE — PROGRESS NOTES
Physical Therapy Med Surg Daily Treatment Note  Facility/Department: [facility]  Room: [place] [address]       NAME: Collin Thorne  : 1950 (42 y.o.)  MRN: [id number]  CODE STATUS: Full Code    Date of Service: 2020    Patient Diagnosis(es): Fall at home, initial encounter [W19. XXXA, T56.752]  Falls, initial encounter [E70. XXXA]   Chief Complaint   Patient presents with    Fatigue     Patient Active Problem List    Diagnosis Date Noted    Abscess     Sepsis due to gram-negative UTI ([address] 2020    Neurogenic bladder     Stenosis of cervical spine with myelopathy ([address] 2020    Malignant tumor of prostate ([address] 2020    Raised prostate specific antigen 2020    Retention of urine 2020    Urethritis 2020    Ataxic gait 2020    Myelopathy ([address]     Falls, initial encounter 2020    Severe malnutrition ([address] 2020    Fall at home, initial encounter 2020    Goals of care, counseling/discussion     Gastrointestinal hemorrhage     Rectal mass     Acute cystitis     Metastatic cancer ([address]     Gastric ulcer 2020    Weakness     Anemia 2020    Other specified disorders of bone density and structure, unspecified site      Liver mass 2020    Osteoarthritis of hip 2020    Benign prostatic hyperplasia with incomplete bladder emptying 2018    Palpitations 10/19/2010        Past Medical History:   Diagnosis Date    PAF (paroxysmal atrial fibrillation) ([address]     ON XARELTO    Prostate cancer ([address] 2019     Past Surgical History:   Procedure Laterality Date    CERVICAL LAMINECTOMY N/A 2020    CERVICAL C3-4,4-5,5-6  LAMINOPLASTY WITH RECONSTRUCTION performed by Julisa Garcia MD at [address] [place] [address] N/A 2020    COLONOSCOPY DIAGNOSTIC performed by Tatyana Peterson MD at [facility] Right 2018    hip    UPPER GASTROINTESTINAL ENDOSCOPY N/A 2020    EGD DIAGNOSTIC ONLY performed by Suzy Monteiro MD at [facility]       Restrictions  Restrictions/Precautions: Fall Risk  Position Activity Restriction  Other position/activity restrictions: Limit cervical ROM to within pain tolerable range    SUBJECTIVE   General  Chart Reviewed: Yes  Family / Caregiver Present: No  Subjective  Subjective: Patient states he will try. Pre-Session Pain Report  Pre Treatment Pain Screening  Pain at present: 0  Scale Used: Numeric Score  Intervention List: Patient able to continue with treatment  Comments / Details: denies pain, no objective signs  Pain Screening  Patient Currently in Pain: Yes       Post-Session Pain Report  Pain Assessment  Pain Assessment: 0-10  Pain Level: 0       OBJECTIVE        Bed mobility  Bridging: Unable to assess(patient in chair)    Transfers  Sit to Stand: Moderate Assistance;Maximum Assistance  Stand to sit: Moderate Assistance;Maximum Assistance  Stand Pivot Transfers: Moderate Assistance;Maximum Assistance  Comment: sit to stand x2 able to stand 3 -5 seconds before ble weakness and needs to sit back down       Neuromuscular Education  Neuromuscular Comments: seated weight shifts on bedside commode, 1 episode of anterior lob with mod assist to correct, stand x3 sec, 5sec, 20 sec with max assist     Exercises  Hip Flexion: x10  Knee Long Arc Quad: x 10         ASSESSMENT   Assessment: Patient challenged by transfers sit to stand and to bedside commode, fatigued quickly with  brinda knee buckling.      Discharge Recommendations:  Continue to assess pending progress, Patient would benefit from continued therapy after discharge    Goals  Short term goals  Short term goal 1: Pt to complete HEP with indep  Long term goals  Long term goal 1: Pt to complete bed mobility with indep  Long term goal 2: Pt to complete transfers with indep  Long term goal 3: Pt to ambulate 150ft with LRD and indep  Long term goal 4: TUG <15.0 seconds to demo decreased risk for falls    PLAN    Times per week: 3-6  Safety Devices  Type of devices: All fall risk precautions in place, Call light within reach, Left in chair     [facility] (6 CLICK) Antonia Garcia 28 Inpatient Mobility Raw Score : 14     Therapy Time   Individual   Time In 1135   Time Out 1150   Minutes 15      tf 10 min  tx 5 min       Katheryn Suero PTA, 12/05/20 at 12:34 PM         Definitions for assistance levels  Independent = pt does not require any physical supervision or assistance from another person for activity completion. Device may be needed.   Stand by assistance = pt requires verbal cues or instructions from another person, close to but not touching, to perform the activity  Minimal assistance= pt performs 75% or more of the activity; assistance is required to complete the activity  Moderate assistance= pt performs 50% of the activity; assistance is required to complete the activity  Maximal assistance = pt performs 25% of the activity; assistance is required to complete the activity  Dependent = pt requires total physical assistance to accomplish the task

## 2020-12-05 NOTE — PROGRESS NOTES
Infectious Disease     Patient Name: Kinga Falcon  Date: 12/5/2020  YOB: 1950  Medical Record Number: [id number]      Postoperative abscess of neck  UTI with Proteus      Presenting with difficulty walking on 11/18/2020  Progressive quadriparesis  MRI was found to have severe canal stenosis C3-4-5 6 and 7  Patient went to surgery on 11/23/2020 cervical stenosis mild myelopathy underwent cervical laminoplasty reconstruction    Patient is now having significant cervical pain has had increased movement in upper extremities    CT scan was performed on 12/2/2020 showing fluid collection     MRI  MRI 7.5 X 4.5 X 3.5 CM POSTOPERATIVE FLUID COLLECTION  THIS DOES NOT APPEAR TO COMMUNICATE TO THE SPINAL CANAL, AND THERE IS NO EPIDURAL COLLECTION OR OTHER COMPLICATION IDENTIFIED. Component  Collected  Lab    Body Fluid Culture, Sterile  12/03/2020  1:58 PM  [address] [facility]    No growth 24 hours   No growth in 48 hours                 Review of Systems   Constitutional: Negative for chills, diaphoresis, fatigue and fever. Respiratory: Negative. Cardiovascular: Negative. Gastrointestinal: Negative. Musculoskeletal: Positive for back pain and neck pain. Neurological: Positive for weakness. Physical Exam   Neck:   Drainage from neck in drain appears somewhat cloudy   Cardiovascular:   No murmur heard. Pulmonary/Chest: Effort normal and breath sounds normal. No respiratory distress. He has no wheezes. He has no rales. He exhibits no tenderness. Abdominal: Soft. Bowel sounds are normal. He exhibits no distension and no mass. There is no abdominal tenderness. There is no rebound and no guarding. Blood pressure 135/77, pulse 82, temperature 98.5 °F (36.9 °C), temperature source Oral, resp. rate 18, height 6' 1\" (1.854 m), weight 147 lb 4.3 oz (66.8 kg), SpO2 100 %.       .   Lab Results   Component Value Date    WBC 8.1 12/04/2020    HGB 8.4 (L) 12/04/2020    HCT 26.3 (L) 12/04/2020    MCV 77.4 (L) 12/04/2020     (H) 12/04/2020     Lab Results   Component Value Date     12/05/2020    K 3.5 12/05/2020    K 4.2 11/25/2020    CL 99 12/05/2020    CO2 27 12/05/2020    BUN 7 12/05/2020    CREATININE 0.43 12/05/2020    GLUCOSE 94 12/05/2020    CALCIUM 9.0 12/05/2020          MRI CERVICAL SPINE W WO CONTRAST : 12/3/2020         COMPARISON: Cervical spine CT with contrast 12/2/2020         REASON FOR EXAMINATION:   possible cervical spinal abscess           TECHNIQUE: Multiplanar MR imaging of the cervical spine was performed before and after intravenous administration of approximately 15 mL of MultiHance gadolinium contrast.         FINDINGS:           Postsurgical changes of posterior decompression left laminoplasty at C3-C5 are again noted.         A mildly marginally enhancing organized fluid collection within the posterior soft tissues measures approximately 4.5 x 3.5 x 7.5 cm (transverse, AP and sagittal dimension).         This fluid collection does not appear to be contiguous with the spinal canal, and there is no residual spinal stenosis, epidural fluid collection or other significant changes identified.         Multilevel degenerative changes of the cervical spine described in detail on prior studies are again noted.                        Impression         APPROXIMATELY 7.5 X 4.5 X 3.5 CM POSTOPERATIVE FLUID COLLECTION AFTER RECENT CERVICAL LAMINOPLASTY.         INFECTION SHOULD BE EXCLUDED CLINICALLY.         THIS DOES NOT APPEAR TO COMMUNICATE TO THE SPINAL CANAL, AND THERE IS NO EPIDURAL COLLECTION OR OTHER COMPLICATION IDENTIFIED. Culture, Body Fluid [[id number]]   Collected: 12/03/20 1358    Order Status: Completed  Specimen:  Body Fluid  Updated: 12/04/20 1057     Body Fluid Culture, Sterile  No growth 24 hours          ASSESSMENT:  PLAN:    Postoperative abscess of neck  Given findings of MRI and elevated sedimentation rate     drainage of abscess cultures are no growth      cefepime  Dc  vancomycin    UTI with Proteus

## 2020-12-05 NOTE — PROGRESS NOTES
Patient resting in bed. Alert and oriented times 4. Complains of pain 5/10 in neck. Patient repositioned to right side.  Call bell with in reach

## 2020-12-05 NOTE — PROGRESS NOTES
[facility] Neurology Daily Progress Note  Name: Petra Chaudhary  Age: 79 y.o. Gender: male  CodeStatus: Full Code  Allergies: No Known Allergies    Chief Complaint:Fatigue    Primary Care Provider: Shahida Gill MD  InpatientTreatment Team: Treatment Team: Attending Provider: Dieudonne Dean DO; Consulting Physician: Keshia Thomas DO; Consulting Physician: Gil Tellez MD; Consulting Physician: Alyssa Parrish MD; Consulting Physician: Leonardo Pratt MD; Consulting Physician: Branden Spencer MD; Nursing Student: Iona Mahoney; : Lashon Willams, RN; Registered Nurse: Jacki Bourgeois, RN; : Zara Drummond, RN; Utilization Reviewer: Benji Buitrago RN  Admission Date: 11/18/2020      Fatigue   Associated symptoms include fatigue, neck pain and weakness. Pertinent negatives include no chest pain, chills, congestion, fever, headaches, nausea, sore throat or vomiting. Fatigue   Associated symptoms include fatigue, numbness and weakness. Pertinent negatives include no chills, congestion, coughing, fever, headaches, nausea, neck pain, sore throat or vomiting. Associated symptoms include fatigue, myalgias, neck pain, numbness and weakness.      Pt sen and he is ill and he cannot tolerate pain     We were called to reassess as patient's gait has not improved.  He continues complain of considerable pain in his neck and shoulders and requires assistance to transfer. [facility] is in fact become weaker.  Is been bed at least 15 days.     Patient states that pain is limiting factor in participating in PT. States pain has not improved and he is taking all the pain medication he is provided. Participated in PT and was able to sit at the edge of the bed, but could not stand due to significant increase in pain.       Patient alert and oriented x3, continues to have numbness in his upper extremities and weakness of lower extremities with difficulty standing.     Herman's WBC is elevated at 13.3.  He is afebrile and all vitals are within normal limits. He denies feeling sick or headache, sore throat, cough, shortness of breath, chest pain, fever/chills, or N/V.     Pt has severe pain in neck and more on movement   No fever     Patient seen and examined for neurology follow-up. Currently alert and oriented x3, no acute distress, cooperative. He is status post drainage of cervical spine abscess today. Afebrile. WBC count 12.4 today. Sed rate 114. He denies neck pain currently. Denies headache. Has minimal paresthesias to bilateral fingers. No weakness of upper extremities. Continues to complain of numbness and tingling and weakness to bilateral lower extremities up to the level of the knee. Renetta Love is O&Ax3, alert, and cooperative. He continues to complain on neck pain S/p abscess drainage, but states that the pain is \"much better. \" Describes pain as a 7/10, but has improved range of motion in his neck. Was able to stand and walk a few steps in PT which is a considerable improvement. He states his declining health is overwhelming and was quite tearful. Expressed fear about \"withering away. \"    Denies any new symptoms including HA, fevers/chills, chest pain, SOB, or N/V. Patient was seen and he actually is resting in bed. He has not yet ambulated well. His neck pain appears to be somewhat better and his affect is better since the abscess drainage. No fevers are reported. He is able to eat and is not choking    Patient is in bed at this time and reports his head and neck hurts much less he is able to move his neck somewhat better his weakness though has not changed. He is able to eat. /77   Pulse 82   Temp 98.5 °F (36.9 °C) (Oral)   Resp 18   Ht 6' 1\" (1.854 m)   Wt 147 lb 4.3 oz (66.8 kg)   SpO2 100%   BMI 19.43 kg/m²     Review of Systems   Constitutional: Positive for fatigue and unexpected weight change. Negative for chills and fever.    HENT: Negative for congestion, sore throat and trouble swallowing. Eyes: Negative for visual disturbance. Respiratory: Negative for shortness of breath. Cardiovascular: Negative for chest pain. Gastrointestinal: Negative for diarrhea, nausea and vomiting. Musculoskeletal: Positive for gait problem and neck pain. Neurological: Positive for weakness. Negative for dizziness, tremors, seizures, facial asymmetry and headaches. Psychiatric/Behavioral: Positive for dysphoric mood. Negative for agitation, behavioral problems and confusion. Reports his neck pain is better than it was and is able to move his neck somewhat better. He is less dysphoric today  Physical Exam  Constitutional:       Comments: underweight   HENT:      Head: Normocephalic and atraumatic. Eyes:      Extraocular Movements: Extraocular movements intact and EOM normal.      Conjunctiva/sclera: Conjunctivae normal.   Neck:      Comments: Increased ROM in neck  Cardiovascular:      Rate and Rhythm: Normal rate and regular rhythm. Heart sounds: Normal heart sounds. Pulmonary:      Effort: Pulmonary effort is normal.      Breath sounds: Normal breath sounds. Skin:     General: Skin is warm and dry. Neurological:      Mental Status: He is alert and oriented to person, place, and time. Motor: Weakness present. Coordination: Finger-Nose-Finger Test normal.   Psychiatric:         Speech: Speech normal.         Behavior: Behavior normal.         Thought Content: Thought content normal.       Neurologic Exam     Mental Status   Oriented to person, place, and time.    Speech: speech is normal     Cranial Nerves     CN III, IV, VI   Extraocular motions are normal.     Motor Exam   Right arm pronator drift: absent  Left arm pronator drift: absent    Gait, Coordination, and Reflexes     Coordination   Finger to nose coordination: normal    Tremor   Resting tremor: absent    Examination was performed is able to move his neck laterally at least 40 degrees and does not have any significant pain when I do this which is doing yesterday. He is weakness of 4/5 remains the same still hyperreflexic somewhat in the upper extremities and less in the lower extremities though much better than I seen on day 1  Medications:  Reviewed    Infusion Medications:     Scheduled Medications:    vancomycin  1,000 mg Intravenous Q8H    sodium chloride flush  10 mL Intravenous 2 times per day    sodium chloride (PF)  10 mL Intravenous Once    cefepime  2 g Intravenous Q12H    vancomycin (VANCOCIN) intermittent dosing (placeholder)   Other RX Placeholder    gabapentin  300 mg Oral TID    enzalutamide  160 mg Oral Daily    sodium chloride flush  10 mL Intravenous 2 times per day    bisacodyl  5 mg Oral Daily    morphine  15 mg Oral Q8H    mirtazapine  7.5 mg Oral Nightly    Vitamin D  2,000 Units Oral Dinner    cyanocobalamin  1,000 mcg Intramuscular Weekly    coenzyme Q10  100 mg Oral Daily    lidocaine  3 patch Transdermal Daily    polyethylene glycol  17 g Oral Daily    senna  1 tablet Oral Nightly    folic acid  1 mg Oral Daily    enoxaparin  40 mg Subcutaneous Daily     PRN Meds: sodium chloride flush, oxyCODONE-acetaminophen, [Held by provider] HYDROmorphone, diazePAM, sodium chloride flush, magnesium hydroxide, polyethylene glycol, ondansetron **OR** ondansetron, acetaminophen **OR** acetaminophen, promethazine **OR** ondansetron    Labs:   Recent Labs     12/03/20  0603 12/04/20  0703   WBC 12.4* 8.1   HGB 8.4* 8.4*   HCT 26.9* 26.3*   * 635*     Recent Labs     12/03/20  0603 12/04/20  0703 12/05/20  0600    136 136   K 3.7 3.6 3.5    100 99   CO2 25 25 27   BUN 9 9 7*   CREATININE 0.46* 0.42* 0.43*   CALCIUM 8.6 8.7 9.0   PHOS 3.1 2.9 2.8     No results for input(s): AST, ALT, BILIDIR, BILITOT, ALKPHOS in the last 72 hours. No results for input(s): INR in the last 72 hours.   No results for input(s): Thao Thurston in the last 72 hours.    Urinalysis:   Lab Results   Component Value Date    NITRU POSITIVE 11/29/2020    WBCUA >100 11/29/2020    BACTERIA MANY 11/29/2020    RBCUA 3-5 09/26/2020    BLOODU Negative 11/29/2020    SPECGRAV 1.014 11/29/2020    GLUCOSEU Negative 11/29/2020       Radiology:   Most recent    EEG No procedure found. MRI of Brain No results found for this or any previous visit. No results found for this or any previous visit. MRA of the Head and Neck: No results found for this or any previous visit. No results found for this or any previous visit. No results found for this or any previous visit. CT of the Head:   Results for orders placed during the hospital encounter of 11/18/20   CT Head WO Contrast    Narrative CT Brain    Contrast medium:  Not utilized. History:  Weakness, fatigue    Comparison:  None    Findings:    Extra-axial spaces:  Normal.     Intracranial hemorrhage:  None. Ventricular system: Ventricles mildly enlarged. Sulci mildly prominent. Basal Cisterns:  Normal.    Cerebral Parenchyma: Bilateral symmetric periventricular areas decreased attenuation. Midline Shift:  None. Cerebellum:  Normal.     Paranasal sinuses and mastoid air cells:  Normal.    Visualized Orbits:  Normal.        Impression Impression:    No acute findings. Mild cerebral atrophy. Chronic ischemic white matter disease. All CT scans at this facility use dose modulation, iterative reconstruction, and/or weight based dosing when appropriate to reduce radiation dose to as low as reasonably achievable. No results found for this or any previous visit. No results found for this or any previous visit. Carotid duplex: No results found for this or any previous visit. No results found for this or any previous visit. No results found for this or any previous visit. Echo No results found for this or any previous visit.           Assessment/Plan:  Gait ataxia with cervical myelopathy in the presence of metastatic prostate cancer.       Patient was seen by us initially for lower extremity weakness and a complete spine evaluation was done and patient had cervical spinal stenosis which was operated we very called in to evaluate due to his continued weakness and pain.  This is an aftermath of cervical myelopathy and may not improve or may take several weeks to months to improve.  Patient's main issues appears to be pain and requires pain management.      Exam does not show any acute changes, and patient remains areflexic in bilateral upper extremities, with gait ataxia, and weakness. 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      He continues to be a high fall risk. Patient is more motivated today to participate in PT.     Palliative care has been following patient and adjustments to pain meds have been made.  Discharge planning in process for skilled nursing facility.  Awaiting precertification.     I examined this patient again and I see that he still has significant issues with his lower extremity though these are likely to stay for a while we are not quite quite concerned about a new cord compression at least at this time. Ana Paula Drummond is no fever and there is no suggestion of any infective process that we are worried about in the cervical spine.  This again falls under neurological restriction and therefore close neurological follow-up.  We are contemplating obtaining a CT scan of the neck though given that these findings have not changed to be concerned about a spinal abscess we have not done this.     Patient continues to have pain despite maximizing analgesics.      CT of the cervical spine today shows rim-enhancing fluid collection containing small foci of air within the subcutaneous soft tissues at the C1-C5 levels.  Concerning for abscess.     Will order MRI cervical spine with and without contrast and consult neurosurgery (Dr. Ayush Longoria).    pts CT was reviewed / could be abscess/  MRI ordered  Will consult neurosurgery     MRI of cervical spine show 7.5 x 4.5 x 3.5 cm postoperative fluid collection status post recent cervical laminoplasty. Patient had abscess drainage done today with neurosurgery. Infectious diseases has been consulted. Patient now on cefepime and vancomycin. Cultures pending. MRI seen and discussed   Will have neurosurgery to see, findings as suspected clinically     He is making gains in strength and has increased neck ROM S/p abscess drainage. He is to have a PICC line placed today. He is okay to be discharged to rehab from a neurological standpoint. He  was reexamined today and his neck examination is much better is able to rotate his neck at 40 degrees bilaterally without any major pain which is much better than yesterday. His strength otherwise still remains the same as above. He will require rehabilitation. He has regressed somewhat since the surgery. We await his planning for rehabilitation    Oscar Ordoñez MD, Laverne Suarez, American Board of Psychiatry & Neurology  Board Certified in Vascular Neurology  Board Certified in Neuromuscular Medicine  Certified in Neurorehabilitation       Collaborating physicians: Dr Kimberly Ordoñez    Electronically signed by Manolo Lofton MD on 12/5/2020 at 12:59 PM

## 2020-12-05 NOTE — PROGRESS NOTES
Patient sat up in chair for approximately 4 hours and seemed stronger getting back in bed. Also used commode twice. Will continue to monitor.

## 2020-12-05 NOTE — PROGRESS NOTES
Intravenous PRN Courtney Suárez MD        bisacodyl (DULCOLAX) EC tablet 5 mg  5 mg Oral Daily Courtney Suárez MD   5 mg at 12/05/20 9644    magnesium hydroxide (MILK OF MAGNESIA) 400 MG/5ML suspension 30 mL  30 mL Oral Daily PRN Courtney Suárez MD        polyethylene glycol [facility]) packet 17 g  17 g Oral Daily PRN Courtney Suárez MD        ondansetron (ZOFRAN-ODT) disintegrating tablet 4 mg  4 mg Oral Q8H PRN Courtney Suárez MD        Or    ondansetron [facility]) injection 4 mg  4 mg Intravenous Q6H PRN Courtney Suárez MD        morphine (MS CONTIN) extended release tablet 15 mg  15 mg Oral Q8H JOYCE Lea - CNP   15 mg at 12/05/20 0504    mirtazapine (REMERON) tablet 7.5 mg  7.5 mg Oral Nightly JOYCE Lea - CNP   7.5 mg at 12/04/20 2047    Vitamin D (CHOLECALCIFEROL) tablet 2,000 Units  2,000 Units Oral Dinner Nicole Scullin, DO   2,000 Units at 12/04/20 1605    cyanocobalamin injection 1,000 mcg  1,000 mcg Intramuscular Weekly Nicole Scullin, DO   1,000 mcg at 12/01/20 2039    coenzyme Q10 capsule 100 mg  100 mg Oral Daily Nicole Scullin, DO   100 mg at 12/05/20 8106    lidocaine 4 % external patch 3 patch  3 patch Transdermal Daily Nicole Scullin, DO   3 patch at 11/30/20 0900    acetaminophen (TYLENOL) tablet 650 mg  650 mg Oral Q4H PRN Nicole Scullin, DO        Or    acetaminophen (TYLENOL) suppository 650 mg  650 mg Rectal Q6H PRN Nicole Scullin, DO        polyethylene glycol (GLYCOLAX) packet 17 g  17 g Oral Daily Rema Cove, DO   17 g at 12/05/20 8089    senna (SENOKOT) tablet 8.6 mg  1 tablet Oral Nightly Rema Cove, DO   8.6 mg at 40/95/65 1670    folic acid (FOLVITE) tablet 1 mg  1 mg Oral Daily Jj Johnson MD   1 mg at 12/05/20 0939    promethazine (PHENERGAN) tablet 12.5 mg  12.5 mg Oral Q6H PRN Kimberlee Ochoa MD        Or    ondansetron [facility]) injection 4 mg  4 mg Intravenous Q6H PRN Kimberlee Ochoa MD   4 mg at 11/18/20 9922    enoxaparin (LOVENOX) injection 40 mg  40 mg Subcutaneous Daily Greg Abernathy MD   40 mg at 20       Physical Examination:      Vitals:  /77   Pulse 82   Temp 98.5 °F (36.9 °C) (Oral)   Resp 18   Ht 6' 1\" (1.854 m)   Wt 147 lb 4.3 oz (66.8 kg)   SpO2 100%   BMI 19.43 kg/m²   Temp (24hrs), Av.8 °F (36.6 °C), Min:97 °F (36.1 °C), Max:98.5 °F (36.9 °C)      General appearance: Thin, chronically ill-appearing, malnourished, in no distress. Patient looking to the left. Mental Status: oriented to person, place and time and normal affect  Lungs: clear to auscultation bilaterally, normal effort  Heart: regular rate and rhythm, no murmur  Abdomen: soft, nontender, nondistended, bowel sounds present, no masses  Extremities: no edema, redness, tenderness in the calves  Skin: no gross lesions, rashes    Data:     Labs:  Recent Labs     20  0603 20  0703   WBC 12.4* 8.1   HGB 8.4* 8.4*   * 635*     Recent Labs     20  0703 20  0600    136   K 3.6 3.5    99   CO2 25 27   BUN 9 7*   CREATININE 0.42* 0.43*   GLUCOSE 101* 94     No results for input(s): AST, ALT, ALB, BILITOT, ALKPHOS in the last 72 hours. Assessment and Plan:        66-year-old male with history of metastatic prostate cancer presented with generalized weakness and recurrent falls. He was found to have orthostatic hypotension, severe protein calorie malnutrition, and severe C-spine stenosis for which she underwent C3-6 laminoplasty with reconstruction . His postoperative course was complicated by fevers and persistent neck pain-he was found to have Proteus UTI and cervical neck fluid collection vs abscess for which he is s/p aspiration / drainage. 1.  Sepsis secondary to possible postoperative cervical soft tissue abscess and Proteus UTI  -IV antibiotics per infectious disease. No growth from culture data.   PICC line in place  -Neurosurgery to see patient next week as outpatient or as inpatient if patient is still here  -Pain control per palliative medicine-they will E-prescribe scripts to skilled nursing facility at discharge  -PT/OT  -I repeated advance care planning today: I explained to him I did not feel he would survive cardiac resuscitation. He understood my reasoning but would like to remain full code at this time     Metastatic prostate cancer  Constipation  Folic acid deficiency  Severe protein calorie malnutrition  C-spine stenosis s/p C3-6 laminoplasty  Orthostatic hypotension  Chronic urinary retention requiring straight catheterization    Diet: Dietary Nutrition Supplements: Clear Liquid Oral Supplement  Dietary Nutrition Supplements: Frozen Oral Supplement  DIET GENERAL; Carb Control: 4 carbs/meal (approximate 1800 kcals/day)  Dietary Nutrition Supplements: Standard High Calorie Oral Supplement  Ppx: Lovenox  Full Code    Dispo: This patient is medically stable for discharge when SNF bed is arranged. Case management is having difficulty placing the patient due to facilities not taking his insurance. He will need antibiotic sent and pain prescription sent at discharge-this will be done by infectious disease and palliative medicine respectively.     Electronically signed by Brennen Buchanan DO on 12/5/2020 at 12:19 PM

## 2020-12-05 NOTE — FLOWSHEET NOTE
Assessment complete. Patient is a/o x4, c/o pain 7/10 in neck. Dressing on back of neck clean, dry, intact. Vitals are stable. Patient medicated per MAR. Patient is a check and change and straight caths himself. Patient denies need to empty bladder at this time. Call light within reach. 8087 - Writer called lab for vancomycin trough, pending per .

## 2020-12-06 LAB
ALBUMIN SERPL-MCNC: 2.8 G/DL (ref 3.5–4.6)
ANION GAP SERPL CALCULATED.3IONS-SCNC: 10 MEQ/L (ref 9–15)
BASOPHILS ABSOLUTE: 0 K/UL (ref 0–0.2)
BASOPHILS RELATIVE PERCENT: 0.5 %
BODY FLUID CULTURE, STERILE: NORMAL
BUN BLDV-MCNC: 9 MG/DL (ref 8–23)
CALCIUM SERPL-MCNC: 9.2 MG/DL (ref 8.5–9.9)
CHLORIDE BLD-SCNC: 102 MEQ/L (ref 95–107)
CO2: 27 MEQ/L (ref 20–31)
CREAT SERPL-MCNC: 0.5 MG/DL (ref 0.7–1.2)
EOSINOPHILS ABSOLUTE: 0.2 K/UL (ref 0–0.7)
EOSINOPHILS RELATIVE PERCENT: 3 %
GFR AFRICAN AMERICAN: >60
GFR NON-AFRICAN AMERICAN: >60
GLUCOSE BLD-MCNC: 141 MG/DL (ref 60–115)
GLUCOSE BLD-MCNC: 96 MG/DL (ref 70–99)
HCT VFR BLD CALC: 26.2 % (ref 42–52)
HEMOGLOBIN: 8.3 G/DL (ref 14–18)
LYMPHOCYTES ABSOLUTE: 2.3 K/UL (ref 1–4.8)
LYMPHOCYTES RELATIVE PERCENT: 27.9 %
MAGNESIUM: 2 MG/DL (ref 1.7–2.4)
MCH RBC QN AUTO: 24.8 PG (ref 27–31.3)
MCHC RBC AUTO-ENTMCNC: 31.9 % (ref 33–37)
MCV RBC AUTO: 77.7 FL (ref 80–100)
MONOCYTES ABSOLUTE: 0.8 K/UL (ref 0.2–0.8)
MONOCYTES RELATIVE PERCENT: 9.8 %
NEUTROPHILS ABSOLUTE: 4.7 K/UL (ref 1.4–6.5)
NEUTROPHILS RELATIVE PERCENT: 58.8 %
PDW BLD-RTO: 19 % (ref 11.5–14.5)
PERFORMED ON: ABNORMAL
PHOSPHORUS: 2.9 MG/DL (ref 2.3–4.8)
PLATELET # BLD: 663 K/UL (ref 130–400)
POTASSIUM SERPL-SCNC: 3.5 MEQ/L (ref 3.4–4.9)
RBC # BLD: 3.37 M/UL (ref 4.7–6.1)
SODIUM BLD-SCNC: 139 MEQ/L (ref 135–144)
WBC # BLD: 8.1 K/UL (ref 4.8–10.8)

## 2020-12-06 PROCEDURE — 99232 SBSQ HOSP IP/OBS MODERATE 35: CPT | Performed by: INTERNAL MEDICINE

## 2020-12-06 PROCEDURE — 6370000000 HC RX 637 (ALT 250 FOR IP): Performed by: NURSE PRACTITIONER

## 2020-12-06 PROCEDURE — 6370000000 HC RX 637 (ALT 250 FOR IP): Performed by: INTERNAL MEDICINE

## 2020-12-06 PROCEDURE — 6360000002 HC RX W HCPCS: Performed by: INTERNAL MEDICINE

## 2020-12-06 PROCEDURE — 6370000000 HC RX 637 (ALT 250 FOR IP): Performed by: NEUROLOGICAL SURGERY

## 2020-12-06 PROCEDURE — 6360000002 HC RX W HCPCS: Performed by: FAMILY MEDICINE

## 2020-12-06 PROCEDURE — 2580000003 HC RX 258: Performed by: INTERNAL MEDICINE

## 2020-12-06 PROCEDURE — 99232 SBSQ HOSP IP/OBS MODERATE 35: CPT | Performed by: PSYCHIATRY & NEUROLOGY

## 2020-12-06 PROCEDURE — 1210000000 HC MED SURG R&B

## 2020-12-06 PROCEDURE — 80069 RENAL FUNCTION PANEL: CPT

## 2020-12-06 PROCEDURE — 6370000000 HC RX 637 (ALT 250 FOR IP): Performed by: PSYCHIATRY & NEUROLOGY

## 2020-12-06 PROCEDURE — 2580000003 HC RX 258: Performed by: NEUROLOGICAL SURGERY

## 2020-12-06 PROCEDURE — 6370000000 HC RX 637 (ALT 250 FOR IP): Performed by: PHYSICAL MEDICINE & REHABILITATION

## 2020-12-06 PROCEDURE — 83735 ASSAY OF MAGNESIUM: CPT

## 2020-12-06 PROCEDURE — 85025 COMPLETE CBC W/AUTO DIFF WBC: CPT

## 2020-12-06 RX ADMIN — MIRTAZAPINE 7.5 MG: 15 TABLET, FILM COATED ORAL at 22:01

## 2020-12-06 RX ADMIN — OXYCODONE HYDROCHLORIDE AND ACETAMINOPHEN 1 TABLET: 10; 325 TABLET ORAL at 11:07

## 2020-12-06 RX ADMIN — MORPHINE SULFATE 15 MG: 15 TABLET, FILM COATED, EXTENDED RELEASE ORAL at 13:47

## 2020-12-06 RX ADMIN — CEFEPIME 2 G: 2 INJECTION, POWDER, FOR SOLUTION INTRAVENOUS at 01:23

## 2020-12-06 RX ADMIN — STANDARDIZED SENNA CONCENTRATE 8.6 MG: 8.6 TABLET ORAL at 22:01

## 2020-12-06 RX ADMIN — Medication 2000 UNITS: at 16:59

## 2020-12-06 RX ADMIN — Medication 100 MG: at 11:06

## 2020-12-06 RX ADMIN — POLYETHYLENE GLYCOL 3350 17 G: 17 POWDER, FOR SOLUTION ORAL at 11:06

## 2020-12-06 RX ADMIN — GABAPENTIN 300 MG: 300 CAPSULE ORAL at 13:47

## 2020-12-06 RX ADMIN — CEFEPIME 2 G: 2 INJECTION, POWDER, FOR SOLUTION INTRAVENOUS at 13:47

## 2020-12-06 RX ADMIN — GABAPENTIN 300 MG: 300 CAPSULE ORAL at 11:06

## 2020-12-06 RX ADMIN — MORPHINE SULFATE 15 MG: 15 TABLET, FILM COATED, EXTENDED RELEASE ORAL at 22:01

## 2020-12-06 RX ADMIN — ENOXAPARIN SODIUM 40 MG: 40 INJECTION SUBCUTANEOUS at 22:01

## 2020-12-06 RX ADMIN — FOLIC ACID 1 MG: 1 TABLET ORAL at 11:06

## 2020-12-06 RX ADMIN — Medication 10 ML: at 22:02

## 2020-12-06 RX ADMIN — GABAPENTIN 300 MG: 300 CAPSULE ORAL at 22:01

## 2020-12-06 RX ADMIN — BISACODYL 5 MG: 5 TABLET, COATED ORAL at 11:06

## 2020-12-06 RX ADMIN — MORPHINE SULFATE 15 MG: 15 TABLET, FILM COATED, EXTENDED RELEASE ORAL at 05:27

## 2020-12-06 ASSESSMENT — PAIN SCALES - GENERAL
PAINLEVEL_OUTOF10: 8
PAINLEVEL_OUTOF10: 7
PAINLEVEL_OUTOF10: 7
PAINLEVEL_OUTOF10: 10
PAINLEVEL_OUTOF10: 0

## 2020-12-06 ASSESSMENT — ENCOUNTER SYMPTOMS
RESPIRATORY NEGATIVE: 1
GASTROINTESTINAL NEGATIVE: 1
BACK PAIN: 1

## 2020-12-06 NOTE — FLOWSHEET NOTE
0663 - In room to provide patient care, patient awakens confused, is grabbing at writer's arms and babbling with incomprehensible speech. Patient calms down after a few minutes and is able to answer questions. Neuro assessment performed, no deficits noted. Patient oriented x3. Begins saying \"please help me, please help me. \" Pt is very tearful and looking at arms saying \"help\" and crying. Patient c/o pain 10/10 in neck, medicated per MAR. Pt is repositioned and call light within reach.

## 2020-12-06 NOTE — PROGRESS NOTES
Infectious Disease     Patient Name: Monique Tomlinson  Date: 12/6/2020  YOB: 1950  Medical Record Number: [id number]      Postoperative abscess of neck  UTI with Proteus      Presenting with difficulty walking on 11/18/2020  Progressive quadriparesis  MRI was found to have severe canal stenosis C3-4-5 6 and 7  Patient went to surgery on 11/23/2020 cervical stenosis mild myelopathy underwent cervical laminoplasty reconstruction    Patient is now having significant cervical pain has had increased movement in upper extremities    CT scan was performed on 12/2/2020 showing fluid collection     MRI  MRI 7.5 X 4.5 X 3.5 CM POSTOPERATIVE FLUID COLLECTION  THIS DOES NOT APPEAR TO COMMUNICATE TO THE SPINAL CANAL, AND THERE IS NO EPIDURAL COLLECTION OR OTHER COMPLICATION IDENTIFIED. Component  Collected  Lab    Body Fluid Culture, Sterile  12/03/2020  1:58 PM  [address] [facility]    No growth 24 hours   No growth in 48 hours                 Review of Systems   Constitutional: Negative. Respiratory: Negative. Cardiovascular: Negative. Gastrointestinal: Negative. Musculoskeletal: Positive for back pain and neck pain. Neurological: Positive for weakness. Physical Exam   Neck:        Cardiovascular:   No murmur heard. Pulmonary/Chest: Effort normal and breath sounds normal. No respiratory distress. He has no wheezes. He has no rales. He exhibits no tenderness. Abdominal: Soft. Bowel sounds are normal. He exhibits no distension and no mass. There is no abdominal tenderness. There is no rebound and no guarding. Blood pressure (!) 147/74, pulse 86, temperature 97.5 °F (36.4 °C), resp. rate 20, height 6' 1\" (1.854 m), weight 147 lb 4.3 oz (66.8 kg), SpO2 100 %.       .   Lab Results   Component Value Date    WBC 8.1 12/06/2020    HGB 8.3 (L) 12/06/2020    HCT 26.2 (L) 12/06/2020    MCV 77.7 (L) 12/06/2020     (H) 12/06/2020     Lab Results   Component Value Date    NA

## 2020-12-06 NOTE — PROGRESS NOTES
Physician Progress Note    12/6/2020   2:21 PM    Name:  Jaswant Elizabeth  MRN:    [id number]      Day: 12     Admit Date: 11/18/2020  2:44 PM  PCP: Ciarra Porter MD    Code Status:  Full Code    Subjective:     He continues to feel stiff but is slightly improved. Trying to eat and drink. No chest pain, dyspnea, abdominal pain.     Current Facility-Administered Medications   Medication Dose Route Frequency Provider Last Rate Last Dose    sodium chloride flush 0.9 % injection 10 mL  10 mL Intravenous 2 times per day Deniz Mendez DO   10 mL at 12/04/20 2050    sodium chloride flush 0.9 % injection 10 mL  10 mL Intravenous PRN Deniz Mendez, DO        oxyCODONE-acetaminophen (PERCOCET)  MG per tablet 1 tablet  1 tablet Oral Q4H PRN JOYCE Mitchell - CNP   1 tablet at 12/06/20 1107    sodium chloride (PF) 0.9 % injection 10 mL  10 mL Intravenous Once Erma Aaron MD        cefepime (MAXIPIME) 2 g IVPB minibag  2 g Intravenous Q12H Jose Rodriguez  mL/hr at 12/06/20 1347 2 g at 12/06/20 1347    [Held by provider] HYDROmorphone (DILAUDID) injection 1 mg  1 mg Intravenous Q3H PRN JOYCE Mitchell - CNP   1 mg at 12/02/20 1840    gabapentin (NEURONTIN) capsule 300 mg  300 mg Oral TID JOYCE Villeda - CNP   300 mg at 12/06/20 1347    enzalutamide (XTANDI) capsule 160 mg  160 mg Oral Daily Deniz Mendez,         diazePAM (VALIUM) tablet 2 mg  2 mg Oral Q6H PRN JOYCE Hernandez - CNP   2 mg at 12/02/20 2025    sodium chloride flush 0.9 % injection 10 mL  10 mL Intravenous 2 times per day Erma Aaron MD   10 mL at 12/05/20 2151    sodium chloride flush 0.9 % injection 10 mL  10 mL Intravenous PRN Erma Aaron MD        bisacodyl (DULCOLAX) EC tablet 5 mg  5 mg Oral Daily Erma Aaron MD   5 mg at 12/06/20 1106    magnesium hydroxide (MILK OF MAGNESIA) 400 MG/5ML suspension 30 mL  30 mL Oral Daily PRN Erma Aaron MD        polyethylene glycol [facility]) packet 17 Temp (24hrs), Av.1 °F (36.7 °C), Min:97.5 °F (36.4 °C), Max:98.6 °F (37 °C)      General appearance: Thin, chronically ill-appearing, malnourished, in no distress. Patient looking to the left. Mental Status: oriented to person, place and time and normal affect  Lungs: clear to auscultation bilaterally, normal effort  Heart: regular rate and rhythm, no murmur  Abdomen: soft, nontender, nondistended, bowel sounds present, no masses  Extremities: no edema, redness, tenderness in the calves  Skin: no gross lesions, rashes    Data:     Labs:  Recent Labs     20  1738 20  0525   WBC 10.1 8.1   HGB 8.6* 8.3*   * 663*     Recent Labs     20  0600 20  0557    139   K 3.5 3.5   CL 99 102   CO2 27 27   BUN 7* 9   CREATININE 0.43* 0.50*   GLUCOSE 94 96     No results for input(s): AST, ALT, ALB, BILITOT, ALKPHOS in the last 72 hours. Assessment and Plan:        Summary: 80-year-old male with history of metastatic prostate cancer (bone/liver mets) presented with generalized weakness and recurrent falls. He was found to have orthostatic hypotension, severe protein calorie malnutrition, and severe C-spine stenosis for which he underwent C3-6 laminoplasty with reconstruction . His postoperative course was complicated by fevers and persistent neck pain-he was found to have Proteus UTI and cervical neck fluid collection vs abscess for which he is s/p aspiration / drainage. 1.  Sepsis secondary to possible postoperative cervical soft tissue abscess and Proteus UTI  -IV Cefepime per infectious disease. No growth from culture data.   PICC line in place  -Neurosurgery to see patient next week as outpatient or as inpatient if patient is still here  -Pain control per palliative medicine-they will E-prescribe scripts to skilled nursing facility at discharge  -PT/OT     Metastatic prostate cancer  Constipation  Folic acid deficiency  Severe protein calorie malnutrition  C-spine stenosis s/p C3-6 laminoplasty  Orthostatic hypotension  Chronic urinary retention requiring straight catheterization    Diet: Dietary Nutrition Supplements: Clear Liquid Oral Supplement  Dietary Nutrition Supplements: Frozen Oral Supplement  DIET GENERAL; Carb Control: 4 carbs/meal (approximate 1800 kcals/day)  Dietary Nutrition Supplements: Standard High Calorie Oral Supplement  Ppx: Lovenox  Full Code    Dispo: This patient is medically stable for discharge when SNF bed is arranged. Case management is having difficulty placing the patient due to facilities not taking his insurance. He will need antibiotic sent and pain prescription sent at discharge-this will be done by infectious disease and palliative medicine, respectively.      Electronically signed by Suzette Mcduffie DO on 12/6/2020 at 2:21 PM

## 2020-12-06 NOTE — PROGRESS NOTES
Neurology Follow up    SUBJECTIVE:  NO Headache, double vision,blurry vision,difficulty with speech,difficulty with swallowing,weakness,numbness,pain,nausea,vomitting,chocking,neck pain,dizziness,  Patient's only complaints appears to be neck pain and gait issues.     Current Facility-Administered Medications   Medication Dose Route Frequency Provider Last Rate Last Dose    sodium chloride flush 0.9 % injection 10 mL  10 mL Intravenous 2 times per day Colon Evelyne, DO   10 mL at 12/04/20 2050    sodium chloride flush 0.9 % injection 10 mL  10 mL Intravenous PRN Colon Evelyne, DO        oxyCODONE-acetaminophen (PERCOCET)  MG per tablet 1 tablet  1 tablet Oral Q4H PRN Jus Skelton APRN - CNP   1 tablet at 12/06/20 1107    sodium chloride (PF) 0.9 % injection 10 mL  10 mL Intravenous Once McGrath MD Paul        cefepime (MAXIPIME) 2 g IVPB minibag  2 g Intravenous Q12H Brittaney Gloria MD   Stopped at 12/06/20 0153    [Held by provider] HYDROmorphone (DILAUDID) injection 1 mg  1 mg Intravenous Q3H PRN Jus Skelton APRN - CNP   1 mg at 12/02/20 1840    gabapentin (NEURONTIN) capsule 300 mg  300 mg Oral TID JOYCE Smith - CNP   300 mg at 12/06/20 1106    enzalutamide (XTANDI) capsule 160 mg  160 mg Oral Daily Colon Evelyne, DO        diazePAM (VALIUM) tablet 2 mg  2 mg Oral Q6H PRN Adjsylviai Jose Campbell APRN - CNP   2 mg at 12/02/20 2025    sodium chloride flush 0.9 % injection 10 mL  10 mL Intravenous 2 times per day Bennie Miller MD   10 mL at 12/05/20 2151    sodium chloride flush 0.9 % injection 10 mL  10 mL Intravenous PRN Bennie Miller MD        bisacodyl (DULCOLAX) EC tablet 5 mg  5 mg Oral Daily Bennie Miller MD   5 mg at 12/06/20 1106    magnesium hydroxide (MILK OF MAGNESIA) 400 MG/5ML suspension 30 mL  30 mL Oral Daily PRN Bennie Miller MD        polyethylene glycol [facility]) packet 17 g  17 g Oral Daily PRN McGrath Flank, MD        ondansetron (ZOFRAN-ODT) disintegrating tablet 4 mg  4 mg Oral Q8H PRN Zuleyka Garcia MD        Or    ondansetron [facility]) injection 4 mg  4 mg Intravenous Q6H PRN Zuleyka Garcia MD        morphine (MS CONTIN) extended release tablet 15 mg  15 mg Oral Q8H Belén Oas, APRN - CNP   15 mg at 12/06/20 0527    mirtazapine (REMERON) tablet 7.5 mg  7.5 mg Oral Nightly Belén Oas, APRN - CNP   7.5 mg at 12/05/20 2149    Vitamin D (CHOLECALCIFEROL) tablet 2,000 Units  2,000 Units Oral Dinner Nicole Scullin, DO   2,000 Units at 12/05/20 1724    cyanocobalamin injection 1,000 mcg  1,000 mcg Intramuscular Weekly Nicole Scullin, DO   1,000 mcg at 12/01/20 2039    coenzyme Q10 capsule 100 mg  100 mg Oral Daily Nicole Scullin, DO   100 mg at 12/06/20 1106    lidocaine 4 % external patch 3 patch  3 patch Transdermal Daily Nicole Scullin, DO   3 patch at 11/30/20 0900    acetaminophen (TYLENOL) tablet 650 mg  650 mg Oral Q4H PRN Nicole Scullin, DO        Or    acetaminophen (TYLENOL) suppository 650 mg  650 mg Rectal Q6H PRN Nicole Scullin, DO        polyethylene glycol (GLYCOLAX) packet 17 g  17 g Oral Daily Louellen Honey, DO   17 g at 12/06/20 1106    senna (SENOKOT) tablet 8.6 mg  1 tablet Oral Nightly Louellen Honey, DO   8.6 mg at 53/77/93 5681    folic acid (FOLVITE) tablet 1 mg  1 mg Oral Daily Abraham Dyer MD   1 mg at 12/06/20 1106    promethazine (PHENERGAN) tablet 12.5 mg  12.5 mg Oral Q6H PRN Mira South MD        Or    ondansetron [facility]) injection 4 mg  4 mg Intravenous Q6H PRN Mira South MD   4 mg at 11/18/20 1843    enoxaparin (LOVENOX) injection 40 mg  40 mg Subcutaneous Daily Mira South MD   40 mg at 12/05/20 2150       PHYSICAL EXAM:    BP (!) 147/74   Pulse 86   Temp 97.5 °F (36.4 °C)   Resp 20   Ht 6' 1\" (1.854 m)   Wt 147 lb 4.3 oz (66.8 kg)   SpO2 100%   BMI 19.43 kg/m²    General Appearance:      Skin:  normal  CVS - Normal sounds, No murmurs , No carotid Bruits  RS -CTA  Abdomen Soft, BS present  Review of Systems   Mental Status Exam:             Level of Alertness:   awake            Orientation:   person, place, time            Memory:   normal                      Attention/Concentration:  normal            Language:  normal      Funduscopic Exam:     Cranial Nerves        Cranial nerve II           Visual acuity:  normal           Visual fields:  normal      Cranial nerve III           Pupils:  equal, round, reactive to light      Cranial nerves III, IV, VI           Extraocular Movements: intact      Cranial nerve V           Facial sensation:  intact      Cranial nerve VII           Facial strength: intact      Cranial nerve VIII           Hearing:  intact      Cranial nerve IX           Palate:  intact      Cranial nerve XI         Shoulder shrug:  intact      Cranial nerve XII          Tongue movement:  normal    Motor: Neck examination is performed and he is able to now move his neck much better without pain. Drift:  absent  Motor exam is symmetrical strength is 4/5 and he is hyperreflexic in the upper extremity compared to the lower extremity there is no sensory levels. Tone:  normal  Abnormal Movements:  absent            Sensory:        Pinprick             Right Upper Extremity:  normal             Left Upper Extremity:  normal             Right Lower Extremity:  normal             Left Lower Extremity:  normal           Vibration                         Touch            Proprioception                 Coordination:           Finger/Nose   Right:  normal              Left:  normal          Heel-Knee-Shin                Right:  normal              Left:  normal          Rapid Alternating Movements              Right:  normal              Left:  normal          Gait:                       Casual: She has considerable difficulty standing but does transfer.                        Romberg:  normal            Reflexes:             Deep Tendon Reflexes: Reflexes are 2 +             Plantar response:                Right:  downgoing               Left:  downgoing    Vascular:  Cardiac Exam:  normal         Ct Head Wo Contrast    Result Date: 11/18/2020  CT Brain Contrast medium:  Not utilized. History:  Weakness, fatigue Comparison:  None Findings: Extra-axial spaces:  Normal. Intracranial hemorrhage:  None. Ventricular system: Ventricles mildly enlarged. Sulci mildly prominent. Basal Cisterns:  Normal. Cerebral Parenchyma: Bilateral symmetric periventricular areas decreased attenuation. Midline Shift:  None. Cerebellum:  Normal. Paranasal sinuses and mastoid air cells:  Normal. Visualized Orbits:  Normal.     Impression: No acute findings. Mild cerebral atrophy. Chronic ischemic white matter disease. All CT scans at this facility use dose modulation, iterative reconstruction, and/or weight based dosing when appropriate to reduce radiation dose to as low as reasonably achievable. Xr Chest Portable    Result Date: 11/18/2020  EXAMINATION: XR CHEST PORTABLE CLINICAL HISTORY: SHORTNESS OF BREATH COMPARISONS: CHEST RADIOGRAPH, SEPTEMBER 26, 2020 FINDINGS: Osseous structures are intact. Cardiopericardial silhouette is normal. Pulmonary vasculature is normal. Lungs are clear and hyperexpanded. NO ACUTE CARDIOPULMONARY DISEASE. Mri Cervical Thoracic Lumbar Spine Wo Contrast Limited    Result Date: 11/20/2020  EXAMINATION: MRI CERVICAL THORACIC LUMBAR SPINE WO CONTRAST LIMITED DATE AND TIME:11/19/2020 5:52 PM CLINICAL HISTORY: Severe neck pain  cord compresssion  COMPARISON: None TECHNIQUE: Multiplanar, multi-sequence MRI scans of the cervical and thoracic spine performed to exclude cord compression. FINDINGS Craniocervical junction: Craniocervical junction is within normal limits. Bone marrow: T1 marrow signal loss involving T3, T4, T7 T10 and T11 consistent with metastatic bone disease. No pathologic fracture or associated canal stenosis at these levels.  At C3-4, C4-5, C5-6, and C6-7 there is advanced cervical spondylosis with disc osteophyte complex with associated spinal cord deformity consistent with severe canal stenosis. Minimal canal diameter is less than 5 mm at several points throughout this area. Assessment of abnormal cord signal intensity is limited due to severe cord compression. The thoracic vertebral bodies are normally aligned with no evidence of pathologic fracture. There is no evidence for spinal canal stenosis in the thoracic spine. Scans of the lower thoracic spine was included due to the lumbar area show metastatic bone lesions involving all lumbar levels most significant at L3 and L4 without pathologic fracture or canal stenosis. Metastatic foci involving multiple sacral levels as well. No paraspinal soft tissue mass or fluid collection     Advanced cervical spondylosis from C3-4 through C6-7 resulting in severe spinal canal stenosis at these levels with minimal spinal canal diameter approaching 4 to 5 mm. Multiple metastatic bone disease throughout the thoracolumbar spine with no pathologic fracture. Recent Labs     12/04/20  0703 12/05/20  1738 12/06/20  0525   WBC 8.1 10.1 8.1   HGB 8.4* 8.6* 8.3*   * 664* 663*     Recent Labs     12/04/20  0703 12/05/20  0600 12/06/20  0557    136 139   K 3.6 3.5 3.5    99 102   CO2 25 27 27   BUN 9 7* 9   CREATININE 0.42* 0.43* 0.50*   GLUCOSE 101* 94 96     No results for input(s): BILITOT, ALKPHOS, AST, ALT in the last 72 hours. Lab Results   Component Value Date    PROTIME 12.3 02/26/2020    INR 0.9 02/26/2020     No results found for: LITHIUM, DILFRTOT, VALPROATE    ASSESSMENT AND PLAN  Cervical myelopathy with operative procedure. Patient presented with gait ataxia. We were following him for a while and then we had suspected that he may have a spinal abscess after his surgery.   Further evaluations were obtained with an MRI as well as CT scans and he was really evaluated by

## 2020-12-06 NOTE — FLOWSHEET NOTE
Assessment complete. Vitals stable, c/o neck pain 7/10, medicated per MAR. Pt up to chair with walker and assist x2 with no complaints. Patient transferred to [facility], cleaned, and repositioned.

## 2020-12-06 NOTE — PLAN OF CARE
Problem: Pain:  Goal: Pain level will decrease  Description: Pain level will decrease  Outcome: Ongoing  Goal: Control of acute pain  Description: Control of acute pain  Outcome: Ongoing  Goal: Control of chronic pain  Description: Control of chronic pain  Outcome: Ongoing     Problem: Falls - Risk of:  Goal: Will remain free from falls  Description: Will remain free from falls  Outcome: Ongoing  Goal: Absence of physical injury  Description: Absence of physical injury  Outcome: Ongoing     Problem: IP BALANCE  Goal: LTG - patient will maintain standing balance to allow for completion of daily activities  Outcome: Ongoing     Problem: Nutrition  Goal: Optimal nutrition therapy  Outcome: Ongoing     Problem: Skin Integrity:  Goal: Will show no infection signs and symptoms  Description: Will show no infection signs and symptoms  Outcome: Ongoing  Goal: Absence of new skin breakdown  Description: Absence of new skin breakdown  Outcome: Ongoing

## 2020-12-06 NOTE — PROGRESS NOTES
Patient gotten up to chair and was agreeable to it. Told him since he sat in the chair 4 hours yesterday he has to make it longer today. He was agreeable. Will continue to monitor.

## 2020-12-06 NOTE — CARE COORDINATION
MET WITH PATIENT, MUCH IMPROVED TODAY, SITTING IN HIS CHAIR. FREEDOM OF CHOICE OFFERED. WOULD LIKE TO TRY INPATIENT REHAB IF INSURANCE WILL APPROVE. WILL NEED REHAB EVAL ORDERED.  IF NOT [facility] REHAB , PATIENT WILLING TO TRANSFER TO [facility]

## 2020-12-07 LAB
ALBUMIN SERPL-MCNC: 2.8 G/DL (ref 3.5–4.6)
ANION GAP SERPL CALCULATED.3IONS-SCNC: 12 MEQ/L (ref 9–15)
BASOPHILS ABSOLUTE: 0.1 K/UL (ref 0–0.2)
BASOPHILS RELATIVE PERCENT: 0.6 %
BUN BLDV-MCNC: 9 MG/DL (ref 8–23)
CALCIUM SERPL-MCNC: 9.3 MG/DL (ref 8.5–9.9)
CHLORIDE BLD-SCNC: 99 MEQ/L (ref 95–107)
CO2: 25 MEQ/L (ref 20–31)
CREAT SERPL-MCNC: 0.41 MG/DL (ref 0.7–1.2)
EOSINOPHILS ABSOLUTE: 0.3 K/UL (ref 0–0.7)
EOSINOPHILS RELATIVE PERCENT: 3.3 %
GFR AFRICAN AMERICAN: >60
GFR NON-AFRICAN AMERICAN: >60
GLUCOSE BLD-MCNC: 92 MG/DL (ref 70–99)
HCT VFR BLD CALC: 26.4 % (ref 42–52)
HEMOGLOBIN: 8.5 G/DL (ref 14–18)
LYMPHOCYTES ABSOLUTE: 2.6 K/UL (ref 1–4.8)
LYMPHOCYTES RELATIVE PERCENT: 27.3 %
MAGNESIUM: 1.8 MG/DL (ref 1.7–2.4)
MCH RBC QN AUTO: 24.8 PG (ref 27–31.3)
MCHC RBC AUTO-ENTMCNC: 32.1 % (ref 33–37)
MCV RBC AUTO: 77.3 FL (ref 80–100)
MONOCYTES ABSOLUTE: 0.9 K/UL (ref 0.2–0.8)
MONOCYTES RELATIVE PERCENT: 9.3 %
NEUTROPHILS ABSOLUTE: 5.8 K/UL (ref 1.4–6.5)
NEUTROPHILS RELATIVE PERCENT: 59.5 %
PDW BLD-RTO: 19.1 % (ref 11.5–14.5)
PHOSPHORUS: 2.7 MG/DL (ref 2.3–4.8)
PLATELET # BLD: 658 K/UL (ref 130–400)
POTASSIUM SERPL-SCNC: 3.5 MEQ/L (ref 3.4–4.9)
RBC # BLD: 3.42 M/UL (ref 4.7–6.1)
SODIUM BLD-SCNC: 136 MEQ/L (ref 135–144)
WBC # BLD: 9.7 K/UL (ref 4.8–10.8)

## 2020-12-07 PROCEDURE — 2580000003 HC RX 258: Performed by: INTERNAL MEDICINE

## 2020-12-07 PROCEDURE — 97116 GAIT TRAINING THERAPY: CPT

## 2020-12-07 PROCEDURE — 6370000000 HC RX 637 (ALT 250 FOR IP): Performed by: PHYSICAL MEDICINE & REHABILITATION

## 2020-12-07 PROCEDURE — 83735 ASSAY OF MAGNESIUM: CPT

## 2020-12-07 PROCEDURE — 99232 SBSQ HOSP IP/OBS MODERATE 35: CPT | Performed by: INTERNAL MEDICINE

## 2020-12-07 PROCEDURE — 6370000000 HC RX 637 (ALT 250 FOR IP): Performed by: NEUROLOGICAL SURGERY

## 2020-12-07 PROCEDURE — 6360000002 HC RX W HCPCS: Performed by: FAMILY MEDICINE

## 2020-12-07 PROCEDURE — 97535 SELF CARE MNGMENT TRAINING: CPT

## 2020-12-07 PROCEDURE — 6370000000 HC RX 637 (ALT 250 FOR IP): Performed by: PSYCHIATRY & NEUROLOGY

## 2020-12-07 PROCEDURE — 6370000000 HC RX 637 (ALT 250 FOR IP): Performed by: NURSE PRACTITIONER

## 2020-12-07 PROCEDURE — 1210000000 HC MED SURG R&B

## 2020-12-07 PROCEDURE — APPSS30 APP SPLIT SHARED TIME 16-30 MINUTES: Performed by: NURSE PRACTITIONER

## 2020-12-07 PROCEDURE — 2580000003 HC RX 258: Performed by: NEUROLOGICAL SURGERY

## 2020-12-07 PROCEDURE — 85025 COMPLETE CBC W/AUTO DIFF WBC: CPT

## 2020-12-07 PROCEDURE — 6360000002 HC RX W HCPCS: Performed by: INTERNAL MEDICINE

## 2020-12-07 PROCEDURE — 99232 SBSQ HOSP IP/OBS MODERATE 35: CPT | Performed by: PSYCHIATRY & NEUROLOGY

## 2020-12-07 PROCEDURE — 36591 DRAW BLOOD OFF VENOUS DEVICE: CPT

## 2020-12-07 PROCEDURE — 80069 RENAL FUNCTION PANEL: CPT

## 2020-12-07 PROCEDURE — 6370000000 HC RX 637 (ALT 250 FOR IP): Performed by: INTERNAL MEDICINE

## 2020-12-07 RX ORDER — OXYCODONE HYDROCHLORIDE 15 MG/1
15 TABLET ORAL EVERY 6 HOURS PRN
Qty: 120 TABLET | Refills: 0 | OUTPATIENT
Start: 2020-12-07 | End: 2021-04-06

## 2020-12-07 RX ORDER — MORPHINE SULFATE 15 MG/1
15 TABLET, FILM COATED, EXTENDED RELEASE ORAL 2 TIMES DAILY
Qty: 60 TABLET | Refills: 0 | OUTPATIENT
Start: 2020-12-07 | End: 2021-01-06

## 2020-12-07 RX ADMIN — ENOXAPARIN SODIUM 40 MG: 40 INJECTION SUBCUTANEOUS at 22:03

## 2020-12-07 RX ADMIN — DIAZEPAM 2 MG: 2 TABLET ORAL at 22:05

## 2020-12-07 RX ADMIN — Medication 10 ML: at 10:30

## 2020-12-07 RX ADMIN — GABAPENTIN 300 MG: 300 CAPSULE ORAL at 10:28

## 2020-12-07 RX ADMIN — BISACODYL 5 MG: 5 TABLET, COATED ORAL at 10:28

## 2020-12-07 RX ADMIN — MORPHINE SULFATE 15 MG: 15 TABLET, FILM COATED, EXTENDED RELEASE ORAL at 05:30

## 2020-12-07 RX ADMIN — CEFEPIME 2 G: 2 INJECTION, POWDER, FOR SOLUTION INTRAVENOUS at 01:21

## 2020-12-07 RX ADMIN — Medication 100 MG: at 10:28

## 2020-12-07 RX ADMIN — GABAPENTIN 300 MG: 300 CAPSULE ORAL at 14:07

## 2020-12-07 RX ADMIN — OXYCODONE HYDROCHLORIDE AND ACETAMINOPHEN 1 TABLET: 10; 325 TABLET ORAL at 10:49

## 2020-12-07 RX ADMIN — MIRTAZAPINE 7.5 MG: 15 TABLET, FILM COATED ORAL at 22:04

## 2020-12-07 RX ADMIN — STANDARDIZED SENNA CONCENTRATE 8.6 MG: 8.6 TABLET ORAL at 22:03

## 2020-12-07 RX ADMIN — FOLIC ACID 1 MG: 1 TABLET ORAL at 10:28

## 2020-12-07 RX ADMIN — MORPHINE SULFATE 15 MG: 15 TABLET, FILM COATED, EXTENDED RELEASE ORAL at 14:07

## 2020-12-07 RX ADMIN — MORPHINE SULFATE 15 MG: 15 TABLET, FILM COATED, EXTENDED RELEASE ORAL at 22:03

## 2020-12-07 RX ADMIN — Medication 2000 UNITS: at 22:04

## 2020-12-07 RX ADMIN — Medication 10 ML: at 22:09

## 2020-12-07 RX ADMIN — GABAPENTIN 300 MG: 300 CAPSULE ORAL at 22:09

## 2020-12-07 RX ADMIN — CEFEPIME 2 G: 2 INJECTION, POWDER, FOR SOLUTION INTRAVENOUS at 14:07

## 2020-12-07 RX ADMIN — POLYETHYLENE GLYCOL 3350 17 G: 17 POWDER, FOR SOLUTION ORAL at 10:28

## 2020-12-07 ASSESSMENT — PAIN SCALES - GENERAL
PAINLEVEL_OUTOF10: 7
PAINLEVEL_OUTOF10: 7
PAINLEVEL_OUTOF10: 8
PAINLEVEL_OUTOF10: 4
PAINLEVEL_OUTOF10: 8
PAINLEVEL_OUTOF10: 7

## 2020-12-07 ASSESSMENT — ENCOUNTER SYMPTOMS
NAUSEA: 0
COLOR CHANGE: 0
COUGH: 0
WHEEZING: 0
CHEST TIGHTNESS: 0
VOMITING: 0
RESPIRATORY NEGATIVE: 1
TROUBLE SWALLOWING: 0
GASTROINTESTINAL NEGATIVE: 1
SHORTNESS OF BREATH: 0
BACK PAIN: 0
BACK PAIN: 1

## 2020-12-07 ASSESSMENT — PAIN DESCRIPTION - PAIN TYPE
TYPE: CHRONIC PAIN
TYPE: ACUTE PAIN;SURGICAL PAIN
TYPE: SURGICAL PAIN

## 2020-12-07 ASSESSMENT — PAIN DESCRIPTION - DESCRIPTORS
DESCRIPTORS: ACHING
DESCRIPTORS: ACHING;SORE

## 2020-12-07 ASSESSMENT — PAIN DESCRIPTION - ORIENTATION
ORIENTATION: RIGHT;LEFT

## 2020-12-07 ASSESSMENT — PAIN DESCRIPTION - LOCATION
LOCATION: NECK;SHOULDER

## 2020-12-07 NOTE — PROGRESS NOTES
[facility] OCCUPATIONAL THERAPY MED SURG TREATMENT NOTE     Date: 2020  Patient Name: Stefano Adams        MRN: [id number]  Account: [de-identified]   : 1950  (79 y.o.)  [facility]    Chart Review:  Diagnosis:  The primary encounter diagnosis was Generalized weakness. A diagnosis of Abscess was also pertinent to this visit. Restrictions:    Restrictions/Precautions  Restrictions/Precautions: Fall Risk  Position Activity Restriction  Other position/activity restrictions: Limit cervical ROM to within pain tolerable range      Subjective:  Patient states:  \"I don't feel strong. \"  Pain:  Start of tx:  Pre Treatment Pain Screening  Pain at present: 4  Scale Used: Numeric Score  Intervention List: Patient able to continue with treatment, Patient declined any intervention  Comments / Details: \"I'm just sore\"    End of tx:  Pain Assessment  Patient Currently in Pain: Yes  Pain Assessment: 0-10  Pain Level: 4  Pain Type: Surgical pain  Pain Location: Neck, Shoulder  Pain Orientation: Right, Left  Pain Descriptors: Aching, Sore  Pain Frequency: Continuous    Objective:    ADL:    Pt. seen for partial ADL at EOB. Sponge bath for UE bathing performed; did not attempt LE bathing due to increased dizziness when attempting bending. ADL  Feeding: Setup(+ hand to mouth; however, pt with difficulty opening containers)  Grooming: Setup  UE Bathing: Setup  LE Bathing: Moderate assistance  UE Dressing: Minimal assistance  LE Dressing: Moderate assistance  Toileting: Moderate assistance  Additional Comments: Pt. simulated ADLs as above with reaching; performed UE bathing at setup. Decreased overall trunk control and reports dizziness following sitting upright x 5 minutes to practice toilet transfers. Has decreased overall endurance. Practiced manipulating snaps for buttons practice; pt. with mod difficulty and requires increased time.  Decreased coordination for twisting objects with fingers, which would likely impact pt's ability to complete IV antibiotics independently at home. Toilet Transfers  Toilet - Technique: Stand pivot  Equipment Used: Grab bars  Toilet Transfer: Contact guard assistance  Toilet Transfers Comments: Stand pivot with CGA to BSC; increased dizziness with standing, decreased endurance and overall activity tolerance     Therapy key for assistance levels -   Independent = Pt. is able to perform task with no assistance but may require a device   Stand by assistance = Pt. does not perform task at an independent level but does not need physical assistance, requires verbal cues  Minimal, Moderate, Maximal Assistance = Pt. requires physical assistance (25%, 50%, 75% assist from helper) for task but is able to actively participate in task   Dependent = Pt. requires total assistance with task and is not able to actively participate with task completion    Functional Balance:  Balance  Sitting Balance: Supervision  Standing Balance: Contact guard assistance   Functional Mobility  Functional - Mobility Device: No device  Activity: Other  Assist Level: Contact guard assistance  Functional Mobility Comments: CGA to BS; pt. very fatigued and dizzy, unsafe in PM to attempt to ambulate to bathroom    Cognition:  Cognition  Overall Cognitive Status: WFL    Bed Mobility  Bed mobility  Bridging: Minimal assistance  Rolling to Left: Moderate assistance  Rolling to Right: Contact guard assistance  Supine to Sit: Contact guard assistance  Sit to Supine: Contact guard assistance  Scooting: Contact guard assistance  Comment: HOB slightly elevated, increased time for mobility, verbal cues to initiate log roll. Min use of grab bar noted. Treatment consisted of:  ADL Training    Assessment/Discharge Disposition:  Assessment: Pt. practiced bed mobility and bathing. Pt. with significant weakness and endurance deficits in BUEs.  Pt. would benefit from continued OT to maximize safety and balance for ADLs, improve coordination for fine motor tasks, and maximize consistency with transfers. Performance deficits / Impairments: Decreased functional mobility , Decreased ADL status, Decreased strength, Decreased endurance, Decreased balance, Decreased high-level IADLs, Decreased fine motor control, Decreased sensation  Prognosis: Good  Discharge Recommendations: Continue to assess pending progress  History: Pt's medical history is moderately complex  Exam: Pt. has 8 performance deficits  Assistance / Modification: Pt. requires min A      6-Click  How much help for putting on and taking off regular lower body clothing?: A Lot  How much help for Bathing?: A Lot  How much help for Toileting?: A Little  How much help for putting on and taking off regular upper body clothing?: A Little  How much help for taking care of personal grooming?: A Little  How much help for eating meals?: A Little  AM-[facility] Inpatient Daily Activity Raw Score: 16  AM-PAC Inpatient ADL T-Scale Score : 35.96  ADL Inpatient CMS 0-100% Score: 53.32    Plan:  Continue OT per POC    Goals/Plan:    Improve Functional Transfers  Improve ADL [place]    Minutes:    OT Individual Minutes  Time In: 1533  Time Out: 1556  Minutes: 23    ADL trainin minutes    Electronically signed by:     Tomi Calvillo  2020, 4:19 PM

## 2020-12-07 NOTE — PROGRESS NOTES
Neurology Follow up    SUBJECTIVE:    Patient seen and examined for neurology follow-up for cervical myelopathy status post cervical laminoplasty with postoperative cervical spinal abscess status post drainage in the setting of metastatic cancer. Cervical spinal fluid culture with no growth for 72 hours. Patient with chronic urinary retention requiring self straight catheterization. Patient currently alert and oriented x3, no acute distress, cooperative. Continues to have some neck pain but overall reports that it is improved and responds well to pain medication. He continues to have some paresthesias into bilateral fingers and feet. No new weakness noted. Patient is now able to ambulate with walker which is an improvement for him.     Pt is in bed and comfortable    Current Facility-Administered Medications   Medication Dose Route Frequency Provider Last Rate Last Dose    sodium chloride flush 0.9 % injection 10 mL  10 mL Intravenous 2 times per day Alta Ferrell DO   10 mL at 12/07/20 1030    sodium chloride flush 0.9 % injection 10 mL  10 mL Intravenous PRN Alta Ferrell DO        oxyCODONE-acetaminophen (PERCOCET)  MG per tablet 1 tablet  1 tablet Oral Q4H PRN Levorn Lissette, APRN - CNP   1 tablet at 12/07/20 1049    sodium chloride (PF) 0.9 % injection 10 mL  10 mL Intravenous Once Sophia Aaron MD        cefepime (MAXIPIME) 2 g IVPB minibag  2 g Intravenous Q12H Carlos Casarez MD   Stopped at 12/07/20 0157    [Held by provider] HYDROmorphone (DILAUDID) injection 1 mg  1 mg Intravenous Q3H PRN Levorn Lissette APRN - CNP   1 mg at 12/02/20 1840    gabapentin (NEURONTIN) capsule 300 mg  300 mg Oral TID Bales Prima [employer], APRN - CNP   300 mg at 12/07/20 1028    enzalutamide (XTANDI) capsule 160 mg  160 mg Oral Daily Alta Ferrell DO   Stopped at 12/07/20 1029    diazePAM (VALIUM) tablet 2 mg  2 mg Oral Q6H PRN Yue Prieto APRN - CNP   2 mg at 12/02/20 2025    sodium chloride flush 0.9 % injection 10 mL  10 mL Intravenous 2 times per day Annia Marsh MD   10 mL at 12/07/20 1030    sodium chloride flush 0.9 % injection 10 mL  10 mL Intravenous PRN Annia Marsh MD        bisacodyl (DULCOLAX) EC tablet 5 mg  5 mg Oral Daily Annia Marsh MD   5 mg at 12/07/20 1028    magnesium hydroxide (MILK OF MAGNESIA) 400 MG/5ML suspension 30 mL  30 mL Oral Daily PRN Annia Marsh MD        polyethylene glycol [facility]) packet 17 g  17 g Oral Daily PRN Annia Marsh MD        ondansetron (ZOFRAN-ODT) disintegrating tablet 4 mg  4 mg Oral Q8H PRN Annia Marsh MD        Or    [facility]ron [facility]) injection 4 mg  4 mg Intravenous Q6H PRN Annia Marsh MD        morphine (MS CONTIN) extended release tablet 15 mg  15 mg Oral Q8H JOYCE Wang - CNP   15 mg at 12/07/20 0530    mirtazapine (REMERON) tablet 7.5 mg  7.5 mg Oral Nightly JOYCE Wang - CNP   7.5 mg at 12/06/20 2201    Vitamin D (CHOLECALCIFEROL) tablet 2,000 Units  2,000 Units Oral Dinner Nicole Scullin, DO   2,000 Units at 12/06/20 1659    cyanocobalamin injection 1,000 mcg  1,000 mcg Intramuscular Weekly Nicole Scullin, DO   1,000 mcg at 12/01/20 2039    coenzyme Q10 capsule 100 mg  100 mg Oral Daily Nicole Scullin, DO   100 mg at 12/07/20 1028    lidocaine 4 % external patch 3 patch  3 patch Transdermal Daily Nicole Scullin, DO   3 patch at 11/30/20 0900    acetaminophen (TYLENOL) tablet 650 mg  650 mg Oral Q4H PRN Nicole Scullin, DO        Or    acetaminophen (TYLENOL) suppository 650 mg  650 mg Rectal Q6H PRN Nicole Scullin, DO        polyethylene glycol (GLYCOLAX) packet 17 g  17 g Oral Daily Pravin Barefoot, DO   17 g at 12/07/20 1028    senna (SENOKOT) tablet 8.6 mg  1 tablet Oral Nightly Pravin Barefoot, DO   8.6 mg at 30/25/88 8420    folic acid (FOLVITE) tablet 1 mg  1 mg Oral Daily Shana Mcnamara MD   1 mg at 12/07/20 1028    promethazine (PHENERGAN) tablet 12.5 mg  12.5 mg Oral Q6H PRN Gretchen Sánchez MD        Or    ondansetron [facility]) injection 4 mg  4 mg Intravenous Q6H PRN Gretchen Sánchez MD   4 mg at 11/18/20 1843    enoxaparin (LOVENOX) injection 40 mg  40 mg Subcutaneous Daily Gretchen Sánchez MD   40 mg at 12/06/20 2201       PHYSICAL EXAM:    /86   Pulse 83   Temp 98.2 °F (36.8 °C) (Oral)   Resp 17   Ht 6' 1\" (1.854 m)   Wt 147 lb 4.3 oz (66.8 kg)   SpO2 100%   BMI 19.43 kg/m²    General Appearance:      Skin:  normal  CVS - Normal sounds, No murmurs , No carotid Bruits  RS -CTA  Abdomen Soft, BS present  Review of Systems   Constitutional: Negative for appetite change, fatigue and fever. HENT: Negative for hearing loss and trouble swallowing. Eyes: Negative for visual disturbance. Respiratory: Negative for cough, chest tightness, shortness of breath and wheezing. Cardiovascular: Negative for chest pain, palpitations and leg swelling. Gastrointestinal: Negative for nausea and vomiting. Genitourinary: Positive for difficulty urinating. Musculoskeletal: Positive for gait problem, neck pain and neck stiffness. Negative for back pain. Skin: Negative for color change and rash. Neurological: Positive for weakness and numbness. Negative for dizziness, tremors, seizures, syncope, facial asymmetry, speech difficulty, light-headedness and headaches. Psychiatric/Behavioral: Negative for agitation, confusion and hallucinations. The patient is not nervous/anxious.      has less neck pain  Mental Status Exam:             Level of Alertness:   awake            Orientation:   person, place, time            Memory:   normal                      Attention/Concentration:  normal            Language:  normal      Funduscopic Exam:     Cranial Nerves        Cranial nerve II              Cranial nerve III           Pupils:  equal, round, reactive to light      Cranial nerves III, IV, VI           Extraocular Movements: intact      Cranial nerve V Facial sensation:  intact      Cranial nerve VII           Facial strength: intact      Cranial nerve VIII           Hearing:  intact      Cranial nerve IX           Palate:  intact      Cranial nerve XI         Shoulder shrug:  intact      Cranial nerve XII          Tongue movement:  normal    Motor: Neck examination is performed and he is able to now move his neck much better without pain. Drift:  absent  Motor exam is symmetrical strength is 4/5 and he is hyperreflexic in the upper extremity compared to the lower extremity there is no sensory levels. Tone:  normal  Abnormal Movements:  Absent    No spasm on movement            Sensory:        Pinprick             Right Upper Extremity:  normal             Left Upper Extremity:  normal             Right Lower Extremity:  normal             Left Lower Extremity:  normal           Vibration                         Touch            Proprioception                 Coordination:           Finger/Nose   Right:  normal              Left:  normal          Heel-Knee-Shin                Right:  normal              Left:  normal          Rapid Alternating Movements              Right:  normal              Left:  normal          Gait:                       Casual: Not able to stand very well            Romberg:  normal            Reflexes:             Deep Tendon Reflexes:             Reflexes are 2 +             Plantar response:                Right:  downgoing               Left:  downgoing    Vascular:  Cardiac Exam:  normal         Ct Head Wo Contrast    Result Date: 11/18/2020  CT Brain Contrast medium:  Not utilized. History:  Weakness, fatigue Comparison:  None Findings: Extra-axial spaces:  Normal. Intracranial hemorrhage:  None. Ventricular system: Ventricles mildly enlarged. Sulci mildly prominent. Basal Cisterns:  Normal. Cerebral Parenchyma: Bilateral symmetric periventricular areas decreased attenuation. Midline Shift:  None.  Cerebellum:  Normal. Paranasal sinuses and mastoid air cells:  Normal. Visualized Orbits:  Normal.     Impression: No acute findings. Mild cerebral atrophy. Chronic ischemic white matter disease. All CT scans at this facility use dose modulation, iterative reconstruction, and/or weight based dosing when appropriate to reduce radiation dose to as low as reasonably achievable. Xr Chest Portable    Result Date: 11/18/2020  EXAMINATION: XR CHEST PORTABLE CLINICAL HISTORY: SHORTNESS OF BREATH COMPARISONS: CHEST RADIOGRAPH, SEPTEMBER 26, 2020 FINDINGS: Osseous structures are intact. Cardiopericardial silhouette is normal. Pulmonary vasculature is normal. Lungs are clear and hyperexpanded. NO ACUTE CARDIOPULMONARY DISEASE. Mri Cervical Thoracic Lumbar Spine Wo Contrast Limited    Result Date: 11/20/2020  EXAMINATION: MRI CERVICAL THORACIC LUMBAR SPINE WO CONTRAST LIMITED DATE AND TIME:11/19/2020 5:52 PM CLINICAL HISTORY: Severe neck pain  cord compresssion  COMPARISON: None TECHNIQUE: Multiplanar, multi-sequence MRI scans of the cervical and thoracic spine performed to exclude cord compression. FINDINGS Craniocervical junction: Craniocervical junction is within normal limits. Bone marrow: T1 marrow signal loss involving T3, T4, T7 T10 and T11 consistent with metastatic bone disease. No pathologic fracture or associated canal stenosis at these levels. At C3-4, C4-5, C5-6, and C6-7 there is advanced cervical spondylosis with disc osteophyte complex with associated spinal cord deformity consistent with severe canal stenosis. Minimal canal diameter is less than 5 mm at several points throughout this area. Assessment of abnormal cord signal intensity is limited due to severe cord compression. The thoracic vertebral bodies are normally aligned with no evidence of pathologic fracture. There is no evidence for spinal canal stenosis in the thoracic spine.  Scans of the lower thoracic spine was included due to the lumbar area show metastatic bone lesions involving all lumbar levels most significant at L3 and L4 without pathologic fracture or canal stenosis. Metastatic foci involving multiple sacral levels as well. No paraspinal soft tissue mass or fluid collection     Advanced cervical spondylosis from C3-4 through C6-7 resulting in severe spinal canal stenosis at these levels with minimal spinal canal diameter approaching 4 to 5 mm. Multiple metastatic bone disease throughout the thoracolumbar spine with no pathologic fracture. Recent Labs     12/05/20  1738 12/06/20  0525 12/07/20  0551   WBC 10.1 8.1 9.7   HGB 8.6* 8.3* 8.5*   * 663* 658*     Recent Labs     12/05/20  0600 12/06/20  0557 12/07/20  0549    139 136   K 3.5 3.5 3.5   CL 99 102 99   CO2 27 27 25   BUN 7* 9 9   CREATININE 0.43* 0.50* 0.41*   GLUCOSE 94 96 92     No results for input(s): BILITOT, ALKPHOS, AST, ALT in the last 72 hours. Lab Results   Component Value Date    PROTIME 12.3 02/26/2020    INR 0.9 02/26/2020     No results found for: LITHIUM, DILFRTOT, VALPROATE    ASSESSMENT AND PLAN  Cervical myelopathy with operative procedure. Patient presented with gait ataxia. We were following him for a while and then we had suspected that he may have a spinal abscess after his surgery. Further evaluations were obtained with an MRI as well as CT scans and he was really evaluated by neurosurgery had a drainage done. He actually showing improvement his neck pain is much better there is not been any fevers. His main issues appears to gait issues due to myelopathy of lower extremity secondary to previous chemotherapy as well. His gait issues also from cervical myelopathy with compression. Patient require aggressive physical therapy.     Patient is followed neurologically as patients can change and alter after surgical interventions and to prevent a catastrophic quadriplegia after procedures and therefore this requires a close follow-up and neurological examinations at

## 2020-12-07 NOTE — TELEPHONE ENCOUNTER
Medication will not be refilled. Patient is scheduled to be discharged to a facility. Prescription will be sent to facility's pharmacy.

## 2020-12-07 NOTE — PROGRESS NOTES
Physical Therapy Med Surg Daily Treatment Note  Facility/Department: [facility] MED SURG UNIT  Room: [place] [id number]       NAME: Kirsten Aquino  : 1950 (82 y.o.)  MRN: [id number]  CODE STATUS: Full Code    Date of Service: 2020    Patient Diagnosis(es): Fall at home, initial encounter [W19. XXXA, Z97.614]  Falls, initial encounter [U49. XXXA]   Chief Complaint   Patient presents with    Fatigue     Patient Active Problem List    Diagnosis Date Noted    Abscess     Sepsis due to gram-negative UTI ([address] 2020    Neurogenic bladder     Stenosis of cervical spine with myelopathy ([address] 2020    Malignant tumor of prostate ([address] 2020    Raised prostate specific antigen 2020    Retention of urine 2020    Urethritis 2020    Ataxic gait 2020    Myelopathy ([address]     Falls, initial encounter 2020    Severe malnutrition ([address] 2020    Fall at home, initial encounter 2020    Goals of care, counseling/discussion     Gastrointestinal hemorrhage     Rectal mass     Acute cystitis     Metastatic cancer ([address]     Gastric ulcer 2020    Weakness     Anemia 2020    Other specified disorders of bone density and structure, unspecified site      Liver mass 2020    Osteoarthritis of hip 2020    Benign prostatic hyperplasia with incomplete bladder emptying 2018    Palpitations 10/19/2010        Past Medical History:   Diagnosis Date    PAF (paroxysmal atrial fibrillation) ([address]     ON XARELTO    Prostate cancer ([address] 2019     Past Surgical History:   Procedure Laterality Date    CERVICAL LAMINECTOMY N/A 2020    CERVICAL C3-4,4-5,5-6  LAMINOPLASTY WITH RECONSTRUCTION performed by El Kraft MD at [address] [place] [address] N/A 2020    [facility]    hip    UPPER GASTROINTESTINAL ENDOSCOPY N/A 2020    EGD DIAGNOSTIC ONLY performed by Christina Reynolds MD at [facility]       Restrictions  Restrictions/Precautions: Fall Risk  Position Activity Restriction  Other position/activity restrictions: Limit cervical ROM to within pain tolerable range    SUBJECTIVE   General  Chart Reviewed: Yes  Family / Caregiver Present: No  Subjective  Subjective: \"I'm hurting but I'm still going to do it. \"    Pre-Session Pain Report  Pre Treatment Pain Screening  Pain at present: 7  Intervention List: Patient able to continue with treatment;Patient declined any intervention  Comments / Details: Pt states he received medication prior to tx  Pain Screening  Patient Currently in Pain: Yes       Post-Session Pain Report  Pain Assessment  Pain Assessment: 0-10  Pain Level: 8  Pain Type: Acute pain;Surgical pain  Pain Location: Neck; Shoulder  Pain Orientation: Right;Left  Pain Descriptors: Aching         OBJECTIVE        Bed mobility  Supine to Sit: Contact guard assistance  Sit to Supine: Unable to assess(pt up in chair post tx)  Scooting: Contact guard assistance  Comment: Hob slightly elevated, no use of HR. Pt coming to long sit from supine then scooting EOB vs log roll technique.     Transfers  Sit to Stand: Minimal Assistance  Stand to sit: Minimal Assistance  Bed to Chair: Contact guard assistance  Comment: VC's for proper hand placement with good follow through, slightly decreased eccentric control    Ambulation  Ambulation?: Yes  Ambulation 1  Surface: level tile  Device: Rolling Walker  Assistance: Contact guard assistance  Quality of Gait: Shuffling steps, nbos, decreased brinda heel strike, ff posture  Gait Deviations: Slow Yanet;Decreased step length  Distance: 10' x 2  Comments: pt utilizing [employer] appropriately in tight spaces in room, pt performing side steps without ww with no LOB    Neuromuscular Education  Neuromuscular Comments: standing lateral weight shifts at ww     Exercises  Quad Sets: x20  Gluteal Sets: x20  Knee Long Arc Quad: x10  Ankle Pumps: x20         ASSESSMENT   Assessment: Pt exhibits an improvement in strength and endurance this date secondary to performing ambulation and standing activities with no knee buckling or LOB. Decreased assist needed during trsfs this date as pt has excellent follow through of vc's for technique when performing sit > stand. Good motivation and participation displayed throughout tx. Discharge Recommendations:  Continue to assess pending progress, Patient would benefit from continued therapy after discharge    Goals  Short term goals  Short term goal 1: Pt to complete HEP with indep  Long term goals  Long term goal 1: Pt to complete bed mobility with indep  Long term goal 2: Pt to complete transfers with indep  Long term goal 3: Pt to ambulate 150ft with LRD and indep  Long term goal 4: TUG <15.0 seconds to demo decreased risk for falls    PLAN    Times per week: 3-6  Plan Comment: Cont. POC  Safety Devices  Type of devices: All fall risk precautions in place, Call light within reach, Chair alarm in place, Left in chair     AMPAC (6 CLICK) Antonia Garcia 28 Inpatient Mobility Raw Score : 17     Therapy Time   Individual   Time In 0934   Time Out 0957   Minutes 23      BM/Trsf: 8  Gait: 9  NMR: 2  There ex: 4       Daniel Akhtar PTA, 12/07/20 at 10:12 AM         Definitions for assistance levels  Independent = pt does not require any physical supervision or assistance from another person for activity completion. Device may be needed.   Stand by assistance = pt requires verbal cues or instructions from another person, close to but not touching, to perform the activity  Minimal assistance= pt performs 75% or more of the activity; assistance is required to complete the activity  Moderate assistance= pt performs 50% of the activity; assistance is required to complete the activity  Maximal assistance = pt performs 25% of the activity; assistance is required to complete the activity  Dependent = pt requires total physical assistance to accomplish the task

## 2020-12-07 NOTE — CARE COORDINATION
Social work note: Communicated with [employer] ,RN admissions from [address] [place] [address] to confirm they did receive another referral for patient. It was noted the [facility] was already denied on 11/25/2020 with insurance. [employer] will follow up with [facility]. Received a VM from [employer] at [employer] for [facility] in [place] that patient was agreeable to last week. She went on indicating PT/OT updated notes needed. Left voicemail for Cassy. Spoke with patient to review the form [facility] needs filled out which was placed in chart after business hours on Friday. At this time, patient reports he spoke with nurse yesterday who told him he could stay and get rehab here. Let him know his insurance had denied it on 11/25/2020. Then he said he [facility] or fill [facility] the form because he was told he would go to this rehab. Patient was agreeable to have rehab contact his sister to see if he was mailed a denial letter to his home. RUBÉN following. Electronically signed by RUBÉN Andrade on 12/7/2020 at 11:36 AM     1200: Spoke with NEIDA Smith admissions from [address] [place] [address] who reports she spoke with patient and as he was denied for Acute rehab he would need to speak to insurance company. Patient does not want to speak to insurance company. [employer] reaching out to sister to see if she would be his representative. Electronically signed by RUBÉN Andrade on 12/7/2020 at 12:04 PM     [phone]: Updated Ramon Madera for [facility] in [place] that patient no longer wants to [facility] and will not fill out financial paperwork for [facility]. Electronically signed by RUBÉN Andrade on 12/7/2020 at 1:46 PM     [phone]: Spoke with patient about the need to appeal the denial for acute rehab which Sarah from rehab would need to be done. The appeal would have to be on business letter head from patient/family.  Also, discussed with patient if it came to patient maybe

## 2020-12-07 NOTE — TELEPHONE ENCOUNTER
JUDITH-Pt called while still admitted at [facility], Per his nurse Lurdes Prather, the patient is going to another facility and is not going home. Refills not sent to the provider.

## 2020-12-07 NOTE — CARE COORDINATION
IV BENEFIT REQUEST FORM    FAX FROM:  [facility]                        [address]    REQUESTED BY: Electronically signed by Kiko Trejo RN on 2020 at 2:50 PM                                               RN/C3: PHONE: [phone])     DATE:/TIME OF REQUEST: 20  TIME: 2:50 PM      TO: Maday [address] TO: [phone]    PHONE: [phone]     THIS PATIENT HAS BEEN IDENTIFIED TO POSSIBLY NEED LONG TERM IV'S. PLEASE CHECK INSURANCE COVERAGE FOR THE FOLLOWING PT/DRUGS. PATIENT'S NAME: [facility]                              [facility]   PATIENT'S : 1950  PATIENT ADDRESS: [place] [id number]  SSN:    ()     PAYOR NAME:  Payor: Christi Freeze / Plan: [place] Freeze  / Product Type: *No Product type* /     DRUG:  MAXIPIME             DOSE:  1 GM        FREQUENCY: Q 8 HR      __________ CHECK HERE IF PT HAS NO INSURANCE AND REQUESTING SELF PAY COST. *IF [facility] IS NOT A PROVIDER FOR THIS PATIENT, PLEASE FORWARD INFO VIA FAX TO [facility] @ [phone],(PHONE NUMBER: [phone]) TO RUN BENEFIT VERIFICATION AND NOTIFY THE ABOVE C3 OF THIS PLAN. (FAX FACE SHEET WITH DEMOGRAPHICS AND INSURANCE INFO WITH THIS FORM.)  PLEASE FAX BENEFIT INFO TO: THE [phone]    This message is intended only for the use of the individual or entity to which it is addressed and may contain information that is privileged, confidential, and exempt from disclosure under applicable law. If the reader of the notice is not intended recipient of the employee/agent responsible for delivering the message to the intended recipient, you are hereby notified than any dissemination, distribution or copying of this communication is strictly prohibited.  Please contact the sender for further instructions on handling the information.

## 2020-12-07 NOTE — PROGRESS NOTES
Subjective: The patient complains of severe  acute on chronic neck pain and weakness partially relieved by rest, PT, OT and meds and exacerbated by exertion and recent illness. I ordered him a soft collar it seems to be helping though he is not able to tolerate wearing it at night and takes it off. He still having a hard time turning to the right. Patient doing better, will re appeal; with insurance company    ROS x10: The patient also complains of severely impaired mobility and activities of daily living. Otherwise no new problems with vision, hearing, nose, mouth, throat, dermal, cardiovascular, GI, , pulmonary, musculoskeletal, psychiatric or neurological. See Rehab consult on Rehab chart . Vital signs:  /86   Pulse 83   Temp 98.2 °F (36.8 °C) (Oral)   Resp 17   Ht 6' 1\" (1.854 m)   Wt 147 lb 4.3 oz (66.8 kg)   SpO2 100%   BMI 19.43 kg/m²   I/O:   PO/Intake:    fair PO intake,       Bowel/Bladder:   Incontinent-neurogenic bladder-needs assist with IC q 6-8 hours scheduled. ,    General:  Patient is well developed, adequately nourished, non-obese and     well kempt. HEENT:    PERRLA, hearing intact to loud voice, external inspection of ear     and nose benign. Inspection of lips, tongue and gums benign  Musculoskeletal: No significant change in strength or tone. All joints stable. Inspection and palpation of digits and nails show no clubbing,       cyanosis or inflammatory conditions. Neuro/Psychiatric: Affect: flat-  Alert and oriented to self and     Situation. No significant change in deep tendon reflexes or     sensation  Lungs:  Diminished, CTA-B. Respiration effort is normal at rest.     Heart:   S1 = S2,  RRR. No loud murmurs. Abdomen:  Soft, non-tender, no enlargement of liver or spleen. Extremities:  No significant lower extremity edema or tenderness. Skin:    BUE bruises dt blood draws, c spine incision--head turned to the left.     Rehabilitation:  Physical Range    Magnesium 1.8 1.7 - 2.4 mg/dL   CBC Auto Differential    Collection Time: 12/07/20  5:51 AM   Result Value Ref Range    WBC 9.7 4.8 - 10.8 K/uL    RBC 3.42 (L) 4.70 - 6.10 M/uL    Hemoglobin 8.5 (L) 14.0 - 18.0 g/dL    Hematocrit 26.4 (L) 42.0 - 52.0 %    MCV 77.3 (L) 80.0 - 100.0 fL    MCH 24.8 (L) 27.0 - 31.3 pg    MCHC 32.1 (L) 33.0 - 37.0 %    RDW 19.1 (H) 11.5 - 14.5 %    Platelets 394 (H) 623 - 400 K/uL    Neutrophils % 59.5 %    Lymphocytes % 27.3 %    Monocytes % 9.3 %    Eosinophils % 3.3 %    Basophils % 0.6 %    Neutrophils Absolute 5.8 1.4 - 6.5 K/uL    Lymphocytes Absolute 2.6 1.0 - 4.8 K/uL    Monocytes Absolute 0.9 (H) 0.2 - 0.8 K/uL    Eosinophils Absolute 0.3 0.0 - 0.7 K/uL    Basophils Absolute 0.1 0.0 - 0.2 K/uL       Previous extensive, complex labs, notes and diagnostics reviewed and analyzed. ALLERGIES:    Allergies as of 11/18/2020    (No Known Allergies)      (please also verify by checking [facility])     Complex Physical Medicine & Rehab Issues Assess & Plan:   1. Severe abnormality of gait and mobility and impaired self-care and ADL's secondary to progressive cervical myelopathy. Functional and medical status reassessed regarding patients ability to participate in therapies and patient found to be able to participate in  acute intensive comprehensive inpatient rehabilitation program including PT/OT to improve balance, ambulation, ADLs, and to improve the P/AROM. It is my opinion that they will be able to tolerate 3 hours of therapy a day and benefit from it at an acute level. 2. Bowel constipation and Bladder dysfunction atonic bladder: Incontinent-neurogenic bladder-needs assist with IC q 6-8 hours scheduled. ,   frequent toileting, ambulate to bathroom with assistance, check post void residuals. Check for C.difficile x1 if >2 loose stools in 24 hours, continue bowel & bladder program.  Monitor for UTI symptoms including lethargy and confusion  3.  Severe postoperative neck pain and generalized OA pain: reassess pain every shift and prior to and after each therapy session, give scheduled MS Contin, prn Percocet  or Tylenol, modalities prn in therapy, consider Lidoderm, K-pad prn. Consider increase to Dilaudid 1 mg.consider percocet 10--vs increased MScontin--also add soft cervical collar. 4. Skin breakdown risk:  continue pressure relief program.  Daily skin exams and reports from nursing. 5. Severe fatigue due to immobility and nutritional deficits: Add vitamin B12 vitamin D and CoQ10 titrate dosing and add protein supplementation with low carb content. 6. Complex discharge planning: Risk for urinary retention given spinal cord injury-Precert  Denied with [facility] Medicare for Acute Inpatient Rehab, no waivers in place at this time--they did not call us back yesterday during the window of time that they gave us. I have attempted to improve his pain management while here and hopefully he will do better at a skilled facility with those interventions however I strongly feel that his insurance company is doing he did this for service by not approving acute rehab. Secondarily I also feel that they were not forthright in their attempts to achieve a peer to peer. Complex Active General Medical Issues that complicate care Assess & Plan:     1. 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 *  marked improvements noted in all therapies, thus demonstrating rehab potential and ability to tolerate 3 hours aday of rehab. Recommend acute rehab if insurance company can pay for home IV Abx at discharge.     Omar Bazan D.O., PM&R     Attending    [address]

## 2020-12-07 NOTE — PROGRESS NOTES
Infectious Disease     Patient Name: Deepak Sykes  Date: 12/7/2020  YOB: 1950  Medical Record Number: [id number]      Postoperative abscess of neck  UTI with Proteus      MRI was found to have severe canal stenosis C3-4-5 6 and 7  Patient went to surgery on 11/23/2020 cervical stenosis mild myelopathy underwent cervical laminoplasty reconstruction    Patient is now having significant cervical pain has had increased movement in upper extremities    CT scan was performed on 12/2/2020 showing fluid collection     MRI  MRI 7.5 X 4.5 X 3.5 CM POSTOPERATIVE FLUID COLLECTION  THIS DOES NOT APPEAR TO COMMUNICATE TO THE SPINAL CANAL, AND THERE IS NO EPIDURAL COLLECTION OR OTHER COMPLICATION IDENTIFIED. Component  Collected  Lab    Body Fluid Culture, Sterile  12/03/2020  1:58 PM  [address] [facility]    No growth 24 hours   No growth in 48 hours           Review of Systems   Constitutional: Negative. Respiratory: Negative. Cardiovascular: Negative. Gastrointestinal: Negative. Musculoskeletal: Positive for back pain and neck pain. Neurological: Positive for weakness. Physical Exam   Neck: Neck supple. Cardiovascular:   No murmur heard. Pulmonary/Chest: Effort normal and breath sounds normal. No respiratory distress. He has no wheezes. He has no rales. He exhibits no tenderness. Abdominal: Soft. Bowel sounds are normal. He exhibits no distension and no mass. There is no abdominal tenderness. There is no rebound and no guarding. Blood pressure 107/86, pulse 83, temperature 98.2 °F (36.8 °C), temperature source Oral, resp. rate 17, height 6' 1\" (1.854 m), weight 147 lb 4.3 oz (66.8 kg), SpO2 100 %.       .   Lab Results   Component Value Date    WBC 9.7 12/07/2020    HGB 8.5 (L) 12/07/2020    HCT 26.4 (L) 12/07/2020    MCV 77.3 (L) 12/07/2020     (H) 12/07/2020     Lab Results   Component Value Date     12/07/2020    K 3.5 12/07/2020    K 4.2 11/25/2020    CL 99 12/07/2020    CO2 25 12/07/2020    BUN 9 12/07/2020    CREATININE 0.41 12/07/2020    GLUCOSE 92 12/07/2020    CALCIUM 9.3 12/07/2020          MRI CERVICAL SPINE W WO CONTRAST : 12/3/2020         COMPARISON: Cervical spine CT with contrast 12/2/2020         REASON FOR EXAMINATION:   possible cervical spinal abscess           TECHNIQUE: Multiplanar MR imaging of the cervical spine was performed before and after intravenous administration of approximately 15 mL of MultiHance gadolinium contrast.         FINDINGS:           Postsurgical changes of posterior decompression left laminoplasty at C3-C5 are again noted.         A mildly marginally enhancing organized fluid collection within the posterior soft tissues measures approximately 4.5 x 3.5 x 7.5 cm (transverse, AP and sagittal dimension).         This fluid collection does not appear to be contiguous with the spinal canal, and there is no residual spinal stenosis, epidural fluid collection or other significant changes identified.         Multilevel degenerative changes of the cervical spine described in detail on prior studies are again noted.                        Impression         APPROXIMATELY 7.5 X 4.5 X 3.5 CM POSTOPERATIVE FLUID COLLECTION AFTER RECENT CERVICAL LAMINOPLASTY.         INFECTION SHOULD BE EXCLUDED CLINICALLY.         THIS DOES NOT APPEAR TO COMMUNICATE TO THE SPINAL CANAL, AND THERE IS NO EPIDURAL COLLECTION OR OTHER COMPLICATION IDENTIFIED. Culture, Body Fluid [[id number]]   Collected: 12/03/20 1358    Order Status: Completed  Specimen:  Body Fluid  Updated: 12/04/20 1057     Body Fluid Culture, Sterile  No growth 24 hours          ASSESSMENT:  PLAN:    Postoperative abscess of neck  Given findings of MRI and elevated sedimentation rate     drainage of abscess cultures are no growth      cefepime x 4 weeks    UTI with Proteus

## 2020-12-07 NOTE — CARE COORDINATION
Met with patient again tonight regarding requested Appeal for Acute Rehab. Patient requesting appeal for Acute Rehab be submitted, letter completed and information faxed per [facility] instructions. Discussed possibility of SNF vs HHC pending outcome and patient's progress, and patient verbalized understanding. Patient also stated he is appreciative of everyone working together to provide him with safe discharge plan.  Electronically signed by Karol Donato RN on 12/7/20 at 5:46 PM EST

## 2020-12-07 NOTE — PROGRESS NOTES
Physician Progress Note    12/7/2020   3:39 PM    Name:  Jess Mcfarlane  MRN:    [id number]     [facility] Day: 16     Admit Date: 11/18/2020  2:44 PM  PCP: Jade Boogie MD    Code Status:  Full Code    Subjective:     Patient is seen and evaluated at bedside. 4 weeks cefepime recommended by ID, however still awaiting chemotherapy recommendations from Oncology. No new acute pressing concerns. Vitals are stable. Labs unchanged.     Current Facility-Administered Medications   Medication Dose Route Frequency Provider Last Rate Last Dose    sodium chloride flush 0.9 % injection 10 mL  10 mL Intravenous 2 times per day Katie Hem, DO   10 mL at 12/07/20 1030    sodium chloride flush 0.9 % injection 10 mL  10 mL Intravenous PRN Katie Hem, DO        oxyCODONE-acetaminophen (PERCOCET)  MG per tablet 1 tablet  1 tablet Oral Q4H PRN Jenni Lopez APRN - CNP   1 tablet at 12/07/20 1049    sodium chloride (PF) 0.9 % injection 10 mL  10 mL Intravenous Once Kit Hutchison MD        cefepime (MAXIPIME) 2 g IVPB minibag  2 g Intravenous Q12H Dontae Escobar MD   Stopped at 12/07/20 1440    [Held by provider] HYDROmorphone (DILAUDID) injection 1 mg  1 mg Intravenous Q3H PRN Jenni Lopez APRN - CNP   1 mg at 12/02/20 1840    gabapentin (NEURONTIN) capsule 300 mg  300 mg Oral TID [facility], APRN - CNP   300 mg at 12/07/20 1407    enzalutamide (XTANDI) capsule 160 mg  160 mg Oral Daily Katie Hem, DO   Stopped at 12/07/20 1029    diazePAM (VALIUM) tablet 2 mg  2 mg Oral Q6H PRN Yue Mao APRN - CNP   2 mg at 12/02/20 2025    sodium chloride flush 0.9 % injection 10 mL  10 mL Intravenous 2 times per day Kit Hutchison MD   10 mL at 12/07/20 1030    sodium chloride flush 0.9 % injection 10 mL  10 mL Intravenous PRN Kit Hutchison MD        bisacodyl (DULCOLAX) EC tablet 5 mg  5 mg Oral Daily Kit Hutchison MD   5 mg at 12/07/20 1028    magnesium hydroxide (MILK OF MAGNESIA) 400 MG/5ML suspension 30 mL  30 mL Oral Daily PRN Bernardo Godoy MD        polyethylene glycol [facility]) packet 17 g  17 g Oral Daily PRN Bernardo Godoy MD        ondansetron (ZOFRAN-ODT) disintegrating tablet 4 mg  4 mg Oral Q8H PRN Bernardo Godoy MD        Or    ondansetron [facility] [address] [facility]) injection 4 mg  4 mg Intravenous Q6H PRN Bernardo Godoy MD        morphine (MS CONTIN) extended release tablet 15 mg  15 mg Oral Q8H Harvey Blight, APRN - CNP   15 mg at 12/07/20 1407    mirtazapine (REMERON) tablet 7.5 mg  7.5 mg Oral Nightly Harvey Blight, APRN - CNP   7.5 mg at 12/06/20 2201    Vitamin D (CHOLECALCIFEROL) tablet 2,000 Units  2,000 Units Oral Dinner Nicole Scullin, DO   2,000 Units at 12/06/20 1659    cyanocobalamin injection 1,000 mcg  1,000 mcg Intramuscular Weekly Nicole Scullin, DO   1,000 mcg at 12/01/20 2039    coenzyme Q10 capsule 100 mg  100 mg Oral Daily Nicole Scullin, DO   100 mg at 12/07/20 1028    lidocaine 4 % external patch 3 patch  3 patch Transdermal Daily Nicole Scullin, DO   3 patch at 11/30/20 0900    acetaminophen (TYLENOL) tablet 650 mg  650 mg Oral Q4H PRN Nicole Scullin, DO        Or    acetaminophen (TYLENOL) suppository 650 mg  650 mg Rectal Q6H PRN Nicole Scullin, DO        polyethylene glycol (GLYCOLAX) packet 17 g  17 g Oral Daily Dawna Crosser, DO   17 g at 12/07/20 1028    senna (SENOKOT) tablet 8.6 mg  1 tablet Oral Nightly Dawna Crosser, DO   8.6 mg at 75/35/18 0994    folic acid (FOLVITE) tablet 1 mg  1 mg Oral Daily Pito Benz MD   1 mg at 12/07/20 1028    promethazine (PHENERGAN) tablet 12.5 mg  12.5 mg Oral Q6H PRN Thien Jason MD        Or    ondansetron [facility] [address]) injection 4 mg  4 mg Intravenous Q6H PRN Thien Jason MD   4 mg at 11/18/20 1843    enoxaparin (LOVENOX) injection 40 mg  40 mg Subcutaneous Daily Thien Jason MD   40 mg at 12/06/20 2201       Physical Examination:      Vitals:  /86   Pulse 83   Temp 98.2 °F (36.8 °C) (Oral)   Resp 17   Ht 6' 1\" (1.854 m)   Wt 147 lb 4.3 oz (66.8 kg)   SpO2 100%   BMI 19.43 kg/m²   Temp (24hrs), Av.2 °F (36.8 °C), Min:98.2 °F (36.8 °C), Max:98.2 °F (36.8 °C)      General appearance: Thin, chronically ill-appearing, malnourished, in no distress. Patient looking to the left. Mental Status: oriented to person, place and time and normal affect  Lungs: clear to auscultation bilaterally, normal effort  Heart: regular rate and rhythm, no murmur  Abdomen: soft, nontender, nondistended, bowel sounds present, no masses  Extremities: no edema, redness, tenderness in the calves  Skin: no gross lesions, rashes    Data:     Labs:  Recent Labs     20  0525 20  0551   WBC 8.1 9.7   HGB 8.3* 8.5*   * 658*     Recent Labs     20  0557 20  0549    136   K 3.5 3.5    99   CO2 27 25   BUN 9 9   CREATININE 0.50* 0.41*   GLUCOSE 96 92     No results for input(s): AST, ALT, ALB, BILITOT, ALKPHOS in the last 72 hours. Assessment and Plan:        Summary: 27-year-old male with history of metastatic prostate cancer (bone/liver mets) presented with generalized weakness and recurrent falls. He was found to have orthostatic hypotension, severe protein calorie malnutrition, and severe C-spine stenosis for which he underwent C3-6 laminoplasty with reconstruction . His postoperative course was complicated by fevers and persistent neck pain-he was found to have Proteus UTI and cervical neck fluid collection vs abscess for which he is s/p aspiration / drainage. 1.  Sepsis secondary to possible postoperative cervical soft tissue abscess and Proteus UTI  -IV Cefepime per infectious disease. No growth from culture data.   PICC line in place plan for 4 weeks coverage.     -Neurosurgery to see patient next week as outpatient or as inpatient if patient is still here    -Pain control per palliative medicine-they will E-prescribe scripts to skilled nursing facility at discharge  -PT/OT     Metastatic prostate cancer  Constipation  Folic acid deficiency  Severe protein calorie malnutrition  C-spine stenosis s/p C3-6 laminoplasty  Orthostatic hypotension  Chronic urinary retention requiring straight catheterization    Diet: Dietary Nutrition Supplements: Clear Liquid Oral Supplement  Dietary Nutrition Supplements: Frozen Oral Supplement  DIET GENERAL; Carb Control: 4 carbs/meal (approximate 1800 kcals/day)  Dietary Nutrition Supplements: Standard High Calorie Oral Supplement  Ppx: Lovenox  Full Code    Dispo: This patient is medically stable for discharge when SNF bed is arranged. Case management is having difficulty placing the patient due to facilities not taking his insurance. He will need antibiotic sent and pain prescription sent at discharge-this will be done by infectious disease and palliative medicine, respectively.      Electronically signed by Johnny Faustin DO on 12/7/2020 at 3:39 PM

## 2020-12-07 NOTE — PROGRESS NOTES
Subjective: The patient complains of severe  acute on chronic neck pain and weakness partially relieved by rest, PT, OT and meds and exacerbated by exertion and recent illness. I ordered him a soft collar it seems to be helping though he is not able to tolerate wearing it at night and takes it off. He still having a hard time turning to the right. Patient doing better, will re appeal; with insurance company    ROS x10: The patient also complains of severely impaired mobility and activities of daily living. Otherwise no new problems with vision, hearing, nose, mouth, throat, dermal, cardiovascular, GI, , pulmonary, musculoskeletal, psychiatric or neurological. See Rehab consult on Rehab chart . Vital signs:  /72   Pulse 85   Temp 98.2 °F (36.8 °C) (Oral)   Resp 18   Ht 6' 1\" (1.854 m)   Wt 147 lb 4.3 oz (66.8 kg)   SpO2 98%   BMI 19.43 kg/m²   I/O:   PO/Intake:    fair PO intake,       Bowel/Bladder:   Incontinent-neurogenic bladder-needs assist with IC q 6-8 hours scheduled. ,    General:  Patient is well developed, adequately nourished, non-obese and     well kempt. HEENT:    PERRLA, hearing intact to loud voice, external inspection of ear     and nose benign. Inspection of lips, tongue and gums benign  Musculoskeletal: No significant change in strength or tone. All joints stable. Inspection and palpation of digits and nails show no clubbing,       cyanosis or inflammatory conditions. Neuro/Psychiatric: Affect: flat-  Alert and oriented to self and     Situation. No significant change in deep tendon reflexes or     sensation  Lungs:  Diminished, CTA-B. Respiration effort is normal at rest.     Heart:   S1 = S2,  RRR. No loud murmurs. Abdomen:  Soft, non-tender, no enlargement of liver or spleen. Extremities:  No significant lower extremity edema or tenderness. Skin:    BUE bruises dt blood draws, c spine incision--head turned to the left.     Rehabilitation:  Physical therapy: FIMS:  Bed Mobility: Scooting: Contact guard assistance    Transfers: Sit to Stand: Moderate Assistance, Maximum Assistance  Stand to sit: Moderate Assistance, Maximum Assistance  Bed to Chair: Contact guard assistance, Ambulation 1  Surface: level tile  Device: No Device  Assistance: Contact guard assistance  Quality of Gait: Shuffling steps, decreased trunk rotation, nbos, decreased brinda heel strike  Gait Deviations: Slow Yanet, Decreased step length  Distance: 10' x 2  Comments: Distance limited to in room tx secondary to pt request,      FIMS:  ,  , Assessment: Patient challenged by transfers sit to stand and to bedside commode, fatigued quickly with  brinda knee buckling. Occupational therapy: FIMS:   ,  , Assessment: Pt. seen for follow up treatment this date and completed ADL bed bath due to pain. Pt. with limited mobility and safety. Pt. continues to benefit from OT to maximize independence with bathing and improve safety and sequencing.     Speech therapy: FIMS:        Lab/X-ray studies reviewed, analyzed and discussed with patient and staff:   Recent Results (from the past 24 hour(s))   POCT Glucose    Collection Time: 12/06/20  5:23 AM   Result Value Ref Range    POC Glucose 141 (H) 60 - 115 mg/dl    Performed on ACCU-CHEK    CBC Auto Differential    Collection Time: 12/06/20  5:25 AM   Result Value Ref Range    WBC 8.1 4.8 - 10.8 K/uL    RBC 3.37 (L) 4.70 - 6.10 M/uL    Hemoglobin 8.3 (L) 14.0 - 18.0 g/dL    Hematocrit 26.2 (L) 42.0 - 52.0 %    MCV 77.7 (L) 80.0 - 100.0 fL    MCH 24.8 (L) 27.0 - 31.3 pg    MCHC 31.9 (L) 33.0 - 37.0 %    RDW 19.0 (H) 11.5 - 14.5 %    Platelets 880 (H) 640 - 400 K/uL    Neutrophils % 58.8 %    Lymphocytes % 27.9 %    Monocytes % 9.8 %    Eosinophils % 3.0 %    Basophils % 0.5 %    Neutrophils Absolute 4.7 1.4 - 6.5 K/uL    Lymphocytes Absolute 2.3 1.0 - 4.8 K/uL    Monocytes Absolute 0.8 0.2 - 0.8 K/uL    Eosinophils Absolute 0.2 0.0 - 0.7 K/uL    Basophils Absolute 0.0 0.0 - 0.2 K/uL   RENAL FUNCTION PANEL    Collection Time: 12/06/20  5:57 AM   Result Value Ref Range    Sodium 139 135 - 144 mEq/L    Potassium 3.5 3.4 - 4.9 mEq/L    Chloride 102 95 - 107 mEq/L    CO2 27 20 - 31 mEq/L    Anion Gap 10 9 - 15 mEq/L    Glucose 96 70 - 99 mg/dL    BUN 9 8 - 23 mg/dL    CREATININE 0.50 (L) 0.70 - 1.20 mg/dL    GFR Non-African American >60.0 >60    GFR  >60.0 >60    Calcium 9.2 8.5 - 9.9 mg/dL    Phosphorus 2.9 2.3 - 4.8 mg/dL    Alb 2.8 (L) 3.5 - 4.6 g/dL   Magnesium    Collection Time: 12/06/20  5:57 AM   Result Value Ref Range    Magnesium 2.0 1.7 - 2.4 mg/dL       Previous extensive, complex labs, notes and diagnostics reviewed and analyzed. ALLERGIES:    Allergies as of 11/18/2020    (No Known Allergies)      (please also verify by checking [facility])     Complex Physical Medicine & Rehab Issues Assess & Plan:   1. Severe abnormality of gait and mobility and impaired self-care and ADL's secondary to progressive cervical myelopathy. Functional and medical status reassessed regarding patients ability to participate in therapies and patient found to be able to participate in  acute intensive comprehensive inpatient rehabilitation program including PT/OT to improve balance, ambulation, ADLs, and to improve the P/AROM. It is my opinion that they will be able to tolerate 3 hours of therapy a day and benefit from it at an acute level. 2. Bowel constipation and Bladder dysfunction atonic bladder: Incontinent-neurogenic bladder-needs assist with IC q 6-8 hours scheduled. ,   frequent toileting, ambulate to bathroom with assistance, check post void residuals. Check for C.difficile x1 if >2 loose stools in 24 hours, continue bowel & bladder program.  Monitor for UTI symptoms including lethargy and confusion  3.  Severe postoperative neck pain and generalized OA pain: reassess pain every shift and prior to and after each therapy session, give scheduled MS Contin, prn Percocet  or Tylenol, modalities prn in therapy, consider Lidoderm, K-pad prn. Consider increase to Dilaudid 1 mg.consider percocet 10--vs increased MScontin--also add soft cervical collar. 4. Skin breakdown risk:  continue pressure relief program.  Daily skin exams and reports from nursing. 5. Severe fatigue due to immobility and nutritional deficits: Add vitamin B12 vitamin D and CoQ10 titrate dosing and add protein supplementation with low carb content. 6. Complex discharge planning: Risk for urinary retention given spinal cord injury-Precert  Denied with [facility] Medicare for Acute Inpatient Rehab, no waivers in place at this time--they did not call us back yesterday during the window of time that they gave us. I have attempted to improve his pain management while here and hopefully he will do better at a skilled facility with those interventions however I strongly feel that his insurance company is doing he did this for service by not approving acute rehab. Secondarily I also feel that they were not forthright in their attempts to achieve a peer to peer. Complex Active General Medical Issues that complicate care Assess & Plan:     1. 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  Kendy Corey D.O., PM&R     Attending    [address]

## 2020-12-08 LAB
ALBUMIN SERPL-MCNC: 2.5 G/DL (ref 3.5–4.6)
ANION GAP SERPL CALCULATED.3IONS-SCNC: 12 MEQ/L (ref 9–15)
BASOPHILS ABSOLUTE: 0.1 K/UL (ref 0–0.2)
BASOPHILS RELATIVE PERCENT: 0.7 %
BUN BLDV-MCNC: 8 MG/DL (ref 8–23)
CALCIUM SERPL-MCNC: 8.7 MG/DL (ref 8.5–9.9)
CHLORIDE BLD-SCNC: 101 MEQ/L (ref 95–107)
CO2: 25 MEQ/L (ref 20–31)
CREAT SERPL-MCNC: 0.41 MG/DL (ref 0.7–1.2)
EOSINOPHILS ABSOLUTE: 0.2 K/UL (ref 0–0.7)
EOSINOPHILS RELATIVE PERCENT: 2 %
GFR AFRICAN AMERICAN: >60
GFR NON-AFRICAN AMERICAN: >60
GLUCOSE BLD-MCNC: 90 MG/DL (ref 70–99)
HCT VFR BLD CALC: 25.2 % (ref 42–52)
HEMOGLOBIN: 8.1 G/DL (ref 14–18)
LYMPHOCYTES ABSOLUTE: 2 K/UL (ref 1–4.8)
LYMPHOCYTES RELATIVE PERCENT: 19.6 %
MAGNESIUM: 1.8 MG/DL (ref 1.7–2.4)
MCH RBC QN AUTO: 25 PG (ref 27–31.3)
MCHC RBC AUTO-ENTMCNC: 32 % (ref 33–37)
MCV RBC AUTO: 78.2 FL (ref 80–100)
MONOCYTES ABSOLUTE: 1.2 K/UL (ref 0.2–0.8)
MONOCYTES RELATIVE PERCENT: 11.5 %
NEUTROPHILS ABSOLUTE: 6.8 K/UL (ref 1.4–6.5)
NEUTROPHILS RELATIVE PERCENT: 66.2 %
PDW BLD-RTO: 19.7 % (ref 11.5–14.5)
PHOSPHORUS: 2.8 MG/DL (ref 2.3–4.8)
PLATELET # BLD: 566 K/UL (ref 130–400)
POTASSIUM SERPL-SCNC: 3.7 MEQ/L (ref 3.4–4.9)
RBC # BLD: 3.23 M/UL (ref 4.7–6.1)
SODIUM BLD-SCNC: 138 MEQ/L (ref 135–144)
WBC # BLD: 10.2 K/UL (ref 4.8–10.8)

## 2020-12-08 PROCEDURE — 83735 ASSAY OF MAGNESIUM: CPT

## 2020-12-08 PROCEDURE — 99231 SBSQ HOSP IP/OBS SF/LOW 25: CPT | Performed by: PSYCHIATRY & NEUROLOGY

## 2020-12-08 PROCEDURE — 99232 SBSQ HOSP IP/OBS MODERATE 35: CPT | Performed by: NURSE PRACTITIONER

## 2020-12-08 PROCEDURE — 6360000002 HC RX W HCPCS: Performed by: PHYSICAL MEDICINE & REHABILITATION

## 2020-12-08 PROCEDURE — 1210000000 HC MED SURG R&B

## 2020-12-08 PROCEDURE — 36591 DRAW BLOOD OFF VENOUS DEVICE: CPT

## 2020-12-08 PROCEDURE — 6360000002 HC RX W HCPCS: Performed by: FAMILY MEDICINE

## 2020-12-08 PROCEDURE — 2580000003 HC RX 258: Performed by: INTERNAL MEDICINE

## 2020-12-08 PROCEDURE — 6370000000 HC RX 637 (ALT 250 FOR IP): Performed by: NURSE PRACTITIONER

## 2020-12-08 PROCEDURE — 99232 SBSQ HOSP IP/OBS MODERATE 35: CPT | Performed by: INTERNAL MEDICINE

## 2020-12-08 PROCEDURE — 80069 RENAL FUNCTION PANEL: CPT

## 2020-12-08 PROCEDURE — 97116 GAIT TRAINING THERAPY: CPT

## 2020-12-08 PROCEDURE — 97535 SELF CARE MNGMENT TRAINING: CPT

## 2020-12-08 PROCEDURE — 6370000000 HC RX 637 (ALT 250 FOR IP): Performed by: PHYSICAL MEDICINE & REHABILITATION

## 2020-12-08 PROCEDURE — APPSS30 APP SPLIT SHARED TIME 16-30 MINUTES: Performed by: STUDENT IN AN ORGANIZED HEALTH CARE EDUCATION/TRAINING PROGRAM

## 2020-12-08 PROCEDURE — 85025 COMPLETE CBC W/AUTO DIFF WBC: CPT

## 2020-12-08 PROCEDURE — 6370000000 HC RX 637 (ALT 250 FOR IP): Performed by: INTERNAL MEDICINE

## 2020-12-08 PROCEDURE — 6370000000 HC RX 637 (ALT 250 FOR IP): Performed by: NEUROLOGICAL SURGERY

## 2020-12-08 PROCEDURE — 6360000002 HC RX W HCPCS: Performed by: INTERNAL MEDICINE

## 2020-12-08 PROCEDURE — 6370000000 HC RX 637 (ALT 250 FOR IP): Performed by: PSYCHIATRY & NEUROLOGY

## 2020-12-08 RX ADMIN — ENOXAPARIN SODIUM 40 MG: 40 INJECTION SUBCUTANEOUS at 20:51

## 2020-12-08 RX ADMIN — GABAPENTIN 300 MG: 300 CAPSULE ORAL at 20:51

## 2020-12-08 RX ADMIN — GABAPENTIN 300 MG: 300 CAPSULE ORAL at 13:09

## 2020-12-08 RX ADMIN — POLYETHYLENE GLYCOL 3350 17 G: 17 POWDER, FOR SOLUTION ORAL at 08:29

## 2020-12-08 RX ADMIN — MORPHINE SULFATE 15 MG: 15 TABLET, FILM COATED, EXTENDED RELEASE ORAL at 13:09

## 2020-12-08 RX ADMIN — CEFEPIME 2 G: 2 INJECTION, POWDER, FOR SOLUTION INTRAVENOUS at 13:10

## 2020-12-08 RX ADMIN — CEFEPIME 2 G: 2 INJECTION, POWDER, FOR SOLUTION INTRAVENOUS at 01:18

## 2020-12-08 RX ADMIN — BISACODYL 5 MG: 5 TABLET, COATED ORAL at 08:29

## 2020-12-08 RX ADMIN — OXYCODONE HYDROCHLORIDE AND ACETAMINOPHEN 1 TABLET: 10; 325 TABLET ORAL at 17:07

## 2020-12-08 RX ADMIN — Medication 100 MG: at 08:29

## 2020-12-08 RX ADMIN — FOLIC ACID 1 MG: 1 TABLET ORAL at 08:29

## 2020-12-08 RX ADMIN — MORPHINE SULFATE 15 MG: 15 TABLET, FILM COATED, EXTENDED RELEASE ORAL at 20:51

## 2020-12-08 RX ADMIN — GABAPENTIN 300 MG: 300 CAPSULE ORAL at 08:29

## 2020-12-08 RX ADMIN — Medication 10 ML: at 20:56

## 2020-12-08 RX ADMIN — STANDARDIZED SENNA CONCENTRATE 8.6 MG: 8.6 TABLET ORAL at 20:51

## 2020-12-08 RX ADMIN — MORPHINE SULFATE 15 MG: 15 TABLET, FILM COATED, EXTENDED RELEASE ORAL at 05:11

## 2020-12-08 RX ADMIN — MIRTAZAPINE 7.5 MG: 15 TABLET, FILM COATED ORAL at 20:51

## 2020-12-08 RX ADMIN — Medication 2000 UNITS: at 17:06

## 2020-12-08 RX ADMIN — CYANOCOBALAMIN 1000 MCG: 1000 INJECTION, SOLUTION INTRAMUSCULAR; SUBCUTANEOUS at 08:29

## 2020-12-08 ASSESSMENT — ENCOUNTER SYMPTOMS
SHORTNESS OF BREATH: 1
GASTROINTESTINAL NEGATIVE: 1
SHORTNESS OF BREATH: 0
CONSTIPATION: 0
NAUSEA: 0
BLOOD IN STOOL: 0
VOMITING: 0
RESPIRATORY NEGATIVE: 1
WHEEZING: 0
ABDOMINAL DISTENTION: 0
SORE THROAT: 0
ABDOMINAL PAIN: 0
RECTAL PAIN: 0
TROUBLE SWALLOWING: 0
VOICE CHANGE: 0
COLOR CHANGE: 0
CHEST TIGHTNESS: 0
STRIDOR: 0
DIARRHEA: 0
COUGH: 0
ANAL BLEEDING: 0
EYE DISCHARGE: 0
BACK PAIN: 1
APNEA: 0
CHOKING: 0

## 2020-12-08 ASSESSMENT — PAIN SCALES - GENERAL
PAINLEVEL_OUTOF10: 6
PAINLEVEL_OUTOF10: 7
PAINLEVEL_OUTOF10: 5
PAINLEVEL_OUTOF10: 4
PAINLEVEL_OUTOF10: 8

## 2020-12-08 ASSESSMENT — PAIN DESCRIPTION - LOCATION: LOCATION: NECK

## 2020-12-08 ASSESSMENT — PAIN DESCRIPTION - PAIN TYPE: TYPE: SURGICAL PAIN

## 2020-12-08 NOTE — CARE COORDINATION
WENT IN TO SPEAK WITH THE PATIENT REGARDING THE [facility] DECISION TO ACCEPT HIM FOR SNF LOC FOR HIS ATB THERAPY. THE PATIENT WAS OVERWHELMED WITH EMOTIONS, HE STARTED TO CRY AND WAS EXTREMELY THANKFUL AND GRATEFUL FOR THE WEIGHT LIFTING INFORMATION OF NOT HAVING TO GO HOME AND TRY TO FIGURE EVERYTHING OUT FINANCIALLY/AND WITH THE AMOUNT OF CARE HE WOULD HAVE NEEDED BY HIS FAMILY/S/O. [facility] AND [facility] ARE STARTING THE PRE[facility]ERT AND WE WILL HOPEFULLY GET HIM TO [facility] TOMORROW. NURSING AND PHYSICIAN UPDATED ON DC PLAN.

## 2020-12-08 NOTE — PROGRESS NOTES
Infectious Disease     Patient Name: Monique Tomlinson  Date: 12/8/2020  YOB: 1950  Medical Record Number: [id number]      Postoperative abscess of neck  UTI with Proteus      MRI was found to have severe canal stenosis C3-4-5 6 and 7  Patient went to surgery on 11/23/2020 cervical stenosis mild myelopathy underwent cervical laminoplasty reconstruction    Patient is now having significant cervical pain has had increased movement in upper extremities    CT scan was performed on 12/2/2020 showing fluid collection     MRI  MRI 7.5 X 4.5 X 3.5 CM POSTOPERATIVE FLUID COLLECTION  THIS DOES NOT APPEAR TO COMMUNICATE TO THE SPINAL CANAL, AND THERE IS NO EPIDURAL COLLECTION OR OTHER COMPLICATION IDENTIFIED. Component  Collected  Lab    Body Fluid Culture, Sterile  12/03/2020  1:58 PM  [address] [facility]    No growth 24 hours   No growth in 48 hours           Review of Systems   Constitutional: Negative for chills, diaphoresis, fatigue and fever. Respiratory: Negative. Cardiovascular: Negative. Gastrointestinal: Negative. Musculoskeletal: Positive for back pain and neck pain. Neurological: Positive for weakness. Physical Exam   Neck: Neck supple. Cardiovascular:   No murmur heard. Pulmonary/Chest: Effort normal and breath sounds normal. No respiratory distress. He has no wheezes. He has no rales. Abdominal: Soft. Bowel sounds are normal. He exhibits no distension and no mass. There is no abdominal tenderness. There is no rebound. Blood pressure (!) 147/71, pulse 89, temperature 97.9 °F (36.6 °C), temperature source Oral, resp. rate 18, height 6' 1\" (1.854 m), weight 147 lb 4.3 oz (66.8 kg), SpO2 96 %.       .   Lab Results   Component Value Date    WBC 10.2 12/08/2020    HGB 8.1 (L) 12/08/2020    HCT 25.2 (L) 12/08/2020    MCV 78.2 (L) 12/08/2020     (H) 12/08/2020     Lab Results   Component Value Date     12/08/2020    K 3.7 12/08/2020    K 4.2 11/25/2020     12/08/2020    CO2 25 12/08/2020    BUN 8 12/08/2020    CREATININE 0.41 12/08/2020    GLUCOSE 90 12/08/2020    CALCIUM 8.7 12/08/2020          MRI CERVICAL SPINE W WO CONTRAST : 12/3/2020         COMPARISON: Cervical spine CT with contrast 12/2/2020         REASON FOR EXAMINATION:   possible cervical spinal abscess           TECHNIQUE: Multiplanar MR imaging of the cervical spine was performed before and after intravenous administration of approximately 15 mL of MultiHance gadolinium contrast.         FINDINGS:           Postsurgical changes of posterior decompression left laminoplasty at C3-C5 are again noted.         A mildly marginally enhancing organized fluid collection within the posterior soft tissues measures approximately 4.5 x 3.5 x 7.5 cm (transverse, AP and sagittal dimension).         This fluid collection does not appear to be contiguous with the spinal canal, and there is no residual spinal stenosis, epidural fluid collection or other significant changes identified.         Multilevel degenerative changes of the cervical spine described in detail on prior studies are again noted.                        Impression         APPROXIMATELY 7.5 X 4.5 X 3.5 CM POSTOPERATIVE FLUID COLLECTION AFTER RECENT CERVICAL LAMINOPLASTY.         INFECTION SHOULD BE EXCLUDED CLINICALLY.         THIS DOES NOT APPEAR TO COMMUNICATE TO THE SPINAL CANAL, AND THERE IS NO EPIDURAL COLLECTION OR OTHER COMPLICATION IDENTIFIED. Culture, Body Fluid [[id number]]   Collected: 12/03/20 1358    Order Status: Completed  Specimen:  Body Fluid  Updated: 12/04/20 1057     Body Fluid Culture, Sterile  No growth 24 hours          ASSESSMENT:  PLAN:    Postoperative abscess of neck  Given findings of MRI and elevated sedimentation rate     drainage of abscess cultures are no growth      cefepime x 4 weeks    UTI with Proteus

## 2020-12-08 NOTE — PROGRESS NOTES
Palliative Care Progress Note  Patient: Constantine Salamanca  Gender: male  YOB: 1950  Unit/Bed: V561/Z342-12  Code Status: Full Code  Inpatient Treatment Team: Treatment Team: Attending Provider: Romelia Augustine DO; Consulting Physician: Ban Sánchez DO; Consulting Physician: Shana Mcnamara MD; Consulting Physician: Annia Marsh MD; Consulting Physician: Minal Velázquez MD; Consulting Physician: Buzz Rivero MD; Utilization Reviewer: Katia Vance, NEIDA; Registered Nurse: Daya Pearl RN; Tech: Radha Butcher; : Camron Murguia RN  Admit Date:  11/18/2020    Chief Complaint: Pain    History of Presenting Illness:      Constantine Salamanca is a 79 y.o. male on hospital day 25 with a history of androgen independent prostate cancer. Known liver and bone mets, severe malnutrition, falls, urine retention self caths, gastric ulcer with hemorrhage. Presented with progressive quadriparesis weight loss inability to ambulate. His legs will not support him anymore. Every few weeks he is getting weaker and weaker. He is now unable to ambulate. Found to have severe spinal canal stenosis at C3-C7 with cord atrophy      Laminoplasty with reconstruction was done. Observed laying in bed. He is pleasant and cooperative. He is in NAD. He tells me that he is still having neck and right hip pain, but much improved. He is now able to turn his head and torso without discomfort. He has been taking Gabapentin 300 mg TID, MS Contin 15 mg every 8 hours, he has not used any percocet for two days. He is negative for constipation or diarrhea, no blood in stool. Appetite stable. Negative significant emotional, spiritual, or sleep disturbance. The plan is for discharge to a SNF. Review of Systems:       Review of Systems   Constitutional: Negative for activity change, appetite change, chills, diaphoresis, fatigue, fever and unexpected weight change.    HENT: Negative for drooling, hearing loss, mouth sores, sore throat, trouble swallowing and voice change. Eyes: Negative for discharge and visual disturbance. Respiratory: Negative for apnea, cough, choking, chest tightness, shortness of breath, wheezing and stridor. Cardiovascular: Negative for chest pain, palpitations and leg swelling. Gastrointestinal: Negative for abdominal distention, abdominal pain, anal bleeding, blood in stool, constipation, diarrhea, nausea, rectal pain and vomiting. Genitourinary: Negative for difficulty urinating, dysuria, enuresis, frequency and hematuria. Musculoskeletal: Positive for arthralgias, back pain, gait problem, neck pain and neck stiffness. Negative for joint swelling and myalgias. Skin: Negative for color change, pallor, rash and wound. Allergic/Immunologic: Negative for food allergies and immunocompromised state. Neurological: Positive for weakness. Negative for dizziness, tremors, seizures, syncope, facial asymmetry, speech difficulty, light-headedness, numbness and headaches. Hematological: Negative for adenopathy. Does not bruise/bleed easily. Psychiatric/Behavioral: Negative for agitation, behavioral problems, confusion, decreased concentration, dysphoric mood, hallucinations, self-injury, sleep disturbance and suicidal ideas. The patient is not nervous/anxious and is not hyperactive. Physical Examination:       BP (!) 147/71   Pulse 89   Temp 97.9 °F (36.6 °C) (Oral)   Resp 18   Ht 6' 1\" (1.854 m)   Wt 147 lb 4.3 oz (66.8 kg)   SpO2 96%   BMI 19.43 kg/m²    Physical Exam  Constitutional:       General: He is not in acute distress. Appearance: He is underweight. He is not diaphoretic. HENT:      Head: Normocephalic and atraumatic. Right Ear: External ear normal.      Left Ear: External ear normal.      Nose: Nose normal.      Mouth/Throat:      Pharynx: No oropharyngeal exudate. Eyes:      General: No scleral icterus. Right eye: No discharge.          Left eye: No discharge. Conjunctiva/sclera: Conjunctivae normal.      Pupils: Pupils are equal, round, and reactive to light. Neck:      Musculoskeletal: Normal range of motion and neck supple. Thyroid: No thyromegaly. Vascular: No JVD. Trachea: No tracheal deviation. Cardiovascular:      Rate and Rhythm: Normal rate and regular rhythm. Heart sounds: Normal heart sounds. Pulmonary:      Effort: Pulmonary effort is normal. No respiratory distress. Breath sounds: Normal breath sounds. No stridor. No wheezing or rales. Chest:      Chest wall: No tenderness. Abdominal:      General: Bowel sounds are normal. There is no distension. Palpations: Abdomen is soft. There is no mass. Tenderness: There is no abdominal tenderness. There is no guarding or rebound. Musculoskeletal: Normal range of motion. General: No tenderness or deformity. Lymphadenopathy:      Cervical: No cervical adenopathy. Skin:     General: Skin is warm and dry. Findings: No erythema or rash. Neurological:      Mental Status: He is alert and oriented to person, place, and time. Cranial Nerves: No cranial nerve deficit. Coordination: Coordination normal.   Psychiatric:         Behavior: Behavior normal.         Thought Content:  Thought content normal.         Judgment: Judgment normal.         Allergies:      No Known Allergies    Medications:      Current Facility-Administered Medications   Medication Dose Route Frequency Provider Last Rate Last Dose    sodium chloride flush 0.9 % injection 10 mL  10 mL Intravenous 2 times per day Katie Hem, DO   10 mL at 12/07/20 2209    sodium chloride flush 0.9 % injection 10 mL  10 mL Intravenous PRN Katie Hem, DO        oxyCODONE-acetaminophen (PERCOCET)  MG per tablet 1 tablet  1 tablet Oral Q4H PRN JOYCE Gomez - CNP   1 tablet at 12/07/20 1049    sodium chloride (PF) 0.9 % injection 10 mL  10 mL Intravenous Once Chris Fields Jonas Torres MD        cefepime (MAXIPIME) 2 g IVPB minibag  2 g Intravenous Q12H Mitchel Bright MD   Stopped at 12/08/20 0148    [Held by provider] HYDROmorphone (DILAUDID) injection 1 mg  1 mg Intravenous Q3H PRN JOYCE Martinez CNP   1 mg at 12/02/20 1840    gabapentin (NEURONTIN) capsule 300 mg  300 mg Oral TID Corinn Sever, APRN - CNP   300 mg at 12/08/20 7380    enzalutamide (XTANDI) capsule 160 mg  160 mg Oral Daily Marina Sanchez DO   Stopped at 12/07/20 1029    diazePAM (VALIUM) tablet 2 mg  2 mg Oral Q6H PRN JOYCE Jeronimo CNP   2 mg at 12/07/20 2205    sodium chloride flush 0.9 % injection 10 mL  10 mL Intravenous 2 times per day Lucho Torres MD   10 mL at 12/07/20 1030    sodium chloride flush 0.9 % injection 10 mL  10 mL Intravenous PRN Lucho Torres MD        bisacodyl (DULCOLAX) EC tablet 5 mg  5 mg Oral Daily Lucho Torres MD   5 mg at 12/08/20 0829    magnesium hydroxide (MILK OF MAGNESIA) 400 MG/5ML suspension 30 mL  30 mL Oral Daily PRN Lucho Torres MD        polyethylene glycol [facility]) packet 17 g  17 g Oral Daily PRN Lucho Torres MD        ondansetron (ZOFRAN-ODT) disintegrating tablet 4 mg  4 mg Oral Q8H PRN Lucho Torres MD        Or    ondansetron [facility]) injection 4 mg  4 mg Intravenous Q6H PRN Lucho Torres MD        morphine (MS CONTIN) extended release tablet 15 mg  15 mg Oral Q8H Corinn Sever, APRN - CNP   15 mg at 12/08/20 0511    mirtazapine (REMERON) tablet 7.5 mg  7.5 mg Oral Nightly JOYCE Bustillo CNP   7.5 mg at 12/07/20 2204    Vitamin D (CHOLECALCIFEROL) tablet 2,000 Units  2,000 Units Oral Dinner Nicole Scullin, DO   2,000 Units at 12/07/20 2204    cyanocobalamin injection 1,000 mcg  1,000 mcg Intramuscular Weekly Nicole Scullin, DO   1,000 mcg at 12/08/20 0829    coenzyme Q10 capsule 100 mg  100 mg Oral Daily Nicole Scullin, DO   100 mg at 12/08/20 0829    lidocaine 4 % external patch 3 patch  3 patch Transdermal Daily Nicole Scullin, DO   3 patch at 11/30/20 0900    acetaminophen (TYLENOL) tablet 650 mg  650 mg Oral Q4H PRN Nicole Scullin, DO        Or    acetaminophen (TYLENOL) suppository 650 mg  650 mg Rectal Q6H PRN Nicole Scullin, DO        polyethylene glycol (GLYCOLAX) packet 17 g  17 g Oral Daily Colon Evelyne, DO   17 g at 12/08/20 9533    senna (SENOKOT) tablet 8.6 mg  1 tablet Oral Nightly Colon Evelyne, DO   8.6 mg at 23/06/58 1707    folic acid (FOLVITE) tablet 1 mg  1 mg Oral Daily Vandana Greene MD   1 mg at 12/08/20 0829    promethazine (PHENERGAN) tablet 12.5 mg  12.5 mg Oral Q6H PRN Cici Maher MD        Or    ondansetron [facility]) injection 4 mg  4 mg Intravenous Q6H PRN Cici Maher MD   4 mg at 11/18/20 1843    enoxaparin (LOVENOX) injection 40 mg  40 mg Subcutaneous Daily Cici Maher MD   40 mg at 12/07/20 2203       History:       PMHx:  Past Medical History:   Diagnosis Date    PAF (paroxysmal atrial fibrillation) ([facility] [address]     ON XARELTO    Prostate cancer ([facility] [address] 11/2019       PSHx:  Past Surgical History:   Procedure Laterality Date    CERVICAL LAMINECTOMY N/A 11/23/2020    CERVICAL C3-4,4-5,5-6  LAMINOPLASTY WITH RECONSTRUCTION performed by Bennie Miller MD at [address] [place] [address] COLONOSCOPY N/A 9/29/2020    COLONOSCOPY DIAGNOSTIC performed by Carina Ureña MD at [facility] Right 11/2018    hip    UPPER GASTROINTESTINAL ENDOSCOPY N/A 9/29/2020    EGD DIAGNOSTIC ONLY performed by Carina Ureña MD at [facility] [address] Hx:  Social History     Socioeconomic History    Marital status: Single     Spouse name: None    Number of children: None    Years of education: None    Highest education level: None   Occupational History    Occupation: retired [employer]   Social Needs    Financial resource strain: Not very hard    Food insecurity     Worry: Never true     Inability: Never true    Transportation needs     Medical: No Non-medical: No   Tobacco Use    Smoking status: Never Smoker    Smokeless tobacco: Never Used    Tobacco comment: denies   Substance and Sexual Activity    Alcohol use: Not Currently     Frequency: 2-4 times a month     Comment: denies    Drug use: No     Comment: denies    Sexual activity: None   Lifestyle    Physical activity     Days per week: 0 days     Minutes per session: 0 min    Stress: Only a little   Relationships    Social connections     Talks on phone: None     Gets together: None     Attends [employer] service: None     Active member of club or organization: None     Attends meetings of clubs or organizations: None     Relationship status: None    Intimate partner violence     Fear of current or ex partner: No     Emotionally abused: No     Physically abused: No     Forced sexual activity: No   Other Topics Concern    None   Social History Narrative    Retired from [employer] With: Alone    Type of Home: [address] in [address] [place] [address] [place]: One level    Home Access: Level entry    Bathroom Shower/Tub: Tub/Shower unit    [employer]: Grab bars in [facility]: [facility]: Independent    Transfer Assistance: Independent    Active : No    Patient's  Info: Sister    Additional Comments: Pt. reports he has been having near falls but no falls in the last few weeks, but progressively more weakness hte last few weeks       Family Hx:  History reviewed. No pertinent family history.     LABS: Reviewed     CBC:  Lab Results   Component Value Date    WBC 10.2 12/08/2020    RBC 3.23 12/08/2020    HGB 8.1 12/08/2020    HCT 25.2 12/08/2020    MCV 78.2 12/08/2020    MCH 25.0 12/08/2020    MCHC 32.0 12/08/2020    RDW 19.7 12/08/2020     12/08/2020    MPV 8.7 09/09/2015     CBC with Differential:   Lab Results   Component Value Date    WBC 10.2 12/08/2020    RBC 3.23 12/08/2020    HGB 8.1 12/08/2020    HCT 25.2 12/08/2020     12/08/2020    MCV 78.2 12/08/2020    MCH 25.0 12/08/2020    MCHC 32.0 12/08/2020    RDW 19.7 12/08/2020    METASPCT 1 09/26/2020    LYMPHOPCT 19.6 12/08/2020    MONOPCT 11.5 12/08/2020    MYELOPCT 1 09/26/2020    BASOPCT 0.7 12/08/2020    MONOSABS 1.2 12/08/2020    LYMPHSABS 2.0 12/08/2020    EOSABS 0.2 12/08/2020    BASOSABS 0.1 12/08/2020     CMP:    Lab Results   Component Value Date     12/08/2020    K 3.7 12/08/2020    K 4.2 11/25/2020     12/08/2020    CO2 25 12/08/2020    BUN 8 12/08/2020    CREATININE 0.41 12/08/2020    GFRAA >60.0 12/08/2020    LABGLOM >60.0 12/08/2020    GLUCOSE 90 12/08/2020    PROT 8.0 11/18/2020    LABALBU 2.5 12/08/2020    CALCIUM 8.7 12/08/2020    BILITOT 0.7 11/18/2020    ALKPHOS 233 11/18/2020    AST 26 11/18/2020    ALT 9 11/18/2020     BMP:    Lab Results   Component Value Date     12/08/2020    K 3.7 12/08/2020    K 4.2 11/25/2020     12/08/2020    CO2 25 12/08/2020    BUN 8 12/08/2020    LABALBU 2.5 12/08/2020    CREATININE 0.41 12/08/2020    CALCIUM 8.7 12/08/2020    GFRAA >60.0 12/08/2020    LABGLOM >60.0 12/08/2020    GLUCOSE 90 12/08/2020     TSH:   Lab Results   Component Value Date    TSH 1.210 11/19/2020     Vitamin B12 and Folate: No components found for: FOLIC,  H89  Urinalysis:   Lab Results   Component Value Date    NITRU POSITIVE 11/29/2020    WBCUA >100 11/29/2020    BACTERIA MANY 11/29/2020    RBCUA 3-5 09/26/2020    BLOODU Negative 11/29/2020    SPECGRAV 1.014 11/29/2020    GLUCOSEU Negative 11/29/2020           FUNCTIONAL ADL´S:     Independent: [ x ] Eating, [   ] Dressing, [   ] Transferring, [   ] Faisal Maria Luisa, [   ] Timmothy Marcola, [   ] Continence  Dependent   : [  ] Eating, [   ] Dressing, [   ] Transferring, [   ] Faisal Maria Luisa, [   ] Timmothy Marcola, [   ] Continence  W. assistant : [  ] Eating, [  x ] Dressing, [ x  ] Transferring, [  x ] Toileting, [  x ] Bathing, [   x] Continence    Radiology: Reviewed      No results found. Assessment and plan:    1. Recurrent falls multifactorial in etiology: Orthostatic hypotension suspect due to autonomic dysfunction,spinal stenosis, advanced prostate cancer on chemotherapy, moderate protein calorie malnutrition  Follow with primary medical team  Continue PT    2. Prostate Cancer with mets  -Oncology Following    3. Anorexia and Malnutrition  -Start Mirtazapine 7.5 mg po daily at HS  -Continue nutritional Supplementation    4. Spinal Stenosis with weakness and paraesthesia  -Neurosurgery following  -Currently taking Gabapentin 300 mg TID    5. Pain rt neoplasm and spinal stenosis  - Continue to  utilize the medication in place for breakthrough pain. Discussed need for breakthrough pain medication with nursing staff. He should medicate one hour prior to PT so he can have maximum participation.   -Continue MS Contin to 15 mg po every 8 hours  -Will increase Percocet to 10/325 mg po every 4 hours for moderate to severe breakthrough pain  -Continue to hold Hydromorphone 1.0mg IV every 3 hours prn severe breakthrough pain  -Continue Gabapentin to 300 mg po tid  -Will start heat pack to affected area and Lidocaine patch.  -The plan is for discharge to a SNF. He will not be able to use IV Dilaudid at the SNF. Will put the dilaudid on hold and monitor his pain. Will continue to titrate his oral medication regime to control his pain. Constipation  -Continue current POC  -Increase fiber and fluid in diet    -Advance Care Planning  Remains FULL CODE      -Goals of Care Discussion:  We discussed all care options contingent on treatment response and QOL. Much active listening, presence, and emotional support were given. Will continue to follow.     Electronically signed by JOYCE Villeda CNP on 12/8/2020 at 10:39 AM

## 2020-12-08 NOTE — PROGRESS NOTES
Examination:      Vitals:  BP (!) 147/71   Pulse 89   Temp 97.9 °F (36.6 °C) (Oral)   Resp 18   Ht 6' 1\" (1.854 m)   Wt 147 lb 4.3 oz (66.8 kg)   SpO2 96%   BMI 19.43 kg/m²   Temp (24hrs), Av °F (36.7 °C), Min:97.9 °F (36.6 °C), Max:98.1 °F (36.7 °C)      General appearance: Thin, chronically ill-appearing, malnourished, in no distress. Patient looking to the left. Mental Status: oriented to person, place and time and normal affect  Lungs: clear to auscultation bilaterally, normal effort  Heart: regular rate and rhythm, no murmur  Abdomen: soft, nontender, nondistended, bowel sounds present, no masses  Extremities: no edema, redness, tenderness in the calves. Reduced ROM, with diffuse weakness and neuropathy. Skin: no gross lesions, rashes    Data:     Labs:  Recent Labs     20  0551 20  0600   WBC 9.7 10.2   HGB 8.5* 8.1*   * 566*     Recent Labs     20  0549 20  0600    138   K 3.5 3.7   CL 99 101   CO2 25 25   BUN 9 8   CREATININE 0.41* 0.41*   GLUCOSE 92 90     No results for input(s): AST, ALT, ALB, BILITOT, ALKPHOS in the last 72 hours. Assessment and Plan:        Summary: 80-year-old male with history of metastatic prostate cancer (bone/liver mets) presented with generalized weakness and recurrent falls. He was found to have orthostatic hypotension, severe protein calorie malnutrition, and severe C-spine stenosis for which he underwent C3-6 laminoplasty with reconstruction . His postoperative course was complicated by fevers and persistent neck pain-he was found to have Proteus UTI and cervical neck fluid collection vs abscess for which he is s/p aspiration / drainage. 1.  Sepsis 2/2 post operative abscess/fluid collection complicated by proteus UTI: sepsis resolved. Continue Cefepime, 4weeks total coverage per ID. Cultures remain negative. Neurology, neurosurgery, PM&R following.      Metastatic prostate Cancer: Palliative care following for pain control. Constipation: Daily miralax. Folic acid deficiency: continue supplementation. Severe protein calorie malnutrition  C-spine stenosis s/p C3-6 laminoplasty: continue gabapentin 300 TID, scheduled pain control. Orthostatic hypotension  Chronic urinary retention requiring straight catheterization: proteus UTI 11/29. Proteus hauseri (1)     Antibiotic  Interpretation  MADELYN  Status    amoxicillin-clavulanate  Sensitive  8  mcg/mL     ampicillin  Resistant  >=32  mcg/mL     ceFAZolin  Resistant  >=64  mcg/mL     cefTRIAXone  Sensitive  <=1  mcg/mL     ciprofloxacin  Sensitive  <=0.25  mcg/mL     gentamicin  Sensitive  <=1  mcg/mL     nitrofurantoin  Resistant  128  mcg/mL     trimethoprim-sulfamethoxazole  Sensitive           D/C Planning in progress with case mgmt. Diet: Dietary Nutrition Supplements: Clear Liquid Oral Supplement  Dietary Nutrition Supplements: Frozen Oral Supplement  DIET GENERAL; Carb Control: 4 carbs/meal (approximate 1800 kcals/day)  Dietary Nutrition Supplements: Standard High Calorie Oral Supplement  Ppx: Lovenox  Full Code    Dispo: This patient is medically stable for discharge when SNF bed is arranged. Case management is having difficulty placing the patient due to facilities not taking his insurance. He will need antibiotic sent and pain prescription sent at discharge-this will be done by infectious disease and palliative medicine, respectively.      Electronically signed by Carlos Kong DO on 12/8/2020 at 12:20 PM

## 2020-12-08 NOTE — CARE COORDINATION
IV BENEFITS WERE FORWARDED TO I PHARMACY. CALL PLACED TO [facility] TO [facility]QUIRE ON IV BENEFIT COSTS. [facility], CM FAXED ALL CLINICAL/LABS AND IV ORDERS FOR PRICE VERIFICATION. PHARMACY WAS ASKED TO NOT FILL UNTIL WE WERE SURE THE PATIENT WOULD BE ABLE TO AFFORD THE IV AND GO HOME. CM/LSW HAVING DIFFICULITIES PLACING PATIENT D/T INSURANCE PRECERTS AND D/C PATIENT'S ACUTE REHAB/SNF REQUEST. SPOKE WITH  DIRECTOR AND DIRECTED FOR THE LSW TO CONTACT INSURANCE DIRECTLY AND SPEAK WITH THE  FOR ANY ASSISTANCE IN D/C PLANNING FOR THIS PATIENT.

## 2020-12-08 NOTE — CARE COORDINATION
reports she could if needed to learn how to do IV antibiotics at home. She went on to say he has a lady friend Alexia aJra who could bath him if needed. Will confirm all with patient. Ashok Luz requested this information regarding the chemo pill. Electronically signed by RUBÉN Sweet on 12/8/2020 at 2:37 PM     : Updated patient on the above. He would like LSW to try for [facility]. [facility] who is asking about patient's code status. Will let [facility] know. Electronically signed by RUBÉN Sweet on 12/8/2020 at 2:46 PM     1515: [facility] reports they cannot accept patient per their medical director Dr. Giacomo Zamora. She did report they can try to send to their sister facility [place]. Electronically signed by RUBÉN Sweet on 12/8/2020 at 3:28 PM     1556: [facility] reported that Dr. Giacomo Zamora will be accepting patient.  Director Malachi Chew spoke with Dr. Giacomo Zamora per [facility]. [facility] reports precert will be started today and they may be able to accept tomorrow.  Electronically signed by RUBÉN Sweet on 12/8/2020 at 3:57 PM

## 2020-12-08 NOTE — PROGRESS NOTES
Physical Therapy Med Surg Daily Treatment Note  Facility/Department: [facility]  Room: [place] [id number]       NAME: Petra Clarkedale  : 1950 (15 y.o.)  MRN: [id number]  CODE STATUS: Full Code    Date of Service: 2020    Patient Diagnosis(es): Fall at home, initial encounter [W19. XXXA, W90.450]  Falls, initial encounter [K07. XXXA]   Chief Complaint   Patient presents with    Fatigue     Patient Active Problem List    Diagnosis Date Noted    Abscess     Sepsis due to gram-negative UTI ([address] 2020    Neurogenic bladder     Stenosis of cervical spine with myelopathy ([address] 2020    Malignant tumor of prostate ([address] 2020    Raised prostate specific antigen 2020    Retention of urine 2020    Urethritis 2020    Ataxic gait 2020    Myelopathy ([address]     Falls, initial encounter 2020    Severe malnutrition ([address] 2020    Fall at home, initial encounter 2020    Goals of care, counseling/discussion     Gastrointestinal hemorrhage     Rectal mass     Acute cystitis     Metastatic cancer ([address]     Gastric ulcer 2020    Weakness     Anemia 2020    Other specified disorders of bone density and structure, unspecified site      Liver mass 2020    Osteoarthritis of hip 2020    Benign prostatic hyperplasia with incomplete bladder emptying 2018    Palpitations 10/19/2010        Past Medical History:   Diagnosis Date    PAF (paroxysmal atrial fibrillation) ([address]     ON XAREL[place]    Prostate cancer ([address] 2019     Past Surgical History:   Procedure Laterality Date    CERVICAL LAMINECTOMY N/A 2020    CERVICAL C3-4,4-5,5-6  LAMINOPLASTY WITH RECONSTRUCTION performed by Alyssa Parrish MD at [address] [place] [address] N/A 2020    COLONOSCOPY DIAGNOSTIC performed by Soo Schwartz MD at [address] Right 2018    hip    UPPER GASTROINTESTINAL ENDOSCOPY N/A 2020    EGD DIAGNOSTIC ONLY performed by Ingrid Ricks MD at [facility]       Restrictions  Restrictions/Precautions: Fall Risk  Position Activity Restriction  Other position/activity restrictions: Limit cervical ROM to within pain tolerable range    SUBJECTIVE   General  Chart Reviewed: Yes  Family / Caregiver Present: No  Subjective  Subjective: \"I'll try whatever you got for me. \"    Pre-Session Pain Report  Pre Treatment Pain Screening  Pain at present: 4  Scale Used: Numeric Score  Intervention List: Patient able to continue with treatment  Pain Screening  Patient Currently in Pain: Yes       Post-Session Pain Report  Pain Assessment  Pain Assessment: 0-10  Pain Level: 4  Pain Type: Surgical pain  Pain Location: Neck         OBJECTIVE        Bed mobility  Supine to Sit: Stand by assistance  Sit to Supine: (DNT pt into chair.)  Comment: HOB elevated, increased time to complete, pt with good safe technique. Transfers  Sit to Stand: Contact guard assistance;Minimal Assistance(Min A from lower surface, CGA from standard height surface.)  Stand to sit: Contact guard assistance  Bed to Chair: Minimal assistance(small L lateral LOB during turn.)  Comment: good follow through of cues for technique from previous sessions. Ambulation  Ambulation?: Yes  Ambulation 1  Surface: level tile  Device: Rolling Walker  Assistance: Contact guard assistance  Quality of Gait: Shuffling steps, nbos, decreased brinda heel strike, ff posture  Distance: 25' in room with turns. Comments: pt utilizing [employer] appropriately in tight spaces in room. small sized room limits pt. Exercises  Hip Flexion: x10  Hip Abduction: sitting x 10  Knee Long Arc Quad: x10  Ankle Pumps: x20  Comments: reviewed written HEP for supine/seated therex. pt verbalizes understanding.                     Activity Tolerance  Activity Tolerance: Patient Tolerated treatment well          ASSESSMENT   Assessment: pt with improvements in mobility, pt able to increase ambulation distance, pt shows good carryover of cues from previous sessions. Discharge Recommendations:  Continue to assess pending progress, Patient would benefit from continued therapy after discharge    Goals  Short term goals  Short term goal 1: Pt to complete HEP with indep  Long term goals  Long term goal 1: Pt to complete bed mobility with indep  Long term goal 2: Pt to complete transfers with indep  Long term goal 3: Pt to ambulate 150ft with LRD and indep  Long term goal 4: TUG <15.0 seconds to demo decreased risk for falls    PLAN    Times per week: 3-6  Safety Devices  Type of devices: All fall risk precautions in place, Call light within reach, Chair alarm in place, Left in chair     [facility] (6 CLICK) [address] Raw Score : 14      Therapy Time   Individual   Time In 0903   Time Out 0927   Minutes 24      Gait: 11  BM/Trsf: 8  Therex: 5       Twila Lombardi PTA, 12/08/20 at 9:37 AM         Definitions for assistance levels  Independent = pt does not require any physical supervision or assistance from another person for activity completion. Device may be needed.   Stand by assistance = pt requires verbal cues or instructions from another person, close to but not touching, to perform the activity  Minimal assistance= pt performs 75% or more of the activity; assistance is required to complete the activity  Moderate assistance= pt performs 50% of the activity; assistance is required to complete the activity  Maximal assistance = pt performs 25% of the activity; assistance is required to complete the activity  Dependent = pt requires total physical assistance to accomplish the task

## 2020-12-08 NOTE — PROGRESS NOTES
Offered to straight cath patient and he declined. Also does not want to self cath. Incontinent of urine, julio care given and depends on. Patient able to move more freely in bed than witnessed a few days ago. In addition to scheduled MS Contin, was given Valium 2mg po at hs.

## 2020-12-08 NOTE — PROGRESS NOTES
[id number] [facility] Neurology Daily Progress Note  Name: Kinag Falcon  Age: 79 y.o. Gender: male  CodeStatus: Full Code  Allergies: No Known Allergies    Chief Complaint:Fatigue    Primary Care Provider: Sofia Moran MD  InpatientTreatment Team: Treatment Team: Attending Provider: Melissa Wylie DO; Consulting Physician: Bina Aponte DO; Consulting Physician: Millicent Preston MD; Consulting Physician: Kate Kumar MD; Consulting Physician: Karson Garcias MD; Consulting Physician: Chris Perez MD; Utilization Reviewer: Lizbeth Ocampo, NEIDA; Registered Nurse: Dell Gauthier RN; Tech: Mint; : Alex Gerardo RN  Admission Date: 11/18/2020      HPI     Patient seen and examined for neurology follow-up for cervical myelopathy status post cervical laminoplasty with postoperative cervical spinal abscess status post drainage in the setting of metastatic cancer. Cervical spinal fluid culture with no growth for 72 hours. Patient with chronic urinary retention requiring self straight catheterization. Patient currently alert and oriented x3, no acute distress, cooperative. Continues to have some neck pain but overall reports that it is improved and responds well to pain medication. He continues to have some paresthesias into bilateral fingers and feet. No new weakness noted. Patient is now able to ambulate with walker which is an improvement for him.     Patient is seated bedside. Has been participating in PT and states he if feeling stronger. Paresthesias in BLE continue. Denies any new or worsening Sx including HA, cough, fever/chills, changes in vision, trouble swallowing, or N/V/D. Continue to wait on insurance approval.   Patient is not complain of any new neck pain and seems to be doing somewhat better    Vitals:    12/08/20 0739   BP: (!) 147/71   Pulse: 89   Resp: 18   Temp: 97.9 °F (36.6 °C)   SpO2:       Review of Systems   Constitutional: Positive for fatigue.  Negative for chills, diaphoresis and fever. HENT: Negative for congestion and sore throat. Eyes: Negative for visual disturbance. Respiratory: Positive for shortness of breath. Cardiovascular: Negative. Gastrointestinal: Negative for diarrhea, nausea and vomiting. Genitourinary: Positive for difficulty urinating. Musculoskeletal: Positive for neck pain. Skin: Negative. Neurological: Positive for numbness. Negative for dizziness, tremors, seizures, speech difficulty and headaches. Psychiatric/Behavioral: Negative. Patient's neck pain is better than it was though still present though he is able to move his neck  Physical Exam  Vitals signs and nursing note reviewed. Constitutional:       Comments: Underweight    HENT:      Head: Normocephalic and atraumatic. Eyes:      Extraocular Movements: Extraocular movements intact. Conjunctiva/sclera: Conjunctivae normal.   Cardiovascular:      Rate and Rhythm: Normal rate and regular rhythm. Heart sounds: Normal heart sounds. Pulmonary:      Effort: Pulmonary effort is normal.   Skin:     General: Skin is warm and dry. Neurological:      Mental Status: He is alert and oriented to person, place, and time. Psychiatric:         Mood and Affect: Mood normal.         Speech: Speech normal.         Behavior: Behavior normal.       Neurologic Exam     Mental Status   Oriented to person, place, and time. Speech: speech is normal     Motor Exam     Strength   Right biceps: 5/5  Left biceps: 5/5  Right triceps: 5/5  Left triceps: 5/5  Right quadriceps: 5/5  Left quadriceps: 5/5  Right hamstrin/5  Left hamstrin/5  Right anterior tibial: 5/5  Left anterior tibial: 5/5    Gait, Coordination, and Reflexes     Tremor   Resting tremor: absent    Neck range of motion appears to be 50 degrees now with fixation of extension but good flexion.       Medications:  Reviewed    Infusion Medications:   Scheduled Medications:    sodium chloride flush  10 mL Intravenous 2 times No results found for this or any previous visit. No results found for this or any previous visit. No results found for this or any previous visit. CT of the Head:   Results for orders placed during the hospital encounter of 11/18/20   CT Head WO Contrast    Narrative CT Brain    Contrast medium:  Not utilized. History:  Weakness, fatigue    Comparison:  None    Findings:    Extra-axial spaces:  Normal.     Intracranial hemorrhage:  None. Ventricular system: Ventricles mildly enlarged. Sulci mildly prominent. Basal Cisterns:  Normal.    Cerebral Parenchyma: Bilateral symmetric periventricular areas decreased attenuation. Midline Shift:  None. Cerebellum:  Normal.     Paranasal sinuses and mastoid air cells:  Normal.    Visualized Orbits:  Normal.        Impression Impression:    No acute findings. Mild cerebral atrophy. Chronic ischemic white matter disease. All CT scans at this facility use dose modulation, iterative reconstruction, and/or weight based dosing when appropriate to reduce radiation dose to as low as reasonably achievable. No results found for this or any previous visit. No results found for this or any previous visit. Carotid duplex: No results found for this or any previous visit. No results found for this or any previous visit. No results found for this or any previous visit. Echo No results found for this or any previous visit.           Assessment/Plan:  Gait ataxia with cervical myelopathy in the presence of metastatic prostate cancer.       Patient was seen by us initially for lower extremity weakness and a complete spine evaluation was done and patient had cervical spinal stenosis which was operated we very called in to evaluate due to his continued weakness and pain.  This is an aftermath of cervical myelopathy and may not improve or may take several weeks to months to improve.  Patient's main issues appears to be pain and requires pain management.      Exam does not show any acute changes, and patient remains areflexic in bilateral upper extremities, with gait ataxia, and weakness. 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      He continues to be a high fall risk. Patient is more motivated today to participate in PT.     Palliative care has been following patient and adjustments to pain meds have been made.  Discharge planning in process for skilled nursing facility.  Awaiting precertification.     I examined this patient again and I see that he still has significant issues with his lower extremity though these are likely to stay for a while we are not quite quite concerned about a new cord compression at least at this time. Jeffrey Morales is no fever and there is no suggestion of any infective process that we are worried about in the cervical spine.  This again falls under neurological restriction and therefore close neurological follow-up.  We are contemplating obtaining a CT scan of the neck though given that these findings have not changed to be concerned about a spinal abscess we have not done this.     Patient continues to have pain despite maximizing analgesics.      CT of the cervical spine today shows rim-enhancing fluid collection containing small foci of air within the subcutaneous soft tissues at the C1-C5 levels. Concerning for abscess.     Will order MRI cervical spine with and without contrast and consult neurosurgery (Dr. Darrel Tripp).    pts CT was reviewed / could be abscess/  MRI ordered  Will consult neurosurgery     MRI of cervical spine show 7.5 x 4.5 x 3.5 cm postoperative fluid collection status post recent cervical laminoplasty. Patient had abscess drainage done today with neurosurgery.  Infectious diseases has been consulted. Jayashree Riggs now on cefepime and vancomycin.  Cultures pending.   MRI seen and discussed   Will have neurosurgery to see, findings as suspected

## 2020-12-08 NOTE — PROGRESS NOTES
Assumed care of patient. Medicated patient for neck pain at a 7/10. Discussed plan for discharge to [facility] tomorrow. Incision to neck CDI no signs of skin infection.  Electronically signed by Natalee Reyes RN on 12/8/20 at 5:09 PM EST

## 2020-12-09 LAB
ALBUMIN SERPL-MCNC: 2.7 G/DL (ref 3.5–4.6)
ANION GAP SERPL CALCULATED.3IONS-SCNC: 10 MEQ/L (ref 9–15)
BASOPHILS ABSOLUTE: 0 K/UL (ref 0–0.2)
BASOPHILS RELATIVE PERCENT: 0.2 %
BUN BLDV-MCNC: 9 MG/DL (ref 8–23)
CALCIUM SERPL-MCNC: 8.9 MG/DL (ref 8.5–9.9)
CHLORIDE BLD-SCNC: 100 MEQ/L (ref 95–107)
CO2: 27 MEQ/L (ref 20–31)
CREAT SERPL-MCNC: 0.46 MG/DL (ref 0.7–1.2)
EOSINOPHILS ABSOLUTE: 0.2 K/UL (ref 0–0.7)
EOSINOPHILS RELATIVE PERCENT: 2.1 %
GFR AFRICAN AMERICAN: >60
GFR NON-AFRICAN AMERICAN: >60
GLUCOSE BLD-MCNC: 92 MG/DL (ref 70–99)
HCT VFR BLD CALC: 25 % (ref 42–52)
HEMOGLOBIN: 7.9 G/DL (ref 14–18)
LYMPHOCYTES ABSOLUTE: 2.3 K/UL (ref 1–4.8)
LYMPHOCYTES RELATIVE PERCENT: 22.5 %
MAGNESIUM: 1.9 MG/DL (ref 1.7–2.4)
MCH RBC QN AUTO: 24.7 PG (ref 27–31.3)
MCHC RBC AUTO-ENTMCNC: 31.6 % (ref 33–37)
MCV RBC AUTO: 78.2 FL (ref 80–100)
MONOCYTES ABSOLUTE: 1.4 K/UL (ref 0.2–0.8)
MONOCYTES RELATIVE PERCENT: 13.5 %
NEUTROPHILS ABSOLUTE: 6.4 K/UL (ref 1.4–6.5)
NEUTROPHILS RELATIVE PERCENT: 61.7 %
PDW BLD-RTO: 19.4 % (ref 11.5–14.5)
PHOSPHORUS: 2.7 MG/DL (ref 2.3–4.8)
PLATELET # BLD: 549 K/UL (ref 130–400)
POTASSIUM SERPL-SCNC: 4 MEQ/L (ref 3.4–4.9)
RBC # BLD: 3.2 M/UL (ref 4.7–6.1)
SODIUM BLD-SCNC: 137 MEQ/L (ref 135–144)
WBC # BLD: 10.4 K/UL (ref 4.8–10.8)

## 2020-12-09 PROCEDURE — 6370000000 HC RX 637 (ALT 250 FOR IP): Performed by: NEUROLOGICAL SURGERY

## 2020-12-09 PROCEDURE — 6370000000 HC RX 637 (ALT 250 FOR IP): Performed by: NURSE PRACTITIONER

## 2020-12-09 PROCEDURE — 6360000002 HC RX W HCPCS: Performed by: INTERNAL MEDICINE

## 2020-12-09 PROCEDURE — 6360000002 HC RX W HCPCS: Performed by: FAMILY MEDICINE

## 2020-12-09 PROCEDURE — 99232 SBSQ HOSP IP/OBS MODERATE 35: CPT | Performed by: INTERNAL MEDICINE

## 2020-12-09 PROCEDURE — 36591 DRAW BLOOD OFF VENOUS DEVICE: CPT

## 2020-12-09 PROCEDURE — 6370000000 HC RX 637 (ALT 250 FOR IP): Performed by: PSYCHIATRY & NEUROLOGY

## 2020-12-09 PROCEDURE — 97535 SELF CARE MNGMENT TRAINING: CPT

## 2020-12-09 PROCEDURE — APPSS30 APP SPLIT SHARED TIME 16-30 MINUTES: Performed by: STUDENT IN AN ORGANIZED HEALTH CARE EDUCATION/TRAINING PROGRAM

## 2020-12-09 PROCEDURE — 2580000003 HC RX 258: Performed by: INTERNAL MEDICINE

## 2020-12-09 PROCEDURE — 99232 SBSQ HOSP IP/OBS MODERATE 35: CPT | Performed by: NURSE PRACTITIONER

## 2020-12-09 PROCEDURE — 6370000000 HC RX 637 (ALT 250 FOR IP): Performed by: INTERNAL MEDICINE

## 2020-12-09 PROCEDURE — 6370000000 HC RX 637 (ALT 250 FOR IP): Performed by: PHYSICAL MEDICINE & REHABILITATION

## 2020-12-09 PROCEDURE — 83735 ASSAY OF MAGNESIUM: CPT

## 2020-12-09 PROCEDURE — 97116 GAIT TRAINING THERAPY: CPT

## 2020-12-09 PROCEDURE — 85025 COMPLETE CBC W/AUTO DIFF WBC: CPT

## 2020-12-09 PROCEDURE — 1210000000 HC MED SURG R&B

## 2020-12-09 PROCEDURE — 99231 SBSQ HOSP IP/OBS SF/LOW 25: CPT | Performed by: PSYCHIATRY & NEUROLOGY

## 2020-12-09 PROCEDURE — 80069 RENAL FUNCTION PANEL: CPT

## 2020-12-09 RX ADMIN — MORPHINE SULFATE 15 MG: 15 TABLET, FILM COATED, EXTENDED RELEASE ORAL at 13:21

## 2020-12-09 RX ADMIN — Medication 10 ML: at 11:59

## 2020-12-09 RX ADMIN — OXYCODONE HYDROCHLORIDE AND ACETAMINOPHEN 1 TABLET: 10; 325 TABLET ORAL at 02:15

## 2020-12-09 RX ADMIN — GABAPENTIN 300 MG: 300 CAPSULE ORAL at 13:21

## 2020-12-09 RX ADMIN — GABAPENTIN 300 MG: 300 CAPSULE ORAL at 08:30

## 2020-12-09 RX ADMIN — POLYETHYLENE GLYCOL 3350 17 G: 17 POWDER, FOR SOLUTION ORAL at 08:30

## 2020-12-09 RX ADMIN — STANDARDIZED SENNA CONCENTRATE 8.6 MG: 8.6 TABLET ORAL at 21:05

## 2020-12-09 RX ADMIN — BISACODYL 5 MG: 5 TABLET, COATED ORAL at 08:31

## 2020-12-09 RX ADMIN — MORPHINE SULFATE 15 MG: 15 TABLET, FILM COATED, EXTENDED RELEASE ORAL at 21:05

## 2020-12-09 RX ADMIN — CEFEPIME 2 G: 2 INJECTION, POWDER, FOR SOLUTION INTRAVENOUS at 11:59

## 2020-12-09 RX ADMIN — Medication 2000 UNITS: at 16:11

## 2020-12-09 RX ADMIN — MORPHINE SULFATE 15 MG: 15 TABLET, FILM COATED, EXTENDED RELEASE ORAL at 05:38

## 2020-12-09 RX ADMIN — Medication 10 ML: at 21:07

## 2020-12-09 RX ADMIN — OXYCODONE HYDROCHLORIDE AND ACETAMINOPHEN 1 TABLET: 10; 325 TABLET ORAL at 08:31

## 2020-12-09 RX ADMIN — OXYCODONE HYDROCHLORIDE AND ACETAMINOPHEN 1 TABLET: 10; 325 TABLET ORAL at 16:11

## 2020-12-09 RX ADMIN — Medication 100 MG: at 08:30

## 2020-12-09 RX ADMIN — GABAPENTIN 300 MG: 300 CAPSULE ORAL at 21:05

## 2020-12-09 RX ADMIN — CEFEPIME 2 G: 2 INJECTION, POWDER, FOR SOLUTION INTRAVENOUS at 00:36

## 2020-12-09 RX ADMIN — ALTEPLASE 1 MG: 2.2 INJECTION, POWDER, LYOPHILIZED, FOR SOLUTION INTRAVENOUS at 13:22

## 2020-12-09 RX ADMIN — FOLIC ACID 1 MG: 1 TABLET ORAL at 08:31

## 2020-12-09 RX ADMIN — MIRTAZAPINE 7.5 MG: 15 TABLET, FILM COATED ORAL at 21:06

## 2020-12-09 RX ADMIN — ENOXAPARIN SODIUM 40 MG: 40 INJECTION SUBCUTANEOUS at 21:07

## 2020-12-09 ASSESSMENT — ENCOUNTER SYMPTOMS
SINUS PAIN: 0
NAUSEA: 0
SHORTNESS OF BREATH: 0
RECTAL PAIN: 0
EYES NEGATIVE: 1
BLOOD IN STOOL: 0
ABDOMINAL PAIN: 0
CONSTIPATION: 0
GASTROINTESTINAL NEGATIVE: 1
WHEEZING: 0
DIARRHEA: 0
STRIDOR: 0
COLOR CHANGE: 0
SORE THROAT: 0
RESPIRATORY NEGATIVE: 1
COUGH: 0
ABDOMINAL DISTENTION: 0
CHOKING: 0
ANAL BLEEDING: 0
VOMITING: 0
VOICE CHANGE: 0
EYE DISCHARGE: 0
BACK PAIN: 1
APNEA: 0
CHEST TIGHTNESS: 0
TROUBLE SWALLOWING: 0

## 2020-12-09 ASSESSMENT — PAIN DESCRIPTION - PAIN TYPE: TYPE: SURGICAL PAIN

## 2020-12-09 ASSESSMENT — PAIN DESCRIPTION - LOCATION: LOCATION: NECK

## 2020-12-09 ASSESSMENT — PAIN SCALES - GENERAL
PAINLEVEL_OUTOF10: 7
PAINLEVEL_OUTOF10: 2
PAINLEVEL_OUTOF10: 9
PAINLEVEL_OUTOF10: 6
PAINLEVEL_OUTOF10: 7
PAINLEVEL_OUTOF10: 9
PAINLEVEL_OUTOF10: 2
PAINLEVEL_OUTOF10: 8
PAINLEVEL_OUTOF10: 4

## 2020-12-09 NOTE — PROGRESS NOTES
12/09/20 0733   BP: 137/73   Pulse: 93   Resp: 18   Temp: 98.8 °F (37.1 °C)   SpO2: 98%      Review of Systems   Constitutional: Positive for fatigue. Negative for chills and fever. HENT: Negative for congestion, sinus pain and trouble swallowing. Eyes: Negative. Respiratory: Negative for cough, shortness of breath and wheezing. Cardiovascular: Negative. Gastrointestinal: Negative for diarrhea, nausea and vomiting. Endocrine: Negative. Musculoskeletal: Positive for gait problem and neck pain. Skin: Negative. Neurological: Positive for weakness and numbness. Negative for dizziness, tremors, seizures, speech difficulty and light-headedness. Psychiatric/Behavioral: Negative. neck pain better, slept better first night  Physical Exam  Vitals signs and nursing note reviewed. Constitutional:       Comments: underweight   Eyes:      Extraocular Movements: EOM normal.   Cardiovascular:      Rate and Rhythm: Normal rate and regular rhythm. Pulmonary:      Effort: Pulmonary effort is normal.   Musculoskeletal: Normal range of motion. Skin:     General: Skin is warm and dry. Neurological:      General: No focal deficit present. Mental Status: He is alert and oriented to person, place, and time. Coordination: Finger-Nose-Finger Test normal.   Psychiatric:         Mood and Affect: Mood normal.         Speech: Speech normal.         Behavior: Behavior normal.       Neurologic Exam     Mental Status   Oriented to person, place, and time.    Speech: speech is normal     Cranial Nerves     CN III, IV, VI   Extraocular motions are normal.     Gait, Coordination, and Reflexes     Coordination   Finger to nose coordination: normal    Tremor   Resting tremor: absent  Intention tremor: absent  Action tremor: absent    Gait ataxia and weakness of lower extremity      Medications:  Reviewed    Infusion Medications:   Scheduled Medications:    sodium chloride flush  10 mL Intravenous 2 times per day    sodium chloride (PF)  10 mL Intravenous Once    cefepime  2 g Intravenous Q12H    gabapentin  300 mg Oral TID    enzalutamide  160 mg Oral Daily    sodium chloride flush  10 mL Intravenous 2 times per day    bisacodyl  5 mg Oral Daily    morphine  15 mg Oral Q8H    mirtazapine  7.5 mg Oral Nightly    Vitamin D  2,000 Units Oral Dinner    cyanocobalamin  1,000 mcg Intramuscular Weekly    coenzyme Q10  100 mg Oral Daily    lidocaine  3 patch Transdermal Daily    polyethylene glycol  17 g Oral Daily    senna  1 tablet Oral Nightly    folic acid  1 mg Oral Daily    enoxaparin  40 mg Subcutaneous Daily     PRN Meds: sodium chloride flush, oxyCODONE-acetaminophen, [Held by provider] HYDROmorphone, diazePAM, sodium chloride flush, magnesium hydroxide, polyethylene glycol, ondansetron **OR** ondansetron, acetaminophen **OR** acetaminophen, promethazine **OR** ondansetron    Labs:   Recent Labs     12/07/20  0551 12/08/20  0600 12/09/20  0551   WBC 9.7 10.2 10.4   HGB 8.5* 8.1* 7.9*   HCT 26.4* 25.2* 25.0*   * 566* 549*     Recent Labs     12/07/20  0549 12/08/20  0600 12/09/20  0550    138 137   K 3.5 3.7 4.0   CL 99 101 100   CO2 25 25 27   BUN 9 8 9   CREATININE 0.41* 0.41* 0.46*   CALCIUM 9.3 8.7 8.9   PHOS 2.7 2.8 2.7     No results for input(s): AST, ALT, BILIDIR, BILITOT, ALKPHOS in the last 72 hours. No results for input(s): INR in the last 72 hours. No results for input(s): Connee Matar in the last 72 hours. Urinalysis:   Lab Results   Component Value Date    NITRU POSITIVE 11/29/2020    WBCUA >100 11/29/2020    BACTERIA MANY 11/29/2020    RBCUA 3-5 09/26/2020    BLOODU Negative 11/29/2020    SPECGRAV 1.014 11/29/2020    GLUCOSEU Negative 11/29/2020       Radiology:   Most recent    EEG No procedure found. MRI of Brain No results found for this or any previous visit. No results found for this or any previous visit.                           MRA of the Head and Neck: No results found for this or any previous visit. No results found for this or any previous visit. No results found for this or any previous visit. CT of the Head:   Results for orders placed during the hospital encounter of 11/18/20   CT Head WO Contrast    Narrative CT Brain    Contrast medium:  Not utilized. History:  Weakness, fatigue    Comparison:  None    Findings:    Extra-axial spaces:  Normal.     Intracranial hemorrhage:  None. Ventricular system: Ventricles mildly enlarged. Sulci mildly prominent. Basal Cisterns:  Normal.    Cerebral Parenchyma: Bilateral symmetric periventricular areas decreased attenuation. Midline Shift:  None. Cerebellum:  Normal.     Paranasal sinuses and mastoid air cells:  Normal.    Visualized Orbits:  Normal.        Impression Impression:    No acute findings. Mild cerebral atrophy. Chronic ischemic white matter disease. All CT scans at this facility use dose modulation, iterative reconstruction, and/or weight based dosing when appropriate to reduce radiation dose to as low as reasonably achievable. No results found for this or any previous visit. No results found for this or any previous visit. Carotid duplex: No results found for this or any previous visit. No results found for this or any previous visit. No results found for this or any previous visit. Echo No results found for this or any previous visit.           Assessment/Plan:  Gait ataxia with cervical myelopathy in the presence of metastatic prostate cancer.       Patient was seen by us initially for lower extremity weakness and a complete spine evaluation was done and patient had cervical spinal stenosis which was operated we very called in to evaluate due to his continued weakness and pain.  This is an aftermath of cervical myelopathy and may not improve or may take several weeks to months to improve.  Patient's main issues appears to be pain and requires pain management.      Exam does not show any acute changes, and patient remains areflexic in bilateral upper extremities, with gait ataxia, and weakness. 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      He continues to be a high fall risk. Patient is more motivated today to participate in PT.     Palliative care has been following patient and adjustments to pain meds have been made.  Discharge planning in process for skilled nursing facility.  Awaiting precertification.     I examined this patient again and I see that he still has significant issues with his lower extremity though these are likely to stay for a while we are not quite quite concerned about a new cord compression at least at this time. Isidro Parish is no fever and there is no suggestion of any infective process that we are worried about in the cervical spine.  This again falls under neurological restriction and therefore close neurological follow-up.  We are contemplating obtaining a CT scan of the neck though given that these findings have not changed to be concerned about a spinal abscess we have not done this.     Patient continues to have pain despite maximizing analgesics.      CT of the cervical spine today shows rim-enhancing fluid collection containing small foci of air within the subcutaneous soft tissues at the C1-C5 levels. Concerning for abscess.     Will order MRI cervical spine with and without contrast and consult neurosurgery (Dr. Tamiko Jimenez).    pts CT was reviewed / could be abscess/  MRI ordered  Will consult neurosurgery     MRI of cervical spine show 7.5 x 4.5 x 3.5 cm postoperative fluid collection status post recent cervical laminoplasty. Patient had abscess drainage done today with neurosurgery.  Infectious diseases has been consulted. Stefanie Willis now on cefepime and vancomycin.  Cultures pending.   MRI seen and discussed   Will have neurosurgery to see, findings as suspected clinically     He is making gains in strength and has increased neck ROM S/p abscess drainage. He is to have a PICC line placed today. [employer] is okay to be discharged to rehab from a neurological standpoint.      He  was reexamined today and his neck examination is much better is able to rotate his neck at 40 degrees bilaterally without any major pain which is much better than yesterday.  His strength otherwise still remains the same as above.  He will require rehabilitation. [employer] has regressed somewhat since the surgery.  We await his planning for rehabilitation.     Patient stable and would benefit from rehabilitation. Pain and neck ROM continue to improve since abscess drainage. Continue with PT/OT. I have personally performed a face to face diagnostic evaluation on this patient, reviewed all data and investigations, and am the sole provider of all clinical decisions on the neurological status of this patient. Patient examination continues to improve slowly over days. We are not expecting any other complication. Patient is followed by neurology for expected complications of cervical myelopathy and we did discover an abscess during your evaluations which was beneficial to the patient's treatment plan. We follow him periodically for the same and he may be discharged to a nursing home. Patient still has considerable gait issues which may take 6 months to improve     Patient is to be discharged to [facility] Tuesday. He will benefit from continued physical therapy. He is improving in strength since his spinal surgery but will continue to need rehabilitation. He has almost full range of motion of his neck. To continue on cefepime as an outpatient for both UTI and abscess. I have personally performed a face to face diagnostic evaluation on this patient, reviewed all data and investigations, and am the sole provider of all clinical decisions on the neurological status of this patient. my exam shows weakness and gait issues in lower extremity,  Neck better  Will sign off      Oscar Walters MD, [address], American Board of Psychiatry & Neurology  Board Certified in Vascular Neurology  Board Certified in Neuromuscular Medicine  Certified in Neurorehabilitation       Collaborating physicians: Dr Dominga Walters    Electronically signed by Johanne Fernandez PA-C on 12/9/2020 at 1:52 PM

## 2020-12-09 NOTE — PROGRESS NOTES
Physician Progress Note    12/9/2020   2:50 PM    Name:  Monique Tomlinson  MRN:    [id number]     IP Day: 23     Admit Date: 11/18/2020  2:44 PM  PCP: Wilda Sahu MD    Code Status:  Full Code    Subjective:     Patient is seen and evaluated at bedside. SNF rejected by insurance today. unable to obtain peer to peer contact information. 4 weeks cefepime recommended by ID. Chemo on hold following discharge. Labs unchanged. Pain control Improved.      Current Facility-Administered Medications   Medication Dose Route Frequency Provider Last Rate Last Dose    sodium chloride flush 0.9 % injection 10 mL  10 mL Intravenous 2 times per day Dawna Quiroga DO   10 mL at 12/09/20 1159    sodium chloride flush 0.9 % injection 10 mL  10 mL Intravenous PRN Dawna Quiroga DO        oxyCODONE-acetaminophen (PERCOCET)  MG per tablet 1 tablet  1 tablet Oral Q4H PRN JOYCE Aguilera - CNP   1 tablet at 12/09/20 0831    sodium chloride (PF) 0.9 % injection 10 mL  10 mL Intravenous Once Bernardo Godoy MD        cefepime (MAXIPIME) 2 g IVPB minibag  2 g Intravenous Q12H Haider Heck MD   Stopped at 12/09/20 1229    [Held by provider] HYDROmorphone (DILAUDID) injection 1 mg  1 mg Intravenous Q3H PRN JOYCE Aguilera - CNP   1 mg at 12/02/20 1840    gabapentin (NEURONTIN) capsule 300 mg  300 mg Oral TID [facility]   300 mg at 12/09/20 1321    enzalutamide (XTANDI) capsule 160 mg  160 mg Oral Daily Dawna Quiroga DO   Stopped at 12/07/20 1029    diazePAM (VALIUM) tablet 2 mg  2 mg Oral Q6H PRN JOYCE Srinivasan - CNP   2 mg at 12/07/20 2205    sodium chloride flush 0.9 % injection 10 mL  10 mL Intravenous 2 times per day Bernardo Godoy MD   10 mL at 12/07/20 1030    sodium chloride flush 0.9 % injection 10 mL  10 mL Intravenous PRN Bernardo Godoy MD        bisacodyl (DULCOLAX) EC tablet 5 mg  5 mg Oral Daily Bernardo Godoy MD   5 mg at 12/09/20 0846    magnesium hydroxide (MILK OF MAGNESIA) 400 MG/5ML suspension 30 mL  30 mL Oral Daily PRN Corbin Whitman MD        polyethylene glycol [facility]) packet 17 g  17 g Oral Daily PRN Corbin Whitman MD        ondansetron (ZOFRAN-ODT) disintegrating tablet 4 mg  4 mg Oral Q8H PRN Corbin Whitman MD        Or    ondansetron [facility]) injection 4 mg  4 mg Intravenous Q6H PRN Corbin Whitman MD        morphine (MS CONTIN) extended release tablet 15 mg  15 mg Oral Q8H JOYCE Rosenbaum - CNP   15 mg at 12/09/20 1321    mirtazapine (REMERON) tablet 7.5 mg  7.5 mg Oral Nightly JOYCE Rosenbaum - CNP   7.5 mg at 12/08/20 2051    Vitamin D (CHOLECALCIFEROL) tablet 2,000 Units  2,000 Units Oral Dinner Nicole Scullin, DO   2,000 Units at 12/08/20 1706    cyanocobalamin injection 1,000 mcg  1,000 mcg Intramuscular Weekly Nicole Scullin, DO   1,000 mcg at 12/08/20 0829    coenzyme Q10 capsule 100 mg  100 mg Oral Daily Nicole Scullin, DO   100 mg at 12/09/20 0830    lidocaine 4 % external patch 3 patch  3 patch Transdermal Daily Nicole Scullin, DO   3 patch at 11/30/20 0900    acetaminophen (TYLENOL) tablet 650 mg  650 mg Oral Q4H PRN Nicole Scullin, DO        Or    acetaminophen (TYLENOL) suppository 650 mg  650 mg Rectal Q6H PRN Nicole Scullin, DO        polyethylene glycol (GLYCOLAX) packet 17 g  17 g Oral Daily Hellen Grief, DO   17 g at 12/09/20 0830    senna (SENOKOT) tablet 8.6 mg  1 tablet Oral Nightly Hellen Grief, DO   8.6 mg at 23/98/89 9202    folic acid (FOLVITE) tablet 1 mg  1 mg Oral Daily Rob Montaño MD   1 mg at 12/09/20 0831    promethazine (PHENERGAN) tablet 12.5 mg  12.5 mg Oral Q6H PRN Adolfo Haji MD        Or    ondansetron [facility]) injection 4 mg  4 mg Intravenous Q6H PRN Adolfo Haji MD   4 mg at 11/18/20 1843    enoxaparin (LOVENOX) injection 40 mg  40 mg Subcutaneous Daily Adolfo Haji MD   40 mg at 12/08/20 2051       Physical Examination:      Vitals:  /73   Pulse 93 Temp 98.8 °F (37.1 °C) (Oral)   Resp 18   Ht 6' 1\" (1.854 m)   Wt 147 lb 4.3 oz (66.8 kg)   SpO2 98%   BMI 19.43 kg/m²   Temp (24hrs), Av.9 °F (37.2 °C), Min:98.8 °F (37.1 °C), Max:99 °F (37.2 °C)      General appearance: Thin, chronically ill-appearing, malnourished, in no distress. Patient looking to the left. Mental Status: oriented to person, place and time and normal affect  Lungs: clear to auscultation bilaterally, normal effort  Heart: regular rate and rhythm, no murmur  Abdomen: soft, nontender, nondistended, bowel sounds present, no masses  Extremities: no edema, redness, tenderness in the calves. Reduced ROM, with diffuse weakness and neuropathy. Skin: no gross lesions, rashes    Data:     Labs:  Recent Labs     20  0600 20  0551   WBC 10.2 10.4   HGB 8.1* 7.9*   * 549*     Recent Labs     20  0600 20  0550    137   K 3.7 4.0    100   CO2 25 27   BUN 8 9   CREATININE 0.41* 0.46*   GLUCOSE 90 92     No results for input(s): AST, ALT, ALB, BILITOT, ALKPHOS in the last 72 hours. Assessment and Plan:        Summary: 66-year-old male with history of metastatic prostate cancer (bone/liver mets) presented with generalized weakness and recurrent falls. He was found to have orthostatic hypotension, severe protein calorie malnutrition, and severe C-spine stenosis for which he underwent C3-6 laminoplasty with reconstruction . His postoperative course was complicated by fevers and persistent neck pain-he was found to have Proteus UTI and cervical neck fluid collection vs abscess for which he is s/p aspiration / drainage. 1.  Sepsis 2/2 post operative abscess/fluid collection complicated by proteus UTI: sepsis resolved. Continue Cefepime, 4weeks total coverage per ID. Cultures remain negative. Neurology, neurosurgery, PM&R following. Metastatic prostate Cancer: Palliative care following for pain control.  No hemo x 2 weeks following oncology consult. Constipation: Daily miralax. Folic acid deficiency: continue supplementation. Severe protein calorie malnutrition  C-spine stenosis s/p C3-6 laminoplasty: continue gabapentin 300 TID, scheduled pain control. Orthostatic hypotension  Chronic urinary retention requiring straight catheterization: proteus UTI 11/29. Proteus hauseri (1)     Antibiotic  Interpretation  MADELYN  Status    amoxicillin-clavulanate  Sensitive  8  mcg/mL     ampicillin  Resistant  >=32  mcg/mL     ceFAZolin  Resistant  >=64  mcg/mL     cefTRIAXone  Sensitive  <=1  mcg/mL     ciprofloxacin  Sensitive  <=0.25  mcg/mL     gentamicin  Sensitive  <=1  mcg/mL     nitrofurantoin  Resistant  128  mcg/mL     trimethoprim-sulfamethoxazole  Sensitive           D/C Planning in progress with case mgmt. Diet: Dietary Nutrition Supplements: Clear Liquid Oral Supplement  Dietary Nutrition Supplements: Frozen Oral Supplement  DIET GENERAL; Carb Control: 4 carbs/meal (approximate 1800 kcals/day)  Dietary Nutrition Supplements: Standard High Calorie Oral Supplement  Ppx: Lovenox  Full Code    Dispo: This patient is medically stable for discharge when SNF bed is arranged. Case management is having difficulty placing the patient due to facilities not taking his insurance. He will need antibiotic sent and pain prescription sent at discharge-this will be done by infectious disease and palliative medicine, respectively.      Electronically signed by Sharyn Jurado DO on 12/9/2020 at 2:50 PM

## 2020-12-09 NOTE — PROGRESS NOTES
Palliative Care Progress Note  Patient: Jaswant Elizabeth  Gender: male  YOB: 1950  Unit/Bed: I656/N159-09  Code Status: Full Code  Inpatient Treatment Team: Treatment Team: Attending Provider: Jaskaran Ferrer DO; Consulting Physician: Odilia Navarro DO; Consulting Physician: Jessica Stein MD; Consulting Physician: Jose Rodriguez MD; Consulting Physician: Arden Avila MD; Utilization Reviewer: Yulia Crystal RN; Consulting Physician: Moise Martinez MD; Registered Nurse: Anthony Linn, NEIDA; : Karl Gitelman, RN; : Efrem Santillan; : Magalis George, NEIDA; Patient Care Tech: Yary Andersen  Admit Date:  11/18/2020    Chief Complaint: Pain    History of Presenting Illness:      Jaswant Elizabeth is a 79 y.o. male on hospital day 23 with a history of androgen independent prostate cancer. Known liver and bone mets, severe malnutrition, falls, urine retention self caths, gastric ulcer with hemorrhage. Presented with progressive quadriparesis weight loss inability to ambulate. His legs will not support him anymore. Every few weeks he is getting weaker and weaker. He is now unable to ambulate. Found to have severe spinal canal stenosis at C3-C7 with cord atrophy      Laminoplasty with reconstruction was done. Observed laying in bed. He is pleasant and cooperative. He is in NAD. He tells me that he is still having neck and right hip pain, but much improved. He is now able to turn his head and torso without discomfort. He has been taking Gabapentin 300 mg TID, MS Contin 15 mg every 8 hours. He has Percocet 10/325 mg po available every 4 hours for breakthrough pain abd has used it once today. He is negative for constipation or diarrhea, no blood in stool. Appetite stable. Negative significant emotional, spiritual, or sleep disturbance. The plan is for discharge to a SNF.     Review of Systems:       Review of Systems   Constitutional: Negative for activity change, appetite change, chills, diaphoresis, fatigue, fever and unexpected weight change. HENT: Negative for drooling, hearing loss, mouth sores, sore throat, trouble swallowing and voice change. Eyes: Negative for discharge and visual disturbance. Respiratory: Negative for apnea, cough, choking, chest tightness, shortness of breath, wheezing and stridor. Cardiovascular: Negative for chest pain, palpitations and leg swelling. Gastrointestinal: Negative for abdominal distention, abdominal pain, anal bleeding, blood in stool, constipation, diarrhea, nausea, rectal pain and vomiting. Genitourinary: Negative for difficulty urinating, dysuria, enuresis, frequency and hematuria. Musculoskeletal: Positive for arthralgias, back pain, gait problem, neck pain and neck stiffness. Negative for joint swelling and myalgias. Skin: Negative for color change, pallor, rash and wound. Allergic/Immunologic: Negative for food allergies and immunocompromised state. Neurological: Positive for weakness. Negative for dizziness, tremors, seizures, syncope, facial asymmetry, speech difficulty, light-headedness, numbness and headaches. Hematological: Negative for adenopathy. Does not bruise/bleed easily. Psychiatric/Behavioral: Negative for agitation, behavioral problems, confusion, decreased concentration, dysphoric mood, hallucinations, self-injury, sleep disturbance and suicidal ideas. The patient is not nervous/anxious and is not hyperactive. Physical Examination:       /73   Pulse 93   Temp 98.8 °F (37.1 °C) (Oral)   Resp 18   Ht 6' 1\" (1.854 m)   Wt 147 lb 4.3 oz (66.8 kg)   SpO2 98%   BMI 19.43 kg/m²    Physical Exam  Constitutional:       General: He is not in acute distress. Appearance: He is underweight. He is not diaphoretic. HENT:      Head: Normocephalic and atraumatic.       Right Ear: External ear normal.      Left Ear: External ear normal.      Nose: Nose normal. Mouth/Throat:      Pharynx: No oropharyngeal exudate. Eyes:      General: No scleral icterus. Right eye: No discharge. Left eye: No discharge. Conjunctiva/sclera: Conjunctivae normal.      Pupils: Pupils are equal, round, and reactive to light. Neck:      Musculoskeletal: Normal range of motion and neck supple. Thyroid: No thyromegaly. Vascular: No JVD. Trachea: No tracheal deviation. Cardiovascular:      Rate and Rhythm: Normal rate and regular rhythm. Heart sounds: Normal heart sounds. Pulmonary:      Effort: Pulmonary effort is normal. No respiratory distress. Breath sounds: Normal breath sounds. No stridor. No wheezing or rales. Chest:      Chest wall: No tenderness. Abdominal:      General: Bowel sounds are normal. There is no distension. Palpations: Abdomen is soft. There is no mass. Tenderness: There is no abdominal tenderness. There is no guarding or rebound. Musculoskeletal: Normal range of motion. General: No tenderness or deformity. Lymphadenopathy:      Cervical: No cervical adenopathy. Skin:     General: Skin is warm and dry. Findings: No erythema or rash. Neurological:      Mental Status: He is alert and oriented to person, place, and time. Cranial Nerves: No cranial nerve deficit. Coordination: Coordination normal.   Psychiatric:         Behavior: Behavior normal.         Thought Content:  Thought content normal.         Judgment: Judgment normal.         Allergies:      No Known Allergies    Medications:      Current Facility-Administered Medications   Medication Dose Route Frequency Provider Last Rate Last Dose    alteplase (CATHFLO) injection 1 mg  1 mg Intracatheter Once [employer], DO        sodium chloride flush 0.9 % injection 10 mL  10 mL Intravenous 2 times per day Amina Durham,    10 mL at 12/09/20 1159    sodium chloride flush 0.9 % injection 10 mL  10 mL Intravenous PRN Chayo Hardwick Eleno Ireland DO        oxyCODONE-acetaminophen (PERCOCET)  MG per tablet 1 tablet  1 tablet Oral Q4H PRN JOYCE Colby CNP   1 tablet at 12/09/20 0831    sodium chloride (PF) 0.9 % injection 10 mL  10 mL Intravenous Once Annia Marsh MD        cefepime (MAXIPIME) 2 g IVPB minibag  2 g Intravenous Q12H Minal Velázquez  mL/hr at 12/09/20 1159 2 g at 12/09/20 1159    [Held by provider] HYDROmorphone (DILAUDID) injection 1 mg  1 mg Intravenous Q3H PRN JOYCE Colby CNP   1 mg at 12/02/20 1840    gabapentin (NEURONTIN) capsule 300 mg  300 mg Oral TID JOYCE Wang CNP   300 mg at 12/09/20 0830    enzalutamide (XTANDI) capsule 160 mg  160 mg Oral Daily Pravin Wan DO   Stopped at 12/07/20 1029    diazePAM (VALIUM) tablet 2 mg  2 mg Oral Q6H PRN AdjondJOYCE Mckinney - CNP   2 mg at 12/07/20 2205    sodium chloride flush 0.9 % injection 10 mL  10 mL Intravenous 2 times per day Annia Marsh MD   10 mL at 12/07/20 1030    sodium chloride flush 0.9 % injection 10 mL  10 mL Intravenous PRN Annia Marsh MD        bisacodyl (DULCOLAX) EC tablet 5 mg  5 mg Oral Daily Annia Marsh MD   5 mg at 12/09/20 0831    magnesium hydroxide (MILK OF MAGNESIA) 400 MG/5ML suspension 30 mL  30 mL Oral Daily PRN Annia Marsh MD        polyethylene glycol [facility]) packet 17 g  17 g Oral Daily PRN Annia Marsh MD        ondansetron (ZOFRAN-ODT) disintegrating tablet 4 mg  4 mg Oral Q8H PRN Annia Marsh MD        Or    ondansetron [facility]) injection 4 mg  4 mg Intravenous Q6H PRN Annia Marsh MD        morphine (MS CONTIN) extended release tablet 15 mg  15 mg Oral Q8H JOYCE Horn CNP   15 mg at 12/09/20 0538    mirtazapine (REMERON) tablet 7.5 mg  7.5 mg Oral Nightly JOYCE Wiggins CNP   7.5 mg at 12/08/20 5441    Vitamin D (CHOLECALCIFEROL) tablet 2,000 Units  2,000 Units Oral Dinner Nicole Reyes DO   2,000 Units at 12/08/20 3376    cyanocobalamin injection 1,000 mcg  1,000 mcg Intramuscular Weekly Nicole Scullin, DO   1,000 mcg at 12/08/20 0829    coenzyme Q10 capsule 100 mg  100 mg Oral Daily Nicole Scullin, DO   100 mg at 12/09/20 0830    lidocaine 4 % external patch 3 patch  3 patch Transdermal Daily Nicole Scullin, DO   3 patch at 11/30/20 0900    acetaminophen (TYLENOL) tablet 650 mg  650 mg Oral Q4H PRN Nicole Scullin, DO        Or    acetaminophen (TYLENOL) suppository 650 mg  650 mg Rectal Q6H PRN Nicole Scullin, DO        polyethylene glycol (GLYCOLAX) packet 17 g  17 g Oral Daily Louellen Honey, DO   17 g at 12/09/20 0830    senna (SENOKOT) tablet 8.6 mg  1 tablet Oral Nightly Louellen Honey, DO   8.6 mg at 80/25/35 8728    folic acid (FOLVITE) tablet 1 mg  1 mg Oral Daily Abraham Dyer MD   1 mg at 12/09/20 0831    promethazine (PHENERGAN) tablet 12.5 mg  12.5 mg Oral Q6H PRN Mira South MD        Or    ondansetron [facility]) injection 4 mg  4 mg Intravenous Q6H PRN Mira South MD   4 mg at 11/18/20 1843    enoxaparin (LOVENOX) injection 40 mg  40 mg Subcutaneous Daily Mira South MD   40 mg at 12/08/20 2051       History:       PMHx:  Past Medical History:   Diagnosis Date    PAF (paroxysmal atrial fibrillation) ([facility]     ON XARELTO    Prostate cancer ([facility] 11/2019       PSHx:  Past Surgical History:   Procedure Laterality Date    CERVICAL LAMINECTOMY N/A 11/23/2020    CERVICAL C3-4,4-5,5-6  LAMINOPLASTY WITH RECONSTRUCTION performed by Zuleyka Garcia MD at [address] [place] [address] COLONOSCOPY N/A 9/29/2020    COLONOSCOPY DIAGNOSTIC performed by Jose Elias Kong MD at [email or web address] Right 11/2018    hip    UPPER GASTROINTESTINAL ENDOSCOPY N/A 9/29/2020    EGD DIAGNOSTIC ONLY performed by Jose Elias Kong MD at [facility] Hx:  Social History     Socioeconomic History    Marital status: Single     Spouse name: None    Number of children: None    Years of education: None  Highest education level: None   Occupational History    Occupation: retired [employer]   Social Needs    Financial resource strain: Not very hard    Food insecurity     Worry: Never true     Inability: Never true    Transportation needs     Medical: No     Non-medical: No   Tobacco Use    Smoking status: Never Smoker    Smokeless tobacco: Never Used    Tobacco comment: denies   Substance and Sexual Activity    Alcohol use: Not Currently     Frequency: 2-4 times a month     Comment: denies    Drug use: No     Comment: denies    Sexual activity: None   Lifestyle    Physical activity     Days per week: 0 days     Minutes per session: 0 min    Stress: Only a little   Relationships    Social connections     Talks on phone: None     Gets together: None     Attends Orthodox service: None     Active member of club or organization: None     Attends meetings of clubs or organizations: None     Relationship status: None    Intimate partner violence     Fear of current or ex partner: No     Emotionally abused: No     Physically abused: No     Forced sexual activity: No   Other Topics Concern    None   Social History Narrative    Retired from [employer] With: Alone    Type of Home: [address] [place], [place] [address]vd: One level    Home Access: Level entry    Bathroom Shower/Tub: Tub/Shower unit    [address]: [employer] [address] in [facility]: [facility]: Independent    Transfer Assistance: Independent    Active : No    Patient's  Info: Sister    Additional Comments: Pt. reports he has been having near falls but no falls in the last few weeks, but progressively more weakness hte last few weeks       Family Hx:  History reviewed. No pertinent family history.     LABS: Reviewed     CBC:  Lab Results   Component Value Date    WBC 10.4 12/09/2020    RBC 3.20 12/09/2020    HGB 7.9 12/09/2020    HCT 25.0 12/09/2020    MCV 78.2 12/09/2020    MCH 24.7 12/09/2020    MCHC 31.6 12/09/2020    RDW 19.4 12/09/2020     12/09/2020    MPV 8.7 09/09/2015     CBC with Differential:   Lab Results   Component Value Date    WBC 10.4 12/09/2020    RBC 3.20 12/09/2020    HGB 7.9 12/09/2020    HCT 25.0 12/09/2020     12/09/2020    MCV 78.2 12/09/2020    MCH 24.7 12/09/2020    MCHC 31.6 12/09/2020    RDW 19.4 12/09/2020    METASPCT 1 09/26/2020    LYMPHOPCT 22.5 12/09/2020    MONOPCT 13.5 12/09/2020    MYELOPCT 1 09/26/2020    BASOPCT 0.2 12/09/2020    MONOSABS 1.4 12/09/2020    LYMPHSABS 2.3 12/09/2020    EOSABS 0.2 12/09/2020    BASOSABS 0.0 12/09/2020     CMP:    Lab Results   Component Value Date     12/09/2020    K 4.0 12/09/2020    K 4.2 11/25/2020     12/09/2020    CO2 27 12/09/2020    BUN 9 12/09/2020    CREATININE 0.46 12/09/2020    GFRAA >60.0 12/09/2020    LABGLOM >60.0 12/09/2020    GLUCOSE 92 12/09/2020    PROT 8.0 11/18/2020    LABALBU 2.7 12/09/2020    CALCIUM 8.9 12/09/2020    BILITOT 0.7 11/18/2020    ALKPHOS 233 11/18/2020    AST 26 11/18/2020    ALT 9 11/18/2020     BMP:    Lab Results   Component Value Date     12/09/2020    K 4.0 12/09/2020    K 4.2 11/25/2020     12/09/2020    CO2 27 12/09/2020    BUN 9 12/09/2020    LABALBU 2.7 12/09/2020    CREATININE 0.46 12/09/2020    CALCIUM 8.9 12/09/2020    GFRAA >60.0 12/09/2020    LABGLOM >60.0 12/09/2020    GLUCOSE 92 12/09/2020     TSH:   Lab Results   Component Value Date    TSH 1.210 11/19/2020     Vitamin B12 and Folate: No components found for: FOLIC,  T04  Urinalysis:   Lab Results   Component Value Date    NITRU POSITIVE 11/29/2020    WBCUA >100 11/29/2020    BACTERIA MANY 11/29/2020    RBCUA 3-5 09/26/2020    BLOODU Negative 11/29/2020    SPECGRAV 1.014 11/29/2020    GLUCOSEU Negative 11/29/2020           FUNCTIONAL ADL´S:     Independent: [ x ] Eating, [   ] Dressing, [   ] Transferring, [   ] Claude Flax, [   ] Caralyn Kalata, [   ] Continence  Dependent   : [  ] Eating, [   ] Dressing, [   ] Transferring, [   ] Martha Brash, [   ] Audrene Kay, [   ] Continence  W. assistant : [  ] Eating, [  x ] Dressing, [ x  ] Transferring, [  x ] Toileting, [  x ] Bathing, [   x] Continence    Radiology: Reviewed      No results found. Assessment and plan:    1. Recurrent falls multifactorial in etiology: Orthostatic hypotension suspect due to autonomic dysfunction,spinal stenosis, advanced prostate cancer on chemotherapy, moderate protein calorie malnutrition  Follow with primary medical team  Continue PT    2. Prostate Cancer with mets  -Oncology Following    3. Anorexia and Malnutrition  -Start Mirtazapine 7.5 mg po daily at HS  -Continue nutritional Supplementation    4. Spinal Stenosis with weakness and paraesthesia  -Neurosurgery following  -Currently taking Gabapentin 300 mg TID    5. Pain rt neoplasm and spinal stenosis  - Continue to  utilize the medication in place for breakthrough pain. Discussed need for breakthrough pain medication with nursing staff. He should medicate one hour prior to PT so he can have maximum participation.   -Continue MS Contin to 15 mg po every 8 hours  -Will increase Percocet to 10/325 mg po every 4 hours for moderate to severe breakthrough pain  -Continue to hold Hydromorphone 1.0mg IV every 3 hours prn severe breakthrough pain  -Continue Gabapentin to 300 mg po tid  -Will start heat pack to affected area and Lidocaine patch.  -The plan is for discharge to a SNF. He will not be able to use IV Dilaudid at the SNF. Will put the dilaudid on hold and monitor his pain. Will continue to titrate his oral medication regime to control his pain. Constipation  -Continue current POC  -Increase fiber and fluid in diet    -Advance Care Planning  Remains FULL CODE      -Goals of Care Discussion:  We discussed all care options contingent on treatment response and QOL. Much active listening, presence, and emotional support were given. Will continue to follow.     Electronically signed by Corinn Sever, APRN - CNP on 12/9/2020 at 1:20 PM

## 2020-12-09 NOTE — PROGRESS NOTES
Physical Therapy Med Surg Daily Treatment Note  Facility/Department: [facility]  Room: [place] [address]       NAME: Earle Betts  : 1950 (66 y.o.)  MRN: [id number]  CODE STATUS: Full Code    Date of Service: 2020    Patient Diagnosis(es): Fall at home, initial encounter [W19. XXXA, A17.979]  Falls, initial encounter [T26. XXXA]   Chief Complaint   Patient presents with    Fatigue     Patient Active Problem List    Diagnosis Date Noted    Abscess     Sepsis due to gram-negative UTI ([address] 2020    Neurogenic bladder     Stenosis of cervical spine with myelopathy ([address] 2020    Malignant tumor of prostate ([address] 2020    Raised prostate specific antigen 2020    Retention of urine 2020    Urethritis 2020    Ataxic gait 2020    Myelopathy ([address]     Falls, initial encounter 2020    Severe malnutrition ([address] 2020    Fall at home, initial encounter 2020    Goals of care, counseling/discussion     Gastrointestinal hemorrhage     Rectal mass     Acute cystitis     Metastatic cancer ([address]     Gastric ulcer 2020    Weakness     Anemia 2020    Other specified disorders of bone density and structure, unspecified site      Liver mass 2020    Osteoarthritis of hip 2020    Benign prostatic hyperplasia with incomplete bladder emptying 2018    Palpitations 10/19/2010        Past Medical History:   Diagnosis Date    PAF (paroxysmal atrial fibrillation) ([address]     ON XARELTO    Prostate cancer ([address] 2019     Past Surgical History:   Procedure Laterality Date    CERVICAL LAMINECTOMY N/A 2020    CERVICAL C3-4,4-5,5-6  LAMINOPLASTY WITH RECONSTRUCTION performed by Al Carrillo MD at [address] [place] [address] N/A 2020    COLONOSCOPY DIAGNOSTIC performed by Hellen Gonzalez MD at [address] Right 2018    hip    UPPER GASTROINTESTINAL ENDOSCOPY N/A 2020    EGD DIAGNOSTIC ONLY performed by Donnie Saba MD at [facility]       Restrictions  Restrictions/Precautions: Fall Risk  Position Activity Restriction  Other position/activity restrictions: Limit cervical ROM to within pain tolerable range    SUBJECTIVE   General  Chart Reviewed: Yes  Family / Caregiver Present: No  Subjective  Subjective: \"Do you know what time I'm leaving? \"    Pre-Session Pain Report  Pre Treatment Pain Screening  Pain at present: 4  Scale Used: Numeric Score  Intervention List: Patient able to continue with treatment  Pain Screening  Patient Currently in Pain: Yes       Post-Session Pain Report  Pain Assessment  Pain Assessment: 0-10  Pain Level: 4  Pain Type: Surgical pain  Pain Location: Neck         OBJECTIVE        Bed mobility  Supine to Sit: Contact guard assistance  Sit to Supine: Minimal assistance  Comment: HOB elevated at pt's request, increased time to complete, pt with good technique. pt needing assistance to lift LLE into bed. Transfers  Sit to Stand: Contact guard assistance  Stand to sit: Contact guard assistance  Comment: good follow through of cues for technique from previous sessions. Ambulation  Ambulation?: Yes  Ambulation 1  Surface: level tile  Device: Rolling Walker  Assistance: Contact guard assistance  Quality of Gait: Shuffling steps, nbos, decreased brinda heel strike, ff posture  Distance: 30'  Comments: at end of ambulation pt becomes unsafe while attempting to adjust his bedsheets while standing at [address] education given on safety concerns pt verbalizes understanding. Activity Tolerance  Activity Tolerance: Patient Tolerated treatment well          ASSESSMENT   Assessment: Increased time to complete tasks this date, pt able to increase ambulation distance, pt shows good carryover of cues from previous sessions.      Discharge Recommendations:  Continue to assess pending progress, Patient would benefit from continued therapy after discharge    Goals  Short term goals  Short term goal 1: Pt to complete HEP with indep  Long term goals  Long term goal 1: Pt to complete bed mobility with indep  Long term goal 2: Pt to complete transfers with indep  Long term goal 3: Pt to ambulate 150ft with LRD and indep  Long term goal 4: TUG <15.0 seconds to demo decreased risk for falls    PLAN    Times per week: 3-6  Safety Devices  Type of devices: All fall risk precautions in place, Bed alarm in place, Call light within reach, Left in bed     AMPAC (6 CLICK) BASIC MOBILITY  AM-PAC Inpatient Mobility Raw Score : 16      Therapy Time   Individual   Time In 1036   Time Out 1059   Minutes 23      Gait: 13  BM/Trsf: 135 [address], [facility], 12/09/20 at 11:10 AM         Definitions for assistance levels  Independent = pt does not require any physical supervision or assistance from another person for activity completion. Device may be needed.   Stand by assistance = pt requires verbal cues or instructions from another person, close to but not touching, to perform the activity  Minimal assistance= pt performs 75% or more of the activity; assistance is required to complete the activity  Moderate assistance= pt performs 50% of the activity; assistance is required to complete the activity  Maximal assistance = pt performs 25% of the activity; assistance is required to complete the activity  Dependent = pt requires total physical assistance to accomplish the task

## 2020-12-09 NOTE — PROGRESS NOTES
Infectious Disease     Patient Name: Monique Tomlinson  Date: 12/9/2020  YOB: 1950  Medical Record Number: [id number]      Postoperative abscess of neck  UTI with Proteus      MRI was found to have severe canal stenosis C3-4-5 6 and 7  Patient went to surgery on 11/23/2020 cervical stenosis mild myelopathy underwent cervical laminoplasty reconstruction    Patient is now having significant cervical pain has had increased movement in upper extremities    CT scan was performed on 12/2/2020 showing fluid collection     MRI  MRI 7.5 X 4.5 X 3.5 CM POSTOPERATIVE FLUID COLLECTION  THIS DOES NOT APPEAR TO COMMUNICATE TO THE SPINAL CANAL, AND THERE IS NO EPIDURAL COLLECTION OR OTHER COMPLICATION IDENTIFIED. Component  Collected  Lab    Body Fluid Culture, Sterile  12/03/2020  1:58 PM  [address] [facility]    No growth 24 hours   No growth in 48 hours           Review of Systems   Respiratory: Negative. Cardiovascular: Negative. Gastrointestinal: Negative. Musculoskeletal: Positive for back pain and neck pain. Neurological: Positive for weakness. Physical Exam   Neck: Neck supple. Cardiovascular:   No murmur heard. Pulmonary/Chest: Effort normal and breath sounds normal. No respiratory distress. He has no wheezes. He has no rales. Abdominal: Soft. Bowel sounds are normal. He exhibits no distension and no mass. There is no abdominal tenderness. There is no rebound. Blood pressure 137/73, pulse 93, temperature 98.8 °F (37.1 °C), temperature source Oral, resp. rate 18, height 6' 1\" (1.854 m), weight 147 lb 4.3 oz (66.8 kg), SpO2 98 %.       .   Lab Results   Component Value Date    WBC 10.4 12/09/2020    HGB 7.9 (L) 12/09/2020    HCT 25.0 (L) 12/09/2020    MCV 78.2 (L) 12/09/2020     (H) 12/09/2020     Lab Results   Component Value Date     12/09/2020    K 4.0 12/09/2020    K 4.2 11/25/2020     12/09/2020    CO2 27 12/09/2020    BUN 9 12/09/2020 CREATININE 0.46 12/09/2020    GLUCOSE 92 12/09/2020    CALCIUM 8.9 12/09/2020          MRI CERVICAL SPINE W WO CONTRAST : 12/3/2020         COMPARISON: Cervical spine CT with contrast 12/2/2020         REASON FOR EXAMINATION:   possible cervical spinal abscess           TECHNIQUE: Multiplanar MR imaging of the cervical spine was performed before and after intravenous administration of approximately 15 mL of MultiHance gadolinium contrast.         FINDINGS:           Postsurgical changes of posterior decompression left laminoplasty at C3-C5 are again noted.         A mildly marginally enhancing organized fluid collection within the posterior soft tissues measures approximately 4.5 x 3.5 x 7.5 cm (transverse, AP and sagittal dimension).         This fluid collection does not appear to be contiguous with the spinal canal, and there is no residual spinal stenosis, epidural fluid collection or other significant changes identified.         Multilevel degenerative changes of the cervical spine described in detail on prior studies are again noted.                        Impression         APPROXIMATELY 7.5 X 4.5 X 3.5 CM POSTOPERATIVE FLUID COLLECTION AFTER RECENT CERVICAL LAMINOPLASTY.         INFECTION SHOULD BE EXCLUDED CLINICALLY.         THIS DOES NOT APPEAR TO COMMUNICATE TO THE SPINAL CANAL, AND THERE IS NO EPIDURAL COLLECTION OR OTHER COMPLICATION IDENTIFIED. Culture, Body Fluid [[id number]]   Collected: 12/03/20 1358    Order Status: Completed  Specimen:  Body Fluid  Updated: 12/04/20 1057     Body Fluid Culture, Sterile  No growth 24 hours          ASSESSMENT:  PLAN:    Postoperative abscess of neck     cefepime x 4 weeks    UTI with Proteus

## 2020-12-09 NOTE — CARE COORDINATION
Social work note: Joann Faith,  for [facility] updated LSW they can accept patient today preferably earlier in the day. She asked for a COVID test as well. NEIDA Nunez updated. Updated Sister Ana Charles and garnered permission to give her phone number to [facility] if requested. [facility] had asked if family can bring chemo pill. LSW following to completed DC to [facility]. Need [id number] and transport set. Electronically signed by RUBÉN Nogueira on 12/9/2020 at 10:46 AM     : [id number] completed for [facility]. Electronically signed by RUBÉN Nogueira on 12/9/2020 at 11:56 AM     1236: Set up [address] via cot for 2:30pm. RN aware. Rena aware. Still need COVID test. RN is working on this. Electronically signed by RUBÉN Nogueira on 12/9/2020 at 12:37 PM    1301: Was notified by [facility] that the patient was denied for Skilled. Alerted Dr. Claire Coleman and NEIDA Nunez. Electronically signed by RUBÉN Nogueira on 12/9/2020 at 1:02 PM     1325: [facility] reports they may know at 2pm if [facility] will approve SNF. They are appealing the denial. [address] on hold but can be reopened for today. Electronically signed by RUBÉN Nogueira on 12/9/2020 at 1:34 PM     1558: In collaboration with NEIDA Nunez, met with patient to let him know [facility] Medicare is making the decision [facility] FLOR Corcoran aware. Did speak with patient again about [facility] with assist from Ana Charles his sister who was agreeable to learn IV antibiotics and his lady friend to assist him. Patient did not want LSW or RN to call his sister at this time.  Electronically signed by RUBÉN Nogueira on 12/9/2020 at 4:00 PM

## 2020-12-10 VITALS
TEMPERATURE: 99.1 F | RESPIRATION RATE: 18 BRPM | SYSTOLIC BLOOD PRESSURE: 129 MMHG | BODY MASS INDEX: 19.52 KG/M2 | DIASTOLIC BLOOD PRESSURE: 72 MMHG | WEIGHT: 147.27 LBS | HEIGHT: 73 IN | HEART RATE: 36 BPM | OXYGEN SATURATION: 98 %

## 2020-12-10 LAB
ALBUMIN SERPL-MCNC: 3 G/DL (ref 3.5–4.6)
ANION GAP SERPL CALCULATED.3IONS-SCNC: 12 MEQ/L (ref 9–15)
BASOPHILS ABSOLUTE: 0.1 K/UL (ref 0–0.2)
BASOPHILS RELATIVE PERCENT: 0.4 %
BUN BLDV-MCNC: 9 MG/DL (ref 8–23)
CALCIUM SERPL-MCNC: 9.8 MG/DL (ref 8.5–9.9)
CHLORIDE BLD-SCNC: 95 MEQ/L (ref 95–107)
CO2: 26 MEQ/L (ref 20–31)
CREAT SERPL-MCNC: 0.41 MG/DL (ref 0.7–1.2)
EOSINOPHILS ABSOLUTE: 0.2 K/UL (ref 0–0.7)
EOSINOPHILS RELATIVE PERCENT: 1.3 %
GFR AFRICAN AMERICAN: >60
GFR NON-AFRICAN AMERICAN: >60
GLUCOSE BLD-MCNC: 86 MG/DL (ref 70–99)
HCT VFR BLD CALC: 25.6 % (ref 42–52)
HEMOGLOBIN: 8 G/DL (ref 14–18)
LYMPHOCYTES ABSOLUTE: 2.6 K/UL (ref 1–4.8)
LYMPHOCYTES RELATIVE PERCENT: 18.9 %
MAGNESIUM: 1.8 MG/DL (ref 1.7–2.4)
MCH RBC QN AUTO: 24.8 PG (ref 27–31.3)
MCHC RBC AUTO-ENTMCNC: 31.5 % (ref 33–37)
MCV RBC AUTO: 78.6 FL (ref 80–100)
MONOCYTES ABSOLUTE: 1.5 K/UL (ref 0.2–0.8)
MONOCYTES RELATIVE PERCENT: 11.3 %
NEUTROPHILS ABSOLUTE: 9.2 K/UL (ref 1.4–6.5)
NEUTROPHILS RELATIVE PERCENT: 68.1 %
PDW BLD-RTO: 19.3 % (ref 11.5–14.5)
PHOSPHORUS: 3 MG/DL (ref 2.3–4.8)
PLATELET # BLD: 591 K/UL (ref 130–400)
POTASSIUM SERPL-SCNC: 4.5 MEQ/L (ref 3.4–4.9)
RBC # BLD: 3.25 M/UL (ref 4.7–6.1)
SODIUM BLD-SCNC: 133 MEQ/L (ref 135–144)
WBC # BLD: 13.6 K/UL (ref 4.8–10.8)

## 2020-12-10 PROCEDURE — 6370000000 HC RX 637 (ALT 250 FOR IP): Performed by: NEUROLOGICAL SURGERY

## 2020-12-10 PROCEDURE — 80069 RENAL FUNCTION PANEL: CPT

## 2020-12-10 PROCEDURE — 6370000000 HC RX 637 (ALT 250 FOR IP): Performed by: NURSE PRACTITIONER

## 2020-12-10 PROCEDURE — APPSS30 APP SPLIT SHARED TIME 16-30 MINUTES: Performed by: STUDENT IN AN ORGANIZED HEALTH CARE EDUCATION/TRAINING PROGRAM

## 2020-12-10 PROCEDURE — 6370000000 HC RX 637 (ALT 250 FOR IP): Performed by: INTERNAL MEDICINE

## 2020-12-10 PROCEDURE — 6360000002 HC RX W HCPCS: Performed by: INTERNAL MEDICINE

## 2020-12-10 PROCEDURE — 2580000003 HC RX 258: Performed by: INTERNAL MEDICINE

## 2020-12-10 PROCEDURE — 99232 SBSQ HOSP IP/OBS MODERATE 35: CPT | Performed by: NURSE PRACTITIONER

## 2020-12-10 PROCEDURE — 6370000000 HC RX 637 (ALT 250 FOR IP): Performed by: PSYCHIATRY & NEUROLOGY

## 2020-12-10 PROCEDURE — 83735 ASSAY OF MAGNESIUM: CPT

## 2020-12-10 PROCEDURE — 2580000003 HC RX 258: Performed by: NEUROLOGICAL SURGERY

## 2020-12-10 PROCEDURE — 99232 SBSQ HOSP IP/OBS MODERATE 35: CPT | Performed by: INTERNAL MEDICINE

## 2020-12-10 PROCEDURE — 6370000000 HC RX 637 (ALT 250 FOR IP): Performed by: PHYSICAL MEDICINE & REHABILITATION

## 2020-12-10 PROCEDURE — 85025 COMPLETE CBC W/AUTO DIFF WBC: CPT

## 2020-12-10 PROCEDURE — 99231 SBSQ HOSP IP/OBS SF/LOW 25: CPT | Performed by: PSYCHIATRY & NEUROLOGY

## 2020-12-10 RX ORDER — LIDOCAINE 4 G/G
3 PATCH TOPICAL DAILY
Qty: 1 BOX | Refills: 2 | Status: ON HOLD | OUTPATIENT
Start: 2020-12-11 | End: 2020-12-18 | Stop reason: HOSPADM

## 2020-12-10 RX ORDER — MORPHINE SULFATE 15 MG/1
15 TABLET, FILM COATED, EXTENDED RELEASE ORAL EVERY 8 HOURS
Qty: 90 TABLET | Refills: 0 | Status: ON HOLD | OUTPATIENT
Start: 2020-12-10 | End: 2020-12-12

## 2020-12-10 RX ORDER — GABAPENTIN 300 MG/1
300 CAPSULE ORAL 3 TIMES DAILY
Qty: 90 CAPSULE | Refills: 0 | Status: SHIPPED | OUTPATIENT
Start: 2020-12-10 | End: 2021-01-09

## 2020-12-10 RX ORDER — TIZANIDINE 4 MG/1
4 TABLET ORAL EVERY 8 HOURS
Status: DISCONTINUED | OUTPATIENT
Start: 2020-12-10 | End: 2020-12-10 | Stop reason: HOSPADM

## 2020-12-10 RX ORDER — OXYCODONE AND ACETAMINOPHEN 10; 325 MG/1; MG/1
1 TABLET ORAL EVERY 4 HOURS PRN
Qty: 60 TABLET | Refills: 0 | Status: ON HOLD | OUTPATIENT
Start: 2020-12-10 | End: 2020-12-12

## 2020-12-10 RX ORDER — TIZANIDINE 4 MG/1
4 TABLET ORAL EVERY 8 HOURS
Qty: 30 TABLET | Refills: 0 | Status: ON HOLD | OUTPATIENT
Start: 2020-12-10 | End: 2020-12-18 | Stop reason: HOSPADM

## 2020-12-10 RX ADMIN — MORPHINE SULFATE 15 MG: 15 TABLET, FILM COATED, EXTENDED RELEASE ORAL at 12:24

## 2020-12-10 RX ADMIN — MORPHINE SULFATE 15 MG: 15 TABLET, FILM COATED, EXTENDED RELEASE ORAL at 05:21

## 2020-12-10 RX ADMIN — FOLIC ACID 1 MG: 1 TABLET ORAL at 09:22

## 2020-12-10 RX ADMIN — OXYCODONE HYDROCHLORIDE AND ACETAMINOPHEN 1 TABLET: 10; 325 TABLET ORAL at 08:30

## 2020-12-10 RX ADMIN — GABAPENTIN 300 MG: 300 CAPSULE ORAL at 09:22

## 2020-12-10 RX ADMIN — GABAPENTIN 300 MG: 300 CAPSULE ORAL at 12:24

## 2020-12-10 RX ADMIN — Medication 10 ML: at 12:25

## 2020-12-10 RX ADMIN — BISACODYL 5 MG: 5 TABLET, COATED ORAL at 09:22

## 2020-12-10 RX ADMIN — Medication 10 ML: at 09:22

## 2020-12-10 RX ADMIN — CEFEPIME 2 G: 2 INJECTION, POWDER, FOR SOLUTION INTRAVENOUS at 01:27

## 2020-12-10 RX ADMIN — TIZANIDINE 4 MG: 4 TABLET ORAL at 12:24

## 2020-12-10 RX ADMIN — CEFEPIME 2 G: 2 INJECTION, POWDER, FOR SOLUTION INTRAVENOUS at 12:24

## 2020-12-10 RX ADMIN — OXYCODONE HYDROCHLORIDE AND ACETAMINOPHEN 1 TABLET: 10; 325 TABLET ORAL at 13:36

## 2020-12-10 RX ADMIN — OXYCODONE HYDROCHLORIDE AND ACETAMINOPHEN 1 TABLET: 10; 325 TABLET ORAL at 01:18

## 2020-12-10 RX ADMIN — POLYETHYLENE GLYCOL 3350 17 G: 17 POWDER, FOR SOLUTION ORAL at 09:22

## 2020-12-10 RX ADMIN — Medication 100 MG: at 09:22

## 2020-12-10 ASSESSMENT — ENCOUNTER SYMPTOMS
BACK PAIN: 1
WHEEZING: 0
VOICE CHANGE: 0
TROUBLE SWALLOWING: 0
CHOKING: 0
VOMITING: 0
DIARRHEA: 0
RECTAL PAIN: 0
ABDOMINAL DISTENTION: 0
CONSTIPATION: 0
NAUSEA: 0
COUGH: 0
ANAL BLEEDING: 0
COLOR CHANGE: 0
BACK PAIN: 0
STRIDOR: 0
EYES NEGATIVE: 1
CHEST TIGHTNESS: 0
EYE DISCHARGE: 0
SHORTNESS OF BREATH: 0
RESPIRATORY NEGATIVE: 1
BLOOD IN STOOL: 0
ABDOMINAL PAIN: 0
GASTROINTESTINAL NEGATIVE: 1
APNEA: 0
SORE THROAT: 0

## 2020-12-10 ASSESSMENT — PAIN SCALES - GENERAL
PAINLEVEL_OUTOF10: 8
PAINLEVEL_OUTOF10: 10
PAINLEVEL_OUTOF10: 10
PAINLEVEL_OUTOF10: 8
PAINLEVEL_OUTOF10: 8
PAINLEVEL_OUTOF10: 5

## 2020-12-10 NOTE — PROGRESS NOTES
Assumed care of patient this evening. Pt resting in bed and denies any pain at this time. Offered to reposition pt at this time, but pt refused stating he was comfortable at this time.   Electronically signed by Judith Bravo RN on 12/9/2020 at 8:29 PM

## 2020-12-10 NOTE — DISCHARGE SUMMARY
Hospital Medicine Discharge Summary    Gayle Wilkinson  :  1950  MRN:  [id number]    Admit date:  2020  Discharge date:  12/10/20    Admitting Physician:  No admitting provider for patient encounter. Primary Care Physician:  Mary Robin MD      Discharge Diagnoses:    Principal Problem:    Stenosis of cervical spine with myelopathy [facility])  Active Problems:    Palpitations    Anemia    Generalized weakness    Rectal mass    Acute cystitis    Metastatic cancer ([address]    Fall at home, initial encounter    Severe malnutrition ([address]    Falls, initial encounter    Myelopathy ([address]    Malignant tumor of prostate ([address]    Retention of urine    Urethritis    Ataxic gait    Neurogenic bladder    Sepsis due to gram-negative UTI ([address]    Abscess  Resolved Problems:    * No resolved hospital problems. [facility] Course:   See my associates discharge summary from  for the further details of the early parts of the patient's admission: In summary Kailash Phillips is a 79-year-old male with metastatic prostate cancer who was admitted with autonomic dysfunction cervical spine stenosis who underwent surgical laminoplasty and reconstruction . He developed a Proteus UTI which was treated with antibiotic coverage however postoperatively developed an abscess around the cervical recall surgery site. Patient was started on IV antibiotics and surgical debridement was performed. At this point in time the patient's fermín status has improved however he remains intermittent straight cathing for urinary retention. He is able to ambulate 30 feet with physical therapy today and he will discharged with 21 more days of antibiotic treatment with cefepime. Family will be educated on antibiotic administration as well as straight cathing can also be discharged with home health care patient will also be discharged with flight of care given his severe chronic pain and nutrition recommendations given his cachexia.   Patient was seen evaluate by oncology who noted he had a relatively poor prognosis however he was not able to be placed in skilled facility due to insurance are refusing placement. The patient has been seen by neurology who notes is continued steady improvement as well patient will follow up with oncology as scheduled and is as noted to be discharged with home health care. Patient be followed up with outpatient with palliative care for pain control and will follow up with infectious disease as scheduled. Principal Problem:    Stenosis of cervical spine with myelopathy (HCC)  Active Problems:    Palpitations    Anemia    Generalized weakness    Rectal mass    Acute cystitis    Metastatic cancer ([address]    Fall at home, initial encounter    Severe malnutrition ([address]    Falls, initial encounter    Myelopathy ([facility]    Malignant tumor of prostate ([facility]    Retention of urine    Urethritis    Ataxic gait    Neurogenic bladder    Sepsis due to gram-negative UTI ([address]    Abscess  Resolved Problems:    * No resolved hospital problems. *      Patient was seen by the following consultants while admitted to [facility]:   Consults:  IP CONSULT TO ONCOLOGY  IP CONSULT TO REHAB/TCU ADMISSION COORDINATOR  IP CONSULT TO NEUROLOGY  IP CONSULT TO NEUROSURGERY  IP CONSULT TO PALLIATIVE CARE  IP CONSULT TO CASE MANAGEMENT  IP CONSULT TO SOCIAL WORK  IP CONSULT TO NEUROSURGERY  IP CONSULT TO INFECTIOUS DISEASES  IP CONSULT TO RADIOLOGY  IP CONSULT TO ONCOLOGY  IP CONSULT TO HOME CARE NEEDS    Physical Exam:   General appearance: No apparent distress but thin and malnourished and generally ill-appearing  HEENT: Pupils equal, round, and reactive to light. Conjunctivae/corneas clear. Bitemporal muscle wasting  Neck: Supple, with full range of motion. No jugular venous distention. Trachea midline.   Hattori: Decreased breath sounds with no wheezes rales or rhonchi  Cardiovascular: Regular rate and rhythm with normal S1/S2 without murmurs, rubs or gallops. Abdomen: Soft, non-tender, non-distended with normal bowel sounds. Musculoskeletal: No clubbing, cyanosis or edema bilaterally. Full range of motion without deformity. Decreased strength but intact range of motion  Skin: Skin color, texture, turgor normal.  No rashes or lesions. Neuro: Decreased peripheral sensation but strength and range of motion intact no clear focal deficits  Psychiatric: Alert and oriented, thought content appropriate, normal insight  Peripheral Pulses: +2 palpable, equal bilaterally    Significant Diagnostic Studies:  CT c spine 12/2  Narrative    EXAM: CT CERVICAL SPINE W CONTRAST         COMPARISON: None available         REASON FOR EXAMINATION:  Fatigue, mild, neck pain, numbness and weakness.         TECHNIQUE: Multiple contiguous axial CT images of the cervical spine were obtained. Multiplanar reformats performed.         FINDINGS:           Postsurgical changes of posterior decompression at C3-C5. Soft tissue fluid collection within the subcutaneous soft tissues at the C1-C5 level measures approximately 4 cm in AP dimension by 4 cm in transverse dimension by 7 cm in craniocaudal dimension    and demonstrates rim enhancement. Multiple small foci of air are present within this fluid collection. This fluid collection does not appear to be contiguous with the spinal canal on CT. Multilevel degenerative changes of the cervical spine again    identified.              Impression         Rim-enhancing fluid collection containing small foci of air within the subcutaneous soft tissues at the C1-C5 levels measures approximately 4 cm in AP dimension by 4 cm in transverse dimension by 7 cm in craniocaudal dimension and is most concerning for    abscess.  Evaluation for extension of infection of the spinal canal is suboptimal by CT and MRI of the cervical spine with contrast is recommended.         All CT scans at this facility use dose modulation, iterative reconstruction, and/or weight based dosing when appropriate to reduce radiation dose to as low as reasonably achievable. MRI C SPine 12/3  APPROXIMATELY 7.5 X 4.5 X 3.5 CM POSTOPERATIVE FLUID COLLECTION AFTER RECENT CERVICAL LAMINOPLASTY.         INFECTION SHOULD BE EXCLUDED CLINICALLY.         THIS DOES NOT APPEAR TO COMMUNICATE TO THE SPINAL CANAL, AND THERE IS NO EPIDURAL COLLECTION OR OTHER COMPLICATION IDENTIFIED.               Discharge Medications:       Sathya Horowitz [facility] Medication Instructions     Printed on:12/10/20 5130   Medication Information                      cefepime (MAXIPIME) infusion  Infuse 1,000 mg intravenously every 8 hours Compound per protocol             enzalutamide (XTANDI) 40 MG capsule  Take 160 mg by mouth daily             gabapentin (NEURONTIN) 300 MG capsule  Take 1 capsule by mouth 3 times daily for 30 days. leuprolide (LUPRON) 7.5 MG injection  Inject 7.5 mg into the muscle every 28 days             lidocaine 4 % external patch  Place 3 patches onto the skin daily             mirtazapine (REMERON) 7.5 MG tablet  Take 1 tablet by mouth nightly             Misc. Devices (ROLLATOR ULTRA-LIGHT) MISC  1 Device by Does not apply route daily             ondansetron (ZOFRAN) 4 MG tablet  Take 1 tablet by mouth daily as needed for Nausea or Vomiting             oxyCODONE (OXY-IR) 15 MG immediate release tablet  Take 1 tablet by mouth every 6 hours as needed for Pain (moderate to severe pain) for up to 120 days.              polyethylene glycol (GLYCOLAX) 17 g packet  Take 17 g by mouth daily as needed for Constipation             Polyethylene Glycol 3350 [address]OW[address]  Take by mouth             prochlorperazine (COMPAZINE) 10 MG tablet  Take 10 mg by mouth every 6 hours as needed             sennosides-docusate sodium (SENOKOT-S) 8.6-50 MG tablet  Take 2 tablets by mouth daily as needed for Constipation             tiZANidine (ZANAFLEX) 4 MG tablet  Take 1 tablet by mouth every 8 hours                 Disposition:   Discharged to Home. Any HHC needs that were indicated and/or required as been addressed and set up by Social Work. Condition at discharge: Pt was medically stable at the time of discharge. Activity: activity as tolerated    Total time taken for discharging this patient: 40 minutes. Greater than 70% of time was spent focused exclusively on this patient. Time was taken to review chart, discuss plans with consultants, reconciling medications, discussing plan answering questions with patient.      Dillno Heck  12/10/2020, 2:47 PM

## 2020-12-10 NOTE — PROGRESS NOTES
Tour up a norco prescription from Dr. Thee Lake because [id number] [facility] care taking care of pain medications witnessed by [place].

## 2020-12-10 NOTE — PROGRESS NOTES
Palliative Care Progress Note  Patient: Earle Betts  Gender: male  YOB: 1950  Unit/Bed: Y145/M837-55  Code Status: Full Code  Inpatient Treatment Team: Treatment Team: Attending Provider: Pradeep Sheridan DO; Consulting Physician: Vincent Osorio DO; Consulting Physician: Reyna Trevino MD; Consulting Physician: Jozef Samuel MD; Consulting Physician: Juan F Simon MD; Utilization Reviewer: Alexia Davis RN; Consulting Physician: Emelyn Bailon MD; LPN: Juliette Doyle LPN; Registered Nurse: Enrike Arias RN; : Fay He; Patient Care Tech: Jitendra Vazquez; : Rosalva Mcmillan, RN; : Dede Mcdowell RN  Admit Date:  11/18/2020    Chief Complaint: Pain    History of Presenting Illness:      Earle Betts is a 79 y.o. male on hospital day 20 with a history of androgen independent prostate cancer. Known liver and bone mets, severe malnutrition, falls, urine retention self caths, gastric ulcer with hemorrhage. Presented with progressive quadriparesis weight loss inability to ambulate. His legs will not support him anymore. Every few weeks he is getting weaker and weaker. He is now unable to ambulate. Found to have severe spinal canal stenosis at C3-C7 with cord atrophy      Laminoplasty with reconstruction was done. Observed laying in bed. He is pleasant and cooperative. He is in NAD. Neck and arm pain much improved. Complains of increase in left leg pain radiating from hip to toes that started about three hours ago. Negative edema in leg, pedal pulses palpated, cap refill in foot under 3 seconds. Pain described as a \" burning\". He has been sitting in the same position for several hours as he is fearful to move due to the pain. He last took an oxycodone 3 hours ago. He can feel me touching him, motor strength is 4/5. No facial droop, arm droop, aphasia, headache, or nausea.       I helped him reposition his leg and place a pillow between his knees and the pain improved. Encouraged him to particpate in PT          He is negative for constipation or diarrhea, no blood in stool. Appetite stable. Negative significant emotional, spiritual, or sleep disturbance. The plan is for discharge to a SNF, but has been difficult to place him due to covid and his insurance restrictions. As he has been here so long we reviewed Advance directives    Review of Systems:       Review of Systems   Constitutional: Negative for activity change, appetite change, chills, diaphoresis, fatigue, fever and unexpected weight change. HENT: Negative for drooling, hearing loss, mouth sores, sore throat, trouble swallowing and voice change. Eyes: Negative for discharge and visual disturbance. Respiratory: Negative for apnea, cough, choking, chest tightness, shortness of breath, wheezing and stridor. Cardiovascular: Negative for chest pain, palpitations and leg swelling. Gastrointestinal: Negative for abdominal distention, abdominal pain, anal bleeding, blood in stool, constipation, diarrhea, nausea, rectal pain and vomiting. Genitourinary: Negative for difficulty urinating, dysuria, enuresis, frequency and hematuria. Musculoskeletal: Positive for arthralgias, back pain, gait problem, neck pain and neck stiffness. Negative for joint swelling and myalgias. Skin: Negative for color change, pallor, rash and wound. Allergic/Immunologic: Negative for food allergies and immunocompromised state. Neurological: Positive for weakness. Negative for dizziness, tremors, seizures, syncope, facial asymmetry, speech difficulty, light-headedness, numbness and headaches. Hematological: Negative for adenopathy. Does not bruise/bleed easily. Psychiatric/Behavioral: Negative for agitation, behavioral problems, confusion, decreased concentration, dysphoric mood, hallucinations, self-injury, sleep disturbance and suicidal ideas.  The patient is not nervous/anxious and is not hyperactive. Physical Examination:       /72   Pulse (!) 36   Temp 99.1 °F (37.3 °C) (Oral)   Resp 18   Ht 6' 1\" (1.854 m)   Wt 147 lb 4.3 oz (66.8 kg)   SpO2 98%   BMI 19.43 kg/m²    Physical Exam  Constitutional:       General: He is not in acute distress. Appearance: He is underweight. He is not diaphoretic. HENT:      Head: Normocephalic and atraumatic. Right Ear: External ear normal.      Left Ear: External ear normal.      Nose: Nose normal.      Mouth/Throat:      Pharynx: No oropharyngeal exudate. Eyes:      General: No scleral icterus. Right eye: No discharge. Left eye: No discharge. Conjunctiva/sclera: Conjunctivae normal.      Pupils: Pupils are equal, round, and reactive to light. Neck:      Musculoskeletal: Normal range of motion and neck supple. Thyroid: No thyromegaly. Vascular: No JVD. Trachea: No tracheal deviation. Cardiovascular:      Rate and Rhythm: Normal rate and regular rhythm. Heart sounds: Normal heart sounds. Pulmonary:      Effort: Pulmonary effort is normal. No respiratory distress. Breath sounds: Normal breath sounds. No stridor. No wheezing or rales. Chest:      Chest wall: No tenderness. Abdominal:      General: Bowel sounds are normal. There is no distension. Palpations: Abdomen is soft. There is no mass. Tenderness: There is no abdominal tenderness. There is no guarding or rebound. Musculoskeletal: Normal range of motion. General: No tenderness or deformity. Lymphadenopathy:      Cervical: No cervical adenopathy. Skin:     General: Skin is warm and dry. Findings: No erythema or rash. Neurological:      Mental Status: He is alert and oriented to person, place, and time. Cranial Nerves: No cranial nerve deficit. Coordination: Coordination normal.   Psychiatric:         Behavior: Behavior normal.         Thought Content:  Thought content normal. Judgment: Judgment normal.         Allergies:      No Known Allergies    Medications:      Current Facility-Administered Medications   Medication Dose Route Frequency Provider Last Rate Last Dose    tiZANidine (ZANAFLEX) tablet 4 mg  4 mg Oral Q8H Louie Nemo, APRN - CNP        sodium chloride flush 0.9 % injection 10 mL  10 mL Intravenous 2 times per day Martinez Credit, DO   10 mL at 12/10/20 8792    sodium chloride flush 0.9 % injection 10 mL  10 mL Intravenous PRN Martinez Credit, DO        oxyCODONE-acetaminophen (PERCOCET)  MG per tablet 1 tablet  1 tablet Oral Q4H PRN Flower Sorrel, APRN - CNP   1 tablet at 12/10/20 0830    sodium chloride (PF) 0.9 % injection 10 mL  10 mL Intravenous Once Al Carrillo MD        cefepime (MAXIPIME) 2 g IVPB minibag  2 g Intravenous Q12H Jozef Samuel MD   Stopped at 12/10/20 0157    [Held by provider] HYDROmorphone (DILAUDID) injection 1 mg  1 mg Intravenous Q3H PRN Flower Sorrel, APRN - CNP   1 mg at 12/02/20 1840    gabapentin (NEURONTIN) capsule 300 mg  300 mg Oral TID Louie Nemo, APRN - CNP   300 mg at 12/10/20 7233    sodium chloride flush 0.9 % injection 10 mL  10 mL Intravenous 2 times per day Al Carrillo MD   10 mL at 12/07/20 1030    sodium chloride flush 0.9 % injection 10 mL  10 mL Intravenous PRN Al Carrillo MD        bisacodyl (DULCOLAX) EC tablet 5 mg  5 mg Oral Daily Al Carrillo MD   5 mg at 12/10/20 7109    magnesium hydroxide (MILK OF MAGNESIA) 400 MG/5ML suspension 30 mL  30 mL Oral Daily PRN Al Carrillo MD        polyethylene glycol [facility]) packet 17 g  17 g Oral Daily PRN Al Carrillo MD        ondansetron (ZOFRAN-ODT) disintegrating tablet 4 mg  4 mg Oral Q8H PRN Al Carrillo MD        Or    [facility]ron [facility]) injection 4 mg  4 mg Intravenous Q6H PRN Al Carrillo MD        morphine (MS CONTIN) extended release tablet 15 mg  15 mg Oral Q8H Lindsay Hansen, JOYCE - CNP   15 mg at 12/10/20 7883    mirtazapine (REMERON) tablet 7.5 mg  7.5 mg Oral Nightly JOYCE Szymanski - CNP   7.5 mg at 12/09/20 2106    Vitamin D (CHOLECALCIFEROL) tablet 2,000 Units  2,000 Units Oral Dinner Nicole Scullin, DO   2,000 Units at 12/09/20 1611    cyanocobalamin injection 1,000 mcg  1,000 mcg Intramuscular Weekly Nicole Scullin, DO   1,000 mcg at 12/08/20 0829    coenzyme Q10 capsule 100 mg  100 mg Oral Daily Nicole Scullin, DO   100 mg at 12/10/20 3483    lidocaine 4 % external patch 3 patch  3 patch Transdermal Daily Nicole Scullin, DO   3 patch at 11/30/20 0900    acetaminophen (TYLENOL) tablet 650 mg  650 mg Oral Q4H PRN Nicole Scullin, DO        Or    acetaminophen (TYLENOL) suppository 650 mg  650 mg Rectal Q6H PRN Nicole Scullin, DO        polyethylene glycol (GLYCOLAX) packet 17 g  17 g Oral Daily Alphonsus Decree, DO   17 g at 12/10/20 2621    senna (SENOKOT) tablet 8.6 mg  1 tablet Oral Nightly Alphonsus Decree, DO   8.6 mg at 92/44/57 4125    folic acid (FOLVITE) tablet 1 mg  1 mg Oral Daily Praneeth Rush MD   1 mg at 12/10/20 7053    promethazine (PHENERGAN) tablet 12.5 mg  12.5 mg Oral Q6H PRN Ana Gillette MD        Or    ondansetron [facility]) injection 4 mg  4 mg Intravenous Q6H PRN Ana Gillette MD   4 mg at 11/18/20 1843    enoxaparin (LOVENOX) injection 40 mg  40 mg Subcutaneous Daily Ana Gillette MD   40 mg at 12/09/20 2107       History:       PMHx:  Past Medical History:   Diagnosis Date    PAF (paroxysmal atrial fibrillation) ([facility] [address]     ON XARELTO    Prostate cancer ([facility] [address] 11/2019       PSHx:  Past Surgical History:   Procedure Laterality Date    CERVICAL LAMINECTOMY N/A 11/23/2020    CERVICAL C3-4,4-5,5-6  LAMINOPLASTY WITH RECONSTRUCTION performed by Mynor Escobar MD at [facility] COLONOSCOPY N/A 9/29/2020    COLONOSCOPY DIAGNOSTIC performed by Willy Nunez MD at [facility] [address] Right 11/2018    hip    UPPER GASTROINTESTINAL ENDOSCOPY N/A 9/29/2020    EGD DIAGNOSTIC ONLY performed by Jitendra Thorne MD at [facility] [address] Hx:  Social History     Socioeconomic History    Marital status: Single     Spouse name: None    Number of children: None    Years of education: None    Highest education level: None   Occupational History    Occupation: retired [employer]   Social Needs    Financial resource strain: Not very hard    Food insecurity     Worry: Never true     Inability: Never true    Transportation needs     Medical: No     Non-medical: No   Tobacco Use    Smoking status: Never Smoker    Smokeless tobacco: Never Used    Tobacco comment: denies   Substance and Sexual Activity    Alcohol use: Not Currently     Frequency: 2-4 times a month     Comment: denies    Drug use: No     Comment: denies    Sexual activity: None   Lifestyle    Physical activity     Days per week: 0 days     Minutes per session: 0 min    Stress:  Only a little   Relationships    Social connections     Talks on phone: None     Gets together: None     Attends Holiness service: None     Active member of club or organization: None     Attends meetings of clubs or organizations: None     Relationship status: None    Intimate partner violence     Fear of current or ex partner: No     Emotionally abused: No     Physically abused: No     Forced sexual activity: No   Other Topics Concern    None   Social History Narrative    Retired from [employer] With: Alone    Type of Home: [address] [place], [place] [address]vd: One level    Home Access: Level entry    Bathroom Shower/Tub: Tub/Shower unit    [employer]: [facility]    Transfer Assistance: Independent    Active : No    Patient's  Info: Sister    Additional Comments: Pt. reports he has been having near falls but no falls in the last few weeks, but 11/29/2020    RBCUA 3-5 09/26/2020    BLOODU Negative 11/29/2020    SPECGRAV 1.014 11/29/2020    GLUCOSEU Negative 11/29/2020           FUNCTIONAL ADL´S:     Independent: [ x ] Eating, [   ] Dressing, [   ] Transferring, [   ] Brisa Montilla, [   ] Daniel Safe, [   ] Continence  Dependent   : [  ] Eating, [   ] Dressing, [   ] Transferring, [   ] Brisa Montilla, [   ] Daniel Safe, [   ] Continence  W. assistant : [  ] Eating, [  x ] Dressing, [ x  ] Transferring, [  x ] Toileting, [  x ] Bathing, [   x] Continence    Radiology: Reviewed      No results found. Assessment and plan:    1. Recurrent falls multifactorial in etiology: Orthostatic hypotension suspect due to autonomic dysfunction,spinal stenosis, advanced prostate cancer on chemotherapy, moderate protein calorie malnutrition  Follow with primary medical team  Continue PT    2. Prostate Cancer with mets  -Oncology Following  -On hormonal therapy with lupron and casodex    3. Anorexia and Malnutrition  -Start Mirtazapine 7.5 mg po daily at HS  -Continue nutritional Supplementation    4. Spinal Stenosis with weakness and paraesthesia  -Neurosurgery following  -Currently taking Gabapentin 300 mg TID    5. Pain rt neoplasm and spinal stenosis  - Continue to  utilize the medication in place for breakthrough pain. Discussed need for breakthrough pain medication with nursing staff.  He should medicate one hour prior to PT so he can have maximum participation.   -Continue MS Contin to 15 mg po every 8 hours  -Will increase Percocet to 10/325 mg po every 4 hours for moderate to severe breakthrough pain  -Continue Gabapentin to 300 mg po tid  - Start Zanaflex 4 mg po every 8 hours  -Will start heat pack to affected area and Lidocaine patch.  - Encouraged him to exercise as much as possible      Constipation  -Continue current POC  -Increase fiber and fluid in diet    -Advance Care Planning  Remains FULL CODE        -Goals of Care Discussion:      Cameron Espinoza does realize his cancer

## 2020-12-10 NOTE — PROGRESS NOTES
Did teaching with sister Adalid about picc line care and flushing. She is very nervous but she did return demonstration and said she can do this. Sent home saline flushes and alcohol and alcohol caps. Did let amita and Adalid know if he feels bad or they can not due it to come back to the ER per Dr. Jaqui Dean.

## 2020-12-10 NOTE — PROGRESS NOTES
Physical Therapy Missed Treatment   Facility/Department: [facility] MED SURG O187/N565-66    NAME: Allie Navarrete    : 1950 (79 y.o.)  MRN: [id number]    Account: [de-identified]  Gender: male    Chart reviewed, attempted PT at 1200. Patient unavailable 2° to:    [] Hold per nsg request    [x] Pt declined  PLEASE document that you notified nsg. [x] Nsg notified   [] Other notified    Pt stated he cant move his LLE. NP was notified      Will attempt PT treatment again at earliest convenience.       Electronically signed by Armando Champion PTA on 12/10/20 at 12:05 PM EST

## 2020-12-10 NOTE — PROGRESS NOTES
[facility] Daily Progress Note  Name: Verónica Nieto  Age: 79 y.o. Gender: male  CodeStatus: Full Code  Allergies: No Known Allergies    Chief Complaint:Fatigue    Primary Care Provider: Jerrell Singer MD  InpatientTreatment Team: Treatment Team: Attending Provider: Lin Victor DO; Consulting Physician: Ramila Albarado DO; Consulting Physician: Manolo Lofton MD; Consulting Physician: Joselyn Orourke MD; Consulting Physician: Josie Schwartz MD; Utilization Reviewer: Padmini Reyna RN; Consulting Physician: Ellie Carlson MD; LPN: Lindsay Tam LPN; Registered Nurse: Anel López, NEIDA; : Horace Sheffield; Patient Care Tech: Peyman Nohelia; : Heather Soto, RN; : Desire Mora RN  Admission Date: 11/18/2020      HPI   Patient seen and examined for neurology follow-up for cervical myelopathy status post cervical laminoplasty with postoperative cervical spinal abscess status post drainage in the setting of metastatic cancer.  Cervical spinal fluid culture with no growth for 72 hours.  Patient with chronic urinary retention requiring self straight catheterization.  Patient currently alert and oriented x3, no acute distress, cooperative.  Continues to have some neck pain but overall reports that it is improved and responds well to pain medication. Kristyn Moralez continues to have some paresthesias into bilateral fingers and feet. Patient continues to ambulate with assistance during PT. Neck ROM and and overall strength is improving. He remains a fall risk and has generalized weakness from deconditioning. Patient denies HA, vision changes, diplopia, trouble swallowing, fever/chills, SOB, chest pain, or N/V/D    Neck pain better,  Pt despodent as he could not do rehab  Vitals:    12/10/20 0740   BP: 129/72   Pulse: (!) 36   Resp: 18   Temp: 99.1 °F (37.3 °C)   SpO2: 98%      Review of Systems   Constitutional: Negative for chills and fever.    HENT: Negative for congestion, sore throat and trouble swallowing. Eyes: Negative. Cardiovascular: Negative. Gastrointestinal: Negative for diarrhea, nausea and vomiting. Musculoskeletal: Positive for gait problem and neck pain. Skin: Negative. Neurological: Positive for weakness. Negative for dizziness, tremors, seizures, speech difficulty, light-headedness and headaches. Psychiatric/Behavioral: Negative. neck pain  Physical Exam  Vitals signs and nursing note reviewed. Constitutional:       Comments: underweight   HENT:      Head: Normocephalic and atraumatic. Eyes:      Conjunctiva/sclera: Conjunctivae normal.   Cardiovascular:      Rate and Rhythm: Normal rate and regular rhythm. Pulmonary:      Effort: Pulmonary effort is normal.   Musculoskeletal: Normal range of motion. Skin:     General: Skin is warm and dry. Neurological:      Mental Status: He is alert and oriented to person, place, and time. Coordination: Finger-Nose-Finger Test normal.   Psychiatric:         Mood and Affect: Mood normal.         Speech: Speech normal.       Neurologic Exam     Mental Status   Oriented to person, place, and time.    Speech: speech is normal     Gait, Coordination, and Reflexes     Coordination   Finger to nose coordination: normal    Tremor   Resting tremor: absent  Intention tremor: absent  Action tremor: absent    Gait ataxia,  And neck pain      Medications:  Reviewed    Infusion Medications:   Scheduled Medications:    tiZANidine  4 mg Oral Q8H    sodium chloride flush  10 mL Intravenous 2 times per day    sodium chloride (PF)  10 mL Intravenous Once    cefepime  2 g Intravenous Q12H    gabapentin  300 mg Oral TID    sodium chloride flush  10 mL Intravenous 2 times per day    bisacodyl  5 mg Oral Daily    morphine  15 mg Oral Q8H    mirtazapine  7.5 mg Oral Nightly    Vitamin D  2,000 Units Oral Dinner    cyanocobalamin  1,000 mcg Intramuscular Weekly    coenzyme Q10  100 mg Oral Daily hemorrhage:  None. Ventricular system: Ventricles mildly enlarged. Sulci mildly prominent. Basal Cisterns:  Normal.    Cerebral Parenchyma: Bilateral symmetric periventricular areas decreased attenuation. Midline Shift:  None. Cerebellum:  Normal.     Paranasal sinuses and mastoid air cells:  Normal.    Visualized Orbits:  Normal.        Impression Impression:    No acute findings. Mild cerebral atrophy. Chronic ischemic white matter disease. All CT scans at this facility use dose modulation, iterative reconstruction, and/or weight based dosing when appropriate to reduce radiation dose to as low as reasonably achievable. No results found for this or any previous visit. No results found for this or any previous visit. Carotid duplex: No results found for this or any previous visit. No results found for this or any previous visit. No results found for this or any previous visit. Echo No results found for this or any previous visit. Assessment/Plan:  Gait ataxia with cervical myelopathy in the presence of metastatic prostate cancer.       Patient was seen by us initially for lower extremity weakness and a complete spine evaluation was done and patient had cervical spinal stenosis which was operated we very called in to evaluate due to his continued weakness and pain.  This is an aftermath of cervical myelopathy and may not improve or may take several weeks to months to improve.  Patient's main issues appears to be pain and requires pain management.      Exam does not show any acute changes, and patient remains areflexic in bilateral upper extremities, with gait ataxia, and weakness. 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      He continues to be a high fall risk.   Patient is more motivated today to participate in PT.     Palliative care has been following patient and adjustments to pain meds have been made.  Discharge planning in process for skilled nursing facility.  Awaiting precertification.     I examined this patient again and I see that he still has significant issues with his lower extremity though these are likely to stay for a while we are not quite quite concerned about a new cord compression at least at this time. Parveen Abril is no fever and there is no suggestion of any infective process that we are worried about in the cervical spine.  This again falls under neurological restriction and therefore close neurological follow-up.  We are contemplating obtaining a CT scan of the neck though given that these findings have not changed to be concerned about a spinal abscess we have not done this.     Patient continues to have pain despite maximizing analgesics.      CT of the cervical spine today shows rim-enhancing fluid collection containing small foci of air within the subcutaneous soft tissues at the C1-C5 levels. Concerning for abscess.     Will order MRI cervical spine with and without contrast and consult neurosurgery (Dr. Shahida Llanes).    pts CT was reviewed / could be abscess/  MRI ordered  Will consult neurosurgery     MRI of cervical spine show 7.5 x 4.5 x 3.5 cm postoperative fluid collection status post recent cervical laminoplasty. Patient had abscess drainage done today with neurosurgery.  Infectious diseases has been consulted. Daniel Weston now on cefepime and vancomycin.  Cultures pending. MRI seen and discussed   Will have neurosurgery to see, findings as suspected clinically     He is making gains in strength and has increased neck ROM S/p abscess drainage.  He is to have a PICC line placed today. Deborah Kuhn is okay to be discharged to rehab from a neurological standpoint.      He  was reexamined today and his neck examination is much better is able to rotate his neck at 40 degrees bilaterally without any major pain which is much better than yesterday.  His strength otherwise still remains the same as above.  He will require rehabilitation. Kristyn Moralez has regressed somewhat since the surgery.  We await his planning for rehabilitation.     Patient stable and would benefit from rehabilitation. Pain and neck ROM continue to improve since abscess drainage. Continue with PT/OT. I have personally performed a face to face diagnostic evaluation on this patient, reviewed all data and investigations, and am the sole provider of all clinical decisions on the neurological status of this patient.  Patient examination continues to improve slowly over days.  We are not expecting any other complication.  Patient is followed by neurology for expected complications of cervical myelopathy and we did discover an abscess during your evaluations which was beneficial to the patient's treatment plan.  We follow him periodically for the same and he may be discharged to a nursing home. Rosemarie Disla still has considerable gait issues which may take 6 months to improve      Patient stable and continues to improve. Given his deconditioning, generalized weakness, and spinal stenosis s/p laminoplasty, he will need continued rehabilitation as an outpatient and monitoring for fall risk. Fall risk remains high and could be extremely detrimental considering his recent surgery and spinal metastasis. He is stable neurologically and okay for discharge. I have personally performed a face to face diagnostic evaluation on this patient, reviewed all data and investigations, and am the sole provider of all clinical decisions on the neurological status of this patient. weakness 4/5 legs but neck better  and reflexes better  Weare trying to get him to reahb and hitting walls,  Pt may be d/ from neuro      Oscar LAWSON Ordoñez MD, [address], American Board of Psychiatry & Neurology  Board Certified in Vascular Neurology  Board Certified in Neuromuscular Medicine  Certified in Neurorehabilitation          Collaborating physicians: Dr Kimberly Ordoñez    Electronically signed by Gideon Giron

## 2020-12-10 NOTE — PROGRESS NOTES
Spoke with patient about straight catheterization. I ask him if he was able to do it himself he said \"I have been doing it for 7 months I can do it myself!' I did ask if he had anyone else to do it for him and he said no.

## 2020-12-10 NOTE — PROGRESS NOTES
Infectious Disease     Patient Name: Suad Leroy  Date: 12/10/2020  YOB: 1950  Medical Record Number: [id number]      Postoperative abscess of neck  UTI with Proteus      MRI was found to have severe canal stenosis C3-4-5 6 and 7  Patient went to surgery on 11/23/2020 cervical stenosis mild myelopathy underwent cervical laminoplasty reconstruction    Patient is now having significant cervical pain has had increased movement in upper extremities    CT scan was performed on 12/2/2020 showing fluid collection     MRI  MRI 7.5 X 4.5 X 3.5 CM POSTOPERATIVE FLUID COLLECTION  THIS DOES NOT APPEAR TO COMMUNICATE TO THE SPINAL CANAL, AND THERE IS NO EPIDURAL COLLECTION OR OTHER COMPLICATION IDENTIFIED. Component  Collected  Lab    Body Fluid Culture, Sterile  12/03/2020  1:58 PM  [address] [facility]    No growth 24 hours   No growth in 48 hours           Review of Systems   Constitutional: Negative for chills, diaphoresis, fatigue and fever. Respiratory: Negative. Cardiovascular: Negative. Gastrointestinal: Negative. Musculoskeletal: Negative for back pain and neck pain. Neurological: Negative for weakness. Physical Exam   Neck: Neck supple. Cardiovascular:   No murmur heard. Pulmonary/Chest: Effort normal and breath sounds normal. No respiratory distress. He has no wheezes. He has no rales. Abdominal: Soft. Bowel sounds are normal. He exhibits no distension and no mass. There is no abdominal tenderness. There is no rebound. Blood pressure 129/72, pulse (!) 36, temperature 99.1 °F (37.3 °C), temperature source Oral, resp. rate 18, height 6' 1\" (1.854 m), weight 147 lb 4.3 oz (66.8 kg), SpO2 98 %.       .   Lab Results   Component Value Date    WBC 13.6 (H) 12/10/2020    HGB 8.0 (L) 12/10/2020    HCT 25.6 (L) 12/10/2020    MCV 78.6 (L) 12/10/2020     (H) 12/10/2020     Lab Results   Component Value Date     12/10/2020    K 4.5 12/10/2020    K 4.2 11/25/2020    CL 95 12/10/2020    CO2 26 12/10/2020    BUN 9 12/10/2020    CREATININE 0.41 12/10/2020    GLUCOSE 86 12/10/2020    CALCIUM 9.8 12/10/2020          MRI CERVICAL SPINE W WO CONTRAST : 12/3/2020         COMPARISON: Cervical spine CT with contrast 12/2/2020         REASON FOR EXAMINATION:   possible cervical spinal abscess           TECHNIQUE: Multiplanar MR imaging of the cervical spine was performed before and after intravenous administration of approximately 15 mL of MultiHance gadolinium contrast.         FINDINGS:           Postsurgical changes of posterior decompression left laminoplasty at C3-C5 are again noted.         A mildly marginally enhancing organized fluid collection within the posterior soft tissues measures approximately 4.5 x 3.5 x 7.5 cm (transverse, AP and sagittal dimension).         This fluid collection does not appear to be contiguous with the spinal canal, and there is no residual spinal stenosis, epidural fluid collection or other significant changes identified.         Multilevel degenerative changes of the cervical spine described in detail on prior studies are again noted.                        Impression         APPROXIMATELY 7.5 X 4.5 X 3.5 CM POSTOPERATIVE FLUID COLLECTION AFTER RECENT CERVICAL LAMINOPLASTY.         INFECTION SHOULD BE EXCLUDED CLINICALLY.         THIS DOES NOT APPEAR TO COMMUNICATE TO THE SPINAL CANAL, AND THERE IS NO EPIDURAL COLLECTION OR OTHER COMPLICATION IDENTIFIED. Culture, Body Fluid [[id number]]   Collected: 12/03/20 1358    Order Status: Completed  Specimen:  Body Fluid  Updated: 12/04/20 1057     Body Fluid Culture, Sterile  No growth 24 hours          ASSESSMENT:  PLAN:    Postoperative abscess of neck     cefepime x 4 weeks    UTI with Proteus

## 2020-12-10 NOTE — PROGRESS NOTES
Comprehensive Nutrition Assessment    Type and Reason for Visit:  Reassess    Nutrition Recommendations/Plan:   Continue Current Diet, Continue Oral Nutrition Supplement    Nutrition Assessment:  Severe malnutrition continues, intake appears stable at this time. Unable to determine acceptance to supplements, pt was in pain at time of visit, will continue to provide ONS's and monitor    Malnutrition Assessment:  Malnutrition Status:  Severe malnutrition    Context:  Chronic Illness     Findings of the 6 clinical characteristics of malnutrition:  Energy Intake:  7 - 75% or less estimated energy requirements for 1 month or longer  Weight Loss:  7 - 10% over 6 months     Body Fat Loss:  1 - Mild body fat loss Buccal region, Orbital(per visual assessment)   Muscle Mass Loss:  1 - Mild muscle mass loss Temples (temporalis), Clavicles (pectoralis & deltoids), Thigh (quadraceps)(per visual assessment)  Fluid Accumulation:  No significant fluid accumulation     Strength:  Not Performed    Estimated Daily Nutrient Needs:  Energy (kcal):  1681-3072 kcals @ 28-30 kcal/kg; Weight Used for Energy Requirements:  Current(67 kg)     Protein (g):   g protein @ 1.3-1.5 g/kg; Weight Used for Protein Requirements:  Current(67 kg)        Fluid (ml/day):  ~1900 ( 28 ml/kg);  Method Used for Fluid Requirements:  ml/Kg      Nutrition Related Findings:  BM (12/9), awaiting placement      Wounds:  Surgical Incision       Current Nutrition Therapies:    Dietary Nutrition Supplements: Clear Liquid Oral Supplement  Dietary Nutrition Supplements: Frozen Oral Supplement  DIET GENERAL; Carb Control: 4 carbs/meal (approximate 1800 kcals/day)  Dietary Nutrition Supplements: Standard High Calorie Oral Supplement    Anthropometric Measures:  · Height: 6' 1\" (185.4 cm)  · Current Body Weight: (No new wt)   · Admission Body Weight: 147 lb (66.7 kg)(stated)    · Usual Body Weight: 177 lb (80.3 kg)((2/20), 182# ( 6/20), 166# ( 9/20)) · Ideal Body Weight: 184 lbs; % Ideal Body Weight  80%   · BMI: 19.4  · BMI Categories: Underweight (BMI less than 22) age over 72       Nutrition Diagnosis:   · Severe malnutrition, In context of chronic, non-illness related related to catabolic illness, inadequate protein-energy intake as evidenced by poor intake prior to admission, BMI, weight loss greater than or equal to 10% in 6 months    Nutrition Interventions:   Food and/or Nutrient Delivery:  Continue Current Diet, Continue Oral Nutrition Supplement  Nutrition Education/Counseling:  No recommendation at this time   Coordination of Nutrition Care:  No recommendation at this time    Goals:  PO intake >75% meals and ONS.        Nutrition Monitoring and Evaluation:   Food/Nutrient Intake Outcomes:  Food and Nutrient Intake, Supplement Intake  Physical Signs/Symptoms Outcomes:  Weight, Nutrition Focused Physical Findings, Meal Time Behavior     Electronically signed by Candi Bush RD, LD on 12/10/20 at 2:07 PM EST

## 2020-12-10 NOTE — CARE COORDINATION
Social work note: Communicated with Berta Nielsen at [facility]. She has not heard back from [facility] and will be calling LSW when she hears from them. Electronically signed by RUBÉN Azul on 12/10/2020 at 9:49 AM     1030: Spoke with patient to update that the insurance company has not yet approved [facility]. Discussed a back up plan. He was agreeable to have LSW reach out to his sister Maggi Cast again. Patient had had [facility] but patient asked \"What about my pain? \". Will let  Umer Cruz know patient asking on this. Electronically signed by RUBÉN Azul on 12/10/2020 at 11:10 AM     1300: Spoke with Dr. Brenda Jackson about patient's case. Brought to his attention the patient's function in PT as of yesterday. Let Dr. Brenda Jackson know patient's sister Maggi Cast was agreeable to assisting with IV antibiotics after learning. Did confirm at 18 with sister Maggi Cast that she is still agreeable to assist patient. Electronically signed by RUBÉN Azul on 12/10/2020 at 1:53 PM     1400: Dr Brenda Jackson reports he spoke with patient and he is agreeable to go home. CM [employer] working on [facility]. Patient had been getting [facility] prior to hospitalization. At [phone], Spoke with [facility] admissions and there was no update on [facility] denying or approving his stay. Let her know that a back up plan for [facility] is being discussed with patients. She will keep case open in case they hear from [facility] this date. Electronically signed by RUBÉN Azul on 12/10/2020 at 2:09 PM     [phone]: Spoke with Sister Maggi Cast. She can come in at 4pm for teaching from RN. NEIDA Mosley aware and will review IV antibiotics with her. Electronically signed by RUBÉN Azul on 12/10/2020 at 2:48 PM     [phone]: Met with patient. He is aware sister will be here at 4pm. [employer], form signed by patient for the jocelyn Woods from [employer] verified that [facility] covered walkers. Patient has walker.  Electronically signed

## 2020-12-10 NOTE — PROGRESS NOTES
Patient almost fell getting into the wheelchair to go home. Offered to get an ambulance to take him home telling him that insurance may not cover it. Jeramiefabio Arshad declined and said he can get to the apartment. Sister Adalid there also and wanted to do what he wanted. Jay Pickett took him downstairs and got him into the car.

## 2020-12-10 NOTE — PROGRESS NOTES
1700: Notified patient with  Rambo Smith that he would not be going to [facility] at this time due to insurance denial. Offered to call family to notify them but patient declined.  Electronically signed by Ariel Sanchez RN on 12/9/20 at 7:06 PM EST

## 2020-12-10 NOTE — DISCHARGE INSTR - OTHER ORDERS
Picc line care. Change dressing every one week. Take off blue sterile alcohol cap clean with alcohol, flush with one 10 cc normal saline (start stop method) and apply antibiotic tubing. Let run the antibiotic. After antibiotic is finished remove iv tubing and flush with 10cc normal saline flush( start stop method). Apply new blue alcohol cap. Straight catheterize 6 times a day and as needed.

## 2020-12-10 NOTE — CARE COORDINATION
1353-IV ABX RX FAXED TO [phone], ALSO SPOKE WITH LAISHA TO ENSURE NURSE AVAILABLE TOMORROW FOR IV ABX. DR. Belia Edge TO ORDER WHEELED [place]. PATIENT OFFERED HOSPITAL BED FOR HOME BUT DECLINES AT THIS TIME. [address] [facility], THEY HAVE ACCEPTED PATIENT FOR START OF IN AM 12/11, WILL NEED ORDER FAXED.  5483 SPOKE WITH DEMETRIS WITH [facility] CONCERNING DELIVERY OF ABX. DEMETRIS WILL REACH OUT TO GINA, PATIENT'S SISTER TO COORDINATE CARE.

## 2020-12-11 ENCOUNTER — HOSPITAL ENCOUNTER (INPATIENT)
Age: 70
LOS: 6 days | Discharge: HOME | DRG: 682 | End: 2020-12-18
Attending: EMERGENCY MEDICINE | Admitting: INTERNAL MEDICINE
Payer: MEDICARE

## 2020-12-11 ENCOUNTER — APPOINTMENT (OUTPATIENT)
Dept: CT IMAGING | Age: 70
DRG: 682 | End: 2020-12-11
Payer: MEDICARE

## 2020-12-11 ENCOUNTER — TELEPHONE (OUTPATIENT)
Dept: FAMILY MEDICINE CLINIC | Age: 70
End: 2020-12-11

## 2020-12-11 ENCOUNTER — APPOINTMENT (OUTPATIENT)
Dept: GENERAL RADIOLOGY | Age: 70
DRG: 682 | End: 2020-12-11
Payer: MEDICARE

## 2020-12-11 LAB
ALBUMIN SERPL-MCNC: 3 G/DL (ref 3.5–4.6)
ALP BLD-CCNC: 355 U/L (ref 35–104)
ALT SERPL-CCNC: 23 U/L (ref 0–41)
ANION GAP SERPL CALCULATED.3IONS-SCNC: 14 MEQ/L (ref 9–15)
AST SERPL-CCNC: 123 U/L (ref 0–40)
BACTERIA: ABNORMAL /HPF
BILIRUB SERPL-MCNC: 0.7 MG/DL (ref 0.2–0.7)
BILIRUBIN URINE: NEGATIVE
BLOOD, URINE: NEGATIVE
BUN BLDV-MCNC: 28 MG/DL (ref 8–23)
CALCIUM SERPL-MCNC: 8.5 MG/DL (ref 8.5–9.9)
CHLORIDE BLD-SCNC: 92 MEQ/L (ref 95–107)
CLARITY: CLEAR
CO2: 24 MEQ/L (ref 20–31)
COLOR: ABNORMAL
CREAT SERPL-MCNC: 1.76 MG/DL (ref 0.7–1.2)
EPITHELIAL CELLS, UA: ABNORMAL /HPF (ref 0–5)
GFR AFRICAN AMERICAN: 46.5
GFR NON-AFRICAN AMERICAN: 38.4
GLOBULIN: 3.7 G/DL (ref 2.3–3.5)
GLUCOSE BLD-MCNC: 136 MG/DL (ref 70–99)
GLUCOSE URINE: NEGATIVE MG/DL
HYALINE CASTS: ABNORMAL /HPF (ref 0–5)
KETONES, URINE: NEGATIVE MG/DL
LEUKOCYTE ESTERASE, URINE: ABNORMAL
NITRITE, URINE: NEGATIVE
PH UA: 6.5 (ref 5–9)
POTASSIUM SERPL-SCNC: 6 MEQ/L (ref 3.4–4.9)
PROTEIN UA: 30 MG/DL
RBC UA: ABNORMAL /HPF (ref 0–5)
SODIUM BLD-SCNC: 130 MEQ/L (ref 135–144)
SPECIFIC GRAVITY UA: 1.02 (ref 1–1.03)
TOTAL PROTEIN: 6.7 G/DL (ref 6.3–8)
URINE REFLEX TO CULTURE: ABNORMAL
UROBILINOGEN, URINE: 0.2 E.U./DL
WBC UA: ABNORMAL /HPF (ref 0–5)

## 2020-12-11 PROCEDURE — 87040 BLOOD CULTURE FOR BACTERIA: CPT

## 2020-12-11 PROCEDURE — 81001 URINALYSIS AUTO W/SCOPE: CPT

## 2020-12-11 PROCEDURE — 36415 COLL VENOUS BLD VENIPUNCTURE: CPT

## 2020-12-11 PROCEDURE — 85025 COMPLETE CBC W/AUTO DIFF WBC: CPT

## 2020-12-11 PROCEDURE — 71045 X-RAY EXAM CHEST 1 VIEW: CPT

## 2020-12-11 PROCEDURE — 51702 INSERT TEMP BLADDER CATH: CPT

## 2020-12-11 PROCEDURE — 93005 ELECTROCARDIOGRAM TRACING: CPT | Performed by: EMERGENCY MEDICINE

## 2020-12-11 PROCEDURE — 99285 EMERGENCY DEPT VISIT HI MDM: CPT

## 2020-12-11 PROCEDURE — 70450 CT HEAD/BRAIN W/O DYE: CPT

## 2020-12-11 PROCEDURE — 80053 COMPREHEN METABOLIC PANEL: CPT

## 2020-12-11 NOTE — TELEPHONE ENCOUNTER
Ashwini Sanders just DC'd to home last night. He has already fallen because of weakness. He is to straight cath himself. She is concerned because the weakness is causing him to contaminate the area and she is worried about a UTI. Ashwini Sanders only has 1 Zofran. Can you reorder this for him? Please send to [facility]. They would also like an order for Boost to [facility]. Sister is considering sending him to a rehab center. They do not have enough people in the home to take care of him. They are going to try it for the weekend and see how it goes.   JUDITH

## 2020-12-12 ENCOUNTER — APPOINTMENT (OUTPATIENT)
Dept: MRI IMAGING | Age: 70
DRG: 682 | End: 2020-12-12
Payer: MEDICARE

## 2020-12-12 PROBLEM — N17.9 AKI (ACUTE KIDNEY INJURY) (HCC): Status: ACTIVE | Noted: 2020-12-12

## 2020-12-12 PROBLEM — E87.5 HYPERKALEMIA: Status: ACTIVE | Noted: 2020-12-12

## 2020-12-12 PROBLEM — R41.82 AMS (ALTERED MENTAL STATUS): Status: ACTIVE | Noted: 2020-12-12

## 2020-12-12 PROBLEM — D72.829 LEUKOCYTOSIS: Status: ACTIVE | Noted: 2020-12-12

## 2020-12-12 LAB
ALBUMIN SERPL-MCNC: 2.7 G/DL (ref 3.5–4.6)
ALP BLD-CCNC: 323 U/L (ref 35–104)
ALT SERPL-CCNC: 28 U/L (ref 0–41)
ANION GAP SERPL CALCULATED.3IONS-SCNC: 14 MEQ/L (ref 9–15)
ANION GAP SERPL CALCULATED.3IONS-SCNC: 15 MEQ/L (ref 9–15)
ANISOCYTOSIS: ABNORMAL
AST SERPL-CCNC: 155 U/L (ref 0–40)
BASOPHILS ABSOLUTE: 0 K/UL (ref 0–0.2)
BASOPHILS ABSOLUTE: 0.1 K/UL (ref 0–0.2)
BASOPHILS RELATIVE PERCENT: 0.4 %
BASOPHILS RELATIVE PERCENT: 0.5 %
BILIRUB SERPL-MCNC: 0.7 MG/DL (ref 0.2–0.7)
BUN BLDV-MCNC: 31 MG/DL (ref 8–23)
BUN BLDV-MCNC: 33 MG/DL (ref 8–23)
CALCIUM SERPL-MCNC: 7.9 MG/DL (ref 8.5–9.9)
CALCIUM SERPL-MCNC: 8.7 MG/DL (ref 8.5–9.9)
CHLORIDE BLD-SCNC: 95 MEQ/L (ref 95–107)
CHLORIDE BLD-SCNC: 96 MEQ/L (ref 95–107)
CO2: 23 MEQ/L (ref 20–31)
CO2: 24 MEQ/L (ref 20–31)
CREAT SERPL-MCNC: 1.26 MG/DL (ref 0.7–1.2)
CREAT SERPL-MCNC: 1.49 MG/DL (ref 0.7–1.2)
EKG ATRIAL RATE: 100 BPM
EKG ATRIAL RATE: 150 BPM
EKG P AXIS: 43 DEGREES
EKG P-R INTERVAL: 162 MS
EKG Q-T INTERVAL: 288 MS
EKG Q-T INTERVAL: 352 MS
EKG QRS DURATION: 78 MS
EKG QRS DURATION: 86 MS
EKG QTC CALCULATION (BAZETT): 454 MS
EKG QTC CALCULATION (BAZETT): 481 MS
EKG R AXIS: -4 DEGREES
EKG R AXIS: -6 DEGREES
EKG T AXIS: -7 DEGREES
EKG T AXIS: 37 DEGREES
EKG VENTRICULAR RATE: 100 BPM
EKG VENTRICULAR RATE: 168 BPM
EOSINOPHILS ABSOLUTE: 0 K/UL (ref 0–0.7)
EOSINOPHILS ABSOLUTE: 0 K/UL (ref 0–0.7)
EOSINOPHILS RELATIVE PERCENT: 0 %
EOSINOPHILS RELATIVE PERCENT: 0 %
GFR AFRICAN AMERICAN: 56.3
GFR AFRICAN AMERICAN: >60
GFR NON-AFRICAN AMERICAN: 46.5
GFR NON-AFRICAN AMERICAN: 56.5
GLOBULIN: 3.8 G/DL (ref 2.3–3.5)
GLUCOSE BLD-MCNC: 129 MG/DL (ref 70–99)
GLUCOSE BLD-MCNC: 138 MG/DL (ref 70–99)
HCT VFR BLD CALC: 23.3 % (ref 42–52)
HCT VFR BLD CALC: 24.9 % (ref 42–52)
HEMOGLOBIN: 7.4 G/DL (ref 14–18)
HEMOGLOBIN: 8.1 G/DL (ref 14–18)
HYPOCHROMIA: ABNORMAL
LYMPHOCYTES ABSOLUTE: 1.8 K/UL (ref 1–4.8)
LYMPHOCYTES ABSOLUTE: 2 K/UL (ref 1–4.8)
LYMPHOCYTES RELATIVE PERCENT: 10.8 %
LYMPHOCYTES RELATIVE PERCENT: 11 %
MCH RBC QN AUTO: 24.3 PG (ref 27–31.3)
MCH RBC QN AUTO: 24.5 PG (ref 27–31.3)
MCHC RBC AUTO-ENTMCNC: 31.8 % (ref 33–37)
MCHC RBC AUTO-ENTMCNC: 32.4 % (ref 33–37)
MCV RBC AUTO: 75.6 FL (ref 80–100)
MCV RBC AUTO: 76.3 FL (ref 80–100)
MONOCYTES ABSOLUTE: 2 K/UL (ref 0.2–0.8)
MONOCYTES ABSOLUTE: 2 K/UL (ref 0.2–0.8)
MONOCYTES RELATIVE PERCENT: 10.5 %
MONOCYTES RELATIVE PERCENT: 12.1 %
NEUTROPHILS ABSOLUTE: 12.6 K/UL (ref 1.4–6.5)
NEUTROPHILS ABSOLUTE: 14.2 K/UL (ref 1.4–6.5)
NEUTROPHILS RELATIVE PERCENT: 76.6 %
NEUTROPHILS RELATIVE PERCENT: 79 %
PDW BLD-RTO: 19.4 % (ref 11.5–14.5)
PDW BLD-RTO: 19.8 % (ref 11.5–14.5)
PLATELET # BLD: 499 K/UL (ref 130–400)
PLATELET # BLD: 523 K/UL (ref 130–400)
PLATELET SLIDE REVIEW: ABNORMAL
POIKILOCYTES: ABNORMAL
POTASSIUM SERPL-SCNC: 5.1 MEQ/L (ref 3.4–4.9)
POTASSIUM SERPL-SCNC: 5.2 MEQ/L (ref 3.4–4.9)
POTASSIUM SERPL-SCNC: 5.3 MEQ/L (ref 3.4–4.9)
POTASSIUM SERPL-SCNC: 5.5 MEQ/L (ref 3.4–4.9)
RBC # BLD: 3.05 M/UL (ref 4.7–6.1)
RBC # BLD: 3.29 M/UL (ref 4.7–6.1)
SARS-COV-2, NAAT: NOT DETECTED
SARS-COV-2, NAAT: NOT DETECTED
SMUDGE CELLS: 3.8
SODIUM BLD-SCNC: 133 MEQ/L (ref 135–144)
SODIUM BLD-SCNC: 134 MEQ/L (ref 135–144)
TARGET CELLS: ABNORMAL
TOTAL PROTEIN: 6.5 G/DL (ref 6.3–8)
WBC # BLD: 16.4 K/UL (ref 4.8–10.8)
WBC # BLD: 18 K/UL (ref 4.8–10.8)

## 2020-12-12 PROCEDURE — 6370000000 HC RX 637 (ALT 250 FOR IP): Performed by: INTERNAL MEDICINE

## 2020-12-12 PROCEDURE — 6360000002 HC RX W HCPCS: Performed by: EMERGENCY MEDICINE

## 2020-12-12 PROCEDURE — 6360000004 HC RX CONTRAST MEDICATION: Performed by: INTERNAL MEDICINE

## 2020-12-12 PROCEDURE — A9579 GAD-BASE MR CONTRAST NOS,1ML: HCPCS | Performed by: INTERNAL MEDICINE

## 2020-12-12 PROCEDURE — 2580000003 HC RX 258: Performed by: NURSE PRACTITIONER

## 2020-12-12 PROCEDURE — 96366 THER/PROPH/DIAG IV INF ADDON: CPT

## 2020-12-12 PROCEDURE — 84132 ASSAY OF SERUM POTASSIUM: CPT

## 2020-12-12 PROCEDURE — 36415 COLL VENOUS BLD VENIPUNCTURE: CPT

## 2020-12-12 PROCEDURE — 96365 THER/PROPH/DIAG IV INF INIT: CPT

## 2020-12-12 PROCEDURE — 2580000003 HC RX 258: Performed by: EMERGENCY MEDICINE

## 2020-12-12 PROCEDURE — 96375 TX/PRO/DX INJ NEW DRUG ADDON: CPT

## 2020-12-12 PROCEDURE — 6360000002 HC RX W HCPCS: Performed by: NURSE PRACTITIONER

## 2020-12-12 PROCEDURE — U0002 COVID-19 LAB TEST NON-CDC: HCPCS

## 2020-12-12 PROCEDURE — 85025 COMPLETE CBC W/AUTO DIFF WBC: CPT

## 2020-12-12 PROCEDURE — 2500000003 HC RX 250 WO HCPCS: Performed by: EMERGENCY MEDICINE

## 2020-12-12 PROCEDURE — 70553 MRI BRAIN STEM W/O & W/DYE: CPT

## 2020-12-12 PROCEDURE — 2500000003 HC RX 250 WO HCPCS: Performed by: INTERNAL MEDICINE

## 2020-12-12 PROCEDURE — 1210000000 HC MED SURG R&B

## 2020-12-12 PROCEDURE — 80053 COMPREHEN METABOLIC PANEL: CPT

## 2020-12-12 PROCEDURE — 99232 SBSQ HOSP IP/OBS MODERATE 35: CPT | Performed by: INTERNAL MEDICINE

## 2020-12-12 RX ORDER — DILTIAZEM HYDROCHLORIDE 5 MG/ML
20 INJECTION INTRAVENOUS ONCE
Status: COMPLETED | OUTPATIENT
Start: 2020-12-12 | End: 2020-12-12

## 2020-12-12 RX ORDER — SODIUM CHLORIDE 0.9 % (FLUSH) 0.9 %
10 SYRINGE (ML) INJECTION PRN
Status: DISCONTINUED | OUTPATIENT
Start: 2020-12-12 | End: 2020-12-18 | Stop reason: HOSPADM

## 2020-12-12 RX ORDER — SODIUM POLYSTYRENE SULFONATE 15 G/60ML
15 SUSPENSION ORAL; RECTAL ONCE
Status: DISCONTINUED | OUTPATIENT
Start: 2020-12-12 | End: 2020-12-12

## 2020-12-12 RX ORDER — NALOXONE HYDROCHLORIDE 1 MG/ML
2 INJECTION INTRAMUSCULAR; INTRAVENOUS; SUBCUTANEOUS ONCE
Status: COMPLETED | OUTPATIENT
Start: 2020-12-12 | End: 2020-12-12

## 2020-12-12 RX ORDER — PROMETHAZINE HYDROCHLORIDE 12.5 MG/1
12.5 TABLET ORAL EVERY 6 HOURS PRN
Status: DISCONTINUED | OUTPATIENT
Start: 2020-12-12 | End: 2020-12-18 | Stop reason: HOSPADM

## 2020-12-12 RX ORDER — SODIUM CHLORIDE 0.9 % (FLUSH) 0.9 %
10 SYRINGE (ML) INJECTION EVERY 12 HOURS SCHEDULED
Status: DISCONTINUED | OUTPATIENT
Start: 2020-12-12 | End: 2020-12-18 | Stop reason: HOSPADM

## 2020-12-12 RX ORDER — ONDANSETRON 2 MG/ML
4 INJECTION INTRAMUSCULAR; INTRAVENOUS EVERY 6 HOURS PRN
Status: DISCONTINUED | OUTPATIENT
Start: 2020-12-12 | End: 2020-12-18 | Stop reason: HOSPADM

## 2020-12-12 RX ORDER — ACETAMINOPHEN 650 MG/1
650 SUPPOSITORY RECTAL EVERY 6 HOURS PRN
Status: DISCONTINUED | OUTPATIENT
Start: 2020-12-12 | End: 2020-12-14

## 2020-12-12 RX ORDER — TRAMADOL HYDROCHLORIDE 50 MG/1
50 TABLET ORAL EVERY 6 HOURS PRN
Status: DISCONTINUED | OUTPATIENT
Start: 2020-12-12 | End: 2020-12-14

## 2020-12-12 RX ORDER — ATENOLOL 50 MG/1
50 TABLET ORAL DAILY
Status: DISCONTINUED | OUTPATIENT
Start: 2020-12-12 | End: 2020-12-18 | Stop reason: HOSPADM

## 2020-12-12 RX ORDER — 0.9 % SODIUM CHLORIDE 0.9 %
1000 INTRAVENOUS SOLUTION INTRAVENOUS ONCE
Status: COMPLETED | OUTPATIENT
Start: 2020-12-12 | End: 2020-12-12

## 2020-12-12 RX ORDER — ACETAMINOPHEN 325 MG/1
650 TABLET ORAL EVERY 6 HOURS PRN
Status: DISCONTINUED | OUTPATIENT
Start: 2020-12-12 | End: 2020-12-14

## 2020-12-12 RX ORDER — LORAZEPAM 2 MG/ML
2 INJECTION INTRAMUSCULAR ONCE
Status: COMPLETED | OUTPATIENT
Start: 2020-12-12 | End: 2020-12-12

## 2020-12-12 RX ORDER — SODIUM CHLORIDE 9 MG/ML
INJECTION, SOLUTION INTRAVENOUS CONTINUOUS
Status: DISCONTINUED | OUTPATIENT
Start: 2020-12-12 | End: 2020-12-18 | Stop reason: HOSPADM

## 2020-12-12 RX ADMIN — SODIUM CHLORIDE 1000 ML: 9 INJECTION, SOLUTION INTRAVENOUS at 00:26

## 2020-12-12 RX ADMIN — CEFEPIME 1 G: 1 INJECTION, POWDER, FOR SOLUTION INTRAMUSCULAR; INTRAVENOUS at 16:14

## 2020-12-12 RX ADMIN — Medication 10 ML: at 09:04

## 2020-12-12 RX ADMIN — DILTIAZEM HYDROCHLORIDE 20 MG: 5 INJECTION INTRAVENOUS at 10:06

## 2020-12-12 RX ADMIN — LORAZEPAM 2 MG: 2 INJECTION INTRAMUSCULAR; INTRAVENOUS at 00:33

## 2020-12-12 RX ADMIN — TRAMADOL HYDROCHLORIDE 50 MG: 50 TABLET, FILM COATED ORAL at 21:38

## 2020-12-12 RX ADMIN — SODIUM CHLORIDE: 9 INJECTION, SOLUTION INTRAVENOUS at 06:21

## 2020-12-12 RX ADMIN — SODIUM BICARBONATE 50 MEQ: 84 INJECTION INTRAVENOUS at 00:26

## 2020-12-12 RX ADMIN — NALOXONE HYDROCHLORIDE 2 MG: 1 INJECTION PARENTERAL at 00:26

## 2020-12-12 RX ADMIN — Medication 10 ML: at 20:20

## 2020-12-12 RX ADMIN — ATENOLOL 50 MG: 50 TABLET ORAL at 13:22

## 2020-12-12 RX ADMIN — SODIUM CHLORIDE: 9 INJECTION, SOLUTION INTRAVENOUS at 20:28

## 2020-12-12 RX ADMIN — GADOTERIDOL 13 ML: 279.3 INJECTION, SOLUTION INTRAVENOUS at 15:24

## 2020-12-12 RX ADMIN — CEFEPIME 1 G: 1 INJECTION, POWDER, FOR SOLUTION INTRAMUSCULAR; INTRAVENOUS at 08:28

## 2020-12-12 RX ADMIN — ENOXAPARIN SODIUM 40 MG: 40 INJECTION SUBCUTANEOUS at 09:03

## 2020-12-12 RX ADMIN — CALCIUM CHLORIDE 1 G: 100 INJECTION, SOLUTION INTRAVENOUS at 00:51

## 2020-12-12 ASSESSMENT — PAIN SCALES - GENERAL
PAINLEVEL_OUTOF10: 0
PAINLEVEL_OUTOF10: 10

## 2020-12-12 ASSESSMENT — ENCOUNTER SYMPTOMS
RESPIRATORY NEGATIVE: 1
EYES NEGATIVE: 1
GASTROINTESTINAL NEGATIVE: 1

## 2020-12-12 NOTE — FLOWSHEET NOTE
Patient admitted to room 465 from ED. Pt awakens to verbal stimuli, responds with incomprehensible speech, and does not follow commands. Vitals are stable. Pt has dual lumen PICC and patent IV access in left forearm. Fluids started per orders. Basilio draining orange, hazy urine. 450 mls emptied at this time. Labs drawn and sent. Pt is NPO.

## 2020-12-12 NOTE — ED NOTES
Report called to receiving nurse. Tele monitor applied to patient.      Dejan Ivey RN  12/12/20 8654

## 2020-12-12 NOTE — CARE COORDINATION
[facility] Case Management Initial Discharge Assessment    Met with Patient AND SISTER GINA to discuss discharge plan. PCP: Daxa Groves MD                                Date of Last Visit: Lissette Vela. PT JUST DC ON 12/10/20    If no PCP, list provided? N/A    Discharge Planning    Living Arrangements: independently at home    Who do you live with? ALONE    Who helps you with your care:  Family- [address]    If lives at home:     Do you have any barriers navigating in your home? yes - Roddy Santiago. PT VERY WEAK PER FAMILY. [address] ON 12/10/20 WITH [facility] FOR ABX IV. Patient can perform ADL? No- PT IS SUPPOSED TO STRAIGHT CATH, IS UNABLE TO DO THIS. FAMILY STATES PT SHOULD NOT BE HOME ALONE. [facility] ([facility] and [facility] home) [facility]    Dialysis: No    Is transportation available to get to your appointments? Yes- FAMILY    DME Equipment:  yes - WALKER,     Respiratory equipment: None    Respiratory provider:  no     Pharmacy:  yes -     Consult with Medication Assistance Program?  No      Patient agreeable to [facility]? Declined- FAMILY FEELS THIS PLAN IS NOT WORKING, PT NOT SAFE TO RETURN HOME WITH HHC. Patient agreeable to SNF/Rehab? Yes, [facility]    Other discharge needs identified? N/A    Freedom of choice list provided with basic dialogue that supports the patient's individualized plan of care/goals and shares the quality data associated with the providers. Yes    Does Patient Have a High-Risk for Readmission Diagnosis (CHF, PN, MI, COPD)? No      The plan for Transition of Care is related to the following treatment goals:HD STABLE    Initial Discharge Plan? (Note: please see concurrent daily documentation for any updates after initial note).     MAYI     The Patient was provided with choice of any post-acute providers for care and equipment and agrees with discharge plan  Yes

## 2020-12-12 NOTE — ED PROVIDER NOTES
C3-4,4-5,5-6  LAMINOPLASTY WITH RECONSTRUCTION performed by Nico Dewey MD at [address] N/A 9/29/2020    COLONOSCOPY DIAGNOSTIC performed by Lalo Goode MD at [address] Right 11/2018    hip    UPPER GASTROINTESTINAL ENDOSCOPY N/A 9/29/2020    EGD DIAGNOSTIC ONLY performed by Lalo Goode MD at [facility]       Previous Medications    CEFEPIME (MAXIPIME) INFUSION    Infuse 1,000 mg intravenously every 8 hours Compound per protocol    ENZALUTAMIDE (XTANDI) 40 MG CAPSULE    Take 160 mg by mouth daily    GABAPENTIN (NEURONTIN) 300 MG CAPSULE    Take 1 capsule by mouth 3 times daily for 30 days. LEUPROLIDE (LUPRON) 7.5 MG INJECTION    Inject 7.5 mg into the muscle every 28 days    LIDOCAINE 4 % EXTERNAL PATCH    Place 3 patches onto the skin daily    MIRTAZAPINE (REMERON) 7.5 MG TABLET    Take 1 tablet by mouth nightly    MISC. DEVICES (ROLLATOR ULTRA-LIGHT) MISC    1 Device by Does not apply route daily    MORPHINE (MS CONTIN) 15 MG EXTENDED RELEASE TABLET    Take 1 tablet by mouth 2 times daily for 30 days. MORPHINE (MS CONTIN) 15 MG EXTENDED RELEASE TABLET    Take 1 tablet by mouth every 8 hours for 30 days. ONDANSETRON (ZOFRAN) 4 MG TABLET    Take 1 tablet by mouth daily as needed for Nausea or Vomiting    OXYCODONE (OXY-IR) 15 MG IMMEDIATE RELEASE TABLET    Take 1 tablet by mouth every 6 hours as needed for Pain (moderate to severe pain) for up to 120 days. OXYCODONE-ACETAMINOPHEN (PERCOCET)  MG PER TABLET    Take 1 tablet by mouth every 4 hours as needed for Pain (moderate to sever break through pain) for up to 15 days.     POLYETHYLENE GLYCOL (GLYCOLAX) 17 G PACKET    Take 17 g by mouth daily as needed for Constipation    POLYETHYLENE GLYCOL 3350 POWD    Take by mouth    PROCHLORPERAZINE (COMPAZINE) 10 MG TABLET    Take 10 mg by mouth every 6 hours as needed    SENNOSIDES-DOCUSATE SODIUM (SENOKOT-S) 8.6-50 MG TABLET Independent    Active : No    Patient's  Info: Sister    Additional Comments: Pt. reports he has been having near falls but no falls in the last few weeks, but progressively more weakness hte last few weeks       SCREENINGS      @FLOW([id number])@      PHYSICAL EXAM    (up to 7 for level 4, 8 or more for level 5)     ED Triage Vitals [12/11/20 2245]   BP Temp Temp src Pulse Resp SpO2 Height Weight   (!) 182/152 99.4 °F (37.4 °C) -- -- -- -- 6' 1\" (1.854 m) --       Physical Exam  Vitals signs and nursing note reviewed. Constitutional:       Appearance: He is ill-appearing. He is not diaphoretic. Comments: Apparently he was diaphoretic at home. HENT:      Head: Atraumatic. Mouth/Throat:      Comments: Dry mucous membranes  Eyes:      Extraocular Movements:      Right eye: No nystagmus. Left eye: No nystagmus. Pupils: Pupils are equal, round, and reactive to light. Neck:      Musculoskeletal: Neck supple. Comments: No signs of abscess  Cardiovascular:      Rate and Rhythm: Normal rate. Pulmonary:      Effort: Pulmonary effort is normal.   Abdominal:      Palpations: Abdomen is soft. Lymphadenopathy:      Cervical: No cervical adenopathy. Skin:     General: Skin is dry. Capillary Refill: Capillary refill takes less than 2 seconds. Neurological:      Mental Status: He is unresponsive. GCS: GCS eye subscore is 4. GCS verbal subscore is 3. GCS motor subscore is 4. Deep Tendon Reflexes: Reflexes normal.         DIAGNOSTIC RESULTS     EKG: All EKG's are interpreted by the Emergency Department Physician who either signs or Co-signsthis  chart in the absence of a cardiologist.  EKG shows a sinus tachycardia rate of 168. There is secondary ST changes based on the patient's rate. He has a normal axis. There is no signs of acute ischemia. Abnormal EKG.     RADIOLOGY:   Non-plain filmimages such as CT, Ultrasound and MRI are read by the radiologist. Monserrat Tang radiographic images are visualized and preliminarily interpreted by the emergency physician with the below findings:    Portable chest x-ray is unremarkable    Interpretation per the Radiologist below, if available at the time ofthis note:    CT Head WO Contrast    (Results Pending)   XR CHEST PORTABLE    (Results Pending)         ED BEDSIDE ULTRASOUND:   Performed by ED Physician - none    LABS:  Labs Reviewed   COMPREHENSIVE METABOLIC PANEL - Abnormal; Notable for the following components:       Result Value    Sodium 130 (*)     Potassium 6.0 (*)     Chloride 92 (*)     Glucose 136 (*)     BUN 28 (*)     CREATININE 1.76 (*)     GFR Non- 38.4 (*)     GFR  46.5 (*)     Alb 3.0 (*)     Alkaline Phosphatase 355 (*)      (*)     Globulin 3.7 (*)     All other components within normal limits    Narrative:     CALL  [facility] tel. [phone],  K results called to and read back by Kvng Riley, 12/11/2020 23:49, by Brit Florence   CBC WITH AUTO DIFFERENTIAL - Abnormal; Notable for the following components:    WBC 18.0 (*)     RBC 3.29 (*)     Hemoglobin 8.1 (*)     Hematocrit 24.9 (*)     MCV 75.6 (*)     MCH 24.5 (*)     MCHC 32.4 (*)     RDW 19.8 (*)     Platelets 111 (*)     Neutrophils Absolute 14.2 (*)     Monocytes Absolute 2.0 (*)     All other components within normal limits   URINE RT REFLEX TO CULTURE - Abnormal; Notable for the following components:    Color, UA DARK YELLOW (*)     Protein, UA 30 (*)     Leukocyte Esterase, Urine SMALL (*)     All other components within normal limits   MICROSCOPIC URINALYSIS - Abnormal; Notable for the following components:    Bacteria, UA MANY (*)     RBC, UA 3-5 (*)     All other components within normal limits   CULTURE, BLOOD 2   CULTURE, BLOOD 1   COVID-19       All other labs were within normal range or not returned as of this dictation.     EMERGENCY DEPARTMENT COURSE and DIFFERENTIAL DIAGNOSIS/MDM:   Vitals:    Vitals:    12/12/20 0001 12/12/20 0015 12/12/20 0021 12/12/20 0030   BP: (!) 173/135  (!) 182/152 (!) 142/125   Pulse: 98 99 97 113   Resp: 27 15 27 (!) 31   Temp:   99.4 °F (37.4 °C)    TempSrc:   Temporal    SpO2: 97%  98%    Height:            MDM patient with clinical symptoms of early sepsis. His white count is 18. Temperature is currently 99.4. He has acute renal injury with a creatinine of 1.76 and corresponding elevation of BUN consistent with dehydration. He also has new hyperkalemia with a potassium of 6. EKG does not show peaked T waves at this point. Clinical update. Patient discharge instructions from yesterday were reviewed and I saw that he was put on Percocet. I had the nurse give Narcan and the patient woke up immediately and was more verbal.  He still has the acute renal injury with hyperkalemia and will need admission for that. I believe his mental status changes now secondary to accidental drug overdose    CRITICAL CARE TIME   Total Critical Care time was 37 minutes, excluding separately reportableprocedures. There was a high probability of clinicallysignificant/life threatening deterioration in the patient's condition which required my urgent intervention. CONSULTS:  None    PROCEDURES:  Unless otherwise noted below, none     Procedures    FINAL IMPRESSION      1. [place] coma scale total score 9-12, unspecified coma timing    2. Dehydration    3. Acute renal injury ([facility] [address]    4. Narcotic overdose, accidental or unintentional, initial encounter [facility])          DISPOSITION/PLAN   DISPOSITION Decision To Admit 12/12/2020 12:13:03 AM      PATIENT REFERRED TO:  No follow-up provider specified.     DISCHARGE MEDICATIONS:  New Prescriptions    No medications on file          (Please note that portions of this note were completed with a voice recognition program.  Efforts were made to edit the dictations but occasionally words are mis-transcribed.)    Kindra Middleton MD (electronically signed)  Attending

## 2020-12-12 NOTE — ED TRIAGE NOTES
Pt comes [facility] the ED [facility] in as a possible stroke.   Pt blood sugar checked at 22:40 was 159  Dr. Calixto Deleon at bedside at 22:45

## 2020-12-12 NOTE — H&P
[place] [address] MEDICINE    HISTORY AND PHYSICAL EXAM    PATIENT NAME:  Anya Hernandez    MRN:  [id number]  SERVICE DATE:  12/12/2020   SERVICE TIME:  4:18 AM    Primary Care Physician: Kiko Ledezma MD         SUBJECTIVE  CHIEF COMPLAINT:  Altered mental status    HPI: This patient is a 66-year-old male with a past medical history of prostate cancer and atrial fibrillation on Xarelto, falls, liver mass, recent UTI, recent surgical spine surgery with post-operative abscess, presents with altered mental status. He is a recent hospital discharge after extended stay for complicated urinary tract infection and c-spine abscess. He was discharged with a PICC line for IV antibiotics and went to his sisters home. He has failed to thrive since discharge. Today he was found to be lethargic/nonverbal.  He was treated with Narcan and became more responsive. He does take prescribed narcotics. Labs show DEONDRE, hyperkalemia. He remains with altered mentation. He is being admitted for further management. PAST MEDICAL HISTORY:    Past Medical History:   Diagnosis Date    PAF (paroxysmal atrial fibrillation) ([facility] [address]     ON XARELTO    Prostate cancer ([facility] [address] 11/2019     PAST SURGICAL HISTORY:    Past Surgical History:   Procedure Laterality Date    CERVICAL LAMINECTOMY N/A 11/23/2020    CERVICAL C3-4,4-5,5-6  LAMINOPLASTY WITH RECONSTRUCTION performed by Joselito Torres MD at [address] [place] [address] COLONOSCOPY N/A 9/29/2020    COLONOSCOPY DIAGNOSTIC performed by Jennifer Recinos MD at [address] [facility] [address] Right 11/2018    hip    UPPER GASTROINTESTINAL ENDOSCOPY N/A 9/29/2020    EGD DIAGNOSTIC ONLY performed by Jennifer Recinos MD at [address] [place] [address]:  History reviewed. No pertinent family history.   SOCIAL HISTORY:    Social History     Socioeconomic History    Marital status: Single     Spouse name: Not on file    Number of children: Not on file    Years of education: Not on file  Highest education level: Not on file   Occupational History    Occupation: retired [employer]   Social Needs    Financial resource strain: Not very hard    Food insecurity     Worry: Never true     Inability: Never true    Transportation needs     Medical: No     Non-medical: No   Tobacco Use    Smoking status: Never Smoker    Smokeless tobacco: Never Used    Tobacco comment: denies   Substance and Sexual Activity    Alcohol use: Not Currently     Frequency: 2-4 times a month     Comment: denies    Drug use: No     Comment: denies    Sexual activity: Not on file   Lifestyle    Physical activity     Days per week: 0 days     Minutes per session: 0 min    Stress: Only a little   Relationships    Social connections     Talks on phone: Not on file     Gets together: Not on file     Attends Faith service: Not on file     Active member of club or organization: Not on file     Attends meetings of clubs or organizations: Not on file     Relationship status: Not on file    Intimate partner violence     Fear of current or ex partner: No     Emotionally abused: No     Physically abused: No     Forced sexual activity: No   Other Topics Concern    Not on file   Social History Narrative    Retired from [employer] With: Alone    Type of Home[address] [place] [address]: One level    Home Access: Level entry    Bathroom Shower/Tub: Tub/Shower unit    [address] [facility]: [employer] [facility] in [facility]: [address] [place] [address] Avenue: [facility]: Independent    Transfer Assistance: Independent    Active : No    Patient's  Info: Sister    Additional Comments: Pt. reports he has been having near falls but no falls in the last few weeks, but progressively more weakness hte last few weeks     MEDICATIONS:   Prior to Admission medications    Medication Sig Start Date End Date Taking?  Authorizing Provider gabapentin (NEURONTIN) 300 MG capsule Take 1 capsule by mouth 3 times daily for 30 days. 12/10/20 1/9/21  Emely VARNER Sedar, DO   lidocaine 4 % external patch Place 3 patches onto the skin daily 12/11/20   Nunu Natacha Sedar, DO   tiZANidine (ZANAFLEX) 4 MG tablet Take 1 tablet by mouth every 8 hours 12/10/20   Nunu Natacha Sedar, DO   morphine (MS CONTIN) 15 MG extended release tablet Take 1 tablet by mouth every 8 hours for 30 days. 12/10/20 1/9/21  JOYCE Jara CNP   oxyCODONE-acetaminophen (PERCOCET)  MG per tablet Take 1 tablet by mouth every 4 hours as needed for Pain (moderate to sever break through pain) for up to 15 days. 12/10/20 12/25/20  JOYCE Lea CNP   cefepime (MAXIPIME) infusion Infuse 1,000 mg intravenously every 8 hours Compound per protocol 12/7/20 1/6/21  Priyanka Petit MD   mirtazapine (REMERON) 7.5 MG tablet Take 1 tablet by mouth nightly 12/5/20   Rema Jeronimo DO   polyethylene glycol (GLYCOLAX) 17 g packet Take 17 g by mouth daily as needed for Constipation 12/1/20 12/31/20  Rema Jeronimo DO   enzalutamide (XTANDI) 40 MG capsule Take 160 mg by mouth daily    Historical Provider, MD   oxyCODONE (OXY-IR) 15 MG immediate release tablet Take 1 tablet by mouth every 6 hours as needed for Pain (moderate to severe pain) for up to 120 days. 10/13/20 2/10/21  JOYCE Le CNP   morphine (MS CONTIN) 15 MG extended release tablet Take 1 tablet by mouth 2 times daily for 30 days. 10/13/20 11/12/20  JOYCE Le CNP   leuprolide (LUPRON) 7.5 MG injection Inject 7.5 mg into the muscle every 28 days    Historical Provider, MD   Misc.  Devices (ROLLATOR ULTRA-LIGHT) MISC 1 Device by Does not apply route daily 10/1/20   Scott Clarke MD   sennosides-docusate sodium (SENOKOT-S) 8.6-50 MG tablet Take 2 tablets by mouth daily as needed for Constipation 6/24/20   JOYCE Le - CNP Polyethylene Glycol 3350 POWD Take by mouth    Historical Provider, MD   ondansetron (ZOFRAN) 4 MG tablet Take 1 tablet by mouth daily as needed for Nausea or Vomiting 2/26/20   JOYCE Odom - IDA   prochlorperazine (COMPAZINE) 10 MG tablet Take 10 mg by mouth every 6 hours as needed    Historical Provider, MD       ALLERGIES: Patient has no known allergies. REVIEW OF SYSTEM:   Review of Systems   Unable to perform ROS: Mental status change        OBJECTIVE  PHYSICAL EXAM:   Physical Exam  Constitutional:       Appearance: He is normal weight. HENT:      Head: Normocephalic. Nose: Nose normal.      Mouth/Throat:      Mouth: Mucous membranes are dry. Pharynx: Oropharynx is clear. Eyes:      Conjunctiva/sclera: Conjunctivae normal.      Pupils: Pupils are equal, round, and reactive to light. Neck:      Musculoskeletal: Normal range of motion. Vascular: No carotid bruit. Cardiovascular:      Rate and Rhythm: Regular rhythm. Tachycardia present. Pulses: Normal pulses. Heart sounds: Normal heart sounds. Pulmonary:      Effort: Pulmonary effort is normal.      Breath sounds: Normal breath sounds. Abdominal:      General: Abdomen is flat. Bowel sounds are normal.      Palpations: Abdomen is soft. Musculoskeletal:      Right lower leg: No edema. Left lower leg: No edema. Lymphadenopathy:      Cervical: No cervical adenopathy. Skin:     General: Skin is cool. Capillary Refill: Capillary refill takes less than 2 seconds. Neurological:      Mental Status: He is lethargic. BP (!) 142/125   Pulse 114   Temp 99.4 °F (37.4 °C) (Temporal)   Resp 21   Ht 6' 1\" (1.854 m)   SpO2 100%   BMI 19.43 kg/m²     DATA:     Diagnostic tests reviewed for today's visit:    Most recent labs and imaging results reviewed.      LABS:    Recent Results (from the past 24 hour(s))   Comprehensive Metabolic Panel    Collection Time: 12/11/20 11:00 PM Result Value Ref Range    Sodium 130 (L) 135 - 144 mEq/L    Potassium 6.0 (HH) 3.4 - 4.9 mEq/L    Chloride 92 (L) 95 - 107 mEq/L    CO2 24 20 - 31 mEq/L    Anion Gap 14 9 - 15 mEq/L    Glucose 136 (H) 70 - 99 mg/dL    BUN 28 (H) 8 - 23 mg/dL    CREATININE 1.76 (H) 0.70 - 1.20 mg/dL    GFR Non-African American 38.4 (L) >60    GFR  46.5 (L) >60    Calcium 8.5 8.5 - 9.9 mg/dL    Total Protein 6.7 6.3 - 8.0 g/dL    Alb 3.0 (L) 3.5 - 4.6 g/dL    Total Bilirubin 0.7 0.2 - 0.7 mg/dL    Alkaline Phosphatase 355 (H) 35 - 104 U/L    ALT 23 0 - 41 U/L     (H) 0 - 40 U/L    Globulin 3.7 (H) 2.3 - 3.5 g/dL   CBC Auto Differential    Collection Time: 12/11/20 11:00 PM   Result Value Ref Range    WBC 18.0 (H) 4.8 - 10.8 K/uL    RBC 3.29 (L) 4.70 - 6.10 M/uL    Hemoglobin 8.1 (L) 14.0 - 18.0 g/dL    Hematocrit 24.9 (L) 42.0 - 52.0 %    MCV 75.6 (L) 80.0 - 100.0 fL    MCH 24.5 (L) 27.0 - 31.3 pg    MCHC 32.4 (L) 33.0 - 37.0 %    RDW 19.8 (H) 11.5 - 14.5 %    Platelets 243 (H) 269 - 400 K/uL    PLATELET SLIDE REVIEW Increased     Neutrophils % 79.0 %    Lymphocytes % 11.0 %    Monocytes % 10.5 %    Eosinophils % 0.0 %    Basophils % 0.4 %    Neutrophils Absolute 14.2 (H) 1.4 - 6.5 K/uL    Lymphocytes Absolute 2.0 1.0 - 4.8 K/uL    Monocytes Absolute 2.0 (H) 0.2 - 0.8 K/uL    Eosinophils Absolute 0.0 0.0 - 0.7 K/uL    Basophils Absolute 0.0 0.0 - 0.2 K/uL    Smudge Cells 3.8     Anisocytosis 1+     Hypochromia 1+     Poikilocytes 1+     Target Cells 1+    Urine Reflex to Culture    Collection Time: 12/11/20 11:00 PM    Specimen: Urine, clean catch   Result Value Ref Range    Color, UA DARK YELLOW (A) Straw/Yellow    Clarity, UA Clear Clear    Glucose, Ur Negative Negative mg/dL    Bilirubin Urine Negative Negative    Ketones, Urine Negative Negative mg/dL    Specific Gravity, UA 1.016 1.005 - 1.030    Blood, Urine Negative Negative    pH, UA 6.5 5.0 - 9.0    Protein, UA 30 (A) Negative mg/dL Urobilinogen, Urine 0.2 <2.0 E.U./dL    Nitrite, Urine Negative Negative    Leukocyte Esterase, Urine SMALL (A) Negative    Urine Reflex to Culture Not Indicated    Microscopic Urinalysis    Collection Time: 12/11/20 11:00 PM   Result Value Ref Range    Bacteria, UA MANY (A) Negative /HPF    Hyaline Casts, UA 0-1 0 - 5 /HPF    WBC, UA 3-5 0 - 5 /HPF    RBC, UA 3-5 (A) 0 - 5 /HPF    Epithelial Cells, UA 0-2 0 - 5 /HPF   EKG 12 Lead - Chest Pain    Collection Time: 12/11/20 11:55 PM   Result Value Ref Range    Ventricular Rate 100 BPM    Atrial Rate 100 BPM    P-R Interval 162 ms    QRS Duration 78 ms    Q-T Interval 352 ms    QTc Calculation (Bazett) 454 ms    P Axis 43 degrees    R Axis -4 degrees    T Axis 37 degrees   COVID-19    Collection Time: 12/12/20  1:00 AM   Result Value Ref Range    SARS-CoV-2, NAAT Not Detected Not Detected   COVID-19    Collection Time: 12/12/20  2:56 AM   Result Value Ref Range    SARS-CoV-2, NAAT Not Detected Not Detected       IMAGING:  No results found. VTE Prophylaxis: low molecular weight heparin -  start    ASSESSMENT AND PLAN    Altered Mental Status  less responsive per family, on admission was nonverbal, likely from dehydration but some concern that it was narcotic induced, and was responsive to Narcan. Labs with new DEONDRE, WBC increase, mild tachycardia.      Plan: admit, hydrate, monitor labs, treat hyperkalemia, will ask ID to see, consult palliative care  Hyperkalemia  potassium 6.0, dehydrated,    Plan: treat with kayexalate, IV fluids, Q6H BMP,   DEONDRE  BUN 28, Creatinine 1.76 from 0.41 on the 10th, appears dry,   Plan: hydrate, monitor labs  Prostate cancer with mets to bone and liver mass  followed by palliative care, pain medications provided, on Lupron & Xtandi   Plan: consult palliative care for continued pain medication management, continue chemo, next infusion appointment is 1/6/2021 Leukocytosis  WBC 18.0, temp 99.4, ongoing treatment for UTI, and cervical abscess, many bacteria on UA   Plan: consult to infectious disease, he has picc, he is on cefepime, continue cefepime  Anemia  HGB 8.1, HCT 24.9, appears chronic, vital signs stable   Plan: monitor    SIGNATURE: JOYCE Murillo - CNP  DATE: December 12, 2020  TIME: 4:18 AM     Manuel Chambers MD - supervising physician

## 2020-12-12 NOTE — PROGRESS NOTES
Physician Progress Note      PATIENT:               Tanya Chapman  CSN #:                  [id number]  :                       1950  ADMIT DATE:       2020 10:44 PM  [address] [place] DATE:  RESPONDING  PROVIDER #:        Nani Morrell MD          QUERY TEXT:    Pt admitted with DEONDRE and dehydration and has AMS documented. If possible,   please document in progress notes and discharge summary further specificity   regarding the type of encephalopathy:    The medical record reflects the following:  Risk Factors: s/p spinal surgery with abscess, IV ABX at home  Clinical Indicators: confused, lethargic at home, Na 130-134, sCr/GFR   .41/>60-1.76/46.5; WBC 13.6-18,  H&P: \"Patient was discharged 2 days ago. Apparently he was found to be more confused and lethargic at home. Therefore,   he was sent to the ER. Patient was given Narcan at home by EMS with minimal   response. Narcan was repeated in the ER and patient became more responsive. Most likely his altered mentation and lethargy was due to oxycodone and   morphine that he is probably getting at home in the setting of DEONDRE. \"  Treatment: IVFB 1L, IV Narcan, Ca Cl, NaHCO3, labs and monitoring  Options provided:  -- Metabolic encephalopathy  -- Toxic encephalopathy  -- Other - I will add my own diagnosis  -- Disagree - Not applicable / Not valid  -- Disagree - Clinically unable to determine / Unknown  -- Refer to Clinical Documentation Reviewer    PROVIDER RESPONSE TEXT:    Encephalopathy: Multifactorial. Likely combination of toxic and metabolic   types.     Query created by: Yvonne Navarro on 2020 12:51 PM      Electronically signed by:  Nani Morrell MD 2020 1:08 PM

## 2020-12-12 NOTE — CONSULTS
Infectious Disease     Patient Name: Jaswant Elizabeth  Date: 12/12/2020  YOB: 1950  Medical Record Number: [id number]      Postoperative abscess of neck          Hist prostate cancer atrial fibrillation liver mass recurrent UTI ory of Present Illness:      Presenting with difficulty walking on 11/18/2020  Progressive quadriparesis  MRI was found to have severe canal stenosis C3-4-5 6 and 7  Patient went to surgery on 11/23/2020 cervical stenosis mild myelopathy underwent cervical laminoplasty reconstruction          CT scan was performed on 12/2/2020 showing fluid collection      MRI  MRI 7.5 X 4.5 X 3.5 CM POSTOPERATIVE FLUID COLLECTION  THIS DOES NOT APPEAR TO COMMUNICATE TO THE SPINAL CANAL, AND THERE IS NO EPIDURAL COLLECTION OR OTHER COMPLICATION IDENTIFIED      Cyst was drained percutaneously cultures grew no organisms  Sedimentation rate was elevated    Patient recently discharged on cefepime plan for 4 weeks    Patient went home brought back now because of becoming lethargic nonverbal  Improved with Narcan            Review of Systems   Constitutional: Negative for chills, diaphoresis, fatigue and fever. HENT: Negative. Eyes: Negative. Respiratory: Negative. Cardiovascular: Negative. Gastrointestinal: Negative. Endocrine: Negative. Genitourinary: Negative. Musculoskeletal: Negative. Skin: Negative. Neurological: Positive for weakness. Negative for headaches.        Review of Systems: All 14 review of systems negative other than as stated above    Social History     Tobacco Use    Smoking status: Never Smoker    Smokeless tobacco: Never Used    Tobacco comment: denies   Substance Use Topics    Alcohol use: Not Currently     Frequency: 2-4 times a month     Comment: denies    Drug use: No     Comment: denies         Past Medical History:   Diagnosis Date    PAF [facility]paroxysmal atrial fibrillation) ([facility]     ON XARELTO    Prostate cancer ([facility] 11/2019 Past Surgical History:   Procedure Laterality Date    CERVICAL LAMINECTOMY N/A 11/23/2020    CERVICAL C3-4,4-5,5-6  LAMINOPLASTY WITH RECONSTRUCTION performed by Chyna Simmons MD at [address] [place] [address] COLONOSCOPY N/A 9/29/2020    COLONOSCOPY DIAGNOSTIC performed by Kamlesh Geronimo MD at [address] Right 11/2018    hip    UPPER GASTROINTESTINAL ENDOSCOPY N/A 9/29/2020    EGD DIAGNOSTIC ONLY performed by Kamlesh Geronimo MD at [facility]         No current facility-administered medications on file prior to encounter. Current Outpatient Medications on File Prior to Encounter   Medication Sig Dispense Refill    gabapentin (NEURONTIN) 300 MG capsule Take 1 capsule by mouth 3 times daily for 30 days. 90 capsule 0    lidocaine 4 % external patch Place 3 patches onto the skin daily 1 box 2    tiZANidine (ZANAFLEX) 4 MG tablet Take 1 tablet by mouth every 8 hours 30 tablet 0    morphine (MS CONTIN) 15 MG extended release tablet Take 1 tablet by mouth every 8 hours for 30 days. 90 tablet 0    oxyCODONE-acetaminophen (PERCOCET)  MG per tablet Take 1 tablet by mouth every 4 hours as needed for Pain (moderate to sever break through pain) for up to 15 days. 60 tablet 0    cefepime (MAXIPIME) infusion Infuse 1,000 mg intravenously every 8 hours Compound per protocol 90 g 0    mirtazapine (REMERON) 7.5 MG tablet Take 1 tablet by mouth nightly 30 tablet 3    polyethylene glycol (GLYCOLAX) 17 g packet Take 17 g by mouth daily as needed for Constipation 527 g 1    enzalutamide (XTANDI) 40 MG capsule Take 160 mg by mouth daily      oxyCODONE (OXY-IR) 15 MG immediate release tablet Take 1 tablet by mouth every 6 hours as needed for Pain (moderate to severe pain) for up to 120 days. 120 tablet 0    morphine (MS CONTIN) 15 MG extended release tablet Take 1 tablet by mouth 2 times daily for 30 days.  60 tablet 0 CREATININE 1.49 12/12/2020    GLUCOSE 129 12/12/2020    CALCIUM 8.7 12/12/2020                ASSESSMENT:  Patient Active Problem List   Diagnosis    Liver mass    Osteoarthritis of hip    Palpitations    Other specified disorders of bone density and structure, unspecified site     Anemia    Weakness    Gastric ulcer    Goals of care, counseling/discussion    Gastrointestinal hemorrhage    Rectal mass    Acute cystitis    Metastatic cancer ([address]    Fall at home, initial encounter    Severe malnutrition ([address]    Falls, initial encounter    Myelopathy ([address]    Benign prostatic hyperplasia with incomplete bladder emptying    Malignant tumor of prostate ([address]    Raised prostate specific antigen    Retention of urine    Urethritis    Ataxic gait    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

## 2020-12-13 LAB
ABO/RH: NORMAL
ALBUMIN SERPL-MCNC: 2.1 G/DL (ref 3.5–4.6)
ALP BLD-CCNC: 259 U/L (ref 35–104)
ALT SERPL-CCNC: 27 U/L (ref 0–41)
ANION GAP SERPL CALCULATED.3IONS-SCNC: 11 MEQ/L (ref 9–15)
ANTIBODY SCREEN: NORMAL
AST SERPL-CCNC: 95 U/L (ref 0–40)
BASOPHILS ABSOLUTE: 0 K/UL (ref 0–0.2)
BASOPHILS RELATIVE PERCENT: 0.3 %
BILIRUB SERPL-MCNC: 0.6 MG/DL (ref 0.2–0.7)
BUN BLDV-MCNC: 33 MG/DL (ref 8–23)
CALCIUM SERPL-MCNC: 7.6 MG/DL (ref 8.5–9.9)
CHLORIDE BLD-SCNC: 102 MEQ/L (ref 95–107)
CO2: 23 MEQ/L (ref 20–31)
CREAT SERPL-MCNC: 0.86 MG/DL (ref 0.7–1.2)
EOSINOPHILS ABSOLUTE: 0 K/UL (ref 0–0.7)
EOSINOPHILS RELATIVE PERCENT: 0 %
GFR AFRICAN AMERICAN: >60
GFR NON-AFRICAN AMERICAN: >60
GLOBULIN: 4 G/DL (ref 2.3–3.5)
GLUCOSE BLD-MCNC: 115 MG/DL (ref 70–99)
HCT VFR BLD CALC: 21.7 % (ref 42–52)
HEMOGLOBIN: 6.8 G/DL (ref 14–18)
LYMPHOCYTES ABSOLUTE: 1.5 K/UL (ref 1–4.8)
LYMPHOCYTES RELATIVE PERCENT: 11 %
MCH RBC QN AUTO: 24 PG (ref 27–31.3)
MCHC RBC AUTO-ENTMCNC: 31.4 % (ref 33–37)
MCV RBC AUTO: 76.3 FL (ref 80–100)
MONOCYTES ABSOLUTE: 1.7 K/UL (ref 0.2–0.8)
MONOCYTES RELATIVE PERCENT: 12.3 %
NEUTROPHILS ABSOLUTE: 10.5 K/UL (ref 1.4–6.5)
NEUTROPHILS RELATIVE PERCENT: 76.4 %
PDW BLD-RTO: 19.8 % (ref 11.5–14.5)
PLATELET # BLD: 435 K/UL (ref 130–400)
POTASSIUM SERPL-SCNC: 4.2 MEQ/L (ref 3.4–4.9)
RBC # BLD: 2.84 M/UL (ref 4.7–6.1)
SODIUM BLD-SCNC: 136 MEQ/L (ref 135–144)
TOTAL PROTEIN: 6.1 G/DL (ref 6.3–8)
WBC # BLD: 13.7 K/UL (ref 4.8–10.8)

## 2020-12-13 PROCEDURE — 85025 COMPLETE CBC W/AUTO DIFF WBC: CPT

## 2020-12-13 PROCEDURE — 86901 BLOOD TYPING SEROLOGIC RH(D): CPT

## 2020-12-13 PROCEDURE — 86923 COMPATIBILITY TEST ELECTRIC: CPT

## 2020-12-13 PROCEDURE — P9016 RBC LEUKOCYTES REDUCED: HCPCS

## 2020-12-13 PROCEDURE — 36592 COLLECT BLOOD FROM PICC: CPT

## 2020-12-13 PROCEDURE — 86900 BLOOD TYPING SEROLOGIC ABO: CPT

## 2020-12-13 PROCEDURE — 2700000000 HC OXYGEN THERAPY PER DAY

## 2020-12-13 PROCEDURE — 6360000002 HC RX W HCPCS: Performed by: NURSE PRACTITIONER

## 2020-12-13 PROCEDURE — 86850 RBC ANTIBODY SCREEN: CPT

## 2020-12-13 PROCEDURE — 92610 EVALUATE SWALLOWING FUNCTION: CPT

## 2020-12-13 PROCEDURE — 36415 COLL VENOUS BLD VENIPUNCTURE: CPT

## 2020-12-13 PROCEDURE — 99232 SBSQ HOSP IP/OBS MODERATE 35: CPT | Performed by: INTERNAL MEDICINE

## 2020-12-13 PROCEDURE — 2580000003 HC RX 258: Performed by: NURSE PRACTITIONER

## 2020-12-13 PROCEDURE — 1210000000 HC MED SURG R&B

## 2020-12-13 PROCEDURE — 6370000000 HC RX 637 (ALT 250 FOR IP): Performed by: INTERNAL MEDICINE

## 2020-12-13 PROCEDURE — 80053 COMPREHEN METABOLIC PANEL: CPT

## 2020-12-13 RX ORDER — 0.9 % SODIUM CHLORIDE 0.9 %
250 INTRAVENOUS SOLUTION INTRAVENOUS ONCE
Status: DISCONTINUED | OUTPATIENT
Start: 2020-12-13 | End: 2020-12-18 | Stop reason: HOSPADM

## 2020-12-13 RX ORDER — OXYCODONE HYDROCHLORIDE 5 MG/1
10 TABLET ORAL 2 TIMES DAILY PRN
Status: DISCONTINUED | OUTPATIENT
Start: 2020-12-13 | End: 2020-12-13

## 2020-12-13 RX ORDER — MORPHINE SULFATE 2 MG/ML
1 INJECTION, SOLUTION INTRAMUSCULAR; INTRAVENOUS ONCE
Status: COMPLETED | OUTPATIENT
Start: 2020-12-13 | End: 2020-12-13

## 2020-12-13 RX ADMIN — MORPHINE SULFATE 1 MG: 2 INJECTION, SOLUTION INTRAMUSCULAR; INTRAVENOUS at 23:20

## 2020-12-13 RX ADMIN — CEFEPIME 1 G: 1 INJECTION, POWDER, FOR SOLUTION INTRAMUSCULAR; INTRAVENOUS at 00:10

## 2020-12-13 RX ADMIN — TRAMADOL HYDROCHLORIDE 50 MG: 50 TABLET, FILM COATED ORAL at 03:20

## 2020-12-13 RX ADMIN — Medication 10 ML: at 19:24

## 2020-12-13 RX ADMIN — CEFEPIME 1 G: 1 INJECTION, POWDER, FOR SOLUTION INTRAMUSCULAR; INTRAVENOUS at 23:20

## 2020-12-13 ASSESSMENT — ENCOUNTER SYMPTOMS
GASTROINTESTINAL NEGATIVE: 1
RESPIRATORY NEGATIVE: 1

## 2020-12-13 ASSESSMENT — PAIN SCALES - GENERAL
PAINLEVEL_OUTOF10: 8
PAINLEVEL_OUTOF10: 9

## 2020-12-13 NOTE — FLOWSHEET NOTE
2028 hs assessment completed. Awake and resting in bed. Iv fluids maintained. Chart and meds reviewed. Pt asking for pain meds. Secure message sent to hospital physician. Pt took sip of liquid without difficulty. Oriented to self and place of medical center.  Electronically signed by Brittaney Knox RN on 12/12/2020 at 10:39 PM

## 2020-12-13 NOTE — PROGRESS NOTES
Assumed care of patient at this time. Agree with assesmsnet completed earlier in shift. Pt resting at this time. Denies an needs.   Electronically signed by Shy York RN on 12/13/2020 at 12:42 AM

## 2020-12-13 NOTE — PROGRESS NOTES
Infectious Disease     Patient Name: Sandra Peres  Date: 12/13/2020  YOB: 1950  Medical Record Number: [id number]      Postoperative abscess of neck        Presenting with difficulty walking on 11/18/2020  Progressive quadriparesis  MRI was found to have severe canal stenosis C3-4-5 6 and 7  Patient went to surgery on 11/23/2020 cervical stenosis mild myelopathy underwent cervical laminoplasty reconstruction          CT scan was performed on 12/2/2020 showing fluid collection      MRI  MRI 7.5 X 4.5 X 3.5 CM POSTOPERATIVE FLUID COLLECTION  THIS DOES NOT APPEAR TO COMMUNICATE TO THE SPINAL CANAL, AND THERE IS NO EPIDURAL COLLECTION OR OTHER COMPLICATION IDENTIFIED     drained percutaneously cultures grew no organisms  Sedimentation rate was elevated    Patient recently discharged on cefepime plan for 4 weeks    Patient went home brought back now because of becoming lethargic nonverbal  Improved with Narcan            Review of Systems   Constitutional: Negative for chills, diaphoresis, fatigue and fever. Respiratory: Negative. Cardiovascular: Negative. Gastrointestinal: Negative. Neurological: Positive for weakness. Negative for headaches. Physical Exam   Constitutional: No distress. Neck:   No drainage from wound and neck   Cardiovascular: Normal heart sounds. No murmur heard. Pulmonary/Chest: No respiratory distress. He has no wheezes. He has no rales. Abdominal: Soft. Bowel sounds are normal. He exhibits no distension and no mass. There is no abdominal tenderness. There is no rebound. Blood pressure (!) 101/58, pulse 87, temperature 99.1 °F (37.3 °C), temperature source Oral, resp. rate 16, height 6' 1\" (1.854 m), weight 147 lb 4.3 oz (66.8 kg), SpO2 98 %.       .   Lab Results   Component Value Date    WBC 13.7 (H) 12/13/2020    HGB 6.8 (LL) 12/13/2020    HCT 21.7 (L) 12/13/2020    MCV 76.3 (L) 12/13/2020     (H) 12/13/2020     Lab Results

## 2020-12-13 NOTE — PROGRESS NOTES
Assumed care of patient at 0700. Patient alert and oriented x 2Patient knows who he is and where he is. Patient is very agitated and aggressive with staff this morning. He is continually calling out, and when we go in the room to check on him, patient states 'lady, get out of my room, stop bullshitting me, and get me the f** out of here' Multiple RNs attempted to reorient patient and reassure this patient. Patient repeatedly states 'stop bullshitting me and get the f out of my room' Patient refused antibiotics this morning, education provided. Patient still refused states 'I don't give a f** about these medications' Additionally pt hemoglobin 6.8, orders per Dr. Carlos Pope to transfuse one unit of blood, patient is refusing blood draws from picc line. Multiple attempts to educate patient, no evidence of learning noted. 1100: patient continues to be aggressive verbally and physically, calling out and refusing treatment. Pt NPO per Speech reccomendations, and pt educated multiple times on such. No evidence of learning noted. 1222: patient continues to be verbally aggressive, agitated and rude. In room to check on patient, patient oxygen off and also pt tele battery needs changed. Attempted to fix both, patient states 'get the fuck out and don't touch me,  youre gonna fuck me over and I'm not doing it. I don't give a damn about what you're doing' multiple attempts made to reorient patient and pt still adamantly refusing all care. 1824:patient angry, aggressive and rude at this time. Patient continues to demand discharge, something to eat or drink. He is refusing IV fluids and antibiotics this shift states 'stop poisoning me, get the hell out of my room' many attempts made to reorient this patient, and to calm patient down. All unsuccessful. Dr. Ezio Guerrero aware.

## 2020-12-13 NOTE — PROGRESS NOTES
[facility]   Facility/Department: [facility] MED SURG UNIT  Speech Language Pathology  Clinical Bedside Swallow Evaluation    NAME:Herman Parish  : 1950 (79 y.o.)   MRN: [id number]  ROOM: [place] [address]  ADMISSION DATE: 2020  PATIENT DIAGNOSIS(ES): DEONDRE (acute kidney injury) [facility]) [N17.9]  Chief Complaint   Patient presents with    Altered Mental Status     Patient Active Problem List    Diagnosis Date Noted    DEONDRE (acute kidney injury) ([address] 2020    Hyperkalemia 2020    AMS (altered mental status) 2020    Leukocytosis 2020    Abscess     Sepsis due to gram-negative UTI ([address] 2020    Neurogenic bladder     Stenosis of cervical spine with myelopathy ([address] 2020    Malignant tumor of prostate ([address] 2020    Raised prostate specific antigen 2020    Retention of urine 2020    Urethritis 2020    Ataxic gait 2020    Myelopathy ([address]     Falls, initial encounter 2020    Severe malnutrition ([address] 2020    Fall at home, initial encounter 2020    Goals of care, counseling/discussion     Gastrointestinal hemorrhage     Rectal mass     Acute cystitis     Metastatic cancer ([address]     Gastric ulcer 2020    Weakness     Anemia 2020    Other specified disorders of bone density and structure, unspecified site      Liver mass 2020    Osteoarthritis of hip 2020    Benign prostatic hyperplasia with incomplete bladder emptying 2018    Palpitations 10/19/2010     Past Medical History:   Diagnosis Date    PAF (paroxysmal atrial fibrillation) ([address]     ON XARELTO    Prostate cancer ([address] 2019     Past Surgical History:   Procedure Laterality Date    CERVICAL LAMINECTOMY N/A 2020    CERVICAL C3-4,4-5,5-6  LAMINOPLASTY WITH RECONSTRUCTION performed by Lucho Torres MD at [address] [place] [address] N/A 2020 COLONOSCOPY DIAGNOSTIC performed by Mikey Gillespie MD at [address] Right 11/2018    hip    UPPER GASTROINTESTINAL ENDOSCOPY N/A 9/29/2020    EGD DIAGNOSTIC ONLY performed by Mikey Gillespie MD at [facility]     No Known Allergies    DATE ONSET: 12/11/2020     Date of Evaluation: 12/13/2020   Evaluating Therapist: Radha Gaffney, SLP    Recommended Diet and Intervention  Diet Solids Recommendation: NPO  Liquid Consistency Recommendation: NPO  Recommended Form of Meds: Crushed in puree as able(1:1 supervision, slow feed, stop if pt is fatiguing.)  Recommendations: NPO;Modified barium swallow study  Therapeutic Interventions: Diet tolerance monitoring, Oral motor exercises, Patient/Family education, Pharyngeal exercises    Compensatory Swallowing Strategies  Compensatory Swallowing Strategies: Total feed;Upright as possible for all oral intake;Eat/Feed slowly; Small bites/sips    Reason for Referral  Verónica Nieto was referred for a bedside swallow evaluation to assess the efficiency of his swallow function, identify signs and symptoms of aspiration and make recommendations regarding safe dietary consistencies, effective compensatory strategies, and safe eating environment. General  Chart Reviewed: Yes  Comments: Pt previously agitated this date per RN. Pt agreed to evaluation as he \"wants water. \" Pt found slouched to left side of bed. Donovan Armstrong RN assisted this SLP in repositioning pt. Behavior/Cognition: Agitated; Cooperative  Respiratory Status: O2 via nasual cannula  Communication Observation: Dysarthria  Follows Directions: Simple  Dentition: Edentulous; Poor dental/oral hygeine  Patient Positioning: Upright in bed  Baseline Vocal Quality: Weak  Volitional Cough: Weak  Consistencies Administered: Dysphagia Pureed (Dysphagia I); Thin;Nectar - cup    Current Diet level:  Current Diet : NPO  Current Liquid Diet : NPO    Oral Motor Deficits  Oral/Motor Oral Motor: [facility]  Labial ROM: Reduced left; Reduced right  Labial Strength: Reduced  Labial Coordination: Reduced  Lingual ROM: Reduced left; Reduced right  Lingual Strength: Reduced  Lingual Coordination: Reduced    Oral Phase Dysfunction  Oral Phase  Oral Phase: Exceptions  Oral Phase Dysfunction  Spillage Left: Puree;Nectar - cup; Thin - cup  Decreased Anterior to Posterior Transit: All  Suspected Premature Bolus Loss: Thin - cup  Oral Phase  Oral Phase - Comment: Pt presents with moderate-severe oral dysphagia characterized by generalized weakness, impaired bolus control, anterior bolus loss via cup and spoon, and decreased A-P transit (oral holding observed). pt with large bolus size and taking multiple consecutive sips from cup. SLP provided external pacing through limiting bolus in cup. Indicators of Pharyngeal Phase Dysfunction   Pharyngeal Phase  Pharyngeal Phase: Exceptions  Indicators of Pharyngeal Phase Dysfunction  Delayed Swallow: All  Decreased Laryngeal Elevation: All  Change in Vital Signs: Puree - teaspoon  Pharyngeal Phase   Pharyngeal: suspected pharyngeal dysphagia characterized by impaired breathing-swallowing coordination (pt observed to stop in between swallows to take breath), suspected delayed swallow initiation, and decreased hyolaryngeal elevation for all consistencies administered. Pt fatigued after trials. Impression  Dysphagia Diagnosis: Suspected needs further assessment; Moderate pharyngeal stage dysphagia; Moderate to severe oral stage dysphagia  Dysphagia Outcome Severity Scale: Level 2: Moderate Severe dysphagia- Maximum assistance or maximum use of strategies with partial PO only     Treatment Plan     Duration/Frequency of Treatment: 2-4x NPO          Treatment/Goals  Short-term Goals  Timeframe for Short-term Goals: 1-2 weeks during LOS or until goals are met. Goal 1: Pt will tolerate least restrictive diet without overt s/s of aspiration. Goal 2: Pt will tolerate instrumental swallowing procedure to more objectively assess pharyngeal stage of swallow if deemed appropriate by treating SLP. Goal 3: Pt will complete oral motor ROM and strengthening exercises with 80% accuracy, given cues as needed, in order to strengthen lingual/labial/buccal musculature to promote safety and efficiency of oral phase of swallow and decrease risk for pocketing. Goal 4: Pt will tolerate 5/5 trials of puree via teaspoon without overt s/s of aspiration. Goal 5: Pt will tolerate 5/5 trials of NTL via teaspoon without overt s/s of aspiration. Goal 6: Pt will complete pharyngeal strengthening exercises (such as the Sherrie, Effortful swallow, etc) with 80% acc given cues as needed in order to strengthen and establish and more effective swallow. Long-term Goals  Timeframe for Long-term Goals: 1-2 weeks during LOS or until goals are met. Goal 1: Pt will tolerate least restrictive diet without overt s/s of aspiration. Prognosis  Prognosis  Prognosis for safe diet advancement: fair  Barriers to reach goals: cognitive deficits;behavior;fatigue  Individuals consulted  Consulted and agree with results and recommendations: Patient;RN(Gabi CARRASQUILLO)    Education  Patient Education: Pt educated on results of BSE  Patient Education Response: No evidence of learning  Safety Devices in place: Yes  Type of devices: All fall risk precautions in place    Pain Assessment:  Initial Assessment:  Patient denies pain. Re-assessment:  Patient denies pain. []  Perfect Serve sent to ordering physician- SLP unable to perfectserve at this time. SLP notified Josh Ordonez RN re: diet order.    [x]  Evaluation routed to ordering physician inbox           Therapy Time  SLP Individual Minutes  Time In: 0935  Time Out: 8727  Minutes: 15              Signature: Electronically signed by ILYA Becker on 12/13/2020 at 9:49 AM

## 2020-12-14 PROBLEM — M79.10 MYALGIA: Status: ACTIVE | Noted: 2020-12-14

## 2020-12-14 PROBLEM — C61 PROSTATE CANCER METASTATIC TO MULTIPLE SITES (HCC): Status: ACTIVE | Noted: 2020-12-14

## 2020-12-14 PROBLEM — T50.905A CONFUSION CAUSED BY A DRUG: Status: ACTIVE | Noted: 2020-12-14

## 2020-12-14 PROBLEM — R41.0 CONFUSION CAUSED BY A DRUG: Status: ACTIVE | Noted: 2020-12-14

## 2020-12-14 PROBLEM — G89.3 CHRONIC NEOPLASM-RELATED PAIN: Status: ACTIVE | Noted: 2020-12-14

## 2020-12-14 PROBLEM — M62.81 MUSCLE WEAKNESS (GENERALIZED): Status: ACTIVE | Noted: 2020-12-14

## 2020-12-14 PROBLEM — Z79.891 LONG TERM CURRENT USE OF OPIATE ANALGESIC: Status: ACTIVE | Noted: 2020-12-14

## 2020-12-14 PROBLEM — G83.10 PARESIS OF LOWER EXTREMITY (HCC): Status: ACTIVE | Noted: 2020-12-14

## 2020-12-14 LAB
ALBUMIN SERPL-MCNC: 2.4 G/DL (ref 3.5–4.6)
ALP BLD-CCNC: 254 U/L (ref 35–104)
ALT SERPL-CCNC: 27 U/L (ref 0–41)
AMMONIA: 30 UMOL/L (ref 16–60)
ANION GAP SERPL CALCULATED.3IONS-SCNC: 11 MEQ/L (ref 9–15)
AST SERPL-CCNC: 60 U/L (ref 0–40)
BASOPHILS ABSOLUTE: 0 K/UL (ref 0–0.2)
BASOPHILS RELATIVE PERCENT: 0.2 %
BILIRUB SERPL-MCNC: 0.8 MG/DL (ref 0.2–0.7)
BLOOD BANK DISPENSE STATUS: NORMAL
BLOOD BANK PRODUCT CODE: NORMAL
BPU ID: NORMAL
BUN BLDV-MCNC: 29 MG/DL (ref 8–23)
CALCIUM SERPL-MCNC: 8 MG/DL (ref 8.5–9.9)
CHLORIDE BLD-SCNC: 102 MEQ/L (ref 95–107)
CO2: 23 MEQ/L (ref 20–31)
CREAT SERPL-MCNC: 0.65 MG/DL (ref 0.7–1.2)
DESCRIPTION BLOOD BANK: NORMAL
EOSINOPHILS ABSOLUTE: 0.1 K/UL (ref 0–0.7)
EOSINOPHILS RELATIVE PERCENT: 0.4 %
GFR AFRICAN AMERICAN: >60
GFR NON-AFRICAN AMERICAN: >60
GLOBULIN: 4 G/DL (ref 2.3–3.5)
GLUCOSE BLD-MCNC: 98 MG/DL (ref 70–99)
HCT VFR BLD CALC: 21.3 % (ref 42–52)
HCT VFR BLD CALC: 23.9 % (ref 42–52)
HEMOGLOBIN: 6.6 G/DL (ref 14–18)
HEMOGLOBIN: 7.6 G/DL (ref 14–18)
LYMPHOCYTES ABSOLUTE: 1.7 K/UL (ref 1–4.8)
LYMPHOCYTES RELATIVE PERCENT: 12.5 %
MCH RBC QN AUTO: 23.9 PG (ref 27–31.3)
MCHC RBC AUTO-ENTMCNC: 31.2 % (ref 33–37)
MCV RBC AUTO: 76.4 FL (ref 80–100)
MONOCYTES ABSOLUTE: 1.5 K/UL (ref 0.2–0.8)
MONOCYTES RELATIVE PERCENT: 10.9 %
NEUTROPHILS ABSOLUTE: 10.2 K/UL (ref 1.4–6.5)
NEUTROPHILS RELATIVE PERCENT: 76 %
PDW BLD-RTO: 19.8 % (ref 11.5–14.5)
PLATELET # BLD: 448 K/UL (ref 130–400)
POTASSIUM SERPL-SCNC: 4 MEQ/L (ref 3.4–4.9)
RBC # BLD: 2.78 M/UL (ref 4.7–6.1)
SODIUM BLD-SCNC: 136 MEQ/L (ref 135–144)
TOTAL PROTEIN: 6.4 G/DL (ref 6.3–8)
WBC # BLD: 13.5 K/UL (ref 4.8–10.8)

## 2020-12-14 PROCEDURE — 2700000000 HC OXYGEN THERAPY PER DAY

## 2020-12-14 PROCEDURE — 36430 TRANSFUSION BLD/BLD COMPNT: CPT

## 2020-12-14 PROCEDURE — 80053 COMPREHEN METABOLIC PANEL: CPT

## 2020-12-14 PROCEDURE — 1210000000 HC MED SURG R&B

## 2020-12-14 PROCEDURE — 99221 1ST HOSP IP/OBS SF/LOW 40: CPT | Performed by: NURSE PRACTITIONER

## 2020-12-14 PROCEDURE — 82140 ASSAY OF AMMONIA: CPT

## 2020-12-14 PROCEDURE — 2580000003 HC RX 258: Performed by: NURSE PRACTITIONER

## 2020-12-14 PROCEDURE — 85025 COMPLETE CBC W/AUTO DIFF WBC: CPT

## 2020-12-14 PROCEDURE — 85014 HEMATOCRIT: CPT

## 2020-12-14 PROCEDURE — 85018 HEMOGLOBIN: CPT

## 2020-12-14 PROCEDURE — 6360000002 HC RX W HCPCS: Performed by: NURSE PRACTITIONER

## 2020-12-14 PROCEDURE — C9113 INJ PANTOPRAZOLE SODIUM, VIA: HCPCS | Performed by: INTERNAL MEDICINE

## 2020-12-14 PROCEDURE — 6360000002 HC RX W HCPCS: Performed by: INTERNAL MEDICINE

## 2020-12-14 PROCEDURE — 93010 ELECTROCARDIOGRAM REPORT: CPT | Performed by: INTERNAL MEDICINE

## 2020-12-14 PROCEDURE — 92526 ORAL FUNCTION THERAPY: CPT

## 2020-12-14 PROCEDURE — 99232 SBSQ HOSP IP/OBS MODERATE 35: CPT | Performed by: INTERNAL MEDICINE

## 2020-12-14 PROCEDURE — 99222 1ST HOSP IP/OBS MODERATE 55: CPT | Performed by: INTERNAL MEDICINE

## 2020-12-14 RX ORDER — GABAPENTIN 100 MG/1
100 CAPSULE ORAL 2 TIMES DAILY
Status: DISCONTINUED | OUTPATIENT
Start: 2020-12-14 | End: 2020-12-18 | Stop reason: HOSPADM

## 2020-12-14 RX ORDER — LIDOCAINE 4 G/G
3 PATCH TOPICAL DAILY
Status: DISCONTINUED | OUTPATIENT
Start: 2020-12-14 | End: 2020-12-18 | Stop reason: HOSPADM

## 2020-12-14 RX ORDER — 0.9 % SODIUM CHLORIDE 0.9 %
250 INTRAVENOUS SOLUTION INTRAVENOUS ONCE
Status: COMPLETED | OUTPATIENT
Start: 2020-12-14 | End: 2020-12-14

## 2020-12-14 RX ORDER — PANTOPRAZOLE SODIUM 40 MG/10ML
40 INJECTION, POWDER, LYOPHILIZED, FOR SOLUTION INTRAVENOUS 2 TIMES DAILY
Status: DISCONTINUED | OUTPATIENT
Start: 2020-12-14 | End: 2020-12-18 | Stop reason: HOSPADM

## 2020-12-14 RX ORDER — OXYCODONE HYDROCHLORIDE 5 MG/1
5 TABLET ORAL EVERY 4 HOURS PRN
Status: DISCONTINUED | OUTPATIENT
Start: 2020-12-14 | End: 2020-12-18 | Stop reason: HOSPADM

## 2020-12-14 RX ORDER — ACETAMINOPHEN 500 MG
500 TABLET ORAL EVERY 6 HOURS
Status: DISCONTINUED | OUTPATIENT
Start: 2020-12-14 | End: 2020-12-18 | Stop reason: HOSPADM

## 2020-12-14 RX ORDER — SODIUM CHLORIDE 9 MG/ML
10 INJECTION INTRAVENOUS DAILY
Status: DISCONTINUED | OUTPATIENT
Start: 2020-12-14 | End: 2020-12-18 | Stop reason: HOSPADM

## 2020-12-14 RX ORDER — HALOPERIDOL 0.5 MG/1
0.5 TABLET ORAL EVERY 6 HOURS PRN
Status: DISCONTINUED | OUTPATIENT
Start: 2020-12-14 | End: 2020-12-18 | Stop reason: HOSPADM

## 2020-12-14 RX ADMIN — Medication 10 ML: at 09:11

## 2020-12-14 RX ADMIN — CEFEPIME 1 G: 1 INJECTION, POWDER, FOR SOLUTION INTRAMUSCULAR; INTRAVENOUS at 15:14

## 2020-12-14 RX ADMIN — ENOXAPARIN SODIUM 40 MG: 40 INJECTION SUBCUTANEOUS at 09:12

## 2020-12-14 RX ADMIN — Medication 10 ML: at 15:17

## 2020-12-14 RX ADMIN — PANTOPRAZOLE SODIUM 40 MG: 40 INJECTION, POWDER, FOR SOLUTION INTRAVENOUS at 15:13

## 2020-12-14 RX ADMIN — CEFEPIME 1 G: 1 INJECTION, POWDER, FOR SOLUTION INTRAMUSCULAR; INTRAVENOUS at 09:09

## 2020-12-14 RX ADMIN — SODIUM CHLORIDE: 9 INJECTION, SOLUTION INTRAVENOUS at 17:26

## 2020-12-14 RX ADMIN — SODIUM CHLORIDE 250 ML: 9 INJECTION, SOLUTION INTRAVENOUS at 13:25

## 2020-12-14 ASSESSMENT — ENCOUNTER SYMPTOMS
BACK PAIN: 1
SORE THROAT: 0
RESPIRATORY NEGATIVE: 1
FACIAL SWELLING: 0
TROUBLE SWALLOWING: 1
SINUS PRESSURE: 0
RHINORRHEA: 0
GASTROINTESTINAL NEGATIVE: 1
SINUS PAIN: 0
VOICE CHANGE: 0
EYES NEGATIVE: 1

## 2020-12-14 NOTE — PROGRESS NOTES
Patient is calm and cooperative. I have been able to give blood, antibiotics, and restart IV fluids today. Patient repositioned for comfort. Spoke with family members [facility] and Leigha England today and allowed patient to talk with both of them. Will continue to monitor.  Electronically signed by Austin Barber RN on 12/14/20 at 6:39 PM EST

## 2020-12-14 NOTE — PROGRESS NOTES
Patient continues to remain calm. PCA Kaela bathed patient. Will draw ammonia level from PICC. Blood transfusion complete. Patient repositioned for comfort.  Electronically signed by Shayne Severin, RN on 12/14/20 at 5:30 PM EST

## 2020-12-14 NOTE — CONSULTS
Inpatient consult to GI  Consult performed by: Antony Casas MD  Consult ordered by: Anastasia Grewal MD          Patient Name: Myrna Patient Date: 2020 10:44 PM  MR #: [id number]  : 1950    Attending Physician: Anastasia Grewal MD  Reason for consult: Declining hemoglobin    History of Presenting Illness:      Leia River is a 79 y.o. male on hospital day 2 with a history of PAF, state cancer. History Obtained From:  patient, electronic medical record, RN  Patient with metastatic prostate cancer. He is confused, but alert. RN denies any melena, hematochezia, or hematemesis. Unable to perform ROS due to confusion. GI consulted for declining hemoglobin. Patient status post cervical laminoplasty reconstruction on 2020. CT scan performed on 2020 showing fluid collection, ID following-for cervical spine abscess, postoperative infection. Colonoscopy 2020medium sized internal/external hemorrhoids. Transverse colon polyp, not removed. EGD 2020-healing pyloric channel ulcer. Gastric antral erosions. Colonoscopy - moderate internal hemorrhoids. One 3 mm polyp removed from rectum. Sigmoid diverticulosis. History:      Past Medical History:   Diagnosis Date    PAF (paroxysmal atrial fibrillation) ([facility] [address]     ON XARELTO    Prostate cancer ([facility] [address] 2019     Past Surgical History:   Procedure Laterality Date    CERVICAL LAMINECTOMY N/A 2020    CERVICAL C3-4,4-5,5-6  LAMINOPLASTY WITH RECONSTRUCTION performed by Carrol Newsome MD at [address] [place] [address] COLONOSCOPY N/A 2020    COLONOSCOPY DIAGNOSTIC performed by Antony Casas MD at [address] [address] Right 2018    hip    UPPER GASTROINTESTINAL ENDOSCOPY N/A 2020    EGD DIAGNOSTIC ONLY performed by Antony Casas MD at [facility]     Family History  History reviewed. No pertinent family history.   Social History     Socioeconomic History    Marital status: Single Spouse name: Not on file    Number of children: Not on file    Years of education: Not on file    Highest education level: Not on file   Occupational History    Occupation: retired [employer]   Social Needs    Financial resource strain: Not very hard    Food insecurity     Worry: Never true     Inability: Never true    Transportation needs     Medical: No     Non-medical: No   Tobacco Use    Smoking status: Never Smoker    Smokeless tobacco: Never Used    Tobacco comment: denies   Substance and Sexual Activity    Alcohol use: Not Currently     Frequency: 2-4 times a month     Comment: denies    Drug use: No     Comment: denies    Sexual activity: Not on file   Lifestyle    Physical activity     Days per week: 0 days     Minutes per session: 0 min    Stress:  Only a little   Relationships    Social connections     Talks on phone: Not on file     Gets together: Not on file     Attends Christianity service: Not on file     Active member of club or organization: Not on file     Attends meetings of clubs or organizations: Not on file     Relationship status: Not on file    Intimate partner violence     Fear of current or ex partner: No     Emotionally abused: No     Physically abused: No     Forced sexual activity: No   Other Topics Concern    Not on file   Social History Narrative    Retired from [employer] With: Alone    Type of Home: [address] [place], [place] [address]vd: One level    Home Access: Level entry    Bathroom Shower/Tub: Tub/Shower unit    [address]: Location[place] bars in [facility]: [facility]: [address]: Independent    Transfer Assistance: Independent    Active : No    Patient's  Info: Sister    Additional Comments: Pt. reports he has been having near falls but no falls in the last few weeks, but progressively more weakness hte last few weeks      Home Medications: Medications Prior to Admission: gabapentin (NEURONTIN) 300 MG capsule, Take 1 capsule by mouth 3 times daily for 30 days. lidocaine 4 % external patch, Place 3 patches onto the skin daily  tiZANidine (ZANAFLEX) 4 MG tablet, Take 1 tablet by mouth every 8 hours  cefepime (MAXIPIME) infusion, Infuse 1,000 mg intravenously every 8 hours Compound per protocol  mirtazapine (REMERON) 7.5 MG tablet, Take 1 tablet by mouth nightly  polyethylene glycol (GLYCOLAX) 17 g packet, Take 17 g by mouth daily as needed for Constipation  enzalutamide (XTANDI) 40 MG capsule, Take 160 mg by mouth daily  oxyCODONE (OXY-IR) 15 MG immediate release tablet, Take 1 tablet by mouth every 6 hours as needed for Pain (moderate to severe pain) for up to 120 days. leuprolide (LUPRON) 7.5 MG injection, Inject 7.5 mg into the muscle every 28 days  Misc. Devices (ROLLATOR ULTRA-LIGHT) MISC, 1 Device by Does not apply route daily  sennosides-docusate sodium (SENOKOT-S) 8.6-50 MG tablet, Take 2 tablets by mouth daily as needed for Constipation  Polyethylene Glycol 3350 POWD, Take by mouth  ondansetron (ZOFRAN) 4 MG tablet, Take 1 tablet by mouth daily as needed for Nausea or Vomiting  prochlorperazine (COMPAZINE) 10 MG tablet, Take 10 mg by mouth every 6 hours as needed  [DISCONTINUED] morphine (MS CONTIN) 15 MG extended release tablet, Take 1 tablet by mouth every 8 hours for 30 days. [DISCONTINUED] oxyCODONE-acetaminophen (PERCOCET)  MG per tablet, Take 1 tablet by mouth every 4 hours as needed for Pain (moderate to sever break through pain) for up to 15 days. [DISCONTINUED] morphine (MS CONTIN) 15 MG extended release tablet, Take 1 tablet by mouth 2 times daily for 30 days.     Current Hospital Medications:   Scheduled Meds:   pantoprazole  40 mg Intravenous BID    And    sodium chloride (PF)  10 mL Intravenous Daily    sodium chloride  250 mL Intravenous Once    sodium chloride flush  10 mL Intravenous 2 times per day  enoxaparin  40 mg Subcutaneous Daily    cefepime  1 g Intravenous Q8H    atenolol  50 mg Oral Daily     Continuous Infusions:   sodium chloride Stopped (12/13/20 1634)     PRN Meds:.haloperidol, sodium chloride flush, promethazine **OR** ondansetron, acetaminophen **OR** acetaminophen, traMADol   sodium chloride Stopped (12/13/20 1634)      Allergies:   No Known Allergies   Review of Systems:      Unable to obtain due to neurologic status    Objective Findings:     Vitals:   Vitals:    12/12/20 1014 12/12/20 2022 12/14/20 0732 12/14/20 1322   BP: (!) 105/59 (!) 101/58 131/62 (!) 141/72   Pulse: 105 87 90 93   Resp:  16 16 16   Temp:  99.1 °F (37.3 °C) 98.4 °F (36.9 °C) 98.1 °F (36.7 °C)   TempSrc:  Oral Oral Oral   SpO2: 100% 98% 100% 100%   Weight:       Height:          Physical Examination:  General: No apparent distress, alert but confused  HEENT: Normocephalic, -no scleral icterus. Neck: No JVD. Heart: Regular, no murmur, no rub/gallop. No RV heave. Lungs: Clear to ascultation, no rales/wheezing/rhonchi. Good chest wall excursion. Abdomen: Appearance:, Distension -none, Soft , tenderness -none, Bowel sounds normal   Extremities: No clubbing/cyanosis, no edema. Skin: Warm, dry, normal turgor, no rash, no bruise, no petichiae. Neuro: No myoclonus or tremor.    Psych: Normal affect    Results/ Medications reviewed 12/14/2020, 1:46 PM     Laboratory, Microbiology, Pathology, Radiology, Cardiology, Medications and Transcriptions reviewed  Scheduled Meds:   pantoprazole  40 mg Intravenous BID    And    sodium chloride (PF)  10 mL Intravenous Daily    sodium chloride  250 mL Intravenous Once    sodium chloride flush  10 mL Intravenous 2 times per day    enoxaparin  40 mg Subcutaneous Daily    cefepime  1 g Intravenous Q8H    atenolol  50 mg Oral Daily     Continuous Infusions:   sodium chloride Stopped (12/13/20 1634)       Recent Labs     12/12/20  0618 12/13/20  0600 12/14/20  0600 WBC 16.4* 13.7* 13.5*   HGB 7.4* 6.8* 6.6*   HCT 23.3* 21.7* 21.3*   MCV 76.3* 76.3* 76.4*   * 435* 448*     Recent Labs     12/12/20  1245 12/12/20  1753 12/13/20  0600 12/14/20  0600   *  --  136 136   K 5.1* 5.2* 4.2 4.0   CL 96  --  102 102   CO2 23  --  23 23   BUN 33*  --  33* 29*   CREATININE 1.26*  --  0.86 0.65*     Recent Labs     12/12/20  0618 12/13/20  0600 12/14/20  0600   * 95* 60*   ALT 28 27 27   BILITOT 0.7 0.6 0.8*   ALKPHOS 323* 259* 254*     No results for input(s): LIPASE, AMYLASE in the last 72 hours. Recent Labs     12/12/20  0618 12/13/20  0600 12/14/20  0600   PROT 6.5 6.1* 6.4     Ct Head Wo Contrast  Result Date: 12/12/2020  No acute intracranial process. Nonspecific 1.5 cm soft tissue structure with internal calcifications within the subcutaneous soft tissues superficial the right nasal bone. Clinical correlation is recommended. Ct Head Wo Contrast  Result Date: 11/18/2020  Impression: No acute findings. Mild cerebral atrophy. Chronic ischemic white matter disease. Ct Cervical Spine W Contrast  Result Date: 12/2/2020  Rim-enhancing fluid collection containing small foci of air within the subcutaneous soft tissues at the C1-C5 levels measures approximately 4 cm in AP dimension by 4 cm in transverse dimension by 7 cm in craniocaudal dimension and is most concerning for abscess. Evaluation for extension of infection of the spinal canal is suboptimal by CT and MRI of the cervical spine with contrast is recommended.   Impression: 51-year-old male patient with history of metastatic prostate cancer. GI consulted for declining hemoglobin. Baseline hemoglobin 8-9. Hemoglobin today 6.6, MCV 76.4, platelets 651. Status post cervical laminoplasty with postoperative infections / cervical abscess. On IV antibiotics, ID following. No active GI bleeding and he is hemodynamically stable. Status post EGD and colonoscopy in September, 2020. Colonoscopy findings of internal/external hemorrhoids and a transverse colon polyp. EGD findings of a healing pyloric channel ulcer and gastric antral erosions. Will defer endoscopic investigations at this time, unless active bleeding occurs. Recommend checking HP stool study. PPI twice daily. Anemia likely multifactorial, wide differential.  Plan:   -Continue course of care  -Monitor H&H  -PPI twice daily  -Check HP stool  -Observe clinical course  Comments: Thank you for allowing us to participate in the care of this patient. Will continue to follow. Please call if questions or concerns arise. Electronically signed by Kennedi Paez MD on 12/14/2020 at 1:46 PM    Please note this report has been partially produced using speech recognition software and may cause contain errors related to that system including grammar, punctuation and spelling as well as words and phrases that may seem inappropriate. If there are questions or concerns please feel free to contact me to clarify.

## 2020-12-14 NOTE — PROGRESS NOTES
[facility]  Facility/Department: [facility]  Speech Language Pathology   Treatment Note          Earle Betts  1950  [id number]    Medical Dx: DEONDRE (acute kidney injury) ([address] [N17.9]  Speech Dx: Dysphagia     12/14/2020    Subjective:  Cooperative, Pleasant and Lethargic        Interventions used this date:  Dysphagia Treatment    Objective/Assessment:  Patient progressing towards goals:  Short-term Goals  Timeframe for Short-term Goals: 1-2 weeks during LOS or until goals are met. Goal 1: Pt will tolerate least restrictive diet without overt s/s of aspiration. Goal 2: Pt will tolerate instrumental swallowing procedure to more objectively assess pharyngeal stage of swallow if deemed appropriate by treating SLP.--Not appropriate at this time as pt remains too weak to tolerate MBS. Goal 3: Pt will complete oral motor ROM and strengthening exercises with 80% accuracy, given cues as needed, in order to strengthen lingual/labial/buccal musculature to promote safety and efficiency of oral phase of swallow and decrease risk for pocketing.--Completed oral motor ROM and strengthening exercises with 70% accuracy following min verbal cues. Required max verbal cues in order to increase to 90% accuracy. SLP noted decreased lingual and labial strength, coordination, and sensation. Goal 4: Pt will tolerate 5/5 trials of puree via teaspoon without overt s/s of aspiration. --Pt given 1/4 teaspoon of puree consistencies. Pt demonstrated delayed swallow in 5/5 trials and demonstrated moderate residue on base of tongue for all 5 trials. Pt required moderate verbal cues in order to clear residue. Pt educated on continuation of NPO status. Education may need reinforcement. Goal 5: Pt will tolerate 5/5 trials of NTL via teaspoon without overt s/s of aspiration. --Pt trials HTL 3x and demonstrated a delayed cough and throat clear after each trial.  Task d/c as pt appeared lethargic during task. Pt observed to demonstrated delayed swallow by holding liquid for 6-8 seconds before initiation of swallow. Goal 6: Pt will complete pharyngeal strengthening exercises (such as the Sherrie, Effortful swallow, etc) with 80% acc given cues as needed in order to strengthen and establish and more effective swallow. Compensatory Swallowing Strategies: Total feed, Upright as possible for all oral intake, Eat/Feed slowly, Small bites/sips      Treatment/Activity Tolerance:  Patient tolerated treatment well    Plan:  Continue per POC    Pain Assessment:  Initial Assessment:  Patient denies pain. Re-assessment:  Patient denies pain. Patient/Caregiver Education:  Patient educated on session and progression towards goals. Education needs reinforcement.     Safety Devices:  Bed alarm in place and Call light within reach      Therapy Time  Time In: 1000  Time Out: 1008  Total Time: 8 min    Signature: Electronically signed by ILYA Ruiz on 12/14/2020 at 2:21 PM

## 2020-12-14 NOTE — PROGRESS NOTES
Spiritual Care Services     Summary of Visit:      Spiritual Assessment/Intervention/Outcomes:    Encounter Summary  Services provided to[de-identified] Patient  Referral/Consult From[de-identified] Rounding  Support System: Family members  Continue Visiting: No  Complexity of Encounter: Moderate  Length of Encounter: 15 minutes  Spiritual Assessment Completed: Yes  Routine  Type: Initial     Spiritual/Hinduism  Type: Spiritual support  Assessment: Approachable  Intervention: Active listening, Explored feelings, thoughts, concerns, Sustaining presence/ Ministry of presence  Outcome: Engaged in conversation, Expressed feelings/needs/concerns, Venting emotion                         Care Plan:        Spiritual Care Services   Electronically signed by Megan Carranza on 12/14/20 at 3:36 PM EST     To reach a  for emotional and spiritual support, place an [facility] consult request.   If a  is needed immediately, dial 0 and ask to page the on-call .

## 2020-12-14 NOTE — PROGRESS NOTES
Physical Therapy  Facility/Department: [facility] [id number]  Physical Therapy Discharge      NAME: Gayle Wilkinson    : 1950 (7 y.o.)  MRN: [id number]    Account: [de-identified]  Gender: male      Patient has been discharged from acute care hospital. DC patient from current PT program.      Electronically signed by Josie Mercado PT on 20 at 4:54 PM EST

## 2020-12-14 NOTE — PROGRESS NOTES
Patient is still confused but much less agitated since he talked to his sister earlier. Opted to not give haldol as he has been calm, only upset that he cannot drink. Will continue to monitor agitation and need for haldol prn. Repositioned patient a couple times. Denies pain at this time.  Electronically signed by Marybel Pantoja RN on 12/14/20 at 3:54 PM EST

## 2020-12-14 NOTE — PROGRESS NOTES
Assumed care of patient around 1100. Patient is extremely confused and agitated. Called Mag[facility] Cast to verify blood consent. Blood transfusion started. Allowed patient to speak with Hina Sniff per his request. Lungs clear, HR regular at 83 NSR.  Electronically signed by Marybel Pantoja RN on 12/14/20 at 1:59 PM EST

## 2020-12-14 NOTE — PROGRESS NOTES
GLUCOSE 98 12/14/2020    CALCIUM 8.0 12/14/2020                ASSESSMENT:  PLAN:  Cervical spine abscess   postoperative infection    Culture-negative    Continue cefepime plan 3weeks more

## 2020-12-14 NOTE — CONSULTS
Palliative Care Consult Note  Patient: Leia River  Gender: male  YOB: 1950  Unit/Bed: V632/P092-18  CodeStatus: Full Code  Inpatient Treatment Team: Treatment Team: Attending Provider: Anastasia Grewal MD; Consulting Physician: Javon Castro MD; Utilization Reviewer: Toy Lilly RN; Consulting Physician: Maulik Iraheta MD; : Deena Moreland RN; Patient Care Tech: Bayron Ferrari; Registered Nurse: Diego Andres RN; Consulting Physician: Antony Casas MD; Consulting Physician: Brian Barraza MD  Admit Date:  12/11/2020    Chief Complaint: Altered mental status    History of Presenting Illness:      Leia River is a 79 y.o. male on hospital day 2 with a history of DEONDRE. PMH is significant for androgen independent prostate cancer.  Known liver and bone mets, severe malnutrition, falls, urine retention self caths, gastric ulcer with hemorrhage. Patient seen and examined at bedside for symptom managment and family support. He is currently active [facility]. He was recently discharged from the hospital after being treated for abscess to his neck. He was discharged to home on 12/10/20 with [address] On 12/12/20 he returned to the ER after been found lethargic and confused at home. He was given narcan in the ER which improved his responsiveness. Patient observed laying in bed. He is oriented to self and place. At baseline, he is oriented x4. He denies having any pain at this time. He tells me that he doesn't understand why everyone is asking him about pain and he has never had any pain. He is telling me that he wants to take a shower. However, he then states that \"people\" wants to call the police on him. Nursing reports that he has been agitated and yelling at the staff. Patient does have metastasis from his prostate cancer to his liver. I will order Ammonia level. Will also start PRN Haldol for his agitation. Phone placed to patient's sister, Dudley Arita to discuss his goals of care. She told me that when he got home from the hospital that she was at home with him. He was confused. She had to feed him his meals. His confusion worsen. She called 911 and he was taken back to the hospital.    Review of Systems:       Review of Systems   Unable to perform ROS: Mental status change       Physical Examination:       /62   Pulse 90   Temp 98.4 °F (36.9 °C) (Oral)   Resp 16   Ht 6' 1\" (1.854 m)   Wt 147 lb 4.3 oz (66.8 kg)   SpO2 100%   BMI 19.43 kg/m²    Physical Exam  Constitutional:       General: He is not in acute distress. Appearance: He is underweight. He is not diaphoretic. HENT:      Head: Normocephalic and atraumatic. Right Ear: External ear normal.      Left Ear: External ear normal.      Nose: Nose normal.      Mouth/Throat:      Pharynx: No oropharyngeal exudate. Eyes:      General: No scleral icterus. Right eye: No discharge. Left eye: No discharge. Conjunctiva/sclera: Conjunctivae normal.      Pupils: Pupils are equal, round, and reactive to light. Neck:      Musculoskeletal: Normal range of motion and neck supple. Thyroid: No thyromegaly. Vascular: No JVD. Trachea: No tracheal deviation. Cardiovascular:      Rate and Rhythm: Normal rate and regular rhythm. Heart sounds: Normal heart sounds. Pulmonary:      Effort: Pulmonary effort is normal. No respiratory distress. Breath sounds: Normal breath sounds. No stridor. No wheezing or rales. Chest:      Chest wall: No tenderness. Abdominal:      General: Bowel sounds are normal. There is no distension. Palpations: Abdomen is soft. There is no mass. Tenderness: There is no abdominal tenderness. There is no guarding or rebound. Musculoskeletal: Normal range of motion. General: No tenderness or deformity. Lymphadenopathy:      Cervical: No cervical adenopathy.    Skin: General: Skin is warm and dry. Findings: No erythema or rash. Neurological:      Mental Status: He is alert. He is disoriented. Cranial Nerves: No cranial nerve deficit. Motor: Weakness present.       Coordination: Coordination normal.      Gait: Gait abnormal.         Allergies:      No Known Allergies    Medications:      Current Facility-Administered Medications   Medication Dose Route Frequency Provider Last Rate Last Admin    0.9 % sodium chloride infusion 250 mL  250 mL Intravenous Once Yvonne HEATHER HollandN - IDA        pantoprazole (PROTONIX) injection 40 mg  40 mg Intravenous BID Lupis Walsh MD        And    sodium chloride (PF) 0.9 % injection 10 mL  10 mL Intravenous Daily Lupis Walsh MD        0.9 % sodium chloride bolus  250 mL Intravenous Once Almon Dub, DO        sodium chloride flush 0.9 % injection 10 mL  10 mL Intravenous 2 times per day Tawanda Nieves APRN - CNP   10 mL at 12/14/20 0911    sodium chloride flush 0.9 % injection 10 mL  10 mL Intravenous PRN Tawanda Nieves APRN - CNP        enoxaparin (LOVENOX) injection 40 mg  40 mg Subcutaneous Daily Kenyatta Ospina APRN - CNP   40 mg at 12/14/20 0912    promethazine (PHENERGAN) tablet 12.5 mg  12.5 mg Oral Q6H PRN Tawanda Nieves APRN - CNP        Or    ondansetron [facility]) injection 4 mg  4 mg Intravenous Q6H PRN Tawanda Nieves, APRN - CNP        acetaminophen (TYLENOL) tablet 650 mg  650 mg Oral Q6H PRN Tawanad Nieves APRN - CNP        Or    acetaminophen (TYLENOL) suppository 650 mg  650 mg Rectal Q6H PRN Tawanda Nieves APRN - CNP        0.9 % sodium chloride infusion   Intravenous Continuous Tawanda Nieves APRN - CNP   Stopped at 12/13/20 1634    cefepime (MAXIPIME) 1 g IVPB minibag  1 g Intravenous Q8H Tawanda Nieves, APRN - CNP   Stopped at 12/14/20 9628  atenolol (TENORMIN) tablet 50 mg  50 mg Oral Daily Krystle Dangelo MD   50 mg at 12/12/20 1322    traMADol (ULTRAM) tablet 50 mg  50 mg Oral Q6H PRN Yves Heck DO   50 mg at 12/13/20 0320       History: PMHx:  Past Medical History:   Diagnosis Date    PAF (paroxysmal atrial fibrillation) ([facility] [address]     ON XARELTO    Prostate cancer ([facility] [address] 11/2019       PSHx:  Past Surgical History:   Procedure Laterality Date    CERVICAL LAMINECTOMY N/A 11/23/2020    CERVICAL C3-4,4-5,5-6  LAMINOPLASTY WITH RECONSTRUCTION performed by Rubin Mensah MD at [facility] COLONOSCOPY N/A 9/29/2020    COLONOSCOPY DIAGNOSTIC performed by Sameer Sanchez MD at [facility] Right 11/2018    hip    UPPER GASTROINTESTINAL ENDOSCOPY N/A 9/29/2020    EGD DIAGNOSTIC ONLY performed by Sameer Sanchez MD at [address] Hx:  Social History     Socioeconomic History    Marital status: Single     Spouse name: None    Number of children: None    Years of education: None    Highest education level: None   Occupational History    Occupation: retired [employer]   Social Needs    Financial resource strain: Not very hard    Food insecurity     Worry: Never true     Inability: Never true    Transportation needs     Medical: No     Non-medical: No   Tobacco Use    Smoking status: Never Smoker    Smokeless tobacco: Never Used    Tobacco comment: denies   Substance and Sexual Activity    Alcohol use: Not Currently     Frequency: 2-4 times a month     Comment: denies    Drug use: No     Comment: denies    Sexual activity: None   Lifestyle    Physical activity     Days per week: 0 days     Minutes per session: 0 min    Stress:  Only a little   Relationships    Social connections     Talks on phone: None     Gets together: None     Attends Religion service: None     Active member of club or organization: None     Attends meetings of clubs or organizations: None     Relationship status: None  Intimate partner violence     Fear of current or ex partner: No     Emotionally abused: No     Physically abused: No     Forced sexual activity: No   Other Topics Concern    None   Social History Narrative    Retired from [employer] With: [address]: [address] in [address]: One level    Home Access: Level entry    Bathroom Shower/Tub: Tub/Shower unit    [employer]: Grab bars in [facility]: [address] [place] [address] Avenue: [address]: Independent    Transfer Assistance: Independent    Active : No    Patient's  Info: Sister    Additional Comments: Pt. reports he has been having near falls but no falls in the last few weeks, but progressively more weakness hte last few weeks       Family Hx:  History reviewed. No pertinent family history.     LABS: Reviewed     CBC:  Lab Results   Component Value Date    WBC 13.5 12/14/2020    RBC 2.78 12/14/2020    HGB 6.6 12/14/2020    HCT 21.3 12/14/2020    MCV 76.4 12/14/2020    MCH 23.9 12/14/2020    MCHC 31.2 12/14/2020    RDW 19.8 12/14/2020     12/14/2020    MPV 8.7 09/09/2015     CBC with Differential:   Lab Results   Component Value Date    WBC 13.5 12/14/2020    RBC 2.78 12/14/2020    HGB 6.6 12/14/2020    HCT 21.3 12/14/2020     12/14/2020    MCV 76.4 12/14/2020    MCH 23.9 12/14/2020    MCHC 31.2 12/14/2020    RDW 19.8 12/14/2020    METASPCT 1 09/26/2020    LYMPHOPCT 12.5 12/14/2020    MONOPCT 10.9 12/14/2020    MYELOPCT 1 09/26/2020    BASOPCT 0.2 12/14/2020    MONOSABS 1.5 12/14/2020    LYMPHSABS 1.7 12/14/2020    EOSABS 0.1 12/14/2020    BASOSABS 0.0 12/14/2020     CMP:    Lab Results   Component Value Date     12/14/2020    K 4.0 12/14/2020    K 4.2 11/25/2020     12/14/2020    CO2 23 12/14/2020    BUN 29 12/14/2020    CREATININE 0.65 12/14/2020    GFRAA >60.0 12/14/2020    LABGLOM >60.0 12/14/2020    GLUCOSE 98 12/14/2020 PROT 6.4 12/14/2020    LABALBU 2.4 12/14/2020    CALCIUM 8.0 12/14/2020    BILITOT 0.8 12/14/2020    ALKPHOS 254 12/14/2020    AST 60 12/14/2020    ALT 27 12/14/2020     BMP:    Lab Results   Component Value Date     12/14/2020    K 4.0 12/14/2020    K 4.2 11/25/2020     12/14/2020    CO2 23 12/14/2020    BUN 29 12/14/2020    LABALBU 2.4 12/14/2020    CREATININE 0.65 12/14/2020    CALCIUM 8.0 12/14/2020    GFRAA >60.0 12/14/2020    LABGLOM >60.0 12/14/2020    GLUCOSE 98 12/14/2020     TSH:   Lab Results   Component Value Date    TSH 1.210 11/19/2020     Vitamin B12 and Folate: No components found for: FOLIC,  L94  Urinalysis:   Lab Results   Component Value Date    NITRU Negative 12/11/2020    WBCUA 3-5 12/11/2020    BACTERIA MANY 12/11/2020    RBCUA 3-5 12/11/2020    BLOODU Negative 12/11/2020    SPECGRAV 1.016 12/11/2020    GLUCOSEU Negative 12/11/2020           FUNCTIONAL ADL´S:     Independent: [  ] Eating, [   ] Dressing, [   ] Transferring, [   ] Kelly Rocio, [   ] Nuris Grant, [   ] Continence  Dependent   : [ X ] Eating, [ X  ] Dressing, [ X  ] Transferring, [ X  ] Toileting, Dianelys.Appl  ] Bathing, [ Ramiro Arrow Continence  W. assistant : [  ] Eating, [   ] Dressing, [   ] Transferring, [   ] Kelly Rocio, [   ] Bathing, [   ] Continence    Radiology: Reviewed      Mri Brain W Wo Contrast    Result Date: 12/12/2020 1. Altered mental status due to acute metabolic encephalopathy with hypercalcemia (corrected) and acute delirium most likely due to DEONDRE but cannot rule out other underlying conditions such as infectious process or hyperammonia. He is currently being treated for abscess of neck  -Will start Haldol 0.5 mg every six hours PRN  -MRI of head was negative for mets or other abnormality  -ID following  -Currently on IV cefetpime  -Will order ammonia level due to liver metastasis  -Psych has been consulted to determine decision making capacity    2. Impaired mobility  -PT/OT    3. Anemia  -Hemoglobin 6.6  -He is scheduled to have blood transfusion today    4. Dysphagia  -SLP consulted  -NPO status  -Dietitian consulted    -Palliative Care Encounter  -Palliative Care will continue to follow patient  -Patient may be appropriate for Hospice services due to his advanced metastatic disease. -Advance Care Planning  Discussed goals of care with patient. Explained in extensive detail nuances between full code, DNR CCA and DNR CC. Patient has made the decision to be FULL CODE      -Goals of Care Discussion: At this time, goals of care cannot be determined. Patient is confused, acutely. His sister wants to wait and see if his condition improves. She wants to continue with [facility]. She is okay with hospice if the is what he needs.      Electronically signed by JOYCE Whipple CNP on 12/14/2020 at 1:00 PM

## 2020-12-14 NOTE — PROGRESS NOTES
Comprehensive Nutrition Assessment    Type and Reason for Visit:  Initial(low BMI)    Nutrition Recommendations/Plan:   Recommend obtain EN access to start tube feeding  Recommend 1.5 with fiber formula (Jevity 1.5)@ 20 ml/hr x 24 hrs. Increase rate as tolerated to goal rate of 55 ml/hr   (This will provide 1980 kcal,84 gm protein, ~1000 ml free water)    Nutrition Assessment:  Pt admitted with DEONDRE, severely malnourished on admission. recently discharged from this hospital.  Pt at high nutrition risk due to severe malnutrition, now NPO due to dysphagia. Too weak to complete MBS at this time per SLP. Recommend obtain EN access and start tube feeding. Recommendations provided    Malnutrition Assessment:  Malnutrition Status:  Severe malnutrition    Context:  Chronic Illness     Findings of the 6 clinical characteristics of malnutrition:  Energy Intake:  7 - 75% or less estimated energy requirements for 1 month or longer  Weight Loss:  7 - Greater than 10% over 6 months     Body Fat Loss:  (Moderate body fat loss) Buccal region, Orbital, Fat Overlying Ribs(per visual assessment)   Muscle Mass Loss:  (Moderate muscle mass loss) Clavicles (pectoralis & deltoids), Temples (temporalis), Thigh (quadraceps)(per visual assessment)  Fluid Accumulation:  No significant fluid accumulation     Strength:  Not Performed    Estimated Daily Nutrient Needs:  Energy (kcal):  5222-3162 kcal (kg x 28-30); Weight Used for Energy Requirements:  Current(67 kg)     Protein (g):   gms (1.3-1.5 gm/kg); Weight Used for Protein Requirements:  Current(67 kg)        Fluid (ml/day):  ~1900 ml; Method Used for Fluid Requirements:  1 ml/kcal      Nutrition Related Findings:  PMH includes prostate cancer with mets to bone and liver, s/p spine surgery, pt is confused, per nsg verbally aggressive and uncooperative, hospice C/S noted. labs/meds reviewed.   BM (12/12), no edema noted      Wounds:  Surgical Incision Current Nutrition Therapies:    Diet NPO Effective Now Exceptions are: Sips with Meds    Anthropometric Measures:  · Height: 6' 1\" (185.4 cm)  · Current Body Weight: 147 lb (66.7 kg)(12/14 bedscale)   · Admission Body Weight: 147 lb (66.7 kg)(stated)    · Usual Body Weight: 177 lb (80.3 kg)(2/2020, 182 lb (6/2020), 166 lb (9/2020))     · Ideal Body Weight: 184 lbs; % Ideal Body Weight  < 100%   · BMI: 19.4  · BMI Categories:  Underweight (BMI less than 22) age over 72     Nutrition Diagnosis:   · In context of chronic illness, Severe malnutrition related to catabolic illness, inadequate protein-energy intake as evidenced by poor intake prior to admission, weight loss greater than or equal to 10% in 6 months, moderate loss of subcutaneous fat, moderate muscle loss    Nutrition Interventions:   Food and/or Nutrient Delivery:  Continue NPO, Start Tube Feeding(Recommend 1.5 with fiber formula (Jevity 1.5)@ 20 ml/hr x 24 hrs.   Increase rate as tolerated to goal rate of 55 ml/hr (1980 kcal,84 gm protein, ~1000 ml free water))  Nutrition Education/Counseling:  Education not indicated   Coordination of Nutrition Care:  Continue to monitor while inpatient    Goals:  EN access to start TF       Nutrition Monitoring and Evaluation:   Food/Nutrient Intake Outcomes:  Diet Advancement/Tolerance  Physical Signs/Symptoms Outcomes:  Biochemical Data, Weight     Electronically signed by Kimberlee Cervantes RD, LD on 12/14/20 at 3:41 PM EST

## 2020-12-14 NOTE — PLAN OF CARE
Nutrition Problem #1: In context of chronic illness, Severe malnutrition  Intervention: Food and/or Nutrient Delivery: Continue NPO, Start Tube Feeding(Recommend 1.5 with fiber formula (Jevity 1.5)@ 20 ml/hr x 24 hrs.   Increase rate as tolerated to goal rate of 55 ml/hr (1980 kcal,84 gm protein, ~1000 ml free water))  Nutritional Goals: EN access to start TF

## 2020-12-14 NOTE — CARE COORDINATION
Social work note: Chart review completed. It was communicated that patient and family are requesting [facility] but [facility] Medicare website indicates Audelia Bhagat is not an option. Called sister Mike Waggoner as she discussed DC plans with CM over the weekend. She had questions about another insurance card she found. Jazmin Brown, HELP dept to inquire in on looking into this. Looked at [facility] listing for SNF as sister reports he cannot go home. She chose [facility] in [facility] [phone] and [address] [place] in [address] [place] [address]. Will call to verify if they have beds and will accept his insurance. Lastly, it should be noted that sister reports she called [facility] on [address] and was going to speak to [place] at [phone] about Passport. Electronically signed by RUBÉN Main on 12/14/2020 at 10:30 AM     1037: Spoke with Eugenia Rios who confirms we do need patient's new medicare care and the [facility] Medicare card we do have is the replacement for Medicare. Called and left a message for [facility]. Awaiting reply. Electronically signed by RUBÉN Main on 12/14/20 at 10:39 AM EST    1048: Reached out to [place] Lisa Duong, liaison for [place]. Spoke with Mike Waggoner to let her know about Medicare card and his [facility] Medicare being the most updated card. Electronically signed by RUBÉN Main on 12/14/2020 at 10:48 AM     1111: Mike Waggoner, sister called. She was asking about a facility on [facility]. Let her know [facility] can only make referrals to facilities [facility] approves. Will reach out to [place] facilities just in case. Electronically signed by RUBÉN Main on 12/14/2020 at 12:06 PM     [id number]: Referral made to Sinai Wheeler, admissions at [facility]. They have few skilled beds available. She will be able to access patient's info to review benefits.  Electronically signed by RUBÉN Main on 12/14/2020 at 12:42 PM

## 2020-12-14 NOTE — PROGRESS NOTES
Hospitalist Progress Note      Date of Admission: 12/11/2020  Chief Complaint:    Chief Complaint   Patient presents with    Altered Mental Status     Subjective:  Confused,    Medications:    Infusion Medications    sodium chloride Stopped (12/13/20 1634)     Scheduled Medications    0.9 % sodium chloride  250 mL Intravenous Once    pantoprazole  40 mg Intravenous BID    And    sodium chloride (PF)  10 mL Intravenous Daily    sodium chloride  250 mL Intravenous Once    sodium chloride flush  10 mL Intravenous 2 times per day    enoxaparin  40 mg Subcutaneous Daily    cefepime  1 g Intravenous Q8H    atenolol  50 mg Oral Daily     PRN Meds: sodium chloride flush, promethazine **OR** ondansetron, acetaminophen **OR** acetaminophen, traMADol    Intake/Output Summary (Last 24 hours) at 12/14/2020 1108  Last data filed at 12/14/2020 0557  Gross per 24 hour   Intake 100 ml   Output 950 ml   Net -850 ml     Exam:  /62   Pulse 90   Temp 98.4 °F (36.9 °C) (Oral)   Resp 16   Ht 6' 1\" (1.854 m)   Wt 147 lb 4.3 oz (66.8 kg)   SpO2 100%   BMI 19.43 kg/m²   Head: Normocephalic, atraumatic  Sclera clear  Neck JVD flat  Lungs: normal effort of breathing    Labs:   Recent Labs     12/12/20  0618 12/13/20  0600 12/14/20  0600   WBC 16.4* 13.7* 13.5*   HGB 7.4* 6.8* 6.6*   HCT 23.3* 21.7* 21.3*   * 435* 448*     Recent Labs     12/12/20  0618 12/12/20  0618 12/12/20  1245 12/12/20  1753 12/13/20  0600 12/14/20  0600   *  --  133*  --  136 136   K 5.3*   < > 5.1* 5.2* 4.2 4.0   CL 95  --  96  --  102 102   CO2 24  --  23  --  23 23   BUN 31*  --  33*  --  33* 29*   CREATININE 1.49*  --  1.26*  --  0.86 0.65*   CALCIUM 8.7  --  7.9*  --  7.6* 8.0*   *  --   --   --  95* 60*   ALT 28  --   --   --  27 27   BILITOT 0.7  --   --   --  0.6 0.8*   ALKPHOS 323*  --   --   --  259* 254*    < > = values in this interval not displayed. No results for input(s): INR in the last 72 hours. No results for input(s): Ida Calderón in the last 72 hours. Radiology:  MRI BRAIN W WO CONTRAST   Final Result      No acute intracranial process or metastasis of the brain. Abnormal signal within the clivus, raising concern for metastatic disease             XR CHEST PORTABLE   Final Result      CT Head WO Contrast   Final Result        Assessment/Plan:    Patient with hx of canal stenosis s/p abscess with iv cefepime  Presents with elijah and confusion that improved with narcan. elijah resolved. Possible acute blood loss anemia,. Therefore will consult with gastroenterology. Hemoglobin again low today. 1 unit PRBC ordered. Although PRBC has been ordered, patient has been refusing. Still confused, encephalopathic, refusing rx including abx. Rectal mass and prostate ca with poor prognosis based on previous oncology eval.   Will need to discuss goals of care with family.      35 minutes total care time, >1/2 in unit/floor time and care coordination      Antonio Shepard MD

## 2020-12-14 NOTE — PROGRESS NOTES
Hospitalist Progress Note      Date of Admission: 12/11/2020  Chief Complaint:    Chief Complaint   Patient presents with    Altered Mental Status     Subjective:  Confused, feeling thirsty (rn to provide moistened foam brush)    Medications:    Infusion Medications    sodium chloride Stopped (12/13/20 1634)     Scheduled Medications    sodium chloride  250 mL Intravenous Once    sodium chloride flush  10 mL Intravenous 2 times per day    enoxaparin  40 mg Subcutaneous Daily    cefepime  1 g Intravenous Q8H    atenolol  50 mg Oral Daily     PRN Meds: sodium chloride flush, promethazine **OR** ondansetron, acetaminophen **OR** acetaminophen, traMADol    Intake/Output Summary (Last 24 hours) at 12/13/2020 2135  Last data filed at 12/13/2020 0506  Gross per 24 hour   Intake 1742 ml   Output 450 ml   Net 1292 ml     Exam:  BP (!) 101/58   Pulse 87   Temp 99.1 °F (37.3 °C) (Oral)   Resp 16   Ht 6' 1\" (1.854 m)   Wt 147 lb 4.3 oz (66.8 kg)   SpO2 98%   BMI 19.43 kg/m²   Head: Normocephalic, atraumatic  Sclera clear  Neck JVD flat  Lungs: normal effort of breathing    Labs:   Recent Labs     12/11/20  2300 12/12/20  0618 12/13/20  0600   WBC 18.0* 16.4* 13.7*   HGB 8.1* 7.4* 6.8*   HCT 24.9* 23.3* 21.7*   * 499* 435*     Recent Labs     12/11/20  2300 12/12/20  0618 12/12/20  0618 12/12/20  1245 12/12/20  1753 12/13/20  0600   * 134*  --  133*  --  136   K 6.0* 5.3*   < > 5.1* 5.2* 4.2   CL 92* 95  --  96  --  102   CO2 24 24  --  23  --  23   BUN 28* 31*  --  33*  --  33*   CREATININE 1.76* 1.49*  --  1.26*  --  0.86   CALCIUM 8.5 8.7  --  7.9*  --  7.6*   * 155*  --   --   --  95*   ALT 23 28  --   --   --  27   BILITOT 0.7 0.7  --   --   --  0.6   ALKPHOS 355* 323*  --   --   --  259*    < > = values in this interval not displayed. No results for input(s): INR in the last 72 hours. No results for input(s): Taz Hug in the last 72 hours.   Radiology: MRI BRAIN W WO CONTRAST   Final Result      No acute intracranial process or metastasis of the brain. Abnormal signal within the clivus, raising concern for metastatic disease             XR CHEST PORTABLE   Final Result      CT Head WO Contrast   Final Result        Assessment/Plan:    Patient with hx of canal stenosis s/p abscess with iv cefepime  Presents with elijah and confusion that improved with narcan. elijah resolved. Still confused, encephalopathic, refusing rx including abx. Rectal mass and prostate ca with poor prognosis based on previous oncology eval.   Will need to discuss goals of care with family.      35 minutes total care time, >1/2 in unit/floor time and care coordination      Kate Haile MD

## 2020-12-15 LAB
ALBUMIN SERPL-MCNC: 2.4 G/DL (ref 3.5–4.6)
ALP BLD-CCNC: 233 U/L (ref 35–104)
ALT SERPL-CCNC: 23 U/L (ref 0–41)
ANION GAP SERPL CALCULATED.3IONS-SCNC: 15 MEQ/L (ref 9–15)
AST SERPL-CCNC: 38 U/L (ref 0–40)
BASOPHILS ABSOLUTE: 0 K/UL (ref 0–0.2)
BASOPHILS RELATIVE PERCENT: 0.4 %
BILIRUB SERPL-MCNC: 0.8 MG/DL (ref 0.2–0.7)
BUN BLDV-MCNC: 23 MG/DL (ref 8–23)
CALCIUM SERPL-MCNC: 8.7 MG/DL (ref 8.5–9.9)
CHLORIDE BLD-SCNC: 108 MEQ/L (ref 95–107)
CO2: 19 MEQ/L (ref 20–31)
CREAT SERPL-MCNC: 0.51 MG/DL (ref 0.7–1.2)
EOSINOPHILS ABSOLUTE: 0.1 K/UL (ref 0–0.7)
EOSINOPHILS RELATIVE PERCENT: 0.8 %
GFR AFRICAN AMERICAN: >60
GFR NON-AFRICAN AMERICAN: >60
GLOBULIN: 3.5 G/DL (ref 2.3–3.5)
GLUCOSE BLD-MCNC: 91 MG/DL (ref 70–99)
HCT VFR BLD CALC: 23.5 % (ref 42–52)
HEMOGLOBIN: 7.6 G/DL (ref 14–18)
LYMPHOCYTES ABSOLUTE: 1.6 K/UL (ref 1–4.8)
LYMPHOCYTES RELATIVE PERCENT: 12 %
MCH RBC QN AUTO: 25.1 PG (ref 27–31.3)
MCHC RBC AUTO-ENTMCNC: 32.2 % (ref 33–37)
MCV RBC AUTO: 77.9 FL (ref 80–100)
MONOCYTES ABSOLUTE: 1.5 K/UL (ref 0.2–0.8)
MONOCYTES RELATIVE PERCENT: 10.9 %
NEUTROPHILS ABSOLUTE: 10.2 K/UL (ref 1.4–6.5)
NEUTROPHILS RELATIVE PERCENT: 75.9 %
PDW BLD-RTO: 19.5 % (ref 11.5–14.5)
PLATELET # BLD: 440 K/UL (ref 130–400)
POTASSIUM SERPL-SCNC: 3.4 MEQ/L (ref 3.4–4.9)
RBC # BLD: 3.02 M/UL (ref 4.7–6.1)
SODIUM BLD-SCNC: 142 MEQ/L (ref 135–144)
TOTAL PROTEIN: 5.9 G/DL (ref 6.3–8)
WBC # BLD: 13.4 K/UL (ref 4.8–10.8)

## 2020-12-15 PROCEDURE — 2580000003 HC RX 258: Performed by: NURSE PRACTITIONER

## 2020-12-15 PROCEDURE — 1210000000 HC MED SURG R&B

## 2020-12-15 PROCEDURE — 6370000000 HC RX 637 (ALT 250 FOR IP): Performed by: ANESTHESIOLOGY

## 2020-12-15 PROCEDURE — 99232 SBSQ HOSP IP/OBS MODERATE 35: CPT | Performed by: INTERNAL MEDICINE

## 2020-12-15 PROCEDURE — 80053 COMPREHEN METABOLIC PANEL: CPT

## 2020-12-15 PROCEDURE — 6370000000 HC RX 637 (ALT 250 FOR IP): Performed by: INTERNAL MEDICINE

## 2020-12-15 PROCEDURE — 92526 ORAL FUNCTION THERAPY: CPT

## 2020-12-15 PROCEDURE — 6360000002 HC RX W HCPCS: Performed by: NURSE PRACTITIONER

## 2020-12-15 PROCEDURE — 2580000003 HC RX 258: Performed by: INTERNAL MEDICINE

## 2020-12-15 PROCEDURE — C9113 INJ PANTOPRAZOLE SODIUM, VIA: HCPCS | Performed by: INTERNAL MEDICINE

## 2020-12-15 PROCEDURE — 6360000002 HC RX W HCPCS: Performed by: INTERNAL MEDICINE

## 2020-12-15 PROCEDURE — 85025 COMPLETE CBC W/AUTO DIFF WBC: CPT

## 2020-12-15 PROCEDURE — 90792 PSYCH DIAG EVAL W/MED SRVCS: CPT | Performed by: PSYCHIATRY & NEUROLOGY

## 2020-12-15 PROCEDURE — 99232 SBSQ HOSP IP/OBS MODERATE 35: CPT | Performed by: NURSE PRACTITIONER

## 2020-12-15 RX ADMIN — Medication 10 ML: at 21:34

## 2020-12-15 RX ADMIN — CEFEPIME 1 G: 1 INJECTION, POWDER, FOR SOLUTION INTRAMUSCULAR; INTRAVENOUS at 17:20

## 2020-12-15 RX ADMIN — CEFEPIME 1 G: 1 INJECTION, POWDER, FOR SOLUTION INTRAMUSCULAR; INTRAVENOUS at 03:43

## 2020-12-15 RX ADMIN — PANTOPRAZOLE SODIUM 40 MG: 40 INJECTION, POWDER, FOR SOLUTION INTRAVENOUS at 11:44

## 2020-12-15 RX ADMIN — GABAPENTIN 100 MG: 100 CAPSULE ORAL at 11:45

## 2020-12-15 RX ADMIN — GABAPENTIN 100 MG: 100 CAPSULE ORAL at 21:34

## 2020-12-15 RX ADMIN — ENOXAPARIN SODIUM 40 MG: 40 INJECTION SUBCUTANEOUS at 11:46

## 2020-12-15 RX ADMIN — Medication 10 ML: at 11:46

## 2020-12-15 RX ADMIN — ACETAMINOPHEN 500 MG: 500 TABLET ORAL at 11:45

## 2020-12-15 RX ADMIN — ACETAMINOPHEN 500 MG: 500 TABLET ORAL at 17:20

## 2020-12-15 RX ADMIN — CEFEPIME 1 G: 1 INJECTION, POWDER, FOR SOLUTION INTRAMUSCULAR; INTRAVENOUS at 11:45

## 2020-12-15 RX ADMIN — ATENOLOL 50 MG: 50 TABLET ORAL at 11:45

## 2020-12-15 RX ADMIN — PANTOPRAZOLE SODIUM 40 MG: 40 INJECTION, POWDER, FOR SOLUTION INTRAVENOUS at 21:34

## 2020-12-15 RX ADMIN — Medication 10 ML: at 11:52

## 2020-12-15 ASSESSMENT — PAIN SCALES - GENERAL
PAINLEVEL_OUTOF10: 2

## 2020-12-15 ASSESSMENT — ENCOUNTER SYMPTOMS
GASTROINTESTINAL NEGATIVE: 1
RESPIRATORY NEGATIVE: 1

## 2020-12-15 NOTE — CARE COORDINATION
Phone call to [facility] who does not currently have a bed available. They are willing to put the pt on a waiting list if necessary. Phone call to sister Cherie Trinidad to notify of above and also give names of other facilities in network. She is willing to look into [facility] in [facility] and [facility] in [place] as they may also be in network. LSW also discussed with her the hospice referral. Cherie Trinidad is agreeable to meeting with hospice so she may decide if this would be the direction to go. She would like [facility] She may want pt to return to her home with measures for patient's comfort. Referral made to [facility] Per [facility], consult scheduled for 2:15 on 12/16. Likely plan to DC to home with hospice.

## 2020-12-15 NOTE — PROGRESS NOTES
Infectious Disease     Patient Name: Earle Betts  Date: 12/15/2020  YOB: 1950  Medical Record Number: [id number]      Postoperative abscess of neck        Presenting with difficulty walking on 11/18/2020  Progressive quadriparesis  MRI was found to have severe canal stenosis C3-4-5 6 and 7  Patient went to surgery on 11/23/2020 cervical stenosis mild myelopathy underwent cervical laminoplasty reconstruction       CT scan was performed on 12/2/2020 showing fluid collection      MRI  MRI 7.5 X 4.5 X 3.5 CM POSTOPERATIVE FLUID COLLECTION  THIS DOES NOT APPEAR TO COMMUNICATE TO THE SPINAL CANAL, AND THERE IS NO EPIDURAL COLLECTION OR OTHER COMPLICATION IDENTIFIED     drained percutaneously cultures grew no organisms  Sedimentation rate was elevated    Patient recently discharged on cefepime plan for 4 weeks          Review of Systems   Constitutional: Negative for chills, diaphoresis, fatigue and fever. Respiratory: Negative. Cardiovascular: Negative. Gastrointestinal: Negative. Neurological: Positive for weakness. Physical Exam   Constitutional: No distress. Neck:   No drainage from wound and neck   Cardiovascular: Normal heart sounds. No murmur heard. Pulmonary/Chest: No respiratory distress. He has no wheezes. He has no rales. Abdominal: Soft. Bowel sounds are normal. He exhibits no distension and no mass. There is no abdominal tenderness. There is no rebound. Blood pressure (!) 132/92, pulse 73, temperature 97.2 °F (36.2 °C), temperature source Oral, resp. rate 16, height 6' 1\" (1.854 m), weight 147 lb 4.3 oz (66.8 kg), SpO2 100 %.       .   Lab Results   Component Value Date    WBC 13.4 (H) 12/15/2020    HGB 7.6 (L) 12/15/2020    HCT 23.5 (L) 12/15/2020    MCV 77.9 (L) 12/15/2020     (H) 12/15/2020     Lab Results   Component Value Date     12/15/2020    K 3.4 12/15/2020    K 4.2 11/25/2020     12/15/2020    CO2 19 12/15/2020 BUN 23 12/15/2020    CREATININE 0.51 12/15/2020    GLUCOSE 91 12/15/2020    CALCIUM 8.7 12/15/2020        ASSESSMENT:  PLAN:  Cervical spine abscess   postoperative infection  Continue cefepime plan 3weeks more

## 2020-12-15 NOTE — CONSULTS
[address] [place] [address], Department of Psychiatry  Behavioral Health Consult    REASON FOR CONSULT: Sumeet dempsey    CONSULTING PHYSICIAN:     History obtained from: patient    HISTORY OF PRESENT ILLNESS:    The patient is a 79 y.o. male living alone with multiple medical issues including terminal cancer  Patient was recently admitted to the hospital and then discharged home following which he got readmitted again  Treatment team is proposing hospice care for him, which patient is currently declining  Patient was noticed to be confused especially more so at nighttime  Patient was cooperative during the assessment  Is alert and oriented x 2  Patient denies feeling depressed or suicidal  Patient is able to tell me the nature of his current illness including the infection that is getting treatment and terminal prostatic cancer  Patient is able to understand the hospice care treatment that was proposed  However he is not able to analyze the risk-benefit alternative  Patient is unable to retain information given to him make this determination on the treatment plan      The patient is not currently receiving care for the above psychiatric illness.       Psychiatric Review of Systems       Depression: denies     Libby or Hypomania:  no     Panic Attacks:  no     Phobias:  no     Obsessions and Compulsions:  no     PTSD : no     Hallucinations:  no     Delusions:  no      Substance Abuse History:  ETOH: no  Marijuana: no  Opiates: no  Other Drugs: no      Past Psychiatric History:  Denies any    Past Medical History:        Diagnosis Date    PAF (paroxysmal atrial fibrillation) ([facility] [address]     ON XARELTO    Prostate cancer ([facility] [address] 11/2019       Past Surgical History:        Procedure Laterality Date    CERVICAL LAMINECTOMY N/A 11/23/2020    CERVICAL C3-4,4-5,5-6  LAMINOPLASTY WITH RECONSTRUCTION performed by Corbin Whitman MD at [address] [place] [address] COLONOSCOPY N/A 9/29/2020 COLONOSCOPY DIAGNOSTIC performed by Beverley Corbin MD at [address] Right 11/2018    hip    UPPER GASTROINTESTINAL ENDOSCOPY N/A 9/29/2020    EGD DIAGNOSTIC ONLY performed by Beverley Corbin MD at [facility]       Medications Prior to Admission:   Medications Prior to Admission: gabapentin (NEURONTIN) 300 MG capsule, Take 1 capsule by mouth 3 times daily for 30 days. lidocaine 4 % external patch, Place 3 patches onto the skin daily  tiZANidine (ZANAFLEX) 4 MG tablet, Take 1 tablet by mouth every 8 hours  cefepime (MAXIPIME) infusion, Infuse 1,000 mg intravenously every 8 hours Compound per protocol  mirtazapine (REMERON) 7.5 MG tablet, Take 1 tablet by mouth nightly  polyethylene glycol (GLYCOLAX) 17 g packet, Take 17 g by mouth daily as needed for Constipation  enzalutamide (XTANDI) 40 MG capsule, Take 160 mg by mouth daily  oxyCODONE (OXY-IR) 15 MG immediate release tablet, Take 1 tablet by mouth every 6 hours as needed for Pain (moderate to severe pain) for up to 120 days. leuprolide (LUPRON) 7.5 MG injection, Inject 7.5 mg into the muscle every 28 days  Misc. Devices (ROLLATOR ULTRA-LIGHT) MISC, 1 Device by Does not apply route daily  sennosides-docusate sodium (SENOKOT-S) 8.6-50 MG tablet, Take 2 tablets by mouth daily as needed for Constipation  Polyethylene Glycol 3350 POWD, Take by mouth  ondansetron (ZOFRAN) 4 MG tablet, Take 1 tablet by mouth daily as needed for Nausea or Vomiting  prochlorperazine (COMPAZINE) 10 MG tablet, Take 10 mg by mouth every 6 hours as needed  [DISCONTINUED] morphine (MS CONTIN) 15 MG extended release tablet, Take 1 tablet by mouth every 8 hours for 30 days. [DISCONTINUED] oxyCODONE-acetaminophen (PERCOCET)  MG per tablet, Take 1 tablet by mouth every 4 hours as needed for Pain (moderate to sever break through pain) for up to 15 days. [DISCONTINUED] morphine (MS CONTIN) 15 MG extended release tablet, Take 1 tablet by mouth 2 times daily for 30 days. Allergies:  Patient has no known allergies. FAMILY/SOCIAL HISTORY:  History reviewed. No pertinent family history. Social History     Socioeconomic History    Marital status: Single     Spouse name: Not on file    Number of children: Not on file    Years of education: Not on file    Highest education level: Not on file   Occupational History    Occupation: retired [employer]   Social Needs    Financial resource strain: Not very hard    Food insecurity     Worry: Never true     Inability: Never true    Transportation needs     Medical: No     Non-medical: No   Tobacco Use    Smoking status: Never Smoker    Smokeless tobacco: Never Used    Tobacco comment: denies   Substance and Sexual Activity    Alcohol use: Not Currently     Frequency: 2-4 times a month     Comment: denies    Drug use: No     Comment: denies    Sexual activity: Not on file   Lifestyle    Physical activity     Days per week: 0 days     Minutes per session: 0 min    Stress:  Only a little   Relationships    Social connections     Talks on phone: Not on file     Gets together: Not on file     Attends Mandaeism service: Not on file     Active member of club or organization: Not on file     Attends meetings of clubs or organizations: Not on file     Relationship status: Not on file    Intimate partner violence     Fear of current or ex partner: No     Emotionally abused: No     Physically abused: No     Forced sexual activity: No   Other Topics Concern    Not on file   Social History Narrative    Retired from [employer] With: Alone    Type of Home: [address] [place], [place] [address]vd: One level    Home Access: Level entry    Bathroom Shower/Tub: Tub/Shower unit    [employer]: [employer] in [facility]: [address]: [facility] Ambulation Assistance: Independent    Transfer Assistance: Independent    Active : No    Patient's  Info: Sister    Additional Comments: Pt. reports he has been having near falls but no falls in the last few weeks, but progressively more weakness hte last few weeks       REVIEW OF SYSTEMS    Constitutional: [] fever  [] chills  [] weight loss  []weakness [] Other:  Eyes:  [] photophobia  [] discharge [] acuity change   [] Diplopia   [] Other:  HENT:  [] sore throat  [] ear pain [] Tinnitus   [] Other  Respiratory:  [] Cough  [] Shortness of breath   [] Sputum   [] Other:   Cardiac: []Chest pain   []Palpitations []Edema  []PND  [] Other:  GI:  []Abdominal pain   []Nausea  []Vomiting  []Diarrhea  [] Other:  :  [] Dysuria   []Frequency  []Hematuria  []Discharge  [] Other:  Possible Pregnancy: []Yes   []No   LMP:   Musculoskeletal:  []Back pain  []Neck pain  []Recent Injury   Skin:  []Rash  [] Itching  [] Other:  Neurologic:  [] Headache  [] Focal weakness  [] Sensory changes []Other:  Endocrine:  [] Polyuria  [] Polydipsia  [] Hair Loss  [] Other:  Lymphatic:   [] Swollen glands   Psychiatric:  As per HPI      All other systems negative except as marked or mentioned/indicated in the HPI. Masood Gonzales      PHYSICAL EXAM:  Vitals:  BP (!) 132/92   Pulse 73   Temp 97.2 °F (36.2 °C) (Oral)   Resp 16   Ht 6' 1\" (1.854 m)   Wt 147 lb 4.3 oz (66.8 kg)   SpO2 100%   BMI 19.43 kg/m²      Neuro Exam:   Muscle Strength & Tone: full ROM  Gait: in bed   Involuntary Movements: No    Mental Status Examination:    Level of consciousness:  within normal limits   Appearance:  ill-appearing  Behavior/Motor:  psychomotor retardation  Attitude toward examiner:  cooperative  Speech:  slow   Mood: anxious  Affect:  mood congruent  Thought processes:  slow   Thought content:  Perceptual Disturbance:  denies any perceptual disturbance  Cognition:  disoriented   Concentration poor  Memory intact  Mini Mental Status not completed Insight poor   Judgement poor   Fund of Knowledge limited      DIAGNOSIS:     Delirium- mutifactorial         LABS: REVIEWED TODAY:  Recent Labs     12/13/20  0600 12/14/20  0600 12/14/20  1919 12/15/20  0600   WBC 13.7* 13.5*  --  13.4*   HGB 6.8* 6.6* 7.6* 7.6*   * 448*  --  440*     Recent Labs     12/13/20  0600 12/14/20  0600 12/15/20  0600    136 142   K 4.2 4.0 3.4    102 108*   CO2 23 23 19*   BUN 33* 29* 23   CREATININE 0.86 0.65* 0.51*   GLUCOSE 115* 98 91     Recent Labs     12/13/20  0600 12/14/20  0600 12/15/20  0600   BILITOT 0.6 0.8* 0.8*   ALKPHOS 259* 254* 233*   AST 95* 60* 38   ALT 27 27 23     No results found for: [address], BARBSCNU, LABBENZ, CANNAB, COCAINESCRN, LABMETH, OPIATESCREENURINE, PHENCYCLIDINESCREENURINE, PPXUR, ETOH  Lab Results   Component Value Date    TSH 1.210 11/19/2020     No results found for: LITHIUM  No results found for: VALPROATE, CBMZ  No results found for: LITHIUM, VALPROATE    FURTHER LABS ORDERED :      Radiology   Ct Head Wo Contrast    Result Date: 12/12/2020  EXAMINATION: CT of the brain without contrast HISTORY: Altered mental status COMPARISON: CT brain from November 18, 2020 TECHNIQUE: Multiple contiguous axial images were obtained of the brain from the skull base through the vertex. Multiplanar reformats were obtained. FINDINGS: Brain volume is age appropriate. Ventricular morphology is within normal limits. Gray-white matter differentiation is preserved. Areas of bilateral supratentorial white matter hypoattenuation are nonspecific but most likely related to chronic small vessel ischemic changes in a patient of this age. No acute hemorrhage or abnormal extra-axial fluid collection. No mass effect or midline shift. The visualized paranasal sinuses and mastoid air cells are clear. Calvarium is intact. A 1.5 cm soft tissue structure with internal calcifications identified within the subcutaneous soft tissues along the right nasal bone. EXAM: CT CERVICAL SPINE W CONTRAST COMPARISON: None available REASON FOR EXAMINATION:  Fatigue, mild, neck pain, numbness and weakness. TECHNIQUE: Multiple contiguous axial CT images of the cervical spine were obtained. Multiplanar reformats performed. FINDINGS:  Postsurgical changes of posterior decompression at C3-C5. Soft tissue fluid collection within the subcutaneous soft tissues at the C1-C5 level measures approximately 4 cm in AP dimension by 4 cm in transverse dimension by 7 cm in craniocaudal dimension and demonstrates rim enhancement. Multiple small foci of air are present within this fluid collection. This fluid collection does not appear to be contiguous with the spinal canal on CT. Multilevel degenerative changes of the cervical spine again identified. Rim-enhancing fluid collection containing small foci of air within the subcutaneous soft tissues at the C1-C5 levels measures approximately 4 cm in AP dimension by 4 cm in transverse dimension by 7 cm in craniocaudal dimension and is most concerning for abscess. Evaluation for extension of infection of the spinal canal is suboptimal by CT and MRI of the cervical spine with contrast is recommended. All CT scans at this facility use dose modulation, iterative reconstruction, and/or weight based dosing when appropriate to reduce radiation dose to as low as reasonably achievable.      Mri Cervical Spine W Wo Contrast    Result Date: 12/3/2020 MRI CERVICAL SPINE W WO CONTRAST : 12/3/2020 COMPARISON: Cervical spine CT with contrast 12/2/2020 REASON FOR EXAMINATION:   possible cervical spinal abscess  TECHNIQUE: Multiplanar MR imaging of the cervical spine was performed before and after intravenous administration of approximately 15 mL of MultiHance gadolinium contrast. FINDINGS:  Postsurgical changes of posterior decompression left laminoplasty at C3-C5 are again noted. A mildly marginally enhancing organized fluid collection within the posterior soft tissues measures approximately 4.5 x 3.5 x 7.5 cm (transverse, AP and sagittal dimension). This fluid collection does not appear to be contiguous with the spinal canal, and there is no residual spinal stenosis, epidural fluid collection or other significant changes identified. Multilevel degenerative changes of the cervical spine described in detail on prior studies are again noted. APPROXIMATELY 7.5 X 4.5 X 3.5 CM POSTOPERATIVE FLUID COLLECTION AFTER RECENT CERVICAL LAMINOPLASTY. INFECTION SHOULD BE EXCLUDED CLINICALLY. THIS DOES NOT APPEAR TO COMMUNICATE TO THE SPINAL CANAL, AND THERE IS NO EPIDURAL COLLECTION OR OTHER COMPLICATION IDENTIFIED.      Mri Cervical Spine W Wo Contrast    Result Date: 11/20/2020 MRI of the cervical spine without and with gadolinium. HISTORY: Frequent falls. Recent diagnosis of metastatic prostate carcinoma. COMPARISON: Images of the cervical spine on noncontrast MRI of the cervical thoracic and lumbar spine performed on 11/19/2020. TECHNIQUE: Sagittal T1, T2, and inversion recovery. Axial gradient echo and T2. Sagittal and axial T1 with fat suppression after the IV administration of 13 cc of gadolinium (MultiHance). 3 plane T2 localizer. FINDINGS: Image quality is compromised by motion. Mid to lower cervical spinal canal has a decreased caliber on a congenital basis accentuated by spondylitic changes detailed below. Cervical cord has myelomalacia with a diffuse decreased caliber and mild intrasubstance increased signal beginning just above the C3-4 disc level and extending to just below the C6-7 level secondary to severe canal stenosis and chronic cord compression at multiple disc levels. As an example, the AP dimension of the cord is 2.6 mm at the C5-6 disc level. Although evaluation is limited by motion there is a pinpoint focus of enhancement at the posterior cervical cord margin at the C4-5 disc level on sagittal image 9 and axial image 19 of the contrast-enhanced sequence. Equivocal enhancement just below the C3-4 disc level only on the sagittal image. This could alternatively represent enhancing pial vessels distended due to chronic cord compression. Metastatic osseous marrow replacing enhancement throughout most of the clivus on the edge of the study. T3 superior posterior vertebral body 1 cm low T1 signal, subsequently enhancing lesion compatible with metastasis. Cervical vertebral body heights are maintained. Mid C4 vertebral body 8.8 x 5.7 mm focus of increased T1 and T2 signal without corresponding inversion recovery signal or enhancement compatible with fatty marrow rest. No sign of craniocervical junction stenosis or cerebellar tonsillar ectopia.  C2-3 (C2): Disc is essentially Osseous metastases throughout the clivus and a 1 cm T3 vertebra metastasis. Please refer to prior whole spine MRI detailing more extensive metastasis. C3-4 through C6-7 myelomalacia with cord atrophy, and abnormal signal due to canal stenosis and chronic cord compression. C3-4 through C6-7 varying degrees of significant foraminal stenosis predominantly due to uncovertebral joint arthrosis. Evaluation less than optimal due to motion. Punctate posterior peripheral cord enhancement at the C4-5 disc level and equivocal just below the C3-4 disc level detailed above. Us Abscess Drainage W Cath S&i    Result Date: 12/3/2020  US ABSCESS DRAINAGE W CATH S&I : 12/3/2020 CLINICAL HISTORY: Postoperative posterior cervical fluid collection. COMPARISON: Cervical spine MRI from earlier 12/3/2020 and cervical spine CT 12/2/2020. PROCEDURE: Informed consent was obtained. Sterile technique, local lidocaine anesthesia and ultrasound guidance was used to place a 6 Romanian pigtail drainage catheter within an approximately 7.5 x 4.5 cm fluid collection within the posterior neck soft tissues using single step trocar method. Approximately 20 mL of low viscosity mildly turbid dark red-colored fluid was aspirated and sent for analysis as requested. The drainage catheter was secured and connected to a suction bulb collection device. The patient tolerated the procedure without evidence of immediate complication, and was returned to the medical floor in stable condition. ULTRASOUND-GUIDED PIGTAIL DRAINAGE CATHETER PLACED WITHIN A POSTERIOR NECK FLUID COLLECTION.       Xr Chest Portable    Result Date: 12/12/2020 Exam: XR CHEST PORTABLE History: Altered mental status Technique: AP portable view of the chest obtained. Comparison: Portable chest radiograph from November 18, 2020 Findings: Interval placement of a right upper extremity PICC terminating near the cavoatrial junction. The cardiomediastinal silhouette is within normal limits. No pneumothorax, pleural effusion, or consolidation. Bones of the thorax appear intact. No radiographic evidence of acute intrathoracic process. Xr Chest Portable    Result Date: 11/18/2020  EXAMINATION: XR CHEST PORTABLE CLINICAL HISTORY: SHORTNESS OF BREATH COMPARISONS: CHEST RADIOGRAPH, SEPTEMBER 26, 2020 FINDINGS: Osseous structures are intact. Cardiopericardial silhouette is normal. Pulmonary vasculature is normal. Lungs are clear and hyperexpanded. NO ACUTE CARDIOPULMONARY DISEASE.     Ir Picc Wo Sq Port/pump > 5 Years    Result Date: 12/4/2020 PERIPHERALLY INSERTED CENTRAL CATHETER (PICC) PLACEMENT WITH ULTRASOUND GUIDANCE CLINICAL HISTORY:  COURSE PROLONGED VASCULAR ACCESS FOR INTRAVENOUS ANTIBIOTICS. After discussing the procedure and possible complications with the patient, informed consent was obtained. The patient was placed on the Special Procedures table. The right upper extremity was sterilely prepared using a maximal sterile barrier technique which includes cap, mask, sterile gown, sterile gloves, sterile full-body drape, hand hygiene and 2% chlorhexidine for cutaneous antisepsis. A sterile ultrasound technique with sterile gel and sterile probe covers was also utilized. A pre-procedure time out was performed in order to assure the correct patient and procedure. Local anesthetic was administered. A peripheral vein was accessed with sonographic guidance. A sonographic spot image was obtained for documentation. A guidewire was advanced into the vein with fluoroscopic guidance and a sheath was placed over the guidewire. A 5-Serbian dual-lumen PICC was advanced through the sheath, up the arm and into the central vasculature. It was positioned appropriately. The sheath was removed. The catheter was shown to aspirate and infuse properly. The flange of the catheter was affixed to the arm using a PICC securement device. A spot image of the chest showed the tip of the PICC line to lie in the superior vena cava. The patient tolerated the procedure well and without complications. Number of films: 3 Fluoroscopy time: 9.1 seconds CONCLUSION: SUCCESSFUL RIGHT PICC PLACEMENT WITHOUT IMMEDIATE COMPLICATIONS.      Fluoro For Surgical Procedures    Result Date: 11/24/2020 EXAM: FLUORO FOR SURGICAL PROCEDURES HISTORY:W19. XXXA FALL AT HOME, INITIAL ENCOUNTER ICD10  CERVICAL Findings: Fluoroscopy exposure was 0.03 mGy. A total of fluoroscopic images were obtained by Dr. Claudia Rangel during the laminoplasty of C3-C6. Please refer to operative report for further details. Sandra Vaughan POST-OPERATIVE APPEARANCE AS SHOWN.      Mri Brain W Wo Contrast    Result Date: 12/12/2020 When I explained to the patient about the hospice care, he was willing to consider the treatment option  Thanks for the consult. Please call me if needed.     Electronically signed by Matt Mckee MD on 12/15/2020 at 12:38 PM

## 2020-12-15 NOTE — CONSULTS
INITIAL CONSULT -PAIN MANAGEMENT     SERVICE DATE:  12/14/2020   SERVICE TIME:  8:02 PM  Admission date 12/11/2020 /inpatient   REASON FORCONSULT: Generalized cancer pain, confusion  REQUESTING PHYSICIAN: Hospitalist team  PRIMARY CARE PHYSICIAN:Neville Garcia MD    Chief Complaint   Patient presents with    Altered Mental Status         HISTORY OF PRESENTILLNESS:  Mr. Gertrudis Vincent is a 79 y.o. male who presents for [facility] initially admitted on 12/11/2020, patient was admitted recently after discharge due to confusion and lethargy and reported on the ER and he was given Narcan which has reversed his confusion immediately. Become more responsive. \  Patient was diagnosed with multifactorial encephalopathy, altered mental status, acute kidney injury and dehydration and recovery from recent infection to the cervical spine, he had an abscess and received cervical spine surgery and nasal antibiotic treatment. Patient has complex history with prostatic cancer, metastatic prostatic cancer, severe malnutrition, neurogenic bladder, spine infection, acute kidney injury on admission. Patient is under care of palliative care who has him on morphine 15 mg 3 times daily and Percocet 10 mg 4 times daily and gabapentin 300 mg 4 times daily. Patient was recently discharged after was receiving treatment for spinal abscess, he came 2 days after having severe confusion the surgery and reversed by Narcan at the emergency room. Patient was diagnosed by medication overdose, unintentional probably due to his poor nutrition and receiving the same dose of opioid therapy. On exam he was awake, cooperative, slightly confused but alert, low muscle mass generally constricted.     PAIN  ASSESSMENT:    constant, waxing and waning, moderate    aching, pulsating, shooting, stabbing and throbbing    pain is perceived as severe (6-8 pain scale)    PAST MEDICAL HISTORY:    Past Medical History:   Diagnosis Date prochlorperazine (COMPAZINE) 10 MG tablet, Take 10 mg by mouth every 6 hours as needed  [DISCONTINUED] morphine (MS CONTIN) 15 MG extended release tablet, Take 1 tablet by mouth every 8 hours for 30 days. [DISCONTINUED] oxyCODONE-acetaminophen (PERCOCET)  MG per tablet, Take 1 tablet by mouth every 4 hours as needed for Pain (moderate to sever break through pain) for up to 15 days. [DISCONTINUED] morphine (MS CONTIN) 15 MG extended release tablet, Take 1 tablet by mouth 2 times daily for 30 days. [unfilled]    ALLERGIES:  Patient has no known allergies. COMPLETE REVIEW OF SYSTEMS:  As noted in HPI, 12 point ROS reviewed and otherwise negative. Review of Systems   Constitutional: Positive for activity change, appetite change, diaphoresis and fatigue. Negative for chills, fever and unexpected weight change. HENT: Positive for trouble swallowing. Negative for congestion, dental problem, drooling, ear discharge, ear pain, facial swelling, hearing loss, mouth sores, nosebleeds, postnasal drip, rhinorrhea, sinus pressure, sinus pain, sneezing, sore throat, tinnitus and voice change. Eyes: Negative. Respiratory: Negative. Cardiovascular: Negative. Gastrointestinal: Negative. Endocrine: Positive for cold intolerance. Negative for heat intolerance, polydipsia, polyphagia and polyuria. Genitourinary: Positive for decreased urine volume and difficulty urinating. Negative for dysuria, enuresis, flank pain, frequency, genital sores, hematuria, penile pain, penile swelling, scrotal swelling, testicular pain and urgency. Musculoskeletal: Positive for arthralgias, back pain, gait problem, joint swelling, myalgias, neck pain and neck stiffness. Skin: Negative. Allergic/Immunologic: Positive for immunocompromised state. Negative for environmental allergies and food allergies. Neurological: Positive for dizziness, tremors, weakness, light-headedness, numbness and headaches. Negative for seizures, syncope, facial asymmetry and speech difficulty. Hematological: Negative for adenopathy. Bruises/bleeds easily. Psychiatric/Behavioral: Positive for behavioral problems, confusion, decreased concentration, dysphoric mood and sleep disturbance. Negative for agitation, hallucinations, self-injury and suicidal ideas. The patient is nervous/anxious. The patient is not hyperactive. OBJECTIVE  PHYSICAL EXAM:  /79   Pulse 88   Temp 97.3 °F (36.3 °C) (Oral)   Resp 18   Ht 6' 1\" (1.854 m)   Wt 147 lb 4.3 oz (66.8 kg)   SpO2 100%   BMI 19.43 kg/m²   Body mass index is 19.43 kg/m². CONSTITUTIONAL: Patient is awake, alert about overall confused slightly however is able to maintain slow conversation, according nursing staff his mental condition much improved than admission. EYES:  vision intact  ENT:  normocepalic, without obvious abnormality, atraumatic  NECK: Very painful and restricted range of motion, he had healed scar in the back of his cervical area  BACK: Decreased muscle mass, generally restricted motion, currently tender all across  LUNGS:  no increased work of breathing  CARDIOVASCULAR:  regular rate and rhythm  ABDOMEN: Soft and nondistended  MUSCULOSKELETAL: Loss of muscle mass, decreased muscle tone, generalized muscle atrophy, generalized cachexia and weakness  NEUROLOGIC: Decreased motor movement for both lower extremity, unable to fully flex his knee, motor strength 4/5 bilateral upper and 3+/5 lower extremity, according to report patient is not able to maintain gait. SKIN:  no bruising or bleeding    DATA:   Diagnostic tests reviewed for today's visit:    All labs and imaging results reviewed.      Lab Results   Component Value Date    WBC 13.5 12/14/2020    HGB 7.6 12/14/2020    HCT 23.9 12/14/2020    MCV 76.4 12/14/2020     12/14/2020  12/14/2020    K 4.0 12/14/2020    K 4.2 11/25/2020     12/14/2020    CO2 23 12/14/2020    BUN 29 12/14/2020    CREATININE 0.65 12/14/2020    CALCIUM 8.0 12/14/2020    PHOS 3.0 12/10/2020    ALKPHOS 254 12/14/2020    ALT 27 12/14/2020    AST 60 12/14/2020    BILITOT 0.8 12/14/2020    BILIDIR 0.3 09/27/2020    LABALBU 2.4 12/14/2020     No results for input(s): Fonnie Alexandria, LABBENZ, CANSU, COCAIMETSCRU, OPIATESCREENURINE, OXYCODONEUR, DSCOMMENT in the last 72 hours. Invalid input(s): PHENCYCLIDINESCREENURINE. LABMETH. PROPOX   Ct Head Wo Contrast    Result Date: 12/12/2020  EXAMINATION: CT of the brain without contrast HISTORY: Altered mental status COMPARISON: CT brain from November 18, 2020 TECHNIQUE: Multiple contiguous axial images were obtained of the brain from the skull base through the vertex. Multiplanar reformats were obtained. FINDINGS: Brain volume is age appropriate. Ventricular morphology is within normal limits. Gray-white matter differentiation is preserved. Areas of bilateral supratentorial white matter hypoattenuation are nonspecific but most likely related to chronic small vessel ischemic changes in a patient of this age. No acute hemorrhage or abnormal extra-axial fluid collection. No mass effect or midline shift. The visualized paranasal sinuses and mastoid air cells are clear. Calvarium is intact. A 1.5 cm soft tissue structure with internal calcifications identified within the subcutaneous soft tissues along the right nasal bone. No acute intracranial process. Nonspecific 1.5 cm soft tissue structure with internal calcifications within the subcutaneous soft tissues superficial the right nasal bone. Clinical correlation is recommended. All CT scans at this facility use dose modulation, iterative reconstruction, and/or weight based dosing when appropriate to reduce radiation dose to as low as reasonably achievable.     Ct Head Wo Contrast    Result Date: 11/18/2020 CT Brain Contrast medium:  Not utilized. History:  Weakness, fatigue Comparison:  None Findings: Extra-axial spaces:  Normal. Intracranial hemorrhage:  None. Ventricular system: Ventricles mildly enlarged. Sulci mildly prominent. Basal Cisterns:  Normal. Cerebral Parenchyma: Bilateral symmetric periventricular areas decreased attenuation. Midline Shift:  None. Cerebellum:  Normal. Paranasal sinuses and mastoid air cells:  Normal. Visualized Orbits:  Normal.     Impression: No acute findings. Mild cerebral atrophy. Chronic ischemic white matter disease. All CT scans at this facility use dose modulation, iterative reconstruction, and/or weight based dosing when appropriate to reduce radiation dose to as low as reasonably achievable. Ct Cervical Spine W Contrast    Result Date: 12/2/2020  EXAM: CT CERVICAL SPINE W CONTRAST COMPARISON: None available REASON FOR EXAMINATION:  Fatigue, mild, neck pain, numbness and weakness. TECHNIQUE: Multiple contiguous axial CT images of the cervical spine were obtained. Multiplanar reformats performed. FINDINGS:  Postsurgical changes of posterior decompression at C3-C5. Soft tissue fluid collection within the subcutaneous soft tissues at the C1-C5 level measures approximately 4 cm in AP dimension by 4 cm in transverse dimension by 7 cm in craniocaudal dimension and demonstrates rim enhancement. Multiple small foci of air are present within this fluid collection. This fluid collection does not appear to be contiguous with the spinal canal on CT. Multilevel degenerative changes of the cervical spine again identified. Rim-enhancing fluid collection containing small foci of air within the subcutaneous soft tissues at the C1-C5 levels measures approximately 4 cm in AP dimension by 4 cm in transverse dimension by 7 cm in craniocaudal dimension and is most concerning for abscess. Evaluation for extension of infection of the spinal canal is suboptimal by CT and MRI of the cervical spine with contrast is recommended. All CT scans at this facility use dose modulation, iterative reconstruction, and/or weight based dosing when appropriate to reduce radiation dose to as low as reasonably achievable. Mri Cervical Spine W Wo Contrast    Result Date: 12/3/2020  MRI CERVICAL SPINE W WO CONTRAST : 12/3/2020 COMPARISON: Cervical spine CT with contrast 12/2/2020 REASON FOR EXAMINATION:   possible cervical spinal abscess  TECHNIQUE: Multiplanar MR imaging of the cervical spine was performed before and after intravenous administration of approximately 15 mL of MultiHance gadolinium contrast. FINDINGS:  Postsurgical changes of posterior decompression left laminoplasty at C3-C5 are again noted. A mildly marginally enhancing organized fluid collection within the posterior soft tissues measures approximately 4.5 x 3.5 x 7.5 cm (transverse, AP and sagittal dimension). This fluid collection does not appear to be contiguous with the spinal canal, and there is no residual spinal stenosis, epidural fluid collection or other significant changes identified. Multilevel degenerative changes of the cervical spine described in detail on prior studies are again noted. APPROXIMATELY 7.5 X 4.5 X 3.5 CM POSTOPERATIVE FLUID COLLECTION AFTER RECENT CERVICAL LAMINOPLASTY. INFECTION SHOULD BE EXCLUDED CLINICALLY. THIS DOES NOT APPEAR TO COMMUNICATE TO THE SPINAL CANAL, AND THERE IS NO EPIDURAL COLLECTION OR OTHER COMPLICATION IDENTIFIED.      Mri Cervical Spine W Wo Contrast    Result Date: 11/20/2020 MRI of the cervical spine without and with gadolinium. HISTORY: Frequent falls. Recent diagnosis of metastatic prostate carcinoma. COMPARISON: Images of the cervical spine on noncontrast MRI of the cervical thoracic and lumbar spine performed on 11/19/2020. TECHNIQUE: Sagittal T1, T2, and inversion recovery. Axial gradient echo and T2. Sagittal and axial T1 with fat suppression after the IV administration of 13 cc of gadolinium (MultiHance). 3 plane T2 localizer. FINDINGS: Image quality is compromised by motion. Mid to lower cervical spinal canal has a decreased caliber on a congenital basis accentuated by spondylitic changes detailed below. Cervical cord has myelomalacia with a diffuse decreased caliber and mild intrasubstance increased signal beginning just above the C3-4 disc level and extending to just below the C6-7 level secondary to severe canal stenosis and chronic cord compression at multiple disc levels. As an example, the AP dimension of the cord is 2.6 mm at the C5-6 disc level. Although evaluation is limited by motion there is a pinpoint focus of enhancement at the posterior cervical cord margin at the C4-5 disc level on sagittal image 9 and axial image 19 of the contrast-enhanced sequence. Equivocal enhancement just below the C3-4 disc level only on the sagittal image. This could alternatively represent enhancing pial vessels distended due to chronic cord compression. Metastatic osseous marrow replacing enhancement throughout most of the clivus on the edge of the study. T3 superior posterior vertebral body 1 cm low T1 signal, subsequently enhancing lesion compatible with metastasis. Cervical vertebral body heights are maintained. Mid C4 vertebral body 8.8 x 5.7 mm focus of increased T1 and T2 signal without corresponding inversion recovery signal or enhancement compatible with fatty marrow rest. No sign of craniocervical junction stenosis or cerebellar tonsillar ectopia.  C2-3 (C2): Disc is essentially unremarkable. No canal or foraminal stenosis. C3-4 (C3): Moderate to severe disc space narrowing. Anterior posterior disc bulging. Moderate canal stenosis accentuated by ligamentous hypertrophy with the AP dimension measuring 6 mm. Cord atrophy as previously described. No cord compression with cerebrospinal fluid signal anterior posterior to the cord. Bilateral uncovertebral joint arthrosis with associated moderate foraminal stenosis, evaluation limited by motion. C4-5 (C4): Moderate to severe disc space narrowing. Mild posterior disc bulging which along with ligamentous hypertrophy causes severe canal stenosis with a 5.4 mm in AP dimension. Cord compression with no cerebrospinal fluid signal anterior posterior to  the cord. At least moderate to severe foraminal stenosis due to uncovertebral and facet joint arthrosis, evaluation limited by motion. C5-6 (C5): Moderate to severe disc space narrowing. Mild posterior disc bulging. Severe canal stenosis with the AP dimension measuring 4.5 mm. Cerebrospinal fluid signal posterior to the cord due to marked cord atrophy. Severe foraminal stenosis due to uncovertebral and to lesser extent facet joint arthrosis, evaluation limited by motion. C6-7 (C6): Moderate to severe disc space narrowing. Mild anterior and slightly more pronounced posterior disc bulging. Moderate canal stenosis with the AP dimension measuring 6.2 mm. Cord atrophy as previously described. Uncovertebral joint arthrosis causes severe right and mild to moderate left foraminal stenosis, evaluation limited by motion. C7-T1 (C7): Disc is unremarkable. No canal or foraminal stenosis. T1-2 through the T3-4 disc levels are unremarkable with no canal or foraminal stenosis. PERIPHERALLY INSERTED CENTRAL CATHETER (PICC) PLACEMENT WITH ULTRASOUND GUIDANCE CLINICAL HISTORY:  COURSE PROLONGED VASCULAR ACCESS FOR INTRAVENOUS ANTIBIOTICS. After discussing the procedure and possible complications with the patient, informed consent was obtained. The patient was placed on the Special Procedures table. The right upper extremity was sterilely prepared using a maximal sterile barrier technique which includes cap, mask, sterile gown, sterile gloves, sterile full-body drape, hand hygiene and 2% chlorhexidine for cutaneous antisepsis. A sterile ultrasound technique with sterile gel and sterile probe covers was also utilized. A pre-procedure time out was performed in order to assure the correct patient and procedure. Local anesthetic was administered. A peripheral vein was accessed with sonographic guidance. A sonographic spot image was obtained for documentation. A guidewire was advanced into the vein with fluoroscopic guidance and a sheath was placed over the guidewire. A 5-Turkish dual-lumen PICC was advanced through the sheath, up the arm and into the central vasculature. It was positioned appropriately. The sheath was removed. The catheter was shown to aspirate and infuse properly. The flange of the catheter was affixed to the arm using a PICC securement device. A spot image of the chest showed the tip of the PICC line to lie in the superior vena cava. The patient tolerated the procedure well and without complications. Number of films: 3 Fluoroscopy time: 9.1 seconds CONCLUSION: SUCCESSFUL RIGHT PICC PLACEMENT WITHOUT IMMEDIATE COMPLICATIONS.      Fluoro For Surgical Procedures    Result Date: 11/24/2020 EXAM: FLUORO FOR SURGICAL PROCEDURES HISTORY:W19. XXXA FALL AT HOME, INITIAL ENCOUNTER ICD10  CERVICAL Findings: Fluoroscopy exposure was 0.03 mGy. A total of fluoroscopic images were obtained by Dr. Seven Joiner during the laminoplasty of C3-C6. Please refer to operative report for further details. Hannah Graven POST-OPERATIVE APPEARANCE AS SHOWN.      Mri Brain W Wo Contrast    Result Date: 12/12/2020 EXAMINATION:  MRI BRAIN W WO CONTRAST HISTORY:   new dysphagia, r/o acute pathology, r/o metastasis  N17.9 DEONDRE (acute kidney injury) ([facility] [address] ICD10 confusion. Lethargy. Weakness. Prostate cancer. Recent cervical abscess. TECHNIQUE:  Routine brain MRI protocol without and with contrast including diffusion images. CONTRAST:  13 mL gadoteridol (PROHANCE) injection COMPARISON: CT head 12/11/2020 RESULT: BRAIN: Acute Change: There is no evidence of restricted diffusion to suggest an acute infarct. Hemorrhage:    No evidence of prior parenchymal hemorrhage. Mass Lesion/ Mass Effect:    No evidence of an intracranial mass or extra-axial fluid collection. No abnormal parenchymal or leptomeningeal enhancement following contrast administration. No significant mass effect. Chronic Change: The white matter is within normal limits of signal intensity for age. Parenchyma:   No significant volume loss for age. The brain parenchyma is otherwise within normal limits of signal intensity and morphology. Ventricles:     Normal caliber and morphology. Skull Base:    Hypothalamic and pituitary region are grossly normal.   Craniocervical junction is normal. Abnormal signal within the clivus. Vasculature:    Major intracranial arterial structures, and dural venous sinuses show typical flow void, suggesting patency. Other:  The visualized paranasal sinuses are clear. Trace fluid in the mastoid air cells. The orbits and extracranial soft tissues are unremarkable. Nonspecific rounded lesion adjacent to the right nasal bones, unchanged from the recent CT. Again recommend clinical correlation. No acute intracranial process or metastasis of the brain.  Abnormal signal within the clivus, raising concern for metastatic disease     Mri Cervical Thoracic Lumbar Spine Wo Contrast Limited    Result Date: 11/20/2020 EXAMINATION: MRI CERVICAL THORACIC LUMBAR SPINE WO CONTRAST LIMITED DATE AND TIME:11/19/2020 5:52 PM CLINICAL HISTORY: Severe neck pain  cord compresssion  COMPARISON: None TECHNIQUE: Multiplanar, multi-sequence MRI scans of the cervical and thoracic spine performed to exclude cord compression. FINDINGS Craniocervical junction: Craniocervical junction is within normal limits. Bone marrow: T1 marrow signal loss involving T3, T4, T7 T10 and T11 consistent with metastatic bone disease. No pathologic fracture or associated canal stenosis at these levels. At C3-4, C4-5, C5-6, and C6-7 there is advanced cervical spondylosis with disc osteophyte complex with associated spinal cord deformity consistent with severe canal stenosis. Minimal canal diameter is less than 5 mm at several points throughout this area. Assessment of abnormal cord signal intensity is limited due to severe cord compression. The thoracic vertebral bodies are normally aligned with no evidence of pathologic fracture. There is no evidence for spinal canal stenosis in the thoracic spine. Scans of the lower thoracic spine was included due to the lumbar area show metastatic bone lesions involving all lumbar levels most significant at L3 and L4 without pathologic fracture or canal stenosis. Metastatic foci involving multiple sacral levels as well. No paraspinal soft tissue mass or fluid collection     Advanced cervical spondylosis from C3-4 through C6-7 resulting in severe spinal canal stenosis at these levels with minimal spinal canal diameter approaching 4 to 5 mm. Multiple metastatic bone disease throughout the thoracolumbar spine with no pathologic fracture.           [address] Problems    Diagnosis Date Noted    Chronic neoplasm-related pain [G89.3] 12/14/2020    Myalgia [M79.10] 12/14/2020    Muscle weakness (generalized) [M62.81] 12/14/2020    Paresis of lower extremity ([address] [G83.10] 12/14/2020  Long term current use of opiate analgesic [Z79.891] 12/14/2020    Confusion caused by a drug [R41.0, T50.905A] 12/14/2020    Prostate cancer metastatic to multiple sites ([facility] [address] Ute Georges 12/14/2020    Dehydration [E86.0]     Narcotic overdose ([address] [T40.601A]     DEONDRE (acute kidney injury) ([address] [N17.9] 12/12/2020    Hyperkalemia [E87.5] 12/12/2020    AMS (altered mental status) [R41.82] 12/12/2020    Leukocytosis [D72.829] 12/12/2020    Abscess [L02.91]     Sepsis due to gram-negative UTI ([address] [A41.50, N39.0] 12/01/2020    Neurogenic bladder [N31.9]     Stenosis of cervical spine with myelopathy (HCC) [M48.02, G99.2] 11/24/2020    Ataxic gait [R26.0] 11/22/2020    Retention of urine [R33.9] 11/22/2020    Malignant tumor of prostate ([facility] [address] Ute Georges 11/22/2020    Myelopathy ([address] [G95.9]     Falls, initial encounter [W19. XXXA] 11/20/2020    Severe malnutrition ([address] [E43] 11/19/2020    Metastatic cancer ([facility] [address] [C79.9]     Anemia [D64.9] 09/26/2020       79years old male history of metastatic prostate cancer, recovering from multifactorial encephalopathy, mainly probably due to taking his morphine and painkillers while he is having ongoing systemic infection and other causes with malnutrition and severe anemia. Even though patient still reporting a lot of neck pain, he is confused but alert, generally weak, expected cancer pain and postoperative pain across the cervical area. I discussed instead of completely get him off all painkillers to titrate low-dose intermittent short acting opioid while optimizing on his nonopioid analgesia. I am concerned about patient inability to swallow which can put him the risk for aspiration pneumonia, probably from the IV morphine in the meantime, patient is not candidate for Duragesic patch given his cognitive dysfunction. Patient been followed by palliative care, will communicate care with them about discontinuing outpatient oral morphine and long-acting given his current updated situation, may be left only on short acting opioid like oxycodone and Tylenol as needed once medically stable and discharged home. Patient was on high-dose gabapentin prior to coming in which also was contributing however I preferred instead of discontinue completely and may have him put risk of seizure, we will keep him on the low-dose at nighttime.     RECOMMENDATION:  SEE ORDERS    Current plan of care, discontinue Ultram as may cause some more confusion, will place him on street oxycodone IR every 4 hours as needed    Start Tylenol scheduled 500 g every 6 hours to optimize nonopioid analgesia    Topical lidocaine patches across the region of the cervical paraspinal region    Avoid muscle relaxant    Patient was on high-dose gabapentin, probably will decrease the dose and start him on 1 daily dose of 100 mg once or twice daily to prevent withdrawal seizure    We will continue to monitor and adjust medication accordingly, continue symptomatic management per palliative care colleague, continue hydration and other medical management per medical team.    SIGNATURE: Kasie Christie MD PATIENT NAME: Tian Mcbride: December 14, 2020 MRN: [id number]   TIME: 8:02 PM PAGER/CONTACT #: [phone]

## 2020-12-15 NOTE — PROGRESS NOTES
[facility]  Facility/Department: [address] [facility]  Speech Language Pathology   Treatment Note      Elise Bonilla  1950  [id number]    Medical Dx: DEONDRE (acute kidney injury) ([address] [N17.9]  Speech Dx: Dysphagia     12/15/2020    Subjective:  Alert and Cooperative        Interventions used this date:  Dysphagia Treatment    Objective/Assessment:  Patient progressing towards goals:  Short-term Goals  Timeframe for Short-term Goals: 1-2 weeks during LOS or until goals are met. Goal 1: Pt will tolerate least restrictive diet without overt s/s of aspiration. Goal 2: Pt will tolerate instrumental swallowing procedure to more objectively assess pharyngeal stage of swallow if deemed appropriate by treating SLP. Not indicated at this time. Patient tolerated PO trials this date without overt s/s of aspiration. Most recent CXR was clear. Goal 3: Pt will complete oral motor ROM and strengthening exercises with 80% accuracy, given cues as needed, in order to strengthen lingual/labial/buccal musculature to promote safety and efficiency of oral phase of swallow and decrease risk for pocketing. Not addressed  Goal 4: Pt will tolerate 5/5 trials of puree via teaspoon without overt s/s of aspiration. Pt tolerated bites of puree in 5/5 trials without overt s/s of aspiration. Minimal residuals observed throughout the oral cavity post swallow that the pt cleared spontaneously with increased time. Pt tolerated bites of regular solids in 3/3 trials without overt s/s of aspiration. Pt demonstrated prolonged mastication time and mod residuals were observed throughout the oral cavity post swallow that the pt cleared spontaneously with increased time. Goal 5: Pt will tolerate 5/5 trials of NTL via teaspoon without overt s/s of aspiration. Pt tolerated consecutive sips of thin liquid via cup without overt s/s of aspiration in all opportunities. Pt was offered a straw, pt required max verbal prompting to use a slower rate and take smaller sips. No overt s/s of aspiration observed. Goal 6: Pt will complete pharyngeal strengthening exercises (such as the Sherrie, Effortful swallow, etc) with 80% acc given cues as needed in order to strengthen and establish and more effective swallow. Not addressed  Compensatory Swallowing Strategies: Total feed, Upright as possible for all oral intake, Eat/Feed slowly, Small bites/sips    Rec: minced & moist/thin liquid, no straws, assist feed  Notified NEIDA Salazar and Dr. Elly Galicia via [employer]. Treatment/Activity Tolerance:  Patient tolerated treatment well    Plan: Alter current POC (see above)    Pain Assessment:  Initial Assessment:  Patient c/o:  Patient reported an acceptable level for treatment. 2/10 back pain    Re-assessment:  Patient c/o: 2/10 back pain    Patient/Caregiver Education:  Patient educated on session and progression towards goals. Education needs reinforcement.     Safety Devices:  Bed alarm in place and Call light within reach      Therapy Time  Time in: 0825  Time out: 0840  Total Minutes: 15    Signature: Electronically signed by ILYA Wilkinson on 12/15/2020 at 9:14 AM

## 2020-12-15 NOTE — PROGRESS NOTES
BP (!) 132/92   Pulse 73   Temp 97.2 °F (36.2 °C) (Oral)   Resp 16   Ht 6' 1\" (1.854 m)   Wt 147 lb 4.3 oz (66.8 kg)   SpO2 100%   BMI 19.43 kg/m²    Physical Exam  Constitutional:       General: He is not in acute distress. Appearance: He is underweight. He is not diaphoretic. HENT:      Head: Normocephalic and atraumatic. Right Ear: External ear normal.      Left Ear: External ear normal.      Nose: Nose normal.      Mouth/Throat:      Pharynx: No oropharyngeal exudate. Eyes:      General: No scleral icterus. Right eye: No discharge. Left eye: No discharge. Conjunctiva/sclera: Conjunctivae normal.      Pupils: Pupils are equal, round, and reactive to light. Neck:      Musculoskeletal: Normal range of motion and neck supple. Thyroid: No thyromegaly. Vascular: No JVD. Trachea: No tracheal deviation. Cardiovascular:      Rate and Rhythm: Normal rate and regular rhythm. Heart sounds: Normal heart sounds. Pulmonary:      Effort: Pulmonary effort is normal. No respiratory distress. Breath sounds: Normal breath sounds. No stridor. No wheezing or rales. Chest:      Chest wall: No tenderness. Abdominal:      General: Bowel sounds are normal. There is no distension. Palpations: Abdomen is soft. There is no mass. Tenderness: There is no abdominal tenderness. There is no guarding or rebound. Musculoskeletal: Normal range of motion. General: No tenderness or deformity. Lymphadenopathy:      Cervical: No cervical adenopathy. Skin:     General: Skin is warm and dry. Findings: No erythema or rash. Neurological:      Mental Status: He is alert. He is disoriented. Cranial Nerves: No cranial nerve deficit. Motor: Weakness present.       Coordination: Coordination normal.      Gait: Gait abnormal.         Allergies:      No Known Allergies    Medications:      Current Facility-Administered Medications  PAF (paroxysmal atrial fibrillation) ([facility]     ON XARELTO    Prostate cancer ([facility] 11/2019       PSHx:  Past Surgical History:   Procedure Laterality Date    CERVICAL LAMINECTOMY N/A 11/23/2020    CERVICAL C3-4,4-5,5-6  LAMINOPLASTY WITH RECONSTRUCTION performed by Kate Kumar MD at [facility] COLONOSCOPY N/A 9/29/2020    COLONOSCOPY DIAGNOSTIC performed by Darius Pfeiffer MD at [facility] Right 11/2018    hip    UPPER GASTROINTESTINAL ENDOSCOPY N/A 9/29/2020    EGD DIAGNOSTIC ONLY performed by Darius Pfeiffer MD at [address] Hx:  Social History     Socioeconomic History    Marital status: Single     Spouse name: None    Number of children: None    Years of education: None    Highest education level: None   Occupational History    Occupation: retired [employer]   Social Needs    Financial resource strain: Not very hard    Food insecurity     Worry: Never true     Inability: Never true    Transportation needs     Medical: No     Non-medical: No   Tobacco Use    Smoking status: Never Smoker    Smokeless tobacco: Never Used    Tobacco comment: denies   Substance and Sexual Activity    Alcohol use: Not Currently     Frequency: 2-4 times a month     Comment: denies    Drug use: No     Comment: denies    Sexual activity: None   Lifestyle    Physical activity     Days per week: 0 days     Minutes per session: 0 min    Stress:  Only a little   Relationships    Social connections     Talks on phone: None     Gets together: None     Attends [employer] service: None     Active member of club or organization: None     Attends meetings of clubs or organizations: None     Relationship status: None    Intimate partner violence     Fear of current or ex partner: No     Emotionally abused: No     Physically abused: No     Forced sexual activity: No   Other Topics Concern    None   Social History Narrative    Retired from [employer] With: Alone K 4.2 11/25/2020     12/15/2020    CO2 19 12/15/2020    BUN 23 12/15/2020    LABALBU 2.4 12/15/2020    CREATININE 0.51 12/15/2020    CALCIUM 8.7 12/15/2020    GFRAA >60.0 12/15/2020    LABGLOM >60.0 12/15/2020    GLUCOSE 91 12/15/2020     TSH:   Lab Results   Component Value Date    TSH 1.210 11/19/2020     Vitamin B12 and Folate: No components found for: FOLIC,  E62  Urinalysis:   Lab Results   Component Value Date    NITRU Negative 12/11/2020    WBCUA 3-5 12/11/2020    BACTERIA MANY 12/11/2020    RBCUA 3-5 12/11/2020    BLOODU Negative 12/11/2020    SPECGRAV 1.016 12/11/2020    GLUCOSEU Negative 12/11/2020           FUNCTIONAL ADL´S:     Independent: [  ] Eating, [   ] Dressing, [   ] Transferring, [   ] Lesta Jaden, [   ] Denishaen Maday, [   ] Continence  Dependent   : [ X ] Eating, [ X  ] Dressing, [ X  ] Transferring, [ X  ] Toileting, Rosa.Shack  ] Bathing, [ Javier Chant Continence  W. assistant : [  ] Eating, [   ] Dressing, [   ] Transferring, [   ] Kaita Jaden, [   ] Bathing, [   ] Continence    Radiology: Reviewed      Mri Brain W Wo Contrast    Result Date: 12/12/2020 1. Altered mental status due to acute metabolic encephalopathy with hypercalcemia (corrected) and acute delirium most likely due to DEONDRE but cannot rule out other underlying conditions such as infectious process or hyperammonia. He is currently being treated for abscess of neck  -Will start Haldol 0.5 mg every six hours PRN  -MRI of head was negative for mets or other abnormality  -ID following  -Currently on IV cefetpime  -Will order ammonia level due to liver metastasis  -Psych evaluated patient for decision making capacity. Patient lacks ability to make his own medical decision at this time. 2. Impaired mobility  -PT/OT    3. Anemia  -Hemoglobin 6.6  -He is scheduled to have blood transfusion today    4. Dysphagia  -SLP consulted  -On dysphagia soft diet  -Dietitian consulted    5. Metastatic prostate cancer  6. Neoplasm related pain  -Will order hospice consultation  -Assess pain using the FLACC scale  -Pain management following    -Palliative Care Encounter  -Palliative Care will continue to follow patient  -Patient may be appropriate for Hospice services due to his advanced metastatic disease.  -Patient and his sister is in agreement for hospice consultation  -Hospice consultation ordered.  -Pall Care will continue to follow patient until he is discharged to the care of hospice services. -Advance Care Planning  Discussed goals of care with patient. Explained in extensive detail nuances between full code, DNR CCA and DNR CC. Patient has made the decision to be FULL CODE      -Goals of Care Discussion: At this time, goals of care cannot be determined. Patient is confused, acutely. His sister wants to wait and see if his condition improves. She wants to continue with [facility]. She is okay with hospice if the is what he needs.      Electronically signed by Claudell Gate, APRN - CNP on 12/15/2020 at 12:52 PM

## 2020-12-15 NOTE — PROGRESS NOTES
80-year-old male patient with history of metastatic prostate cancer. GI consulted for declining hemoglobin. No overt signs of active GI bleeding and he is hemodynamically stable. Hemoglobin stable overnight. Will defer endoscopic investigations at this time. Continue with supportive care. PLAN:  -Continue course of care  -Monitor H&H  -Continue with PPI twice daily  -Supportive care  -GI shall sign off    Thank you for allowing me to participate in the care of your patient.   Feel free to contact me with any questions or concerns    Beverley Corbin MD

## 2020-12-15 NOTE — PROGRESS NOTES
Hospitalist Progress Note      PCP: Madie Ackerman MD    Date of Admission: 12/11/2020    Chief Complaint:    Chief Complaint   Patient presents with    Altered Mental Status     Subjective: At this moment the patient is cooperative but per RN this is intermittent; currently denies fevers, chills, sweats, CP, SOB; feels well and is requesting water. 12 point ROS negative other than mentioned above     Medications:  Reviewed    Infusion Medications    sodium chloride 75 mL/hr at 12/14/20 1726     Scheduled Medications    pantoprazole  40 mg Intravenous BID    And    sodium chloride (PF)  10 mL Intravenous Daily    acetaminophen  500 mg Oral Q6H    lidocaine  3 patch Transdermal Daily    gabapentin  100 mg Oral BID    sodium chloride  250 mL Intravenous Once    sodium chloride flush  10 mL Intravenous 2 times per day    enoxaparin  40 mg Subcutaneous Daily    cefepime  1 g Intravenous Q8H    atenolol  50 mg Oral Daily     PRN Meds: haloperidol, oxyCODONE, sodium chloride flush, promethazine **OR** ondansetron      Intake/Output Summary (Last 24 hours) at 12/15/2020 1146  Last data filed at 12/14/2020 1836  Gross per 24 hour   Intake 795 ml   Output 450 ml   Net 345 ml     Exam:    BP (!) 132/92   Pulse 73   Temp 97.2 °F (36.2 °C) (Oral)   Resp 16   Ht 6' 1\" (1.854 m)   Wt 147 lb 4.3 oz (66.8 kg)   SpO2 100%   BMI 19.43 kg/m²     General appearance: No apparent distress, appears stated age and cooperative. HEENT:  Conjunctivae/corneas clear. Neck:  Trachea midline. Respiratory:  Normal respiratory effort. Clear to auscultation  Cardiovascular: Regular rate and rhythm   Abdomen: Soft, non-tender, non-distended with normal bowel sounds. Musculoskeletal: No clubbing, cyanosis or edema bilaterally.    Neuro: Non Focal.   Capillary Refill: Brisk,< 3 seconds   Peripheral Pulses: +2 palpable, equal bilaterally     Labs:   Recent Labs     12/13/20  0600 12/14/20  0600 12/14/20  1919 12/15/20  0600  Long term current use of opiate analgesic [Z79.891] 12/14/2020    Confusion caused by a drug [R41.0, T50.905A] 12/14/2020    Prostate cancer metastatic to multiple sites ([facility] [address] [facility] 12/14/2020    Dehydration [E86.0]     Narcotic overdose ([facility] [facility] [T40.601A]     DEONDRE (acute kidney injury) ([facility] [address] [facility] [N17.9] 12/12/2020    Hyperkalemia [E87.5] 12/12/2020    AMS (altered mental status) [R41.82] 12/12/2020    Leukocytosis [D72.829] 12/12/2020    Abscess [L02.91]     Sepsis due to gram-negative UTI ([facility] [address] [facility] [A41.50, N39.0] 12/01/2020    Neurogenic bladder [N31.9]     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 XXXA] 11/20/2020    Severe malnutrition ([facility] [address] [E43] 11/19/2020    Metastatic cancer ([facility] [address] [facility] [C79.9]     Anemia [D64.9] 09/26/2020     Additional work up or/and treatment plan may be added today or then after based on clinical progression. I am managing a portion of pt care. Some medical issues are handled by other specialists. Additional work up and treatment should be done in out pt setting by pt PCP and other out pt providers. In addition to examining and evaluating pt, I spent additional time explaining care, normal and abnormal findings, and treatment plan. All of pt questions were answered. Counseling, diet and education were  provided. Case will be discussed with nursing staff when appropriate. Family will be updated if and when appropriate.       Diet: DIET DYSPHAGIA MINCED AND MOIST; No Drinking Straw    Code Status: Full Code      Electronically signed by Matheus العراقي MD on 12/15/2020 at 11:46 AM

## 2020-12-16 LAB
ALBUMIN SERPL-MCNC: 2 G/DL (ref 3.5–4.6)
ALP BLD-CCNC: 221 U/L (ref 35–104)
ALT SERPL-CCNC: 19 U/L (ref 0–41)
ANION GAP SERPL CALCULATED.3IONS-SCNC: 11 MEQ/L (ref 9–15)
AST SERPL-CCNC: 29 U/L (ref 0–40)
BASOPHILS ABSOLUTE: 0.1 K/UL (ref 0–0.2)
BASOPHILS RELATIVE PERCENT: 0.9 %
BILIRUB SERPL-MCNC: 0.5 MG/DL (ref 0.2–0.7)
BUN BLDV-MCNC: 14 MG/DL (ref 8–23)
CALCIUM SERPL-MCNC: 8.3 MG/DL (ref 8.5–9.9)
CHLORIDE BLD-SCNC: 106 MEQ/L (ref 95–107)
CO2: 20 MEQ/L (ref 20–31)
CREAT SERPL-MCNC: 0.43 MG/DL (ref 0.7–1.2)
EOSINOPHILS ABSOLUTE: 0.1 K/UL (ref 0–0.7)
EOSINOPHILS RELATIVE PERCENT: 1.4 %
GFR AFRICAN AMERICAN: >60
GFR NON-AFRICAN AMERICAN: >60
GLOBULIN: 3.9 G/DL (ref 2.3–3.5)
GLUCOSE BLD-MCNC: 104 MG/DL (ref 70–99)
HCT VFR BLD CALC: 25.3 % (ref 42–52)
HEMOGLOBIN: 8.2 G/DL (ref 14–18)
LYMPHOCYTES ABSOLUTE: 1.4 K/UL (ref 1–4.8)
LYMPHOCYTES RELATIVE PERCENT: 14.2 %
MCH RBC QN AUTO: 25.3 PG (ref 27–31.3)
MCHC RBC AUTO-ENTMCNC: 32.5 % (ref 33–37)
MCV RBC AUTO: 77.6 FL (ref 80–100)
MONOCYTES ABSOLUTE: 1 K/UL (ref 0.2–0.8)
MONOCYTES RELATIVE PERCENT: 9.6 %
NEUTROPHILS ABSOLUTE: 7.3 K/UL (ref 1.4–6.5)
NEUTROPHILS RELATIVE PERCENT: 73.9 %
PDW BLD-RTO: 19.5 % (ref 11.5–14.5)
PLATELET # BLD: 432 K/UL (ref 130–400)
POTASSIUM SERPL-SCNC: 3.5 MEQ/L (ref 3.4–4.9)
RBC # BLD: 3.26 M/UL (ref 4.7–6.1)
SODIUM BLD-SCNC: 137 MEQ/L (ref 135–144)
TOTAL PROTEIN: 5.9 G/DL (ref 6.3–8)
WBC # BLD: 9.9 K/UL (ref 4.8–10.8)

## 2020-12-16 PROCEDURE — 6360000002 HC RX W HCPCS: Performed by: NURSE PRACTITIONER

## 2020-12-16 PROCEDURE — 99232 SBSQ HOSP IP/OBS MODERATE 35: CPT | Performed by: INTERNAL MEDICINE

## 2020-12-16 PROCEDURE — 6370000000 HC RX 637 (ALT 250 FOR IP): Performed by: INTERNAL MEDICINE

## 2020-12-16 PROCEDURE — 6370000000 HC RX 637 (ALT 250 FOR IP): Performed by: ANESTHESIOLOGY

## 2020-12-16 PROCEDURE — 2580000003 HC RX 258: Performed by: INTERNAL MEDICINE

## 2020-12-16 PROCEDURE — 1210000000 HC MED SURG R&B

## 2020-12-16 PROCEDURE — 80053 COMPREHEN METABOLIC PANEL: CPT

## 2020-12-16 PROCEDURE — C9113 INJ PANTOPRAZOLE SODIUM, VIA: HCPCS | Performed by: INTERNAL MEDICINE

## 2020-12-16 PROCEDURE — 6360000002 HC RX W HCPCS: Performed by: INTERNAL MEDICINE

## 2020-12-16 PROCEDURE — 99232 SBSQ HOSP IP/OBS MODERATE 35: CPT | Performed by: NURSE PRACTITIONER

## 2020-12-16 PROCEDURE — 2580000003 HC RX 258: Performed by: NURSE PRACTITIONER

## 2020-12-16 PROCEDURE — 85025 COMPLETE CBC W/AUTO DIFF WBC: CPT

## 2020-12-16 RX ADMIN — PANTOPRAZOLE SODIUM 40 MG: 40 INJECTION, POWDER, FOR SOLUTION INTRAVENOUS at 19:58

## 2020-12-16 RX ADMIN — Medication 10 ML: at 08:06

## 2020-12-16 RX ADMIN — ACETAMINOPHEN 500 MG: 500 TABLET ORAL at 05:57

## 2020-12-16 RX ADMIN — ACETAMINOPHEN 500 MG: 500 TABLET ORAL at 18:31

## 2020-12-16 RX ADMIN — ATENOLOL 50 MG: 50 TABLET ORAL at 08:03

## 2020-12-16 RX ADMIN — CEFEPIME 1 G: 1 INJECTION, POWDER, FOR SOLUTION INTRAMUSCULAR; INTRAVENOUS at 07:59

## 2020-12-16 RX ADMIN — GABAPENTIN 100 MG: 100 CAPSULE ORAL at 08:03

## 2020-12-16 RX ADMIN — SODIUM CHLORIDE: 9 INJECTION, SOLUTION INTRAVENOUS at 08:01

## 2020-12-16 RX ADMIN — ENOXAPARIN SODIUM 40 MG: 40 INJECTION SUBCUTANEOUS at 08:32

## 2020-12-16 RX ADMIN — GABAPENTIN 100 MG: 100 CAPSULE ORAL at 19:58

## 2020-12-16 RX ADMIN — OXYCODONE HYDROCHLORIDE 5 MG: 5 TABLET ORAL at 20:00

## 2020-12-16 RX ADMIN — PANTOPRAZOLE SODIUM 40 MG: 40 INJECTION, POWDER, FOR SOLUTION INTRAVENOUS at 08:06

## 2020-12-16 RX ADMIN — ACETAMINOPHEN 500 MG: 500 TABLET ORAL at 12:07

## 2020-12-16 RX ADMIN — CEFEPIME 1 G: 1 INJECTION, POWDER, FOR SOLUTION INTRAMUSCULAR; INTRAVENOUS at 16:23

## 2020-12-16 RX ADMIN — SODIUM CHLORIDE: 9 INJECTION, SOLUTION INTRAVENOUS at 21:50

## 2020-12-16 RX ADMIN — Medication 10 ML: at 20:00

## 2020-12-16 RX ADMIN — CEFEPIME 1 G: 1 INJECTION, POWDER, FOR SOLUTION INTRAMUSCULAR; INTRAVENOUS at 00:19

## 2020-12-16 ASSESSMENT — ENCOUNTER SYMPTOMS
RESPIRATORY NEGATIVE: 1
GASTROINTESTINAL NEGATIVE: 1

## 2020-12-16 ASSESSMENT — PAIN SCALES - GENERAL
PAINLEVEL_OUTOF10: 0
PAINLEVEL_OUTOF10: 8
PAINLEVEL_OUTOF10: 0
PAINLEVEL_OUTOF10: 9

## 2020-12-16 NOTE — PROGRESS NOTES
Hospice nurse spoke with family again. She will come back tomorrow morning and family is looking to see if patient has coverage from working at [employer]. Once this is determined, they can determine if patient will go to inpatient hospice or home with hospice. Notified Dr. Tess Santos that patient will still be here overnight.  Electronically signed by Violette Foy RN on 12/16/20 at 5:24 PM EST

## 2020-12-16 NOTE — PROGRESS NOTES
CALCIUM 8.3 12/16/2020        ASSESSMENT:  PLAN:  Cervical spine abscess   postoperative infection  Continue cefepime plan 3weeks more

## 2020-12-16 NOTE — PROGRESS NOTES
Monitor room called stating patient needed new tele leads. Went to room to replace leads to see that patient had taken off gown, monitor and had removed his picc line. Hospitalist notified that pt removed line.   Electronically signed by Dorothye Sacks, RN on 12/16/2020 at 2:37 AM

## 2020-12-17 LAB
ALBUMIN SERPL-MCNC: 2.1 G/DL (ref 3.5–4.6)
ALP BLD-CCNC: 198 U/L (ref 35–104)
ALT SERPL-CCNC: 15 U/L (ref 0–41)
ANION GAP SERPL CALCULATED.3IONS-SCNC: 11 MEQ/L (ref 9–15)
AST SERPL-CCNC: 21 U/L (ref 0–40)
BASOPHILS ABSOLUTE: 0 K/UL (ref 0–0.2)
BASOPHILS RELATIVE PERCENT: 0.5 %
BILIRUB SERPL-MCNC: 0.4 MG/DL (ref 0.2–0.7)
BLOOD BANK DISPENSE STATUS: NORMAL
BLOOD BANK PRODUCT CODE: NORMAL
BLOOD CULTURE, ROUTINE: NORMAL
BPU ID: NORMAL
BUN BLDV-MCNC: 9 MG/DL (ref 8–23)
CALCIUM SERPL-MCNC: 7.3 MG/DL (ref 8.5–9.9)
CHLORIDE BLD-SCNC: 107 MEQ/L (ref 95–107)
CO2: 20 MEQ/L (ref 20–31)
CREAT SERPL-MCNC: 0.39 MG/DL (ref 0.7–1.2)
CULTURE, BLOOD 2: NORMAL
DESCRIPTION BLOOD BANK: NORMAL
EOSINOPHILS ABSOLUTE: 0.2 K/UL (ref 0–0.7)
EOSINOPHILS RELATIVE PERCENT: 2.3 %
GFR AFRICAN AMERICAN: >60
GFR NON-AFRICAN AMERICAN: >60
GLOBULIN: 3.5 G/DL (ref 2.3–3.5)
GLUCOSE BLD-MCNC: 93 MG/DL (ref 70–99)
HCT VFR BLD CALC: 25.7 % (ref 42–52)
HEMOGLOBIN: 8.2 G/DL (ref 14–18)
LYMPHOCYTES ABSOLUTE: 1.5 K/UL (ref 1–4.8)
LYMPHOCYTES RELATIVE PERCENT: 16.7 %
MCH RBC QN AUTO: 24.9 PG (ref 27–31.3)
MCHC RBC AUTO-ENTMCNC: 31.8 % (ref 33–37)
MCV RBC AUTO: 78.3 FL (ref 80–100)
MONOCYTES ABSOLUTE: 1 K/UL (ref 0.2–0.8)
MONOCYTES RELATIVE PERCENT: 11.6 %
NEUTROPHILS ABSOLUTE: 6 K/UL (ref 1.4–6.5)
NEUTROPHILS RELATIVE PERCENT: 68.9 %
PDW BLD-RTO: 20.7 % (ref 11.5–14.5)
PLATELET # BLD: 423 K/UL (ref 130–400)
POTASSIUM SERPL-SCNC: 3.5 MEQ/L (ref 3.4–4.9)
RBC # BLD: 3.28 M/UL (ref 4.7–6.1)
SODIUM BLD-SCNC: 138 MEQ/L (ref 135–144)
TOTAL PROTEIN: 5.6 G/DL (ref 6.3–8)
WBC # BLD: 8.7 K/UL (ref 4.8–10.8)

## 2020-12-17 PROCEDURE — 36415 COLL VENOUS BLD VENIPUNCTURE: CPT

## 2020-12-17 PROCEDURE — 6370000000 HC RX 637 (ALT 250 FOR IP): Performed by: ANESTHESIOLOGY

## 2020-12-17 PROCEDURE — 99232 SBSQ HOSP IP/OBS MODERATE 35: CPT | Performed by: INTERNAL MEDICINE

## 2020-12-17 PROCEDURE — 6360000002 HC RX W HCPCS: Performed by: INTERNAL MEDICINE

## 2020-12-17 PROCEDURE — 80053 COMPREHEN METABOLIC PANEL: CPT

## 2020-12-17 PROCEDURE — C9113 INJ PANTOPRAZOLE SODIUM, VIA: HCPCS | Performed by: INTERNAL MEDICINE

## 2020-12-17 PROCEDURE — 6370000000 HC RX 637 (ALT 250 FOR IP): Performed by: INTERNAL MEDICINE

## 2020-12-17 PROCEDURE — 85025 COMPLETE CBC W/AUTO DIFF WBC: CPT

## 2020-12-17 PROCEDURE — 2580000003 HC RX 258: Performed by: NURSE PRACTITIONER

## 2020-12-17 PROCEDURE — 1210000000 HC MED SURG R&B

## 2020-12-17 PROCEDURE — 92526 ORAL FUNCTION THERAPY: CPT

## 2020-12-17 PROCEDURE — 6360000002 HC RX W HCPCS: Performed by: NURSE PRACTITIONER

## 2020-12-17 RX ADMIN — GABAPENTIN 100 MG: 100 CAPSULE ORAL at 09:48

## 2020-12-17 RX ADMIN — ENOXAPARIN SODIUM 40 MG: 40 INJECTION SUBCUTANEOUS at 09:49

## 2020-12-17 RX ADMIN — PANTOPRAZOLE SODIUM 40 MG: 40 INJECTION, POWDER, FOR SOLUTION INTRAVENOUS at 20:32

## 2020-12-17 RX ADMIN — PANTOPRAZOLE SODIUM 40 MG: 40 INJECTION, POWDER, FOR SOLUTION INTRAVENOUS at 09:49

## 2020-12-17 RX ADMIN — Medication 10 ML: at 09:49

## 2020-12-17 RX ADMIN — ACETAMINOPHEN 500 MG: 500 TABLET ORAL at 06:21

## 2020-12-17 RX ADMIN — GABAPENTIN 100 MG: 100 CAPSULE ORAL at 20:32

## 2020-12-17 RX ADMIN — OXYCODONE HYDROCHLORIDE 5 MG: 5 TABLET ORAL at 11:54

## 2020-12-17 RX ADMIN — ACETAMINOPHEN 500 MG: 500 TABLET ORAL at 00:54

## 2020-12-17 RX ADMIN — CEFEPIME 1 G: 1 INJECTION, POWDER, FOR SOLUTION INTRAMUSCULAR; INTRAVENOUS at 00:54

## 2020-12-17 RX ADMIN — ACETAMINOPHEN 500 MG: 500 TABLET ORAL at 11:52

## 2020-12-17 RX ADMIN — ATENOLOL 50 MG: 50 TABLET ORAL at 09:48

## 2020-12-17 RX ADMIN — Medication 10 ML: at 20:32

## 2020-12-17 RX ADMIN — CEFEPIME 1 G: 1 INJECTION, POWDER, FOR SOLUTION INTRAMUSCULAR; INTRAVENOUS at 17:53

## 2020-12-17 RX ADMIN — ACETAMINOPHEN 500 MG: 500 TABLET ORAL at 17:53

## 2020-12-17 RX ADMIN — SODIUM CHLORIDE: 9 INJECTION, SOLUTION INTRAVENOUS at 12:56

## 2020-12-17 RX ADMIN — CEFEPIME 1 G: 1 INJECTION, POWDER, FOR SOLUTION INTRAMUSCULAR; INTRAVENOUS at 09:49

## 2020-12-17 ASSESSMENT — PAIN DESCRIPTION - DESCRIPTORS: DESCRIPTORS: ACHING

## 2020-12-17 ASSESSMENT — PAIN SCALES - GENERAL
PAINLEVEL_OUTOF10: 8
PAINLEVEL_OUTOF10: 7
PAINLEVEL_OUTOF10: 8
PAINLEVEL_OUTOF10: 6

## 2020-12-17 ASSESSMENT — ENCOUNTER SYMPTOMS
GASTROINTESTINAL NEGATIVE: 1
RESPIRATORY NEGATIVE: 1

## 2020-12-17 ASSESSMENT — PAIN DESCRIPTION - ORIENTATION: ORIENTATION: RIGHT;LEFT

## 2020-12-17 ASSESSMENT — PAIN DESCRIPTION - FREQUENCY: FREQUENCY: CONTINUOUS

## 2020-12-17 ASSESSMENT — PAIN DESCRIPTION - PAIN TYPE: TYPE: CHRONIC PAIN

## 2020-12-17 ASSESSMENT — PAIN DESCRIPTION - LOCATION: LOCATION: HIP;LEG

## 2020-12-17 NOTE — PROGRESS NOTES
Patient is currently alert to person, place, and month. He is also aware that we are trying to get things together to send him home. He denies any pain at this time. Repositioned patient for comfort. Per  Jackie Thurston, the plan is home with hospice. Patient's sister Thom Motta will not be able to take patient home until tomorrow to arrange furniture for patient.  Electronically signed by Brennen Hernández RN on 12/17/20 at 10:07 AM EST

## 2020-12-17 NOTE — PROGRESS NOTES
Hospice requesting reevaluation of competency. Spoke with Dr. Jose Grant earlier via [facility]. He will be in tomorrow to reevaluate patient's competence to see if patient can sign for hospice. Patient is currently alert to person, place, and time. States he is having \"very little\" pain at a 7/10. Gave scheduled tylenol. No other complaints at this time. Patient repositioned frequently throughout shift.  Electronically signed by Lesly Sims RN on 12/17/20 at 5:59 PM EST

## 2020-12-17 NOTE — PROGRESS NOTES
Hospitalist Progress Note      Date of Admission: 12/11/2020  Chief Complaint:    Chief Complaint   Patient presents with    Altered Mental Status     Subjective:  Confused,    Medications:    Infusion Medications    sodium chloride 75 mL/hr at 12/17/20 1256     Scheduled Medications    pantoprazole  40 mg Intravenous BID    And    sodium chloride (PF)  10 mL Intravenous Daily    acetaminophen  500 mg Oral Q6H    lidocaine  3 patch Transdermal Daily    gabapentin  100 mg Oral BID    sodium chloride  250 mL Intravenous Once    sodium chloride flush  10 mL Intravenous 2 times per day    enoxaparin  40 mg Subcutaneous Daily    cefepime  1 g Intravenous Q8H    atenolol  50 mg Oral Daily     PRN Meds: haloperidol, oxyCODONE, sodium chloride flush, promethazine **OR** ondansetron    Intake/Output Summary (Last 24 hours) at 12/17/2020 1305  Last data filed at 12/17/2020 1111  Gross per 24 hour   Intake 1870 ml   Output 1800 ml   Net 70 ml     Exam:  /63   Pulse 62   Temp 98.1 °F (36.7 °C) (Oral)   Resp 16   Ht 6' 1\" (1.854 m)   Wt 147 lb 4.3 oz (66.8 kg)   SpO2 100%   BMI 19.43 kg/m²   Head: Normocephalic, atraumatic  Sclera clear  Neck JVD flat  Lungs: normal effort of breathing    Labs:   Recent Labs     12/15/20  0600 12/16/20  0611 12/17/20  0540   WBC 13.4* 9.9 8.7   HGB 7.6* 8.2* 8.2*   HCT 23.5* 25.3* 25.7*   * 432* 423*     Recent Labs     12/15/20  0600 12/16/20  0611 12/17/20  0540    137 138   K 3.4 3.5 3.5   * 106 107   CO2 19* 20 20   BUN 23 14 9   CREATININE 0.51* 0.43* 0.39*   CALCIUM 8.7 8.3* 7.3*   AST 38 29 21   ALT 23 19 15   BILITOT 0.8* 0.5 0.4   ALKPHOS 233* 221* 198*     No results for input(s): INR in the last 72 hours. No results for input(s): Chava Calvin in the last 72 hours. Radiology:  RADIOLOGY REPORT   Final Result      MRI BRAIN W WO CONTRAST   Final Result      No acute intracranial process or metastasis of the brain. Abnormal signal within the clivus, raising concern for metastatic disease             XR CHEST PORTABLE   Final Result      CT Head WO Contrast   Final Result        Assessment/Plan:    Patient with hx of canal stenosis s/p abscess with iv cefepime  Presents with elijah and confusion that improved with narcan. elijah resolved. Possible acute blood loss anemia,. .  Although PRBC has been ordered, patient has been refusing. Still confused, encephalopathic, refusing rx including abx. Rectal mass and prostate ca with poor prognosis based on previous oncology eval.   Failed competency eval.   Family and hospice coord with dc planning team. Plan for dc tomorrow morning with home hospice.      25 minutes total care time, >1/2 in unit/floor time and care coordination      Nallely Younger MD

## 2020-12-17 NOTE — PROGRESS NOTES
No acute intracranial process or metastasis of the brain. Abnormal signal within the clivus, raising concern for metastatic disease             XR CHEST PORTABLE   Final Result      CT Head WO Contrast   Final Result        Assessment/Plan:    Patient with hx of canal stenosis s/p abscess with iv cefepime  Presents with elijah and confusion that improved with narcan. elijah resolved. Possible acute blood loss anemia,. Therefore will consult with gastroenterology. Hemoglobin again low today. 1 unit PRBC ordered. Although PRBC has been ordered, patient has been refusing. Still confused, encephalopathic, refusing rx including abx.     Rectal mass and prostate ca with poor prognosis based on previous oncology eval.   Failed competency eval.   Family and hospice coord with dc planning team.     25 minutes total care time, >1/2 in unit/floor time and care coordination      Evelyne Moreno MD

## 2020-12-17 NOTE — PROGRESS NOTES
[facility] is set to meet with patients sister later this afternoon to have consent forms signed. Sister would like for patient to go home with hospice services tomorrow 12/18/2020. She is working on getting help to break down patients current bed to make room for hospital bed.

## 2020-12-17 NOTE — PROGRESS NOTES
[facility]  Facility/Department: [address] [facility]  Speech Language Pathology   Treatment Note    Deandre Castillo  1950  [id number]    Medical Dx: DEONDRE (acute kidney injury) ([address] [N17.9]  Speech Dx: Dysphagia     12/17/2020    Subjective:  Alert, Cooperative and Pleasant        Interventions used this date:  Dysphagia Treatment    Objective/Assessment:  Patient progressing towards goals:  Short-term Goals  Timeframe for Short-term Goals: 1-2 weeks during LOS or until goals are met. Goal 1: Pt will tolerate least restrictive diet without overt s/s of aspiration. Pt tolerated single sips of thin liquid via cup x5 without overt s/s of aspiration. Pt required occasional assistance with holding cup. Pt declined food at this time, stating he does not have an appetite. Goal 2: Pt will tolerate instrumental swallowing procedure to more objectively assess pharyngeal stage of swallow if deemed appropriate by treating SLP. Not indicated at this time. Pt is currently tolerating his current diet. Pt is also scheduled to be d/c tomorrow with hospice services. Goal 3: Pt will complete oral motor ROM and strengthening exercises with 80% accuracy, given cues as needed, in order to strengthen lingual/labial/buccal musculature to promote safety and efficiency of oral phase of swallow and decrease risk for pocketing. Not addressed  Goal 4: Pt will tolerate 5/5 trials of puree via teaspoon without overt s/s of aspiration. Not addressed. Pt is currently on a minced & moist diet. Goal 5: Pt will tolerate 5/5 trials of NTL via teaspoon without overt s/s of aspiration. Not addressed. Pt is currently tolerating a thin liquid diet. Goal 6: Pt will complete pharyngeal strengthening exercises (such as the Sherrie, Effortful swallow, etc) with 80% acc given cues as needed in order to strengthen and establish and more effective swallow.   Not addressed Problem: Pain Management  Goal: Pain level will decrease to patient's comfort goal  Outcome: PROGRESSING AS EXPECTED  PRN pain medications administered.    Problem: Knowledge Deficit  Goal: Knowledge of disease process/condition, treatment plan, diagnostic tests, and medications will improve  Outcome: PROGRESSING AS EXPECTED  Safety education given related to heparin drip.  Patient educated to notify RN of any bleeding.       Compensatory Swallowing Strategies: Total feed, Upright as possible for all oral intake, Eat/Feed slowly, Small bites/sips      Treatment/Activity Tolerance:  Patient tolerated treatment well    Plan:  Continue per POC    Pain Assessment:  Initial Assessment:  Patient denies pain. Re-assessment:  Patient denies pain. Patient/Caregiver Education:  Patient educated on session and progression towards goals. Education needs reinforcement.     Safety Devices:  Bed alarm in place and Call light within reach      Therapy Time  Time in: [phone]  Time out: [address]  Total Minutes: 10    Signature: Electronically signed by Patricia Short SLP on 12/17/2020 at 2:52 PM

## 2020-12-17 NOTE — PROGRESS NOTES
GLUCOSE 93 12/17/2020    CALCIUM 7.3 12/17/2020        ASSESSMENT:  PLAN:  Cervical spine abscess   postoperative infection  Continue cefepime plan 3weeks more

## 2020-12-18 VITALS
HEART RATE: 72 BPM | WEIGHT: 147.27 LBS | HEIGHT: 73 IN | RESPIRATION RATE: 16 BRPM | TEMPERATURE: 98.2 F | OXYGEN SATURATION: 100 % | SYSTOLIC BLOOD PRESSURE: 134 MMHG | BODY MASS INDEX: 19.52 KG/M2 | DIASTOLIC BLOOD PRESSURE: 71 MMHG

## 2020-12-18 LAB
ALBUMIN SERPL-MCNC: 2.1 G/DL (ref 3.5–4.6)
ALP BLD-CCNC: 199 U/L (ref 35–104)
ALT SERPL-CCNC: 13 U/L (ref 0–41)
ANION GAP SERPL CALCULATED.3IONS-SCNC: 11 MEQ/L (ref 9–15)
AST SERPL-CCNC: 17 U/L (ref 0–40)
BASOPHILS ABSOLUTE: 0.1 K/UL (ref 0–0.2)
BASOPHILS RELATIVE PERCENT: 0.7 %
BILIRUB SERPL-MCNC: 0.4 MG/DL (ref 0.2–0.7)
BUN BLDV-MCNC: 7 MG/DL (ref 8–23)
CALCIUM SERPL-MCNC: 7.9 MG/DL (ref 8.5–9.9)
CHLORIDE BLD-SCNC: 106 MEQ/L (ref 95–107)
CO2: 20 MEQ/L (ref 20–31)
CREAT SERPL-MCNC: 0.3 MG/DL (ref 0.7–1.2)
EOSINOPHILS ABSOLUTE: 0.3 K/UL (ref 0–0.7)
EOSINOPHILS RELATIVE PERCENT: 3.5 %
GFR AFRICAN AMERICAN: >60
GFR NON-AFRICAN AMERICAN: >60
GLOBULIN: 3.2 G/DL (ref 2.3–3.5)
GLUCOSE BLD-MCNC: 90 MG/DL (ref 70–99)
HCT VFR BLD CALC: 23.8 % (ref 42–52)
HEMOGLOBIN: 7.7 G/DL (ref 14–18)
LYMPHOCYTES ABSOLUTE: 1.4 K/UL (ref 1–4.8)
LYMPHOCYTES RELATIVE PERCENT: 16.4 %
MCH RBC QN AUTO: 25.3 PG (ref 27–31.3)
MCHC RBC AUTO-ENTMCNC: 32.5 % (ref 33–37)
MCV RBC AUTO: 77.9 FL (ref 80–100)
MONOCYTES ABSOLUTE: 0.9 K/UL (ref 0.2–0.8)
MONOCYTES RELATIVE PERCENT: 10 %
NEUTROPHILS ABSOLUTE: 6 K/UL (ref 1.4–6.5)
NEUTROPHILS RELATIVE PERCENT: 69.4 %
PDW BLD-RTO: 20.4 % (ref 11.5–14.5)
PLATELET # BLD: 437 K/UL (ref 130–400)
POTASSIUM SERPL-SCNC: 3.2 MEQ/L (ref 3.4–4.9)
RBC # BLD: 3.06 M/UL (ref 4.7–6.1)
SODIUM BLD-SCNC: 137 MEQ/L (ref 135–144)
TOTAL PROTEIN: 5.3 G/DL (ref 6.3–8)
WBC # BLD: 8.7 K/UL (ref 4.8–10.8)

## 2020-12-18 PROCEDURE — 2580000003 HC RX 258: Performed by: NURSE PRACTITIONER

## 2020-12-18 PROCEDURE — 6360000002 HC RX W HCPCS: Performed by: INTERNAL MEDICINE

## 2020-12-18 PROCEDURE — 99232 SBSQ HOSP IP/OBS MODERATE 35: CPT | Performed by: PSYCHIATRY & NEUROLOGY

## 2020-12-18 PROCEDURE — 2580000003 HC RX 258: Performed by: INTERNAL MEDICINE

## 2020-12-18 PROCEDURE — 36415 COLL VENOUS BLD VENIPUNCTURE: CPT

## 2020-12-18 PROCEDURE — C9113 INJ PANTOPRAZOLE SODIUM, VIA: HCPCS | Performed by: INTERNAL MEDICINE

## 2020-12-18 PROCEDURE — 99232 SBSQ HOSP IP/OBS MODERATE 35: CPT | Performed by: NURSE PRACTITIONER

## 2020-12-18 PROCEDURE — 85025 COMPLETE CBC W/AUTO DIFF WBC: CPT

## 2020-12-18 PROCEDURE — 6360000002 HC RX W HCPCS: Performed by: NURSE PRACTITIONER

## 2020-12-18 PROCEDURE — 6370000000 HC RX 637 (ALT 250 FOR IP): Performed by: INTERNAL MEDICINE

## 2020-12-18 PROCEDURE — 80053 COMPREHEN METABOLIC PANEL: CPT

## 2020-12-18 PROCEDURE — 99232 SBSQ HOSP IP/OBS MODERATE 35: CPT | Performed by: INTERNAL MEDICINE

## 2020-12-18 PROCEDURE — 6370000000 HC RX 637 (ALT 250 FOR IP): Performed by: ANESTHESIOLOGY

## 2020-12-18 RX ADMIN — OXYCODONE HYDROCHLORIDE 5 MG: 5 TABLET ORAL at 03:30

## 2020-12-18 RX ADMIN — Medication 10 ML: at 09:35

## 2020-12-18 RX ADMIN — CEFEPIME 1 G: 1 INJECTION, POWDER, FOR SOLUTION INTRAMUSCULAR; INTRAVENOUS at 01:57

## 2020-12-18 RX ADMIN — Medication 10 ML: at 09:36

## 2020-12-18 RX ADMIN — CEFEPIME 1 G: 1 INJECTION, POWDER, FOR SOLUTION INTRAMUSCULAR; INTRAVENOUS at 11:30

## 2020-12-18 RX ADMIN — ENOXAPARIN SODIUM 40 MG: 40 INJECTION SUBCUTANEOUS at 11:36

## 2020-12-18 RX ADMIN — ACETAMINOPHEN 500 MG: 500 TABLET ORAL at 11:33

## 2020-12-18 RX ADMIN — ACETAMINOPHEN 500 MG: 500 TABLET ORAL at 00:32

## 2020-12-18 RX ADMIN — SODIUM CHLORIDE: 9 INJECTION, SOLUTION INTRAVENOUS at 03:30

## 2020-12-18 RX ADMIN — ACETAMINOPHEN 500 MG: 500 TABLET ORAL at 06:15

## 2020-12-18 RX ADMIN — ATENOLOL 50 MG: 50 TABLET ORAL at 11:32

## 2020-12-18 RX ADMIN — GABAPENTIN 100 MG: 100 CAPSULE ORAL at 11:33

## 2020-12-18 RX ADMIN — OXYCODONE HYDROCHLORIDE 5 MG: 5 TABLET ORAL at 11:36

## 2020-12-18 RX ADMIN — PANTOPRAZOLE SODIUM 40 MG: 40 INJECTION, POWDER, FOR SOLUTION INTRAVENOUS at 09:35

## 2020-12-18 ASSESSMENT — PAIN SCALES - GENERAL
PAINLEVEL_OUTOF10: 0
PAINLEVEL_OUTOF10: 7
PAINLEVEL_OUTOF10: 8
PAINLEVEL_OUTOF10: 7
PAINLEVEL_OUTOF10: 8
PAINLEVEL_OUTOF10: 7

## 2020-12-18 ASSESSMENT — ENCOUNTER SYMPTOMS
GASTROINTESTINAL NEGATIVE: 1
RESPIRATORY NEGATIVE: 1

## 2020-12-18 ASSESSMENT — PAIN DESCRIPTION - PAIN TYPE: TYPE: CHRONIC PAIN

## 2020-12-18 ASSESSMENT — PAIN DESCRIPTION - LOCATION: LOCATION: GENERALIZED

## 2020-12-18 ASSESSMENT — PAIN DESCRIPTION - DESCRIPTORS: DESCRIPTORS: ACHING;DISCOMFORT

## 2020-12-18 NOTE — PROGRESS NOTES
Pt is a&o x4. He is agreeable to go home with hospice this evening. No complaints of pain, continuing to turn him every 2 hours. No needs at this time.

## 2020-12-18 NOTE — PROGRESS NOTES
Medical equipment is set to be delivered to patients home by 1900. Transport arranged with [facility] for 1900 12/18/2020. Patients sister Cristina Castillo is aware.

## 2020-12-18 NOTE — PROGRESS NOTES
Infectious Disease     Patient Name: Monique Tomlinson  Date: 12/18/2020  YOB: 1950  Medical Record Number: [id number]      Postoperative abscess of neck       CT scan was performed on 12/2/2020 showing fluid collection      MRI  MRI 7.5 X 4.5 X 3.5 CM POSTOPERATIVE FLUID COLLECTION  THIS DOES NOT APPEAR TO COMMUNICATE TO THE SPINAL CANAL, AND THERE IS NO EPIDURAL COLLECTION OR OTHER COMPLICATION IDENTIFIED     drained percutaneously cultures grew no organisms  Sedimentation rate was elevated            Review of Systems   Respiratory: Negative. Cardiovascular: Negative. Gastrointestinal: Negative. Physical Exam   Neck: Normal range of motion. Neck supple. Cardiovascular: Normal heart sounds. No murmur heard. Pulmonary/Chest: No respiratory distress. He has no wheezes. He has no rales. Abdominal: Soft. Bowel sounds are normal. He exhibits no distension and no mass. There is no abdominal tenderness. There is no rebound. Blood pressure (!) 147/71, pulse 67, temperature 97.9 °F (36.6 °C), temperature source Oral, resp. rate 16, height 6' 1\" (1.854 m), weight 147 lb 4.3 oz (66.8 kg), SpO2 98 %.       .   Lab Results   Component Value Date    WBC 8.7 12/18/2020    HGB 7.7 (L) 12/18/2020    HCT 23.8 (L) 12/18/2020    MCV 77.9 (L) 12/18/2020     (H) 12/18/2020     Lab Results   Component Value Date     12/18/2020    K 3.2 12/18/2020    K 4.2 11/25/2020     12/18/2020    CO2 20 12/18/2020    BUN 7 12/18/2020    CREATININE 0.30 12/18/2020    GLUCOSE 90 12/18/2020    CALCIUM 7.9 12/18/2020        ASSESSMENT:  PLAN:  Cervical spine abscess   postoperative infection  Continue cefepime plan 3weeks more

## 2020-12-18 NOTE — PROGRESS NOTES
[facility] FOLLOW-UP NOTE       12/18/2020     Patient was seen and examined in person, Chart reviewed   Patient's case discussed with staff/team    Chief Complaint: Competency reassessment    Interim History:     Pt is doing significantly better  Alert and oriented x 3  Pt is able to pass all the questions on capacity assessment, including working with the team on getting into hospice care  Pt denies feeling depressed or suicidal  No AH or VH  Appetite:   [x] Normal/Unchanged  [] Increased  [] Decreased      Sleep:       [x] Normal/Unchanged  [] Fair       [] Poor              Energy:    [x] Normal/Unchanged  [] Increased  [] Decreased        SI [] Present  [x] Absent    HI  []Present  [x] Absent     Aggression:  [] yes  [x] no    Patient is [x] able  [] unable to CONTRACT FOR SAFETY     PAST MEDICAL/PSYCHIATRIC HISTORY:   Past Medical History:   Diagnosis Date    PAF (paroxysmal atrial fibrillation) ([facility]     ON Radonna Pea    Prostate cancer ([facility] 11/2019       FAMILY/SOCIAL HISTORY:  History reviewed. No pertinent family history.   Social History     Socioeconomic History    Marital status: Single     Spouse name: Not on file    Number of children: Not on file    Years of education: Not on file    Highest education level: Not on file   Occupational History    Occupation: retired [employer]   Social Needs    Financial resource strain: Not very hard    Food insecurity     Worry: Never true     Inability: Never true    Transportation needs     Medical: No     Non-medical: No   Tobacco Use    Smoking status: Never Smoker    Smokeless tobacco: Never Used    Tobacco comment: denies   Substance and Sexual Activity    Alcohol use: Not Currently     Frequency: 2-4 times a month     Comment: denies    Drug use: No     Comment: denies    Sexual activity: Not on file   Lifestyle    Physical activity     Days per week: 0 days     Minutes per session: 0 min  Stress: Only a little   Relationships    Social connections     Talks on phone: Not on file     Gets together: Not on file     Attends [employer] service: Not on file     Active member of club or organization: Not on file     Attends meetings of clubs or organizations: Not on file     Relationship status: Not on file    Intimate partner violence     Fear of current or ex partner: No     Emotionally abused: No     Physically abused: No     Forced sexual activity: No   Other Topics Concern    Not on file   Social History Narrative    Retired from [employer] With: Alone    Type of Home: [address] [place] [address] Blvd: One level    Home Access: Level entry    Bathroom Shower/Tub: Tub/Shower unit    [employer]: Grab bars in [facility]: [address] [place] [address] Avenue: [address]: Independent    Transfer Assistance: Independent    Active : No    Patient's  Info: Sister    Additional Comments: Pt. reports he has been having near falls but no falls in the last few weeks, but progressively more weakness hte last few weeks           ROS:  [x] All negative/unchanged except if checked.  Explain positive(checked items) below:  [] Constitutional  [] Eyes  [] Ear/Nose/Mouth/Throat  [] Respiratory  [] CV  [] GI  []   [] Musculoskeletal  [] Skin/Breast  [] Neurological  [] Endocrine  [] Heme/Lymph  [] Allergic/Immunologic    Explanation:     MEDICATIONS:    Current Facility-Administered Medications:     pantoprazole (PROTONIX) injection 40 mg, 40 mg, Intravenous, BID, 40 mg at 12/18/20 0935 **AND** sodium chloride (PF) 0.9 % injection 10 mL, 10 mL, Intravenous, Daily, Elpidio Hashimoto, MD, 10 mL at 12/18/20 0935    haloperidol (HALDOL) tablet 0.5 mg, 0.5 mg, Oral, Q6H PRN, Tori Barrios, APRN - CNP   acetaminophen (TYLENOL) tablet 500 mg, 500 mg, Oral, Q6H, 500 mg at 12/18/20 1133 **OR** [DISCONTINUED] acetaminophen (TYLENOL) suppository 650 mg, 650 mg, Rectal, Q6H PRN, JOYCE Black - CNP    oxyCODONE (ROXICODONE) immediate release tablet 5 mg, 5 mg, Oral, Q4H PRN, Shabbir Wheeler MD, 5 mg at 12/18/20 1136    lidocaine 4 % external patch 3 patch, 3 patch, Transdermal, Daily, Shabbir Wheeler MD, 3 patch at 12/16/20 0817    gabapentin (NEURONTIN) capsule 100 mg, 100 mg, Oral, BID, Shabbir Wheeler MD, 100 mg at 12/18/20 1133    0.9 % sodium chloride bolus, 250 mL, Intravenous, Once, Renny Cano,     sodium chloride flush 0.9 % injection 10 mL, 10 mL, Intravenous, 2 times per day, Obdulia Duval, JOYCE Calvert CNP, 10 mL at 12/18/20 0936    sodium chloride flush 0.9 % injection 10 mL, 10 mL, Intravenous, PRN, JOYCE Black - CNP    enoxaparin (LOVENOX) injection 40 mg, 40 mg, Subcutaneous, Daily, JOYCE Black CNP, 40 mg at 12/18/20 1136    promethazine (PHENERGAN) tablet 12.5 mg, 12.5 mg, Oral, Q6H PRN **OR** ondansetron (ZOFRAN) injection 4 mg, 4 mg, Intravenous, Q6H PRN, JOYCE Black - CNP    0.9 % sodium chloride infusion, , Intravenous, Continuous, JOYCE Black CNP, Last Rate: 75 mL/hr at 12/18/20 0330, New Bag at 12/18/20 0330    cefepime (MAXIPIME) 1 g IVPB minibag, 1 g, Intravenous, Q8H, JOYCE Valle CNP, Last Rate: 100 mL/hr at 12/18/20 1130, 1 g at 12/18/20 1130    atenolol (TENORMIN) tablet 50 mg, 50 mg, Oral, Daily, Wendie De La Torre MD, 50 mg at 12/18/20 1132      Examination:  BP (!) 147/71   Pulse 67   Temp 97.9 °F (36.6 °C) (Oral)   Resp 16   Ht 6' 1\" (1.854 m)   Wt 147 lb 4.3 oz (66.8 kg)   SpO2 98%   BMI 19.43 kg/m²   Gait - steady  Medication side effects(SE): no    Mental Status Examination:    Level of consciousness:  within normal limits Appearance:  fair grooming and fair hygiene  Behavior/Motor:  psychomotor retardation  Attitude toward examiner:  cooperative  Speech:  normal rate   Mood: euthymic  Affect:  mood congruent  Thought processes:  goal directed   Thought content:  Suicidal Ideation:  denies suicidal ideation  Cognition:  oriented to person, place, and time   Concentration intact  Insight good   Judgement fair     ASSESSMENT:   Patient symptoms are:  [] Well controlled  [] Improving  [] Worsening  [] No change      Diagnosis:   Adjustment disorder NOS  Principal Problem:    DEONDRE (acute kidney injury) ([facility]  Active Problems:    Anemia    Metastatic cancer ([facility]    Severe malnutrition ([address]    Falls, initial encounter    Myelopathy ([facility]    Malignant tumor of prostate (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    Long term current use of opiate analgesic    Confusion caused by a drug    Prostate cancer metastatic to multiple sites [facility])  Resolved Problems:    * No resolved hospital problems.  *      LABS:    Recent Labs     12/16/20  0611 12/17/20  0540 12/18/20  0635   WBC 9.9 8.7 8.7   HGB 8.2* 8.2* 7.7*   * 423* 437*     Recent Labs     12/16/20  0611 12/17/20  0540 12/18/20  0635    138 137   K 3.5 3.5 3.2*    107 106   CO2 20 20 20   BUN 14 9 7*   CREATININE 0.43* 0.39* 0.30*   GLUCOSE 104* 93 90     Recent Labs     12/16/20  0611 12/17/20  0540 12/18/20  0635   BILITOT 0.5 0.4 0.4   ALKPHOS 221* 198* 199*   AST 29 21 17   ALT 19 15 13     No results found for: [place]on Odor, LABBENZ, CANNAB, COCAINESCRN, LABMETH, OPIATESCREENURINE, PHENCYCLIDINESCREENURINE, PPXUR, ETOH Lab Results   Component Value Date    TSH 1.210 11/19/2020     No results found for: LITHIUM  No results found for: VALPROATE, CBMZ        Treatment Plan:  Based on my assessment today, I believe that patient has capacity to make decision regarding his treatment including ability to decide on hospice care.           Electronically signed by Amberly Webb MD on 12/18/2020 at 2:18 PM

## 2020-12-18 NOTE — PROGRESS NOTES
Palliative Care Progress Note    Patient: Torrie Bamberger  Gender: male  YOB: 1950  Unit/Bed: J314/C250-80  CodeStatus: Full Code  Inpatient Treatment Team: Treatment Team: Attending Provider: Caty Parra MD; Consulting Physician: Nadya Thomas MD; Consulting Physician: Rubens Godoy MD; Consulting Physician: Roxane Nagy MD; Utilization Reviewer: Luh Vines RN; Registered Nurse: Albertina Underwood RN; : Joao Raymundo RN; LPN: Sabino Mohr LPN; : RUBÉN Petit; Patient Care Tech: David Wood  Admit Date:  12/11/2020    Chief Complaint: Altered mental status    History of Presenting Illness:      Torrie Bamberger is a 79 y.o. male on hospital day 6 with a history of DEONDRE. PMH is significant for androgen independent prostate cancer.  Known liver and bone mets, severe malnutrition, falls, urine retention self caths, gastric ulcer with hemorrhage. Patient seen and examined at bedside for symptom managment and family support. Patient observed laying in bed. He is oriented to self and place. At baseline, he is usually oriented x4. He denies having any pain at this time although he does have facial grimacing with movement. He tells me that he doesn't understand why everyone is asking him about pain and he has never had any pain. Both the patient and his sister are in agreement for hospice consultation. Patient is appropriate for hospice service due to his advance cancer and he is not going to continue with treatment. He is now on a dysphagia soft diet which he is having difficulty adjusting to. Over night he had episodes of aggression and code CIT was called. Will added PRN IM Haldol order. 12/18/20- Patient observed laying in bed. He is alert and oriented x 3. He is in NAD. He denies pain at this time. Patient is back to his baseline. He is able to make medical decisions for himself at this time. He tells me that he is in agreement for hospice services. Discussed his care and discharge plans with his nurse and . Per case management, she is awaiting competency clearance from Psychiatrist before calling hospice. Apparently patient has adult children with whom he doesn't have a relationship with so his sister has been reluctant to sign his hospice documents. Patient also have other siblings. Review of Systems:       Review of Systems   Unable to perform ROS: Mental status change       Physical Examination:       BP (!) 147/71   Pulse 67   Temp 97.9 °F (36.6 °C) (Oral)   Resp 16   Ht 6' 1\" (1.854 m)   Wt 147 lb 4.3 oz (66.8 kg)   SpO2 98%   BMI 19.43 kg/m²    Physical Exam  Constitutional:       General: He is not in acute distress. Appearance: He is underweight. He is not diaphoretic. HENT:      Head: Normocephalic and atraumatic. Right Ear: External ear normal.      Left Ear: External ear normal.      Nose: Nose normal.      Mouth/Throat:      Pharynx: No oropharyngeal exudate. Eyes:      General: No scleral icterus. Right eye: No discharge. Left eye: No discharge. Conjunctiva/sclera: Conjunctivae normal.      Pupils: Pupils are equal, round, and reactive to light. Neck:      Musculoskeletal: Normal range of motion and neck supple. Thyroid: No thyromegaly. Vascular: No JVD. Trachea: No tracheal deviation. Cardiovascular:      Rate and Rhythm: Normal rate and regular rhythm. Heart sounds: Normal heart sounds. Pulmonary:      Effort: Pulmonary effort is normal. No respiratory distress. Breath sounds: Normal breath sounds. No stridor. No wheezing or rales. Chest:      Chest wall: No tenderness.    Abdominal: General: Bowel sounds are normal. There is no distension. Palpations: Abdomen is soft. There is no mass. Tenderness: There is no abdominal tenderness. There is no guarding or rebound. Musculoskeletal: Normal range of motion. General: No tenderness or deformity. Lymphadenopathy:      Cervical: No cervical adenopathy. Skin:     General: Skin is warm and dry. Findings: No erythema or rash. Neurological:      Mental Status: He is alert. He is disoriented. Cranial Nerves: No cranial nerve deficit. Motor: Weakness present.       Coordination: Coordination normal.      Gait: Gait abnormal.         Allergies:      No Known Allergies    Medications:      Current Facility-Administered Medications   Medication Dose Route Frequency Provider Last Rate Last Admin    pantoprazole (PROTONIX) injection 40 mg  40 mg Intravenous BID Josias Funez MD   40 mg at 12/18/20 0935    And    sodium chloride (PF) 0.9 % injection 10 mL  10 mL Intravenous Daily Josias Funez MD   10 mL at 12/18/20 0935    haloperidol (HALDOL) tablet 0.5 mg  0.5 mg Oral Q6H PRN JOYCE Juan - IDA        acetaminophen (TYLENOL) tablet 500 mg  500 mg Oral Q6H Daren Willis MD   500 mg at 12/18/20 1133    oxyCODONE (ROXICODONE) immediate release tablet 5 mg  5 mg Oral Q4H PRN Daren Willis MD   5 mg at 12/18/20 1136    lidocaine 4 % external patch 3 patch  3 patch Transdermal Daily Daren Willis MD   3 patch at 12/16/20 0817    gabapentin (NEURONTIN) capsule 100 mg  100 mg Oral BID Daren Willis MD   100 mg at 12/18/20 1133    0.9 % sodium chloride bolus  250 mL Intravenous Once Rigo Novak,         sodium chloride flush 0.9 % injection 10 mL  10 mL Intravenous 2 times per day JOYCE Smith CNP   10 mL at 12/18/20 0936    sodium chloride flush 0.9 % injection 10 mL  10 mL Intravenous PRN JOYCE Smith CNP  enoxaparin (LOVENOX) injection 40 mg  40 mg Subcutaneous Daily JOYCE Jimenez CNP   40 mg at 12/18/20 1136    promethazine (PHENERGAN) tablet 12.5 mg  12.5 mg Oral Q6H PRN JOYCE Jmienez CNP        Or    [facility] injection 4 mg  4 mg Intravenous Q6H PRN JOYCE Jimenez - CNP        0.9 % sodium chloride infusion   Intravenous Continuous JOYCE Jimenez CNP 75 mL/hr at 12/18/20 0330 New Bag at 12/18/20 0330    cefepime (MAXIPIME) 1 g IVPB minibag  1 g Intravenous Q8H JOYCE Nelson  mL/hr at 12/18/20 1130 1 g at 12/18/20 1130    atenolol (TENORMIN) tablet 50 mg  50 mg Oral Daily Waleska Forte MD   50 mg at 12/18/20 1132       History:       PMHx:  Past Medical History:   Diagnosis Date    PAF (paroxysmal atrial fibrillation) ([facility]     ON XARELTO    Prostate cancer ([facility] [facility] 11/2019       PSHx:  Past Surgical History:   Procedure Laterality Date    CERVICAL LAMINECTOMY N/A 11/23/2020    CERVICAL C3-4,4-5,5-6  LAMINOPLASTY WITH RECONSTRUCTION performed by Humberto Matta MD at [address] [place] [address] COLONOSCOPY N/A 9/29/2020    COLONOSCOPY DIAGNOSTIC performed by Kennedi Paez MD at [address] [address] Right 11/2018    hip    UPPER GASTROINTESTINAL ENDOSCOPY N/A 9/29/2020    EGD DIAGNOSTIC ONLY performed by Kennedi Paez MD at [address] Hx:  Social History     Socioeconomic History    Marital status: Single     Spouse name: None    Number of children: None    Years of education: None    Highest education level: None   Occupational History    Occupation: retired [employer]   Social Needs    Financial resource strain: Not very hard    Food insecurity     Worry: Never true     Inability: Never true    Transportation needs     Medical: No     Non-medical: No   Tobacco Use    Smoking status: Never Smoker    Smokeless tobacco: Never Used    Tobacco comment: denies Substance and Sexual Activity    Alcohol use: Not Currently     Frequency: 2-4 times a month     Comment: denies    Drug use: No     Comment: denies    Sexual activity: None   Lifestyle    Physical activity     Days per week: 0 days     Minutes per session: 0 min    Stress: Only a little   Relationships    Social connections     Talks on phone: None     Gets together: None     Attends Tenriism service: None     Active member of club or organization: None     Attends meetings of clubs or organizations: None     Relationship status: None    Intimate partner violence     Fear of current or ex partner: No     Emotionally abused: No     Physically abused: No     Forced sexual activity: No   Other Topics Concern    None   Social History Narrative    Retired from [employer] With: Alone    Type of Home: [address]: One level    Home Access: Level entry    Bathroom Shower/Tub: Tub/Shower unit    [address]: [address] in [facility]: [facility]: Independent    Transfer Assistance: Independent    Active : No    Patient's  Info: Sister    Additional Comments: Pt. reports he has been having near falls but no falls in the last few weeks, but progressively more weakness hte last few weeks       Family Hx:  History reviewed. No pertinent family history.     LABS: Reviewed     CBC:  Lab Results   Component Value Date    WBC 8.7 12/18/2020    RBC 3.06 12/18/2020    HGB 7.7 12/18/2020    HCT 23.8 12/18/2020    MCV 77.9 12/18/2020    MCH 25.3 12/18/2020    MCHC 32.5 12/18/2020    RDW 20.4 12/18/2020     12/18/2020    MPV 8.7 09/09/2015     CBC with Differential:   Lab Results   Component Value Date    WBC 8.7 12/18/2020    RBC 3.06 12/18/2020    HGB 7.7 12/18/2020    HCT 23.8 12/18/2020     12/18/2020    MCV 77.9 12/18/2020    MCH 25.3 12/18/2020 MCHC 32.5 12/18/2020    RDW 20.4 12/18/2020    METASPCT 1 09/26/2020    LYMPHOPCT 16.4 12/18/2020    MONOPCT 10.0 12/18/2020    MYELOPCT 1 09/26/2020    BASOPCT 0.7 12/18/2020    MONOSABS 0.9 12/18/2020    LYMPHSABS 1.4 12/18/2020    EOSABS 0.3 12/18/2020    BASOSABS 0.1 12/18/2020     CMP:    Lab Results   Component Value Date     12/18/2020    K 3.2 12/18/2020    K 4.2 11/25/2020     12/18/2020    CO2 20 12/18/2020    BUN 7 12/18/2020    CREATININE 0.30 12/18/2020    GFRAA >60.0 12/18/2020    LABGLOM >60.0 12/18/2020    GLUCOSE 90 12/18/2020    PROT 5.3 12/18/2020    LABALBU 2.1 12/18/2020    CALCIUM 7.9 12/18/2020    BILITOT 0.4 12/18/2020    ALKPHOS 199 12/18/2020    AST 17 12/18/2020    ALT 13 12/18/2020     BMP:    Lab Results   Component Value Date     12/18/2020    K 3.2 12/18/2020    K 4.2 11/25/2020     12/18/2020    CO2 20 12/18/2020    BUN 7 12/18/2020    LABALBU 2.1 12/18/2020    CREATININE 0.30 12/18/2020    CALCIUM 7.9 12/18/2020    GFRAA >60.0 12/18/2020    LABGLOM >60.0 12/18/2020    GLUCOSE 90 12/18/2020     TSH:   Lab Results   Component Value Date    TSH 1.210 11/19/2020     Vitamin B12 and Folate: No components found for: FOLIC,  T67  Urinalysis:   Lab Results   Component Value Date    NITRU Negative 12/11/2020    WBCUA 3-5 12/11/2020    BACTERIA MANY 12/11/2020    RBCUA 3-5 12/11/2020    BLOODU Negative 12/11/2020    SPECGRAV 1.016 12/11/2020    GLUCOSEU Negative 12/11/2020           FUNCTIONAL ADL´S:     Independent: [  ] Eating, [   ] Dressing, [   ] Transferring, [   ] Solange Bennetts, [   ] Murphy Lathe, [   ] Continence  Dependent   : [ X ] Eating, [ X  ] Dressing, [ X  ] Transferring, [ X  ] Toileting, Orión.Rosemarieissant  ] Bathing, [ Augusto Man Continence  W. assistant : [  ] Eating, [   ] Dressing, [   ] Transferring, [   ] Solange Levine, [   ] Murphy Lassiter, [   ] Continence    Radiology: Reviewed      Mri Brain W Wo Contrast    Result Date: 12/12/2020 EXAMINATION:  MRI BRAIN W WO CONTRAST HISTORY:   new dysphagia, r/o acute pathology, r/o metastasis  N17.9 DEONDRE (acute kidney injury) ([facility] [address] ICD10 confusion. Lethargy. Weakness. Prostate cancer. Recent cervical abscess. TECHNIQUE:  Routine brain MRI protocol without and with contrast including diffusion images. CONTRAST:  13 mL gadoteridol (PROHANCE) injection COMPARISON: CT head 12/11/2020 RESULT: BRAIN: Acute Change: There is no evidence of restricted diffusion to suggest an acute infarct. Hemorrhage:    No evidence of prior parenchymal hemorrhage. Mass Lesion/ Mass Effect:    No evidence of an intracranial mass or extra-axial fluid collection. No abnormal parenchymal or leptomeningeal enhancement following contrast administration. No significant mass effect. Chronic Change: The white matter is within normal limits of signal intensity for age. Parenchyma:   No significant volume loss for age. The brain parenchyma is otherwise within normal limits of signal intensity and morphology. Ventricles:     Normal caliber and morphology. Skull Base:    Hypothalamic and pituitary region are grossly normal.   Craniocervical junction is normal. Abnormal signal within the clivus. Vasculature:    Major intracranial arterial structures, and dural venous sinuses show typical flow void, suggesting patency. Other:  The visualized paranasal sinuses are clear. Trace fluid in the mastoid air cells. The orbits and extracranial soft tissues are unremarkable. Nonspecific rounded lesion adjacent to the right nasal bones, unchanged from the recent CT. Again recommend clinical correlation. No acute intracranial process or metastasis of the brain.  Abnormal signal within the clivus, raising concern for metastatic disease      Assessment and plan: 1. Altered mental status due to acute metabolic encephalopathy with hypercalcemia (corrected) and acute delirium most likely due to DEONDRE but cannot rule out other underlying conditions such as infectious process or hyperammonia. He is currently being treated for abscess of neck  -Will start Haldol 0.5 mg every six hours PRN  -MRI of head was negative for mets or other abnormality  -ID following  -Currently on IV cefetpime  -Will order ammonia level due to liver metastasis  -Psych evaluated patient for decision making capacity. Patient lacks ability to make his own medical decision at this time. 2. Impaired mobility  -PT/OT    3. Anemia  -Hemoglobin 6.6  -He is scheduled to have blood transfusion today    4. Dysphagia  -SLP consulted  -On dysphagia soft diet  -Dietitian consulted    5. Metastatic prostate cancer  6. Neoplasm related pain  -Hospice has been consulted  -Currently being controlled with Tylenol 500 mg every 6 hours PRN, Roxicodone 5 mg every 4 hours PRN, and Lidocaine patch.  -Pain management following    -Palliative Care Encounter  -Palliative Care will continue to follow patient  -Patient may be appropriate for Hospice services due to his advanced metastatic disease.  -Patient and his sister is in agreement for hospice consultation  -Hospice consultation ordered.  -Awaiting Psych evaluation for competency to make his own medical choices    -Advance Care Planning  Discussed goals of care with patient. Explained in extensive detail nuances between full code, DNR CCA and DNR CC. Patient has made the decision to be FULL CODE      -Goals of Care Discussion: At this time, goals of care cannot be determined. Patient is confused, acutely. His sister wants to wait and see if his condition improves. She wants to continue with [facility]. She is okay with hospice if the is what he needs.      Electronically signed by JOYCE Gomez CNP on 12/18/2020 at 11:57 AM

## 2020-12-18 NOTE — PROGRESS NOTES
PROGRESS NOTE -PAIN MANAGEMENT     SERVICE DATE:  12/17/2020   SERVICE TIME:  9:06 PM    CHIEFCOMPLAINT: Generalized pain, mental status changes      SUBJECTIVE:  Mr. Toyin Wiggins is a 79 y.o. male [facility] suffering from mental status changes, patient has chronic cancer pain, was on high-dose morphine, came to the ER reversed by Narcan. Patient states  pain is well managed, patient not asking much of pain medication, he seems to be overall depressed to me.     PAIN  ASSESSMENT:    intermittent    boring    pain is perceived as moderate (4-6 pain scale)      MEDICATIONS:    Current Facility-Administered Medications   Medication Dose Route Frequency Provider Last Rate Last Admin    pantoprazole (PROTONIX) injection 40 mg  40 mg Intravenous BID Basil Amin MD   40 mg at 12/17/20 2032    And    sodium chloride (PF) 0.9 % injection 10 mL  10 mL Intravenous Daily Basil Amin MD   10 mL at 12/16/20 0806    haloperidol (HALDOL) tablet 0.5 mg  0.5 mg Oral Q6H PRN JOYCE Aguilera CNP        acetaminophen (TYLENOL) tablet 500 mg  500 mg Oral Q6H Radha Alberts MD   500 mg at 12/17/20 1753    oxyCODONE (ROXICODONE) immediate release tablet 5 mg  5 mg Oral Q4H PRN Radha Alberts MD   5 mg at 12/17/20 1154    lidocaine 4 % external patch 3 patch  3 patch Transdermal Daily Radha Alberts MD   3 patch at 12/16/20 0817    gabapentin (NEURONTIN) capsule 100 mg  100 mg Oral BID Radha Alberts MD   100 mg at 12/17/20 2032    0.9 % sodium chloride bolus  250 mL Intravenous Once Renny Cano,         sodium chloride flush 0.9 % injection 10 mL  10 mL Intravenous 2 times per day JOYCE Teixeira CNP   10 mL at 12/17/20 2032    sodium chloride flush 0.9 % injection 10 mL  10 mL Intravenous PRN JOYCE Teixeira CNP        enoxaparin (LOVENOX) injection 40 mg  40 mg Subcutaneous Daily JOYCE Landa CNP   40 mg at 12/17/20 8689 Neurological: Positive for dizziness, tremors, weakness, light-headedness, numbness and headaches. Negative for seizures, syncope, facial asymmetry and speech difficulty. Hematological: Negative for adenopathy. Bruises/bleeds easily. Psychiatric/Behavioral: Positive for behavioral problems, confusion, decreased concentration, dysphoric mood and sleep disturbance. Negative for agitation, hallucinations, self-injury and suicidal ideas. The patient is nervous/anxious. The patient is not hyperactive.           OBJECTIVE  PHYSICAL EXAM:  BP (!) 143/76   Pulse 67   Temp 97.9 °F (36.6 °C) (Oral)   Resp 16   Ht 6' 1\" (1.854 m)   Wt 147 lb 4.3 oz (66.8 kg)   SpO2 100%   BMI 19.43 kg/m²   Body mass index is 19.43 kg/m². CONSTITUTIONAL: Patient is more awake, alert, less confused. EYES:  vision intact  ENT:  normocepalic, without obvious abnormality, atraumatic  NECK:  Limited range of motion. BACK:  Decreased and limited range of motion all across. LUNGS:  no increased work of breathing  CARDIOVASCULAR:  regular rate and rhythm  ABDOMEN: Soft and nondistended  MUSCULOSKELETAL:  Generalized cachectic appearance, loss of muscle mass. NEUROLOGIC:  Neurologically unchanged, generalized weakness. Improved cognitive function. SKIN:  no bruising or bleeding     DATA: tests reviewed for today's visit:    All labs and imaging results reviewed.      Lab Results   Component Value Date    WBC 8.7 12/17/2020    HGB 8.2 12/17/2020    HCT 25.7 12/17/2020    MCV 78.3 12/17/2020     12/17/2020     12/17/2020    K 3.5 12/17/2020    K 4.2 11/25/2020     12/17/2020    CO2 20 12/17/2020    BUN 9 12/17/2020    CREATININE 0.39 12/17/2020    CALCIUM 7.3 12/17/2020    PHOS 3.0 12/10/2020    ALKPHOS 198 12/17/2020    ALT 15 12/17/2020    AST 21 12/17/2020    BILITOT 0.4 12/17/2020    BILIDIR 0.3 09/27/2020    LABALBU 2.1 12/17/2020   LABS  Recent Labs     12/15/20  0600 12/16/20  0611 12/17/20  0540 EXAMINATION:  MRI BRAIN W WO CONTRAST HISTORY:   new dysphagia, r/o acute pathology, r/o metastasis  N17.9 DEONDRE (acute kidney injury) ([facility] ICD10 confusion. Lethargy. Weakness. Prostate cancer. Recent cervical abscess. TECHNIQUE:  Routine brain MRI protocol without and with contrast including diffusion images. CONTRAST:  13 mL gadoteridol (PROHANCE) injection COMPARISON: CT head 12/11/2020 RESULT: BRAIN: Acute Change: There is no evidence of restricted diffusion to suggest an acute infarct. Hemorrhage:    No evidence of prior parenchymal hemorrhage. Mass Lesion/ Mass Effect:    No evidence of an intracranial mass or extra-axial fluid collection. No abnormal parenchymal or leptomeningeal enhancement following contrast administration. No significant mass effect. Chronic Change: The white matter is within normal limits of signal intensity for age. Parenchyma:   No significant volume loss for age. The brain parenchyma is otherwise within normal limits of signal intensity and morphology. Ventricles:     Normal caliber and morphology. Skull Base:    Hypothalamic and pituitary region are grossly normal.   Craniocervical junction is normal. Abnormal signal within the clivus. Vasculature:    Major intracranial arterial structures, and dural venous sinuses show typical flow void, suggesting patency. Other:  The visualized paranasal sinuses are clear. Trace fluid in the mastoid air cells. The orbits and extracranial soft tissues are unremarkable. Nonspecific rounded lesion adjacent to the right nasal bones, unchanged from the recent CT. Again recommend clinical correlation. No acute intracranial process or metastasis of the brain.  Abnormal signal within the clivus, raising concern for metastatic disease             ASSESSMENT & PLAN  Active Hospital Problems    Diagnosis Date Noted    Chronic neoplasm-related pain [G89.3] 12/14/2020    Myalgia [M79.10] 12/14/2020  Muscle weakness (generalized) [M62.81] 12/14/2020    Paresis of lower extremity ([facility] [address] [G83.10] 12/14/2020    Long term current use of opiate analgesic [Z79.891] 12/14/2020    Confusion caused by a drug [R41.0, T50.905A] 12/14/2020    Prostate cancer metastatic to multiple sites ([facility] [address] Cristy Sorensen 12/14/2020    Dehydration [E86.0]     Narcotic overdose ([address] [T40.601A]     DEONDRE (acute kidney injury) ([address] [N17.9] 12/12/2020    Hyperkalemia [E87.5] 12/12/2020    AMS (altered mental status) [R41.82] 12/12/2020    Leukocytosis [D72.829] 12/12/2020    Abscess [L02.91]     Sepsis due to gram-negative UTI ([facility] [address] [A41.50, N39.0] 12/01/2020    Neurogenic bladder [N31.9]     Stenosis of cervical spine with myelopathy (HCC) [M48.02, G99.2] 11/24/2020    Ataxic gait [R26.0] 11/22/2020    Retention of urine [R33.9] 11/22/2020    Malignant tumor of prostate ([address] Cristy Sorensen 11/22/2020    Myelopathy ([facility] [address] [G95.9]     Falls, initial encounter [W19. XXXA] 11/20/2020    Severe malnutrition ([address] [E43] 11/19/2020    Metastatic cancer ([facility] [address] [C79.9]     Anemia [D64.9] 09/26/2020          Acute multifactorial encephalopathy, worsened by high-dose opioid prescribed for his cancer pain and he was not tolerating well. Questionable about hospice versus continuation of palliative care, pending competency assessment by psychiatry. RECOMMENDATION:  SEE ORDERS    Patient not requiring much of the pain medication, he did not require the high-dose morphine he was on before he came in. Patient seem to be depressed, I do suggest starting low-dose trazodone or Seroquel after evaluation by psychiatry.     SIGNATURE: Michael Bazzi MD PATIENT NAME: Allie Running   DATE: December 17, 2020 MRN: [id number]   TIME: 9:06 PM PAGER/CONTACT #: [phone]

## 2020-12-18 NOTE — PROGRESS NOTES
Wound Ostomy Continence Nursing    WOC consulted for skin tears on pt's sacrum. Spoke to Eduarda Ayala RN who informed me pt was being discharged to Hospice at this time and assessment was not necessary, and that sacrum was being managed.     Electronically signed by Sarahi Barahona RN on 12/18/2020 at 6:37 PM

## 2020-12-18 NOTE — FLOWSHEET NOTE
Assessment complete. Patient is alert and oriented to self, person, and place. C/o pain 6/10 in bilateral hips and legs. Medicated per MAR. Patient repositioned for comfort. Basilio in place, draining vadim colored urine.

## 2020-12-18 NOTE — PROGRESS NOTES
Spiritual Care Services     Summary of Visit:  Social work contacted  and said patient wanted to complete HPOA.  helped patient complete the power of  form and he named his sister as his agent.  placed a copy in the chart. Spiritual Assessment/Intervention/Outcomes:    Encounter Summary  Services provided to[de-identified] Patient  Referral/Consult From[de-identified] Other disciplines(Social work)  Support System: Family members  Continue Visiting: No  Complexity of Encounter: Moderate  Length of Encounter: 30 minutes  Spiritual Assessment Completed: Yes  Routine  Type: Initial     Spiritual/Zoroastrian  Type: Spiritual support  Assessment: Approachable  Intervention: Active listening, Explored feelings, thoughts, concerns, Sustaining presence/ Ministry of presence  Outcome: Engaged in conversation, Expressed feelings/needs/concerns, Venting emotion        Advance Directives (For Healthcare)  Healthcare Directive: Yes, patient has an advance directive for healthcare treatment  Type of Healthcare Directive: Durable power of  for health care  Copy in Chart: Yes, copy in chart  Chart Copy Status [de-identified] Active, Current  Date Reviewed and Current[de-identified] 12/18/20  Information on Healthcare Directives Requested: Yes  Patient Requests Assistance: Yes, referral made to   Advance Directives: Healthcare power of attornery completed  Healthcare Agent Appointed: Adult siblings  Healthcare Agent's Name: John Carmen [address] Agent's Phone Number: [phone]  If you are unable to speak for yourself, does your Healthcare Agent or Legal Spokesperson know your healthcare wishes?: Yes                Care Plan:        Spiritual Care Services   Electronically signed by Lisandra Bustamante on 12/18/20 at 3:48 PM EST     To reach a  for emotional and spiritual support, place an [employer] consult request.   If a  is needed immediately, dial 0 and ask to page the on-call .

## 2020-12-18 NOTE — CARE COORDINATION
Per Dr. Jorje Caraballo, pt now found to have capacity to make his own decisions. LSW met with pt who confirmed that he would like to return to home with his sister and [facility]. He would also like to complete Advanced Directives. Phone call made to [facility] and spoke to Aren Woods. She will be in touch with sister Trinity Martinez to work on getting DME delivered to the home. They will notify when they will be ready for pt to DC. Page out to [employer] to request meeting with pt for Advanced Directives.

## 2020-12-18 NOTE — PROGRESS NOTES
Report called to the on call hospice nurse. Updated Dr. Matty Barrett on d/c. Also called his sister, Thom Motta and updated her on 1900 .

## 2020-12-18 NOTE — DISCHARGE INSTR - COC
Continuity of Care Form    Patient Name: Primo Sharpe   :  1950  MRN:  [id number]    Admit date:  2020  Discharge date:  20    Code Status Order: Full Code   Advance Directives:      Admitting Physician: Jesus Alberto Fernandez MD  PCP: Fozia Fitzgerald MD    Discharging Nurse: [facility] Unit/Room#: [address] Phone Number: [phone]    Emergency Contact:   Extended Emergency Contact Information  Primary Emergency Contact: [facility] [address] [facility] Phone: [phone]  Mobile Phone: [phone]  Relation: Other  Secondary Emergency Contact: [facility] [address] [facility] Phone: [phone]  Mobile Phone: [phone]  Relation: Brother/Sister    Past Surgical History:  Past Surgical History:   Procedure Laterality Date    CERVICAL LAMINECTOMY N/A 2020    CERVICAL C3-4,4-5,5-6  LAMINOPLASTY WITH RECONSTRUCTION performed by Chyna Simmons MD at [address] [place] [address] N/A 2020    COLONOSCOPY DIAGNOSTIC performed by Kamlesh Geronimo MD at [address] [place] [address] 2018    hip    UPPER GASTROINTESTINAL ENDOSCOPY N/A 2020    EGD DIAGNOSTIC ONLY performed by Kamlesh Geronimo MD at [facility]       Immunization History:   Immunization History   Administered Date(s) Administered    Influenza, High Dose (Fluzone 65 yrs and older) 2018    Pneumococcal Polysaccharide ([id number]) 2018       Active Problems:  Patient Active Problem List   Diagnosis Code    Liver mass R16.0    Osteoarthritis of hip M16.9    Palpitations R00.2    Other specified disorders of bone density and structure, unspecified site  M85.80    Anemia D64.9    Weakness R53.1    Gastric ulcer K25.9    Goals of care, counseling/discussion Z71.89    Gastrointestinal hemorrhage K92.2    Rectal mass K62.89    Acute cystitis N30.00    Metastatic cancer ([facility] [facility] C79.9    Fall at home, initial encounter W19. [place], Y92.009 Severe malnutrition ([facility] [address] E43    Falls, initial encounter W19. XXXA    Myelopathy ([facility] G95.9    Benign prostatic hyperplasia with incomplete bladder emptying N40.1, R39.14    Malignant tumor of prostate ([facility] [address] C61    Raised prostate specific antigen R97.20    Retention of urine R33.9    Urethritis N34.2    Ataxic gait R26.0    Stenosis of cervical spine with myelopathy ([facility] [address] [facility] M48.02, G99.2    Neurogenic bladder N31.9    Sepsis due to gram-negative UTI (HCC) A41.50, N39.0    Abscess L02.91    DEONDRE (acute kidney injury) ([facility] [facility] N17.9    Hyperkalemia E87.5    AMS (altered mental status) R41.82    Leukocytosis D72.829    Dehydration E86.0    Narcotic overdose (HCC) T40.601A    Chronic neoplasm-related pain G89.3    Myalgia M79.10    Muscle weakness (generalized) M62.81    Paresis of lower extremity ([facility] [address] [facility] G83.10    Long term current use of opiate analgesic Z79.891    Confusion caused by a drug R41.0, T50.905A    Prostate cancer metastatic to multiple sites [facility]) C61       Isolation/Infection:   Isolation            No Isolation          Patient Infection Status       Infection Onset Added Last Indicated Last Indicated By Review Planned Expiration Resolved Resolved By    None active    Resolved    COVID-19 Rule Out 12/12/20 12/12/20 12/12/20 COVID-19 (Ordered)   12/12/20 Rule-Out Test Resulted    COVID-19 Rule Out  11/20/20 11/20/20 Prudence Lopez RN   11/24/20 Prudence Lopez RN    COVID negative x 2     COVID-19 Rule Out 11/19/20 11/19/20 11/19/20 COVID-19 (Ordered)   11/19/20 Rule-Out Test Resulted    COVID-19 Rule Out 11/18/20 11/18/20 11/18/20 COVID-19 (Ordered)   11/18/20 Rule-Out Test Resulted            Nurse Assessment:  Last Vital Signs: BP (!) 147/71   Pulse 67   Temp 97.9 °F (36.6 °C) (Oral)   Resp 16   Ht 6' 1\" (1.854 m)   Wt 147 lb 4.3 oz (66.8 kg)   SpO2 98%   BMI 19.43 kg/m²     Last documented pain score (0-10 scale): Pain Level: 0(pt is asleep)  Last Weight: Wt Readings from Last 1 Encounters:   12/12/20 147 lb 4.3 oz (66.8 kg)     Mental Status:  oriented and alert    IV Access:  - None    Nursing Mobility/ADLs:  Walking   Assisted  Transfer  Assisted  Bathing  Assisted  Dressing  Assisted  Toileting  Assisted  Feeding  Assisted  Med Admin  Assisted  Med Delivery   whole    Wound Care Documentation and Therapy:        Elimination:  Continence: Bowel: Yes and No  Bladder: No  Urinary Catheter: Insertion Date: 12/12/20    Colostomy/Ileostomy/Ileal Conduit: No       Date of Last BM: 12/16/20    Intake/Output Summary (Last 24 hours) at 12/18/2020 1448  Last data filed at 12/18/2020 1338  Gross per 24 hour   Intake 2160 ml   Output 2100 ml   Net 60 ml     I/O last 3 completed shifts: In: 2400 [P.O.:600; I.V.:1800]  Out: 2500 [Urine:2500]    Safety Concerns: At Risk for Falls    Impairments/Disabilities:      None    Nutrition Therapy:  Current Nutrition Therapy:   - Oral Diet:  Dysphagia 1 pureed    Routes of Feeding: Oral  Liquids: No Restrictions  Daily Fluid Restriction: no  Last Modified Barium Swallow with Video (Video Swallowing Test): not done    Treatments at the Time of Hospital Discharge:   Respiratory Treatments: ***  Oxygen Therapy:  is not on home oxygen therapy.   Ventilator:    - No ventilator support    Rehab Therapies: Physical Therapy and Occupational Therapy  Weight Bearing Status/Restrictions: No weight bearing restirctions  Other Medical Equipment (for information only, NOT a DME order):  walker  Other Treatments: ***    Patient's personal belongings (please select all that are sent with patient):  {[facility] DME Belongings:[id number]}    RN SIGNATURE:  Electronically signed by Jojo Love RN on 12/18/20 at 3:27 PM EST    CASE MANAGEMENT/SOCIAL WORK SECTION    Inpatient Status Date: ***    Readmission Risk Assessment Score:  Readmission Risk              Risk of Unplanned Readmission:        36           Discharging to Facility/ Agency Name: [facility]  Address:  Phone:  Fax:    Dialysis Facility (if applicable)   Name:  Address:  Dialysis Schedule:  Phone:  Fax:    / signature: Electronically signed by RUBÉN Boyer on 12/18/20 at 2:48 PM EST    PHYSICIAN SECTION    Prognosis: Poor    Condition at Discharge: Terminal    Rehab Potential (if transferring to Rehab): Poor    Physician Certification: I certify the above information and transfer of Earle Betts  is necessary for the continuing treatment of the diagnosis listed and that he requires Hospice.      Update Admission H&P: No change in H&P    PHYSICIAN SIGNATURE:  Electronically signed by Valeria Tolentino MD on 12/18/20 at 5:29 PM EST

## 2020-12-19 ENCOUNTER — CARE COORDINATION (OUTPATIENT)
Dept: CASE MANAGEMENT | Age: 70
End: 2020-12-19

## 2020-12-19 NOTE — DISCHARGE SUMMARY
Hospital Medicine Discharge Summary    Charles Beaver  :  1950  MRN:  [id number]    Admit date:  2020  Discharge date:  2020    Admitting Physician: Danny Marcos MD  Primary Care Physician:  Joanie Hodgson MD    Charles Beaver is a 79 y.o. male that was admitted and treated at [facility] for the following medical issues:     Principal Problem:    DEONDRE (acute kidney injury) [facility])  Active Problems:    Anemia    Metastatic cancer ([address]    Severe malnutrition ([address]    Falls, initial encounter    Myelopathy ([address]    Malignant tumor of prostate ([address]    Retention of urine    Ataxic gait    Stenosis of cervical spine with myelopathy (HCC)    Neurogenic bladder    Sepsis due to gram-negative UTI (HCC)    Abscess    Hyperkalemia    AMS (altered mental status)    Leukocytosis    Dehydration    Narcotic overdose (HCC)    Chronic neoplasm-related pain    Myalgia    Muscle weakness (generalized)    Paresis of lower extremity ([address]    Long term current use of opiate analgesic    Confusion caused by a drug    Prostate cancer metastatic to multiple sites [facility])  Resolved Problems:    * No resolved hospital problems.  *      Discharge Diagnoses:    Principal Problem:    DEONDRE (acute kidney injury) ([address]  Active Problems:    Anemia    Metastatic cancer ([address]    Severe malnutrition ([address]    Falls, initial encounter    Myelopathy ([address]    Malignant tumor of prostate ([address]    Retention of urine    Ataxic gait    Stenosis of cervical spine with myelopathy (HCC)    Neurogenic bladder    Sepsis due to gram-negative UTI (HCC)    Abscess    Hyperkalemia    AMS (altered mental status)    Leukocytosis    Dehydration    Narcotic overdose (HCC)    Chronic neoplasm-related pain    Myalgia    Muscle weakness (generalized)    Paresis of lower extremity ([address]    Long term current use of opiate analgesic    Confusion caused by a drug    Prostate cancer metastatic to multiple sites [facility])  Resolved Problems: * No resolved hospital problems. *    Chief Complaint   Patient presents with    Altered Mental Status       Hospital Course:   Joselyn Ecueda is a 79 y.o. male who was admitted with encephalopathy likely sec to pain medication, responded to narcan. Underlying ca. Confusion and discomfort. Difficulty taking care of himself. Refusing treatment. Transitioned to hospice in [address] with family. Pt was discharge in a stable condition. BP (!) 147/71   Pulse 67   Temp 97.9 °F (36.6 °C) (Oral)   Resp 16   Ht 6' 1\" (1.854 m)   Wt 147 lb 4.3 oz (66.8 kg)   SpO2 98%   BMI 19.43 kg/m²     Patient was seen by the following consultants while admitted to [facility]:   Consults:  IP CONSULT TO PALLIATIVE CARE  IP CONSULT TO INFECTIOUS DISEASES  IP CONSULT TO PAIN MANAGEMENT  IP CONSULT TO GI  IP CONSULT TO PSYCHIATRY  IP CONSULT TO HOSPICE  IP CONSULT TO HOSPICE    Significant Diagnostic Studies:    Refer to chart if  Ct Head Wo Contrast    Result Date: 12/12/2020  EXAMINATION: CT of the brain without contrast HISTORY: Altered mental status COMPARISON: CT brain from November 18, 2020 TECHNIQUE: Multiple contiguous axial images were obtained of the brain from the skull base through the vertex. Multiplanar reformats were obtained. FINDINGS: Brain volume is age appropriate. Ventricular morphology is within normal limits. Gray-white matter differentiation is preserved. Areas of bilateral supratentorial white matter hypoattenuation are nonspecific but most likely related to chronic small vessel ischemic changes in a patient of this age. No acute hemorrhage or abnormal extra-axial fluid collection. No mass effect or midline shift. The visualized paranasal sinuses and mastoid air cells are clear. Calvarium is intact. A 1.5 cm soft tissue structure with internal calcifications identified within the subcutaneous soft tissues along the right nasal bone. EXAMINATION:  MRI BRAIN W WO CONTRAST HISTORY:   new dysphagia, r/o acute pathology, r/o metastasis  N17.9 DEONDRE (acute kidney injury) ([facility] [address] ICD10 confusion. Lethargy. Weakness. Prostate cancer. Recent cervical abscess. TECHNIQUE:  Routine brain MRI protocol without and with contrast including diffusion images. CONTRAST:  13 mL gadoteridol (PROHANCE) injection COMPARISON: CT head 12/11/2020 RESULT: BRAIN: Acute Change: There is no evidence of restricted diffusion to suggest an acute infarct. Hemorrhage:    No evidence of prior parenchymal hemorrhage. Mass Lesion/ Mass Effect:    No evidence of an intracranial mass or extra-axial fluid collection. No abnormal parenchymal or leptomeningeal enhancement following contrast administration. No significant mass effect. Chronic Change: The white matter is within normal limits of signal intensity for age. Parenchyma:   No significant volume loss for age. The brain parenchyma is otherwise within normal limits of signal intensity and morphology. Ventricles:     Normal caliber and morphology. Skull Base:    Hypothalamic and pituitary region are grossly normal.   Craniocervical junction is normal. Abnormal signal within the clivus. Vasculature:    Major intracranial arterial structures, and dural venous sinuses show typical flow void, suggesting patency. Other:  The visualized paranasal sinuses are clear. Trace fluid in the mastoid air cells. The orbits and extracranial soft tissues are unremarkable. Nonspecific rounded lesion adjacent to the right nasal bones, unchanged from the recent CT. Again recommend clinical correlation. No acute intracranial process or metastasis of the brain.  Abnormal signal within the clivus, raising concern for metastatic disease       Discharge Medications:       [facility] Medication Instructions IFK:[id number]    Printed on:12/18/20 0167   Medication Information

## 2020-12-19 NOTE — CARE COORDINATION
Last 45 Transitions Initial Follow Up Call    Call within 2 business days of discharge: Yes    Patient: Deandre Castillo Patient : 1950   MRN: <[id number]>  Reason for Admission: AMS  Discharge Date: 20 RARS: Readmission Risk Score: 32      Last Discharge [facility]       Complaint Diagnosis Description Type Department Provider    20 Altered Mental Status Renato coma scale total score 9-12, unspecified coma timing . .. ED to Hosp-Admission (Discharged) (ADMITTED) Martha Suárez MD; Alta Almednarez. .. Pt states [address] was out last night and will be back today. Denies needs or concerns for CTN at this time.      Care Transitions 24 Hour Call    Care Transitions Interventions         Follow Up  Future Appointments   Date Time Provider Alberto Shearer   2021  9:30 AM Hannah Roberts MD [address]   2021  2:15 PM Edith Will MD [facility]   2021 11:30 AM Seng Olson, APRN - CNP [facility]

## 2024-02-06 NOTE — PROGRESS NOTES
Assessment completed. Patient is alert and oriented X4. Vital signs are stable. Is complaining of pain; scheduled Morphine was given. Neck dressing is clean, dry and intact. Denies any other needs at this time. Call light is within reach.   Electronically signed by Melissa Knott RN on 12/7/2020 at 12:13 AM (V5) oriented, appropriate

## 2024-10-17 NOTE — PROGRESS NOTES
Consultation - Nephrology   Name: Tommie Taylor 58 y.o. male I MRN: [id number]  Unit/Bed#: S -01 I Date of Admission: 10/14/2024   Date of Service: 10/17/2024 I Hospital Day: 2   Inpatient consult to Nephrology  Consult performed by: Pito Galicia MD  Consult ordered by: Macey Almeida PA-C        Physician Requesting Evaluation: Rose Miner MD   Reason for Evaluation / Principal Problem: ROSINA    Assessment & Plan  ROSINA (acute kidney injury) (HCC)  POA CR: 3.24(Trend: 3.24>3.08)  CR on consultation: 3.52  Current antihypertensive regimen: Amlodipine 10 mg daily and labetalol 100 mg twice daily  Etiology: Likely secondary to hypertensive emergency/possibly contrast associated nephropathy/could be a component of hypoperfusion from dropping blood pressure since presentation    Plan:  Tight blood pressure control, currently patient is out of the window of permissive hypertension, continue current regimen for now, will change to nifedipine 60 mg daily tomorrow, can increase labetalol since we have some room with heart rate  Consider chest x-ray to look for signs of volume overload  Keep MAP greater than 65, avoid hypois awaiting for transfer perfusion  Avoid nephrotoxic agents not limited to NSAIDs, avoid contrast if possible  Monitor BMP daily  Volume overload  Examination showed bilateral lower extremity pitting edema  Echo on 10/15 showed normal cavity size, moderately increased wall thickness, moderate concentric hypertrophy, EF of 70%, G2 DD  Added torsemide 10 mg daily  Acid-base disorder, mixed  Mixed respiratory alkalosis and metabolic acidosis  VBG: pH 7.45, pCO2 29.2, serum HCO3 18  No active medical intervention needed  Hypertensive emergency  POA BP in 200s  Started on nicardipine drip on admission to allow BP and wean to get TNK  Home regimen: Amlodipine 10 mg daily and labetalol 100 mg twice daily  Continue current regimen, plan to transition to nifedipine tomorrow and can increase labetalol as  Perfect serve sent to Dr. Tejinder Hernandez regarding patient's oral chemo, no response at this time. Patient wants to wait to start    Per palliative care; plan is to escript the prescriptions to the SNF of choice. They verified they were able to do that.       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  CT/CTA head unremarkable for acute intracranial abnormality, s/p TNK following correction of hypertension with nicardipine drip  MRI significant for left corona radiator stroke, per neurology placed on DAPT therapy for 21 days then aspirin monotherapy indefinitely, on Lipitor 40 mg per stroke protocol  Neurology recommended Zio-patch as OP  Bipolar I disorder, severe, current or most recent episode depressed, with psychotic features ([facility])  History of Bipolar disorder, follow with psych  Continue Zyprexa, buproprion      History of Present Illness   Tommie Taylor is a 58 y.o. male who was admitted to [facility] after presenting with right-sided weakness and blurry vision, CT head negative, s/p TNK, MRI confirming left corona radiata infarct. A renal consultation is requested today for assistance in the management of ROSINA.    Review of Systems   Constitutional:  Negative for chills and fever.   HENT:  Negative for ear pain and sore throat.    Eyes:  Negative for pain and visual disturbance.   Respiratory:  Negative for cough and shortness of breath.    Cardiovascular:  Negative for chest pain and palpitations.   Gastrointestinal:  Negative for abdominal pain and vomiting.   Genitourinary:  Negative for dysuria and hematuria.   Musculoskeletal:  Negative for arthralgias and back pain.   Skin:  Negative for color change and rash.   Neurological:  Positive for weakness.  Negative for seizures and syncope.   All other systems reviewed and are negative.    I have reviewed the patient's PMH, PSH, Social History, Family History, Meds, and Allergies    Objective :  Temp:  [98 °F (36.7 °C)-100.3 °F (37.9 °C)] 99.3 °F (37.4 °C)  HR:  [] 105  BP: (142-218)/() 166/95  Resp:  [20-48] 22  SpO2:  [96 %-100 %] 97 %  O2 Device: None (Room air)    Current Weight: Weight - Scale: 113 kg (249 lb 1.9 oz) (removed extra weight from bed)  First Weight: Weight - Scale: 133 kg (292 lb 5.3 oz)  I/O         10/15 0701  10/16 0700 10/16 0701  10/17 0700 10/17 0701  10/18 0700    P.O. 480 360 140    I.V. (mL/kg) 250 (2)      IV Piggyback 50      Total Intake(mL/kg) 780 (6.2) 360 (3.2) 140 (1.2)    Urine (mL/kg/hr) 1825 (0.6) 600 (0.2) 600 (1)    Total Output 1825 600 600    Net -1045 -240 -460           Unmeasured Urine Occurrence 3 x 1 x           Physical Exam  Vitals and nursing note reviewed.   Constitutional:       General: He is not in acute distress.     Appearance: He is well-developed.   HENT:      Head: Normocephalic and atraumatic.   Eyes:      Conjunctiva/sclera: Conjunctivae normal.   Cardiovascular:      Rate and Rhythm: Regular rhythm. Tachycardia present.      Heart sounds: No murmur heard.  Pulmonary:      Effort: Pulmonary effort is normal. No respiratory distress.      Breath sounds: Normal breath sounds. No wheezing, rhonchi or rales.   Abdominal:      Palpations: Abdomen is soft.      Tenderness: There is no abdominal tenderness. There is no guarding or rebound.   Musculoskeletal:         General: No swelling.      Cervical back: Neck supple.      Right lower leg: Edema present.      Left lower leg: Edema present.   Skin:     General: Skin is warm and dry.      Capillary Refill: Capillary refill takes less than 2 seconds.   Neurological:      Mental Status: He is alert.   Psychiatric:         Mood and Affect: Mood normal.         Medications:    Current Facility-Administered  Medications:     acetaminophen (TYLENOL) tablet 650 mg, 650 mg, Oral, Q6H PRN, Macey Almeida PA-C, 650 mg at 10/17/24 0804    aspirin (ECOTRIN LOW STRENGTH) EC tablet 81 mg, 81 mg, Oral, Daily, Rose Miner MD, 81 mg at 10/17/24 0801    atorvastatin (LIPITOR) tablet 40 mg, 40 mg, Oral, QPM, Rose Miner MD, 40 mg at 10/16/24 1723    buPROPion (WELLBUTRIN) tablet 100 mg, 100 mg, Oral, BID, Rose Miner MD, 100 mg at 10/17/24 0800    chlorhexidine (PERIDEX) 0.12 % oral rinse 15 mL, 15 mL, Mouth/Throat, Q12H MIGEL, Rose Miner MD, 15 mL at 10/17/24 0800    clopidogrel (PLAVIX) tablet 75 mg, 75 mg, Oral, Daily, Rose Miner MD, 75 mg at 10/17/24 0800    heparin (porcine) subcutaneous injection 7,500 Units, 7,500 Units, Subcutaneous, Q8H MIGEL, Rose Miner MD, 7,500 Units at 10/17/24 1318    hydrALAZINE (APRESOLINE) injection 10 mg, 10 mg, Intravenous, Q6H PRN, Rose Miner MD, 10 mg at 10/17/24 0759    insulin lispro (HumALOG/ADMELOG) 100 units/mL subcutaneous injection 2-12 Units, 2-12 Units, Subcutaneous, TID AC, 4 Units at 10/16/24 1139 **AND** Fingerstick Glucose (POCT), , , TID AC, Rose Miner MD    labetalol (NORMODYNE) tablet 100 mg, 100 mg, Oral, BID, Rose Miner MD, 100 mg at 10/17/24 0801    [START ON 10/18/2024] NIFEdipine (PROCARDIA XL) 24 hr tablet 60 mg, 60 mg, Oral, Daily, Joselyn Reyes Bahamonde, MD    OLANZapine (ZyPREXA) tablet 10 mg, 10 mg, Oral, HS, Rose Miner MD, 10 mg at 10/16/24 2150    pantoprazole (PROTONIX) EC tablet 40 mg, 40 mg, Oral, Early Morning, Rose Miner MD, 40 mg at 10/15/24 0553    polyethylene glycol (MIRALAX) packet 17 g, 17 g, Oral, Daily PRN, Rose Miner MD    torsemide (DEMADEX) tablet 10 mg, 10 mg, Oral, Daily, Joselyn Reyes Bahamonde, MD, 10 mg at 10/17/24 1321    traZODone (DESYREL) tablet 50 mg, 50 mg, Oral, HS PRN, Rose Miner MD    Valbenazine Tosylate CAPS 40 mg, 40 mg, Oral, HS, Rose Miner MD, 40 mg at 10/16/24 8851      Lab Results: I have  "reviewed the following results:  Results from last 7 days   Lab Units 10/17/24  0557 10/17/24  0459 10/16/24  0504 10/15/24  0553 10/14/24  2312 10/14/24  2311   WBC Thousand/uL 8.35  --  7.27 8.06  --  8.76   HEMOGLOBIN g/dL 10.9*  --  10.8* 10.9*  --  12.1   HEMATOCRIT % 32.7*  --  32.8* 32.1*  --  37.1   PLATELETS Thousands/uL 194  --  177 168  --  212   POTASSIUM mmol/L  --  5.1 4.1 4.1 4.2  --    CHLORIDE mmol/L  --  112* 110* 106 104  --    CO2 mmol/L  --  18* 22 22 22  --    BUN mg/dL  --  26* 27* 32* 29*  --    CREATININE mg/dL  --  3.52* 3.27* 3.08* 3.24*  --    CALCIUM mg/dL  --  8.7 8.5 8.2* 8.2*  --    MAGNESIUM mg/dL  --  2.1 2.0 1.6*  --   --    PHOSPHORUS mg/dL  --  3.8 4.0 4.2  --   --    ALBUMIN g/dL  --  3.0*  --   --  2.9*  --        Administrative Statements     Portions of the record may have been created with voice recognition software. Occasional wrong word or \"sound a like\" substitutions may have occurred due to the inherent limitations of voice recognition software. Read the chart carefully and recognize, using context, where substitutions have occurred.If you have any questions, please contact the dictating provider.  "

## (undated) DEVICE — 3M™ IOBAN™ 2 ANTIMICROBIAL INCISE DRAPE 6650EZ: Brand: IOBAN™ 2

## (undated) DEVICE — ELECTRODE PT RET AD L9FT HI MOIST COND ADH HYDRGEL CORDED

## (undated) DEVICE — SUTURE VCRL + SZ 3-0 L18IN ABSRB UD PS-2 3/8 CIR REV CUT VCP497H

## (undated) DEVICE — INTENDED FOR TISSUE SEPARATION, AND OTHER PROCEDURES THAT REQUIRE A SHARP SURGICAL BLADE TO PUNCTURE OR CUT.: Brand: BARD-PARKER ® CARBON RIB-BACK BLADES

## (undated) DEVICE — CATHETER IV 16 GAX2 IN STR INTROCAN SAFETY

## (undated) DEVICE — SHEET,DRAPE,53X77,STERILE: Brand: MEDLINE

## (undated) DEVICE — CODMAN® SURGICAL PATTIES 1/2" X 3" (1.27CM X 7.62CM): Brand: CODMAN®

## (undated) DEVICE — TUBE SET 96 MM 64 MM H2O PERISTALTIC STD AUX CHANNEL

## (undated) DEVICE — SYRINGE MED 30ML STD CLR PLAS LUERLOCK TIP N CTRL DISP

## (undated) DEVICE — KIT EVAC 400CC PVC RADPQ Y CONN

## (undated) DEVICE — SPONGE GZ W4XL4IN RAYON POLY FILL CVR W/ NONWOVEN FAB

## (undated) DEVICE — TOWEL,OR,DSP,ST,BLUE,STD,4/PK,20PK/CS: Brand: MEDLINE

## (undated) DEVICE — PIN ADLT MAYFIELD RIGID MOLD FINGER

## (undated) DEVICE — COUNTER NDL 40 COUNT HLD 70 FOAM BLK ADH W/ MAG

## (undated) DEVICE — PENCIL SMOKE EVAC PUSH BUTTON COATED

## (undated) DEVICE — GAUZE,SPONGE,4"X4",16PLY,XRAY,STRL,LF: Brand: MEDLINE

## (undated) DEVICE — MARKER SURG SKIN GENTIAN VLT REG TIP W/ 6IN RUL

## (undated) DEVICE — Device: Brand: ENDO SMARTCAP

## (undated) DEVICE — CARBIDE MATCH HEAD

## (undated) DEVICE — SINGLE PORT MANIFOLD: Brand: NEPTUNE 2

## (undated) DEVICE — SUTURE VIC + BR UD CP-2 0-0 27IN VCP870H

## (undated) DEVICE — 3M™ STERI-DRAPE™ INSTRUMENT POUCH 1018: Brand: STERI-DRAPE™

## (undated) DEVICE — CODMAN® SURGICAL PATTIES 1/2" X 1/2" (1.27CM X 1.27CM): Brand: CODMAN®

## (undated) DEVICE — TAPE,CLOTH/SILK,CURAD,3"X10YD,LF,40/CS: Brand: CURAD

## (undated) DEVICE — DRAIN SURG 10FR 100% SIL RND END PERF W/ TRCR

## (undated) DEVICE — ADAPTER FLSH PMP FLD MGMT GI IRRIG OFP 2 DISPOSABLE

## (undated) DEVICE — TUBING, SUCTION, 9/32" X 20', STRAIGHT: Brand: MEDLINE INDUSTRIES, INC.

## (undated) DEVICE — SUTURE VCRL SZ 2-0 L36IN ABSRB UD L36MM CT-1 1/2 CIR J945H

## (undated) DEVICE — HYPODERMIC SAFETY NEEDLE: Brand: MAGELLAN

## (undated) DEVICE — GOWN,AURORA,NONREINFORCED,LARGE: Brand: MEDLINE

## (undated) DEVICE — ENDO CARRY-ON PROCEDURE KIT: Brand: ENDO CARRY-ON PROCEDURE KIT

## (undated) DEVICE — NEEDLE SPINAL 22GA L3.5IN SPINOCAN

## (undated) DEVICE — DRAPE, C-ARM, BANDS, 41X120: Brand: MEDLINE

## (undated) DEVICE — GLOVE ORANGE PI 7 1/2   MSG9075

## (undated) DEVICE — SPONGE GZ W4XL4IN COT 12 PLY TYP VII WVN C FLD DSGN

## (undated) DEVICE — GLOVE ORANGE PI 8   MSG9080

## (undated) DEVICE — SHAFT DRL 5MM FOR LAMINOPLASTY FIX SYS DISP LEVERAGE LFS

## (undated) DEVICE — APPLICATOR MEDICATED 10.5 CC SOLUTION HI LT ORNG CHLORAPREP

## (undated) DEVICE — CORD,CAUTERY,BIPOLAR,STERILE: Brand: MEDLINE

## (undated) DEVICE — PACK,LAPAROTOMY,NO GOWNS: Brand: MEDLINE

## (undated) DEVICE — SYRINGE MED 10ML LUERLOCK TIP W/O SFTY DISP

## (undated) DEVICE — SHAFT SURG DRL LEVERAGE LFS DISPOSABLE 7MM

## (undated) DEVICE — SUTURE VCRL SZ 4-0 L18IN ABSRB UD L19MM PS-2 3/8 CIR PRIM J496H

## (undated) DEVICE — COVER LT HNDL BLU PLAS

## (undated) DEVICE — SYRINGE IRRIG 60ML SFT PLIABLE BLB EZ TO GRP 1 HND USE W/

## (undated) DEVICE — ALCOHOL 70% RUBBING 16OZ

## (undated) DEVICE — WAX SURG 2.5GM HEMSTAT BNE BEESWAX PARAFFIN ISO PALMITATE

## (undated) DEVICE — CONMED SCOPE SAVER BITE BLOCK, 20X27 MM: Brand: SCOPE SAVER

## (undated) DEVICE — LABEL MED MINI W/ MARKER

## (undated) DEVICE — 3M™ TEGADERM™ TRANSPARENT FILM DRESSING FRAME STYLE, 1626W, 4 IN X 4-3/4 IN (10 CM X 12 CM), 50/CT 4CT/CASE: Brand: 3M™ TEGADERM™

## (undated) DEVICE — BRUSH ENDO CLN L90.5IN SHTH DIA1.7MM BRIST DIA5-7MM 2-6MM

## (undated) DEVICE — DRAPE EQUIP TRNSPRT CONTAINMENT FOR BK TAB